# Patient Record
Sex: FEMALE | Race: WHITE | NOT HISPANIC OR LATINO
[De-identification: names, ages, dates, MRNs, and addresses within clinical notes are randomized per-mention and may not be internally consistent; named-entity substitution may affect disease eponyms.]

---

## 2017-01-14 ENCOUNTER — MEDICATION RENEWAL (OUTPATIENT)
Age: 77
End: 2017-01-14

## 2017-01-14 ENCOUNTER — RX RENEWAL (OUTPATIENT)
Age: 77
End: 2017-01-14

## 2017-01-17 ENCOUNTER — APPOINTMENT (OUTPATIENT)
Dept: ELECTROPHYSIOLOGY | Facility: CLINIC | Age: 77
End: 2017-01-17

## 2017-02-14 ENCOUNTER — APPOINTMENT (OUTPATIENT)
Dept: RHEUMATOLOGY | Facility: CLINIC | Age: 77
End: 2017-02-14

## 2017-02-17 ENCOUNTER — APPOINTMENT (OUTPATIENT)
Dept: ELECTROPHYSIOLOGY | Facility: CLINIC | Age: 77
End: 2017-02-17

## 2017-02-21 ENCOUNTER — APPOINTMENT (OUTPATIENT)
Dept: RHEUMATOLOGY | Facility: CLINIC | Age: 77
End: 2017-02-21

## 2017-03-20 ENCOUNTER — APPOINTMENT (OUTPATIENT)
Dept: ELECTROPHYSIOLOGY | Facility: CLINIC | Age: 77
End: 2017-03-20

## 2017-04-09 ENCOUNTER — RX RENEWAL (OUTPATIENT)
Age: 77
End: 2017-04-09

## 2017-04-21 ENCOUNTER — APPOINTMENT (OUTPATIENT)
Dept: ELECTROPHYSIOLOGY | Facility: CLINIC | Age: 77
End: 2017-04-21

## 2017-05-22 ENCOUNTER — APPOINTMENT (OUTPATIENT)
Dept: ELECTROPHYSIOLOGY | Facility: CLINIC | Age: 77
End: 2017-05-22

## 2017-06-23 ENCOUNTER — APPOINTMENT (OUTPATIENT)
Dept: ELECTROPHYSIOLOGY | Facility: CLINIC | Age: 77
End: 2017-06-23

## 2017-07-24 ENCOUNTER — APPOINTMENT (OUTPATIENT)
Dept: ELECTROPHYSIOLOGY | Facility: CLINIC | Age: 77
End: 2017-07-24

## 2017-08-10 ENCOUNTER — APPOINTMENT (OUTPATIENT)
Dept: RHEUMATOLOGY | Facility: CLINIC | Age: 77
End: 2017-08-10
Payer: MEDICARE

## 2017-08-10 VITALS
DIASTOLIC BLOOD PRESSURE: 80 MMHG | HEART RATE: 86 BPM | RESPIRATION RATE: 17 BRPM | BODY MASS INDEX: 30.43 KG/M2 | WEIGHT: 155 LBS | SYSTOLIC BLOOD PRESSURE: 154 MMHG | HEIGHT: 60 IN | TEMPERATURE: 98.2 F | OXYGEN SATURATION: 95 %

## 2017-08-10 DIAGNOSIS — M54.42 LUMBAGO WITH SCIATICA, LEFT SIDE: ICD-10-CM

## 2017-08-10 DIAGNOSIS — Z00.00 ENCOUNTER FOR GENERAL ADULT MEDICAL EXAMINATION W/OUT ABNORMAL FINDINGS: ICD-10-CM

## 2017-08-10 PROCEDURE — 99215 OFFICE O/P EST HI 40 MIN: CPT | Mod: 25

## 2017-08-10 PROCEDURE — 85651 RBC SED RATE NONAUTOMATED: CPT

## 2017-08-10 PROCEDURE — 36415 COLL VENOUS BLD VENIPUNCTURE: CPT

## 2017-08-12 LAB
ALBUMIN SERPL ELPH-MCNC: 4.6 G/DL
ALP BLD-CCNC: 91 U/L
ALT SERPL-CCNC: 21 U/L
ANION GAP SERPL CALC-SCNC: 15 MMOL/L
APPEARANCE: ABNORMAL
AST SERPL-CCNC: 24 U/L
BACTERIA: ABNORMAL
BASOPHILS # BLD AUTO: 0.05 K/UL
BASOPHILS NFR BLD AUTO: 0.7 %
BILIRUB SERPL-MCNC: 0.3 MG/DL
BILIRUB UR QL STRIP: NORMAL
BILIRUBIN URINE: ABNORMAL
BLOOD URINE: NEGATIVE
BUN SERPL-MCNC: 21 MG/DL
CALCIUM OXALATE CRYSTALS: ABNORMAL
CALCIUM SERPL-MCNC: 10 MG/DL
CHLORIDE SERPL-SCNC: 100 MMOL/L
CK SERPL-CCNC: 121 U/L
CO2 SERPL-SCNC: 25 MMOL/L
COLLECTION METHOD: NORMAL
COLOR: ABNORMAL
CREAT SERPL-MCNC: 0.77 MG/DL
CRP SERPL-MCNC: <0.2 MG/DL
DEPRECATED KAPPA LC FREE/LAMBDA SER: 1.21 RATIO
EOSINOPHIL # BLD AUTO: 0.14 K/UL
EOSINOPHIL NFR BLD AUTO: 1.8 %
GLUCOSE QUALITATIVE U: NORMAL MG/DL
GLUCOSE SERPL-MCNC: 93 MG/DL
GLUCOSE UR-MCNC: NORMAL
HCG UR QL: 0.2 EU/DL
HCT VFR BLD CALC: 42.1 %
HGB BLD-MCNC: 13.7 G/DL
HGB UR QL STRIP.AUTO: NORMAL
HYALINE CASTS: 0 /LPF
IGA SER QL IEP: 176 MG/DL
IGG SER QL IEP: 914 MG/DL
IGM SER QL IEP: 97 MG/DL
IMM GRANULOCYTES NFR BLD AUTO: 0.1 %
KAPPA LC CSF-MCNC: 1.82 MG/DL
KAPPA LC SERPL-MCNC: 2.21 MG/DL
KETONES UR-MCNC: NORMAL
KETONES URINE: NEGATIVE
LEUKOCYTE ESTERASE UR QL STRIP: NORMAL
LEUKOCYTE ESTERASE URINE: ABNORMAL
LYMPHOCYTES # BLD AUTO: 1.69 K/UL
LYMPHOCYTES NFR BLD AUTO: 22.2 %
M PROTEIN SPEC IFE-MCNC: NORMAL
MAN DIFF?: NORMAL
MCHC RBC-ENTMCNC: 29.8 PG
MCHC RBC-ENTMCNC: 32.5 GM/DL
MCV RBC AUTO: 91.7 FL
MICROSCOPIC-UA: NORMAL
MONOCYTES # BLD AUTO: 0.76 K/UL
MONOCYTES NFR BLD AUTO: 10 %
NEUTROPHILS # BLD AUTO: 4.97 K/UL
NEUTROPHILS NFR BLD AUTO: 65.2 %
NITRITE UR QL STRIP: POSITIVE
NITRITE URINE: POSITIVE
PH UR STRIP: 5.5
PH URINE: 5.5
PHOSPHATE SERPL-MCNC: 2.7 MG/DL
PLATELET # BLD AUTO: 279 K/UL
POTASSIUM SERPL-SCNC: 4.8 MMOL/L
PROT SERPL-MCNC: 7.3 G/DL
PROT UR STRIP-MCNC: NORMAL
PROTEIN URINE: 30 MG/DL
RBC # BLD: 4.59 M/UL
RBC # FLD: 14.1 %
RED BLOOD CELLS URINE: 16 /HPF
SODIUM SERPL-SCNC: 140 MMOL/L
SP GR UR STRIP: 1.02
SPECIFIC GRAVITY URINE: 1.04
SQUAMOUS EPITHELIAL CELLS: 18 /HPF
TSH SERPL-ACNC: 2.93 UIU/ML
UROBILINOGEN URINE: NORMAL MG/DL
WBC # FLD AUTO: 7.62 K/UL
WESR: 13
WHITE BLOOD CELLS URINE: 140 /HPF

## 2017-08-14 ENCOUNTER — CLINICAL ADVICE (OUTPATIENT)
Age: 77
End: 2017-08-14

## 2017-08-15 ENCOUNTER — APPOINTMENT (OUTPATIENT)
Dept: RHEUMATOLOGY | Facility: CLINIC | Age: 77
End: 2017-08-15
Payer: MEDICARE

## 2017-08-15 PROCEDURE — 77080 DXA BONE DENSITY AXIAL: CPT

## 2017-08-25 ENCOUNTER — APPOINTMENT (OUTPATIENT)
Dept: ELECTROPHYSIOLOGY | Facility: CLINIC | Age: 77
End: 2017-08-25
Payer: MEDICARE

## 2017-08-25 PROCEDURE — 93298 REM INTERROG DEV EVAL SCRMS: CPT

## 2017-08-25 PROCEDURE — 93299: CPT

## 2017-09-20 ENCOUNTER — APPOINTMENT (OUTPATIENT)
Dept: NEUROSURGERY | Facility: CLINIC | Age: 77
End: 2017-09-20
Payer: MEDICARE

## 2017-09-20 VITALS
OXYGEN SATURATION: 99 % | HEIGHT: 60 IN | WEIGHT: 160 LBS | BODY MASS INDEX: 31.41 KG/M2 | DIASTOLIC BLOOD PRESSURE: 68 MMHG | SYSTOLIC BLOOD PRESSURE: 138 MMHG | HEART RATE: 81 BPM | TEMPERATURE: 98.5 F

## 2017-09-20 PROCEDURE — 99212 OFFICE O/P EST SF 10 MIN: CPT

## 2017-09-25 ENCOUNTER — APPOINTMENT (OUTPATIENT)
Dept: ELECTROPHYSIOLOGY | Facility: CLINIC | Age: 77
End: 2017-09-25
Payer: MEDICARE

## 2017-09-25 PROCEDURE — 93299: CPT

## 2017-09-25 PROCEDURE — 93298 REM INTERROG DEV EVAL SCRMS: CPT

## 2017-10-24 ENCOUNTER — APPOINTMENT (OUTPATIENT)
Dept: NEUROLOGY | Facility: CLINIC | Age: 77
End: 2017-10-24
Payer: MEDICARE

## 2017-10-24 VITALS
DIASTOLIC BLOOD PRESSURE: 72 MMHG | BODY MASS INDEX: 31.41 KG/M2 | HEIGHT: 60 IN | WEIGHT: 160 LBS | SYSTOLIC BLOOD PRESSURE: 144 MMHG

## 2017-10-24 DIAGNOSIS — G31.84 MILD COGNITIVE IMPAIRMENT, SO STATED: ICD-10-CM

## 2017-10-24 DIAGNOSIS — G91.9 HYDROCEPHALUS, UNSPECIFIED: ICD-10-CM

## 2017-10-24 PROCEDURE — 99215 OFFICE O/P EST HI 40 MIN: CPT

## 2017-10-27 ENCOUNTER — APPOINTMENT (OUTPATIENT)
Dept: ELECTROPHYSIOLOGY | Facility: CLINIC | Age: 77
End: 2017-10-27
Payer: MEDICARE

## 2017-10-27 PROCEDURE — 93298 REM INTERROG DEV EVAL SCRMS: CPT

## 2017-10-27 PROCEDURE — 93299: CPT

## 2017-11-01 ENCOUNTER — APPOINTMENT (OUTPATIENT)
Dept: PULMONOLOGY | Facility: CLINIC | Age: 77
End: 2017-11-01
Payer: MEDICARE

## 2017-11-01 VITALS
SYSTOLIC BLOOD PRESSURE: 180 MMHG | DIASTOLIC BLOOD PRESSURE: 80 MMHG | HEART RATE: 82 BPM | BODY MASS INDEX: 33.58 KG/M2 | OXYGEN SATURATION: 96 % | WEIGHT: 160 LBS | HEIGHT: 58 IN

## 2017-11-01 DIAGNOSIS — I83.93 ASYMPTOMATIC VARICOSE VEINS OF BILATERAL LOWER EXTREMITIES: ICD-10-CM

## 2017-11-01 PROCEDURE — 99204 OFFICE O/P NEW MOD 45 MIN: CPT | Mod: 25

## 2017-11-01 PROCEDURE — 85018 HEMOGLOBIN: CPT | Mod: QW

## 2017-11-01 PROCEDURE — 94727 GAS DIL/WSHOT DETER LNG VOL: CPT

## 2017-11-01 PROCEDURE — 94060 EVALUATION OF WHEEZING: CPT

## 2017-11-01 PROCEDURE — 94729 DIFFUSING CAPACITY: CPT

## 2017-11-01 PROCEDURE — 94664 DEMO&/EVAL PT USE INHALER: CPT | Mod: 59

## 2017-11-22 ENCOUNTER — APPOINTMENT (OUTPATIENT)
Dept: PULMONOLOGY | Facility: CLINIC | Age: 77
End: 2017-11-22
Payer: MEDICARE

## 2017-11-22 VITALS
DIASTOLIC BLOOD PRESSURE: 70 MMHG | HEART RATE: 79 BPM | SYSTOLIC BLOOD PRESSURE: 160 MMHG | WEIGHT: 164 LBS | BODY MASS INDEX: 34.28 KG/M2

## 2017-11-22 VITALS — OXYGEN SATURATION: 96 %

## 2017-11-22 PROCEDURE — 99214 OFFICE O/P EST MOD 30 MIN: CPT

## 2017-11-27 ENCOUNTER — APPOINTMENT (OUTPATIENT)
Dept: ELECTROPHYSIOLOGY | Facility: CLINIC | Age: 77
End: 2017-11-27
Payer: MEDICARE

## 2017-11-27 PROCEDURE — 93299: CPT

## 2017-11-27 PROCEDURE — 93298 REM INTERROG DEV EVAL SCRMS: CPT

## 2017-12-09 ENCOUNTER — MEDICATION RENEWAL (OUTPATIENT)
Age: 77
End: 2017-12-09

## 2017-12-11 ENCOUNTER — APPOINTMENT (OUTPATIENT)
Dept: RHEUMATOLOGY | Facility: CLINIC | Age: 77
End: 2017-12-11
Payer: MEDICARE

## 2017-12-11 VITALS
BODY MASS INDEX: 32.25 KG/M2 | WEIGHT: 160 LBS | DIASTOLIC BLOOD PRESSURE: 106 MMHG | TEMPERATURE: 98 F | SYSTOLIC BLOOD PRESSURE: 172 MMHG | OXYGEN SATURATION: 95 % | HEIGHT: 59 IN | HEART RATE: 82 BPM | RESPIRATION RATE: 16 BRPM

## 2017-12-11 LAB
BILIRUB UR QL STRIP: NORMAL
CLARITY UR: CLEAR
COLLECTION METHOD: NORMAL
GLUCOSE UR-MCNC: NORMAL
HCG UR QL: 0.2 EU/DL
HGB UR QL STRIP.AUTO: NORMAL
KETONES UR-MCNC: NORMAL
LEUKOCYTE ESTERASE UR QL STRIP: NORMAL
NITRITE UR QL STRIP: NORMAL
PH UR STRIP: 7
PROT UR STRIP-MCNC: NORMAL
SP GR UR STRIP: 1.02
WESR: 38

## 2017-12-11 PROCEDURE — 36415 COLL VENOUS BLD VENIPUNCTURE: CPT

## 2017-12-11 PROCEDURE — 81003 URINALYSIS AUTO W/O SCOPE: CPT | Mod: QW

## 2017-12-11 PROCEDURE — 99214 OFFICE O/P EST MOD 30 MIN: CPT | Mod: 25

## 2017-12-11 PROCEDURE — 85651 RBC SED RATE NONAUTOMATED: CPT

## 2017-12-13 LAB
25(OH)D3 SERPL-MCNC: 58 NG/ML
ALBUMIN SERPL ELPH-MCNC: 4.3 G/DL
ALP BLD-CCNC: 85 U/L
ALT SERPL-CCNC: 20 U/L
ANION GAP SERPL CALC-SCNC: 12 MMOL/L
AST SERPL-CCNC: 27 U/L
BASOPHILS # BLD AUTO: 0.05 K/UL
BASOPHILS NFR BLD AUTO: 1 %
BILIRUB SERPL-MCNC: 0.3 MG/DL
BUN SERPL-MCNC: 21 MG/DL
CALCIUM SERPL-MCNC: 10.2 MG/DL
CHLORIDE SERPL-SCNC: 102 MMOL/L
CK SERPL-CCNC: 185 U/L
CO2 SERPL-SCNC: 30 MMOL/L
CREAT SERPL-MCNC: 0.83 MG/DL
CRP SERPL-MCNC: 0.2 MG/DL
EOSINOPHIL # BLD AUTO: 0.19 K/UL
EOSINOPHIL NFR BLD AUTO: 3.6 %
GLUCOSE SERPL-MCNC: 85 MG/DL
HCT VFR BLD CALC: 41.5 %
HGB BLD-MCNC: 13.1 G/DL
IMM GRANULOCYTES NFR BLD AUTO: 0 %
LYMPHOCYTES # BLD AUTO: 1.84 K/UL
LYMPHOCYTES NFR BLD AUTO: 35.1 %
MAN DIFF?: NORMAL
MCHC RBC-ENTMCNC: 30 PG
MCHC RBC-ENTMCNC: 31.6 GM/DL
MCV RBC AUTO: 95 FL
MONOCYTES # BLD AUTO: 0.66 K/UL
MONOCYTES NFR BLD AUTO: 12.6 %
NEUTROPHILS # BLD AUTO: 2.5 K/UL
NEUTROPHILS NFR BLD AUTO: 47.7 %
PHOSPHATE SERPL-MCNC: 3.2 MG/DL
PLATELET # BLD AUTO: 239 K/UL
POTASSIUM SERPL-SCNC: 4.9 MMOL/L
PROT SERPL-MCNC: 7.3 G/DL
RBC # BLD: 4.37 M/UL
RBC # FLD: 13 %
SODIUM SERPL-SCNC: 144 MMOL/L
WBC # FLD AUTO: 5.24 K/UL

## 2017-12-29 ENCOUNTER — APPOINTMENT (OUTPATIENT)
Dept: ELECTROPHYSIOLOGY | Facility: CLINIC | Age: 77
End: 2017-12-29
Payer: MEDICARE

## 2017-12-29 PROCEDURE — 93298 REM INTERROG DEV EVAL SCRMS: CPT

## 2017-12-29 PROCEDURE — 93299: CPT

## 2018-01-23 ENCOUNTER — APPOINTMENT (OUTPATIENT)
Dept: NEUROLOGY | Facility: CLINIC | Age: 78
End: 2018-01-23
Payer: MEDICARE

## 2018-01-23 VITALS
WEIGHT: 160 LBS | BODY MASS INDEX: 32.25 KG/M2 | SYSTOLIC BLOOD PRESSURE: 160 MMHG | DIASTOLIC BLOOD PRESSURE: 80 MMHG | HEIGHT: 59 IN

## 2018-01-23 PROCEDURE — 99214 OFFICE O/P EST MOD 30 MIN: CPT

## 2018-01-29 ENCOUNTER — APPOINTMENT (OUTPATIENT)
Dept: ELECTROPHYSIOLOGY | Facility: CLINIC | Age: 78
End: 2018-01-29
Payer: MEDICARE

## 2018-01-29 PROCEDURE — 93299: CPT

## 2018-01-29 PROCEDURE — 93298 REM INTERROG DEV EVAL SCRMS: CPT

## 2018-01-30 ENCOUNTER — OUTPATIENT (OUTPATIENT)
Dept: OUTPATIENT SERVICES | Facility: HOSPITAL | Age: 78
LOS: 1 days | End: 2018-01-30
Payer: MEDICARE

## 2018-01-30 DIAGNOSIS — Z90.49 ACQUIRED ABSENCE OF OTHER SPECIFIED PARTS OF DIGESTIVE TRACT: Chronic | ICD-10-CM

## 2018-01-30 DIAGNOSIS — Z96.652 PRESENCE OF LEFT ARTIFICIAL KNEE JOINT: Chronic | ICD-10-CM

## 2018-01-30 DIAGNOSIS — J98.6 DISORDERS OF DIAPHRAGM: ICD-10-CM

## 2018-01-30 DIAGNOSIS — Z90.710 ACQUIRED ABSENCE OF BOTH CERVIX AND UTERUS: Chronic | ICD-10-CM

## 2018-01-30 DIAGNOSIS — S86.009A UNSPECIFIED INJURY OF UNSPECIFIED ACHILLES TENDON, INITIAL ENCOUNTER: Chronic | ICD-10-CM

## 2018-03-02 ENCOUNTER — APPOINTMENT (OUTPATIENT)
Dept: ELECTROPHYSIOLOGY | Facility: CLINIC | Age: 78
End: 2018-03-02
Payer: MEDICARE

## 2018-03-02 PROCEDURE — 93298 REM INTERROG DEV EVAL SCRMS: CPT

## 2018-03-02 PROCEDURE — 93299: CPT

## 2018-03-10 ENCOUNTER — RX RENEWAL (OUTPATIENT)
Age: 78
End: 2018-03-10

## 2018-03-12 ENCOUNTER — APPOINTMENT (OUTPATIENT)
Dept: PULMONOLOGY | Facility: CLINIC | Age: 78
End: 2018-03-12
Payer: MEDICARE

## 2018-03-12 VITALS
HEART RATE: 78 BPM | BODY MASS INDEX: 31.91 KG/M2 | RESPIRATION RATE: 16 BRPM | WEIGHT: 158 LBS | SYSTOLIC BLOOD PRESSURE: 122 MMHG | OXYGEN SATURATION: 95 % | DIASTOLIC BLOOD PRESSURE: 72 MMHG

## 2018-03-12 DIAGNOSIS — J98.4 OTHER DISORDERS OF LUNG: ICD-10-CM

## 2018-03-12 PROCEDURE — 99214 OFFICE O/P EST MOD 30 MIN: CPT

## 2018-03-15 ENCOUNTER — APPOINTMENT (OUTPATIENT)
Dept: RHEUMATOLOGY | Facility: CLINIC | Age: 78
End: 2018-03-15

## 2018-03-24 ENCOUNTER — RX RENEWAL (OUTPATIENT)
Age: 78
End: 2018-03-24

## 2018-04-02 ENCOUNTER — APPOINTMENT (OUTPATIENT)
Dept: ELECTROPHYSIOLOGY | Facility: CLINIC | Age: 78
End: 2018-04-02
Payer: MEDICARE

## 2018-04-02 PROCEDURE — 93299: CPT

## 2018-04-02 PROCEDURE — 93298 REM INTERROG DEV EVAL SCRMS: CPT

## 2018-04-19 PROCEDURE — 94618 PULMONARY STRESS TESTING: CPT

## 2018-04-19 PROCEDURE — G0239: CPT

## 2018-05-04 ENCOUNTER — APPOINTMENT (OUTPATIENT)
Dept: ELECTROPHYSIOLOGY | Facility: CLINIC | Age: 78
End: 2018-05-04
Payer: MEDICARE

## 2018-05-04 PROCEDURE — 93298 REM INTERROG DEV EVAL SCRMS: CPT

## 2018-05-04 PROCEDURE — 93299: CPT

## 2018-06-04 ENCOUNTER — APPOINTMENT (OUTPATIENT)
Dept: ELECTROPHYSIOLOGY | Facility: CLINIC | Age: 78
End: 2018-06-04
Payer: MEDICARE

## 2018-06-04 PROCEDURE — 93299: CPT

## 2018-06-04 PROCEDURE — 93298 REM INTERROG DEV EVAL SCRMS: CPT

## 2018-06-13 ENCOUNTER — NON-APPOINTMENT (OUTPATIENT)
Age: 78
End: 2018-06-13

## 2018-06-13 ENCOUNTER — APPOINTMENT (OUTPATIENT)
Dept: CARDIOLOGY | Facility: CLINIC | Age: 78
End: 2018-06-13
Payer: MEDICARE

## 2018-06-13 VITALS
SYSTOLIC BLOOD PRESSURE: 150 MMHG | WEIGHT: 162 LBS | BODY MASS INDEX: 32.66 KG/M2 | HEIGHT: 59 IN | DIASTOLIC BLOOD PRESSURE: 83 MMHG | OXYGEN SATURATION: 95 % | HEART RATE: 77 BPM

## 2018-06-13 DIAGNOSIS — R06.02 SHORTNESS OF BREATH: ICD-10-CM

## 2018-06-13 PROCEDURE — 93000 ELECTROCARDIOGRAM COMPLETE: CPT

## 2018-06-13 PROCEDURE — 99214 OFFICE O/P EST MOD 30 MIN: CPT

## 2018-06-13 RX ORDER — CHOLECALCIFEROL (VITAMIN D3) 125 MCG
TABLET ORAL
Refills: 0 | Status: DISCONTINUED | COMMUNITY
End: 2018-06-13

## 2018-06-13 RX ORDER — LATANOPROST/PF 0.005 %
DROPS OPHTHALMIC (EYE)
Refills: 0 | Status: DISCONTINUED | COMMUNITY
End: 2018-06-13

## 2018-06-13 RX ORDER — NAPROXEN 500 MG/1
TABLET ORAL
Refills: 0 | Status: DISCONTINUED | COMMUNITY
End: 2018-06-13

## 2018-06-13 RX ORDER — INSULIN DEGLUDEC INJECTION 200 U/ML
200 INJECTION, SOLUTION SUBCUTANEOUS
Qty: 27 | Refills: 0 | Status: DISCONTINUED | COMMUNITY
Start: 2017-08-08 | End: 2018-06-13

## 2018-06-13 RX ORDER — ASPIRIN 325 MG/1
325 TABLET, FILM COATED ORAL DAILY
Qty: 30 | Refills: 3 | Status: DISCONTINUED | COMMUNITY
Start: 2017-03-20 | End: 2018-06-13

## 2018-06-13 RX ORDER — METFORMIN HYDROCHLORIDE 500 MG/1
500 TABLET, COATED ORAL
Refills: 0 | Status: DISCONTINUED | COMMUNITY
End: 2018-06-13

## 2018-06-13 RX ORDER — ACETAMINOPHEN AND CODEINE 300; 30 MG/1; MG/1
300-30 TABLET ORAL
Qty: 12 | Refills: 0 | Status: DISCONTINUED | COMMUNITY
Start: 2017-11-04 | End: 2018-06-13

## 2018-06-13 RX ORDER — NAPROXEN 500 MG/1
TABLET, DELAYED RELEASE ORAL
Refills: 0 | Status: DISCONTINUED | COMMUNITY
End: 2018-06-13

## 2018-06-13 RX ORDER — NAPROXEN SODIUM 220 MG
220 TABLET ORAL
Refills: 0 | Status: DISCONTINUED | COMMUNITY
End: 2018-06-13

## 2018-06-13 RX ORDER — BUPROPION HYDROCHLORIDE 75 MG/1
TABLET, FILM COATED ORAL
Refills: 0 | Status: DISCONTINUED | COMMUNITY
End: 2018-06-13

## 2018-06-19 ENCOUNTER — FORM ENCOUNTER (OUTPATIENT)
Age: 78
End: 2018-06-19

## 2018-06-20 ENCOUNTER — OUTPATIENT (OUTPATIENT)
Dept: OUTPATIENT SERVICES | Facility: HOSPITAL | Age: 78
LOS: 1 days | End: 2018-06-20
Payer: MEDICARE

## 2018-06-20 DIAGNOSIS — Z90.710 ACQUIRED ABSENCE OF BOTH CERVIX AND UTERUS: Chronic | ICD-10-CM

## 2018-06-20 DIAGNOSIS — S86.009A UNSPECIFIED INJURY OF UNSPECIFIED ACHILLES TENDON, INITIAL ENCOUNTER: Chronic | ICD-10-CM

## 2018-06-20 DIAGNOSIS — Z96.652 PRESENCE OF LEFT ARTIFICIAL KNEE JOINT: Chronic | ICD-10-CM

## 2018-06-20 DIAGNOSIS — R06.02 SHORTNESS OF BREATH: ICD-10-CM

## 2018-06-20 DIAGNOSIS — Z90.49 ACQUIRED ABSENCE OF OTHER SPECIFIED PARTS OF DIGESTIVE TRACT: Chronic | ICD-10-CM

## 2018-06-20 PROCEDURE — 93016 CV STRESS TEST SUPVJ ONLY: CPT

## 2018-06-20 PROCEDURE — A9500: CPT

## 2018-06-20 PROCEDURE — 78452 HT MUSCLE IMAGE SPECT MULT: CPT

## 2018-06-20 PROCEDURE — 78452 HT MUSCLE IMAGE SPECT MULT: CPT | Mod: 26

## 2018-06-20 PROCEDURE — 93017 CV STRESS TEST TRACING ONLY: CPT

## 2018-06-20 PROCEDURE — 93306 TTE W/DOPPLER COMPLETE: CPT

## 2018-06-20 PROCEDURE — 93306 TTE W/DOPPLER COMPLETE: CPT | Mod: 26

## 2018-06-20 PROCEDURE — 93018 CV STRESS TEST I&R ONLY: CPT

## 2018-06-22 ENCOUNTER — RESULT REVIEW (OUTPATIENT)
Age: 78
End: 2018-06-22

## 2018-06-27 ENCOUNTER — APPOINTMENT (OUTPATIENT)
Dept: RHEUMATOLOGY | Facility: CLINIC | Age: 78
End: 2018-06-27
Payer: MEDICARE

## 2018-06-27 ENCOUNTER — APPOINTMENT (OUTPATIENT)
Dept: NEUROSURGERY | Facility: CLINIC | Age: 78
End: 2018-06-27
Payer: MEDICARE

## 2018-06-27 VITALS
DIASTOLIC BLOOD PRESSURE: 82 MMHG | BODY MASS INDEX: 32.32 KG/M2 | OXYGEN SATURATION: 93 % | TEMPERATURE: 98 F | WEIGHT: 160 LBS | HEART RATE: 82 BPM | SYSTOLIC BLOOD PRESSURE: 142 MMHG | RESPIRATION RATE: 18 BRPM

## 2018-06-27 VITALS
SYSTOLIC BLOOD PRESSURE: 141 MMHG | BODY MASS INDEX: 32.66 KG/M2 | HEART RATE: 80 BPM | TEMPERATURE: 98.4 F | WEIGHT: 162 LBS | HEIGHT: 59 IN | OXYGEN SATURATION: 94 % | DIASTOLIC BLOOD PRESSURE: 80 MMHG

## 2018-06-27 DIAGNOSIS — M75.21 BICIPITAL TENDINITIS, RIGHT SHOULDER: ICD-10-CM

## 2018-06-27 PROCEDURE — 99214 OFFICE O/P EST MOD 30 MIN: CPT

## 2018-06-27 PROCEDURE — 99214 OFFICE O/P EST MOD 30 MIN: CPT | Mod: 25

## 2018-06-27 PROCEDURE — 85651 RBC SED RATE NONAUTOMATED: CPT

## 2018-06-27 PROCEDURE — 36415 COLL VENOUS BLD VENIPUNCTURE: CPT

## 2018-06-27 RX ORDER — MIRTAZAPINE 30 MG/1
30 TABLET, FILM COATED ORAL
Qty: 30 | Refills: 0 | Status: DISCONTINUED | COMMUNITY
Start: 2018-05-25

## 2018-06-27 RX ORDER — AZITHROMYCIN 250 MG/1
250 TABLET, FILM COATED ORAL
Qty: 6 | Refills: 0 | Status: DISCONTINUED | COMMUNITY
Start: 2018-01-19

## 2018-06-27 RX ORDER — MIRTAZAPINE 45 MG/1
45 TABLET, FILM COATED ORAL
Qty: 90 | Refills: 0 | Status: DISCONTINUED | COMMUNITY
Start: 2018-06-06

## 2018-06-27 RX ORDER — BROMFENAC 1.03 MG/ML
0.09 SOLUTION/ DROPS OPHTHALMIC
Qty: 2 | Refills: 0 | Status: DISCONTINUED | COMMUNITY
Start: 2018-03-23

## 2018-06-27 RX ORDER — PREDNISOLONE ACETATE 10 MG/ML
1 SUSPENSION/ DROPS OPHTHALMIC
Qty: 5 | Refills: 0 | Status: DISCONTINUED | COMMUNITY
Start: 2018-02-28

## 2018-06-28 LAB
BASOPHILS # BLD AUTO: 0.07 K/UL
BASOPHILS NFR BLD AUTO: 1 %
BILIRUB UR QL STRIP: NORMAL
CLARITY UR: NORMAL
COLLECTION METHOD: NORMAL
EOSINOPHIL # BLD AUTO: 0.33 K/UL
EOSINOPHIL NFR BLD AUTO: 4.8 %
GLUCOSE UR-MCNC: NORMAL
HCG UR QL: 0.2 EU/DL
HCT VFR BLD CALC: 41 %
HGB BLD-MCNC: 12.9 G/DL
HGB UR QL STRIP.AUTO: NORMAL
IMM GRANULOCYTES NFR BLD AUTO: 0.3 %
KETONES UR-MCNC: NORMAL
LEUKOCYTE ESTERASE UR QL STRIP: NORMAL
LYMPHOCYTES # BLD AUTO: 1.91 K/UL
LYMPHOCYTES NFR BLD AUTO: 27.6 %
MAN DIFF?: NORMAL
MCHC RBC-ENTMCNC: 30.3 PG
MCHC RBC-ENTMCNC: 31.5 GM/DL
MCV RBC AUTO: 96.2 FL
MONOCYTES # BLD AUTO: 0.75 K/UL
MONOCYTES NFR BLD AUTO: 10.8 %
NEUTROPHILS # BLD AUTO: 3.84 K/UL
NEUTROPHILS NFR BLD AUTO: 55.5 %
NITRITE UR QL STRIP: POSITIVE
PH UR STRIP: 6
PLATELET # BLD AUTO: 288 K/UL
PROT UR STRIP-MCNC: NORMAL
RBC # BLD: 4.26 M/UL
RBC # FLD: 13.3 %
SP GR UR STRIP: 1.02
WBC # FLD AUTO: 6.92 K/UL
WESR: 50

## 2018-06-29 LAB
ALBUMIN SERPL ELPH-MCNC: 4.2 G/DL
ALP BLD-CCNC: 86 U/L
ALT SERPL-CCNC: 16 U/L
ANION GAP SERPL CALC-SCNC: 18 MMOL/L
APPEARANCE: CLEAR
AST SERPL-CCNC: 22 U/L
BACTERIA: ABNORMAL
BILIRUB SERPL-MCNC: 0.2 MG/DL
BILIRUBIN URINE: NEGATIVE
BLOOD URINE: ABNORMAL
BUN SERPL-MCNC: 25 MG/DL
CALCIUM SERPL-MCNC: 9.7 MG/DL
CCP AB SER IA-ACNC: <8 UNITS
CHLORIDE SERPL-SCNC: 100 MMOL/L
CO2 SERPL-SCNC: 22 MMOL/L
COLOR: YELLOW
CREAT SERPL-MCNC: 0.83 MG/DL
CRP SERPL-MCNC: <0.2 MG/DL
GLUCOSE QUALITATIVE U: NEGATIVE MG/DL
GLUCOSE SERPL-MCNC: 98 MG/DL
HYALINE CASTS: 0 /LPF
KETONES URINE: NEGATIVE
LDH SERPL-CCNC: 223 U/L
LEUKOCYTE ESTERASE URINE: ABNORMAL
MICROSCOPIC-UA: NORMAL
NITRITE URINE: POSITIVE
PH URINE: 6
PHOSPHATE SERPL-MCNC: 3 MG/DL
POTASSIUM SERPL-SCNC: 4.4 MMOL/L
PROT SERPL-MCNC: 7.2 G/DL
PROTEIN URINE: NEGATIVE MG/DL
RED BLOOD CELLS URINE: 5 /HPF
RF+CCP IGG SER-IMP: NEGATIVE
RHEUMATOID FACT SER QL: 10 IU/ML
SODIUM SERPL-SCNC: 140 MMOL/L
SPECIFIC GRAVITY URINE: 1.01
SQUAMOUS EPITHELIAL CELLS: 6 /HPF
UROBILINOGEN URINE: NEGATIVE MG/DL
WHITE BLOOD CELLS URINE: 7 /HPF

## 2018-06-30 LAB
ENA SS-A AB SER IA-ACNC: <0.2 AL
ENA SS-B AB SER IA-ACNC: <0.2 AL

## 2018-07-06 ENCOUNTER — APPOINTMENT (OUTPATIENT)
Dept: ELECTROPHYSIOLOGY | Facility: CLINIC | Age: 78
End: 2018-07-06
Payer: MEDICARE

## 2018-07-06 PROCEDURE — 93298 REM INTERROG DEV EVAL SCRMS: CPT

## 2018-07-06 PROCEDURE — 93299: CPT

## 2018-07-10 ENCOUNTER — NON-APPOINTMENT (OUTPATIENT)
Age: 78
End: 2018-07-10

## 2018-07-10 ENCOUNTER — APPOINTMENT (OUTPATIENT)
Dept: CARDIOLOGY | Facility: CLINIC | Age: 78
End: 2018-07-10
Payer: MEDICARE

## 2018-07-10 VITALS
HEART RATE: 70 BPM | SYSTOLIC BLOOD PRESSURE: 162 MMHG | BODY MASS INDEX: 34.07 KG/M2 | WEIGHT: 169 LBS | HEIGHT: 59 IN | DIASTOLIC BLOOD PRESSURE: 60 MMHG | OXYGEN SATURATION: 93 %

## 2018-07-10 VITALS — SYSTOLIC BLOOD PRESSURE: 136 MMHG | DIASTOLIC BLOOD PRESSURE: 60 MMHG

## 2018-07-10 VITALS — DIASTOLIC BLOOD PRESSURE: 58 MMHG | SYSTOLIC BLOOD PRESSURE: 124 MMHG

## 2018-07-10 DIAGNOSIS — R07.89 OTHER CHEST PAIN: ICD-10-CM

## 2018-07-10 PROCEDURE — 99214 OFFICE O/P EST MOD 30 MIN: CPT

## 2018-07-10 PROCEDURE — 93000 ELECTROCARDIOGRAM COMPLETE: CPT

## 2018-07-10 RX ORDER — CLINDAMYCIN HYDROCHLORIDE 150 MG/1
150 CAPSULE ORAL
Qty: 24 | Refills: 0 | Status: DISCONTINUED | COMMUNITY
Start: 2017-10-28 | End: 2018-07-10

## 2018-07-10 RX ORDER — TRAVOPROST 0.04 MG/ML
0 SOLUTION/ DROPS OPHTHALMIC
Qty: 8 | Refills: 0 | Status: DISCONTINUED | COMMUNITY
Start: 2017-11-09 | End: 2018-07-10

## 2018-07-10 RX ORDER — BACLOFEN 10 MG/1
10 TABLET ORAL
Qty: 60 | Refills: 3 | Status: DISCONTINUED | COMMUNITY
Start: 2017-08-10 | End: 2018-07-10

## 2018-08-06 ENCOUNTER — RX RENEWAL (OUTPATIENT)
Age: 78
End: 2018-08-06

## 2018-08-06 ENCOUNTER — APPOINTMENT (OUTPATIENT)
Dept: ELECTROPHYSIOLOGY | Facility: CLINIC | Age: 78
End: 2018-08-06
Payer: MEDICARE

## 2018-08-06 PROCEDURE — 93299: CPT

## 2018-08-06 PROCEDURE — 93298 REM INTERROG DEV EVAL SCRMS: CPT

## 2018-09-07 ENCOUNTER — APPOINTMENT (OUTPATIENT)
Dept: ELECTROPHYSIOLOGY | Facility: CLINIC | Age: 78
End: 2018-09-07
Payer: MEDICARE

## 2018-09-07 ENCOUNTER — APPOINTMENT (OUTPATIENT)
Dept: NEUROLOGY | Facility: CLINIC | Age: 78
End: 2018-09-07

## 2018-09-07 PROCEDURE — 93299: CPT

## 2018-09-07 PROCEDURE — 93298 REM INTERROG DEV EVAL SCRMS: CPT

## 2018-09-10 ENCOUNTER — APPOINTMENT (OUTPATIENT)
Dept: PULMONOLOGY | Facility: CLINIC | Age: 78
End: 2018-09-10

## 2018-09-22 ENCOUNTER — RX RENEWAL (OUTPATIENT)
Age: 78
End: 2018-09-22

## 2018-09-27 ENCOUNTER — RX RENEWAL (OUTPATIENT)
Age: 78
End: 2018-09-27

## 2018-10-02 ENCOUNTER — APPOINTMENT (OUTPATIENT)
Dept: RHEUMATOLOGY | Facility: CLINIC | Age: 78
End: 2018-10-02
Payer: MEDICARE

## 2018-10-02 ENCOUNTER — LABORATORY RESULT (OUTPATIENT)
Age: 78
End: 2018-10-02

## 2018-10-02 VITALS
OXYGEN SATURATION: 96 % | BODY MASS INDEX: 33.26 KG/M2 | WEIGHT: 165 LBS | HEART RATE: 79 BPM | SYSTOLIC BLOOD PRESSURE: 140 MMHG | DIASTOLIC BLOOD PRESSURE: 68 MMHG | RESPIRATION RATE: 18 BRPM | HEIGHT: 59 IN | TEMPERATURE: 97.9 F

## 2018-10-02 DIAGNOSIS — L40.50 ARTHROPATHIC PSORIASIS, UNSPECIFIED: ICD-10-CM

## 2018-10-02 DIAGNOSIS — M79.7 FIBROMYALGIA: ICD-10-CM

## 2018-10-02 DIAGNOSIS — M85.80 OTHER SPECIFIED DISORDERS OF BONE DENSITY AND STRUCTURE, UNSPECIFIED SITE: ICD-10-CM

## 2018-10-02 DIAGNOSIS — M15.9 POLYOSTEOARTHRITIS, UNSPECIFIED: ICD-10-CM

## 2018-10-02 DIAGNOSIS — G89.29 PAIN IN RIGHT SHOULDER: ICD-10-CM

## 2018-10-02 DIAGNOSIS — M25.511 PAIN IN RIGHT SHOULDER: ICD-10-CM

## 2018-10-02 DIAGNOSIS — I73.00 RAYNAUD'S SYNDROME W/OUT GANGRENE: ICD-10-CM

## 2018-10-02 PROCEDURE — 99215 OFFICE O/P EST HI 40 MIN: CPT | Mod: 25

## 2018-10-02 PROCEDURE — 36415 COLL VENOUS BLD VENIPUNCTURE: CPT

## 2018-10-02 RX ORDER — ASPIRIN 300 MG
325 SUPPOSITORY, RECTAL RECTAL
Refills: 0 | Status: DISCONTINUED | COMMUNITY
End: 2018-10-02

## 2018-10-02 RX ORDER — OMEPRAZOLE 20 MG/1
20 CAPSULE, DELAYED RELEASE ORAL DAILY
Qty: 90 | Refills: 0 | Status: DISCONTINUED | COMMUNITY
Start: 2018-06-29 | End: 2018-10-02

## 2018-10-06 LAB
ALBUMIN SERPL ELPH-MCNC: 4.3 G/DL
ALP BLD-CCNC: 78 U/L
ALT SERPL-CCNC: 17 U/L
ANION GAP SERPL CALC-SCNC: 17 MMOL/L
AST SERPL-CCNC: 18 U/L
BASOPHILS # BLD AUTO: 0.09 K/UL
BASOPHILS NFR BLD AUTO: 1.1 %
BILIRUB SERPL-MCNC: 0.3 MG/DL
BUN SERPL-MCNC: 27 MG/DL
CALCIUM SERPL-MCNC: 9.5 MG/DL
CHLORIDE SERPL-SCNC: 100 MMOL/L
CO2 SERPL-SCNC: 25 MMOL/L
CREAT SERPL-MCNC: 1.12 MG/DL
CRP SERPL-MCNC: 0.12 MG/DL
ENA SS-A AB SER IA-ACNC: <0.2 AL
ENA SS-B AB SER IA-ACNC: <0.2 AL
EOSINOPHIL # BLD AUTO: 0.38 K/UL
EOSINOPHIL NFR BLD AUTO: 4.4 %
ERYTHROCYTE [SEDIMENTATION RATE] IN BLOOD BY WESTERGREN METHOD: 22 MM/HR
GLUCOSE SERPL-MCNC: 108 MG/DL
HCT VFR BLD CALC: 40.9 %
HGB BLD-MCNC: 12.8 G/DL
IMM GRANULOCYTES NFR BLD AUTO: 0.4 %
LDH SERPL-CCNC: 193 U/L
LYMPHOCYTES # BLD AUTO: 2.33 K/UL
LYMPHOCYTES NFR BLD AUTO: 27.2 %
MAN DIFF?: NORMAL
MCHC RBC-ENTMCNC: 29 PG
MCHC RBC-ENTMCNC: 31.3 GM/DL
MCV RBC AUTO: 92.5 FL
MONOCYTES # BLD AUTO: 0.78 K/UL
MONOCYTES NFR BLD AUTO: 9.1 %
NEUTROPHILS # BLD AUTO: 4.95 K/UL
NEUTROPHILS NFR BLD AUTO: 57.8 %
PHOSPHATE SERPL-MCNC: 3.2 MG/DL
PLATELET # BLD AUTO: 283 K/UL
POTASSIUM SERPL-SCNC: 4.4 MMOL/L
PROT SERPL-MCNC: 7.2 G/DL
RBC # BLD: 4.42 M/UL
RBC # FLD: 13.8 %
SODIUM SERPL-SCNC: 142 MMOL/L
WBC # FLD AUTO: 8.56 K/UL

## 2018-10-08 ENCOUNTER — APPOINTMENT (OUTPATIENT)
Dept: ELECTROPHYSIOLOGY | Facility: CLINIC | Age: 78
End: 2018-10-08
Payer: MEDICARE

## 2018-10-08 PROCEDURE — 93298 REM INTERROG DEV EVAL SCRMS: CPT

## 2018-10-08 PROCEDURE — 93299: CPT

## 2018-10-31 ENCOUNTER — APPOINTMENT (OUTPATIENT)
Dept: DERMATOLOGY | Facility: CLINIC | Age: 78
End: 2018-10-31

## 2018-11-09 ENCOUNTER — APPOINTMENT (OUTPATIENT)
Dept: ELECTROPHYSIOLOGY | Facility: CLINIC | Age: 78
End: 2018-11-09
Payer: MEDICARE

## 2018-11-09 PROCEDURE — 93298 REM INTERROG DEV EVAL SCRMS: CPT

## 2018-11-09 PROCEDURE — 93299: CPT

## 2018-12-10 ENCOUNTER — APPOINTMENT (OUTPATIENT)
Dept: ELECTROPHYSIOLOGY | Facility: CLINIC | Age: 78
End: 2018-12-10
Payer: MEDICARE

## 2018-12-10 PROCEDURE — 93299: CPT

## 2018-12-10 PROCEDURE — 93298 REM INTERROG DEV EVAL SCRMS: CPT

## 2019-01-11 ENCOUNTER — APPOINTMENT (OUTPATIENT)
Dept: ELECTROPHYSIOLOGY | Facility: CLINIC | Age: 79
End: 2019-01-11
Payer: MEDICARE

## 2019-01-11 ENCOUNTER — APPOINTMENT (OUTPATIENT)
Dept: RHEUMATOLOGY | Facility: CLINIC | Age: 79
End: 2019-01-11

## 2019-01-11 PROCEDURE — 93299: CPT

## 2019-01-11 PROCEDURE — 93298 REM INTERROG DEV EVAL SCRMS: CPT

## 2019-02-04 ENCOUNTER — APPOINTMENT (OUTPATIENT)
Dept: CARDIOLOGY | Facility: CLINIC | Age: 79
End: 2019-02-04

## 2019-02-11 ENCOUNTER — APPOINTMENT (OUTPATIENT)
Dept: ELECTROPHYSIOLOGY | Facility: CLINIC | Age: 79
End: 2019-02-11
Payer: MEDICARE

## 2019-02-11 PROCEDURE — 93299: CPT

## 2019-02-11 PROCEDURE — 93298 REM INTERROG DEV EVAL SCRMS: CPT

## 2019-03-18 ENCOUNTER — APPOINTMENT (OUTPATIENT)
Dept: ELECTROPHYSIOLOGY | Facility: CLINIC | Age: 79
End: 2019-03-18
Payer: MEDICARE

## 2019-03-18 PROCEDURE — 93299: CPT

## 2019-03-18 PROCEDURE — 93298 REM INTERROG DEV EVAL SCRMS: CPT

## 2019-04-19 ENCOUNTER — APPOINTMENT (OUTPATIENT)
Dept: ELECTROPHYSIOLOGY | Facility: CLINIC | Age: 79
End: 2019-04-19
Payer: MEDICARE

## 2019-04-19 PROCEDURE — 93298 REM INTERROG DEV EVAL SCRMS: CPT

## 2019-04-19 PROCEDURE — 93299: CPT

## 2019-05-20 ENCOUNTER — APPOINTMENT (OUTPATIENT)
Dept: ELECTROPHYSIOLOGY | Facility: CLINIC | Age: 79
End: 2019-05-20
Payer: MEDICARE

## 2019-05-20 PROCEDURE — 93299: CPT

## 2019-05-20 PROCEDURE — 93298 REM INTERROG DEV EVAL SCRMS: CPT

## 2019-05-22 ENCOUNTER — APPOINTMENT (OUTPATIENT)
Dept: CARDIOLOGY | Facility: CLINIC | Age: 79
End: 2019-05-22
Payer: MEDICARE

## 2019-05-22 VITALS
DIASTOLIC BLOOD PRESSURE: 64 MMHG | HEART RATE: 85 BPM | BODY MASS INDEX: 35.88 KG/M2 | HEIGHT: 59 IN | WEIGHT: 178 LBS | SYSTOLIC BLOOD PRESSURE: 146 MMHG | OXYGEN SATURATION: 94 %

## 2019-05-22 VITALS — SYSTOLIC BLOOD PRESSURE: 148 MMHG | DIASTOLIC BLOOD PRESSURE: 80 MMHG

## 2019-05-22 DIAGNOSIS — M79.89 OTHER SPECIFIED SOFT TISSUE DISORDERS: ICD-10-CM

## 2019-05-22 DIAGNOSIS — R06.00 DYSPNEA, UNSPECIFIED: ICD-10-CM

## 2019-05-22 PROCEDURE — 93000 ELECTROCARDIOGRAM COMPLETE: CPT

## 2019-05-22 PROCEDURE — 99214 OFFICE O/P EST MOD 30 MIN: CPT

## 2019-05-30 ENCOUNTER — NON-APPOINTMENT (OUTPATIENT)
Age: 79
End: 2019-05-30

## 2019-06-14 ENCOUNTER — APPOINTMENT (OUTPATIENT)
Dept: CARDIOLOGY | Facility: CLINIC | Age: 79
End: 2019-06-14
Payer: MEDICARE

## 2019-06-14 PROCEDURE — 93306 TTE W/DOPPLER COMPLETE: CPT

## 2019-06-14 PROCEDURE — 93970 EXTREMITY STUDY: CPT

## 2019-06-18 ENCOUNTER — MEDICATION RENEWAL (OUTPATIENT)
Age: 79
End: 2019-06-18

## 2019-06-21 ENCOUNTER — APPOINTMENT (OUTPATIENT)
Dept: ELECTROPHYSIOLOGY | Facility: CLINIC | Age: 79
End: 2019-06-21

## 2019-07-22 ENCOUNTER — APPOINTMENT (OUTPATIENT)
Dept: ELECTROPHYSIOLOGY | Facility: CLINIC | Age: 79
End: 2019-07-22

## 2019-08-23 ENCOUNTER — APPOINTMENT (OUTPATIENT)
Dept: ELECTROPHYSIOLOGY | Facility: CLINIC | Age: 79
End: 2019-08-23

## 2019-10-11 ENCOUNTER — APPOINTMENT (OUTPATIENT)
Dept: RHEUMATOLOGY | Facility: CLINIC | Age: 79
End: 2019-10-11

## 2019-10-15 ENCOUNTER — APPOINTMENT (OUTPATIENT)
Dept: DERMATOLOGY | Facility: CLINIC | Age: 79
End: 2019-10-15
Payer: MEDICARE

## 2019-10-15 PROCEDURE — 99202 OFFICE O/P NEW SF 15 MIN: CPT

## 2019-10-30 ENCOUNTER — NON-APPOINTMENT (OUTPATIENT)
Age: 79
End: 2019-10-30

## 2019-10-30 ENCOUNTER — APPOINTMENT (OUTPATIENT)
Dept: CARDIOLOGY | Facility: CLINIC | Age: 79
End: 2019-10-30
Payer: MEDICARE

## 2019-10-30 VITALS
SYSTOLIC BLOOD PRESSURE: 112 MMHG | DIASTOLIC BLOOD PRESSURE: 68 MMHG | WEIGHT: 168 LBS | BODY MASS INDEX: 33.87 KG/M2 | HEART RATE: 71 BPM | OXYGEN SATURATION: 97 % | HEIGHT: 59 IN

## 2019-10-30 DIAGNOSIS — E78.5 HYPERLIPIDEMIA, UNSPECIFIED: ICD-10-CM

## 2019-10-30 DIAGNOSIS — R07.9 CHEST PAIN, UNSPECIFIED: ICD-10-CM

## 2019-10-30 DIAGNOSIS — R60.0 LOCALIZED EDEMA: ICD-10-CM

## 2019-10-30 PROCEDURE — 99214 OFFICE O/P EST MOD 30 MIN: CPT | Mod: 25

## 2019-10-30 PROCEDURE — 93000 ELECTROCARDIOGRAM COMPLETE: CPT

## 2019-11-12 ENCOUNTER — APPOINTMENT (OUTPATIENT)
Dept: CARDIOLOGY | Facility: CLINIC | Age: 79
End: 2019-11-12
Payer: MEDICARE

## 2019-11-12 PROCEDURE — A9500: CPT

## 2019-11-12 PROCEDURE — 93015 CV STRESS TEST SUPVJ I&R: CPT

## 2019-11-12 PROCEDURE — 78452 HT MUSCLE IMAGE SPECT MULT: CPT

## 2020-01-16 ENCOUNTER — APPOINTMENT (OUTPATIENT)
Dept: CARDIOLOGY | Facility: CLINIC | Age: 80
End: 2020-01-16

## 2020-02-16 ENCOUNTER — EMERGENCY (EMERGENCY)
Facility: HOSPITAL | Age: 80
LOS: 1 days | Discharge: HOME | End: 2020-02-16
Attending: EMERGENCY MEDICINE
Payer: MEDICARE

## 2020-02-16 ENCOUNTER — INPATIENT (INPATIENT)
Facility: HOSPITAL | Age: 80
LOS: 18 days | Discharge: HOME | DRG: 885 | End: 2020-03-06
Attending: PSYCHIATRY & NEUROLOGY | Admitting: PSYCHIATRY & NEUROLOGY
Payer: MEDICARE

## 2020-02-16 VITALS
RESPIRATION RATE: 20 BRPM | HEIGHT: 59 IN | WEIGHT: 160.06 LBS | HEART RATE: 116 BPM | TEMPERATURE: 97 F | OXYGEN SATURATION: 96 % | SYSTOLIC BLOOD PRESSURE: 96 MMHG | DIASTOLIC BLOOD PRESSURE: 58 MMHG

## 2020-02-16 VITALS
SYSTOLIC BLOOD PRESSURE: 150 MMHG | TEMPERATURE: 98 F | DIASTOLIC BLOOD PRESSURE: 82 MMHG | WEIGHT: 158.29 LBS | HEART RATE: 83 BPM | RESPIRATION RATE: 16 BRPM | OXYGEN SATURATION: 93 %

## 2020-02-16 VITALS
SYSTOLIC BLOOD PRESSURE: 143 MMHG | HEART RATE: 69 BPM | TEMPERATURE: 98 F | RESPIRATION RATE: 16 BRPM | DIASTOLIC BLOOD PRESSURE: 63 MMHG | OXYGEN SATURATION: 95 %

## 2020-02-16 DIAGNOSIS — Z96.652 PRESENCE OF LEFT ARTIFICIAL KNEE JOINT: Chronic | ICD-10-CM

## 2020-02-16 DIAGNOSIS — F31.4 BIPOLAR DISORDER, CURRENT EPISODE DEPRESSED, SEVERE, WITHOUT PSYCHOTIC FEATURES: ICD-10-CM

## 2020-02-16 DIAGNOSIS — S86.009A UNSPECIFIED INJURY OF UNSPECIFIED ACHILLES TENDON, INITIAL ENCOUNTER: Chronic | ICD-10-CM

## 2020-02-16 DIAGNOSIS — R69 ILLNESS, UNSPECIFIED: ICD-10-CM

## 2020-02-16 DIAGNOSIS — Z90.710 ACQUIRED ABSENCE OF BOTH CERVIX AND UTERUS: Chronic | ICD-10-CM

## 2020-02-16 DIAGNOSIS — Z90.49 ACQUIRED ABSENCE OF OTHER SPECIFIED PARTS OF DIGESTIVE TRACT: Chronic | ICD-10-CM

## 2020-02-16 LAB
ALBUMIN SERPL ELPH-MCNC: 4 G/DL — SIGNIFICANT CHANGE UP (ref 3.3–5.2)
ALP SERPL-CCNC: 70 U/L — SIGNIFICANT CHANGE UP (ref 40–120)
ALT FLD-CCNC: 15 U/L — SIGNIFICANT CHANGE UP
AMPHET UR-MCNC: NEGATIVE — SIGNIFICANT CHANGE UP
ANION GAP SERPL CALC-SCNC: 18 MMOL/L — HIGH (ref 5–17)
APPEARANCE UR: ABNORMAL
AST SERPL-CCNC: 24 U/L — SIGNIFICANT CHANGE UP
BACTERIA # UR AUTO: ABNORMAL
BARBITURATES UR SCN-MCNC: NEGATIVE — SIGNIFICANT CHANGE UP
BASOPHILS # BLD AUTO: 0.08 K/UL — SIGNIFICANT CHANGE UP (ref 0–0.2)
BASOPHILS NFR BLD AUTO: 0.8 % — SIGNIFICANT CHANGE UP (ref 0–2)
BENZODIAZ UR-MCNC: NEGATIVE — SIGNIFICANT CHANGE UP
BILIRUB SERPL-MCNC: 0.4 MG/DL — SIGNIFICANT CHANGE UP (ref 0.4–2)
BILIRUB UR-MCNC: NEGATIVE — SIGNIFICANT CHANGE UP
BUN SERPL-MCNC: 31 MG/DL — HIGH (ref 8–20)
CALCIUM SERPL-MCNC: 9.2 MG/DL — SIGNIFICANT CHANGE UP (ref 8.6–10.2)
CHLORIDE SERPL-SCNC: 98 MMOL/L — SIGNIFICANT CHANGE UP (ref 98–107)
CO2 SERPL-SCNC: 23 MMOL/L — SIGNIFICANT CHANGE UP (ref 22–29)
COCAINE METAB.OTHER UR-MCNC: NEGATIVE — SIGNIFICANT CHANGE UP
COLOR SPEC: YELLOW — SIGNIFICANT CHANGE UP
CREAT SERPL-MCNC: 1.49 MG/DL — HIGH (ref 0.5–1.3)
DIFF PNL FLD: ABNORMAL
EOSINOPHIL # BLD AUTO: 0.12 K/UL — SIGNIFICANT CHANGE UP (ref 0–0.5)
EOSINOPHIL NFR BLD AUTO: 1.2 % — SIGNIFICANT CHANGE UP (ref 0–6)
EPI CELLS # UR: SIGNIFICANT CHANGE UP
ETHANOL SERPL-MCNC: <10 MG/DL — SIGNIFICANT CHANGE UP
GLUCOSE SERPL-MCNC: 177 MG/DL — HIGH (ref 70–99)
GLUCOSE UR QL: NEGATIVE MG/DL — SIGNIFICANT CHANGE UP
HCT VFR BLD CALC: 44.2 % — SIGNIFICANT CHANGE UP (ref 34.5–45)
HGB BLD-MCNC: 14.8 G/DL — SIGNIFICANT CHANGE UP (ref 11.5–15.5)
IMM GRANULOCYTES NFR BLD AUTO: 0.3 % — SIGNIFICANT CHANGE UP (ref 0–1.5)
KETONES UR-MCNC: NEGATIVE — SIGNIFICANT CHANGE UP
LEUKOCYTE ESTERASE UR-ACNC: ABNORMAL
LYMPHOCYTES # BLD AUTO: 2.14 K/UL — SIGNIFICANT CHANGE UP (ref 1–3.3)
LYMPHOCYTES # BLD AUTO: 21.2 % — SIGNIFICANT CHANGE UP (ref 13–44)
MCHC RBC-ENTMCNC: 30.1 PG — SIGNIFICANT CHANGE UP (ref 27–34)
MCHC RBC-ENTMCNC: 33.5 GM/DL — SIGNIFICANT CHANGE UP (ref 32–36)
MCV RBC AUTO: 89.8 FL — SIGNIFICANT CHANGE UP (ref 80–100)
METHADONE UR-MCNC: NEGATIVE — SIGNIFICANT CHANGE UP
MONOCYTES # BLD AUTO: 1 K/UL — HIGH (ref 0–0.9)
MONOCYTES NFR BLD AUTO: 9.9 % — SIGNIFICANT CHANGE UP (ref 2–14)
NEUTROPHILS # BLD AUTO: 6.71 K/UL — SIGNIFICANT CHANGE UP (ref 1.8–7.4)
NEUTROPHILS NFR BLD AUTO: 66.6 % — SIGNIFICANT CHANGE UP (ref 43–77)
NITRITE UR-MCNC: NEGATIVE — SIGNIFICANT CHANGE UP
OPIATES UR-MCNC: NEGATIVE — SIGNIFICANT CHANGE UP
PCP SPEC-MCNC: SIGNIFICANT CHANGE UP
PCP UR-MCNC: NEGATIVE — SIGNIFICANT CHANGE UP
PH UR: 5 — SIGNIFICANT CHANGE UP (ref 5–8)
PLATELET # BLD AUTO: 294 K/UL — SIGNIFICANT CHANGE UP (ref 150–400)
POTASSIUM SERPL-MCNC: 4.4 MMOL/L — SIGNIFICANT CHANGE UP (ref 3.5–5.3)
POTASSIUM SERPL-SCNC: 4.4 MMOL/L — SIGNIFICANT CHANGE UP (ref 3.5–5.3)
PROT SERPL-MCNC: 7.3 G/DL — SIGNIFICANT CHANGE UP (ref 6.6–8.7)
PROT UR-MCNC: 30 MG/DL
RBC # BLD: 4.92 M/UL — SIGNIFICANT CHANGE UP (ref 3.8–5.2)
RBC # FLD: 12.3 % — SIGNIFICANT CHANGE UP (ref 10.3–14.5)
RBC CASTS # UR COMP ASSIST: SIGNIFICANT CHANGE UP /HPF (ref 0–4)
SODIUM SERPL-SCNC: 139 MMOL/L — SIGNIFICANT CHANGE UP (ref 135–145)
SP GR SPEC: 1.01 — SIGNIFICANT CHANGE UP (ref 1.01–1.02)
THC UR QL: NEGATIVE — SIGNIFICANT CHANGE UP
UROBILINOGEN FLD QL: NEGATIVE MG/DL — SIGNIFICANT CHANGE UP
WBC # BLD: 10.08 K/UL — SIGNIFICANT CHANGE UP (ref 3.8–10.5)
WBC # FLD AUTO: 10.08 K/UL — SIGNIFICANT CHANGE UP (ref 3.8–10.5)
WBC UR QL: >50

## 2020-02-16 PROCEDURE — 99285 EMERGENCY DEPT VISIT HI MDM: CPT

## 2020-02-16 PROCEDURE — 93010 ELECTROCARDIOGRAM REPORT: CPT

## 2020-02-16 PROCEDURE — 90792 PSYCH DIAG EVAL W/MED SRVCS: CPT

## 2020-02-16 PROCEDURE — 85027 COMPLETE CBC AUTOMATED: CPT

## 2020-02-16 PROCEDURE — 81001 URINALYSIS AUTO W/SCOPE: CPT

## 2020-02-16 PROCEDURE — 80307 DRUG TEST PRSMV CHEM ANLYZR: CPT

## 2020-02-16 PROCEDURE — 80053 COMPREHEN METABOLIC PANEL: CPT

## 2020-02-16 PROCEDURE — 36415 COLL VENOUS BLD VENIPUNCTURE: CPT

## 2020-02-16 PROCEDURE — 93005 ELECTROCARDIOGRAM TRACING: CPT

## 2020-02-16 RX ORDER — CIPROFLOXACIN LACTATE 400MG/40ML
500 VIAL (ML) INTRAVENOUS ONCE
Refills: 0 | Status: COMPLETED | OUTPATIENT
Start: 2020-02-16 | End: 2020-02-16

## 2020-02-16 RX ORDER — LOSARTAN POTASSIUM 100 MG/1
100 TABLET, FILM COATED ORAL DAILY
Refills: 0 | Status: DISCONTINUED | OUTPATIENT
Start: 2020-02-16 | End: 2020-03-06

## 2020-02-16 RX ORDER — ARIPIPRAZOLE 15 MG/1
5 TABLET ORAL DAILY
Refills: 0 | Status: DISCONTINUED | OUTPATIENT
Start: 2020-02-16 | End: 2020-02-19

## 2020-02-16 RX ORDER — AMLODIPINE BESYLATE 2.5 MG/1
5 TABLET ORAL DAILY
Refills: 0 | Status: DISCONTINUED | OUTPATIENT
Start: 2020-02-16 | End: 2020-03-06

## 2020-02-16 RX ORDER — METFORMIN HYDROCHLORIDE 850 MG/1
500 TABLET ORAL THREE TIMES A DAY
Refills: 0 | Status: DISCONTINUED | OUTPATIENT
Start: 2020-02-16 | End: 2020-02-17

## 2020-02-16 RX ORDER — DEXTROSE 50 % IN WATER 50 %
25 SYRINGE (ML) INTRAVENOUS ONCE
Refills: 0 | Status: DISCONTINUED | OUTPATIENT
Start: 2020-02-16 | End: 2020-03-06

## 2020-02-16 RX ORDER — DEXTROSE 50 % IN WATER 50 %
12.5 SYRINGE (ML) INTRAVENOUS ONCE
Refills: 0 | Status: DISCONTINUED | OUTPATIENT
Start: 2020-02-16 | End: 2020-03-06

## 2020-02-16 RX ORDER — INSULIN LISPRO 100/ML
VIAL (ML) SUBCUTANEOUS
Refills: 0 | Status: DISCONTINUED | OUTPATIENT
Start: 2020-02-16 | End: 2020-03-06

## 2020-02-16 RX ORDER — DEXTROSE 50 % IN WATER 50 %
15 SYRINGE (ML) INTRAVENOUS ONCE
Refills: 0 | Status: DISCONTINUED | OUTPATIENT
Start: 2020-02-16 | End: 2020-03-06

## 2020-02-16 RX ORDER — HYDROCHLOROTHIAZIDE 25 MG
25 TABLET ORAL DAILY
Refills: 0 | Status: DISCONTINUED | OUTPATIENT
Start: 2020-02-16 | End: 2020-02-17

## 2020-02-16 RX ORDER — BUPROPION HYDROCHLORIDE 150 MG/1
150 TABLET, EXTENDED RELEASE ORAL DAILY
Refills: 0 | Status: DISCONTINUED | OUTPATIENT
Start: 2020-02-16 | End: 2020-03-06

## 2020-02-16 RX ORDER — SODIUM CHLORIDE 9 MG/ML
1000 INJECTION INTRAMUSCULAR; INTRAVENOUS; SUBCUTANEOUS ONCE
Refills: 0 | Status: COMPLETED | OUTPATIENT
Start: 2020-02-16 | End: 2020-02-16

## 2020-02-16 RX ORDER — CIPROFLOXACIN LACTATE 400MG/40ML
500 VIAL (ML) INTRAVENOUS EVERY 12 HOURS
Refills: 0 | Status: COMPLETED | OUTPATIENT
Start: 2020-02-17 | End: 2020-02-21

## 2020-02-16 RX ORDER — GLUCAGON INJECTION, SOLUTION 0.5 MG/.1ML
1 INJECTION, SOLUTION SUBCUTANEOUS ONCE
Refills: 0 | Status: DISCONTINUED | OUTPATIENT
Start: 2020-02-16 | End: 2020-03-06

## 2020-02-16 RX ORDER — SODIUM CHLORIDE 9 MG/ML
1000 INJECTION, SOLUTION INTRAVENOUS
Refills: 0 | Status: DISCONTINUED | OUTPATIENT
Start: 2020-02-16 | End: 2020-03-06

## 2020-02-16 RX ADMIN — Medication 500 MILLIGRAM(S): at 21:19

## 2020-02-16 RX ADMIN — SODIUM CHLORIDE 2000 MILLILITER(S): 9 INJECTION INTRAMUSCULAR; INTRAVENOUS; SUBCUTANEOUS at 18:19

## 2020-02-16 NOTE — ED PROVIDER NOTE - PSH
Achilles tendon injury  repair  right scalene muscle release  S/P cholecystectomy  1981  S/P hysterectomy  1981 and Oopherectomy in 1990s  S/P knee replacement, left

## 2020-02-16 NOTE — ED PROVIDER NOTE - PSYCHIATRIC, MLM
Flat affect. Alert and oriented to person, place, time/situation. normal mood. No apparent risk to self or others.
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
## 2020-02-16 NOTE — PATIENT PROFILE BEHAVIORAL HEALTH - SPIRITUAL COMMUNITY, PROFILE
She active in her senior center with lots of friends prior to her son moving in with her when he was released from [facility].

## 2020-02-16 NOTE — ED BEHAVIORAL HEALTH ASSESSMENT NOTE - CURRENT MEDICATION
HCTZ 25 mg daily; Norvasc 5 mg daily; Cozaar 100 mg daily; Metformin 500 mg TID wand Lipitor 40 mg HS.  Wellbutrin  mg daily; Abilify 5 mg daily
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
## 2020-02-16 NOTE — PATIENT PROFILE BEHAVIORAL HEALTH - FALL HARM RISK
poor appetite, s/p knee replacements, glaucoma, s/p retina surgery 9/2019 no prescription for glasses/other

## 2020-02-16 NOTE — PATIENT PROFILE BEHAVIORAL HEALTH - FAMILY HISTORY
Family history of early CAD     Family history of diabetes mellitus     Sibling  Still living? No  Family history of pancreatic cancer, Age at diagnosis: 61-70

## 2020-02-16 NOTE — ED PROVIDER NOTE - CLINICAL SUMMARY MEDICAL DECISION MAKING FREE TEXT BOX
Bipolar female compliant on meds but not taking care of herself secondary to severe depression. Will obtain labs, hydrate, psych consult and re-asses. Bipolar female compliant on meds but not taking care of herself secondary to severe depression. Will obtain labs, hydrate, psych consult and re-asses.  medically clear
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
## 2020-02-16 NOTE — ED PROVIDER NOTE - CARE PLAN
Principal Discharge DX:	Bipolar 1 disorder, depressed, severe Principal Discharge DX:	Bipolar 1 disorder, depressed, severe  Secondary Diagnosis:	Acute cystitis without hematuria

## 2020-02-16 NOTE — PATIENT PROFILE BEHAVIORAL HEALTH - ABILITY TO HEAR (WITH HEARING AID OR HEARING APPLIANCE IF NORMALLY USED):
Mildly to Moderately Impaired: difficulty hearing in some environments or speaker may need to increase volume or speak distinctly/has hearing aides does not wear because she dosnt feel don't fit properly has been back and forth to  numerous times to have them refitted still not happy with fit

## 2020-02-16 NOTE — PATIENT PROFILE BEHAVIORAL HEALTH - VISION (WITH CORRECTIVE LENSES IF THE PATIENT USUALLY WEARS THEM):
awaiting new prescription for eyeglasses s/p rright eye retina surgery 9/2019 Dr Alexander Mccoy missed 2/10/2020/Severely impaired: cannot locate objects without hearing or touching them or patient nonresponsive.
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
## 2020-02-16 NOTE — ED BEHAVIORAL HEALTH NOTE - BEHAVIORAL HEALTH NOTE
SWNote: pt seen by psych MD (Dr Godoy) pt found to benefit from inpatient hospitalization edgar a 9.13 status. Pt and pt's family would like [facility] given that pt's dghtr works nearby and can visit. Bed available at [facility], clinical info to be faxed . Auth will be needed. SW to follow. SWNote: pt seen by psych MD (Dr Godoy) pt found to benefit from inpatient hospitalization edgar a 9.13 status. Pt and pt's family would like [facility] given that pt's dghtr works nearby and can visit. Bed available at [facility], clinical info to be faxed . Auth no needed, pt Mcare recipient. SW to follow to arrange rossy.

## 2020-02-16 NOTE — ED ADULT NURSE REASSESSMENT NOTE - NS ED NURSE REASSESS COMMENT FT1
[facility] at bedside for transport to [place] for inpatient psych. Paperwork needed for transfer given to EMS. Pt. AxOx4, in NAD, VSS. IV catheter discontinued. Pt. aware of plan.
Report received from off-going RN at 19:30, NICOLE, pt resting in stretcher at this time, daughter Sarah at bedside. No s/s of distress noted, awaiting transfer to [facility] at this time. Continues to report feeling sadness and depressed, states she has been taking all of her medications as prescribed but has still been feeling sad. Per family patient has been more isolated and not had an appetite. No current s/s of SI/HI. Safety maintained. Will continue to monitor at this time.

## 2020-02-16 NOTE — ED ADULT NURSE NOTE - NS ED NURSE DISCH DISPOSITION
----- Message from Hollie Bullard sent at 11/6/2018  1:19 PM CST -----  Contact: mom Sherrill Alejandro  [phone]  Provider  Dr. Vidales    Pt mother calling for  dexmethylphenidate (FOCALIN XR) 30 mg 24 hr capsule      Pharmacy   [facility]    Thank you  
Refill request focalin xr 30mg  Well 9/5/18  
Transferred
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
## 2020-02-17 LAB
CHOLEST SERPL-MCNC: 132 MG/DL — SIGNIFICANT CHANGE UP (ref 10–199)
GLUCOSE BLDC GLUCOMTR-MCNC: 129 MG/DL — HIGH (ref 70–99)
GLUCOSE BLDC GLUCOMTR-MCNC: 172 MG/DL — HIGH (ref 70–99)
GLUCOSE BLDC GLUCOMTR-MCNC: 179 MG/DL — HIGH (ref 70–99)
HBA1C BLD-MCNC: 8.5 % — HIGH (ref 4–5.6)
HDLC SERPL-MCNC: 38 MG/DL — LOW
LIPID PNL WITH DIRECT LDL SERPL: 57 MG/DL — SIGNIFICANT CHANGE UP
T PALLIDUM AB TITR SER: NEGATIVE — SIGNIFICANT CHANGE UP
TOTAL CHOLESTEROL/HDL RATIO MEASUREMENT: 3.5 RATIO — SIGNIFICANT CHANGE UP (ref 3.3–7.1)
TRIGL SERPL-MCNC: 184 MG/DL — HIGH (ref 10–149)
TSH SERPL-MCNC: 4.11 UIU/ML — SIGNIFICANT CHANGE UP (ref 0.27–4.2)

## 2020-02-17 PROCEDURE — 99232 SBSQ HOSP IP/OBS MODERATE 35: CPT

## 2020-02-17 RX ORDER — BACITRACIN AND POLYMYXIN B SULFATE 500; 10000 [USP'U]/G; [USP'U]/G
1 OINTMENT TOPICAL
Refills: 0 | Status: DISCONTINUED | OUTPATIENT
Start: 2020-02-17 | End: 2020-02-19

## 2020-02-17 RX ORDER — ATORVASTATIN CALCIUM 80 MG/1
40 TABLET, FILM COATED ORAL AT BEDTIME
Refills: 0 | Status: DISCONTINUED | OUTPATIENT
Start: 2020-02-17 | End: 2020-03-06

## 2020-02-17 RX ADMIN — Medication 1: at 08:29

## 2020-02-17 RX ADMIN — AMLODIPINE BESYLATE 5 MILLIGRAM(S): 2.5 TABLET ORAL at 08:33

## 2020-02-17 RX ADMIN — BUPROPION HYDROCHLORIDE 150 MILLIGRAM(S): 150 TABLET, EXTENDED RELEASE ORAL at 08:33

## 2020-02-17 RX ADMIN — Medication 25 MILLIGRAM(S): at 08:33

## 2020-02-17 RX ADMIN — ATORVASTATIN CALCIUM 40 MILLIGRAM(S): 80 TABLET, FILM COATED ORAL at 20:31

## 2020-02-17 RX ADMIN — LOSARTAN POTASSIUM 100 MILLIGRAM(S): 100 TABLET, FILM COATED ORAL at 08:33

## 2020-02-17 RX ADMIN — ARIPIPRAZOLE 5 MILLIGRAM(S): 15 TABLET ORAL at 08:33

## 2020-02-17 RX ADMIN — Medication 500 MILLIGRAM(S): at 08:33

## 2020-02-17 RX ADMIN — Medication 500 MILLIGRAM(S): at 20:31

## 2020-02-17 NOTE — BEHAVIORAL HEALTH ASSESSMENT NOTE - NSBHCHARTREVIEWLAB_PSY_A_CORE FT
14.8   10.08 )-----------( 294      ( 16 Feb 2020 18:45 )             44.2   02-16    139  |  98  |  31.0<H>  ----------------------------<  177<H>  4.4   |  23.0  |  1.49<H>    Ca    9.2      16 Feb 2020 18:45    TPro  7.3  /  Alb  4.0  /  TBili  0.4  /  DBili  x   /  AST  24  /  ALT  15  /  AlkPhos  70  02-16
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
## 2020-02-17 NOTE — BEHAVIORAL HEALTH ASSESSMENT NOTE - NSBHCHARTREVIEWVS_PSY_A_CORE FT
Vital Signs Last 24 Hrs  T(C): 36.7 (17 Feb 2020 07:25), Max: 36.9 (16 Feb 2020 22:36)  T(F): 98.1 (17 Feb 2020 07:25), Max: 98.4 (16 Feb 2020 22:36)  HR: 96 (17 Feb 2020 07:25) (69 - 116)  BP: 142/79 (17 Feb 2020 07:25) (96/58 - 150/82)  BP(mean): --  RR: 18 (17 Feb 2020 07:25) (16 - 20)  SpO2: 96% (17 Feb 2020 07:25) (93% - 98%)

## 2020-02-17 NOTE — BEHAVIORAL HEALTH ASSESSMENT NOTE - ACTIVATING EVENTS/STRESSORS
None known/Triggering events leading to humiliation, shame, and/or despair (e.g. Loss of relationship, financial or health status) (real or anticipated)
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
## 2020-02-18 LAB
GLUCOSE BLDC GLUCOMTR-MCNC: 148 MG/DL — HIGH (ref 70–99)
GLUCOSE BLDC GLUCOMTR-MCNC: 174 MG/DL — HIGH (ref 70–99)
GLUCOSE BLDC GLUCOMTR-MCNC: 211 MG/DL — HIGH (ref 70–99)
GLUCOSE BLDC GLUCOMTR-MCNC: 282 MG/DL — HIGH (ref 70–99)

## 2020-02-18 PROCEDURE — 93010 ELECTROCARDIOGRAM REPORT: CPT

## 2020-02-18 PROCEDURE — 99233 SBSQ HOSP IP/OBS HIGH 50: CPT

## 2020-02-18 RX ORDER — BRIMONIDINE TARTRATE 2 MG/MG
1 SOLUTION/ DROPS OPHTHALMIC
Refills: 0 | Status: DISCONTINUED | OUTPATIENT
Start: 2020-02-18 | End: 2020-03-06

## 2020-02-18 RX ORDER — DORZOLAMIDE HYDROCHLORIDE 20 MG/ML
1 SOLUTION/ DROPS OPHTHALMIC THREE TIMES A DAY
Refills: 0 | Status: DISCONTINUED | OUTPATIENT
Start: 2020-02-18 | End: 2020-03-06

## 2020-02-18 RX ADMIN — LOSARTAN POTASSIUM 100 MILLIGRAM(S): 100 TABLET, FILM COATED ORAL at 09:17

## 2020-02-18 RX ADMIN — DORZOLAMIDE HYDROCHLORIDE 1 DROP(S): 20 SOLUTION/ DROPS OPHTHALMIC at 21:37

## 2020-02-18 RX ADMIN — AMLODIPINE BESYLATE 5 MILLIGRAM(S): 2.5 TABLET ORAL at 09:16

## 2020-02-18 RX ADMIN — Medication 500 MILLIGRAM(S): at 20:31

## 2020-02-18 RX ADMIN — BUPROPION HYDROCHLORIDE 150 MILLIGRAM(S): 150 TABLET, EXTENDED RELEASE ORAL at 09:19

## 2020-02-18 RX ADMIN — ATORVASTATIN CALCIUM 40 MILLIGRAM(S): 80 TABLET, FILM COATED ORAL at 20:31

## 2020-02-18 RX ADMIN — BRIMONIDINE TARTRATE 1 DROP(S): 2 SOLUTION/ DROPS OPHTHALMIC at 20:34

## 2020-02-18 RX ADMIN — Medication 1: at 12:37

## 2020-02-18 RX ADMIN — BACITRACIN AND POLYMYXIN B SULFATE 1 APPLICATION(S): 500; 10000 OINTMENT TOPICAL at 10:01

## 2020-02-18 RX ADMIN — Medication 2: at 17:13

## 2020-02-18 RX ADMIN — Medication 500 MILLIGRAM(S): at 09:17

## 2020-02-18 RX ADMIN — ARIPIPRAZOLE 5 MILLIGRAM(S): 15 TABLET ORAL at 09:16
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
## 2020-02-18 NOTE — ADVANCED PRACTICE NURSE CONSULT - RECOMMEDATIONS
Monitor glucose trends  Goal range 140 to 180mg/dl  communicate with her PCP  Communicate with  her daughter  Hold metformin  reevaluate diabetes discharge needs when closer to discharge  Consistent Carbohydrates  Diabetes Survival Skills

## 2020-02-18 NOTE — PROGRESS NOTE BEHAVIORAL HEALTH - NSBHFUPINTERVALHXFT_PSY_A_CORE
Met with and evaluated patient.  Chart reviewed and case discussed in tx team meeting and with Dr. Carrasco. No significant interval events are noted or reported.. Patient reports depressed mood, anhedonia, impaired sleep and appetite, poor concentration, and sense of worthlessness.  She reports she has family problems which she declines to discuses today.  Patient isolating to room and needs direction for ADL from staff.  Patient did attend an afternoon group with encouragement and support.  Denies any SI or HI.  No Rx SE or sx TD/EPS are noted or reported

## 2020-02-18 NOTE — OCCUPATIONAL THERAPY INITIAL EVALUATION ADULT - PERTINENT HX OF CURRENT PROBLEM, REHAB EVAL
This is a 79 year old female who comes in with worsening depression and SI. PPHx: Bipolar disorder and prior psychiatric admissions.

## 2020-02-18 NOTE — PROGRESS NOTE ADULT - SUBJECTIVE AND OBJECTIVE BOX
Progress: follow up medical problems.           Allergies    adhesives (Pruritus; Blisters)  penicillin (Hives)  pineapple (Anaphylaxis)    Intolerances            MEDICATIONS  (STANDING):  amLODIPine   Tablet 5 milliGRAM(s) Oral daily  ARIPiprazole 5 milliGRAM(s) Oral daily  atorvastatin 40 milliGRAM(s) Oral at bedtime  bacitracin/polymyxin B Ointment 1 Application(s) Topical two times a day  brimonidine 0.2% Ophthalmic Solution 1 Drop(s) Right EYE two times a day  buPROPion  milliGRAM(s) Oral daily  ciprofloxacin     Tablet 500 milliGRAM(s) Oral every 12 hours  dextrose 5%. 1000 milliLiter(s) (50 mL/Hr) IV Continuous <Continuous>  dextrose 50% Injectable 12.5 Gram(s) IV Push once  dextrose 50% Injectable 25 Gram(s) IV Push once  dextrose 50% Injectable 25 Gram(s) IV Push once  dorzolamide 2% Ophthalmic Solution 1 Drop(s) Right EYE three times a day  insulin lispro (HumaLOG) corrective regimen sliding scale   SubCutaneous three times a day before meals  losartan 100 milliGRAM(s) Oral daily    MEDICATIONS  (PRN):  dextrose 40% Gel 15 Gram(s) Oral once PRN Blood Glucose LESS THAN 70 milliGRAM(s)/deciliter  glucagon  Injectable 1 milliGRAM(s) IntraMuscular once PRN Glucose LESS THAN 70 milligrams/deciliter            Vital Signs Last 24 Hrs  T(C): 36.7 (18 Feb 2020 07:55), Max: 36.7 (18 Feb 2020 07:55)  T(F): 98 (18 Feb 2020 07:55), Max: 98 (18 Feb 2020 07:55)  HR: 76 (18 Feb 2020 07:55) (76 - 90)  BP: 115/75 (18 Feb 2020 07:55) (107/71 - 115/75)  RR: 18 (18 Feb 2020 07:55) (18 - 18)  SpO2: 95% (18 Feb 2020 07:55) (95% - 95%)      ROS ; no c/o sob, no constipation, no dysuria.        PHYSICAL EXAM:  GENERAL: NAD, well-groomed, well-developed  HEAD:  Atraumatic, Normocephalic  EYES: EOMI, PERRLA, conjunctiva and sclera clear  ENMT: No tonsillar erythema, exudates, or enlargement; Moist mucous membranes, Good dentition, No lesions  NECK: Supple, No JVD, Normal thyroid  NERVOUS SYSTEM:  Alert & Oriented X3, Good concentration; Motor Strength 5/5 B/L upper and lower extremities; DTRs 2+ intact and symmetric  CHEST/LUNG: Clear to auscultation bilaterally; No rales, rhonchi, wheezing, or rubs  HEART: Regular rate and rhythm; No murmurs, rubs, or gallops  ABDOMEN: Soft, Nontender, Nondistended; Bowel sounds present  EXTREMITIES:  2+ Peripheral Pulses, No clubbing, cyanosis, or edema  LYMPH: No lymphadenopathy noted  SKIN: No rashes or lesions    LABS:                        14.8   10.08 )-----------( 294      ( 16 Feb 2020 18:45 )             44.2     02-16    139  |  98  |  31.0<H>  ----------------------------<  177<H>  4.4   |  23.0  |  1.49<H>    Ca    9.2      16 Feb 2020 18:45    TPro  7.3  /  Alb  4.0  /  TBili  0.4  /  DBili  x   /  AST  24  /  ALT  15  /  AlkPhos  70  02-16    CAPILLARY BLOOD GLUCOSE      POCT Blood Glucose.: 174 mg/dL (18 Feb 2020 12:26)  POCT Blood Glucose.: 148 mg/dL (18 Feb 2020 07:55)  POCT Blood Glucose.: 179 mg/dL (17 Feb 2020 20:12)  POCT Blood Glucose.: 129 mg/dL (17 Feb 2020 17:10)    Thyroid Stimulating Hormone, Serum: 4.11 uIU/mL (02-17-20 @ 11:09)    02-17 Chol 132 LDL 57 HDL 38<L> Trig 184<H>  Hemoglobin A1C Hemoglobin A1C, Whole Blood: 8.5 % (02-17-20 @ 11:10)      RADIOLOGY & ADDITIONAL TESTS:
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
## 2020-02-18 NOTE — PROGRESS NOTE BEHAVIORAL HEALTH - NSBHCHARTREVIEWVS_PSY_A_CORE FT
Vital Signs Last 24 Hrs  T(C): 36.7 (18 Feb 2020 07:55), Max: 36.7 (18 Feb 2020 07:55)  T(F): 98 (18 Feb 2020 07:55), Max: 98 (18 Feb 2020 07:55)  HR: 81 (18 Feb 2020 16:52) (76 - 81)  BP: 110/73 (18 Feb 2020 16:52) (110/73 - 115/75)  BP(mean): --  RR: 16 (18 Feb 2020 16:52) (16 - 18)  SpO2: 96% (18 Feb 2020 16:52) (95% - 96%)

## 2020-02-18 NOTE — PROGRESS NOTE ADULT - ASSESSMENT
T2DM ; continue coverage . PT was consulted with diabetic nurse , see advise .  hyperlipidemia ; continue treatment as needed.  hypertension; stable.  h/o glaucoma ; started pt on her eye drops.  CKD stage 3; due to diabetic nephropathy. advised f/u with nephrologist post discharge .  UTI ; continue antibiotic , awaiting culture result.  left elbow ulcer ; continue local care . Awaiting wound care team for further evaluation .  h/o depression ; continue treatment as per psych advise.

## 2020-02-19 LAB
CULTURE RESULTS: SIGNIFICANT CHANGE UP
GLUCOSE BLDC GLUCOMTR-MCNC: 155 MG/DL — HIGH (ref 70–99)
GLUCOSE BLDC GLUCOMTR-MCNC: 166 MG/DL — HIGH (ref 70–99)
GLUCOSE BLDC GLUCOMTR-MCNC: 179 MG/DL — HIGH (ref 70–99)
SPECIMEN SOURCE: SIGNIFICANT CHANGE UP

## 2020-02-19 PROCEDURE — 99233 SBSQ HOSP IP/OBS HIGH 50: CPT

## 2020-02-19 PROCEDURE — 99222 1ST HOSP IP/OBS MODERATE 55: CPT

## 2020-02-19 RX ORDER — ARIPIPRAZOLE 15 MG/1
10 TABLET ORAL DAILY
Refills: 0 | Status: DISCONTINUED | OUTPATIENT
Start: 2020-02-19 | End: 2020-03-06

## 2020-02-19 RX ORDER — VENLAFAXINE HCL 75 MG
37.5 CAPSULE, EXT RELEASE 24 HR ORAL DAILY
Refills: 0 | Status: DISCONTINUED | OUTPATIENT
Start: 2020-02-19 | End: 2020-02-25

## 2020-02-19 RX ADMIN — AMLODIPINE BESYLATE 5 MILLIGRAM(S): 2.5 TABLET ORAL at 08:54

## 2020-02-19 RX ADMIN — DORZOLAMIDE HYDROCHLORIDE 1 DROP(S): 20 SOLUTION/ DROPS OPHTHALMIC at 20:28

## 2020-02-19 RX ADMIN — DORZOLAMIDE HYDROCHLORIDE 1 DROP(S): 20 SOLUTION/ DROPS OPHTHALMIC at 09:36

## 2020-02-19 RX ADMIN — ATORVASTATIN CALCIUM 40 MILLIGRAM(S): 80 TABLET, FILM COATED ORAL at 20:29

## 2020-02-19 RX ADMIN — LOSARTAN POTASSIUM 100 MILLIGRAM(S): 100 TABLET, FILM COATED ORAL at 09:34

## 2020-02-19 RX ADMIN — Medication 1: at 12:34

## 2020-02-19 RX ADMIN — DORZOLAMIDE HYDROCHLORIDE 1 DROP(S): 20 SOLUTION/ DROPS OPHTHALMIC at 12:59

## 2020-02-19 RX ADMIN — Medication 1: at 17:12

## 2020-02-19 RX ADMIN — Medication 500 MILLIGRAM(S): at 20:29

## 2020-02-19 RX ADMIN — Medication 1: at 08:13

## 2020-02-19 RX ADMIN — BRIMONIDINE TARTRATE 1 DROP(S): 2 SOLUTION/ DROPS OPHTHALMIC at 20:28

## 2020-02-19 RX ADMIN — Medication 500 MILLIGRAM(S): at 08:55

## 2020-02-19 RX ADMIN — BUPROPION HYDROCHLORIDE 150 MILLIGRAM(S): 150 TABLET, EXTENDED RELEASE ORAL at 08:55

## 2020-02-19 RX ADMIN — BRIMONIDINE TARTRATE 1 DROP(S): 2 SOLUTION/ DROPS OPHTHALMIC at 08:55

## 2020-02-19 RX ADMIN — ARIPIPRAZOLE 5 MILLIGRAM(S): 15 TABLET ORAL at 08:54

## 2020-02-19 NOTE — DIETITIAN INITIAL EVALUATION ADULT. - OTHER INFO
79y old  Female who presents with a chief complaint of Depression, thought blocking delay, acting bizarre: As per MD notes, Pt reported that she took her Metformin 100mg po daily as well as weekly tresiba. Pt stated that she was consuming three meals/day as well. However, as per MD notes, pt had poor intake PTA. HgbA1C 8.5%-question whether pt was taking meds as directed or at all. Pt cannot confirm or deny weight loss. 79y old  Female who presents with a chief complaint of Depression, thought blocking delay, acting bizarre: As per MD notes, Pt reported that she took her Metformin 100mg po daily as well as weekly tresiba. Pt stated that she was consuming three meals/day as well. However, as per MD notes, pt had poor intake  and she was not monitoring BG PTA. HgbA1C 8.5%-question whether pt was taking meds as directed or at all. Pt cannot confirm or deny weight loss. Physically, she appears overweight. ht/wt 5'4 158# BMI 27. Reviewed importance of consuming balanced, healthy meals.  Pt at this time appears dazed and although she was answering questions appropriately, she could not elaborate much on specifics. In depth education does not appear appropriate at this time. Would f/u once pt more mentally stable. 79y old  Female who presents with a chief complaint of Depression, thought blocking delay, acting bizarre: As per MD notes, Pt reported that she took her Metformin 100mg po daily as well as weekly tresiba. Pt stated that she was consuming three meals/day as well. However, as per MD notes, pt had poor intake  and she was not monitoring BG PTA. HgbA1C 8.5%-question whether pt was taking meds as directed or at all. Pt cannot confirm or deny weight loss. Physically, she appears overweight. ht/wt 5'4 158# BMI 27. Reviewed importance of consuming balanced, healthy meals.  Pt  answering questions appropriately,  but she could not elaborate much on specifics. In depth education/discussion does not appear appropriate at this time. Would f/u once pt more mentally stable. D/W NP CDE.

## 2020-02-19 NOTE — PROGRESS NOTE ADULT - ASSESSMENT
T2DM ; continue coverage . PT was consulted with diabetic nurse , see advise .  hyperlipidemia ; continue treatment as needed.  hypertension; stable.  h/o glaucoma ; started pt on her eye drops.  CKD stage 3; due to diabetic nephropathy. advised f/u with nephrologist post discharge .  UTI ;  culture result showed contamination , however pt afebrile and asymptomatic.  left elbow ulcer ; local treatment was changed to Hydro-Gel treatment . Notified  that  wound care team not available , called DR diaz surgeon for further treatment and advise . .  h/o depression ; continue treatment as per psych advise.
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
## 2020-02-19 NOTE — DIETITIAN INITIAL EVALUATION ADULT. - WEIGHT IN KG
1600:Bedside and Verbal shift change report given to CARYL Valderrama (oncoming nurse) by RAMYA Gutiérrez (offgoing nurse). Report included the following information SBAR.  
 
1249: Pt states follow up pediatrician will be Dr. Rosalio Patel. 58.5 71.6

## 2020-02-19 NOTE — PROGRESS NOTE BEHAVIORAL HEALTH - NSBHADMITMEDEDUDETAILS_A_CORE FT
Psychoeducation provided re: potential benefits/SE of Effexor with teach back verbalized by patient and daughter

## 2020-02-19 NOTE — CONSULT NOTE ADULT - ASSESSMENT
Stage II Decubitus of Left Elbow.  As such, no exposed neurovascular structures or bone.  This should be amenable to local care IF pressure alleviation can be maintained.  Discussed with patient in detail.  No cellulitis or soft tissue collection to suggest infection.  Therefore:  -Continue pressure alleviation with off-loading of site- should NOT be an issue now that patient is OOB  -Wash site with simple saline  -Cover with Allevyn dressing  -Repeat Monday, Wednesday and Friday  Relative quick progress should be expected.  However, should there be SLOW progress, then consideration of more intense nutritional supplementation and more thorough evaluation can follow.  Order written for local care.  No surgical intervention planned at this time.  However, will follow progress with you.  Importance of compliance with behavior modification reviewed with patient in detail.  She seems pleased, has overall some insight and verbalizes my instructions as outlined above.

## 2020-02-19 NOTE — PROGRESS NOTE BEHAVIORAL HEALTH - NSBHCHARTREVIEWVS_PSY_A_CORE FT
Vital Signs Last 24 Hrs  T(C): 36.6 (19 Feb 2020 08:14), Max: 36.6 (19 Feb 2020 08:14)  T(F): 97.9 (19 Feb 2020 08:14), Max: 97.9 (19 Feb 2020 08:14)  HR: --  BP: --  BP(mean): --  RR: 18 (19 Feb 2020 08:14) (18 - 18)  SpO2: 97% (19 Feb 2020 08:14) (97% - 97%)

## 2020-02-19 NOTE — PROGRESS NOTE ADULT - SUBJECTIVE AND OBJECTIVE BOX
Progress: follow up medical problems.           Allergies    adhesives (Pruritus; Blisters)  penicillin (Hives)  pineapple (Anaphylaxis)    Intolerances            MEDICATIONS  (STANDING):  amLODIPine   Tablet 5 milliGRAM(s) Oral daily  ARIPiprazole 5 milliGRAM(s) Oral daily  atorvastatin 40 milliGRAM(s) Oral at bedtime  bacitracin/polymyxin B Ointment 1 Application(s) Topical two times a day  brimonidine 0.2% Ophthalmic Solution 1 Drop(s) Right EYE two times a day  buPROPion  milliGRAM(s) Oral daily  ciprofloxacin     Tablet 500 milliGRAM(s) Oral every 12 hours  dextrose 5%. 1000 milliLiter(s) (50 mL/Hr) IV Continuous <Continuous>  dextrose 50% Injectable 12.5 Gram(s) IV Push once  dextrose 50% Injectable 25 Gram(s) IV Push once  dextrose 50% Injectable 25 Gram(s) IV Push once  dorzolamide 2% Ophthalmic Solution 1 Drop(s) Right EYE three times a day  insulin lispro (HumaLOG) corrective regimen sliding scale   SubCutaneous three times a day before meals  losartan 100 milliGRAM(s) Oral daily    MEDICATIONS  (PRN):  dextrose 40% Gel 15 Gram(s) Oral once PRN Blood Glucose LESS THAN 70 milliGRAM(s)/deciliter  glucagon  Injectable 1 milliGRAM(s) IntraMuscular once PRN Glucose LESS THAN 70 milligrams/deciliter            Vital Signs Last 24 Hrs  T(F): 97.9 (19 Feb 2020 )  HR: 97 (19 Feb 2020 )  BP: 1137/81 (19 Feb 2020 )  RR: 17 (19 Feb 2020 )  SpO2: 97% (19 Feb 2020 )      ROS ;c/o being tired  , no c/o sob, no constipation, no dysuria.        PHYSICAL EXAM:  GENERAL: NAD, well-groomed, well-developed  HEAD:  Atraumatic, Normocephalic  EYES: EOMI, PERRLA, conjunctiva and sclera clear  ENMT: No tonsillar erythema, exudates, or enlargement; Moist mucous membranes, Good dentition, No lesions  NECK: Supple, No JVD, Normal thyroid  CHEST/LUNG: Clear to auscultation bilaterally; No rales, rhonchi, wheezing, or rubs  HEART: Regular rate and rhythm; No murmurs, rubs, or gallops  ABDOMEN: Soft, Nontender, Nondistended; Bowel sounds present  EXTREMITIES:  2+ Peripheral Pulses, No clubbing, cyanosis, or edema  LYMPH: No lymphadenopathy noted  SKIN: left elbow wound stage ?2-3 mild clear breakthrough ,no erythema. non tender .    LABS:                        14.8   10.08 )-----------( 294      ( 16 Feb 2020 18:45 )             44.2     02-16    139  |  98  |  31.0<H>  ----------------------------<  177<H>  4.4   |  23.0  |  1.49<H>    Ca    9.2      16 Feb 2020 18:45    TPro  7.3  /  Alb  4.0  /  TBili  0.4  /  DBili  x   /  AST  24  /  ALT  15  /  AlkPhos  70  02-16    CAPILLARY BLOOD GLUCOSE      POCT Blood Glucose.: 166 mg/dL (19 Feb 2020 12:26)  POCT Blood Glucose.: 148 mg/dL (18 Feb 2020 07:55)  POCT Blood Glucose.: 179 mg/dL (17 Feb 2020 20:12)  POCT Blood Glucose.: 129 mg/dL (17 Feb 2020 17:10)    Thyroid Stimulating Hormone, Serum: 4.11 uIU/mL (02-17-20 @ 11:09)    02-17 Chol 132 LDL 57 HDL 38<L> Trig 184<H>  Hemoglobin A1C Hemoglobin A1C, Whole Blood: 8.5 % (02-17-20 @ 11:10)    URINE culture; 3 type of organisms suggestive of contamination .     RADIOLOGY & ADDITIONAL TESTS: EKG ; NSR, LVH .

## 2020-02-19 NOTE — CONSULT NOTE ADULT - SUBJECTIVE AND OBJECTIVE BOX
HPI:  Patient now on inpatient unit for psychiatry admission and treatment.  History of prior decubitus of left elbow which responded to local care and conservative management.  Patient now once again admits to bedrest from anxiety and depression with recurrence of wound at left elbow.  She admits to minimal discomfort, but denies scotty pain.  She denies any odor or drainage or other areas of breakdown/ concern/ compromise...      PAST MEDICAL & SURGICAL HISTORY:  Small bowel obstruction  Osteoarthritis  Glaucoma  Diabetes  Hyperlipidemia  Hypertension  S/P knee replacement, left  Achilles tendon injury: repair  right scalene muscle release  S/P cholecystectomy: 1981  S/P hysterectomy: 1981 and Oopherectomy in 1990s      REVIEW OF SYSTEMS  General: Denies fever or chills.	    Skin/Breast:  See HPI.  	  Ophthalmologic: Denies vision changes.  	  ENMT:	Denies naso or oropharyngeal discharge.    Respiratory and Thorax: Denies SOB.  	  Cardiovascular:	Denies CP.    Gastrointestinal:	Denies abdominal pain.    Genitourinary:	Denies dysuria.    Musculoskeletal:	 "arthritis..."    Neurological:	Denies focal deficits.    Psychiatric: Admits to depression and anxiety.	    Hematology/Lymphatics:	 Denies easy bruising or bleeding.    Endocrine:	Denies hypo or hyperthermia.    Allergic/Immunologic:	None presently; see below.      MEDICATIONS  (STANDING):  amLODIPine   Tablet 5 milliGRAM(s) Oral daily  ARIPiprazole 10 milliGRAM(s) Oral daily  atorvastatin 40 milliGRAM(s) Oral at bedtime  brimonidine 0.2% Ophthalmic Solution 1 Drop(s) Right EYE two times a day  buPROPion  milliGRAM(s) Oral daily  ciprofloxacin     Tablet 500 milliGRAM(s) Oral every 12 hours  dextrose 5%. 1000 milliLiter(s) (50 mL/Hr) IV Continuous <Continuous>  dextrose 50% Injectable 12.5 Gram(s) IV Push once  dextrose 50% Injectable 25 Gram(s) IV Push once  dextrose 50% Injectable 25 Gram(s) IV Push once  dorzolamide 2% Ophthalmic Solution 1 Drop(s) Right EYE three times a day  insulin lispro (HumaLOG) corrective regimen sliding scale   SubCutaneous three times a day before meals  losartan 100 milliGRAM(s) Oral daily  venlafaxine XR 37.5 milliGRAM(s) Oral daily      MEDICATIONS  (PRN):  dextrose 40% Gel 15 Gram(s) Oral once PRN Blood Glucose LESS THAN 70 milliGRAM(s)/deciliter  glucagon  Injectable 1 milliGRAM(s) IntraMuscular once PRN Glucose LESS THAN 70 milligrams/deciliter  LORazepam     Tablet 1 milliGRAM(s) Oral every 6 hours PRN anxiety      Allergies  adhesives (Pruritus; Blisters)  penicillin (Hives)  pineapple (Anaphylaxis)      SOCIAL HISTORY: Denies tobacco, EtOH abuse or any illicit drug use.      FAMILY HISTORY:  Family history of diabetes mellitus  Family history of early CAD  Family history of pancreatic cancer (Sibling)      Vital Signs Last 24 Hrs  T(C): 36.6 (19 Feb 2020 08:14), Max: 36.6 (19 Feb 2020 08:14)  T(F): 97.9 (19 Feb 2020 08:14), Max: 97.9 (19 Feb 2020 08:14)  RR: 18 (19 Feb 2020 08:14) (18 - 18)  SpO2: 97% (19 Feb 2020 08:14) (97% - 97%)      PHYSICAL EXAM-  Constitutional: Clearly flat affect and non-energetic demeanor, but no acute distress.    Eyes: Pupils equal round and responsive.    ENMT: Moist mucosal membranes.    Neck: Supple with full ROM.    Breasts: Deferred.    Back: No CVAT.    Respiratory: Equal expansion bilaterally; well-healed surgical scar consistent with right sided scalene muscle release.    Cardiovascular: Pulse regular in rate and rhythm.    Gastrointestinal: Full, moderately obese, but soft and non tense and non tender with well-healed surgical scars  c/w reported history.    Genitourinary: Deferred.    Rectal: Deferred.    Extremities: Grossly symmetric.    Vascular: Brisk capillary refill.    Neurological: Alert and oriented times three.    Skin: ~ 2 by 2 cm though clean Stage II decubitus of left elbow.  No occipital, scapular, sacral, ischial, trochanteric or heel breakdown.    Lymph Nodes: No cervical, supraclavicular, axillary or inguinal adenopathy.    Musculoskeletal: No splints, casts or braces.    Psychiatric: Interactive with prompting, but flat affect and admitting to severe depression and some anxiety.      LABS:    Hemoglobin A1C, Whole Blood: 8.5: Method: Immunoassay       Reference Range                4.0-5.6%       High risk (prediabetic)        5.7-6.4%       Diabetic, diagnostic             >=6.5%       ADA diabetic treatment goal       <7.0%  The Hemoglobin A1c testing is NGSP-certified.Reference ranges are based  upon the 2010 recommendations of  the American Diabetes Association.  Interpretation may vary for children  and adolescents. % (02.17.20 @ 11:10)      RADIOLOGY & ADDITIONAL STUDIES:    No new imaging studies

## 2020-02-20 LAB
GLUCOSE BLDC GLUCOMTR-MCNC: 150 MG/DL — HIGH (ref 70–99)
GLUCOSE BLDC GLUCOMTR-MCNC: 155 MG/DL — HIGH (ref 70–99)
GLUCOSE BLDC GLUCOMTR-MCNC: 169 MG/DL — HIGH (ref 70–99)
GLUCOSE BLDC GLUCOMTR-MCNC: 185 MG/DL — HIGH (ref 70–99)

## 2020-02-20 PROCEDURE — 99232 SBSQ HOSP IP/OBS MODERATE 35: CPT

## 2020-02-20 RX ADMIN — Medication 500 MILLIGRAM(S): at 08:27

## 2020-02-20 RX ADMIN — DORZOLAMIDE HYDROCHLORIDE 1 DROP(S): 20 SOLUTION/ DROPS OPHTHALMIC at 12:53

## 2020-02-20 RX ADMIN — DORZOLAMIDE HYDROCHLORIDE 1 DROP(S): 20 SOLUTION/ DROPS OPHTHALMIC at 20:33

## 2020-02-20 RX ADMIN — Medication 1: at 12:52

## 2020-02-20 RX ADMIN — Medication 500 MILLIGRAM(S): at 20:33

## 2020-02-20 RX ADMIN — ATORVASTATIN CALCIUM 40 MILLIGRAM(S): 80 TABLET, FILM COATED ORAL at 20:33

## 2020-02-20 RX ADMIN — DORZOLAMIDE HYDROCHLORIDE 1 DROP(S): 20 SOLUTION/ DROPS OPHTHALMIC at 08:27

## 2020-02-20 RX ADMIN — Medication 37.5 MILLIGRAM(S): at 08:27

## 2020-02-20 RX ADMIN — AMLODIPINE BESYLATE 5 MILLIGRAM(S): 2.5 TABLET ORAL at 08:27

## 2020-02-20 RX ADMIN — Medication 1: at 08:25

## 2020-02-20 RX ADMIN — LOSARTAN POTASSIUM 100 MILLIGRAM(S): 100 TABLET, FILM COATED ORAL at 08:27

## 2020-02-20 RX ADMIN — ARIPIPRAZOLE 10 MILLIGRAM(S): 15 TABLET ORAL at 08:27

## 2020-02-20 RX ADMIN — BRIMONIDINE TARTRATE 1 DROP(S): 2 SOLUTION/ DROPS OPHTHALMIC at 20:34

## 2020-02-20 RX ADMIN — BRIMONIDINE TARTRATE 1 DROP(S): 2 SOLUTION/ DROPS OPHTHALMIC at 08:28

## 2020-02-20 RX ADMIN — BUPROPION HYDROCHLORIDE 150 MILLIGRAM(S): 150 TABLET, EXTENDED RELEASE ORAL at 08:27

## 2020-02-20 NOTE — PROGRESS NOTE ADULT - ASSESSMENT
T2DM ; continue coverage . PT was consulted with diabetic nurse , see advise .  hyperlipidemia ; continue treatment as needed.  hypertension; stable.  h/o glaucoma ; started pt on her eye drops.  CKD stage 3; due to diabetic nephropathy. advised f/u with nephrologist post discharge .  UTI ;  culture result showed contamination , however pt afebrile and asymptomatic.  left elbow ulcer ;  DR diaz surgeon consulted pt . Please see consult and advised .  h/o depression ; continue treatment as per psych advise.
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
## 2020-02-20 NOTE — PROGRESS NOTE ADULT - SUBJECTIVE AND OBJECTIVE BOX
Progress: follow up medical problems.           Allergies    adhesives (Pruritus; Blisters)  penicillin (Hives)  pineapple (Anaphylaxis)    Intolerances            MEDICATIONS  (STANDING):  amLODIPine   Tablet 5 milliGRAM(s) Oral daily  ARIPiprazole 5 milliGRAM(s) Oral daily  atorvastatin 40 milliGRAM(s) Oral at bedtime  bacitracin/polymyxin B Ointment 1 Application(s) Topical two times a day  brimonidine 0.2% Ophthalmic Solution 1 Drop(s) Right EYE two times a day  buPROPion  milliGRAM(s) Oral daily  ciprofloxacin     Tablet 500 milliGRAM(s) Oral every 12 hours  dextrose 5%. 1000 milliLiter(s) (50 mL/Hr) IV Continuous <Continuous>  dextrose 50% Injectable 12.5 Gram(s) IV Push once  dextrose 50% Injectable 25 Gram(s) IV Push once  dextrose 50% Injectable 25 Gram(s) IV Push once  dorzolamide 2% Ophthalmic Solution 1 Drop(s) Right EYE three times a day  insulin lispro (HumaLOG) corrective regimen sliding scale   SubCutaneous three times a day before meals  losartan 100 milliGRAM(s) Oral daily    MEDICATIONS  (PRN):  dextrose 40% Gel 15 Gram(s) Oral once PRN Blood Glucose LESS THAN 70 milliGRAM(s)/deciliter  glucagon  Injectable 1 milliGRAM(s) IntraMuscular once PRN Glucose LESS THAN 70 milligrams/deciliter            Vital Signs Last 24 Hrs  T(F): 98.7 (20 Feb 2020 )  HR: 73 (20 Feb 2020 )  BP: 128/78 (20 Feb 2020 )  RR: 17 (20 Feb 2020 )  SpO2: 96% (20 Feb 2020 )      ROS ;c/o being tired  , no c/o sob, no constipation, no dysuria.        PHYSICAL EXAM:  GENERAL: NAD, well-groomed, well-developed  HEAD:  Atraumatic, Normocephalic  EYES: EOMI, PERRLA, conjunctiva and sclera clear  ENMT: No tonsillar erythema, exudates, or enlargement; Moist mucous membranes, Good dentition, No lesions  NECK: Supple, No JVD, Normal thyroid  CHEST/LUNG: Clear to auscultation bilaterally; No rales, rhonchi, wheezing, or rubs  HEART: Regular rate and rhythm; No murmurs, rubs, or gallops  ABDOMEN: Soft, Nontender, Nondistended; Bowel sounds present  EXTREMITIES:  2+ Peripheral Pulses, No clubbing, cyanosis, or edema  LYMPH: No lymphadenopathy noted  SKIN: left elbow 2x2 CM  wound stage 2 mild clear breakthrough ,no erythema. non tender .    LABS:                        14.8   10.08 )-----------( 294      ( 16 Feb 2020 18:45 )             44.2     02-16    139  |  98  |  31.0<H>  ----------------------------<  177<H>  4.4   |  23.0  |  1.49<H>    Ca    9.2      16 Feb 2020 18:45    TPro  7.3  /  Alb  4.0  /  TBili  0.4  /  DBili  x   /  AST  24  /  ALT  15  /  AlkPhos  70  02-16    CAPILLARY BLOOD GLUCOSE      POCT Blood Glucose.: 166 mg/dL (19 Feb 2020 12:26)  POCT Blood Glucose.: 148 mg/dL (18 Feb 2020 07:55)  POCT Blood Glucose.: 179 mg/dL (17 Feb 2020 20:12)  POCT Blood Glucose.: 129 mg/dL (17 Feb 2020 17:10)    Thyroid Stimulating Hormone, Serum: 4.11 uIU/mL (02-17-20 @ 11:09)    02-17 Chol 132 LDL 57 HDL 38<L> Trig 184<H>  Hemoglobin A1C Hemoglobin A1C, Whole Blood: 8.5 % (02-17-20 @ 11:10)    URINE culture; 3 type of organisms suggestive of contamination .     RADIOLOGY & ADDITIONAL TESTS: EKG ; NSR, LVH .

## 2020-02-20 NOTE — PROGRESS NOTE BEHAVIORAL HEALTH - NSBHCHARTREVIEWVS_PSY_A_CORE FT
Vital Signs Last 24 Hrs  T(C): 37.1 (20 Feb 2020 07:33), Max: 37.1 (20 Feb 2020 07:33)  T(F): 98.7 (20 Feb 2020 07:33), Max: 98.7 (20 Feb 2020 07:33)  HR: 73 (20 Feb 2020 07:33) (73 - 73)  BP: 128/78 (20 Feb 2020 07:33) (128/78 - 128/78)  BP(mean): --  RR: 17 (20 Feb 2020 07:33) (17 - 17)  SpO2: 96% (20 Feb 2020 07:33) (96% - 96%)

## 2020-02-21 LAB
GLUCOSE BLDC GLUCOMTR-MCNC: 126 MG/DL — HIGH (ref 70–99)
GLUCOSE BLDC GLUCOMTR-MCNC: 154 MG/DL — HIGH (ref 70–99)
GLUCOSE BLDC GLUCOMTR-MCNC: 183 MG/DL — HIGH (ref 70–99)
GLUCOSE BLDC GLUCOMTR-MCNC: 184 MG/DL — HIGH (ref 70–99)

## 2020-02-21 PROCEDURE — 99232 SBSQ HOSP IP/OBS MODERATE 35: CPT

## 2020-02-21 RX ORDER — ASCORBIC ACID 60 MG
500 TABLET,CHEWABLE ORAL DAILY
Refills: 0 | Status: DISCONTINUED | OUTPATIENT
Start: 2020-02-21 | End: 2020-03-06

## 2020-02-21 RX ORDER — ZINC SULFATE TAB 220 MG (50 MG ZINC EQUIVALENT) 220 (50 ZN) MG
220 TAB ORAL DAILY
Refills: 0 | Status: DISCONTINUED | OUTPATIENT
Start: 2020-02-21 | End: 2020-03-06

## 2020-02-21 RX ADMIN — DORZOLAMIDE HYDROCHLORIDE 1 DROP(S): 20 SOLUTION/ DROPS OPHTHALMIC at 08:40

## 2020-02-21 RX ADMIN — Medication 1: at 08:30

## 2020-02-21 RX ADMIN — Medication 500 MILLIGRAM(S): at 08:39

## 2020-02-21 RX ADMIN — Medication 37.5 MILLIGRAM(S): at 08:39

## 2020-02-21 RX ADMIN — BRIMONIDINE TARTRATE 1 DROP(S): 2 SOLUTION/ DROPS OPHTHALMIC at 21:03

## 2020-02-21 RX ADMIN — DORZOLAMIDE HYDROCHLORIDE 1 DROP(S): 20 SOLUTION/ DROPS OPHTHALMIC at 12:57

## 2020-02-21 RX ADMIN — Medication 1: at 12:45

## 2020-02-21 RX ADMIN — AMLODIPINE BESYLATE 5 MILLIGRAM(S): 2.5 TABLET ORAL at 08:39

## 2020-02-21 RX ADMIN — ARIPIPRAZOLE 10 MILLIGRAM(S): 15 TABLET ORAL at 08:39

## 2020-02-21 RX ADMIN — DORZOLAMIDE HYDROCHLORIDE 1 DROP(S): 20 SOLUTION/ DROPS OPHTHALMIC at 21:04

## 2020-02-21 RX ADMIN — Medication 500 MILLIGRAM(S): at 21:03

## 2020-02-21 RX ADMIN — BRIMONIDINE TARTRATE 1 DROP(S): 2 SOLUTION/ DROPS OPHTHALMIC at 08:40

## 2020-02-21 RX ADMIN — BUPROPION HYDROCHLORIDE 150 MILLIGRAM(S): 150 TABLET, EXTENDED RELEASE ORAL at 08:39

## 2020-02-21 RX ADMIN — ATORVASTATIN CALCIUM 40 MILLIGRAM(S): 80 TABLET, FILM COATED ORAL at 21:03

## 2020-02-21 RX ADMIN — LOSARTAN POTASSIUM 100 MILLIGRAM(S): 100 TABLET, FILM COATED ORAL at 08:39

## 2020-02-21 NOTE — PROGRESS NOTE ADULT - ASSESSMENT
A/P: Patient with Stage 2 LEFT elbow decubiti:    1. dressing changes - MWF - wash first with NS, then apply Avellyn   2. nutritional optimization    Case d/w Dr. ANUJ Wang.

## 2020-02-21 NOTE — PROGRESS NOTE BEHAVIORAL HEALTH - NSBHFUPINTERVALHXFT_PSY_A_CORE
Met with and evaluated patient.  Chart reviewed and case discussed in tx team meeting and with Dr. Carrasco. No significant interval events are noted or reported.. Patient continues to report depressed mood, but does report is feeling a little better.  Patient is in behavioral control and did spend time in the day area today., and went to groups.  More verbal today, and latency is much improved. Affect is somewhat brighter.    Patient continues to need  direction for ADL from staff.   Denies any SI or HI.  No Rx SE or sx TD/EPS are noted or reported.

## 2020-02-21 NOTE — PROGRESS NOTE BEHAVIORAL HEALTH - NSBHCHARTREVIEWVS_PSY_A_CORE FT
Vital Signs Last 24 Hrs  T(C): 36.8 (21 Feb 2020 08:50), Max: 36.8 (21 Feb 2020 08:50)  T(F): 98.2 (21 Feb 2020 08:50), Max: 98.2 (21 Feb 2020 08:50)  HR: 82 (21 Feb 2020 16:21) (82 - 89)  BP: 105/67 (21 Feb 2020 16:21) (105/67 - 121/77)  BP(mean): --  RR: 16 (21 Feb 2020 16:21) (16 - 18)  SpO2: 95% (21 Feb 2020 16:21) (95% - 96%)

## 2020-02-21 NOTE — PROGRESS NOTE ADULT - ATTENDING COMMENTS
All as above.  Patient personally seen and examined.  Wound clean.  Stage II.  Should be amenable to local care.  If site looks stable/ improved next week, can consider changing regimen to Monday and Thursday changes.   In interim, will add MVI, Zinc and Vitamin C as nutritional supplements.

## 2020-02-21 NOTE — PROGRESS NOTE ADULT - SUBJECTIVE AND OBJECTIVE BOX
SURGERY    79y WF admitted with depression, Stage 2 left elbow ulcer    SUBJECTIVE:   Patient seen at bedside, cooperative, alert.   Previous dressing applied on 2/19    OBJECTIVE:   T(F): 98.2 (02-21-20 @ 08:50), Max: 98.2 (02-21-20 @ 08:50)  HR: 89 (02-21-20 @ 08:50) (77 - 89)  BP: 121/77 (02-21-20 @ 08:50) (121/77 - 127/72)  RR: 18 (02-21-20 @ 08:50) (17 - 18)  SpO2: 96% (02-21-20 @ 08:50) (94% - 96%)  CAPILLARY BLOOD GLUCOSE      POCT Blood Glucose.: 154 mg/dL (21 Feb 2020 12:34)  POCT Blood Glucose.: 184 mg/dL (21 Feb 2020 08:14)  POCT Blood Glucose.: 169 mg/dL (20 Feb 2020 20:10)  POCT Blood Glucose.: 150 mg/dL (20 Feb 2020 16:46)    I&O's Detail      Physical exam:  General: alert, NAD  LEFT elbow: 2x2 cm central wound pink, clean, no exudate or foul-smelling drainage on bandage, non-edematous, surround area of ecchymosis with no skin breakdown.    MEDICATIONS  (STANDING):  amLODIPine   Tablet 5 milliGRAM(s) Oral daily  ARIPiprazole 10 milliGRAM(s) Oral daily  atorvastatin 40 milliGRAM(s) Oral at bedtime  brimonidine 0.2% Ophthalmic Solution 1 Drop(s) Right EYE two times a day  buPROPion  milliGRAM(s) Oral daily  ciprofloxacin     Tablet 500 milliGRAM(s) Oral every 12 hours  dextrose 5%. 1000 milliLiter(s) (50 mL/Hr) IV Continuous <Continuous>  dextrose 50% Injectable 12.5 Gram(s) IV Push once  dextrose 50% Injectable 25 Gram(s) IV Push once  dextrose 50% Injectable 25 Gram(s) IV Push once  dorzolamide 2% Ophthalmic Solution 1 Drop(s) Right EYE three times a day  insulin lispro (HumaLOG) corrective regimen sliding scale   SubCutaneous three times a day before meals  losartan 100 milliGRAM(s) Oral daily  venlafaxine XR 37.5 milliGRAM(s) Oral daily    MEDICATIONS  (PRN):  dextrose 40% Gel 15 Gram(s) Oral once PRN Blood Glucose LESS THAN 70 milliGRAM(s)/deciliter  glucagon  Injectable 1 milliGRAM(s) IntraMuscular once PRN Glucose LESS THAN 70 milligrams/deciliter  LORazepam     Tablet 1 milliGRAM(s) Oral every 6 hours PRN anxiety SURGERY      79y WF admitted with depression, Stage 2 left elbow ulcer    SUBJECTIVE:   Patient seen at bedside, cooperative, alert.   Previous dressing applied on 2/19      OBJECTIVE:   T(F): 98.2 (02-21-20 @ 08:50), Max: 98.2 (02-21-20 @ 08:50)  HR: 89 (02-21-20 @ 08:50) (77 - 89)  BP: 121/77 (02-21-20 @ 08:50) (121/77 - 127/72)  RR: 18 (02-21-20 @ 08:50) (17 - 18)  SpO2: 96% (02-21-20 @ 08:50) (94% - 96%)      CAPILLARY BLOOD GLUCOSE  POCT Blood Glucose.: 154 mg/dL (21 Feb 2020 12:34)  POCT Blood Glucose.: 184 mg/dL (21 Feb 2020 08:14)  POCT Blood Glucose.: 169 mg/dL (20 Feb 2020 20:10)  POCT Blood Glucose.: 150 mg/dL (20 Feb 2020 16:46)          Physical exam:  General: alert, NAD  LEFT elbow: 2x2 cm central wound pink, clean, no exudate or foul-smelling drainage on bandage, non-edematous, surround area of ecchymosis with no skin breakdown.      MEDICATIONS  (STANDING):  amLODIPine   Tablet 5 milliGRAM(s) Oral daily  ARIPiprazole 10 milliGRAM(s) Oral daily  atorvastatin 40 milliGRAM(s) Oral at bedtime  brimonidine 0.2% Ophthalmic Solution 1 Drop(s) Right EYE two times a day  buPROPion  milliGRAM(s) Oral daily  ciprofloxacin     Tablet 500 milliGRAM(s) Oral every 12 hours  dextrose 5%. 1000 milliLiter(s) (50 mL/Hr) IV Continuous <Continuous>  dextrose 50% Injectable 12.5 Gram(s) IV Push once  dextrose 50% Injectable 25 Gram(s) IV Push once  dextrose 50% Injectable 25 Gram(s) IV Push once  dorzolamide 2% Ophthalmic Solution 1 Drop(s) Right EYE three times a day  insulin lispro (HumaLOG) corrective regimen sliding scale   SubCutaneous three times a day before meals  losartan 100 milliGRAM(s) Oral daily  venlafaxine XR 37.5 milliGRAM(s) Oral daily    MEDICATIONS  (PRN):  dextrose 40% Gel 15 Gram(s) Oral once PRN Blood Glucose LESS THAN 70 milliGRAM(s)/deciliter  glucagon  Injectable 1 milliGRAM(s) IntraMuscular once PRN Glucose LESS THAN 70 milligrams/deciliter  LORazepam     Tablet 1 milliGRAM(s) Oral every 6 hours PRN anxiety

## 2020-02-21 NOTE — PROGRESS NOTE BEHAVIORAL HEALTH - NSBHADMITMEDEDUDETAILS_A_CORE FT
Psychoeducation again provided re: need for Rx adherence for sx management with teach back verbalized

## 2020-02-22 LAB
GLUCOSE BLDC GLUCOMTR-MCNC: 158 MG/DL — HIGH (ref 70–99)
GLUCOSE BLDC GLUCOMTR-MCNC: 165 MG/DL — HIGH (ref 70–99)
GLUCOSE BLDC GLUCOMTR-MCNC: 170 MG/DL — HIGH (ref 70–99)
GLUCOSE BLDC GLUCOMTR-MCNC: 181 MG/DL — HIGH (ref 70–99)

## 2020-02-22 RX ADMIN — Medication 1: at 08:10

## 2020-02-22 RX ADMIN — Medication 37.5 MILLIGRAM(S): at 08:49

## 2020-02-22 RX ADMIN — ATORVASTATIN CALCIUM 40 MILLIGRAM(S): 80 TABLET, FILM COATED ORAL at 20:10

## 2020-02-22 RX ADMIN — Medication 500 MILLIGRAM(S): at 08:48

## 2020-02-22 RX ADMIN — ARIPIPRAZOLE 10 MILLIGRAM(S): 15 TABLET ORAL at 08:48

## 2020-02-22 RX ADMIN — ZINC SULFATE TAB 220 MG (50 MG ZINC EQUIVALENT) 220 MILLIGRAM(S): 220 (50 ZN) TAB at 08:48

## 2020-02-22 RX ADMIN — Medication 1 TABLET(S): at 08:48

## 2020-02-22 RX ADMIN — Medication 1: at 17:10

## 2020-02-22 RX ADMIN — BUPROPION HYDROCHLORIDE 150 MILLIGRAM(S): 150 TABLET, EXTENDED RELEASE ORAL at 08:48

## 2020-02-22 RX ADMIN — DORZOLAMIDE HYDROCHLORIDE 1 DROP(S): 20 SOLUTION/ DROPS OPHTHALMIC at 20:10

## 2020-02-22 RX ADMIN — LOSARTAN POTASSIUM 100 MILLIGRAM(S): 100 TABLET, FILM COATED ORAL at 08:49

## 2020-02-22 RX ADMIN — DORZOLAMIDE HYDROCHLORIDE 1 DROP(S): 20 SOLUTION/ DROPS OPHTHALMIC at 14:44

## 2020-02-22 RX ADMIN — DORZOLAMIDE HYDROCHLORIDE 1 DROP(S): 20 SOLUTION/ DROPS OPHTHALMIC at 08:49

## 2020-02-22 RX ADMIN — BRIMONIDINE TARTRATE 1 DROP(S): 2 SOLUTION/ DROPS OPHTHALMIC at 08:49

## 2020-02-22 RX ADMIN — AMLODIPINE BESYLATE 5 MILLIGRAM(S): 2.5 TABLET ORAL at 08:48

## 2020-02-22 RX ADMIN — BRIMONIDINE TARTRATE 1 DROP(S): 2 SOLUTION/ DROPS OPHTHALMIC at 20:11

## 2020-02-22 RX ADMIN — Medication 1: at 12:26

## 2020-02-23 LAB
GLUCOSE BLDC GLUCOMTR-MCNC: 153 MG/DL — HIGH (ref 70–99)
GLUCOSE BLDC GLUCOMTR-MCNC: 169 MG/DL — HIGH (ref 70–99)
GLUCOSE BLDC GLUCOMTR-MCNC: 170 MG/DL — HIGH (ref 70–99)
GLUCOSE BLDC GLUCOMTR-MCNC: 206 MG/DL — HIGH (ref 70–99)

## 2020-02-23 RX ADMIN — DORZOLAMIDE HYDROCHLORIDE 1 DROP(S): 20 SOLUTION/ DROPS OPHTHALMIC at 20:30

## 2020-02-23 RX ADMIN — ATORVASTATIN CALCIUM 40 MILLIGRAM(S): 80 TABLET, FILM COATED ORAL at 20:29

## 2020-02-23 RX ADMIN — AMLODIPINE BESYLATE 5 MILLIGRAM(S): 2.5 TABLET ORAL at 09:27

## 2020-02-23 RX ADMIN — ARIPIPRAZOLE 10 MILLIGRAM(S): 15 TABLET ORAL at 09:40

## 2020-02-23 RX ADMIN — Medication 1: at 08:27

## 2020-02-23 RX ADMIN — BRIMONIDINE TARTRATE 1 DROP(S): 2 SOLUTION/ DROPS OPHTHALMIC at 09:27

## 2020-02-23 RX ADMIN — Medication 37.5 MILLIGRAM(S): at 09:28

## 2020-02-23 RX ADMIN — LOSARTAN POTASSIUM 100 MILLIGRAM(S): 100 TABLET, FILM COATED ORAL at 09:28

## 2020-02-23 RX ADMIN — DORZOLAMIDE HYDROCHLORIDE 1 DROP(S): 20 SOLUTION/ DROPS OPHTHALMIC at 14:44

## 2020-02-23 RX ADMIN — Medication 2: at 16:59

## 2020-02-23 RX ADMIN — ZINC SULFATE TAB 220 MG (50 MG ZINC EQUIVALENT) 220 MILLIGRAM(S): 220 (50 ZN) TAB at 09:28

## 2020-02-23 RX ADMIN — BRIMONIDINE TARTRATE 1 DROP(S): 2 SOLUTION/ DROPS OPHTHALMIC at 20:30

## 2020-02-23 RX ADMIN — Medication 1 TABLET(S): at 09:40

## 2020-02-23 RX ADMIN — Medication 1: at 12:35

## 2020-02-23 RX ADMIN — DORZOLAMIDE HYDROCHLORIDE 1 DROP(S): 20 SOLUTION/ DROPS OPHTHALMIC at 09:33

## 2020-02-23 RX ADMIN — Medication 500 MILLIGRAM(S): at 09:27

## 2020-02-23 RX ADMIN — BUPROPION HYDROCHLORIDE 150 MILLIGRAM(S): 150 TABLET, EXTENDED RELEASE ORAL at 09:27

## 2020-02-24 LAB
GLUCOSE BLDC GLUCOMTR-MCNC: 141 MG/DL — HIGH (ref 70–99)
GLUCOSE BLDC GLUCOMTR-MCNC: 172 MG/DL — HIGH (ref 70–99)
GLUCOSE BLDC GLUCOMTR-MCNC: 198 MG/DL — HIGH (ref 70–99)
GLUCOSE BLDC GLUCOMTR-MCNC: 248 MG/DL — HIGH (ref 70–99)

## 2020-02-24 PROCEDURE — 99232 SBSQ HOSP IP/OBS MODERATE 35: CPT

## 2020-02-24 RX ADMIN — ARIPIPRAZOLE 10 MILLIGRAM(S): 15 TABLET ORAL at 09:17

## 2020-02-24 RX ADMIN — Medication 37.5 MILLIGRAM(S): at 09:17

## 2020-02-24 RX ADMIN — DORZOLAMIDE HYDROCHLORIDE 1 DROP(S): 20 SOLUTION/ DROPS OPHTHALMIC at 12:58

## 2020-02-24 RX ADMIN — ATORVASTATIN CALCIUM 40 MILLIGRAM(S): 80 TABLET, FILM COATED ORAL at 20:15

## 2020-02-24 RX ADMIN — Medication 1 TABLET(S): at 09:17

## 2020-02-24 RX ADMIN — Medication 2: at 12:20

## 2020-02-24 RX ADMIN — DORZOLAMIDE HYDROCHLORIDE 1 DROP(S): 20 SOLUTION/ DROPS OPHTHALMIC at 20:15

## 2020-02-24 RX ADMIN — DORZOLAMIDE HYDROCHLORIDE 1 DROP(S): 20 SOLUTION/ DROPS OPHTHALMIC at 09:21

## 2020-02-24 RX ADMIN — ZINC SULFATE TAB 220 MG (50 MG ZINC EQUIVALENT) 220 MILLIGRAM(S): 220 (50 ZN) TAB at 09:17

## 2020-02-24 RX ADMIN — BUPROPION HYDROCHLORIDE 150 MILLIGRAM(S): 150 TABLET, EXTENDED RELEASE ORAL at 09:17

## 2020-02-24 RX ADMIN — LOSARTAN POTASSIUM 100 MILLIGRAM(S): 100 TABLET, FILM COATED ORAL at 09:17

## 2020-02-24 RX ADMIN — AMLODIPINE BESYLATE 5 MILLIGRAM(S): 2.5 TABLET ORAL at 09:17

## 2020-02-24 RX ADMIN — Medication 500 MILLIGRAM(S): at 09:17

## 2020-02-24 RX ADMIN — BRIMONIDINE TARTRATE 1 DROP(S): 2 SOLUTION/ DROPS OPHTHALMIC at 09:17

## 2020-02-24 RX ADMIN — Medication 1: at 08:08

## 2020-02-24 RX ADMIN — BRIMONIDINE TARTRATE 1 DROP(S): 2 SOLUTION/ DROPS OPHTHALMIC at 20:15

## 2020-02-24 NOTE — PROGRESS NOTE BEHAVIORAL HEALTH - NSBHFUPINTERVALHXFT_PSY_A_CORE
Met with and evaluated patient.  Chart reviewed and case discussed in tx team meeting and with Dr. Carrasco. No significant interval events are noted or reported.. Patient continues to report depressed mood.  Patient is in behavioral control and did spend time in the day area today., with a great deal of staff encouragement. and went to groups. Verbal with encouragement and latency is present. . Affect is somewhat brighter.    Patient continues to need  direction for ADL from staff.   Denies any SI or HI.  No Rx SE or sx TD/EPS are noted or reported.  Continue to encourage patient to consider ECT.  Spoke with patient's daughter and she reports she is in favor of ECT for patient, and other family members also feel ECT will help patient. Continue to discuss potential benefits/SE of ECT with daughter. .

## 2020-02-24 NOTE — PROGRESS NOTE BEHAVIORAL HEALTH - NSBHCHARTREVIEWVS_PSY_A_CORE FT
Vital Signs Last 24 Hrs  T(C): 36.5 (24 Feb 2020 07:35), Max: 36.5 (24 Feb 2020 07:35)  T(F): 97.7 (24 Feb 2020 07:35), Max: 97.7 (24 Feb 2020 07:35)  HR: 78 (24 Feb 2020 16:23) (78 - 108)  BP: 121/74 (24 Feb 2020 16:23) (121/74 - 140/83)  BP(mean): --  RR: 17 (24 Feb 2020 16:23) (16 - 17)  SpO2: 97% (24 Feb 2020 16:23) (95% - 97%))

## 2020-02-25 DIAGNOSIS — E11.9 TYPE 2 DIABETES MELLITUS WITHOUT COMPLICATIONS: ICD-10-CM

## 2020-02-25 LAB
GLUCOSE BLDC GLUCOMTR-MCNC: 140 MG/DL — HIGH (ref 70–99)
GLUCOSE BLDC GLUCOMTR-MCNC: 176 MG/DL — HIGH (ref 70–99)
GLUCOSE BLDC GLUCOMTR-MCNC: 210 MG/DL — HIGH (ref 70–99)
GLUCOSE BLDC GLUCOMTR-MCNC: 218 MG/DL — HIGH (ref 70–99)

## 2020-02-25 PROCEDURE — 93010 ELECTROCARDIOGRAM REPORT: CPT

## 2020-02-25 PROCEDURE — 99232 SBSQ HOSP IP/OBS MODERATE 35: CPT

## 2020-02-25 RX ORDER — VENLAFAXINE HCL 75 MG
75 CAPSULE, EXT RELEASE 24 HR ORAL DAILY
Refills: 0 | Status: DISCONTINUED | OUTPATIENT
Start: 2020-02-25 | End: 2020-03-06

## 2020-02-25 RX ADMIN — BUPROPION HYDROCHLORIDE 150 MILLIGRAM(S): 150 TABLET, EXTENDED RELEASE ORAL at 09:05

## 2020-02-25 RX ADMIN — Medication 37.5 MILLIGRAM(S): at 09:05

## 2020-02-25 RX ADMIN — BRIMONIDINE TARTRATE 1 DROP(S): 2 SOLUTION/ DROPS OPHTHALMIC at 09:06

## 2020-02-25 RX ADMIN — Medication 1: at 08:24

## 2020-02-25 RX ADMIN — Medication 1 TABLET(S): at 09:08

## 2020-02-25 RX ADMIN — ARIPIPRAZOLE 10 MILLIGRAM(S): 15 TABLET ORAL at 09:08

## 2020-02-25 RX ADMIN — DORZOLAMIDE HYDROCHLORIDE 1 DROP(S): 20 SOLUTION/ DROPS OPHTHALMIC at 09:06

## 2020-02-25 RX ADMIN — ATORVASTATIN CALCIUM 40 MILLIGRAM(S): 80 TABLET, FILM COATED ORAL at 20:28

## 2020-02-25 RX ADMIN — Medication 500 MILLIGRAM(S): at 09:05

## 2020-02-25 RX ADMIN — Medication 2: at 12:32

## 2020-02-25 RX ADMIN — BRIMONIDINE TARTRATE 1 DROP(S): 2 SOLUTION/ DROPS OPHTHALMIC at 20:28

## 2020-02-25 RX ADMIN — DORZOLAMIDE HYDROCHLORIDE 1 DROP(S): 20 SOLUTION/ DROPS OPHTHALMIC at 12:32

## 2020-02-25 RX ADMIN — LOSARTAN POTASSIUM 100 MILLIGRAM(S): 100 TABLET, FILM COATED ORAL at 09:04

## 2020-02-25 RX ADMIN — AMLODIPINE BESYLATE 5 MILLIGRAM(S): 2.5 TABLET ORAL at 09:04

## 2020-02-25 RX ADMIN — ZINC SULFATE TAB 220 MG (50 MG ZINC EQUIVALENT) 220 MILLIGRAM(S): 220 (50 ZN) TAB at 09:05

## 2020-02-25 RX ADMIN — DORZOLAMIDE HYDROCHLORIDE 1 DROP(S): 20 SOLUTION/ DROPS OPHTHALMIC at 20:28

## 2020-02-25 NOTE — PROGRESS NOTE ADULT - SUBJECTIVE AND OBJECTIVE BOX
Patient is a 79y old  Female who presents with a chief complaint of depression (21 Feb 2020 15:08)      Type:2 Dm  with  CKD was on  metformin 500mg po  daily stopped due to  renal insufficiency.  presently  requires correction insulin to  maintain glucose 140 to 180mg/dl    PAST MEDICAL & SURGICAL HISTORY:  Small bowel obstruction  Osteoarthritis  Glaucoma  Diabetes  Hyperlipidemia  Hypertension  S/P knee replacement, left  Achilles tendon injury: repair  right scalene muscle release  S/P cholecystectomy: 1981  S/P hysterectomy: 1981 and Oopherectomy in 1990s      MEDICATIONS  (STANDING):  amLODIPine   Tablet 5 milliGRAM(s) Oral daily  ARIPiprazole 10 milliGRAM(s) Oral daily  ascorbic acid 500 milliGRAM(s) Oral daily  atorvastatin 40 milliGRAM(s) Oral at bedtime  brimonidine 0.2% Ophthalmic Solution 1 Drop(s) Right EYE two times a day  buPROPion  milliGRAM(s) Oral daily  dextrose 5%. 1000 milliLiter(s) (50 mL/Hr) IV Continuous <Continuous>  dextrose 50% Injectable 12.5 Gram(s) IV Push once  dextrose 50% Injectable 25 Gram(s) IV Push once  dextrose 50% Injectable 25 Gram(s) IV Push once  dorzolamide 2% Ophthalmic Solution 1 Drop(s) Right EYE three times a day  insulin lispro (HumaLOG) corrective regimen sliding scale   SubCutaneous three times a day before meals  losartan 100 milliGRAM(s) Oral daily  multivitamin 1 Tablet(s) Oral daily  venlafaxine XR 37.5 milliGRAM(s) Oral daily  zinc sulfate 220 milliGRAM(s) Oral daily

## 2020-02-25 NOTE — PROGRESS NOTE ADULT - SUBJECTIVE AND OBJECTIVE BOX
No acute events since last visit from General Surgery perspective.  Ongoing psychiatry care continues.  Medication being adjusted.  Patient considering ECT.  She denies discomfort/ pain or complaint pertaining to left elbow.  Nurses report "fair" compliance with off-loading...      Vital Signs Last 24 Hrs  T(F): 97.3 (25 Feb 2020 08:00), Max: 97.3 (25 Feb 2020 08:00)  HR: 109 (25 Feb 2020 08:00) (78 - 109)  BP: 124/82 (25 Feb 2020 08:00) (121/74 - 124/82)  RR: 17 (25 Feb 2020 08:00) (17 - 17)  SpO2: 92% (25 Feb 2020 08:00) (92% - 97%)      SUBJECTIVE: Pt seen in day room and then brought to treatment area.  Appears well and NAD.      Pain: NO              SOB: NO  Chest Pain: NO    Nausea or Vomiting: NO           Flatus or Bowel Movement: YES                Void: YES      General Appearance: Appears well and in NAD  Neck: Supple with full ROM  Chest: Equal expansion bilaterally  CV: Pulse regular presently in rate and rhythm  Abdomen: Soft, non tense, non tender  Extremities: Grossly symmetric with SCD's in place   Left elbow area 1.5 x 1.5 cm central wound pink, clean, no exudate or foul-smelling drainage on bandage, non-edematous with re-epithelialization ongoing; no adjacent skin breakdown though slight discoloration noted... ? ecchymoses  ? stage I.      MEDICATIONS  (STANDING):  amLODIPine   Tablet 5 milliGRAM(s) Oral daily  ARIPiprazole 10 milliGRAM(s) Oral daily  ascorbic acid 500 milliGRAM(s) Oral daily  atorvastatin 40 milliGRAM(s) Oral at bedtime  brimonidine 0.2% Ophthalmic Solution 1 Drop(s) Right EYE two times a day  buPROPion  milliGRAM(s) Oral daily  dextrose 5%. 1000 milliLiter(s) (50 mL/Hr) IV Continuous <Continuous>  dextrose 50% Injectable 12.5 Gram(s) IV Push once  dextrose 50% Injectable 25 Gram(s) IV Push once  dextrose 50% Injectable 25 Gram(s) IV Push once  dorzolamide 2% Ophthalmic Solution 1 Drop(s) Right EYE three times a day  insulin lispro (HumaLOG) corrective regimen sliding scale   SubCutaneous three times a day before meals  losartan 100 milliGRAM(s) Oral daily  multivitamin 1 Tablet(s) Oral daily  venlafaxine XR 37.5 milliGRAM(s) Oral daily  zinc sulfate 220 milliGRAM(s) Oral daily      MEDICATIONS  (PRN):  dextrose 40% Gel 15 Gram(s) Oral once PRN Blood Glucose LESS THAN 70 milliGRAM(s)/deciliter  glucagon  Injectable 1 milliGRAM(s) IntraMuscular once PRN Glucose LESS THAN 70 milligrams/deciliter  LORazepam     Tablet 1 milliGRAM(s) Oral every 6 hours PRN anxiety        LABS: No new labs.      RADIOLOGY & ADDITIONAL STUDIES: No new imaging.

## 2020-02-25 NOTE — PROGRESS NOTE BEHAVIORAL HEALTH - NSBHCHARTREVIEWVS_PSY_A_CORE FT
Vital Signs Last 24 Hrs  T(C): 36.3 (25 Feb 2020 08:00), Max: 36.3 (25 Feb 2020 08:00)  T(F): 97.3 (25 Feb 2020 08:00), Max: 97.3 (25 Feb 2020 08:00)  HR: 69 (25 Feb 2020 16:20) (69 - 109)  BP: 135/75 (25 Feb 2020 16:20) (124/82 - 135/75)  BP(mean): --  RR: 18 (25 Feb 2020 16:20) (17 - 18)  SpO2: 95% (25 Feb 2020 16:20) (92% - 95%)

## 2020-02-25 NOTE — PROGRESS NOTE ADULT - ASSESSMENT
Psychiatry care continues.  Clinically stable from my perspective.  No acute General Surgery interventions required at this time outside of local care.  Vitals non-suggestive.  Stage II decubitus clean, anne marie and re-epithelializing.  Following adjacent areas to detect early new injury.  No new decubiti on my examination of pressure points.  Remains amenable to local care.  Will change dressing regimen to Monday and Thursday.   Ongoing nutritional supplements continue.  Importance of ongoing off-loading emphasized to patient in detail.

## 2020-02-25 NOTE — PROGRESS NOTE ADULT - SUBJECTIVE AND OBJECTIVE BOX
Progress: follow up medical problems.           Allergies    adhesives (Pruritus; Blisters)  penicillin (Hives)  pineapple (Anaphylaxis)    Intolerances            MEDICATIONS  (STANDING):  MEDICATIONS  (STANDING):  amLODIPine   Tablet 5 milliGRAM(s) Oral daily  ARIPiprazole 10 milliGRAM(s) Oral daily  ascorbic acid 500 milliGRAM(s) Oral daily  atorvastatin 40 milliGRAM(s) Oral at bedtime  brimonidine 0.2% Ophthalmic Solution 1 Drop(s) Right EYE two times a day  buPROPion  milliGRAM(s) Oral daily  dextrose 5%. 1000 milliLiter(s) (50 mL/Hr) IV Continuous <Continuous>  dextrose 50% Injectable 12.5 Gram(s) IV Push once  dextrose 50% Injectable 25 Gram(s) IV Push once  dextrose 50% Injectable 25 Gram(s) IV Push once  dorzolamide 2% Ophthalmic Solution 1 Drop(s) Right EYE three times a day  insulin lispro (HumaLOG) corrective regimen sliding scale   SubCutaneous three times a day before meals  losartan 100 milliGRAM(s) Oral daily  multivitamin 1 Tablet(s) Oral daily  venlafaxine XR 75 milliGRAM(s) Oral daily  zinc sulfate 220 milliGRAM(s) Oral daily    MEDICATIONS  (PRN):  dextrose 40% Gel 15 Gram(s) Oral once PRN Blood Glucose LESS THAN 70 milliGRAM(s)/deciliter  glucagon  Injectable 1 milliGRAM(s) IntraMuscular once PRN Glucose LESS THAN 70 milligrams/deciliter  LORazepam     Tablet 1 milliGRAM(s) Oral every 6 hours PRN anxiety          Vital Signs Last 24 Hrs  Vital Signs Last 24 Hrs  T(C): 36.3 (25 Feb 2020 08:00), Max: 36.3 (25 Feb 2020 08:00)  T(F): 97.3 (25 Feb 2020 08:00), Max: 97.3 (25 Feb 2020 08:00)  HR: 109 (25 Feb 2020 08:00) (78 - 109)  BP: 124/82 (25 Feb 2020 08:00) (121/74 - 124/82)  RR: 17 (25 Feb 2020 08:00) (17 - 17)  SpO2: 92% (25 Feb 2020 08:00) (92% - 97%)      ROS ; no c/o constipation, sob, or chest pain.        PHYSICAL EXAM:  GENERAL: NAD, well-groomed, well-developed  HEAD:  Atraumatic, Normocephalic  EYES: EOMI, PERRLA, conjunctiva and sclera clear  ENMT: No tonsillar erythema, exudates, or enlargement; Moist mucous membranes, Good dentition, No lesions  NECK: Supple, No JVD, Normal thyroid  CHEST/LUNG: Clear to auscultation bilaterally; No rales, rhonchi, wheezing, or rubs  HEART: Regular rate and rhythm; No murmurs, rubs, or gallops  ABDOMEN: Soft, Nontender, Nondistended; Bowel sounds present  EXTREMITIES:  2+ Peripheral Pulses, No clubbing, cyanosis, or edema  LYMPH: No lymphadenopathy noted  SKIN: left elbow 2x2 CM  wound stage 2 ,no breakthrough ,no erythema.  No tender .    LABS:                       POCT Blood Glucose.: 218 mg/dL (25 Feb 2020 12:28)  POCT Blood Glucose.: 176 mg/dL (25 Feb 2020 07:49)  POCT Blood Glucose.: 198 mg/dL (24 Feb 2020 20:06)  POCT Blood Glucose.: 141 mg/dL (24 Feb 2020 17:18)      02-17 [id number] 8.5    02-17 Chol 132 LDL 57 HDL 38<L> Trig 184<H>    Thyroid Stimulating Hormone, Serum: 4.11 uIU/mL (02-17 @ 11:09)

## 2020-02-25 NOTE — PROGRESS NOTE BEHAVIORAL HEALTH - NSBHFUPINTERVALHXFT_PSY_A_CORE
Met with and evaluated patient.  Chart reviewed and case discussed in tx team meeting and with Dr. Carrasco. No significant interval events are reported.. Patient continues to report depressed mood.  Patient is in behavioral control and did spend time in the day area again today., with a great deal of staff encouragement. and went to groups. Verbal with encouragement and latency is improved . Affect is somewhat brighter.    Patient continues to need  direction for ADL from staff.   Denies any SI or HI.  No Rx SE or sx TD/EPS are noted or reported.  Continue to encourage patient to consider ECT, and patient continues to refuse ECT  "I don't want it"

## 2020-02-25 NOTE — PROGRESS NOTE ADULT - ASSESSMENT
T2DM ; continue coverage . PT was consulted with diabetic nurse , see advise .  hyperlipidemia ; continue treatment as needed.  hypertension; stable.  h/o glaucoma ; Continue eye drops.  CKD stage 3; due to diabetic nephropathy. advised f/u with nephrologist post discharge .  left elbow ulcer ;  improving , continue treatment as per DR diaz.  h/o depression ; continue treatment as per psych advise.   pt in optimal condition , low risk for ECT procedure.

## 2020-02-25 NOTE — PROGRESS NOTE ADULT - ASSESSMENT
Allergies    adhesives (Pruritus; Blisters)  penicillin (Hives)  pineapple (Anaphylaxis)    Intolerances        Daily     Daily Weight in k.8 (2020 08:00)    Vital Signs Last 24 Hrs  T(C): 36.3 (2020 08:00), Max: 36.3 (2020 08:00)  T(F): 97.3 (2020 08:00), Max: 97.3 (2020 08:00)  HR: 109 (2020 08:00) (78 - 109)  BP: 124/82 (2020 08:00) (121/74 - 124/82)  BP(mean): --  RR: 17 (2020 08:00) (17 - 17)  SpO2: 92% (2020 08:00) (92% - 97%)            Hemoglobin A1C, Whole Blood: 8.5 % (-20 @ 11:10)           CAPILLARY BLOOD GLUCOSE      POCT Blood Glucose.: 176 mg/dL (2020 07:49)  POCT Blood Glucose.: 198 mg/dL (2020 20:06)  POCT Blood Glucose.: 141 mg/dL (2020 17:18)  POCT Blood Glucose.: 248 mg/dL (2020 12:16)      DIET: Consistent Carbohydrates

## 2020-02-25 NOTE — PROGRESS NOTE ADULT - PROBLEM SELECTOR PLAN 1
Repeat labs to assess  renal function  resume metformin if  EGRF is > 30  Monitor glucose trends  Goal range 140 to 180mg/dl  follow up

## 2020-02-26 LAB
ANION GAP SERPL CALC-SCNC: 5 MMOL/L — SIGNIFICANT CHANGE UP (ref 5–17)
BUN SERPL-MCNC: 21 MG/DL — SIGNIFICANT CHANGE UP (ref 7–23)
CALCIUM SERPL-MCNC: 8.6 MG/DL — SIGNIFICANT CHANGE UP (ref 8.4–10.5)
CHLORIDE SERPL-SCNC: 107 MMOL/L — SIGNIFICANT CHANGE UP (ref 96–108)
CO2 SERPL-SCNC: 33 MMOL/L — HIGH (ref 22–31)
CREAT SERPL-MCNC: 1.06 MG/DL — SIGNIFICANT CHANGE UP (ref 0.5–1.3)
GLUCOSE BLDC GLUCOMTR-MCNC: 166 MG/DL — HIGH (ref 70–99)
GLUCOSE BLDC GLUCOMTR-MCNC: 188 MG/DL — HIGH (ref 70–99)
GLUCOSE BLDC GLUCOMTR-MCNC: 189 MG/DL — HIGH (ref 70–99)
GLUCOSE BLDC GLUCOMTR-MCNC: 199 MG/DL — HIGH (ref 70–99)
GLUCOSE SERPL-MCNC: 183 MG/DL — HIGH (ref 70–99)
POTASSIUM SERPL-MCNC: 3.8 MMOL/L — SIGNIFICANT CHANGE UP (ref 3.5–5.3)
POTASSIUM SERPL-SCNC: 3.8 MMOL/L — SIGNIFICANT CHANGE UP (ref 3.5–5.3)
SODIUM SERPL-SCNC: 145 MMOL/L — SIGNIFICANT CHANGE UP (ref 135–145)

## 2020-02-26 PROCEDURE — 99233 SBSQ HOSP IP/OBS HIGH 50: CPT

## 2020-02-26 RX ADMIN — BUPROPION HYDROCHLORIDE 150 MILLIGRAM(S): 150 TABLET, EXTENDED RELEASE ORAL at 08:24

## 2020-02-26 RX ADMIN — ZINC SULFATE TAB 220 MG (50 MG ZINC EQUIVALENT) 220 MILLIGRAM(S): 220 (50 ZN) TAB at 08:24

## 2020-02-26 RX ADMIN — LOSARTAN POTASSIUM 100 MILLIGRAM(S): 100 TABLET, FILM COATED ORAL at 08:24

## 2020-02-26 RX ADMIN — BRIMONIDINE TARTRATE 1 DROP(S): 2 SOLUTION/ DROPS OPHTHALMIC at 08:25

## 2020-02-26 RX ADMIN — Medication 1 TABLET(S): at 08:24

## 2020-02-26 RX ADMIN — DORZOLAMIDE HYDROCHLORIDE 1 DROP(S): 20 SOLUTION/ DROPS OPHTHALMIC at 12:55

## 2020-02-26 RX ADMIN — Medication 1: at 08:01

## 2020-02-26 RX ADMIN — ARIPIPRAZOLE 10 MILLIGRAM(S): 15 TABLET ORAL at 08:24

## 2020-02-26 RX ADMIN — BRIMONIDINE TARTRATE 1 DROP(S): 2 SOLUTION/ DROPS OPHTHALMIC at 20:33

## 2020-02-26 RX ADMIN — Medication 500 MILLIGRAM(S): at 08:24

## 2020-02-26 RX ADMIN — AMLODIPINE BESYLATE 5 MILLIGRAM(S): 2.5 TABLET ORAL at 08:24

## 2020-02-26 RX ADMIN — DORZOLAMIDE HYDROCHLORIDE 1 DROP(S): 20 SOLUTION/ DROPS OPHTHALMIC at 20:33

## 2020-02-26 RX ADMIN — DORZOLAMIDE HYDROCHLORIDE 1 DROP(S): 20 SOLUTION/ DROPS OPHTHALMIC at 08:25

## 2020-02-26 RX ADMIN — ATORVASTATIN CALCIUM 40 MILLIGRAM(S): 80 TABLET, FILM COATED ORAL at 20:33

## 2020-02-26 RX ADMIN — Medication 75 MILLIGRAM(S): at 08:24

## 2020-02-26 RX ADMIN — Medication 1: at 17:31

## 2020-02-26 RX ADMIN — Medication 1: at 12:53

## 2020-02-26 NOTE — PROGRESS NOTE BEHAVIORAL HEALTH - NSBHADMITMEDEDUDETAILS_A_CORE FT
Psychoeducation again provided re: need for Rx adherence for sx management with teach back verbalized Psychoeducation again provided re: need for Rx adherence for sx management with teach back verbalized.  Psychoeducation provided to patient and daughter re: increase in Effexor dose, and potential benefits/SE of Effexor with both reporting teach back.

## 2020-02-26 NOTE — PROGRESS NOTE BEHAVIORAL HEALTH - NSBHCHARTREVIEWVS_PSY_A_CORE FT
Vital Signs Last 24 Hrs  T(C): 36.4 (26 Feb 2020 07:31), Max: 36.4 (26 Feb 2020 07:31)  T(F): 97.6 (26 Feb 2020 07:31), Max: 97.6 (26 Feb 2020 07:31)  HR: 90 (26 Feb 2020 07:31) (69 - 90)  BP: 169/81 (26 Feb 2020 07:31) (135/75 - 169/81)  BP(mean): --  RR: 18 (26 Feb 2020 07:31) (18 - 18)  SpO2: 94% (26 Feb 2020 07:31) (94% - 95%)

## 2020-02-26 NOTE — PROGRESS NOTE BEHAVIORAL HEALTH - NSBHFUPINTERVALHXFT_PSY_A_CORE
Met with and evaluated patient.  Chart reviewed and case discussed in tx team meeting and with Dr. Carrasco. No significant interval events are reported.. Patient continues to report depressed mood, but reports feeling "a little better" Reports on admission her mood was "a 1 or a 2, but now it is a 5"  (1=very depressed, 10=happy)  Patient is in behavioral control and did spend time in the day area again today .,with some  staff encouragement. and went to groups. Verbal and pleasant and no latency noted. . Affect is brighter, smiling at times.  Reports eating and sleeping well.  Patient continues to need  direction for ADL from staff.   Denies any SI or HI.  No Rx SE or sx TD/EPS are noted or reported.  Spoke with patient's daughter, who reports patient has a shunt in her head and patient reports this is from "past fluid on my brain"  Daughter reports she and other family members feel patient is doing better, and patient reports this as well.  Daughter reports family would like ECT put on hold for now, as patient is improving and they have concerns about the shunt. Patient continues to refuse ECT.

## 2020-02-27 DIAGNOSIS — E11.9 TYPE 2 DIABETES MELLITUS WITHOUT COMPLICATIONS: ICD-10-CM

## 2020-02-27 LAB
GLUCOSE BLDC GLUCOMTR-MCNC: 138 MG/DL — HIGH (ref 70–99)
GLUCOSE BLDC GLUCOMTR-MCNC: 152 MG/DL — HIGH (ref 70–99)
GLUCOSE BLDC GLUCOMTR-MCNC: 173 MG/DL — HIGH (ref 70–99)
GLUCOSE BLDC GLUCOMTR-MCNC: 196 MG/DL — HIGH (ref 70–99)
GLUCOSE BLDC GLUCOMTR-MCNC: 198 MG/DL — HIGH (ref 70–99)

## 2020-02-27 PROCEDURE — 99232 SBSQ HOSP IP/OBS MODERATE 35: CPT

## 2020-02-27 RX ADMIN — BUPROPION HYDROCHLORIDE 150 MILLIGRAM(S): 150 TABLET, EXTENDED RELEASE ORAL at 08:16

## 2020-02-27 RX ADMIN — Medication 500 MILLIGRAM(S): at 08:16

## 2020-02-27 RX ADMIN — ARIPIPRAZOLE 10 MILLIGRAM(S): 15 TABLET ORAL at 08:16

## 2020-02-27 RX ADMIN — BRIMONIDINE TARTRATE 1 DROP(S): 2 SOLUTION/ DROPS OPHTHALMIC at 20:49

## 2020-02-27 RX ADMIN — Medication 1: at 13:22

## 2020-02-27 RX ADMIN — Medication 1: at 17:02

## 2020-02-27 RX ADMIN — BRIMONIDINE TARTRATE 1 DROP(S): 2 SOLUTION/ DROPS OPHTHALMIC at 08:16

## 2020-02-27 RX ADMIN — DORZOLAMIDE HYDROCHLORIDE 1 DROP(S): 20 SOLUTION/ DROPS OPHTHALMIC at 20:49

## 2020-02-27 RX ADMIN — Medication 75 MILLIGRAM(S): at 08:16

## 2020-02-27 RX ADMIN — AMLODIPINE BESYLATE 5 MILLIGRAM(S): 2.5 TABLET ORAL at 08:16

## 2020-02-27 RX ADMIN — ZINC SULFATE TAB 220 MG (50 MG ZINC EQUIVALENT) 220 MILLIGRAM(S): 220 (50 ZN) TAB at 08:16

## 2020-02-27 RX ADMIN — Medication 1 TABLET(S): at 08:16

## 2020-02-27 RX ADMIN — DORZOLAMIDE HYDROCHLORIDE 1 DROP(S): 20 SOLUTION/ DROPS OPHTHALMIC at 17:04

## 2020-02-27 RX ADMIN — ATORVASTATIN CALCIUM 40 MILLIGRAM(S): 80 TABLET, FILM COATED ORAL at 20:48

## 2020-02-27 RX ADMIN — LOSARTAN POTASSIUM 100 MILLIGRAM(S): 100 TABLET, FILM COATED ORAL at 08:16

## 2020-02-27 RX ADMIN — Medication 1: at 08:12

## 2020-02-27 RX ADMIN — DORZOLAMIDE HYDROCHLORIDE 1 DROP(S): 20 SOLUTION/ DROPS OPHTHALMIC at 08:16

## 2020-02-27 NOTE — PROGRESS NOTE ADULT - ASSESSMENT
Patient  renal function improved  to resume metformin  low dose  glucose levels  near 200's  Allergies    adhesives (Pruritus; Blisters)  penicillin (Hives)  pineapple (Anaphylaxis)    Intolerances        Daily     Daily     Vital Signs Last 24 Hrs  T(C): 37.2 (27 Feb 2020 07:31), Max: 37.2 (27 Feb 2020 07:31)  T(F): 98.9 (27 Feb 2020 07:31), Max: 98.9 (27 Feb 2020 07:31)  HR: 82 (27 Feb 2020 07:31) (72 - 82)  BP: 141/77 (27 Feb 2020 07:31) (141/77 - 147/81)  BP(mean): --  RR: 14 (27 Feb 2020 07:31) (14 - 18)  SpO2: 95% (27 Feb 2020 07:31) (95% - 98%)    02-26    145  |  107  |  21  ----------------------------<  183<H>  3.8   |  33<H>  |  1.06    Ca    8.6      26 Feb 2020 07:03        Hemoglobin A1C, Whole Blood: 8.5 % (02-17-20 @ 11:10)       Anion Gap, Serum: 5 mmol/L (02-26-20 @ 07:03)  eGFR if Non African American: 51 mL/min/1.73M2 <L> (02-26-20 @ 07:03)  eGFR if African American: 59 mL/min/1.73M2 <L> (02-26-20 @ 07:03)      CAPILLARY BLOOD GLUCOSE      POCT Blood Glucose.: 198 mg/dL (27 Feb 2020 07:44)  POCT Blood Glucose.: 189 mg/dL (26 Feb 2020 20:04)  POCT Blood Glucose.: 166 mg/dL (26 Feb 2020 17:01)  POCT Blood Glucose.: 199 mg/dL (26 Feb 2020 12:10)      DIET: Consistent Carbohydrate

## 2020-02-27 NOTE — PROGRESS NOTE BEHAVIORAL HEALTH - NSBHFUPINTERVALHXFT_PSY_A_CORE
Met with and evaluated patient.  Chart reviewed and case discussed in tx team meeting and with Dr. Carrasco. No significant interval events are reported.. Patient continues to report depressed mood, but reports feeling "a little better" Continues to report her mood as " a 5"  (1=very depressed, 10=happy)  Patient continues  in behavioral control and did spend time in the day area again today .,with some  staff encouragement. and went to groups. Verbal and pleasant and no latency noted. . Affect is brighter, smiling at times.  Reports eating and sleeping well.  Patient continues to need  direction for ADL from staff.   Denies any SI or HI.  No Rx SE or sx TD/EPS are noted or reported.  Spoke with patient's daughter, who reports patient has a shunt in her head and patient reports this is from "past fluid on my brain"  Daughter reports she and other family members feel patient is doing better, and patient reports this as well. . Patient continues to refuse ECT.

## 2020-02-27 NOTE — PROGRESS NOTE BEHAVIORAL HEALTH - NSBHCHARTREVIEWVS_PSY_A_CORE FT
Vital Signs Last 24 Hrs  T(C): 37.2 (27 Feb 2020 07:31), Max: 37.2 (27 Feb 2020 07:31)  T(F): 98.9 (27 Feb 2020 07:31), Max: 98.9 (27 Feb 2020 07:31)  HR: 82 (27 Feb 2020 07:31) (72 - 82)  BP: 141/77 (27 Feb 2020 07:31) (141/77 - 147/81)  BP(mean): --  RR: 14 (27 Feb 2020 07:31) (14 - 18)  SpO2: 95% (27 Feb 2020 07:31) (95% - 98%)

## 2020-02-27 NOTE — PROGRESS NOTE BEHAVIORAL HEALTH - NSBHADMITMEDEDUDETAILS_A_CORE FT
Psychoeducation again provided re: need for Rx adherence for sx management with teach back verbalized.  Psychoeducation provided to patient and daughter re: increase in Effexor dose, and potential benefits/SE of Effexor with both reporting teach back. Psychoeducation again provided re: need for Rx adherence for sx management with teach back verbalized.

## 2020-02-27 NOTE — CHART NOTE - NSCHARTNOTEFT_GEN_A_CORE
Assessment:       79y old  Female who presents with a chief complaint of Depression, thought blocking delay, acting bizarre: As per MD notes, Pt reported that she took her Metformin 100mg po daily as well as weekly tresiba.  During initial assessment Pt stated that she was consuming three meals/day as well. However, as per MD notes, pt had poor intake  and she was not monitoring BG PTA. HgbA1C 8.5%-question whether pt was taking meds as directed or at all. Pt cannot confirm or deny weight loss. Physically, she appears overweight. ht/wt 5'4 158# BMI 27. Reviewed importance of consuming balanced, healthy meals.  Pt  answering questions appropriately,  but she could not elaborate much on specifics. In depth education/discussion does not appear appropriate at this time. Would f/u once pt more mentally stable. D/W NP CDE.              Factors impacting intake: [ ] none [ ] nausea  [ ] vomiting [ ] diarrhea [ ] constipation  [ ]chewing problems [ ] swallowing issues  [ ] other:     Diet Presciption: Diet, Consistent Carbohydrate w/Evening Snack (02-16-20 @ 21:08)    Intake:     Current Weight:   % Weight Change    Pertinent Medications: MEDICATIONS  (STANDING):  amLODIPine   Tablet 5 milliGRAM(s) Oral daily  ARIPiprazole 10 milliGRAM(s) Oral daily  ascorbic acid 500 milliGRAM(s) Oral daily  atorvastatin 40 milliGRAM(s) Oral at bedtime  brimonidine 0.2% Ophthalmic Solution 1 Drop(s) Right EYE two times a day  buPROPion  milliGRAM(s) Oral daily  dextrose 5%. 1000 milliLiter(s) (50 mL/Hr) IV Continuous <Continuous>  dextrose 50% Injectable 12.5 Gram(s) IV Push once  dextrose 50% Injectable 25 Gram(s) IV Push once  dextrose 50% Injectable 25 Gram(s) IV Push once  dorzolamide 2% Ophthalmic Solution 1 Drop(s) Right EYE three times a day  insulin lispro (HumaLOG) corrective regimen sliding scale   SubCutaneous three times a day before meals  losartan 100 milliGRAM(s) Oral daily  multivitamin 1 Tablet(s) Oral daily  venlafaxine XR 75 milliGRAM(s) Oral daily  zinc sulfate 220 milliGRAM(s) Oral daily    MEDICATIONS  (PRN):  dextrose 40% Gel 15 Gram(s) Oral once PRN Blood Glucose LESS THAN 70 milliGRAM(s)/deciliter  glucagon  Injectable 1 milliGRAM(s) IntraMuscular once PRN Glucose LESS THAN 70 milligrams/deciliter  LORazepam     Tablet 1 milliGRAM(s) Oral every 6 hours PRN anxiety    Pertinent Labs: 02-26 Na145 mmol/L Glu 183 mg/dL<H> K+ 3.8 mmol/L Cr  1.06 mg/dL BUN 21 mg/dL 02-17 [id number] 8.5 %<H> 02-17 Chol 132 mg/dL LDL 57 mg/dL HDL 38 mg/dL<L> Trig 184 mg/dL<H>     CAPILLARY BLOOD GLUCOSE      POCT Blood Glucose.: 173 mg/dL (27 Feb 2020 12:13)  POCT Blood Glucose.: 198 mg/dL (27 Feb 2020 07:44)  POCT Blood Glucose.: 189 mg/dL (26 Feb 2020 20:04)  POCT Blood Glucose.: 166 mg/dL (26 Feb 2020 17:01)    Skin: L  elbow stage II    Estimated Needs:   [ ] no change since previous assessment  [ ] recalculated:     Previous Nutrition Diagnosis:   [ ] Inadequate Energy Intake [ ]Inadequate Oral Intake [ ] Excessive Energy Intake   [ ] Underweight [ ] Increased Nutrient Needs [ ] Overweight/Obesity [x] abnormal nutrition related labs  [ ] Altered GI Function [ ] Unintended Weight Loss [ ] Food & Nutrition Related Knowledge Deficit [ ] Malnutrition     Nutrition Diagnosis is x] ongoing  [ ] resolved [ ] not applicable     New Nutrition Diagnosis: [ ] not applicable       Interventions:   Recommend  [ ] Change Diet To:  [x ] Nutrition Supplement  add SF magic cup  [ ] Nutrition Support  [ ] Other:     Monitoring and Evaluation:   [ ] PO intake [ x ] Tolerance to diet prescription [ x ] weights [ x ] labs[ x ] follow up per protocol  [ ] other: Assessment:       79y old  Female who presents with a chief complaint of Depression, thought blocking delay, acting bizarre: As per MD notes, Pt reported that she took her Metformin 100mg po daily as well as weekly tresiba.  During initial assessment Pt stated that she was consuming three meals/day as well. However, as per MD notes, pt had poor intake  and she was not monitoring BG PTA. HgbA1C 8.5%-question whether pt was taking meds as directed or at all. Pt cannot confirm or deny weight loss. Physically, she appears overweight. ht/wt 5'4 158# BMI 27. Attempted to review basics of consistent carbohydrate diet but pt with flat affect and did not appear to be comprehending. Returned for f/u today to re-attempt education. Pt again with flat affect but somewhat more alert. Reinforced balanced/healthy diet. Noted Surgery was consulted for L elbow wound. Pt with central wound to L elbow which as per Surgery is a  stage II p/u. Pt receiving  Vit C, MVI and zinc sulfate. Stressed importance of  consuming adequate protein kcal to help promote healing. RN reports pt eating meals without  trouble. Will provide SF magic cup BID. Pt has been going to Nutrition education group.               Factors impacting intake: [x ] none [ ] nausea  [ ] vomiting [ ] diarrhea [ ] constipation  [ ]chewing problems [ ] swallowing issues  [ ] other:     Diet Presciption: Diet, Consistent Carbohydrate w/Evening Snack (02-16-20 @ 21:08)    Intake: at least 50%    Current Weight:   no new  % Weight Change    Pertinent Medications: MEDICATIONS  (STANDING):  amLODIPine   Tablet 5 milliGRAM(s) Oral daily  ARIPiprazole 10 milliGRAM(s) Oral daily  ascorbic acid 500 milliGRAM(s) Oral daily  atorvastatin 40 milliGRAM(s) Oral at bedtime  brimonidine 0.2% Ophthalmic Solution 1 Drop(s) Right EYE two times a day  buPROPion  milliGRAM(s) Oral daily  dextrose 5%. 1000 milliLiter(s) (50 mL/Hr) IV Continuous <Continuous>  dextrose 50% Injectable 12.5 Gram(s) IV Push once  dextrose 50% Injectable 25 Gram(s) IV Push once  dextrose 50% Injectable 25 Gram(s) IV Push once  dorzolamide 2% Ophthalmic Solution 1 Drop(s) Right EYE three times a day  insulin lispro (HumaLOG) corrective regimen sliding scale   SubCutaneous three times a day before meals  losartan 100 milliGRAM(s) Oral daily  multivitamin 1 Tablet(s) Oral daily  venlafaxine XR 75 milliGRAM(s) Oral daily  zinc sulfate 220 milliGRAM(s) Oral daily    MEDICATIONS  (PRN):  dextrose 40% Gel 15 Gram(s) Oral once PRN Blood Glucose LESS THAN 70 milliGRAM(s)/deciliter  glucagon  Injectable 1 milliGRAM(s) IntraMuscular once PRN Glucose LESS THAN 70 milligrams/deciliter  LORazepam     Tablet 1 milliGRAM(s) Oral every 6 hours PRN anxiety    Pertinent Labs: 02-26 Na145 mmol/L Glu 183 mg/dL<H> K+ 3.8 mmol/L Cr  1.06 mg/dL BUN 21 mg/dL 02-17 [id number] 8.5 %<H> 02-17 Chol 132 mg/dL LDL 57 mg/dL HDL 38 mg/dL<L> Trig 184 mg/dL<H>     CAPILLARY BLOOD GLUCOSE      POCT Blood Glucose.: 173 mg/dL (27 Feb 2020 12:13)  POCT Blood Glucose.: 198 mg/dL (27 Feb 2020 07:44)  POCT Blood Glucose.: 189 mg/dL (26 Feb 2020 20:04)  POCT Blood Glucose.: 166 mg/dL (26 Feb 2020 17:01)    Skin: L  elbow stage II    Estimated Needs:   [x ] no change since previous assessment  [ ] recalculated:     Previous Nutrition Diagnosis:   [ ] Inadequate Energy Intake [ ]Inadequate Oral Intake [ ] Excessive Energy Intake   [ ] Underweight [ ] Increased Nutrient Needs [ ] Overweight/Obesity [x] abnormal nutrition related labs  [ ] Altered GI Function [ ] Unintended Weight Loss [ ] Food & Nutrition Related Knowledge Deficit [ ] Malnutrition     Nutrition Diagnosis is x] ongoing  [ ] resolved [ ] not applicable     New Nutrition Diagnosis: [ ] not applicable       Interventions:   Recommend  [ ] Change Diet To:  [x ] Nutrition Supplement  add SF magic cup  [ ] Nutrition Support  [ ] Other:     Monitoring and Evaluation:   [ ] PO intake [ x ] Tolerance to diet prescription [ x ] weights [ x ] labs[ x ] follow up per protocol  [ ] other:

## 2020-02-27 NOTE — PROGRESS NOTE ADULT - SUBJECTIVE AND OBJECTIVE BOX
79y    Female    Patient is a 79y old  Female who presents with a chief complaint of admitted for depression and medication adjustment. (25 Feb 2020 14:31)    HPI:      PAST MEDICAL & SURGICAL HISTORY:  Small bowel obstruction  Osteoarthritis  Glaucoma  Diabetes Type 2  Hyperlipidemia  Hypertension  S/P knee replacement, left  Achilles tendon injury: repair  right scalene muscle release  S/P cholecystectomy: 1981  S/P hysterectomy: 1981 and Oopherectomy in 1990s      MEDICATIONS  (STANDING):  amLODIPine   Tablet 5 milliGRAM(s) Oral daily  ARIPiprazole 10 milliGRAM(s) Oral daily  ascorbic acid 500 milliGRAM(s) Oral daily  atorvastatin 40 milliGRAM(s) Oral at bedtime  brimonidine 0.2% Ophthalmic Solution 1 Drop(s) Right EYE two times a day  buPROPion  milliGRAM(s) Oral daily  dextrose 5%. 1000 milliLiter(s) (50 mL/Hr) IV Continuous <Continuous>  dextrose 50% Injectable 12.5 Gram(s) IV Push once  dextrose 50% Injectable 25 Gram(s) IV Push once  dextrose 50% Injectable 25 Gram(s) IV Push once  dorzolamide 2% Ophthalmic Solution 1 Drop(s) Right EYE three times a day  insulin lispro (HumaLOG) corrective regimen sliding scale   SubCutaneous three times a day before meals  losartan 100 milliGRAM(s) Oral daily  multivitamin 1 Tablet(s) Oral daily  venlafaxine XR 75 milliGRAM(s) Oral daily  zinc sulfate 220 milliGRAM(s) Oral daily

## 2020-02-28 LAB
GLUCOSE BLDC GLUCOMTR-MCNC: 161 MG/DL — HIGH (ref 70–99)
GLUCOSE BLDC GLUCOMTR-MCNC: 174 MG/DL — HIGH (ref 70–99)
GLUCOSE BLDC GLUCOMTR-MCNC: 217 MG/DL — HIGH (ref 70–99)
GLUCOSE BLDC GLUCOMTR-MCNC: 223 MG/DL — HIGH (ref 70–99)

## 2020-02-28 PROCEDURE — 99231 SBSQ HOSP IP/OBS SF/LOW 25: CPT

## 2020-02-28 PROCEDURE — 99233 SBSQ HOSP IP/OBS HIGH 50: CPT

## 2020-02-28 RX ADMIN — DORZOLAMIDE HYDROCHLORIDE 1 DROP(S): 20 SOLUTION/ DROPS OPHTHALMIC at 12:40

## 2020-02-28 RX ADMIN — Medication 2: at 12:41

## 2020-02-28 RX ADMIN — DORZOLAMIDE HYDROCHLORIDE 1 DROP(S): 20 SOLUTION/ DROPS OPHTHALMIC at 08:22

## 2020-02-28 RX ADMIN — Medication 500 MILLIGRAM(S): at 08:24

## 2020-02-28 RX ADMIN — AMLODIPINE BESYLATE 5 MILLIGRAM(S): 2.5 TABLET ORAL at 08:24

## 2020-02-28 RX ADMIN — ATORVASTATIN CALCIUM 40 MILLIGRAM(S): 80 TABLET, FILM COATED ORAL at 20:49

## 2020-02-28 RX ADMIN — Medication 1: at 08:02

## 2020-02-28 RX ADMIN — Medication 1 TABLET(S): at 08:24

## 2020-02-28 RX ADMIN — Medication 75 MILLIGRAM(S): at 08:23

## 2020-02-28 RX ADMIN — BRIMONIDINE TARTRATE 1 DROP(S): 2 SOLUTION/ DROPS OPHTHALMIC at 20:50

## 2020-02-28 RX ADMIN — ARIPIPRAZOLE 10 MILLIGRAM(S): 15 TABLET ORAL at 08:23

## 2020-02-28 RX ADMIN — Medication 2: at 17:16

## 2020-02-28 RX ADMIN — LOSARTAN POTASSIUM 100 MILLIGRAM(S): 100 TABLET, FILM COATED ORAL at 08:23

## 2020-02-28 RX ADMIN — ZINC SULFATE TAB 220 MG (50 MG ZINC EQUIVALENT) 220 MILLIGRAM(S): 220 (50 ZN) TAB at 08:24

## 2020-02-28 RX ADMIN — DORZOLAMIDE HYDROCHLORIDE 1 DROP(S): 20 SOLUTION/ DROPS OPHTHALMIC at 20:49

## 2020-02-28 RX ADMIN — BRIMONIDINE TARTRATE 1 DROP(S): 2 SOLUTION/ DROPS OPHTHALMIC at 08:23

## 2020-02-28 RX ADMIN — BUPROPION HYDROCHLORIDE 150 MILLIGRAM(S): 150 TABLET, EXTENDED RELEASE ORAL at 08:24

## 2020-02-28 NOTE — PROGRESS NOTE BEHAVIORAL HEALTH - NSBHFUPINTERVALHXFT_PSY_A_CORE
Met with and evaluated patient.  Chart reviewed and case discussed in tx team meeting and with Dr. Carrasco. No significant interval events are reported.. Patient continues to report depressed mood, but reports feeling "a little better" Continues to report her mood as " a 5"  (1=very depressed, 10=happy)  Patient continues  in behavioral control and did spend time in the day area again today .,with some  staff encouragement. and went to groups. Verbal and pleasant and no latency noted. . Affect is brighter, smiling at times.  Reports eating and sleeping well.  Patient continues to need  direction for ADL from staff.   Denies any SI or HI.  No Rx SE or sx TD/EPS are noted or reported.  Spoke with patient's daughter, who reports patient has a shunt in her head and patient reports this is from "past fluid on my brain"  Daughter reports she and other family members feel patient is doing better, and patient reports this as well. . Patient continues to refuse ECT. Met with patient's daughter and daughter's aunt who is a behavioral health professional, and discussed patient's tx. Discussed Rx management, and that patient is slowly improving, and discussed patient's continued refusal of ECT.  Family agrees that Rx should be continued, as patient is refusing ECT

## 2020-02-28 NOTE — PROGRESS NOTE BEHAVIORAL HEALTH - NSBHADMITMEDEDUDETAILS_A_CORE FT
Psychoeducation again provided re: need for Rx adherence for sx management with teach back verbalized.

## 2020-02-28 NOTE — PROGRESS NOTE ADULT - SUBJECTIVE AND OBJECTIVE BOX
Quiet day.  Patient denies complaints to me.  No new surgical issues or concerns with dressing per RN staff.      Vital Signs Last 24 Hrs  T(F): 98.1 (28 Feb 2020 07:30), Max: 98.1 (28 Feb 2020 07:30)  HR: 73 (28 Feb 2020 15:48) (73 - 83)  BP: 114/72 (28 Feb 2020 15:48) (114/72 - 134/70)  RR: 16 (28 Feb 2020 15:48) (16 - 17)  SpO2: 93% (28 Feb 2020 15:48) (93% - 96%)      SUBJECTIVE: Pt seen resting comfortably in bed and NOT lying on elbow.      Pain: NO             SOB: NO  Chest Pain: NO    Nausea or Vomiting: NO           Flatus or Bowel Movement: YES                Void: YES      General Appearance: Appears well and in NAD  Neck: Supple with full ROM  Chest: Equal expansion bilaterally  CV: Pulse regular presently in rate and rhythm  Abdomen: Soft, non tense, non tender  Extremities: Grossly symmetric with SCD's in place   Left elbow area 1.0 x 1.0 cm central wound pink, clean, with no exudate or foul-smelling drainage, re-epithelialization ongoing; no adjacent new skin breakdown though slight discoloration noted, but stable since last exam.      MEDICATIONS  (STANDING):  amLODIPine   Tablet 5 milliGRAM(s) Oral daily  ARIPiprazole 10 milliGRAM(s) Oral daily  ascorbic acid 500 milliGRAM(s) Oral daily  atorvastatin 40 milliGRAM(s) Oral at bedtime  brimonidine 0.2% Ophthalmic Solution 1 Drop(s) Right EYE two times a day  buPROPion  milliGRAM(s) Oral daily  dextrose 5%. 1000 milliLiter(s) (50 mL/Hr) IV Continuous <Continuous>  dextrose 50% Injectable 12.5 Gram(s) IV Push once  dextrose 50% Injectable 25 Gram(s) IV Push once  dextrose 50% Injectable 25 Gram(s) IV Push once  dorzolamide 2% Ophthalmic Solution 1 Drop(s) Right EYE three times a day  insulin lispro (HumaLOG) corrective regimen sliding scale   SubCutaneous three times a day before meals  losartan 100 milliGRAM(s) Oral daily  multivitamin 1 Tablet(s) Oral daily  venlafaxine XR 75 milliGRAM(s) Oral daily  zinc sulfate 220 milliGRAM(s) Oral daily      MEDICATIONS  (PRN):  dextrose 40% Gel 15 Gram(s) Oral once PRN Blood Glucose LESS THAN 70 milliGRAM(s)/deciliter  glucagon  Injectable 1 milliGRAM(s) IntraMuscular once PRN Glucose LESS THAN 70 milligrams/deciliter  LORazepam     Tablet 1 milliGRAM(s) Oral every 8 hours PRN anxiety        LABS: None  IMAGING: None

## 2020-02-28 NOTE — PROGRESS NOTE BEHAVIORAL HEALTH - NSBHCHARTREVIEWVS_PSY_A_CORE FT
Vital Signs Last 24 Hrs  T(C): 36.7 (28 Feb 2020 07:30), Max: 36.7 (28 Feb 2020 07:30)  T(F): 98.1 (28 Feb 2020 07:30), Max: 98.1 (28 Feb 2020 07:30)  HR: 73 (28 Feb 2020 15:48) (73 - 83)  BP: 114/72 (28 Feb 2020 15:48) (114/72 - 134/70)  BP(mean): --  RR: 16 (28 Feb 2020 15:48) (16 - 17)  SpO2: 93% (28 Feb 2020 15:48) (93% - 96%)

## 2020-02-28 NOTE — PROGRESS NOTE ADULT - ASSESSMENT
Psychiatry care continues.  Clinically stable from General Surgery perspective.  No interventions required presently outside of local care.  Vitals non-suggestive.  Stage II decubitus clean and continues to contract/ re-epithelialize.  No new decubiti on my examination of pressure points.  If site looks good next week, can change to weekly dressing with Allevyn.   Ongoing nutritional supplements continue and tolerated per patient and RN report.  Importance of ongoing off-loading emphasized once again.

## 2020-02-29 LAB
GLUCOSE BLDC GLUCOMTR-MCNC: 143 MG/DL — HIGH (ref 70–99)
GLUCOSE BLDC GLUCOMTR-MCNC: 153 MG/DL — HIGH (ref 70–99)
GLUCOSE BLDC GLUCOMTR-MCNC: 186 MG/DL — HIGH (ref 70–99)
GLUCOSE BLDC GLUCOMTR-MCNC: 232 MG/DL — HIGH (ref 70–99)

## 2020-02-29 RX ADMIN — BUPROPION HYDROCHLORIDE 150 MILLIGRAM(S): 150 TABLET, EXTENDED RELEASE ORAL at 08:22

## 2020-02-29 RX ADMIN — Medication 1: at 12:42

## 2020-02-29 RX ADMIN — Medication 500 MILLIGRAM(S): at 08:22

## 2020-02-29 RX ADMIN — LOSARTAN POTASSIUM 100 MILLIGRAM(S): 100 TABLET, FILM COATED ORAL at 08:22

## 2020-02-29 RX ADMIN — Medication 1 TABLET(S): at 08:22

## 2020-02-29 RX ADMIN — AMLODIPINE BESYLATE 5 MILLIGRAM(S): 2.5 TABLET ORAL at 08:22

## 2020-02-29 RX ADMIN — DORZOLAMIDE HYDROCHLORIDE 1 DROP(S): 20 SOLUTION/ DROPS OPHTHALMIC at 12:43

## 2020-02-29 RX ADMIN — DORZOLAMIDE HYDROCHLORIDE 1 DROP(S): 20 SOLUTION/ DROPS OPHTHALMIC at 20:28

## 2020-02-29 RX ADMIN — ARIPIPRAZOLE 10 MILLIGRAM(S): 15 TABLET ORAL at 08:23

## 2020-02-29 RX ADMIN — ZINC SULFATE TAB 220 MG (50 MG ZINC EQUIVALENT) 220 MILLIGRAM(S): 220 (50 ZN) TAB at 08:22

## 2020-02-29 RX ADMIN — Medication 1: at 08:18

## 2020-02-29 RX ADMIN — DORZOLAMIDE HYDROCHLORIDE 1 DROP(S): 20 SOLUTION/ DROPS OPHTHALMIC at 08:18

## 2020-02-29 RX ADMIN — BRIMONIDINE TARTRATE 1 DROP(S): 2 SOLUTION/ DROPS OPHTHALMIC at 08:18

## 2020-02-29 RX ADMIN — BRIMONIDINE TARTRATE 1 DROP(S): 2 SOLUTION/ DROPS OPHTHALMIC at 20:28

## 2020-02-29 RX ADMIN — Medication 75 MILLIGRAM(S): at 08:23

## 2020-02-29 RX ADMIN — ATORVASTATIN CALCIUM 40 MILLIGRAM(S): 80 TABLET, FILM COATED ORAL at 20:28

## 2020-03-01 LAB
GLUCOSE BLDC GLUCOMTR-MCNC: 132 MG/DL — HIGH (ref 70–99)
GLUCOSE BLDC GLUCOMTR-MCNC: 147 MG/DL — HIGH (ref 70–99)
GLUCOSE BLDC GLUCOMTR-MCNC: 152 MG/DL — HIGH (ref 70–99)
GLUCOSE BLDC GLUCOMTR-MCNC: 184 MG/DL — HIGH (ref 70–99)

## 2020-03-01 RX ADMIN — ARIPIPRAZOLE 10 MILLIGRAM(S): 15 TABLET ORAL at 08:32

## 2020-03-01 RX ADMIN — BUPROPION HYDROCHLORIDE 150 MILLIGRAM(S): 150 TABLET, EXTENDED RELEASE ORAL at 08:31

## 2020-03-01 RX ADMIN — DORZOLAMIDE HYDROCHLORIDE 1 DROP(S): 20 SOLUTION/ DROPS OPHTHALMIC at 20:23

## 2020-03-01 RX ADMIN — ZINC SULFATE TAB 220 MG (50 MG ZINC EQUIVALENT) 220 MILLIGRAM(S): 220 (50 ZN) TAB at 08:32

## 2020-03-01 RX ADMIN — BRIMONIDINE TARTRATE 1 DROP(S): 2 SOLUTION/ DROPS OPHTHALMIC at 20:23

## 2020-03-01 RX ADMIN — Medication 1: at 08:22

## 2020-03-01 RX ADMIN — DORZOLAMIDE HYDROCHLORIDE 1 DROP(S): 20 SOLUTION/ DROPS OPHTHALMIC at 08:38

## 2020-03-01 RX ADMIN — LOSARTAN POTASSIUM 100 MILLIGRAM(S): 100 TABLET, FILM COATED ORAL at 08:38

## 2020-03-01 RX ADMIN — BRIMONIDINE TARTRATE 1 DROP(S): 2 SOLUTION/ DROPS OPHTHALMIC at 08:38

## 2020-03-01 RX ADMIN — Medication 75 MILLIGRAM(S): at 08:31

## 2020-03-01 RX ADMIN — Medication 500 MILLIGRAM(S): at 08:32

## 2020-03-01 RX ADMIN — Medication 1 TABLET(S): at 08:38

## 2020-03-01 RX ADMIN — DORZOLAMIDE HYDROCHLORIDE 1 DROP(S): 20 SOLUTION/ DROPS OPHTHALMIC at 18:13

## 2020-03-01 RX ADMIN — ATORVASTATIN CALCIUM 40 MILLIGRAM(S): 80 TABLET, FILM COATED ORAL at 20:22

## 2020-03-01 RX ADMIN — AMLODIPINE BESYLATE 5 MILLIGRAM(S): 2.5 TABLET ORAL at 08:31

## 2020-03-02 LAB
GLUCOSE BLDC GLUCOMTR-MCNC: 134 MG/DL — HIGH (ref 70–99)
GLUCOSE BLDC GLUCOMTR-MCNC: 138 MG/DL — HIGH (ref 70–99)
GLUCOSE BLDC GLUCOMTR-MCNC: 187 MG/DL — HIGH (ref 70–99)
GLUCOSE BLDC GLUCOMTR-MCNC: 190 MG/DL — HIGH (ref 70–99)

## 2020-03-02 PROCEDURE — 99233 SBSQ HOSP IP/OBS HIGH 50: CPT

## 2020-03-02 PROCEDURE — 99231 SBSQ HOSP IP/OBS SF/LOW 25: CPT

## 2020-03-02 RX ORDER — SENNA PLUS 8.6 MG/1
2 TABLET ORAL AT BEDTIME
Refills: 0 | Status: DISCONTINUED | OUTPATIENT
Start: 2020-03-02 | End: 2020-03-06

## 2020-03-02 RX ORDER — POLYETHYLENE GLYCOL 3350 17 G/17G
17 POWDER, FOR SOLUTION ORAL DAILY
Refills: 0 | Status: DISCONTINUED | OUTPATIENT
Start: 2020-03-02 | End: 2020-03-06

## 2020-03-02 RX ORDER — REPAGLINIDE 1 MG/1
0.5 TABLET ORAL
Refills: 0 | Status: DISCONTINUED | OUTPATIENT
Start: 2020-03-02 | End: 2020-03-04

## 2020-03-02 RX ADMIN — ZINC SULFATE TAB 220 MG (50 MG ZINC EQUIVALENT) 220 MILLIGRAM(S): 220 (50 ZN) TAB at 08:26

## 2020-03-02 RX ADMIN — DORZOLAMIDE HYDROCHLORIDE 1 DROP(S): 20 SOLUTION/ DROPS OPHTHALMIC at 08:26

## 2020-03-02 RX ADMIN — REPAGLINIDE 0.5 MILLIGRAM(S): 1 TABLET ORAL at 17:24

## 2020-03-02 RX ADMIN — Medication 1: at 12:22

## 2020-03-02 RX ADMIN — BUPROPION HYDROCHLORIDE 150 MILLIGRAM(S): 150 TABLET, EXTENDED RELEASE ORAL at 08:26

## 2020-03-02 RX ADMIN — Medication 500 MILLIGRAM(S): at 08:26

## 2020-03-02 RX ADMIN — POLYETHYLENE GLYCOL 3350 17 GRAM(S): 17 POWDER, FOR SOLUTION ORAL at 17:48

## 2020-03-02 RX ADMIN — AMLODIPINE BESYLATE 5 MILLIGRAM(S): 2.5 TABLET ORAL at 08:26

## 2020-03-02 RX ADMIN — ATORVASTATIN CALCIUM 40 MILLIGRAM(S): 80 TABLET, FILM COATED ORAL at 20:32

## 2020-03-02 RX ADMIN — LOSARTAN POTASSIUM 100 MILLIGRAM(S): 100 TABLET, FILM COATED ORAL at 08:26

## 2020-03-02 RX ADMIN — SENNA PLUS 2 TABLET(S): 8.6 TABLET ORAL at 20:32

## 2020-03-02 RX ADMIN — BRIMONIDINE TARTRATE 1 DROP(S): 2 SOLUTION/ DROPS OPHTHALMIC at 08:26

## 2020-03-02 RX ADMIN — BRIMONIDINE TARTRATE 1 DROP(S): 2 SOLUTION/ DROPS OPHTHALMIC at 20:32

## 2020-03-02 RX ADMIN — Medication 1: at 08:25

## 2020-03-02 RX ADMIN — Medication 1 TABLET(S): at 08:26

## 2020-03-02 RX ADMIN — DORZOLAMIDE HYDROCHLORIDE 1 DROP(S): 20 SOLUTION/ DROPS OPHTHALMIC at 12:23

## 2020-03-02 RX ADMIN — Medication 75 MILLIGRAM(S): at 08:26

## 2020-03-02 RX ADMIN — DORZOLAMIDE HYDROCHLORIDE 1 DROP(S): 20 SOLUTION/ DROPS OPHTHALMIC at 20:32

## 2020-03-02 RX ADMIN — ARIPIPRAZOLE 10 MILLIGRAM(S): 15 TABLET ORAL at 08:26

## 2020-03-02 NOTE — PROGRESS NOTE ADULT - SUBJECTIVE AND OBJECTIVE BOX
Quiet overnight.  No acute events this am.  Patient very interactive and seemingly in good spirits today.      Vital Signs Last 24 Hrs  T(F): 98 (02 Mar 2020 08:36), Max: 98 (02 Mar 2020 08:36)  HR: 88 (02 Mar 2020 08:36) (72 - 88)  BP: 167/78 (02 Mar 2020 08:36) (155/78 - 167/78)  RR: 18 (01 Mar 2020 17:13) (18 - 18)  SpO2: 95% (01 Mar 2020 17:13) (95% - 95%)      SUBJECTIVE: Pt seen resting in room WITH WEIGHT OFF ELBOW.      Pain: NO         SOB: NO  Chest Pain: NO    Nausea or Vomiting: NO           Flatus or Bowel Movement: YES                Void: YES      General Appearance: Appears well and in remains in NAD  Neck: Supple with full ROM today  Chest: Equal expansion bilaterally  CV: Pulse regular presently in rate and rhythm currently  Abdomen: Soft and non tense and non tender  Extremities: Grossly symmetric  Left elbow area .5 x .5 cm central wound pink, clean, with no exudate or foul-smelling drainage, re-epithelialization ongoing and nearly complete at this point; no adjacent new skin breakdown though slight discoloration noted, but stable since last exam.      MEDICATIONS  (STANDING):  amLODIPine   Tablet 5 milliGRAM(s) Oral daily  ARIPiprazole 10 milliGRAM(s) Oral daily  ascorbic acid 500 milliGRAM(s) Oral daily  atorvastatin 40 milliGRAM(s) Oral at bedtime  brimonidine 0.2% Ophthalmic Solution 1 Drop(s) Right EYE two times a day  buPROPion  milliGRAM(s) Oral daily  dextrose 5%. 1000 milliLiter(s) (50 mL/Hr) IV Continuous <Continuous>  dextrose 50% Injectable 12.5 Gram(s) IV Push once  dextrose 50% Injectable 25 Gram(s) IV Push once  dextrose 50% Injectable 25 Gram(s) IV Push once  dorzolamide 2% Ophthalmic Solution 1 Drop(s) Right EYE three times a day  insulin lispro (HumaLOG) corrective regimen sliding scale   SubCutaneous three times a day before meals  losartan 100 milliGRAM(s) Oral daily  multivitamin 1 Tablet(s) Oral daily  repaglinide 0.5 milliGRAM(s) Oral two times a day  venlafaxine XR 75 milliGRAM(s) Oral daily  zinc sulfate 220 milliGRAM(s) Oral daily      MEDICATIONS  (PRN):  dextrose 40% Gel 15 Gram(s) Oral once PRN Blood Glucose LESS THAN 70 milliGRAM(s)/deciliter  glucagon  Injectable 1 milliGRAM(s) IntraMuscular once PRN Glucose LESS THAN 70 milligrams/deciliter  LORazepam     Tablet 1 milliGRAM(s) Oral every 8 hours PRN anxiety      RADIOLOGY & ADDITIONAL STUDIES: No new results and no studies pending.

## 2020-03-02 NOTE — PROGRESS NOTE BEHAVIORAL HEALTH - NSBHFUPINTERVALHXFT_PSY_A_CORE
Met with and evaluated patient.  Chart reviewed and case discussed in tx team meeting and with Dr. Carrasco. No significant interval events are reported.. Patient continues to report depressed mood, but reports feeling "a little better" Continues to report her mood as " a 6"  (1=very depressed, 10=happy)  Patient continues  in behavioral control and did spend time in the day area again today .,with some  staff encouragement. and went to groups. Verbal and pleasant and no latency noted. . Affect is brighter, smiling at times.  Reports eating and sleeping well.  Patient needs less  direction for ADL from staff.   Denies any SI or HI.  No Rx SE or sx TD/EPS are noted or reported.  Spoke again with patient's daughter, who reports patient will go to stay with family members after DC for support.   Daughter reports she and other family members feel patient is doing better, and are pleased with patient's progress.

## 2020-03-02 NOTE — PROGRESS NOTE ADULT - SUBJECTIVE AND OBJECTIVE BOX
Progress: follow up medical problems. f/u DM.          Allergies    adhesives (Pruritus; Blisters)  penicillin (Hives)  pineapple (Anaphylaxis)    Intolerances            MEDICATIONS  (STANDING):  MEDICATIONS  (STANDING):  amLODIPine   Tablet 5 milliGRAM(s) Oral daily  ARIPiprazole 10 milliGRAM(s) Oral daily  ascorbic acid 500 milliGRAM(s) Oral daily  atorvastatin 40 milliGRAM(s) Oral at bedtime  brimonidine 0.2% Ophthalmic Solution 1 Drop(s) Right EYE two times a day  buPROPion  milliGRAM(s) Oral daily  dextrose 5%. 1000 milliLiter(s) (50 mL/Hr) IV Continuous <Continuous>  dextrose 50% Injectable 12.5 Gram(s) IV Push once  dextrose 50% Injectable 25 Gram(s) IV Push once  dextrose 50% Injectable 25 Gram(s) IV Push once  dorzolamide 2% Ophthalmic Solution 1 Drop(s) Right EYE three times a day  insulin lispro (HumaLOG) corrective regimen sliding scale   SubCutaneous three times a day before meals  losartan 100 milliGRAM(s) Oral daily  multivitamin 1 Tablet(s) Oral daily  venlafaxine XR 75 milliGRAM(s) Oral daily  zinc sulfate 220 milliGRAM(s) Oral daily    MEDICATIONS  (PRN):  dextrose 40% Gel 15 Gram(s) Oral once PRN Blood Glucose LESS THAN 70 milliGRAM(s)/deciliter  glucagon  Injectable 1 milliGRAM(s) IntraMuscular once PRN Glucose LESS THAN 70 milligrams/deciliter  LORazepam     Tablet 1 milliGRAM(s) Oral every 6 hours PRN anxiety      Vital Signs Last 24 Hrs  T(C): 36.7 (02 Mar 2020 08:36), Max: 36.7 (02 Mar 2020 08:36)  T(F): 98 (02 Mar 2020 08:36), Max: 98 (02 Mar 2020 08:36)  HR: 88 (02 Mar 2020 08:36) (72 - 88)  BP: 167/78 (02 Mar 2020 08:36) (155/78 - 167/78)  RR: 18 (01 Mar 2020 17:13) (18 - 18)  SpO2: 95% (01 Mar 2020 17:13) (95% - 95%)    ROS ; no c/o constipation, sob, or chest pain. No c/o elbow pain.        PHYSICAL EXAM:  GENERAL: NAD, well-groomed, well-developed  HEAD:  Atraumatic, Normocephalic  EYES: EOMI, PERRLA, conjunctiva and sclera clear  ENMT: No tonsillar erythema, exudates, or enlargement; Moist mucous membranes, Good dentition, No lesions  NECK: Supple, No JVD, Normal thyroid  CHEST/LUNG: Clear to auscultation bilaterally; No rales, rhonchi, wheezing, or rubs  HEART: Regular rate and rhythm; No murmurs, rubs, or gallops  ABDOMEN: Soft, Nontender, Nondistended; Bowel sounds present  EXTREMITIES:  2+ Peripheral Pulses, No clubbing, cyanosis, or edema  LYMPH: No lymphadenopathy noted  SKIN: left elbow 2x2 CM  wound stage 2 ,no breakthrough ,no erythema.  No tender . healing well .    LABS:                       POCT Blood Glucose.: 218 mg/dL (25 Feb 2020 12:28)  POCT Blood Glucose.: 176 mg/dL (25 Feb 2020 07:49)  POCT Blood Glucose.: 198 mg/dL (24 Feb 2020 20:06)  POCT Blood Glucose.: 141 mg/dL (24 Feb 2020 17:18)      02-17 [id number] 8.5    02-17 Chol 132 LDL 57 HDL 38<L> Trig 184<H>    Thyroid Stimulating Hormone, Serum: 4.11 uIU/mL (02-17 @ 11:09)

## 2020-03-02 NOTE — PROGRESS NOTE ADULT - ASSESSMENT
T2DM ; continue coverage . Start pt on Prandin 0,5 mg before lunch and dinner . f/u labs .   hyperlipidemia ; continue treatment as needed.  hypertension; stable.  h/o glaucoma ; Continue eye drops.  CKD stage 3; due to diabetic nephropathy. advised f/u with nephrologist post discharge .  left elbow ulcer ;  improving , continue treatment as per DR diaz.  h/o depression ; continue treatment as per psych advise.   pt in optimal condition , low risk for ECT procedure.

## 2020-03-02 NOTE — PROGRESS NOTE ADULT - NSHPATTENDINGPLANDISCUSS_GEN_ALL_CORE
patient in detail and today's RN.
patient, Surgical PA and RN staff.
patient, Surgical PA and RN.
patient, Surgical PA and RN staff.

## 2020-03-02 NOTE — PROGRESS NOTE ADULT - ASSESSMENT
Psychiatry care continuing.  Clinically stable from General Surgery perspective.  No interventions required.  Vitals still non-suggestive.  Stage II decubitus clean and continues to contract/ re-epithelialize, and now essentially healed...  No new decubiti on my examination today of pressure points.  Responding well to off-loading and local care.  Changing to weekly dressing with Allevyn.   Ongoing nutritional supplements continue.   Tolerated per patient and RN report.  Importance of ongoing off-loading emphasized.

## 2020-03-02 NOTE — PROGRESS NOTE BEHAVIORAL HEALTH - NSBHCHARTREVIEWVS_PSY_A_CORE FT
Vital Signs Last 24 Hrs  T(C): 36.7 (02 Mar 2020 08:36), Max: 36.7 (02 Mar 2020 08:36)  T(F): 98 (02 Mar 2020 08:36), Max: 98 (02 Mar 2020 08:36)  HR: 88 (02 Mar 2020 08:36) (88 - 88)  BP: 167/78 (02 Mar 2020 08:36) (167/78 - 167/78)  BP(mean): --  RR: --  SpO2: --

## 2020-03-03 LAB
ANION GAP SERPL CALC-SCNC: 3 MMOL/L — LOW (ref 5–17)
BUN SERPL-MCNC: 18 MG/DL — SIGNIFICANT CHANGE UP (ref 7–23)
CALCIUM SERPL-MCNC: 8.9 MG/DL — SIGNIFICANT CHANGE UP (ref 8.4–10.5)
CHLORIDE SERPL-SCNC: 102 MMOL/L — SIGNIFICANT CHANGE UP (ref 96–108)
CO2 SERPL-SCNC: 34 MMOL/L — HIGH (ref 22–31)
CREAT SERPL-MCNC: 0.96 MG/DL — SIGNIFICANT CHANGE UP (ref 0.5–1.3)
GLUCOSE BLDC GLUCOMTR-MCNC: 167 MG/DL — HIGH (ref 70–99)
GLUCOSE BLDC GLUCOMTR-MCNC: 179 MG/DL — HIGH (ref 70–99)
GLUCOSE BLDC GLUCOMTR-MCNC: 217 MG/DL — HIGH (ref 70–99)
GLUCOSE BLDC GLUCOMTR-MCNC: 218 MG/DL — HIGH (ref 70–99)
GLUCOSE SERPL-MCNC: 163 MG/DL — HIGH (ref 70–99)
POTASSIUM SERPL-MCNC: 4.4 MMOL/L — SIGNIFICANT CHANGE UP (ref 3.5–5.3)
POTASSIUM SERPL-SCNC: 4.4 MMOL/L — SIGNIFICANT CHANGE UP (ref 3.5–5.3)
SODIUM SERPL-SCNC: 139 MMOL/L — SIGNIFICANT CHANGE UP (ref 135–145)

## 2020-03-03 PROCEDURE — 99232 SBSQ HOSP IP/OBS MODERATE 35: CPT

## 2020-03-03 RX ADMIN — ZINC SULFATE TAB 220 MG (50 MG ZINC EQUIVALENT) 220 MILLIGRAM(S): 220 (50 ZN) TAB at 08:34

## 2020-03-03 RX ADMIN — ATORVASTATIN CALCIUM 40 MILLIGRAM(S): 80 TABLET, FILM COATED ORAL at 20:30

## 2020-03-03 RX ADMIN — DORZOLAMIDE HYDROCHLORIDE 1 DROP(S): 20 SOLUTION/ DROPS OPHTHALMIC at 08:34

## 2020-03-03 RX ADMIN — DORZOLAMIDE HYDROCHLORIDE 1 DROP(S): 20 SOLUTION/ DROPS OPHTHALMIC at 20:31

## 2020-03-03 RX ADMIN — REPAGLINIDE 0.5 MILLIGRAM(S): 1 TABLET ORAL at 17:07

## 2020-03-03 RX ADMIN — Medication 2: at 12:36

## 2020-03-03 RX ADMIN — Medication 1 TABLET(S): at 08:34

## 2020-03-03 RX ADMIN — BRIMONIDINE TARTRATE 1 DROP(S): 2 SOLUTION/ DROPS OPHTHALMIC at 20:30

## 2020-03-03 RX ADMIN — LOSARTAN POTASSIUM 100 MILLIGRAM(S): 100 TABLET, FILM COATED ORAL at 08:34

## 2020-03-03 RX ADMIN — Medication 500 MILLIGRAM(S): at 08:34

## 2020-03-03 RX ADMIN — Medication 1: at 17:08

## 2020-03-03 RX ADMIN — BRIMONIDINE TARTRATE 1 DROP(S): 2 SOLUTION/ DROPS OPHTHALMIC at 08:35

## 2020-03-03 RX ADMIN — Medication 75 MILLIGRAM(S): at 08:34

## 2020-03-03 RX ADMIN — BUPROPION HYDROCHLORIDE 150 MILLIGRAM(S): 150 TABLET, EXTENDED RELEASE ORAL at 08:34

## 2020-03-03 RX ADMIN — ARIPIPRAZOLE 10 MILLIGRAM(S): 15 TABLET ORAL at 08:34

## 2020-03-03 RX ADMIN — Medication 1: at 08:12

## 2020-03-03 RX ADMIN — AMLODIPINE BESYLATE 5 MILLIGRAM(S): 2.5 TABLET ORAL at 08:34

## 2020-03-03 RX ADMIN — REPAGLINIDE 0.5 MILLIGRAM(S): 1 TABLET ORAL at 12:37

## 2020-03-03 RX ADMIN — SENNA PLUS 2 TABLET(S): 8.6 TABLET ORAL at 20:30

## 2020-03-03 RX ADMIN — DORZOLAMIDE HYDROCHLORIDE 1 DROP(S): 20 SOLUTION/ DROPS OPHTHALMIC at 12:38

## 2020-03-03 NOTE — PROGRESS NOTE BEHAVIORAL HEALTH - NSBHFUPINTERVALHXFT_PSY_A_CORE
Met with and evaluated patient.  Chart reviewed and case discussed in tx team meeting and with Dr. Carrasco. No significant interval events are reported.. Patient now reports improved mood and reports feeling "much better"  Now reports  her mood as " a 7"  (1=very depressed, 10=happy)  Patient continues  in behavioral control and did spend time in the day area again today .,with some  staff encouragement. and went to groups. Verbal and pleasant and no latency noted. . Affect is brighter, smiling often.   Reports eating and sleeping well.     Denies any SI or HI.  No Rx SE or sx TD/EPS are noted or reported.

## 2020-03-03 NOTE — PROGRESS NOTE BEHAVIORAL HEALTH - NSBHCHARTREVIEWVS_PSY_A_CORE FT
Vital Signs Last 24 Hrs  T(C): 36.7 (03 Mar 2020 07:30), Max: 36.7 (03 Mar 2020 07:30)  T(F): 98.1 (03 Mar 2020 07:30), Max: 98.1 (03 Mar 2020 07:30)  HR: 66 (03 Mar 2020 16:08) (66 - 89)  BP: 127/75 (03 Mar 2020 16:08) (127/75 - 149/84)  BP(mean): --  RR: 18 (03 Mar 2020 16:08) (18 - 18)  SpO2: 96% (03 Mar 2020 16:08) (96% - 96%)-

## 2020-03-04 LAB
GLUCOSE BLDC GLUCOMTR-MCNC: 129 MG/DL — HIGH (ref 70–99)
GLUCOSE BLDC GLUCOMTR-MCNC: 142 MG/DL — HIGH (ref 70–99)
GLUCOSE BLDC GLUCOMTR-MCNC: 180 MG/DL — HIGH (ref 70–99)
GLUCOSE BLDC GLUCOMTR-MCNC: 190 MG/DL — HIGH (ref 70–99)

## 2020-03-04 PROCEDURE — 99232 SBSQ HOSP IP/OBS MODERATE 35: CPT

## 2020-03-04 RX ORDER — METFORMIN HYDROCHLORIDE 850 MG/1
500 TABLET ORAL DAILY
Refills: 0 | Status: DISCONTINUED | OUTPATIENT
Start: 2020-03-04 | End: 2020-03-06

## 2020-03-04 RX ORDER — REPAGLINIDE 1 MG/1
1 TABLET ORAL THREE TIMES A DAY
Refills: 0 | Status: DISCONTINUED | OUTPATIENT
Start: 2020-03-04 | End: 2020-03-04

## 2020-03-04 RX ADMIN — DORZOLAMIDE HYDROCHLORIDE 1 DROP(S): 20 SOLUTION/ DROPS OPHTHALMIC at 20:16

## 2020-03-04 RX ADMIN — Medication 1 TABLET(S): at 09:18

## 2020-03-04 RX ADMIN — REPAGLINIDE 0.5 MILLIGRAM(S): 1 TABLET ORAL at 11:56

## 2020-03-04 RX ADMIN — Medication 75 MILLIGRAM(S): at 09:18

## 2020-03-04 RX ADMIN — BUPROPION HYDROCHLORIDE 150 MILLIGRAM(S): 150 TABLET, EXTENDED RELEASE ORAL at 09:17

## 2020-03-04 RX ADMIN — DORZOLAMIDE HYDROCHLORIDE 1 DROP(S): 20 SOLUTION/ DROPS OPHTHALMIC at 09:17

## 2020-03-04 RX ADMIN — BRIMONIDINE TARTRATE 1 DROP(S): 2 SOLUTION/ DROPS OPHTHALMIC at 09:17

## 2020-03-04 RX ADMIN — ZINC SULFATE TAB 220 MG (50 MG ZINC EQUIVALENT) 220 MILLIGRAM(S): 220 (50 ZN) TAB at 09:18

## 2020-03-04 RX ADMIN — BRIMONIDINE TARTRATE 1 DROP(S): 2 SOLUTION/ DROPS OPHTHALMIC at 20:15

## 2020-03-04 RX ADMIN — Medication 1: at 11:59

## 2020-03-04 RX ADMIN — Medication 1: at 08:22

## 2020-03-04 RX ADMIN — Medication 500 MILLIGRAM(S): at 09:17

## 2020-03-04 RX ADMIN — ARIPIPRAZOLE 10 MILLIGRAM(S): 15 TABLET ORAL at 09:17

## 2020-03-04 RX ADMIN — AMLODIPINE BESYLATE 5 MILLIGRAM(S): 2.5 TABLET ORAL at 09:17

## 2020-03-04 RX ADMIN — ATORVASTATIN CALCIUM 40 MILLIGRAM(S): 80 TABLET, FILM COATED ORAL at 20:15

## 2020-03-04 RX ADMIN — LOSARTAN POTASSIUM 100 MILLIGRAM(S): 100 TABLET, FILM COATED ORAL at 09:18

## 2020-03-04 NOTE — PROGRESS NOTE ADULT - SUBJECTIVE AND OBJECTIVE BOX
Progress: follow up medical problems. f/u DM.          Allergies    adhesives (Pruritus; Blisters)  penicillin (Hives)  pineapple (Anaphylaxis)    Intolerances            MEDICATIONS  (STANDING):  MEDICATIONS  (STANDING):  amLODIPine   Tablet 5 milliGRAM(s) Oral daily  ARIPiprazole 10 milliGRAM(s) Oral daily  ascorbic acid 500 milliGRAM(s) Oral daily  atorvastatin 40 milliGRAM(s) Oral at bedtime  brimonidine 0.2% Ophthalmic Solution 1 Drop(s) Right EYE two times a day  buPROPion  milliGRAM(s) Oral daily  dextrose 5%. 1000 milliLiter(s) (50 mL/Hr) IV Continuous <Continuous>  dextrose 50% Injectable 12.5 Gram(s) IV Push once  dextrose 50% Injectable 25 Gram(s) IV Push once  dextrose 50% Injectable 25 Gram(s) IV Push once  dorzolamide 2% Ophthalmic Solution 1 Drop(s) Right EYE three times a day  insulin lispro (HumaLOG) corrective regimen sliding scale   SubCutaneous three times a day before meals  losartan 100 milliGRAM(s) Oral daily  multivitamin 1 Tablet(s) Oral daily  venlafaxine XR 75 milliGRAM(s) Oral daily  zinc sulfate 220 milliGRAM(s) Oral daily    MEDICATIONS  (PRN):  dextrose 40% Gel 15 Gram(s) Oral once PRN Blood Glucose LESS THAN 70 milliGRAM(s)/deciliter  glucagon  Injectable 1 milliGRAM(s) IntraMuscular once PRN Glucose LESS THAN 70 milligrams/deciliter  LORazepam     Tablet 1 milliGRAM(s) Oral every 6 hours PRN anxiety        Vital Signs Last 24 Hrs  T(C): 36.8 (04 Mar 2020 07:30), Max: 36.8 (04 Mar 2020 07:30)  T(F): 98.3 (04 Mar 2020 07:30), Max: 98.3 (04 Mar 2020 07:30)  HR: 95 (04 Mar 2020 07:30) (66 - 95)  BP: 149/69 (04 Mar 2020 07:30) (127/75 - 149/69)  RR: 18 (04 Mar 2020 07:30) (18 - 18)  SpO2: 92% (04 Mar 2020 07:30) (92% - 96%)      ROS ; no c/o constipation, sob, or chest pain. no dysuria.        PHYSICAL EXAM:  GENERAL: NAD, well-groomed, well-developed  HEAD:  Atraumatic, Normocephalic  EYES: EOMI, PERRLA, conjunctiva and sclera clear  ENMT: No tonsillar erythema, exudates, or enlargement; Moist mucous membranes, Good dentition, No lesions  NECK: Supple, No JVD, Normal thyroid  CHEST/LUNG: Clear to auscultation bilaterally; No rales, rhonchi, wheezing, or rubs  HEART: Regular rate and rhythm; No murmurs, rubs, or gallops  ABDOMEN: Soft, Nontender, Nondistended; Bowel sounds present  EXTREMITIES:  2+ Peripheral Pulses, No clubbing, cyanosis, or edema  LYMPH: No lymphadenopathy noted  SKIN: left elbow 2x2 CM  wound stage 2 ,no breakthrough ,no erythema.  No tender .  Healing well .    LABS:                         03 Mar 2020 07:12    139    |  102    |  18     ----------------------------<  163    4.4     |  34     |  0.96     Ca    8.9        03 Mar 2020 07:12      CAPILLARY BLOOD GLUCOSE      POCT Blood Glucose.: 190 mg/dL (04 Mar 2020 08:16)  POCT Blood Glucose.: 217 mg/dL (03 Mar 2020 20:13)  POCT Blood Glucose.: 179 mg/dL (03 Mar 2020 16:59)  POCT Blood Glucose.: 218 mg/dL (03 Mar 2020 12:30)      02-17 [id number] 8.5    02-17 Chol 132 LDL 57 HDL 38<L> Trig 184<H>    Thyroid Stimulating Hormone, Serum: 4.11 uIU/mL (02-17 @ 11:09)

## 2020-03-04 NOTE — PROGRESS NOTE BEHAVIORAL HEALTH - NSBHFUPINTERVALHXFT_PSY_A_CORE
Met with and evaluated patient.  Chart reviewed and case discussed in tx team meeting and with Dr. Godoy. No significant interval events are reported.. Patient now reports improved mood and reports feeling "much better"  Now reports  her mood as "8""  (1=very depressed, 10=happy)  Patient continues  in behavioral control and did spend time in the day area again today .,with some  staff encouragement. and went to groups. Patient needed encouragement to attend DM education group, but found it very helpful and reported "I learned a lot" Verbal and pleasant and no latency noted. . Affect is brighter, smiling often.   Reports eating and sleeping well.     Denies any SI or HI.  No Rx SE or sx TD/EPS are noted or reported.

## 2020-03-04 NOTE — PROGRESS NOTE ADULT - ASSESSMENT
T2DM ; continue coverage . BS improved but not controlled ,will adjust Prandin dosage.   hyperlipidemia ; continue treatment as needed.  hypertension; stable.  h/o glaucoma ; Continue eye drops.  CKD stage 3; due to diabetic nephropathy. advised f/u with nephrologist post discharge .  left elbow ulcer ;  improving , continue treatment as per DR diaz.  h/o depression ; continue treatment as per psych advise.

## 2020-03-04 NOTE — PROGRESS NOTE BEHAVIORAL HEALTH - NSBHCHARTREVIEWVS_PSY_A_CORE FT
Vital Signs Last 24 Hrs  T(C): 36.8 (04 Mar 2020 07:30), Max: 36.8 (04 Mar 2020 07:30)  T(F): 98.3 (04 Mar 2020 07:30), Max: 98.3 (04 Mar 2020 07:30)  HR: 95 (04 Mar 2020 07:30) (66 - 95)  BP: 149/69 (04 Mar 2020 07:30) (127/75 - 149/69)  BP(mean): --  RR: 18 (04 Mar 2020 07:30) (18 - 18)  SpO2: 92% (04 Mar 2020 07:30) (92% - 96%)

## 2020-03-05 DIAGNOSIS — E11.65 TYPE 2 DIABETES MELLITUS WITH HYPERGLYCEMIA: ICD-10-CM

## 2020-03-05 LAB
GLUCOSE BLDC GLUCOMTR-MCNC: 117 MG/DL — HIGH (ref 70–99)
GLUCOSE BLDC GLUCOMTR-MCNC: 155 MG/DL — HIGH (ref 70–99)
GLUCOSE BLDC GLUCOMTR-MCNC: 174 MG/DL — HIGH (ref 70–99)
GLUCOSE BLDC GLUCOMTR-MCNC: 177 MG/DL — HIGH (ref 70–99)

## 2020-03-05 PROCEDURE — 99232 SBSQ HOSP IP/OBS MODERATE 35: CPT

## 2020-03-05 RX ORDER — LOSARTAN POTASSIUM 100 MG/1
1 TABLET, FILM COATED ORAL
Qty: 0 | Refills: 0 | DISCHARGE

## 2020-03-05 RX ORDER — ASCORBIC ACID 60 MG
1 TABLET,CHEWABLE ORAL
Qty: 30 | Refills: 0
Start: 2020-03-05 | End: 2020-04-03

## 2020-03-05 RX ORDER — SENNA PLUS 8.6 MG/1
2 TABLET ORAL
Qty: 60 | Refills: 0
Start: 2020-03-05 | End: 2020-04-03

## 2020-03-05 RX ORDER — POLYETHYLENE GLYCOL 3350 17 G/17G
17 POWDER, FOR SOLUTION ORAL
Qty: 510 | Refills: 0
Start: 2020-03-05 | End: 2020-04-03

## 2020-03-05 RX ORDER — BUPROPION HYDROCHLORIDE 150 MG/1
1 TABLET, EXTENDED RELEASE ORAL
Qty: 30 | Refills: 0
Start: 2020-03-05 | End: 2020-04-03

## 2020-03-05 RX ORDER — DORZOLAMIDE HYDROCHLORIDE 20 MG/ML
1 SOLUTION/ DROPS OPHTHALMIC
Qty: 10 | Refills: 0
Start: 2020-03-05 | End: 2020-04-03

## 2020-03-05 RX ORDER — METFORMIN HYDROCHLORIDE 850 MG/1
1 TABLET ORAL
Qty: 30 | Refills: 0
Start: 2020-03-05 | End: 2020-04-03

## 2020-03-05 RX ORDER — ZINC SULFATE TAB 220 MG (50 MG ZINC EQUIVALENT) 220 (50 ZN) MG
1 TAB ORAL
Qty: 30 | Refills: 0
Start: 2020-03-05 | End: 2020-04-03

## 2020-03-05 RX ORDER — ARIPIPRAZOLE 15 MG/1
1 TABLET ORAL
Qty: 30 | Refills: 0
Start: 2020-03-05 | End: 2020-04-03

## 2020-03-05 RX ORDER — AMLODIPINE BESYLATE 2.5 MG/1
1 TABLET ORAL
Qty: 0 | Refills: 0 | DISCHARGE

## 2020-03-05 RX ORDER — ATORVASTATIN CALCIUM 80 MG/1
1 TABLET, FILM COATED ORAL
Qty: 0 | Refills: 0 | DISCHARGE

## 2020-03-05 RX ORDER — VENLAFAXINE HCL 75 MG
1 CAPSULE, EXT RELEASE 24 HR ORAL
Qty: 30 | Refills: 0
Start: 2020-03-05 | End: 2020-04-03

## 2020-03-05 RX ADMIN — METFORMIN HYDROCHLORIDE 500 MILLIGRAM(S): 850 TABLET ORAL at 09:10

## 2020-03-05 RX ADMIN — ATORVASTATIN CALCIUM 40 MILLIGRAM(S): 80 TABLET, FILM COATED ORAL at 20:02

## 2020-03-05 RX ADMIN — DORZOLAMIDE HYDROCHLORIDE 1 DROP(S): 20 SOLUTION/ DROPS OPHTHALMIC at 20:02

## 2020-03-05 RX ADMIN — DORZOLAMIDE HYDROCHLORIDE 1 DROP(S): 20 SOLUTION/ DROPS OPHTHALMIC at 12:23

## 2020-03-05 RX ADMIN — Medication 1 TABLET(S): at 09:10

## 2020-03-05 RX ADMIN — Medication 500 MILLIGRAM(S): at 09:09

## 2020-03-05 RX ADMIN — BRIMONIDINE TARTRATE 1 DROP(S): 2 SOLUTION/ DROPS OPHTHALMIC at 09:09

## 2020-03-05 RX ADMIN — BUPROPION HYDROCHLORIDE 150 MILLIGRAM(S): 150 TABLET, EXTENDED RELEASE ORAL at 09:10

## 2020-03-05 RX ADMIN — Medication 1: at 07:47

## 2020-03-05 RX ADMIN — Medication 75 MILLIGRAM(S): at 09:10

## 2020-03-05 RX ADMIN — LOSARTAN POTASSIUM 100 MILLIGRAM(S): 100 TABLET, FILM COATED ORAL at 09:10

## 2020-03-05 RX ADMIN — ZINC SULFATE TAB 220 MG (50 MG ZINC EQUIVALENT) 220 MILLIGRAM(S): 220 (50 ZN) TAB at 09:10

## 2020-03-05 RX ADMIN — Medication 1: at 12:22

## 2020-03-05 RX ADMIN — BRIMONIDINE TARTRATE 1 DROP(S): 2 SOLUTION/ DROPS OPHTHALMIC at 20:02

## 2020-03-05 RX ADMIN — DORZOLAMIDE HYDROCHLORIDE 1 DROP(S): 20 SOLUTION/ DROPS OPHTHALMIC at 09:09

## 2020-03-05 RX ADMIN — AMLODIPINE BESYLATE 5 MILLIGRAM(S): 2.5 TABLET ORAL at 09:09

## 2020-03-05 RX ADMIN — ARIPIPRAZOLE 10 MILLIGRAM(S): 15 TABLET ORAL at 09:09

## 2020-03-05 NOTE — PROGRESS NOTE BEHAVIORAL HEALTH - NSBHADMITMEDEDUDETAILS_A_CORE FT
Psychoeducation provided re: need for Rx and after care  adherence for sx management with teach back verbalized.

## 2020-03-05 NOTE — DISCHARGE NOTE BEHAVIORAL HEALTH - HPI (INCLUDE ILLNESS QUALITY, SEVERITY, DURATION, TIMING, CONTEXT, MODIFYING FACTORS, ASSOCIATED SIGNS AND SYMPTOMS)
: Patient seen, evaluated and chart reviewed. Patient a 80 y/o  female, domiciled alone, retired, past psychiatric hx of Bipolar D/O, hospitalized many years ago, no prior SI/SA, no drug abuse hx, no legal issues, no aggression or agitation, medically has HTN; HLD; DM was BIB/Family due to above reasons.    	Patient in bed, soft spoken, or reluctant to speak, looks depressed, with T. Blocking/Delay, limited answers to questions ands denied S/H/I/P, has fair sleep, poor appetite, and denied A/V/H or paranoid beliefs. She is not aware of her medical issues, writer got from her chart. She seems to be in a daze, does not remember her meds, does not remember whether she has been taking her meds and continues to have difficulty finding answers to questions. Even simple questions she had trouble answering.     	As per family information her 52 y/o son, who is a sociopath, moved in with her since Nov/Dec' 2019 and had extorted lot of money, charging different C. Cards, she writing different checks for him and in this way took out large sum of money and continues to do so. Her son was also in care home for 5 months from Feb' 2019 till July' 2019 and while in care home he started to communicate with her, with letters, phone calls etc. and  due to this behavior she had an order of protection against her son twice for a year, and he moved in with her once the order of protection is over. Since he moved in things are unravelling in a different way, she is isolative, not eating, not showering, not taking her meds on time, limited answers to question, as if her executive function is lost which she was doing very well till Nov/Dec' 2019. Family is concerned of her behavior ands wants her to get Psychiatric help. she takes Wellbutrin  mg with Abilify 5 mg daily from Dr. Corona @ [phone], who sees her once every 2-3 months, last visit to her Psychiatrist was in January' 2020, her next visit is in April'2019, and her psychiatrist is [place] of country now.    	Medically has HTN; DM; HLD and for which she takes HCTZ 25 mg daily; Norvasc 5 mg daily; Cozaar 100 mg daily; Metformin 500 mg TID wand Lipitor 40 mg HS. Allergies to PCN

## 2020-03-05 NOTE — DISCHARGE NOTE BEHAVIORAL HEALTH - NSBHDCCRISISPLAN3FT_PSY_A_CORE
Attend a local self-help group in your area, such as Emotions Anonymous (EA), or the Mood disorder support group of [place]. Consider joining a nearby [facility].    Juarez Jimenez! -JOSS Schmid

## 2020-03-05 NOTE — PROVIDER CONTACT NOTE (OTHER) - BACKGROUND
Type 2 DM on Metformin 500mg po daily  tresiba 20 units sq hs prior to  admission now on  metformin 500mg po daily, just resumed

## 2020-03-05 NOTE — PROGRESS NOTE ADULT - REASON FOR ADMISSION
admitted for depression and medication adjustment.
admitted for depression and medication adjustment.  follow up blood sugar .
depression
Psychiatric Admission.
Psychiatry Admission
Psychiatry Admission.
Bipolar Disorder
Bipolar Disorder
Depression

## 2020-03-05 NOTE — DISCHARGE NOTE BEHAVIORAL HEALTH - MEDICATION SUMMARY - MEDICATIONS TO STOP TAKING
I will STOP taking the medications listed below when I get home from the hospital:    Wellbutrin 200 mg/12 hours oral tablet, extended release  -- 1 tab(s) by mouth 2 times a day    Abilify 5 mg oral tablet  -- 1 tab(s) by mouth once a day    losartan 25 mg oral tablet  -- 1 tab(s) by mouth once a day

## 2020-03-05 NOTE — PROGRESS NOTE BEHAVIORAL HEALTH - NSBHFUPINTERVALHXFT_PSY_A_CORE
Met with and evaluated patient.  Chart reviewed and case discussed in tx team meeting and with Dr. Godoy. No significant interval events are reported.. Patient continues to report improved mood and continues to report feeling "much better"  Now reports  her mood as "8""  (1=very depressed, 10=happy)  Patient continues  in behavioral control and did spend time in the day area again today ,with some  staff encouragement. and went to groups.  Verbal and pleasant and no latency noted. . Affect is brighter, smiling often.   Reports eating and sleeping well.     Denies any SI or HI.  No Rx SE or sx TD/EPS are noted or reported.

## 2020-03-05 NOTE — PROGRESS NOTE BEHAVIORAL HEALTH - NSBHCHARTREVIEWVS_PSY_A_CORE FT
Vital Signs Last 24 Hrs  T(C): 36.4 (05 Mar 2020 08:07), Max: 36.4 (05 Mar 2020 08:07)  T(F): 97.5 (05 Mar 2020 08:07), Max: 97.5 (05 Mar 2020 08:07)  HR: 85 (05 Mar 2020 08:07) (73 - 85)  BP: 171/82 (05 Mar 2020 08:07) (120/76 - 171/82)  BP(mean): --  RR: 18 (05 Mar 2020 08:07) (18 - 18)  SpO2: 95% (05 Mar 2020 08:07) (94% - 95%)

## 2020-03-05 NOTE — PROVIDER CONTACT NOTE (OTHER) - RECOMMENDATIONS
Follow up with Dr Trevino  Diabetes Supplies  evaluate self administration of insulin  review the  pen preparation

## 2020-03-05 NOTE — DISCHARGE NOTE BEHAVIORAL HEALTH - NSBHDCCRISISPLAN1FT_PSY_A_CORE
STOP! Call [id number], 911, or go the nearest emergency room. There is always help available when and if you should need it.

## 2020-03-05 NOTE — PROGRESS NOTE ADULT - SUBJECTIVE AND OBJECTIVE BOX
Allergies    adhesives (Pruritus; Blisters)  penicillin (Hives)  pineapple (Anaphylaxis)    Intolerances    Vital Signs Last 24 Hrs  T(C): 36.4 (05 Mar 2020 08:07), Max: 36.4 (05 Mar 2020 08:07)  T(F): 97.5 (05 Mar 2020 08:07), Max: 97.5 (05 Mar 2020 08:07)  HR: 85 (05 Mar 2020 08:07) (73 - 85)  BP: 171/82 (05 Mar 2020 08:07) (120/76 - 171/82)  RR: 18 (05 Mar 2020 08:07) (18 - 18)  SpO2: 95% (05 Mar 2020 08:07) (94% - 95%)      Hemoglobin A1C, Whole Blood: 8.5 % (02-17-20 @ 11:10)    POCT Blood Glucose.: 155 mg/dL (05 Mar 2020 07:43)  POCT Blood Glucose.: 129 mg/dL (04 Mar 2020 20:03)  POCT Blood Glucose.: 142 mg/dL (04 Mar 2020 16:55)  POCT Blood Glucose.: 180 mg/dL (04 Mar 2020 11:54)      DIET: Consistent Carbohydrates

## 2020-03-05 NOTE — PROGRESS NOTE ADULT - PROBLEM SELECTOR PLAN 1
Monitor glucose trends  Goal range 140- 180  monitor use of metformin  communicate with Dr De Oliveira office re metformin and tresiba at discharge  communicate with family  prior to dischage  follow prn

## 2020-03-05 NOTE — DISCHARGE NOTE BEHAVIORAL HEALTH - NSBHDCMEDICALFT_PSY_A_CORE
DM Type 2   HTN   glaucoma   CKD    left elbow ulcer now healed   seen by internist, surgeon , NP,CDE

## 2020-03-05 NOTE — DISCHARGE NOTE BEHAVIORAL HEALTH - MEDICATION SUMMARY - MEDICATIONS TO TAKE
I will START or STAY ON the medications listed below when I get home from the hospital:    losartan 100 mg oral tablet  -- 1 tab(s) by mouth once a day x 30 days   -- Indication: For HTN    venlafaxine 75 mg oral capsule, extended release  -- 1 cap(s) by mouth once a day x 30 days   -- Indication: For Bipolar disorder, current episode depressed, severe, without psychotic features    metFORMIN 500 mg oral tablet  -- 1 tab(s) by mouth once a day x 30 days   -- Indication: For Diabetes type 2,     atorvastatin 40 mg oral tablet  -- 1 tab(s) by mouth once a day (at bedtime) x 30 days   -- Indication: For HLD    ARIPiprazole 10 mg oral tablet  -- 1 tab(s) by mouth once a day x 30 days   -- Indication: For Bipolar disorder, current episode depressed, severe, without psychotic features    amLODIPine 5 mg oral tablet  -- 1 tab(s) by mouth once a day x 30 days   -- Indication: For HTN    senna oral tablet  -- 2 tab(s) by mouth once a day (at bedtime) x 30 days   -- Indication: For constipation    polyethylene glycol 3350 oral powder for reconstitution  -- 17 gram(s) by mouth once a day x 30 days, As Needed -constipation   -- Indication: For constipation    zinc sulfate 220 mg oral capsule  -- 1 cap(s) by mouth once a day x 30 days   -- Indication: For supplement    dorzolamide 2% ophthalmic solution  -- 1 drop(s) to each affected eye 3 times a day x 30 days   -- Indication: For glauxoma    brimonidine 0.2% ophthalmic solution  -- 1 drop(s) to each affected eye 2 times a day x 30 days   -- Indication: For glaucoma    buPROPion 150 mg/24 hours (XL) oral tablet, extended release  -- 1 tab(s) by mouth once a day x 30 days   -- Indication: For Bipolar disorder, current episode depressed, severe, without psychotic features    Multiple Vitamins oral tablet  -- 1 tab(s) by mouth once a day x 30 days   -- Indication: For supplement    ascorbic acid 500 mg oral tablet  -- 1 tab(s) by mouth once a day x 30 days   -- Indication: For supplement

## 2020-03-05 NOTE — PROGRESS NOTE ADULT - ASSESSMENT
Patient is 79 year old female  admitted with depression with a history of diabetes, on  Tresiba 20 units sq hs and [id number] po daily She monitors her glucose at home denies need for  diabetes supplies and plans to follow u with Dr De Oliveira  after discharge.  Disposition to  sister's home

## 2020-03-06 VITALS
OXYGEN SATURATION: 96 % | DIASTOLIC BLOOD PRESSURE: 79 MMHG | HEART RATE: 73 BPM | SYSTOLIC BLOOD PRESSURE: 146 MMHG | RESPIRATION RATE: 16 BRPM

## 2020-03-06 LAB
GLUCOSE BLDC GLUCOMTR-MCNC: 104 MG/DL — HIGH (ref 70–99)
GLUCOSE BLDC GLUCOMTR-MCNC: 164 MG/DL — HIGH (ref 70–99)
GLUCOSE BLDC GLUCOMTR-MCNC: 165 MG/DL — HIGH (ref 70–99)

## 2020-03-06 PROCEDURE — 83036 HEMOGLOBIN GLYCOSYLATED A1C: CPT

## 2020-03-06 PROCEDURE — 36415 COLL VENOUS BLD VENIPUNCTURE: CPT

## 2020-03-06 PROCEDURE — 84443 ASSAY THYROID STIM HORMONE: CPT

## 2020-03-06 PROCEDURE — 99239 HOSP IP/OBS DSCHRG MGMT >30: CPT

## 2020-03-06 PROCEDURE — 80048 BASIC METABOLIC PNL TOTAL CA: CPT

## 2020-03-06 PROCEDURE — 86780 TREPONEMA PALLIDUM: CPT

## 2020-03-06 PROCEDURE — 93005 ELECTROCARDIOGRAM TRACING: CPT

## 2020-03-06 PROCEDURE — 87086 URINE CULTURE/COLONY COUNT: CPT

## 2020-03-06 PROCEDURE — 80061 LIPID PANEL: CPT

## 2020-03-06 PROCEDURE — 82962 GLUCOSE BLOOD TEST: CPT

## 2020-03-06 RX ADMIN — Medication 1: at 12:26

## 2020-03-06 RX ADMIN — Medication 1: at 08:02

## 2020-03-06 RX ADMIN — BUPROPION HYDROCHLORIDE 150 MILLIGRAM(S): 150 TABLET, EXTENDED RELEASE ORAL at 09:26

## 2020-03-06 RX ADMIN — AMLODIPINE BESYLATE 5 MILLIGRAM(S): 2.5 TABLET ORAL at 09:26

## 2020-03-06 RX ADMIN — Medication 1 TABLET(S): at 09:25

## 2020-03-06 RX ADMIN — ZINC SULFATE TAB 220 MG (50 MG ZINC EQUIVALENT) 220 MILLIGRAM(S): 220 (50 ZN) TAB at 09:26

## 2020-03-06 RX ADMIN — Medication 500 MILLIGRAM(S): at 09:26

## 2020-03-06 RX ADMIN — Medication 75 MILLIGRAM(S): at 09:26

## 2020-03-06 RX ADMIN — DORZOLAMIDE HYDROCHLORIDE 1 DROP(S): 20 SOLUTION/ DROPS OPHTHALMIC at 12:30

## 2020-03-06 RX ADMIN — LOSARTAN POTASSIUM 100 MILLIGRAM(S): 100 TABLET, FILM COATED ORAL at 09:26

## 2020-03-06 RX ADMIN — ARIPIPRAZOLE 10 MILLIGRAM(S): 15 TABLET ORAL at 09:25

## 2020-03-06 RX ADMIN — BRIMONIDINE TARTRATE 1 DROP(S): 2 SOLUTION/ DROPS OPHTHALMIC at 08:03

## 2020-03-06 RX ADMIN — DORZOLAMIDE HYDROCHLORIDE 1 DROP(S): 20 SOLUTION/ DROPS OPHTHALMIC at 08:03

## 2020-03-06 RX ADMIN — METFORMIN HYDROCHLORIDE 500 MILLIGRAM(S): 850 TABLET ORAL at 09:26

## 2020-03-06 NOTE — PROGRESS NOTE BEHAVIORAL HEALTH - AFFECT CONGRUENCE
Congruent

## 2020-03-06 NOTE — PROGRESS NOTE BEHAVIORAL HEALTH - NS ED BHA MSE SPEECH SPONTANEITY
Increased latency
Increased latency
Increased latency/Other
Normal
Increased latency
Increased latency
Normal

## 2020-03-06 NOTE — PROGRESS NOTE BEHAVIORAL HEALTH - THOUGHT PROCESS
Linear
Normal reasoning/Linear
Linear
Linear
Linear/Normal reasoning
Linear

## 2020-03-06 NOTE — PROGRESS NOTE BEHAVIORAL HEALTH - NSBHADMITIPREASON_PSY_A_CORE
Danger to self; mental illness expected to respond to inpatient care

## 2020-03-06 NOTE — PROGRESS NOTE BEHAVIORAL HEALTH - AFFECT QUALITY
Depressed
Depressed/Euthymic
Depressed/Euthymic
Depressed/Other
Euthymic
Depressed
Depressed
Depressed/Other
Euthymic
Depressed

## 2020-03-06 NOTE — PROGRESS NOTE BEHAVIORAL HEALTH - NSBHATTESTSEENBY_PSY_A_CORE
NP without Attending Psychiatrist

## 2020-03-06 NOTE — PROGRESS NOTE BEHAVIORAL HEALTH - NSBHFUPVIOL_PSY_A_CORE
none known

## 2020-03-06 NOTE — PROGRESS NOTE BEHAVIORAL HEALTH - ATTENTION / CONCENTRATION
Impaired
Impaired
Normal
Other
Normal

## 2020-03-06 NOTE — PROGRESS NOTE BEHAVIORAL HEALTH - NSBHLEGALSTATUS_PSY_A_CORE
9.13 (Voluntary)

## 2020-03-06 NOTE — PROGRESS NOTE BEHAVIORAL HEALTH - THOUGHT CONTENT
Unremarkable

## 2020-03-06 NOTE — PROGRESS NOTE BEHAVIORAL HEALTH - NSBHADMITIPOBSFT_PSY_A_CORE
Denies any SI or HI    In behavioral control

## 2020-03-06 NOTE — PROGRESS NOTE BEHAVIORAL HEALTH - AXIS III
HTN; DM Type 2, HLD

## 2020-03-06 NOTE — PROGRESS NOTE BEHAVIORAL HEALTH - AFFECT RANGE
Constricted
Full
Constricted
Constricted
Full
Other/Constricted
Constricted/Other
Constricted/Other
Other/Constricted
Other/Constricted

## 2020-03-06 NOTE — PROGRESS NOTE BEHAVIORAL HEALTH - NSBHCHARTREVIEWVS_PSY_A_CORE FT
Vital Signs Last 24 Hrs  T(C): 36.7 (06 Mar 2020 08:29), Max: 36.7 (06 Mar 2020 08:29)  T(F): 98.1 (06 Mar 2020 08:29), Max: 98.1 (06 Mar 2020 08:29)  HR: 54 (06 Mar 2020 08:29) (54 - 74)  BP: 113/66 (06 Mar 2020 08:29) (113/66 - 150/72)  BP(mean): --  RR: 16 (06 Mar 2020 08:29) (16 - 16)  SpO2: 98% (06 Mar 2020 08:29) (97% - 98%)

## 2020-03-06 NOTE — PROGRESS NOTE BEHAVIORAL HEALTH - NSBHADMITDANGERSELF_PSY_A_CORE
unable to care for self

## 2020-03-06 NOTE — PROGRESS NOTE BEHAVIORAL HEALTH - PERCEPTIONS
No abnormalities

## 2020-03-06 NOTE — PROGRESS NOTE BEHAVIORAL HEALTH - GAIT / STATION
Normal gait / station
Other
Normal gait / station

## 2020-03-06 NOTE — PROGRESS NOTE BEHAVIORAL HEALTH - NSBHFUPINTERVALHXFT_PSY_A_CORE
Met with and evaluated patient.  Chart reviewed and case discussed in tx team meeting and with Dr. Godoy. All members of team agree patient is safe and appropriate for discharge.  No significant interval events are reported.. Patient continues to report improved mood and continues to report feeling "much better"  Today again  reports  her mood as "8"  (1=very depressed, 10=happy)  Patient continues  in behavioral control and did spend time in the day area again today, and went to groups.  Verbal and pleasant and no latency noted. . Affect is brighter, smiling often.   Reports eating and sleeping well.     Denies any SI or HI.  No Rx SE or sx TD/EPS are noted or reported. Patient is stable and in behavioral control  Patient is not a danger to self or others.  Family reports that family members will be available and will be seeing patient often and monitoring her. Discharge to home today.

## 2020-03-06 NOTE — PROGRESS NOTE BEHAVIORAL HEALTH - NSBHADMITIPDSM_PSY_A_CORE
see above for Axis I, II, III

## 2020-03-06 NOTE — PROGRESS NOTE BEHAVIORAL HEALTH - NSBHADMITIPOBS_PSY_A_CORE
Routine observation

## 2020-03-06 NOTE — PROGRESS NOTE BEHAVIORAL HEALTH - PRIMARY DX
Bipolar disorder, current episode depressed, severe, without psychotic features

## 2020-03-06 NOTE — PROGRESS NOTE BEHAVIORAL HEALTH - MOOD
Anxious/Depressed
Anxious/Depressed
Other/Depressed
Depressed/Anxious
Depressed/Normal
Depressed/Other
Normal
Normal/Depressed
Depressed
Depressed
Depressed/Other
Normal

## 2020-03-06 NOTE — PROGRESS NOTE BEHAVIORAL HEALTH - SUMMARY
Patient a 78 y/o  female, domiciled alone, retired, past psychiatric hx of Bipolar D/O, hospitalized many years ago, no prior SI/SA, no drug abuse hx, no legal issues, no aggression or agitation, medically has HTN; HLD; DM was BIB/Family due to above reasons.    Patient in bed, soft spoken, or reluctant to speak, looks depressed, with T. Blocking/Delay, limited answers to questions ands denied S/H/I/P, has fair sleep, poor appetite, and denied A/V/H or paranoid beliefs. She is not aware of her medical issues, writer got from her chart. She seems to be in a daze, does not remember her meds, does not remember whether she has been taking her meds and continues to have difficulty finding answers to questions. E  As per her daughter/son/sister at bedside who informed that she has been doing everything by herself till Nov' 2019, she was active, was not able to drive, but was taking buses to go to [employer] center, has lot of friends, and was taking care of herself with no difficulties. Her 52 y/o son, who is a sociopath, moved in with her since Nov/Dec' 2019 and had extorted lot of money, charging different C. Cards, she writing different checks for him and in this way took out large sum of money and continues to do so. Her son was also in nursing home for 5 months from Feb' 2019 till July' 2019 and while in nursing home he started to communicate with her, with letters, phone calls etc. and  due to this behavior she had an order of protection against her son twice for a year, and he moved in with her once the order of protection is over. Since he moved in things are unravelling in a different way, she is isolative, not eating, not showering, not taking her meds on time, limited answers to question, as if her executive function is lost which she was doing very well till Nov/Dec' 2019. Family is concerned of her behavior ands wants her to get Psychiatric help. she takes Wellbutrin  mg with Abilify 5 mg daily from Dr. Corona @ [phone], who sees her once every 2-3 months, last visit to her Psychiatrist was in January' 2020, her next visit is in April'2019, and her psychiatrist is out of country now.    Medically has HTN; DM; HLD and for which she takes HCTZ 25 mg daily; Norvasc 5 mg daily; Cozaar 100 mg daily; Metformin 500 mg TID wand Lipitor 40 mg HS. Allergies to PCN.    Plan: Admit to Psychiatric unit --Voluntary         Start Wellbutrin  mg daily         Start Abilify 5 mg daily, increase ABilify to 10mg   Add Effexor 37.5mg daily, increase Effexor   consider ECT.  Declined ECT, improved sx on Rx         Medical meds as previously given         Medical clearance by Dr. Banerjee
Patient a 78 y/o  female, domiciled alone, retired, past psychiatric hx of Bipolar D/O, hospitalized many years ago, no prior SI/SA, no drug abuse hx, no legal issues, no aggression or agitation, medically has HTN; HLD; DM was BIB/Family due to above reasons.    Patient in bed, soft spoken, or reluctant to speak, looks depressed, with T. Blocking/Delay, limited answers to questions ands denied S/H/I/P, has fair sleep, poor appetite, and denied A/V/H or paranoid beliefs. She is not aware of her medical issues, writer got from her chart. She seems to be in a daze, does not remember her meds, does not remember whether she has been taking her meds and continues to have difficulty finding answers to questions. E  As per her daughter/son/sister at bedside who informed that she has been doing everything by herself till Nov' 2019, she was active, was not able to drive, but was taking buses to go to [employer] [facility], has lot of friends, and was taking care of herself with no difficulties. Her 50 y/o son, who is a sociopath, moved in with her since Nov/Dec' 2019 and had extorted lot of money, charging different C. Cards, she writing different checks for him and in this way took out large sum of money and continues to do so. Her son was also in [facility] for 5 months from Feb' 2019 till July' 2019 and while in [facility] he started to communicate with her, with letters, phone calls etc. and  due to this behavior she had an order of protection against her son twice for a year, and he moved in with her once the order of protection is over. Since he moved in things are unravelling in a different way, she is isolative, not eating, not showering, not taking her meds on time, limited answers to question, as if her executive function is lost which she was doing very well till Nov/Dec' 2019. Family is concerned of her behavior ands wants her to get Psychiatric help. she takes Wellbutrin  mg with Abilify 5 mg daily from Dr. Corona @ [phone], who sees her once every 2-3 months, last visit to her Psychiatrist was in January' 2020, her next visit is in April'2019, and her psychiatrist is out of country now.    Medically has HTN; DM; HLD and for which she takes HCTZ 25 mg daily; Norvasc 5 mg daily; Cozaar 100 mg daily; Metformin 500 mg TID wand Lipitor 40 mg HS. Allergies to PCN.    Plan: Admit to Psychiatric unit --Voluntary         Start Wellbutrin  mg daily         Start Abilify 5 mg daily, increase ABilify to 10mg   Add Effexor 37.5mg daily, increase Effexor   consider ECT.  Declined ECT, improved sx on Rx         Medical meds as previously given         Medical clearance by Dr. Banerjee
Patient a 78 y/o  female, domiciled alone, retired, past psychiatric hx of Bipolar D/O, hospitalized many years ago, no prior SI/SA, no drug abuse hx, no legal issues, no aggression or agitation, medically has HTN; HLD; DM was BIB/Family due to above reasons.    Patient in bed, soft spoken, or reluctant to speak, looks depressed, with T. Blocking/Delay, limited answers to questions ands denied S/H/I/P, has fair sleep, poor appetite, and denied A/V/H or paranoid beliefs. She is not aware of her medical issues, writer got from her chart. She seems to be in a daze, does not remember her meds, does not remember whether she has been taking her meds and continues to have difficulty finding answers to questions. E  As per her daughter/son/sister at bedside who informed that she has been doing everything by herself till Nov' 2019, she was active, was not able to drive, but was taking buses to go to [facility] [facility], has lot of friends, and was taking care of herself with no difficulties. Her 52 y/o son, who is a sociopath, moved in with her since Nov/Dec' 2019 and had extorted lot of money, charging different C. Cards, she writing different checks for him and in this way took out large sum of money and continues to do so. Her son was also in skilled nursing for 5 months from Feb' 2019 till July' 2019 and while in skilled nursing he started to communicate with her, with letters, phone calls etc. and  due to this behavior she had an order of protection against her son twice for a year, and he moved in with her once the order of protection is over. Since he moved in things are unravelling in a different way, she is isolative, not eating, not showering, not taking her meds on time, limited answers to question, as if her executive function is lost which she was doing very well till Nov/Dec' 2019. Family is concerned of her behavior ands wants her to get Psychiatric help. she takes Wellbutrin  mg with Abilify 5 mg daily from Dr. Corona @ [phone], who sees her once every 2-3 months, last visit to her Psychiatrist was in January' 2020, her next visit is in April'2019, and her psychiatrist is out of country now.    Medically has HTN; DM; HLD and for which she takes HCTZ 25 mg daily; Norvasc 5 mg daily; Cozaar 100 mg daily; Metformin 500 mg TID wand Lipitor 40 mg HS. Allergies to PCN.    Plan: Admit to Psychiatric unit --Voluntary         Start Wellbutrin  mg daily         Start Abilify 5 mg daily, increase ABilify to 10mg   Add Effexor 37.5mg daily, increase Effexor   consider ECT         Medical meds as previously given         Medical clearance by Dr. Banerjee
Patient a 78 y/o  female, domiciled alone, retired, past psychiatric hx of Bipolar D/O, hospitalized many years ago, no prior SI/SA, no drug abuse hx, no legal issues, no aggression or agitation, medically has HTN; HLD; DM was BIB/Family due to above reasons.    Patient in bed, soft spoken, or reluctant to speak, looks depressed, with T. Blocking/Delay, limited answers to questions ands denied S/H/I/P, has fair sleep, poor appetite, and denied A/V/H or paranoid beliefs. She is not aware of her medical issues, writer got from her chart. She seems to be in a daze, does not remember her meds, does not remember whether she has been taking her meds and continues to have difficulty finding answers to questions. E  As per her daughter/son/sister at bedside who informed that she has been doing everything by herself till Nov' 2019, she was active, was not able to drive, but was taking buses to go to [employer] center, has lot of friends, and was taking care of herself with no difficulties. Her 52 y/o son, who is a sociopath, moved in with her since Nov/Dec' 2019 and had extorted lot of money, charging different C. Cards, she writing different checks for him and in this way took out large sum of money and continues to do so. Her son was also in nursing home for 5 months from Feb' 2019 till July' 2019 and while in nursing home he started to communicate with her, with letters, phone calls etc. and  due to this behavior she had an order of protection against her son twice for a year, and he moved in with her once the order of protection is over. Since he moved in things are unravelling in a different way, she is isolative, not eating, not showering, not taking her meds on time, limited answers to question, as if her executive function is lost which she was doing very well till Nov/Dec' 2019. Family is concerned of her behavior ands wants her to get Psychiatric help. she takes Wellbutrin  mg with Abilify 5 mg daily from Dr. Corona @ [phone], who sees her once every 2-3 months, last visit to her Psychiatrist was in January' 2020, her next visit is in April'2019, and her psychiatrist is out of country now.    Medically has HTN; DM; HLD and for which she takes HCTZ 25 mg daily; Norvasc 5 mg daily; Cozaar 100 mg daily; Metformin 500 mg TID wand Lipitor 40 mg HS. Allergies to PCN.    Plan: Admit to Psychiatric unit --Voluntary         Start Wellbutrin  mg daily         Start Abilify 5 mg daily, increase ABilify to 10mg   Add Effexor 37.5mg daily    consider ECT         Medical meds as previously given         Medical clearance by Dr. Banerjee
Patient a 78 y/o  female, domiciled alone, retired, past psychiatric hx of Bipolar D/O, hospitalized many years ago, no prior SI/SA, no drug abuse hx, no legal issues, no aggression or agitation, medically has HTN; HLD; DM was BIB/Family due to above reasons.    Patient in bed, soft spoken, or reluctant to speak, looks depressed, with T. Blocking/Delay, limited answers to questions ands denied S/H/I/P, has fair sleep, poor appetite, and denied A/V/H or paranoid beliefs. She is not aware of her medical issues, writer got from her chart. She seems to be in a daze, does not remember her meds, does not remember whether she has been taking her meds and continues to have difficulty finding answers to questions. E  As per her daughter/son/sister at bedside who informed that she has been doing everything by herself till Nov' 2019, she was active, was not able to drive, but was taking buses to go to [employer] center, has lot of friends, and was taking care of herself with no difficulties. Her 50 y/o son, who is a sociopath, moved in with her since Nov/Dec' 2019 and had extorted lot of money, charging different C. Cards, she writing different checks for him and in this way took out large sum of money and continues to do so. Her son was also in [facility] for 5 months from Feb' 2019 till July' 2019 and while in [facility] he started to communicate with her, with letters, phone calls etc. and  due to this behavior she had an order of protection against her son twice for a year, and he moved in with her once the order of protection is over. Since he moved in things are unravelling in a different way, she is isolative, not eating, not showering, not taking her meds on time, limited answers to question, as if her executive function is lost which she was doing very well till Nov/Dec' 2019. Family is concerned of her behavior ands wants her to get Psychiatric help. she takes Wellbutrin  mg with Abilify 5 mg daily from Dr. Corona @ [phone], who sees her once every 2-3 months, last visit to her Psychiatrist was in January' 2020, her next visit is in April'2019, and her psychiatrist is out of country now.    Medically has HTN; DM; HLD and for which she takes HCTZ 25 mg daily; Norvasc 5 mg daily; Cozaar 100 mg daily; Metformin 500 mg TID wand Lipitor 40 mg HS. Allergies to PCN.    Plan: Admit to Psychiatric unit --Voluntary         Start Wellbutrin  mg daily         Start Abilify 5 mg daily         Medical meds as previously given         Medical clearance by Dr. Banerjee
Patient a 78 y/o  female, domiciled alone, retired, past psychiatric hx of Bipolar D/O, hospitalized many years ago, no prior SI/SA, no drug abuse hx, no legal issues, no aggression or agitation, medically has HTN; HLD; DM was BIB/Family due to above reasons.    Patient in bed, soft spoken, or reluctant to speak, looks depressed, with T. Blocking/Delay, limited answers to questions ands denied S/H/I/P, has fair sleep, poor appetite, and denied A/V/H or paranoid beliefs. She is not aware of her medical issues, writer got from her chart. She seems to be in a daze, does not remember her meds, does not remember whether she has been taking her meds and continues to have difficulty finding answers to questions. E  As per her daughter/son/sister at bedside who informed that she has been doing everything by herself till Nov' 2019, she was active, was not able to drive, but was taking buses to go to [employer] center, has lot of friends, and was taking care of herself with no difficulties. Her 50 y/o son, who is a sociopath, moved in with her since Nov/Dec' 2019 and had extorted lot of money, charging different C. Cards, she writing different checks for him and in this way took out large sum of money and continues to do so. Her son was also in [facility] for 5 months from Feb' 2019 till July' 2019 and while in [facility] he started to communicate with her, with letters, phone calls etc. and  due to this behavior she had an order of protection against her son twice for a year, and he moved in with her once the order of protection is over. Since he moved in things are unravelling in a different way, she is isolative, not eating, not showering, not taking her meds on time, limited answers to question, as if her executive function is lost which she was doing very well till Nov/Dec' 2019. Family is concerned of her behavior ands wants her to get Psychiatric help. she takes Wellbutrin  mg with Abilify 5 mg daily from Dr. Corona @ [phone], who sees her once every 2-3 months, last visit to her Psychiatrist was in January' 2020, her next visit is in April'2019, and her psychiatrist is out of country now.    Medically has HTN; DM; HLD and for which she takes HCTZ 25 mg daily; Norvasc 5 mg daily; Cozaar 100 mg daily; Metformin 500 mg TID wand Lipitor 40 mg HS. Allergies to PCN.    Plan: Admit to Psychiatric unit --Voluntary         Start Wellbutrin  mg daily         Start Abilify 5 mg daily, increase ABilify to 10mg   Add Effexor 37.5mg daily, increase Effexor   consider ECT.  Declined ECT, improved sx on Rx         Medical meds as previously given         Medical clearance by Dr. Banerjee
Patient a 78 y/o  female, domiciled alone, retired, past psychiatric hx of Bipolar D/O, hospitalized many years ago, no prior SI/SA, no drug abuse hx, no legal issues, no aggression or agitation, medically has HTN; HLD; DM was BIB/Family due to above reasons.    Patient in bed, soft spoken, or reluctant to speak, looks depressed, with T. Blocking/Delay, limited answers to questions ands denied S/H/I/P, has fair sleep, poor appetite, and denied A/V/H or paranoid beliefs. She is not aware of her medical issues, writer got from her chart. She seems to be in a daze, does not remember her meds, does not remember whether she has been taking her meds and continues to have difficulty finding answers to questions. E  As per her daughter/son/sister at bedside who informed that she has been doing everything by herself till Nov' 2019, she was active, was not able to drive, but was taking buses to go to [employer] center, has lot of friends, and was taking care of herself with no difficulties. Her 52 y/o son, who is a sociopath, moved in with her since Nov/Dec' 2019 and had extorted lot of money, charging different C. Cards, she writing different checks for him and in this way took out large sum of money and continues to do so. Her son was also in [facility] for 5 months from Feb' 2019 till July' 2019 and while in [facility] he started to communicate with her, with letters, phone calls etc. and  due to this behavior she had an order of protection against her son twice for a year, and he moved in with her once the order of protection is over. Since he moved in things are unravelling in a different way, she is isolative, not eating, not showering, not taking her meds on time, limited answers to question, as if her executive function is lost which she was doing very well till Nov/Dec' 2019. Family is concerned of her behavior ands wants her to get Psychiatric help. she takes Wellbutrin  mg with Abilify 5 mg daily from Dr. Corona @ [phone], who sees her once every 2-3 months, last visit to her Psychiatrist was in January' 2020, her next visit is in April'2019, and her psychiatrist is out of country now.    Medically has HTN; DM; HLD and for which she takes HCTZ 25 mg daily; Norvasc 5 mg daily; Cozaar 100 mg daily; Metformin 500 mg TID wand Lipitor 40 mg HS. Allergies to PCN.    Plan: Admit to Psychiatric unit --Voluntary         Start Wellbutrin  mg daily         Start Abilify 5 mg daily, increase ABilify to 10mg   Add Effexor 37.5mg daily    consider ECT         Medical meds as previously given         Medical clearance by Dr. Banerjee
Patient a 78 y/o  female, domiciled alone, retired, past psychiatric hx of Bipolar D/O, hospitalized many years ago, no prior SI/SA, no drug abuse hx, no legal issues, no aggression or agitation, medically has HTN; HLD; DM was BIB/Family due to above reasons.    Patient in bed, soft spoken, or reluctant to speak, looks depressed, with T. Blocking/Delay, limited answers to questions ands denied S/H/I/P, has fair sleep, poor appetite, and denied A/V/H or paranoid beliefs. She is not aware of her medical issues, writer got from her chart. She seems to be in a daze, does not remember her meds, does not remember whether she has been taking her meds and continues to have difficulty finding answers to questions. E  As per her daughter/son/sister at bedside who informed that she has been doing everything by herself till Nov' 2019, she was active, was not able to drive, but was taking buses to go to [employer] center, has lot of friends, and was taking care of herself with no difficulties. Her 50 y/o son, who is a sociopath, moved in with her since Nov/Dec' 2019 and had extorted lot of money, charging different C. Cards, she writing different checks for him and in this way took out large sum of money and continues to do so. Her son was also in [facility] for 5 months from Feb' 2019 till July' 2019 and while in [facility] he started to communicate with her, with letters, phone calls etc. and  due to this behavior she had an order of protection against her son twice for a year, and he moved in with her once the order of protection is over. Since he moved in things are unravelling in a different way, she is isolative, not eating, not showering, not taking her meds on time, limited answers to question, as if her executive function is lost which she was doing very well till Nov/Dec' 2019. Family is concerned of her behavior ands wants her to get Psychiatric help. she takes Wellbutrin  mg with Abilify 5 mg daily from Dr. Corona @ [phone], who sees her once every 2-3 months, last visit to her Psychiatrist was in January' 2020, her next visit is in April'2019, and her psychiatrist is out of country now.    Medically has HTN; DM; HLD and for which she takes HCTZ 25 mg daily; Norvasc 5 mg daily; Cozaar 100 mg daily; Metformin 500 mg TID wand Lipitor 40 mg HS. Allergies to PCN.    Plan: Admit to Psychiatric unit --Voluntary         Start Wellbutrin  mg daily         Start Abilify 5 mg daily, increase ABilify to 10mg   Add Effexor 37.5mg daily    consider ECT         Medical meds as previously given         Medical clearance by Dr. Banerjee
Patient a 78 y/o  female, domiciled alone, retired, past psychiatric hx of Bipolar D/O, hospitalized many years ago, no prior SI/SA, no drug abuse hx, no legal issues, no aggression or agitation, medically has HTN; HLD; DM was BIB/Family due to above reasons.    Patient in bed, soft spoken, or reluctant to speak, looks depressed, with T. Blocking/Delay, limited answers to questions ands denied S/H/I/P, has fair sleep, poor appetite, and denied A/V/H or paranoid beliefs. She is not aware of her medical issues, writer got from her chart. She seems to be in a daze, does not remember her meds, does not remember whether she has been taking her meds and continues to have difficulty finding answers to questions. E  As per her daughter/son/sister at bedside who informed that she has been doing everything by herself till Nov' 2019, she was active, was not able to drive, but was taking buses to go to [employer] [facility], has lot of friends, and was taking care of herself with no difficulties. Her 52 y/o son, who is a sociopath, moved in with her since Nov/Dec' 2019 and had extorted lot of money, charging different C. Cards, she writing different checks for him and in this way took out large sum of money and continues to do so. Her son was also in long term for 5 months from Feb' 2019 till July' 2019 and while in long term he started to communicate with her, with letters, phone calls etc. and  due to this behavior she had an order of protection against her son twice for a year, and he moved in with her once the order of protection is over. Since he moved in things are unravelling in a different way, she is isolative, not eating, not showering, not taking her meds on time, limited answers to question, as if her executive function is lost which she was doing very well till Nov/Dec' 2019. Family is concerned of her behavior ands wants her to get Psychiatric help. she takes Wellbutrin  mg with Abilify 5 mg daily from Dr. Corona @ [phone], who sees her once every 2-3 months, last visit to her Psychiatrist was in January' 2020, her next visit is in April'2019, and her psychiatrist is out of country now.    Medically has HTN; DM; HLD and for which she takes HCTZ 25 mg daily; Norvasc 5 mg daily; Cozaar 100 mg daily; Metformin 500 mg TID wand Lipitor 40 mg HS. Allergies to PCN.    Plan: Admit to Psychiatric unit --Voluntary         Start Wellbutrin  mg daily         Start Abilify 5 mg daily, increase ABilify to 10mg   Add Effexor 37.5mg daily    consider ECT         Medical meds as previously given         Medical clearance by Dr. Banerjee
Patient a 78 y/o  female, domiciled alone, retired, past psychiatric hx of Bipolar D/O, hospitalized many years ago, no prior SI/SA, no drug abuse hx, no legal issues, no aggression or agitation, medically has HTN; HLD; DM was BIB/Family due to above reasons.    Patient in bed, soft spoken, or reluctant to speak, looks depressed, with T. Blocking/Delay, limited answers to questions ands denied S/H/I/P, has fair sleep, poor appetite, and denied A/V/H or paranoid beliefs. She is not aware of her medical issues, writer got from her chart. She seems to be in a daze, does not remember her meds, does not remember whether she has been taking her meds and continues to have difficulty finding answers to questions. E  As per her daughter/son/sister at bedside who informed that she has been doing everything by herself till Nov' 2019, she was active, was not able to drive, but was taking buses to go to [facility] center, has lot of friends, and was taking care of herself with no difficulties. Her 50 y/o son, who is a sociopath, moved in with her since Nov/Dec' 2019 and had extorted lot of money, charging different C. Cards, she writing different checks for him and in this way took out large sum of money and continues to do so. Her son was also in alf for 5 months from Feb' 2019 till July' 2019 and while in alf he started to communicate with her, with letters, phone calls etc. and  due to this behavior she had an order of protection against her son twice for a year, and he moved in with her once the order of protection is over. Since he moved in things are unravelling in a different way, she is isolative, not eating, not showering, not taking her meds on time, limited answers to question, as if her executive function is lost which she was doing very well till Nov/Dec' 2019. Family is concerned of her behavior ands wants her to get Psychiatric help. she takes Wellbutrin  mg with Abilify 5 mg daily from Dr. Corona @ [phone], who sees her once every 2-3 months, last visit to her Psychiatrist was in January' 2020, her next visit is in April'2019, and her psychiatrist is out of country now.    Medically has HTN; DM; HLD and for which she takes HCTZ 25 mg daily; Norvasc 5 mg daily; Cozaar 100 mg daily; Metformin 500 mg TID wand Lipitor 40 mg HS. Allergies to PCN.    Plan: Admit to Psychiatric unit --Voluntary         Start Wellbutrin  mg daily         Start Abilify 5 mg daily, increase ABilify to 10mg   Add Effexor 37.5mg daily    consider ECT         Medical meds as previously given         Medical clearance by Dr. Banerjee
Patient a 78 y/o  female, domiciled alone, retired, past psychiatric hx of Bipolar D/O, hospitalized many years ago, no prior SI/SA, no drug abuse hx, no legal issues, no aggression or agitation, medically has HTN; HLD; DM was BIB/Family due to above reasons.    Patient in bed, soft spoken, or reluctant to speak, looks depressed, with T. Blocking/Delay, limited answers to questions ands denied S/H/I/P, has fair sleep, poor appetite, and denied A/V/H or paranoid beliefs. She is not aware of her medical issues, writer got from her chart. She seems to be in a daze, does not remember her meds, does not remember whether she has been taking her meds and continues to have difficulty finding answers to questions. E  As per her daughter/son/sister at bedside who informed that she has been doing everything by herself till Nov' 2019, she was active, was not able to drive, but was taking buses to go to [employer] center, has lot of friends, and was taking care of herself with no difficulties. Her 52 y/o son, who is a sociopath, moved in with her since Nov/Dec' 2019 and had extorted lot of money, charging different C. Cards, she writing different checks for him and in this way took out large sum of money and continues to do so. Her son was also in custodial for 5 months from Feb' 2019 till July' 2019 and while in custodial he started to communicate with her, with letters, phone calls etc. and  due to this behavior she had an order of protection against her son twice for a year, and he moved in with her once the order of protection is over. Since he moved in things are unravelling in a different way, she is isolative, not eating, not showering, not taking her meds on time, limited answers to question, as if her executive function is lost which she was doing very well till Nov/Dec' 2019. Family is concerned of her behavior ands wants her to get Psychiatric help. she takes Wellbutrin  mg with Abilify 5 mg daily from Dr. Corona @ [phone], who sees her once every 2-3 months, last visit to her Psychiatrist was in January' 2020, her next visit is in April'2019, and her psychiatrist is out of country now.    Medically has HTN; DM; HLD and for which she takes HCTZ 25 mg daily; Norvasc 5 mg daily; Cozaar 100 mg daily; Metformin 500 mg TID wand Lipitor 40 mg HS. Allergies to PCN.    Plan: Admit to Psychiatric unit --Voluntary         Start Wellbutrin  mg daily         Start Abilify 5 mg daily, increase ABilify to 10mg   Add Effexor 37.5mg daily, increase Effexor   consider ECT         Medical meds as previously given         Medical clearance by Dr. Banerjee
Patient a 78 y/o  female, domiciled alone, retired, past psychiatric hx of Bipolar D/O, hospitalized many years ago, no prior SI/SA, no drug abuse hx, no legal issues, no aggression or agitation, medically has HTN; HLD; DM was BIB/Family due to above reasons.    Patient in bed, soft spoken, or reluctant to speak, looks depressed, with T. Blocking/Delay, limited answers to questions ands denied S/H/I/P, has fair sleep, poor appetite, and denied A/V/H or paranoid beliefs. She is not aware of her medical issues, writer got from her chart. She seems to be in a daze, does not remember her meds, does not remember whether she has been taking her meds and continues to have difficulty finding answers to questions. E  As per her daughter/son/sister at bedside who informed that she has been doing everything by herself till Nov' 2019, she was active, was not able to drive, but was taking buses to go to [place] center, has lot of friends, and was taking care of herself with no difficulties. Her 52 y/o son, who is a sociopath, moved in with her since Nov/Dec' 2019 and had extorted lot of money, charging different C. Cards, she writing different checks for him and in this way took out large sum of money and continues to do so. Her son was also in senior living for 5 months from Feb' 2019 till July' 2019 and while in senior living he started to communicate with her, with letters, phone calls etc. and  due to this behavior she had an order of protection against her son twice for a year, and he moved in with her once the order of protection is over. Since he moved in things are unravelling in a different way, she is isolative, not eating, not showering, not taking her meds on time, limited answers to question, as if her executive function is lost which she was doing very well till Nov/Dec' 2019. Family is concerned of her behavior ands wants her to get Psychiatric help. she takes Wellbutrin  mg with Abilify 5 mg daily from Dr. Corona @ [phone], who sees her once every 2-3 months, last visit to her Psychiatrist was in January' 2020, her next visit is in April'2019, and her psychiatrist is out of country now.    Medically has HTN; DM; HLD and for which she takes HCTZ 25 mg daily; Norvasc 5 mg daily; Cozaar 100 mg daily; Metformin 500 mg TID wand Lipitor 40 mg HS. Allergies to PCN.    Plan: Admit to Psychiatric unit --Voluntary         Start Wellbutrin  mg daily         Start Abilify 5 mg daily, increase ABilify to 10mg   Add Effexor 37.5mg daily, increase Effexor   consider ECT         Medical meds as previously given         Medical clearance by Dr. Banerjee
Patient a 80 y/o  female, domiciled alone, retired, past psychiatric hx of Bipolar D/O, hospitalized many years ago, no prior SI/SA, no drug abuse hx, no legal issues, no aggression or agitation, medically has HTN; HLD; DM was BIB/Family due to above reasons.    Patient in bed, soft spoken, or reluctant to speak, looks depressed, with T. Blocking/Delay, limited answers to questions ands denied S/H/I/P, has fair sleep, poor appetite, and denied A/V/H or paranoid beliefs. She is not aware of her medical issues, writer got from her chart. She seems to be in a daze, does not remember her meds, does not remember whether she has been taking her meds and continues to have difficulty finding answers to questions. E  As per her daughter/son/sister at bedside who informed that she has been doing everything by herself till Nov' 2019, she was active, was not able to drive, but was taking buses to go to [employer] center, has lot of friends, and was taking care of herself with no difficulties. Her 50 y/o son, who is a sociopath, moved in with her since Nov/Dec' 2019 and had extorted lot of money, charging different C. Cards, she writing different checks for him and in this way took out large sum of money and continues to do so. Her son was also in [facility] for 5 months from Feb' 2019 till July' 2019 and while in [facility] he started to communicate with her, with letters, phone calls etc. and  due to this behavior she had an order of protection against her son twice for a year, and he moved in with her once the order of protection is over. Since he moved in things are unravelling in a different way, she is isolative, not eating, not showering, not taking her meds on time, limited answers to question, as if her executive function is lost which she was doing very well till Nov/Dec' 2019. Family is concerned of her behavior ands wants her to get Psychiatric help. she takes Wellbutrin  mg with Abilify 5 mg daily from Dr. Corona @ [phone], who sees her once every 2-3 months, last visit to her Psychiatrist was in January' 2020, her next visit is in April'2019, and her psychiatrist is out of country now.    Medically has HTN; DM; HLD and for which she takes HCTZ 25 mg daily; Norvasc 5 mg daily; Cozaar 100 mg daily; Metformin 500 mg TID wand Lipitor 40 mg HS. Allergies to PCN.    Plan: Admit to Psychiatric unit --Voluntary         Start Wellbutrin  mg daily         Start Abilify 5 mg daily, increase ABilify to 10mg   Add Effexor 37.5mg daily, increase Effexor   consider ECT         Medical meds as previously given         Medical clearance by Dr. Banerjee
Patient a 80 y/o  female, domiciled alone, retired, past psychiatric hx of Bipolar D/O, hospitalized many years ago, no prior SI/SA, no drug abuse hx, no legal issues, no aggression or agitation, medically has HTN; HLD; DM was BIB/Family due to above reasons.    Patient in bed, soft spoken, or reluctant to speak, looks depressed, with T. Blocking/Delay, limited answers to questions ands denied S/H/I/P, has fair sleep, poor appetite, and denied A/V/H or paranoid beliefs. She is not aware of her medical issues, writer got from her chart. She seems to be in a daze, does not remember her meds, does not remember whether she has been taking her meds and continues to have difficulty finding answers to questions. E  As per her daughter/son/sister at bedside who informed that she has been doing everything by herself till Nov' 2019, she was active, was not able to drive, but was taking buses to go to [employer] center, has lot of friends, and was taking care of herself with no difficulties. Her 52 y/o son, who is a sociopath, moved in with her since Nov/Dec' 2019 and had extorted lot of money, charging different C. Cards, she writing different checks for him and in this way took out large sum of money and continues to do so. Her son was also in alf for 5 months from Feb' 2019 till July' 2019 and while in alf he started to communicate with her, with letters, phone calls etc. and  due to this behavior she had an order of protection against her son twice for a year, and he moved in with her once the order of protection is over. Since he moved in things are unravelling in a different way, she is isolative, not eating, not showering, not taking her meds on time, limited answers to question, as if her executive function is lost which she was doing very well till Nov/Dec' 2019. Family is concerned of her behavior ands wants her to get Psychiatric help. she takes Wellbutrin  mg with Abilify 5 mg daily from Dr. Corona @ [phone], who sees her once every 2-3 months, last visit to her Psychiatrist was in January' 2020, her next visit is in April'2019, and her psychiatrist is out of country now.    Medically has HTN; DM; HLD and for which she takes HCTZ 25 mg daily; Norvasc 5 mg daily; Cozaar 100 mg daily; Metformin 500 mg TID wand Lipitor 40 mg HS. Allergies to PCN.    Plan: Admit to Psychiatric unit --Voluntary         Start Wellbutrin  mg daily         Start Abilify 5 mg daily, increase ABilify to 10mg   Add Effexor 37.5mg daily, increase Effexor   consider ECT         Medical meds as previously given         Medical clearance by Dr. Banerjee

## 2020-03-06 NOTE — PROGRESS NOTE BEHAVIORAL HEALTH - BEHAVIOR
Cooperative

## 2020-03-06 NOTE — PROGRESS NOTE BEHAVIORAL HEALTH - RISK ASSESSMENT
Denies current SI or HI.  Risk factors: mood episode, family stressors recent insomnia, anhedonia   Protective factors: responsibility to others, IDs reasons to live, future oriented, fear of death, domiciled, supportive daughter and family members
Denies current SI or HI.  Risk factors: mood episode, family stressors, insomnia, anhedonia   Protective factors: responsibility to others, IDs reasons to live, future oriented, fear of death, domiciled
Denies current SI or HI.  Risk factors: mood episode, family stressors, insomnia, anhedonia   Protective factors: responsibility to others, IDs reasons to live, future oriented, fear of death, domiciled, supportive daughter
Denies current SI or HI.  Risk factors: mood episode, family stressors recent insomnia, anhedonia   Protective factors: responsibility to others, IDs reasons to live, future oriented, fear of death, domiciled, supportive daughter
Denies current SI or HI.  Risk factors: mood episode, family stressors recent insomnia, anhedonia   Protective factors: responsibility to others, IDs reasons to live, future oriented, fear of death, domiciled, supportive daughter and family members

## 2020-03-06 NOTE — PROGRESS NOTE BEHAVIORAL HEALTH - NSBHCONSORIP_PSY_A_CORE
Inpatient Admission...

## 2020-04-22 NOTE — PROGRESS NOTE BEHAVIORAL HEALTH - NSBHLOC_PSY_A_CORE
Patient arrives from home via EMS with complaints of chest pain and shortness of breath. Diagnosed with pneumonia a week ago. Has been taking antibiotics and prednisone. Symptoms have become worse over last several days. ABCs in tact.  
Alert

## 2020-05-01 ENCOUNTER — APPOINTMENT (OUTPATIENT)
Dept: NEUROLOGY | Facility: CLINIC | Age: 80
End: 2020-05-01
Payer: MEDICARE

## 2020-05-01 DIAGNOSIS — G91.9 HYDROCEPHALUS, UNSPECIFIED: ICD-10-CM

## 2020-05-01 PROCEDURE — 99214 OFFICE O/P EST MOD 30 MIN: CPT | Mod: 95

## 2020-05-01 RX ORDER — PEN NEEDLE, DIABETIC 29 G X1/2"
32G X 4 MM NEEDLE, DISPOSABLE MISCELLANEOUS
Refills: 0 | Status: DISCONTINUED | COMMUNITY
Start: 2017-10-10 | End: 2020-05-01

## 2020-05-01 RX ORDER — LEVOTHYROXINE SODIUM 25 UG/1
25 TABLET ORAL
Refills: 0 | Status: DISCONTINUED | COMMUNITY
End: 2020-05-01

## 2020-05-01 RX ORDER — INSULIN DEGLUDEC INJECTION 100 U/ML
100 INJECTION, SOLUTION SUBCUTANEOUS
Refills: 0 | Status: DISCONTINUED | COMMUNITY
Start: 2019-05-22 | End: 2020-05-01

## 2020-05-01 RX ORDER — LATANOPROST/PF 0.005 %
0.01 DROPS OPHTHALMIC (EYE)
Refills: 0 | Status: DISCONTINUED | COMMUNITY
Start: 2019-05-22 | End: 2020-05-01

## 2020-05-01 NOTE — PHYSICAL EXAM
[FreeTextEntry1] : Neurologic examination was limited due to the video call.\par \par Mental status: Awake and alert fully oriented to person place and time. There is no aphasia. Mild dysarthria\par \par Cranial nerves:  Full extraocular movements. There is no nystagmus. Normal facial sensation and motor function. Tongue was in the midline.\par \par Motor: no pronator drift  bilaterally.\par \par Sensation: Light touch was diminished in left upper extremity\par \par Coordination: Deferred\par \par Gait: mild unsteadiness\par

## 2020-05-01 NOTE — REVIEW OF SYSTEMS
[Arm Weakness] : arm weakness [As Noted in HPI] : as noted in HPI [Difficulties in Speech] : speech difficulties [Abnormal Sensation] : an abnormal sensation [Difficulty Walking] : difficulty walking [Negative] : Heme/Lymph

## 2020-05-01 NOTE — HISTORY OF PRESENT ILLNESS
[Home] : at home, [unfilled] , at the time of the visit. [Patient] : the patient [Medical Office: ([facility])___] : at the medical office located in  [FreeTextEntry1] : 10/24/17:\par  is a 77-year-old woman comes here today for neurologic evaluation. She has a history of hydrocephalus with  shunt placed. She continues to have multiple neurologic complaints including a sensation of falling changes in handwriting a sensation of leaning to the left when walking, memory loss, drooling occasionally, slurring her speech, forgetting words in conversation, difficulty with handling money and figures, occasional confusion, difficulty tying her shoes and other items requiring fine hand motion and movements. This is been going on for at least 2-3 years what appears to be getting worse. She had a recent CAT scan which showed ventriculomegaly and a  shunt in place. She said the neurosurgeon offered to adjust the shunt but did not think it was entirely necessary. This is how she understood the conversation with the nurse surgical nurse practitioner. She also has a history of depression and is on medications for this. She is also a history of stroke and has a mild base line right-sided weakness. She is here today for neurologic reevaluation having seen multiple neurologists in the past.\par \par Followup January 23, 2018:\par This is a 77-year-old woman follows up today with gait difficulty. She has a history of hydrocephalus with  shunt. She was also having difficulty using her hands when I saw her last. She was also having some cognitive issues. I sent her for both physical and occupational therapy. Since going to therapy she's been improved greatly. Her gait is much more stable and her hands feel stronger and she is able to use them better. She even appears to be doing better for a cognitive point of view. She is here today for a neurologic followup.\par \par Followup May 1, 2020:\par This is a 80-year-old woman who presents today for followup of gait difficulty, new complaint of dysarthria and left hand numbness. She seen by video conference during the corona virus outbreak. Verbal consent was obtained at the beginning of the visit. She states that for the past 2-3 weeks she's been slurring her words. As the day progresses they run into each other more so. She does have a mild dysarthria when talking to her. She also states that her left upper tremor he occasionally has weakness and numbness. She did have some dyspnea and swelling in her leg and is currently being evaluated for DVT. She had some mild cough but no significant fever and no other signs of corona virus. She does have a history of hydrocephalus and has a  shunt placed. She also reports about 6 weeks ago she had a lump in the back of the right side of her head. I was present for 2 days and went away on its own. She presents today for neurologic followup.\par

## 2020-05-01 NOTE — ASSESSMENT
[FreeTextEntry1] : This is a 80-year-old woman with history of gait unsteadiness possibly related to hydrocephalus. She has a  shunt. It's concerning that she is having dysarthria and left the upper tremor he symptoms. I would like to do a head CT derive any evidence for stroke. She is being evaluated for possible DVT. She may be hypercoagulable for some reason, this has been seen in patient with corona virus recently however she has no other signs of corona virus. I will call her the results of the head CT and determine any further treatment plan as needed. I would like to see her back in the office once we reopened after the medical emergency due to the corona virus outbreak subsides.

## 2020-05-01 NOTE — CONSULT LETTER
[Dear  ___] : Dear  [unfilled], [Courtesy Letter:] : I had the pleasure of seeing your patient, [unfilled], in my office today. [Please see my note below.] : Please see my note below. [Consult Closing:] : Thank you very much for allowing me to participate in the care of this patient.  If you have any questions, please do not hesitate to contact me. [FreeTextEntry3] : Refugio Bowman M.D., Ph.D. DPN-N\par [facility]\par Neurology at [place]\par Medical Director of Stroke Services\par [facility]\par  [Sincerely,] : Sincerely,

## 2020-07-28 NOTE — ED BEHAVIORAL HEALTH ASSESSMENT NOTE - NS ED BHA MSE SPEECH VOLUME
07/28/20 6835   ABCDEF Bundle   SBT Safety Screen Passed Yes   SBT Trial Passed No   SBT Trial Reason for Failure Oxygen saturation < 88%;RSBI>105 Soft

## 2020-08-04 ENCOUNTER — APPOINTMENT (OUTPATIENT)
Dept: NEUROLOGY | Facility: CLINIC | Age: 80
End: 2020-08-04

## 2020-08-25 ENCOUNTER — EMERGENCY (EMERGENCY)
Facility: HOSPITAL | Age: 80
LOS: 1 days | Discharge: HOME | End: 2020-08-25
Attending: EMERGENCY MEDICINE
Payer: MEDICARE

## 2020-08-25 VITALS
HEART RATE: 85 BPM | WEIGHT: 160.06 LBS | TEMPERATURE: 98 F | DIASTOLIC BLOOD PRESSURE: 84 MMHG | SYSTOLIC BLOOD PRESSURE: 158 MMHG | OXYGEN SATURATION: 93 % | HEIGHT: 59 IN | RESPIRATION RATE: 20 BRPM

## 2020-08-25 DIAGNOSIS — S86.009A UNSPECIFIED INJURY OF UNSPECIFIED ACHILLES TENDON, INITIAL ENCOUNTER: Chronic | ICD-10-CM

## 2020-08-25 DIAGNOSIS — Z90.49 ACQUIRED ABSENCE OF OTHER SPECIFIED PARTS OF DIGESTIVE TRACT: Chronic | ICD-10-CM

## 2020-08-25 DIAGNOSIS — Z96.652 PRESENCE OF LEFT ARTIFICIAL KNEE JOINT: Chronic | ICD-10-CM

## 2020-08-25 DIAGNOSIS — Z90.710 ACQUIRED ABSENCE OF BOTH CERVIX AND UTERUS: Chronic | ICD-10-CM

## 2020-08-25 LAB
ALBUMIN SERPL ELPH-MCNC: 3.5 G/DL — SIGNIFICANT CHANGE UP (ref 3.3–5.2)
ALP SERPL-CCNC: 62 U/L — SIGNIFICANT CHANGE UP (ref 40–120)
ALT FLD-CCNC: 13 U/L — SIGNIFICANT CHANGE UP
ANION GAP SERPL CALC-SCNC: 13 MMOL/L — SIGNIFICANT CHANGE UP (ref 5–17)
APPEARANCE UR: ABNORMAL
AST SERPL-CCNC: 17 U/L — SIGNIFICANT CHANGE UP
BACTERIA # UR AUTO: ABNORMAL
BASOPHILS # BLD AUTO: 0.06 K/UL — SIGNIFICANT CHANGE UP (ref 0–0.2)
BASOPHILS NFR BLD AUTO: 0.9 % — SIGNIFICANT CHANGE UP (ref 0–2)
BILIRUB SERPL-MCNC: 0.4 MG/DL — SIGNIFICANT CHANGE UP (ref 0.4–2)
BILIRUB UR-MCNC: NEGATIVE — SIGNIFICANT CHANGE UP
BUN SERPL-MCNC: 22 MG/DL — HIGH (ref 8–20)
CALCIUM SERPL-MCNC: 9.7 MG/DL — SIGNIFICANT CHANGE UP (ref 8.6–10.2)
CHLORIDE SERPL-SCNC: 104 MMOL/L — SIGNIFICANT CHANGE UP (ref 98–107)
CO2 SERPL-SCNC: 24 MMOL/L — SIGNIFICANT CHANGE UP (ref 22–29)
COLOR SPEC: YELLOW — SIGNIFICANT CHANGE UP
CREAT SERPL-MCNC: 1.1 MG/DL — SIGNIFICANT CHANGE UP (ref 0.5–1.3)
DIFF PNL FLD: ABNORMAL
EOSINOPHIL # BLD AUTO: 0.15 K/UL — SIGNIFICANT CHANGE UP (ref 0–0.5)
EOSINOPHIL NFR BLD AUTO: 2.2 % — SIGNIFICANT CHANGE UP (ref 0–6)
EPI CELLS # UR: SIGNIFICANT CHANGE UP
ETHANOL SERPL-MCNC: <10 MG/DL — SIGNIFICANT CHANGE UP
GLUCOSE SERPL-MCNC: 209 MG/DL — HIGH (ref 70–99)
GLUCOSE UR QL: NEGATIVE — SIGNIFICANT CHANGE UP
HCT VFR BLD CALC: 40.6 % — SIGNIFICANT CHANGE UP (ref 34.5–45)
HGB BLD-MCNC: 13.5 G/DL — SIGNIFICANT CHANGE UP (ref 11.5–15.5)
IMM GRANULOCYTES NFR BLD AUTO: 0.3 % — SIGNIFICANT CHANGE UP (ref 0–1.5)
KETONES UR-MCNC: ABNORMAL
LEUKOCYTE ESTERASE UR-ACNC: ABNORMAL
LYMPHOCYTES # BLD AUTO: 1.97 K/UL — SIGNIFICANT CHANGE UP (ref 1–3.3)
LYMPHOCYTES # BLD AUTO: 28.4 % — SIGNIFICANT CHANGE UP (ref 13–44)
MCHC RBC-ENTMCNC: 30.6 PG — SIGNIFICANT CHANGE UP (ref 27–34)
MCHC RBC-ENTMCNC: 33.3 GM/DL — SIGNIFICANT CHANGE UP (ref 32–36)
MCV RBC AUTO: 92.1 FL — SIGNIFICANT CHANGE UP (ref 80–100)
MONOCYTES # BLD AUTO: 0.8 K/UL — SIGNIFICANT CHANGE UP (ref 0–0.9)
MONOCYTES NFR BLD AUTO: 11.5 % — SIGNIFICANT CHANGE UP (ref 2–14)
NEUTROPHILS # BLD AUTO: 3.93 K/UL — SIGNIFICANT CHANGE UP (ref 1.8–7.4)
NEUTROPHILS NFR BLD AUTO: 56.7 % — SIGNIFICANT CHANGE UP (ref 43–77)
NITRITE UR-MCNC: POSITIVE
PH UR: 5 — SIGNIFICANT CHANGE UP (ref 5–8)
PLATELET # BLD AUTO: 221 K/UL — SIGNIFICANT CHANGE UP (ref 150–400)
POTASSIUM SERPL-MCNC: 3.6 MMOL/L — SIGNIFICANT CHANGE UP (ref 3.5–5.3)
POTASSIUM SERPL-SCNC: 3.6 MMOL/L — SIGNIFICANT CHANGE UP (ref 3.5–5.3)
PROT SERPL-MCNC: 6.7 G/DL — SIGNIFICANT CHANGE UP (ref 6.6–8.7)
PROT UR-MCNC: 30 MG/DL
RBC # BLD: 4.41 M/UL — SIGNIFICANT CHANGE UP (ref 3.8–5.2)
RBC # FLD: 12.5 % — SIGNIFICANT CHANGE UP (ref 10.3–14.5)
RBC CASTS # UR COMP ASSIST: SIGNIFICANT CHANGE UP /HPF (ref 0–4)
SODIUM SERPL-SCNC: 141 MMOL/L — SIGNIFICANT CHANGE UP (ref 135–145)
SP GR SPEC: 1.02 — SIGNIFICANT CHANGE UP (ref 1.01–1.02)
UROBILINOGEN FLD QL: NEGATIVE — SIGNIFICANT CHANGE UP
WBC # BLD: 6.93 K/UL — SIGNIFICANT CHANGE UP (ref 3.8–10.5)
WBC # FLD AUTO: 6.93 K/UL — SIGNIFICANT CHANGE UP (ref 3.8–10.5)
WBC UR QL: ABNORMAL

## 2020-08-25 PROCEDURE — 93010 ELECTROCARDIOGRAM REPORT: CPT

## 2020-08-25 PROCEDURE — 99220: CPT | Mod: CS

## 2020-08-25 PROCEDURE — 71045 X-RAY EXAM CHEST 1 VIEW: CPT | Mod: 26

## 2020-08-25 RX ORDER — HYDROCHLOROTHIAZIDE 25 MG
25 TABLET ORAL ONCE
Refills: 0 | Status: COMPLETED | OUTPATIENT
Start: 2020-08-25 | End: 2020-08-25

## 2020-08-25 RX ORDER — METFORMIN HYDROCHLORIDE 850 MG/1
500 TABLET ORAL ONCE
Refills: 0 | Status: COMPLETED | OUTPATIENT
Start: 2020-08-25 | End: 2020-08-25

## 2020-08-25 RX ORDER — ATORVASTATIN CALCIUM 80 MG/1
40 TABLET, FILM COATED ORAL AT BEDTIME
Refills: 0 | Status: DISCONTINUED | OUTPATIENT
Start: 2020-08-25 | End: 2020-08-30

## 2020-08-25 RX ORDER — CEFTRIAXONE 500 MG/1
1000 INJECTION, POWDER, FOR SOLUTION INTRAMUSCULAR; INTRAVENOUS ONCE
Refills: 0 | Status: COMPLETED | OUTPATIENT
Start: 2020-08-25 | End: 2020-08-25

## 2020-08-25 RX ORDER — AMLODIPINE BESYLATE 2.5 MG/1
5 TABLET ORAL ONCE
Refills: 0 | Status: COMPLETED | OUTPATIENT
Start: 2020-08-25 | End: 2020-08-25

## 2020-08-25 RX ADMIN — CEFTRIAXONE 100 MILLIGRAM(S): 500 INJECTION, POWDER, FOR SOLUTION INTRAMUSCULAR; INTRAVENOUS at 23:32

## 2020-08-25 RX ADMIN — AMLODIPINE BESYLATE 5 MILLIGRAM(S): 2.5 TABLET ORAL at 23:33

## 2020-08-25 RX ADMIN — METFORMIN HYDROCHLORIDE 500 MILLIGRAM(S): 850 TABLET ORAL at 23:33

## 2020-08-25 RX ADMIN — Medication 25 MILLIGRAM(S): at 23:33

## 2020-08-25 RX ADMIN — ATORVASTATIN CALCIUM 40 MILLIGRAM(S): 80 TABLET, FILM COATED ORAL at 23:33

## 2020-08-25 NOTE — ED ADULT TRIAGE NOTE - CHIEF COMPLAINT QUOTE
as per daughter not taking meds not eating not getting off the couch dx bipolar  not showering not eating   hx dm.. as per daughter no hx of s/a pt denies s/h/i

## 2020-08-25 NOTE — ED ADULT TRIAGE NOTE - BP NONINVASIVE SYSTOLIC (MM HG)
-Left SI joint steroid injection ordered.  Scheduled for tomorrow at 8:15am. Check in at main registration at [facility] - stop drinking 1 hour prior to check in.   Cyclobenzaprine (Flexeril). Taking this medication may cause sedation. Do not take prior to driving  -Ice or heat 15-20 minutes as needed (Avoid sleeping on a heating pad or ice)      Follow up: As needed if symptoms fail to improve or worsen. Please call with any questions or concerns.         
[phone]

## 2020-08-25 NOTE — ED PROVIDER NOTE - OBJECTIVE STATEMENT
81 yo female history of Bipolar Disorder, DM, HTN presents after her daughter brought her to the ED over concern for decreased energy, depression over the past few weeks. Patient herself states that she has become increasingly depressed over the past few weeks. She mainly sits on the couch, has decreased energy and decreased appetite. She has stopped taking all of her medications for an unknown length of time. She is unable to answer why she stopped taking her medications. The patient states that she has though of suicide recently, but has no active plan with which to attempt the act. She denies attempting suicide in the past. Besides her current psychiatric symptoms, the patient states that she has been urinating less frequently, though she still urinates daily. She was started on Fluvoxamine recently by her Psychiatrist, Dr. Barr. Patient denies chest pain, abdominal pain, n/v/d, headache, SOB, recent fever/chills.

## 2020-08-25 NOTE — ED PROVIDER NOTE - PROGRESS NOTE DETAILS
pt with UTI will give one dose of rocephin , pt can be given ceftin 500mg twicw a day for 7 days Spoke with patient's daughter, Sarah. She states the patient has not taken medications in 2-3 weeks. She says her mother is worried about her bathroom renovation and distraught over her family issues with her younger brother. She denies that her mother has ever expressed SI, HI to her. She reports her mother was hospitalized at [facility] for 3 weeks several months ago for similar symptoms. pt with UTI will give one dose of rocephin , pt can be given ceftin 250mg twice a day for 7 days

## 2020-08-25 NOTE — ED PROVIDER NOTE - PHYSICAL EXAMINATION
Const: Awake, alert and oriented. In no acute distress.   HEENT: NC/AT. Moist mucous membranes.  Eyes: No scleral icterus. EOMI.  Neck:. Soft and supple. Full ROM without pain.  Cardiac: Regular rate and regular rhythm. +S1/S2. Peripheral pulses 2+ and symmetric. No LE edema.  Resp: Speaking in full sentences. No evidence of respiratory distress. No wheezes, rales or rhonchi.  Abd: Soft, non-tender, non-distended. Normal bowel sounds in all 4 quadrants. No guarding or rebound.  Back: Spine midline and non-tender. No CVAT.  Skin: No rashes, abrasions or lacerations.  Lymph: No cervical lymphadenopathy.  Neuro: Awake, alert & oriented x 3. Moves all extremities symmetrically.  Psychiatric: Depressed mood. Flat affect. Speaks with little inflection in her voice. Actively tearful in the exam room.

## 2020-08-25 NOTE — ED ADULT NURSE NOTE - OBJECTIVE STATEMENT
Assumed pt care at this time. Pt resting comfortably in stretcher, A&Ox3, NAD noted, respirations even and nonlabored. Pt comes in for a psych eval s/t being noncompliant with all meds x4-5 days (as per pt.) when asked why she hasn't been taking medicine pt responds "I have no idea why." Pt endorses feeling increased depression with loss of energy and interest in every day activities. Pt spends most of the day sitting on the couch which is not normal for her. when asked about wanting to hurt herself pt responds "its hard to say." Pt followed by dr julissa ramos. Assumed pt care at this time. Pt resting comfortably in stretcher, A&Ox3, NAD noted, respirations even and nonlabored. Pt comes in for a psych eval s/t being noncompliant with all meds x4-5 days (as per pt.) when asked why she hasn't been taking medicine pt responds "I have no idea why." Pt endorses feeling increased depression with loss of energy and interest in every day activities. Pt spends most of the day sitting on the couch which is not normal for her. when asked about wanting to hurt herself pt responds "its hard to say." denies plans for SI. Pt followed by dr julissa ramos.

## 2020-08-25 NOTE — ED PROVIDER NOTE - ATTENDING CONTRIBUTION TO CARE
pt was brought in for evaluation for depression h/o bipolar disorder    pt with decreased energy decreased appetite    not taking meds   UTI   rocephin and regular meds   psych eval

## 2020-08-25 NOTE — ED PROVIDER NOTE - CLINICAL SUMMARY MEDICAL DECISION MAKING FREE TEXT BOX
81 yo female history Bipolar Disorder, DM, HTN presents with decreased energy, depressed mood over the past several weeks. Will clear the patient medically with routine labs, EKG, chest X-Ray. Will consult behavioral health, psychiatry.

## 2020-08-25 NOTE — ED PROVIDER NOTE - CARE PLAN
Principal Discharge DX:	Urinary tract infection without hematuria, site unspecified  Secondary Diagnosis:	Depression, unspecified depression type

## 2020-08-25 NOTE — ED ADULT NURSE NOTE - NSIMPLEMENTINTERV_GEN_ALL_ED
Implemented All Universal Safety Interventions:  [place] to call system. Call bell, personal items and telephone within reach. Instruct patient to call for assistance. Room bathroom lighting operational. Non-slip footwear when patient is off stretcher. Physically safe environment: no spills, clutter or unnecessary equipment. Stretcher in lowest position, wheels locked, appropriate side rails in place.

## 2020-08-26 VITALS
HEART RATE: 61 BPM | DIASTOLIC BLOOD PRESSURE: 78 MMHG | TEMPERATURE: 98 F | RESPIRATION RATE: 18 BRPM | SYSTOLIC BLOOD PRESSURE: 148 MMHG | OXYGEN SATURATION: 98 %

## 2020-08-26 DIAGNOSIS — F33.1 MAJOR DEPRESSIVE DISORDER, RECURRENT, MODERATE: ICD-10-CM

## 2020-08-26 LAB
GLUCOSE BLDC GLUCOMTR-MCNC: 192 MG/DL — HIGH (ref 70–99)
SARS-COV-2 RNA SPEC QL NAA+PROBE: SIGNIFICANT CHANGE UP

## 2020-08-26 PROCEDURE — G0378: CPT

## 2020-08-26 PROCEDURE — 93005 ELECTROCARDIOGRAM TRACING: CPT

## 2020-08-26 PROCEDURE — 99284 EMERGENCY DEPT VISIT MOD MDM: CPT

## 2020-08-26 PROCEDURE — 80053 COMPREHEN METABOLIC PANEL: CPT

## 2020-08-26 PROCEDURE — 80307 DRUG TEST PRSMV CHEM ANLYZR: CPT

## 2020-08-26 PROCEDURE — 81001 URINALYSIS AUTO W/SCOPE: CPT

## 2020-08-26 PROCEDURE — 36415 COLL VENOUS BLD VENIPUNCTURE: CPT

## 2020-08-26 PROCEDURE — 71045 X-RAY EXAM CHEST 1 VIEW: CPT

## 2020-08-26 PROCEDURE — 82962 GLUCOSE BLOOD TEST: CPT

## 2020-08-26 PROCEDURE — 99217: CPT | Mod: CS

## 2020-08-26 PROCEDURE — U0003: CPT

## 2020-08-26 PROCEDURE — 85027 COMPLETE CBC AUTOMATED: CPT

## 2020-08-26 PROCEDURE — 96374 THER/PROPH/DIAG INJ IV PUSH: CPT

## 2020-08-26 PROCEDURE — 99284 EMERGENCY DEPT VISIT MOD MDM: CPT | Mod: 25

## 2020-08-26 RX ORDER — DEXTROSE 50 % IN WATER 50 %
25 SYRINGE (ML) INTRAVENOUS ONCE
Refills: 0 | Status: DISCONTINUED | OUTPATIENT
Start: 2020-08-26 | End: 2020-08-30

## 2020-08-26 RX ORDER — CEFUROXIME AXETIL 250 MG
1 TABLET ORAL
Qty: 14 | Refills: 0
Start: 2020-08-26 | End: 2020-09-01

## 2020-08-26 RX ORDER — INSULIN LISPRO 100/ML
VIAL (ML) SUBCUTANEOUS AT BEDTIME
Refills: 0 | Status: DISCONTINUED | OUTPATIENT
Start: 2020-08-26 | End: 2020-08-30

## 2020-08-26 RX ORDER — AMLODIPINE BESYLATE 2.5 MG/1
5 TABLET ORAL DAILY
Refills: 0 | Status: DISCONTINUED | OUTPATIENT
Start: 2020-08-26 | End: 2020-08-30

## 2020-08-26 RX ORDER — INSULIN LISPRO 100/ML
VIAL (ML) SUBCUTANEOUS
Refills: 0 | Status: DISCONTINUED | OUTPATIENT
Start: 2020-08-26 | End: 2020-08-30

## 2020-08-26 RX ORDER — SODIUM CHLORIDE 9 MG/ML
1000 INJECTION, SOLUTION INTRAVENOUS
Refills: 0 | Status: DISCONTINUED | OUTPATIENT
Start: 2020-08-26 | End: 2020-08-30

## 2020-08-26 RX ORDER — CEFUROXIME AXETIL 250 MG
250 TABLET ORAL EVERY 12 HOURS
Refills: 0 | Status: DISCONTINUED | OUTPATIENT
Start: 2020-08-26 | End: 2020-08-30

## 2020-08-26 RX ORDER — BRIMONIDINE TARTRATE 2 MG/MG
1 SOLUTION/ DROPS OPHTHALMIC
Qty: 10 | Refills: 0

## 2020-08-26 RX ORDER — DEXTROSE 50 % IN WATER 50 %
12.5 SYRINGE (ML) INTRAVENOUS ONCE
Refills: 0 | Status: DISCONTINUED | OUTPATIENT
Start: 2020-08-26 | End: 2020-08-30

## 2020-08-26 RX ORDER — GLUCAGON INJECTION, SOLUTION 0.5 MG/.1ML
1 INJECTION, SOLUTION SUBCUTANEOUS ONCE
Refills: 0 | Status: DISCONTINUED | OUTPATIENT
Start: 2020-08-26 | End: 2020-08-30

## 2020-08-26 RX ORDER — DEXTROSE 50 % IN WATER 50 %
15 SYRINGE (ML) INTRAVENOUS ONCE
Refills: 0 | Status: DISCONTINUED | OUTPATIENT
Start: 2020-08-26 | End: 2020-08-30

## 2020-08-26 RX ADMIN — AMLODIPINE BESYLATE 5 MILLIGRAM(S): 2.5 TABLET ORAL at 06:42

## 2020-08-26 RX ADMIN — Medication 2: at 08:05

## 2020-08-26 RX ADMIN — Medication 250 MILLIGRAM(S): at 06:42

## 2020-08-26 NOTE — ED CDU PROVIDER DISPOSITION NOTE - PATIENT PORTAL LINK FT
You can access the FollowMyHealth Patient Portal offered by [facility] by registering at the following website: [email or web address]. By joining [employer]’s FollowMyHealth portal, you will also be able to view your health information using other applications (apps) compatible with our system.

## 2020-08-26 NOTE — ED CDU PROVIDER SUBSEQUENT DAY NOTE - MEDICAL DECISION MAKING DETAILS
79 yo female history of Bipolar Disorder, depression, IDDM, HTN, previous psychiatric hospitalizations (last 2/2020) presents after her daughter brought her to the ED over concern for decreased energy, depression over the past few weeks. Patient stopped taking antipsychotic medications for unknown period of time. Patient found to have UTI. Patient pending psychiatric evaluation.

## 2020-08-26 NOTE — ED BEHAVIORAL HEALTH ASSESSMENT NOTE - HPI (INCLUDE ILLNESS QUALITY, SEVERITY, DURATION, TIMING, CONTEXT, MODIFYING FACTORS, ASSOCIATED SIGNS AND SYMPTOMS)
79 yo patient PMHx significant for DM, Bipolar Disorder, HTN presents to ED with depressed mood. Patient states that for the past couple weeks she has not been taking her medications. She has felt down, lack of interest in activities, decreased appetite and trouble sleeping. Patient states that she currently has a psychiatrist that has prescribed her medications that have improved these symptoms. In the past this year prior to COVID, she was hospitalized at [place] [facility] for similar symptoms. Patient was brought to ED by daughter. Daughter was concerned that her mother has not been eating, drinking, or compliant with her medications. Daughter was concerned as the patient complained that she "couldn't swallow". No suicidal or violent behaviors were identified.  Patient was prescribed Luvox  1 week ago by her psychiatrist but only took 2-3 doses.

## 2020-08-26 NOTE — ED CDU PROVIDER DISPOSITION NOTE - NSFOLLOWUPINSTRUCTIONS_ED_ALL_ED_FT
-FOLLOW UP WITH YOUR PRIMARY MEDICAL DOCTOR  -TAKE MEDICATION AS PRESCRIBED  -FOLLOW UP WITH YOUR PSYCHIATRIST

## 2020-08-26 NOTE — ED ADULT NURSE REASSESSMENT NOTE - COMFORT CARE
plan of care explained/side rails down/darkened lights/po fluids offered/wait time explained/warm blanket provided

## 2020-08-26 NOTE — ED CDU PROVIDER DISPOSITION NOTE - ATTENDING CONTRIBUTION TO CARE
presented for worsening depression. placed on observation for covid testing prior to BH placement.  COVID negative.  Urine c/w UTI: antibiotics prescribed.

## 2020-08-26 NOTE — ED CDU PROVIDER INITIAL DAY NOTE - MEDICAL DECISION MAKING DETAILS
81 yo female history of Bipolar Disorder, depression, IDDM, HTN, previous psychiatric hospitalizations (last 2/2020) presents after her daughter brought her to the ED over concern for decreased energy, depression over the past few weeks. Patient found to have UTI. Patient placed in CDU for psychiatric evaluation.

## 2020-08-26 NOTE — ED BEHAVIORAL HEALTH ASSESSMENT NOTE - SUMMARY
79 yo female with PMHx significant for Bipolar Disorder, DM, HTN presents with depressive symptoms. Patient admits to insomnia, anhedonia, appetite changes. Patient admits to being noncompliant with medications. Patient urine confirmed urinary tract infection. She does not meet criteria for involuntary admission- no indication of dehydration or malnutrition

## 2020-08-26 NOTE — CHART NOTE - NSCHARTNOTEFT_GEN_A_CORE
SW Note: Pt seen by psychiatry and cleared. Spoke with pts Dtr Sarah via phone [phone]. She explained that her mom lives alone and she makes home visits every 2 days. Pt is connected to an o/p psychiatrist. Has not been able to attend her senior program due to covid pandemic. Pts dtr aware I attached a resource for the . elder care program. Called and confirmed they are accepting clients but will conduct outreach via phone. Pts dtr expressed that she does not agree with the psych decision. Aware she can seek out a second opinion. Psychiatrist was aware and rom determined that pt does not meet criteria for inpt hospitalization.

## 2020-08-26 NOTE — ED CDU PROVIDER INITIAL DAY NOTE - ATTENDING CONTRIBUTION TO CARE
9 yo female history of Bipolar Disorder, depression, IDDM, HTN, previous psychiatric hospitalizations (last 2/2020) presents after her daughter brought her to the ED over concern for decreased energy, depression over the past few weeks. Patient found to have UTI. Patient placed in CDU for psychiatric evaluation.

## 2020-08-26 NOTE — ED ADULT NURSE REASSESSMENT NOTE - DESCRIPTION
patient rec'd from Main ER in [facility], belonging had been removed and placed in locked security closet. Pt wand by Security for contraband.  patient states that it started a few weeks ago that she want to lose weight for a event that she was planning on going to so she stopped eating breakfast and at the same time was only eating very little.  Pt states that she also stopped taking all of her meds but was unable to given a reason why. Pt denies any si/hi/ah/vh. patient states that in february had been admitting to [facility] for depression and anxiety and was discharged on 3/5/2020 after being there for about 3 weeks.  Pt also added that she may had bee at [facility] prior. Pt having poor eye contact, slow to respond  to questions. patient states that she lives alone and that her Daughter helps her with financial matters, laundry and shopping. 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 patient denies any short of breath if she walks slow but get worse if she increases her pace. Pt states that she has not been able to do her normal routing. Pt states that she is concerned about her 52 y/i son that lived in [place] about what he is doing with his life.  Pt aware of UTI and rec'd one dose of IV ABX and then will be started on po meds.  Pt stated that she does not have any out-pt services but does have a Psych MD, Dr. Brown in [place] that she spoke to this week but did not further elaborate on what was said. Pt oriented to unit offer a meal and po fluids which she declined v/s taken and chart blanket and pillow provided.  Telepsych consult had been called and awaiting to hear for them  Will monitor and chart changes and maintain safe environment

## 2020-08-26 NOTE — ED BEHAVIORAL HEALTH ASSESSMENT NOTE - DESCRIPTION
retired, lives alone DM, Bipolar Disorder, HTN patient diagnosed with UTI  T(C): 36.7 (26 Aug 2020 07:15), Max: 36.8 (25 Aug 2020 19:46)  T(F): 98 (26 Aug 2020 07:15), Max: 98.3 (25 Aug 2020 19:46)  HR: 61 (26 Aug 2020 07:15) (61 - 85)  BP: 148/78 (26 Aug 2020 07:15) (141/60 - 158/84)  RR: 18 (26 Aug 2020 07:15) (18 - 20)  SpO2: 98% (26 Aug 2020 07:15) (93% - 98%)

## 2020-08-26 NOTE — ED CDU PROVIDER SUBSEQUENT DAY NOTE - ATTENDING CONTRIBUTION TO CARE
case d./w BH on morning rounds.  no problems reported.  Awaiting COVID testing for BH placement.  Urine c/w UTI: moderate LE and positive Nitrite with 6-10 WBC/ hpf: abx ordered.

## 2020-08-26 NOTE — ED CDU PROVIDER INITIAL DAY NOTE - OBJECTIVE STATEMENT
81 yo female history of Bipolar Disorder, depression, IDDM, HTN, previous psychiatric hospitalizations (last 2/2020) presents after her daughter brought her to the ED over concern for decreased energy, depression over the past few weeks. Patient herself states that she has become increasingly depressed over the past few weeks. She mainly sits on the couch, has decreased energy and decreased appetite. She has stopped taking all of her medications for an unknown length of time. She is unable to answer why she stopped taking her medications. The patient states that she has though of suicide recently, but has no active plan with which to attempt the act and presently denies SI. She denies attempting suicide in the past. Besides her current psychiatric symptoms, the patient states that she has been urinating less frequently, though she still urinates daily. She was started on Fluvoxamine recently by her Psychiatrist, Dr. Barr. Patient denies chest pain, abdominal pain, n/v/d, headache, SOB, recent fever/chills.

## 2020-08-26 NOTE — ED BEHAVIORAL HEALTH ASSESSMENT NOTE - CURRENT MEDICATION
ARIPiprazole 20 mg oral tablet: 1 tab(s) orally once a day (26 Aug 2020 08:56)  fluvoxaMINE 25 mg oral tablet: 1 tab(s) orally once a day (at bedtime) (26 Aug 2020 08:56)  meloxicam 15 mg oral tablet: 1 tab(s) orally once a day (26 Aug 2020 08:56)

## 2020-08-26 NOTE — ED CDU PROVIDER DISPOSITION NOTE - CLINICAL COURSE
81 yo female history of Bipolar Disorder, depression, IDDM, HTN, presents after her daughter brought her to the ED over concern for decreased energy, depression over the past few weeks. Patient herself states that she has become increasingly depressed over the past few weeks associated with decreased energy and decreased appetite. She has stopped taking all of her medications for an unknown length of time. Denies SI/ HI. Pt seen by psych and cleared for d/c home- no need for inpt psych at this time. Urine with UTI, will d/c with cefuroxime and adviser pt to f/u pmd and her psychiatrist.

## 2020-10-07 NOTE — ED ADULT TRIAGE NOTE - BP NONINVASIVE DIASTOLIC (MM HG)
Pt notified it is recommended to isolate until results come back Negative. If Negative pt may return to work.    Results should populate in to pt [facility].    Pt verbalized understanding and denies further questions or concerns at this time.     84

## 2021-01-11 NOTE — BEHAVIORAL HEALTH ASSESSMENT NOTE - NSBHNICOTINE30DAYS_A_PSY_CORE
[Intercurrent Urgi Care visits] : went to urgent care [Yes] : Yes [2 - 3 times a week (3 pts)] : 2 - 3  times a week (3 points) [1 or 2 (0 pts)] : 1 or 2 (0 points) [Never (0 pts)] : Never (0 points) [No] : In the past 12 months have you used drugs other than those required for medical reasons? No [No falls in past year] : Patient reported no falls in the past year [1] : 2) Feeling down, depressed, or hopeless for several days (1) [] : No [de-identified] : 01/11/2021 [de-identified] : No [Audit-CScore] : 3 [de-identified] : None [de-identified] : Healthy Diet  [[id number]] : 2 no

## 2021-03-25 ENCOUNTER — APPOINTMENT (OUTPATIENT)
Dept: DERMATOLOGY | Facility: CLINIC | Age: 81
End: 2021-03-25

## 2021-05-18 ENCOUNTER — APPOINTMENT (OUTPATIENT)
Dept: NEUROLOGY | Facility: CLINIC | Age: 81
End: 2021-05-18
Payer: MEDICARE

## 2021-05-18 VITALS — WEIGHT: 160 LBS | BODY MASS INDEX: 32.25 KG/M2 | HEIGHT: 59 IN | TEMPERATURE: 97.4 F

## 2021-05-18 PROCEDURE — 99214 OFFICE O/P EST MOD 30 MIN: CPT

## 2021-05-18 RX ORDER — BUPROPION HYDROCHLORIDE 200 MG/1
200 TABLET, FILM COATED, EXTENDED RELEASE ORAL
Refills: 0 | Status: DISCONTINUED | COMMUNITY
Start: 2017-03-16 | End: 2021-05-18

## 2021-05-18 RX ORDER — AMLODIPINE BESYLATE 5 MG/1
5 TABLET ORAL DAILY
Qty: 30 | Refills: 6 | Status: DISCONTINUED | COMMUNITY
Start: 2018-06-13 | End: 2021-05-18

## 2021-05-18 RX ORDER — ATORVASTATIN CALCIUM 40 MG/1
40 TABLET, FILM COATED ORAL
Qty: 30 | Refills: 0 | Status: DISCONTINUED | COMMUNITY
End: 2021-05-18

## 2021-05-18 RX ORDER — HYDROCHLOROTHIAZIDE 25 MG/1
25 TABLET ORAL
Qty: 90 | Refills: 2 | Status: DISCONTINUED | COMMUNITY
Start: 2018-06-28 | End: 2021-05-18

## 2021-05-18 RX ORDER — NAPROXEN SODIUM 220 MG
220 TABLET ORAL
Qty: 100 | Refills: 0 | Status: DISCONTINUED | COMMUNITY
End: 2021-05-18

## 2021-05-18 NOTE — HISTORY OF PRESENT ILLNESS
[FreeTextEntry1] : 10/24/17:\par  is a 77-year-old woman comes here today for neurologic evaluation. She has a history of hydrocephalus with  shunt placed. She continues to have multiple neurologic complaints including a sensation of falling changes in handwriting a sensation of leaning to the left when walking, memory loss, drooling occasionally, slurring her speech, forgetting words in conversation, difficulty with handling money and figures, occasional confusion, difficulty tying her shoes and other items requiring fine hand motion and movements. This is been going on for at least 2-3 years what appears to be getting worse. She had a recent CAT scan which showed ventriculomegaly and a  shunt in place. She said the neurosurgeon offered to adjust the shunt but did not think it was entirely necessary. This is how she understood the conversation with the nurse surgical nurse practitioner. She also has a history of depression and is on medications for this. She is also a history of stroke and has a mild base line right-sided weakness. She is here today for neurologic reevaluation having seen multiple neurologists in the past.\par \par Followup January 23, 2018:\par This is a 77-year-old woman follows up today with gait difficulty. She has a history of hydrocephalus with  shunt. She was also having difficulty using her hands when I saw her last. She was also having some cognitive issues. I sent her for both physical and occupational therapy. Since going to therapy she's been improved greatly. Her gait is much more stable and her hands feel stronger and she is able to use them better. She even appears to be doing better for a cognitive point of view. She is here today for a neurologic followup.\par \par Followup May 1, 2020:\par This is a 80-year-old woman who presents today for followup of gait difficulty, new complaint of dysarthria and left hand numbness. She seen by video conference during the corona virus outbreak. Verbal consent was obtained at the beginning of the visit. She states that for the past 2-3 weeks she's been slurring her words. As the day progresses they run into each other more so. She does have a mild dysarthria when talking to her. She also states that her left upper tremor he occasionally has weakness and numbness. She did have some dyspnea and swelling in her leg and is currently being evaluated for DVT. She had some mild cough but no significant fever and no other signs of corona virus. She does have a history of hydrocephalus and has a  shunt placed. She also reports about 6 weeks ago she had a lump in the back of the right side of her head. I was present for 2 days and went away on its own. She presents today for neurologic followup.\par \par Followup May 18, 2021:\par This is a 81-year-old woman who presents today for neurologic followup. She has a history of a gait difficulty as well as a dysarthria and speech issues. She has a long-standing psychiatric history and is status post recent ECT. She's been on multiple medications in the past. Today she appears to be parkinsonian, likely from medications. She has a history of hydrocephalus and has a  shunt placed. She states she has not seen neurosurgery in many years for followup. She is here today with her daughter for neurologic followup. Her daughter is very concerned about her neurologic state and is concerned that her psychiatrist is changing her psychiatric medicines at their doses without much thought.\par

## 2021-05-18 NOTE — ASSESSMENT
[FreeTextEntry1] : This is a 81-year-old woman with unsteady gait difficulty speaking. She is overall Parkinsonian appearance. This is likely due to her long-standing history of medication use for psychiatric illness. Like to give her low dose of Cogentin to see if this may help her function better. Because she has a  shunt I will check a head CT to make sure that there is no increasing hydrocephalus I could be the cause of the gait disorder, although does not appear to be magnetic. She also has an unsteady gait due to scissoring as well as a slight shuffled due to her parkinsonism. I will send her for physical therapy for gait training. I will see her back in the office in 3 months, sooner should the need arise.

## 2021-05-18 NOTE — CONSULT LETTER
[Dear  ___] : Dear  [unfilled], [Courtesy Letter:] : I had the pleasure of seeing your patient, [unfilled], in my office today. [Please see my note below.] : Please see my note below. [Consult Closing:] : Thank you very much for allowing me to participate in the care of this patient.  If you have any questions, please do not hesitate to contact me. [Sincerely,] : Sincerely, [FreeTextEntry3] : Refugio Bowman M.D., Ph.D. DPN-N\par [facility]\par Neurology at [place]\par Medical Director of Stroke Services\par [facility]\par

## 2021-05-18 NOTE — PHYSICAL EXAM
[Person] : oriented to person [Place] : oriented to place [Time] : oriented to time [Remote Intact] : remote memory intact [Registration Intact] : recent registration memory intact [Span Intact] : the attention span was normal [Concentration Intact] : normal concentrating ability [Visual Intact] : visual attention was ~T not ~L decreased [Naming Objects] : no difficulty naming common objects [Repeating Phrases] : no difficulty repeating a phrase [Fluency] : fluency intact [Comprehension] : comprehension intact [Current Events] : adequate knowledge of current events [Past History] : adequate knowledge of personal past history [Cranial Nerves Optic (II)] : visual acuity intact bilaterally,  visual fields full to confrontation, pupils equal round and reactive to light [Cranial Nerves Oculomotor (III)] : extraocular motion intact [Cranial Nerves Trigeminal (V)] : facial sensation intact symmetrically [Cranial Nerves Facial (VII)] : face symmetrical [Cranial Nerves Vestibulocochlear (VIII)] : hearing was intact bilaterally [Cranial Nerves Glossopharyngeal (IX)] : tongue and palate midline [Cranial Nerves Accessory (XI - Cranial And Spinal)] : head turning and shoulder shrug symmetric [Cranial Nerves Hypoglossal (XII)] : there was no tongue deviation with protrusion [Motor Strength] : muscle strength was normal in all four extremities [No Muscle Atrophy] : normal bulk in all four extremities [Motor Handedness Right-Handed] : the patient is right hand dominant [Paresis Pronator Drift Right-Sided] : no pronator drift on the right [Paresis Pronator Drift Left-Sided] : no pronator drift on the left [Motor Strength Upper Extremities Bilaterally] : strength was normal in both upper extremities [Motor Strength Lower Extremities Bilaterally] : strength was normal in both lower extremities [Sensation Tactile Decrease] : light touch was intact [Sensation Pain / Temperature Decrease] : pain and temperature was intact [Sensation Vibration Decrease] : vibration was intact [Proprioception] : proprioception was intact [Tremor] : a tremor present [Coordination - Dysmetria Impaired Finger-to-Nose Bilateral] : not present [1+] : Patella left 1+ [FreeTextEntry6] : She has masked facies, increased tone with mild cogwheeling and overall  bradykinesia [FreeTextEntry8] : scissoring gait, Mild shuffle with reduced arm swing [Sclera] : the sclera and conjunctiva were normal [PERRL With Normal Accommodation] : pupils were equal in size, round, reactive to light, with normal accommodation [No APD] : no afferent pupillary defect [Extraocular Movements] : extraocular movements were intact [No CARSON] : no internuclear ophthalmoplegia [Full Visual Field] : full visual field

## 2021-05-18 NOTE — REVIEW OF SYSTEMS
[As Noted in HPI] : as noted in HPI [Changed Thought Patterns] : changed thought patterns [Difficulties in Speech] : speech difficulties [Difficulty Walking] : difficulty walking [Negative] : Heme/Lymph [de-identified] : Parkinsonian appearance

## 2021-05-27 ENCOUNTER — OUTPATIENT (OUTPATIENT)
Dept: OUTPATIENT SERVICES | Facility: HOSPITAL | Age: 81
LOS: 1 days | End: 2021-05-27
Payer: MEDICARE

## 2021-05-27 ENCOUNTER — APPOINTMENT (OUTPATIENT)
Dept: CT IMAGING | Facility: CLINIC | Age: 81
End: 2021-05-27
Payer: MEDICARE

## 2021-05-27 DIAGNOSIS — S86.009A UNSPECIFIED INJURY OF UNSPECIFIED ACHILLES TENDON, INITIAL ENCOUNTER: Chronic | ICD-10-CM

## 2021-05-27 DIAGNOSIS — Z90.710 ACQUIRED ABSENCE OF BOTH CERVIX AND UTERUS: Chronic | ICD-10-CM

## 2021-05-27 DIAGNOSIS — R26.81 UNSTEADINESS ON FEET: ICD-10-CM

## 2021-05-27 DIAGNOSIS — Z90.49 ACQUIRED ABSENCE OF OTHER SPECIFIED PARTS OF DIGESTIVE TRACT: Chronic | ICD-10-CM

## 2021-05-27 DIAGNOSIS — Z96.652 PRESENCE OF LEFT ARTIFICIAL KNEE JOINT: Chronic | ICD-10-CM

## 2021-05-27 PROCEDURE — 70450 CT HEAD/BRAIN W/O DYE: CPT

## 2021-05-27 PROCEDURE — 70450 CT HEAD/BRAIN W/O DYE: CPT | Mod: 26,ME

## 2021-05-27 PROCEDURE — G1004: CPT

## 2021-05-28 ENCOUNTER — INPATIENT (INPATIENT)
Facility: HOSPITAL | Age: 81
LOS: 9 days | Discharge: HOME | DRG: 64 | End: 2021-06-07
Attending: INTERNAL MEDICINE | Admitting: INTERNAL MEDICINE
Payer: MEDICARE

## 2021-05-28 VITALS — HEIGHT: 59 IN | WEIGHT: 154.98 LBS

## 2021-05-28 DIAGNOSIS — Z96.652 PRESENCE OF LEFT ARTIFICIAL KNEE JOINT: Chronic | ICD-10-CM

## 2021-05-28 DIAGNOSIS — S86.009A UNSPECIFIED INJURY OF UNSPECIFIED ACHILLES TENDON, INITIAL ENCOUNTER: Chronic | ICD-10-CM

## 2021-05-28 DIAGNOSIS — Z90.710 ACQUIRED ABSENCE OF BOTH CERVIX AND UTERUS: Chronic | ICD-10-CM

## 2021-05-28 DIAGNOSIS — I61.0 NONTRAUMATIC INTRACEREBRAL HEMORRHAGE IN HEMISPHERE, SUBCORTICAL: ICD-10-CM

## 2021-05-28 DIAGNOSIS — I61.9 NONTRAUMATIC INTRACEREBRAL HEMORRHAGE, UNSPECIFIED: ICD-10-CM

## 2021-05-28 DIAGNOSIS — Z90.49 ACQUIRED ABSENCE OF OTHER SPECIFIED PARTS OF DIGESTIVE TRACT: Chronic | ICD-10-CM

## 2021-05-28 LAB
ABO RH CONFIRMATION: SIGNIFICANT CHANGE UP
ALBUMIN SERPL ELPH-MCNC: 3.6 G/DL — SIGNIFICANT CHANGE UP (ref 3.3–5.2)
ALP SERPL-CCNC: 80 U/L — SIGNIFICANT CHANGE UP (ref 40–120)
ALT FLD-CCNC: 7 U/L — SIGNIFICANT CHANGE UP
ANION GAP SERPL CALC-SCNC: 12 MMOL/L — SIGNIFICANT CHANGE UP (ref 5–17)
APTT BLD: 33.7 SEC — SIGNIFICANT CHANGE UP (ref 27.5–35.5)
APTT BLD: 36.3 SEC — HIGH (ref 27.5–35.5)
AST SERPL-CCNC: 15 U/L — SIGNIFICANT CHANGE UP
BASOPHILS # BLD AUTO: 0.05 K/UL — SIGNIFICANT CHANGE UP (ref 0–0.2)
BASOPHILS NFR BLD AUTO: 0.7 % — SIGNIFICANT CHANGE UP (ref 0–2)
BILIRUB SERPL-MCNC: 0.4 MG/DL — SIGNIFICANT CHANGE UP (ref 0.4–2)
BLD GP AB SCN SERPL QL: SIGNIFICANT CHANGE UP
BUN SERPL-MCNC: 21.1 MG/DL — HIGH (ref 8–20)
CALCIUM SERPL-MCNC: 9.2 MG/DL — SIGNIFICANT CHANGE UP (ref 8.6–10.2)
CHLORIDE SERPL-SCNC: 100 MMOL/L — SIGNIFICANT CHANGE UP (ref 98–107)
CO2 SERPL-SCNC: 28 MMOL/L — SIGNIFICANT CHANGE UP (ref 22–29)
CREAT SERPL-MCNC: 1.14 MG/DL — SIGNIFICANT CHANGE UP (ref 0.5–1.3)
EOSINOPHIL # BLD AUTO: 0.1 K/UL — SIGNIFICANT CHANGE UP (ref 0–0.5)
EOSINOPHIL NFR BLD AUTO: 1.5 % — SIGNIFICANT CHANGE UP (ref 0–6)
GLUCOSE SERPL-MCNC: 222 MG/DL — HIGH (ref 70–99)
HCT VFR BLD CALC: 34.6 % — SIGNIFICANT CHANGE UP (ref 34.5–45)
HGB BLD-MCNC: 11.2 G/DL — LOW (ref 11.5–15.5)
IMM GRANULOCYTES NFR BLD AUTO: 0.3 % — SIGNIFICANT CHANGE UP (ref 0–1.5)
INR BLD: 1.86 RATIO — HIGH (ref 0.88–1.16)
INR BLD: 2.28 RATIO — HIGH (ref 0.88–1.16)
LYMPHOCYTES # BLD AUTO: 1.46 K/UL — SIGNIFICANT CHANGE UP (ref 1–3.3)
LYMPHOCYTES # BLD AUTO: 21.9 % — SIGNIFICANT CHANGE UP (ref 13–44)
MCHC RBC-ENTMCNC: 29.8 PG — SIGNIFICANT CHANGE UP (ref 27–34)
MCHC RBC-ENTMCNC: 32.4 GM/DL — SIGNIFICANT CHANGE UP (ref 32–36)
MCV RBC AUTO: 92 FL — SIGNIFICANT CHANGE UP (ref 80–100)
MONOCYTES # BLD AUTO: 0.56 K/UL — SIGNIFICANT CHANGE UP (ref 0–0.9)
MONOCYTES NFR BLD AUTO: 8.4 % — SIGNIFICANT CHANGE UP (ref 2–14)
NEUTROPHILS # BLD AUTO: 4.49 K/UL — SIGNIFICANT CHANGE UP (ref 1.8–7.4)
NEUTROPHILS NFR BLD AUTO: 67.2 % — SIGNIFICANT CHANGE UP (ref 43–77)
PLATELET # BLD AUTO: 226 K/UL — SIGNIFICANT CHANGE UP (ref 150–400)
POTASSIUM SERPL-MCNC: 3.9 MMOL/L — SIGNIFICANT CHANGE UP (ref 3.5–5.3)
POTASSIUM SERPL-SCNC: 3.9 MMOL/L — SIGNIFICANT CHANGE UP (ref 3.5–5.3)
PROT SERPL-MCNC: 6.6 G/DL — SIGNIFICANT CHANGE UP (ref 6.6–8.7)
PROTHROM AB SERPL-ACNC: 20.9 SEC — HIGH (ref 10.6–13.6)
PROTHROM AB SERPL-ACNC: 25.4 SEC — HIGH (ref 10.6–13.6)
RBC # BLD: 3.76 M/UL — LOW (ref 3.8–5.2)
RBC # FLD: 12.3 % — SIGNIFICANT CHANGE UP (ref 10.3–14.5)
SARS-COV-2 RNA SPEC QL NAA+PROBE: SIGNIFICANT CHANGE UP
SODIUM SERPL-SCNC: 140 MMOL/L — SIGNIFICANT CHANGE UP (ref 135–145)
WBC # BLD: 6.68 K/UL — SIGNIFICANT CHANGE UP (ref 3.8–10.5)
WBC # FLD AUTO: 6.68 K/UL — SIGNIFICANT CHANGE UP (ref 3.8–10.5)

## 2021-05-28 PROCEDURE — 99222 1ST HOSP IP/OBS MODERATE 55: CPT

## 2021-05-28 PROCEDURE — 93970 EXTREMITY STUDY: CPT | Mod: 26

## 2021-05-28 PROCEDURE — G1004: CPT

## 2021-05-28 PROCEDURE — 99285 EMERGENCY DEPT VISIT HI MDM: CPT

## 2021-05-28 PROCEDURE — 70450 CT HEAD/BRAIN W/O DYE: CPT | Mod: 26

## 2021-05-28 PROCEDURE — 71045 X-RAY EXAM CHEST 1 VIEW: CPT | Mod: 26

## 2021-05-28 PROCEDURE — 99223 1ST HOSP IP/OBS HIGH 75: CPT

## 2021-05-28 PROCEDURE — 70450 CT HEAD/BRAIN W/O DYE: CPT | Mod: 26,MG,77

## 2021-05-28 PROCEDURE — 99291 CRITICAL CARE FIRST HOUR: CPT

## 2021-05-28 RX ORDER — LABETALOL HCL 100 MG
5 TABLET ORAL ONCE
Refills: 0 | Status: COMPLETED | OUTPATIENT
Start: 2021-05-28 | End: 2021-05-28

## 2021-05-28 RX ORDER — BENZTROPINE MESYLATE 1 MG
0.5 TABLET ORAL
Refills: 0 | Status: DISCONTINUED | OUTPATIENT
Start: 2021-05-28 | End: 2021-06-04

## 2021-05-28 RX ORDER — ARIPIPRAZOLE 15 MG/1
1 TABLET ORAL
Qty: 0 | Refills: 0 | DISCHARGE

## 2021-05-28 RX ORDER — AMLODIPINE BESYLATE 2.5 MG/1
10 TABLET ORAL DAILY
Refills: 0 | Status: DISCONTINUED | OUTPATIENT
Start: 2021-05-28 | End: 2021-06-07

## 2021-05-28 RX ORDER — AMLODIPINE BESYLATE 2.5 MG/1
1 TABLET ORAL
Qty: 30 | Refills: 0

## 2021-05-28 RX ORDER — METFORMIN HYDROCHLORIDE 850 MG/1
1 TABLET ORAL
Qty: 0 | Refills: 0 | DISCHARGE

## 2021-05-28 RX ORDER — HYDRALAZINE HCL 50 MG
5 TABLET ORAL ONCE
Refills: 0 | Status: COMPLETED | OUTPATIENT
Start: 2021-05-28 | End: 2021-05-28

## 2021-05-28 RX ORDER — FLUVOXAMINE MALEATE 25 MG/1
1 TABLET ORAL
Qty: 0 | Refills: 0 | DISCHARGE

## 2021-05-28 RX ORDER — NETARSUDIL 0.2 MG/ML
1 SOLUTION/ DROPS OPHTHALMIC; TOPICAL
Qty: 0 | Refills: 0 | DISCHARGE

## 2021-05-28 RX ORDER — FOLIC ACID 0.8 MG
1 TABLET ORAL DAILY
Refills: 0 | Status: DISCONTINUED | OUTPATIENT
Start: 2021-05-28 | End: 2021-06-07

## 2021-05-28 RX ORDER — NICARDIPINE HYDROCHLORIDE 30 MG/1
5 CAPSULE, EXTENDED RELEASE ORAL
Qty: 40 | Refills: 0 | Status: DISCONTINUED | OUTPATIENT
Start: 2021-05-28 | End: 2021-05-28

## 2021-05-28 RX ORDER — DESVENLAFAXINE 50 MG/1
1 TABLET, EXTENDED RELEASE ORAL
Qty: 0 | Refills: 0 | DISCHARGE

## 2021-05-28 RX ORDER — LABETALOL HCL 100 MG
10 TABLET ORAL ONCE
Refills: 0 | Status: COMPLETED | OUTPATIENT
Start: 2021-05-28 | End: 2021-05-28

## 2021-05-28 RX ORDER — METOPROLOL TARTRATE 50 MG
25 TABLET ORAL
Refills: 0 | Status: DISCONTINUED | OUTPATIENT
Start: 2021-05-28 | End: 2021-06-07

## 2021-05-28 RX ORDER — DORZOLAMIDE HYDROCHLORIDE 20 MG/ML
1 SOLUTION/ DROPS OPHTHALMIC
Qty: 0 | Refills: 0 | DISCHARGE

## 2021-05-28 RX ORDER — ARIPIPRAZOLE 15 MG/1
5 TABLET ORAL DAILY
Refills: 0 | Status: DISCONTINUED | OUTPATIENT
Start: 2021-05-28 | End: 2021-06-02

## 2021-05-28 RX ORDER — LOSARTAN POTASSIUM 100 MG/1
1 TABLET, FILM COATED ORAL
Qty: 30 | Refills: 0

## 2021-05-28 RX ORDER — ATORVASTATIN CALCIUM 80 MG/1
40 TABLET, FILM COATED ORAL AT BEDTIME
Refills: 0 | Status: DISCONTINUED | OUTPATIENT
Start: 2021-05-28 | End: 2021-06-07

## 2021-05-28 RX ORDER — MELOXICAM 15 MG/1
1 TABLET ORAL
Qty: 0 | Refills: 0 | DISCHARGE

## 2021-05-28 RX ORDER — FLUVOXAMINE MALEATE 25 MG/1
150 TABLET ORAL AT BEDTIME
Refills: 0 | Status: DISCONTINUED | OUTPATIENT
Start: 2021-05-28 | End: 2021-06-07

## 2021-05-28 RX ORDER — ATORVASTATIN CALCIUM 80 MG/1
1 TABLET, FILM COATED ORAL
Qty: 30 | Refills: 0

## 2021-05-28 RX ADMIN — NICARDIPINE HYDROCHLORIDE 25 MG/HR: 30 CAPSULE, EXTENDED RELEASE ORAL at 16:20

## 2021-05-28 RX ADMIN — Medication 25 MILLIGRAM(S): at 18:57

## 2021-05-28 RX ADMIN — Medication 5 MILLIGRAM(S): at 16:12

## 2021-05-28 RX ADMIN — Medication 5 MILLIGRAM(S): at 18:57

## 2021-05-28 RX ADMIN — Medication 5 MILLIGRAM(S): at 16:25

## 2021-05-28 RX ADMIN — Medication 10 MILLIGRAM(S): at 16:30

## 2021-05-28 RX ADMIN — ATORVASTATIN CALCIUM 40 MILLIGRAM(S): 80 TABLET, FILM COATED ORAL at 23:36

## 2021-05-28 RX ADMIN — AMLODIPINE BESYLATE 10 MILLIGRAM(S): 2.5 TABLET ORAL at 18:57

## 2021-05-28 RX ADMIN — FLUVOXAMINE MALEATE 150 MILLIGRAM(S): 25 TABLET ORAL at 23:36

## 2021-05-28 NOTE — H&P ADULT - CONSTITUTIONAL COMMENTS
Gabe calling from [facility] pharmacy to clarify that Adderall XR 20 in a dosage increase and if it is ok to dispense Adderall XR 20 since the last fill of Adderall XR 15 was just filled and picked up on 02/23/2021, the patient will have two weeks left over of the previous medication .    Please review, call pharmacy and advise.    Gabe can be reached at [phone]      Thank you.  
Pharmacy closed for lunch and reopens at 2PM. Will call them back.   
Writer called [facility] pharmacy and advised to fill 20 mg dose of Adderall XR as that is recent increase from visit on 3/8/21. Gave ICD 10 code as well. They will speak with insurance and handle refill.  
AOX 3 word finding difficulty

## 2021-05-28 NOTE — H&P ADULT - HISTORY OF PRESENT ILLNESS
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 She states that over the past few weeks she has noted unsteady gain, worsening extremity tremors  and poor coordination with word finding difficulty She saw her neurologist Dr Bowman yesterday who ordered a CT head.  In the ER, was seen by neurology and neurosurgery. No surgical intervention advised at this time. REpeat CT head showed right lateral basal ganglia focal hge with stable ventriculostomy. Noted to have hypertensive urgency on admission requiring IV labetalol and hydralazine

## 2021-05-28 NOTE — H&P ADULT - NSICDXPASTSURGICALHX_GEN_ALL_CORE_FT
PAST SURGICAL HISTORY:  Achilles tendon injury repair  right scalene muscle release    S/P cholecystectomy 1981    S/P hysterectomy 1981 and Oopherectomy in 1990s    S/P knee replacement, left

## 2021-05-28 NOTE — CONSULT NOTE ADULT - SUBJECTIVE AND OBJECTIVE BOX
full note to follow  Patient is a 81y old  Female who presents with a chief complaint of ICH (28 May 2021 15:08)      HPI: 81 y.o. F evaluated in ED for CTB 5/27 finding of a small R GH hemorrhage and f/u CTB is reported as stable when repeated at [facility]. pt is on Eliquis daily for RLE DVT, resumed on  5/22 as she ran out of meds 10 days prior.  pt was evaluated in bed, Son Thomas ([phone]) and states that pt is followed by Dr Bowman/ neurology for her hand tremor/ worsning weakness/coordination/ unable to write and somewhat odd gait where she overshoots the target and misses / speeds up when she needs to stop.   pt denied recent trauma, states she had falls in the past and was evaluated and diagnosed w hydrocephalus which resolved after Dr Haskins placed right posterior VPS before 2015 as noted on images in [facility], son thinks it was about 5 years ago.   pt also admits to be followed by Dr Chino for arrhythmia when she used to just pass out w/o prodrome, had ILR done.  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   - LOC   - trauma   denied Headache   - Nausea /- Vomiting  Right hand dominant   denies new/ worsening visual changes  denies C- Spine pain,  denies T/LS  Spine pain  denies Bowel/ Bladder dysfunction or saddle anesthesia       GCS: 15  Eye response (E)4  Verbal response (V)5  Motor response (M)6        PAST MEDICAL & SURGICAL HISTORY:  Hypertension    Hyperlipidemia  Diabetes  Glaucoma  Osteoarthritis  Small bowel obstruction    S/P hysterectomy  1981 and Oopherectomy in 1990s    S/P cholecystectomy  1981    Achilles tendon injury  repair  right scalene muscle release    S/P knee replacement, left        SOCIAL HISTORY: - EtOH, - tobacco, - drugs    FAMILY HISTORY:  Family history of pancreatic cancer (Sibling)    Family history of early CAD    Family history of diabetes mellitus        MEDICATIONS  (STANDING):  MEDICATIONS  (PRN):    Allergies    adhesives (Pruritus; Blisters)  penicillin (Hives)  pineapple (Anaphylaxis)    Intolerances      Vital Signs Last 24 Hrs  T(C): 36.8 (28 May 2021 13:19), Max: 36.8 (28 May 2021 13:19)  T(F): 98.2 (28 May 2021 13:19), Max: 98.2 (28 May 2021 13:19)  HR: 70 (28 May 2021 15:20) (70 - 77)  BP: 205/96 (28 May 2021 15:20) (134/80 - 205/96)  BP(mean): --  RR: 22 (28 May 2021 15:20) (18 - 22)  SpO2: 96% (28 May 2021 15:20) (94% - 96%)        PHYSICAL EXAM:  GENERAL: NAD, well-groomed, well-developed, AAOx3 and very cooperative  HEAD:  Atraumatic, Normocephalic, no palpable step-off appreciated on palpation, easily compressible and refilling VPS valve/reservoir   EYES: b/l EOMI, + b/l cataract and sluggishly reactive pupils b/l, conjunctiva and sclera clear,   NECK: Supple, nontender to palpation posteriorly   TS/LS: nontender to palpation midline or paraspinal muscles b/l,   NERVOUS SYSTEM:  Alert & Oriented X3, speech is clear and fluent/ slow, no dysarthria appreciated. Good concentration & very cooperative; Motor Strength -5/5 L-sided weakness otherwise 5/5, sensory is at baseline and symmetric over the face b/ & decreased right-sided sensation as compared to the left-sidel; + mild right pronator drift, No ankle clonus appreciated b/l, cerebellar signs grossly intact b/l. FS/TML, no tavarez's b/l appreciated.   EXTREMITIES: + b/l nail clubbing, no cyanosis, + left shin/ankle and b/l feet edema, no calf tenderness appreciated on palpation    SKIN: No rashes or lesions, pale skin throughout                           11.2   6.68  )-----------( 226      ( 28 May 2021 13:39 )             34.6     05-28    140  |  100  |  21.1<H>  ----------------------------<  222<H>  3.9   |  28.0  |  1.14    Ca    9.2      28 May 2021 13:39    TPro  6.6  /  Alb  3.6  /  TBili  0.4  /  DBili  x   /  AST  15  /  ALT  7   /  AlkPhos  80  05-28    PT/INR - ( 28 May 2021 13:39 )   PT: 25.4 sec;   INR: 2.28 ratio    PTT - ( 28 May 2021 13:39 )  PTT:36.3 sec    LIVER FUNCTIONS - ( 28 May 2021 13:39 )  Alb: 3.6 g/dL / Pro: 6.6 g/dL / ALK PHOS: 80 U/L / ALT: 7 U/L / AST: 15 U/L / GGT: x               RADIOLOGY & ADDITIONAL STUDIES:  < from: CT Head No Cont (05.28.21 @ 14:41) >  INTERPRETATION:  INDICATION:  Headaches with dizziness. History of small hemorrhage. Follow-up study.  TECHNIQUE:  A non contrast 2.5 or 3 mm axial CT study of the brainwas performed from skull base to vertex. Coronal and sagittal reformations were generated from the axial data.  COMPARISON EXAMINATION:  CT dated 5/27/2021    FINDINGS:  HEMISPHERES:  Again identified is a very small area of focal hemorrhage in theright lateral basal ganglia and subinsular region, stable in appearance. There is no further hemorrhage. The surrounding edema is slightly more prominent. Otherwise there are involutional changes within both hemispheres with volume loss. There is gliosis and encephalomalacia in the right parietal region in the area of prior ventriculostomy.  VENTRICLES:  The right ventriculostomy is unremarkable in appearance. Ventricles demonstrate only mild ex vacuo enlargement and are stable compared with prior CT.  POSTERIOR FOSSA:  Scattered chronic ischemic changes are noted in both cerebellar hemispheres. No acute abnormality suggested.  EXTRACEREBRAL SPACES:  No subdural or epidural collections are noted.  SKULL BASE AND CALVARIUM:  Appears intact.  No fracture or destructive lesion is identified.  SINUSES AND MASTOIDS:  Clear.  MISCELLANEOUS:  Extensive calcified plaque noted in the vertebral and carotid arteries.    IMPRESSION:  1)  stable follow-up study with no interval change. Small focal hemorrhage again noted in the right lateral basal ganglia and subinsular region.  2)  right sided ventriculostomy again noted. Involutional changes with volume loss. Right parietal gliosis and encephalomalacia. Mild chronic ischemic changes both cerebellar hemispheres. These findings are well seen on prior study.  < end of copied text >         EXAM:  CT BRAIN        PROCEDURE DATE:  05/27/2021    INTERPRETATION:  Clinical indication: Gait abnormality. Difficulty speaking.  Multiple axial sections were performed from base of skull to vertex without contrast enhancement. Coronal and sagittal reconstructions were performed as well  This exam is compared with prior noncontrast head CT performed on March 28, 2015.  Increased parenchymal volume loss and chronic microvascular ischemic change identified  Right parietal shunt catheter is again seen and unchanged in position. Areas of surrounding gliosis is identified.  There is a new focus of high attenuation seen involving the right anterior perisylvian region. This finding measures approximately 0.8 cm and demonstrates some surrounding edema. This is compatible with a small area of acute parenchymal hemorrhage. No significant shift or herniation seen  Evaluation of the osseous structures with appropriate window contrast hyperostosis frontalis interna.  The visualized paranasal sinuses mastoid and appear clear.  Calcification is seen involving both distal carotid and vertebral arteries. Small left maxillary polyp versus retention cyst is seen. Both mastoids and middle ear regions appear clear.    IMPRESSION: New area of acute parenchymal hemorrhage as described above.  Findings discussed with Dr Bowman  on May 28, 2021 at 9:30 AM  with read back.    ARACELI BAH M.D., ATTENDING RADIOLOGIST   This document has been electronically signed. May 28 2021  9:33AM      Time Spent with patient/ education on the floor & arranging care: >55 mins discussing and arranging care

## 2021-05-28 NOTE — CONSULT NOTE ADULT - SUBJECTIVE AND OBJECTIVE BOX
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 Samuel at [facility],  Achilles tendon injury repair, right scalene muscle release, S/P cholecystectomy 1981, S/P hysterectomy  1981 and Oopherectomy in 1990s, s/P knee replacement presents to [facility] after outpatient CTH reveals small right basal ganglia hemorrhage. Patient endorses that she has had an unsteady gait, worsening coordination, and worsening hand tremor and recent falls x2 in the last month. She denies headache. denies head trauma, LOC, seizures, n/v, paresthesias, and focal weakness.     Constitutional:  Denies fever, chills, increasing lethargy  Eyes: No double vision, no change in visual acuity, no blurry vision, no occular discharge  Neurologic: +complaints of unsteadiness, falls, worsening coordination, worsening hand tremor, +hx  shunt for hydrocephalus,  No new focal weakness, no seizure reported  Ears, nose, mouth throat:  No ottorrhea. No change in hearing, No anosmia, No oral lesions, No sore throat  Cardiovascular: +hx HTN, No palpitations, no chest pain, no nocturnal or positional dyspnea.  Respiratory: No shortness of breath, No Cough  Gastrointestinal: No change in bowel habits, no change in appetite  Genitourinary: No Frequency, No Dysuria, no Hematuria  Musculoskeletal: No muscle wasting, No new weakness  Psych: No changes in mood, Denies drug use, +bipolar history  Integumentary: Denies new skin lesions  Endocrine: Denies history of DM, denies history of thyroid disease, No heat or cold intolerance  Heme/Lymph: No use of antiplatelet/anticoagulant  Allergic / Immune: No active allergies unless otherwise specified in medical chart    All other systems reviewed and are negative       Vital Signs Last 24 Hrs  T(C): 36.8 (28 May 2021 13:19), Max: 36.8 (28 May 2021 13:19)  T(F): 98.2 (28 May 2021 13:19), Max: 98.2 (28 May 2021 13:19)  HR: 73 (28 May 2021 16:50) (59 - 78)  BP: 161/69 (28 May 2021 16:50) (134/80 - 217/89)  BP(mean): --  RR: 20 (28 May 2021 15:40) (18 - 22)  SpO2: 94% (28 May 2021 15:40) (94% - 96%)    PHYSICAL EXAM:  GENERAL: NAD  HEAD:  No trauma  BHUMIKA COMA SCORE: E- V- M- =15       E: 4= opens eyes spontaneously        V: 5= oriented        M: 6= follows commands   MENTAL STATUS: AAO x3; Awake; Opens eyes spontaneously; Appropriately conversant without aphasia; following simple commands  CRANIAL NERVES: Visual acuity normal for age, visual fields full to confrontation, PERRL. EOMI without nystagmus. Face symmetric w/ normal eye closure and smile, tongue midline. Hearing grossly intact. Speech clear.   REFLEXES: PERRL.  MOTOR: strength 5/5 b/l upper and lower extremities  SENSATION: grossly intact to light touch all extremities  COORDINATION: heel to shin intact; no upper extremity dysmetria  CHEST/LUNG: Clear to auscultation bilaterally; no rales, rhonchi, wheezing, or rubs  HEART: +S1/+S2; Regular rate and rhythm; no murmurs, rubs, or gallops  ABDOMEN: Soft, nontender, nondistended; bowel sounds present all four quadrants  EXTREMITIES:  2+ peripheral pulses, no clubbing, cyanosis, or edema  SKIN: Warm, dry; no rashes or lesions    LABS:                        11.2   6.68  )-----------( 226      ( 28 May 2021 13:39 )             34.6     05-28    140  |  100  |  21.1<H>  ----------------------------<  222<H>  3.9   |  28.0  |  1.14    Ca    9.2      28 May 2021 13:39    TPro  6.6  /  Alb  3.6  /  TBili  0.4  /  DBili  x   /  AST  15  /  ALT  7   /  AlkPhos  80  05-28    PT/INR - ( 28 May 2021 13:39 )   PT: 25.4 sec;   INR: 2.28 ratio         PTT - ( 28 May 2021 13:39 )  PTT:36.3 sec        RADIOLOGY & ADDITIONAL TESTS:  < from: CT Head No Cont (05.28.21 @ 14:41) >   EXAM:  CT BRAIN                          PROCEDURE DATE:  05/28/2021          INTERPRETATION:  INDICATION:  Headaches with dizziness. History of small hemorrhage. Follow-up study.  TECHNIQUE:  A non contrast 2.5 or 3 mm axial CT study of the brainwas performed from skull base to vertex. Coronal and sagittal reformations were generated from the axial data.  COMPARISON EXAMINATION:  CT dated 5/27/2021    FINDINGS:    HEMISPHERES:  Again identified is a very small area of focal hemorrhage in theright lateral basal ganglia and subinsular region, stable in appearance. There is no further hemorrhage. The surrounding edema is slightly more prominent. Otherwise there are involutional changes within both hemispheres with volume loss. There is gliosis and encephalomalacia in the right parietal region in the area of prior ventriculostomy.  VENTRICLES:  The right ventriculostomy is unremarkable in appearance. Ventricles demonstrate only mild ex vacuo enlargement and are stable compared with prior CT.  POSTERIOR FOSSA:  Scattered chronic ischemic changes are noted in both cerebellar hemispheres. No acute abnormality suggested.  EXTRACEREBRAL SPACES:  No subdural or epidural collections are noted.  SKULL BASE AND CALVARIUM:  Appears intact.  No fracture or destructive lesion is identified.  SINUSES AND MASTOIDS:  Clear.  MISCELLANEOUS:  Extensive calcified plaque noted in the vertebral and carotid arteries.    IMPRESSION:    1)  stable follow-up study with no interval change. Small focal hemorrhage again noted in the right lateral basal ganglia and subinsular region.  2)  right sided ventriculostomy again noted. Involutional changes with volume loss. Right parietal gliosis and encephalomalacia. Mild chronic ischemic changes both cerebellar hemispheres. These findings are well seen on prior study.    < end of copied text >          CAPRINI SCORE [CLOT]:  Patient has an estimated Caprini score of greater than 5.  However, the patient's unique clinical situation will be addressed in an individual manner to determine appropriate anticoagulation treatment, if any.

## 2021-05-28 NOTE — ED ADULT NURSE REASSESSMENT NOTE - NS ED NURSE REASSESS COMMENT FT1
Call to Dr. Esteves. Patient to remain q4 neuro checks but will require stepdown level of care for blood pressure management/control.
Neurosurgery at bedside for evaluation. subtle deficit (pronator drift) noted to right upper extremity, patient attributes to rotator cuff impairment, patient neuro exam otherwise intact with no gross deficits noted. to order repeat CT

## 2021-05-28 NOTE — CONSULT NOTE ADULT - ASSESSMENT
ASSESSMENT:  82 yo woman with small right basal ganglia hemorrhage, uncontrolled HTN systolic . on eliquis    PLAN:  -Seen and evaluated with neurointensivist Dr. Cuellar.   -agree with neurochecks q4  -small R BG heme CT stable.  -Hold AP/AC  -SCDS  -HTN resolved with PRN pushes of labetalol.   -seen by neurosurgery. no indication for neuro ICU from neurosurgical standpoint. HTN now controlled with PRN labetalol push.   -please reconsult PRN ASSESSMENT:  80 yo woman with small right basal ganglia hemorrhage, called by ED for HTN systolic . on eliquis    PLAN:  -Seen and evaluated with neurointensivist Dr. Cuellar.   -agree with neurochecks q4  -small R BG heme CT stable.  -goal bp <150  -Hold AP/AC  -SCDS  -HTN resolved with PRN pushes of labetalol.   -seen by neurosurgery. no indication for neuro ICU from neurosurgical standpoint. HTN now controlled with PRN labetalol push.   -please reconsult PRN

## 2021-05-28 NOTE — H&P ADULT - NSICDXFAMILYHX_GEN_ALL_CORE_FT
FAMILY HISTORY:  Family history of diabetes mellitus  Family history of early CAD    Sibling  Still living? No  Family history of pancreatic cancer, Age at diagnosis: 61-70

## 2021-05-28 NOTE — ED PROVIDER NOTE - PHYSICAL EXAMINATION
Gen: Alert, NAD  Head: NC, AT, PERRL, EOMI, normal lids/conjunctiva  Neck: +supple, no tenderness/meningismus/JVD, +Trachea midline  Pulm: Bilateral BS, normal resp effort, no wheeze/stridor/retractions  CV: RRR, no M/R/G, 2+dist pulses  Abd: soft, NT/ND, +BS, no hepatosplenomegaly  Mskel: ROM intact x4 extremities.  no edema/erythema/cyanosis  Skin: no rash, warm, dry  Neuro: AAOx3, no sensory/motor deficits, mild L UE pronator drift

## 2021-05-28 NOTE — CONSULT NOTE ADULT - SUBJECTIVE AND OBJECTIVE BOX
Neurology Consult Note  [facility] at [place] [address] [place] [address]  [phone]    HPI:  82 yo woman with medical conditions as outlined below who was sent in by Dr. Bowman for small right basal ganglia hemorrhage. Patient states her BP has been running high recently. She denies recent falls. She has a  shunt for NPH. She states she has had unsteady gait for the past few months. She states recently, she feels she is falling to the left when she walks. She denies headache. She states she is on Eliquis for a blood clot in her right leg. Bilateral lower extremity dopplers performed in ED today shows no evidence of DVT. She states she took eliquis this morning. She denies feeling nauseous or new vision changes. She has no further complaints.    PMHx:  Bipolar disorder  Eliquis for DVT?   Diabetes    Glaucoma    Hyperlipidemia    Hypertension    Osteoarthritis    Small bowel obstruction.    PSHx:   shunt  Achilles tendon injury  repair  right scalene muscle release  S/P cholecystectomy  1981  S/P hysterectomy  1981 and Oopherectomy in 1990s  S/P knee replacement, left.    Meds:  Home Medications:  ARIPiprazole 20 mg oral tablet: 1 tab(s) orally once a day (26 Aug 2020 08:56)  dorzolamide 2% ophthalmic solution: 1 drop(s) in the right eye 2 times a day (26 Aug 2020 08:56)  fluvoxaMINE 25 mg oral tablet: 1 tab(s) orally once a day (at bedtime) (26 Aug 2020 08:56)  hydroCHLOROthiazide 25 mg oral tablet: 1 tab(s) orally once a day (26 Aug 2020 08:56)  meloxicam 15 mg oral tablet: 1 tab(s) orally once a day (26 Aug 2020 08:56)  metFORMIN 500 mg oral tablet: 1 tab(s) orally 3 times a day (26 Aug 2020     Allergies:  Penicillin    FamHx:  Fam hx of DM, CAD, pancreatic cancer    SocHx:  Never smoker    ROS: A 12 system review of system was negative aside from pertinent negatives and positives outlined in HPI    Physical Exam  Vital Signs Last 24 Hrs  T(C): 36.8 (28 May 2021 13:19), Max: 36.8 (28 May 2021 13:19)  T(F): 98.2 (28 May 2021 13:19), Max: 98.2 (28 May 2021 13:19)  HR: 71 (28 May 2021 16:00) (68 - 77)  BP: 176/69 (28 May 2021 16:00) (134/80 - 217/89)  BP(mean): --  RR: 20 (28 May 2021 15:40) (18 - 22)  SpO2: 94% (28 May 2021 15:40) (94% - 96%)  Mental Status: AAO x 3, no dysarthria, no aphasia  CN: PERRL, EOMI, VFF, V1-V3 sensation intact, no facial asymmetry  Motor:  5/5 x 4 extremities  Sensory: intact to light touch throughout  Coordination: no dysmetria on FTN  Gait: deferred    LABS:                        11.2   6.68  )-----------( 226      ( 28 May 2021 13:39 )             34.6     05-28    140  |  100  |  21.1<H>  ----------------------------<  222<H>  3.9   |  28.0  |  1.14    Ca    9.2      28 May 2021 13:39    TPro  6.6  /  Alb  3.6  /  TBili  0.4  /  DBili  x   /  AST  15  /  ALT  7   /  AlkPhos  80  05-28    PT/INR - ( 28 May 2021 13:39 )   PT: 25.4 sec;   INR: 2.28 ratio         PTT - ( 28 May 2021 13:39 )  PTT:36.3 sec08:56)  Rhopressa 0.02% ophthalmic solution: 1 drop(s) in each eye once a day   LABS:  cret                        11.2 6.68  )-----------( 226      ( 28 May 2021 13:39 )             34.6     05-28    140  |  100  |  21.1<H>  ----------------------------<  222<H>  3.9   |  28.0  |  1.14    Ca    9.2      28 May 2021 13:39    TPro  6.6  /  Alb  3.6  /  TBili  0.4  /  DBili  x   /  AST  15  /  ALT  7   /  AlkPhos  80  05-28    PT/INR - ( 28 May 2021 13:39 )   PT: 25.4 sec;   INR: 2.28 ratio         PTT - ( 28 May 2021 13:39 )  PTT:36.3 sec(26 Aug 2020 08:56)      US Doppler LE - no evidence of DVT bilaterally    CTH - 1)  stable follow-up study with no interval change. Small focal hemorrhage again noted in the right lateral basal ganglia and subinsular region.  2)  right sided ventriculostomy again noted. Involutional changes with volume loss. Right parietal gliosis and encephalomalacia. Mild chronic ischemic changes both cerebellar hemispheres. These findings are well seen on prior study.     Neurology Consult Note  [facility] at [place] [address] [place] [address]  [phone]    HPI:  80 yo woman with medical conditions as outlined below who was sent in by Dr. Bowman for small right basal ganglia hemorrhage. Patient states her BP has been running high recently. She denies recent falls. She has a  shunt for NPH. She states she has had unsteady gait for the past few months. She states recently, she feels she is falling to the left when she walks. She denies headache. She states she is on Eliquis for a blood clot in her right leg. Bilateral lower extremity dopplers performed in ED today shows no evidence of DVT. She states she took eliquis this morning. She denies feeling nauseous or new vision changes. She has no further complaints.    PMHx:  Bipolar disorder  Eliquis for DVT?   Diabetes    Glaucoma    Hyperlipidemia    Hypertension    Osteoarthritis    Small bowel obstruction.    PSHx:   shunt  Achilles tendon injury  repair  right scalene muscle release  S/P cholecystectomy  1981  S/P hysterectomy  1981 and Oopherectomy in 1990s  S/P knee replacement, left.    Meds:    Home Medications:  ARIPiprazole 5 mg oral tablet: 1 tab(s) orally once a day (at bedtime) (28 May 2021 18:00)  atorvastatin 40 mg oral tablet: 1 tab(s) orally once a day (28 May 2021 18:00)  benztropine 0.5 mg oral tablet: 1 tab(s) orally 2 times a day (28 May 2021 18:00)  desvenlafaxine (as succinate) 25 mg oral tablet, extended release: 1 tab(s) orally once a day (in the morning) (28 May 2021 18:00)  Eliquis 5 mg oral tablet: 1 tab(s) orally 2 times a day (28 May 2021 18:00)  ergocalciferol 50,000 intl units (1.25 mg) oral capsule: 1 cap(s) orally once a day (28 May 2021 18:00)  fluvoxaMINE 100 mg oral tablet: 1.5 tab(s) orally once a day (at bedtime) (28 May 2021 18:00)  folic acid 1 mg oral tablet: 1 tab(s) orally once a day (28 May 2021 18:00)    Allergies:  Penicillin    FamHx:  Fam hx of DM, CAD, pancreatic cancer    SocHx:  Never smoker    ROS: A 12 system review of system was negative aside from pertinent negatives and positives outlined in HPI    Physical Exam  Vital Signs Last 24 Hrs  T(C): 36.8 (28 May 2021 13:19), Max: 36.8 (28 May 2021 13:19)  T(F): 98.2 (28 May 2021 13:19), Max: 98.2 (28 May 2021 13:19)  HR: 71 (28 May 2021 16:00) (68 - 77)  BP: 176/69 (28 May 2021 16:00) (134/80 - 217/89)  RR: 20 (28 May 2021 15:40) (18 - 22)  SpO2: 94% (28 May 2021 15:40) (94% - 96%)      Mental Status: AAO x 3, no dysarthria, no aphasia  CN: PERRL, EOMI, VFF, V1-V3 sensation intact, no facial asymmetry  Motor:  5/5 x 4 extremities  Sensory: intact to light touch throughout  Coordination: no dysmetria on FTN  Gait: deferred    LABS:                        11.2   6.68  )-----------( 226      ( 28 May 2021 13:39 )             34.6     05-28    140  |  100  |  21.1<H>  ----------------------------<  222<H>  3.9   |  28.0  |  1.14    Ca    9.2      28 May 2021 13:39    TPro  6.6  /  Alb  3.6  /  TBili  0.4  /  DBili  x   /  AST  15  /  ALT  7   /  AlkPhos  80  05-28    PT/INR - ( 28 May 2021 13:39 )   PT: 25.4 sec;   INR: 2.28 ratio    PTT - ( 28 May 2021 13:39 )  PTT:36.3 sec08:56)    Rhopressa 0.02% ophthalmic solution: 1 drop(s) in each eye once a day   LABS:  cret                        11.2   6.68  )-----------( 226      ( 28 May 2021 13:39 )             34.6     05-28    140  |  100  |  21.1<H>  ----------------------------<  222<H>  3.9   |  28.0  |  1.14    Ca    9.2      28 May 2021 13:39    TPro  6.6  /  Alb  3.6  /  TBili  0.4  /  DBili  x   /  AST  15  /  ALT  7   /  AlkPhos  80  05-28    PT/INR - ( 28 May 2021 13:39 )   PT: 25.4 sec;   INR: 2.28 ratio         PTT - ( 28 May 2021 13:39 )  PTT:36.3 sec(26 Aug 2020 08:56)      US Doppler LE - no evidence of DVT bilaterally    CTH - 1)  stable follow-up study with no interval change. Small focal hemorrhage again noted in the right lateral basal ganglia and subinsular region.  2)  right sided ventriculostomy again noted. Involutional changes with volume loss. Right parietal gliosis and encephalomalacia. Mild chronic ischemic changes both cerebellar hemispheres. These findings are well seen on prior study.

## 2021-05-28 NOTE — H&P ADULT - NSICDXPASTMEDICALHX_GEN_ALL_CORE_FT
PAST MEDICAL HISTORY:  Diabetes     Glaucoma     Hyperlipidemia     Hypertension     Osteoarthritis     Small bowel obstruction

## 2021-05-28 NOTE — CONSULT NOTE ADULT - ASSESSMENT
82 yo woman with small right basal ganglia hemorrhage likely 2/2 uncontrolled HTN    Right basal ganglia hemorrhage  BP goal <160/90  Hold antiplatelet, AC  Neurochecks q 1 - 2h  NSICU eval  PT/OT    Hx of DVT?  Patient on eliquis at home, no DVT on dopplers done in ED today    Plan discussed with ED team    Will continue to follow 82 yo woman with small right basal ganglia hemorrhage likely 2/2 uncontrolled HTN    Small Right basal ganglia hemorrhage  BP goal <150/90- hydralazine/   Patient R basal ganglia heme - 1cc in volume and stable will repeat CT at 8 pm   If increase size of heme - KCENTRA- reverse eloquis and repeat PT in 1 0 after drug administered   Repeat INR STAT  Hold antiplatelet, AC  Check Lipid profile and HGBA1C  Neurochecks q 1 - 2h  NSICU eval  PT/OT    Hx of DVT  Patient on eliquis at home, no DVT on dopplers done in ED today    Plan discussed with ED team    Will continue to follow

## 2021-05-28 NOTE — ED PROVIDER NOTE - OBJECTIVE STATEMENT
81yoF; with pmh signif for HTN, HLD, Bipolar, DVT on Eliquis(has not taken it for 10 days), s/p  shunt 3 years ago with Dr. Samuel at [facility]; now p/w dizziness and increasing tremulousness for past 3-4 days. denies headache. denies head trauma. denies loc. denies n/v. denies numbness/tingling. denies focal eakness. denies f/c/s.  PMH: HTN, HLD, Bipolar, DVT on Eliquis(has not taken it for 10 days), s/p  shunt  SOCIAL: No tobacco/illicit substance use

## 2021-05-28 NOTE — ED ADULT TRIAGE NOTE - CHIEF COMPLAINT QUOTE
Patient A&Ox4 complaining of feeling "off". Son stated patient went for CT yesterday & got results today, was told to come to ED because she "has a bleed". Denies any recent trauma. Son also stated patient has shunt x5 years. Patient brought to [facility], Dr. Campos made aware.

## 2021-05-28 NOTE — ED PROVIDER NOTE - CLINICAL SUMMARY MEDICAL DECISION MAKING FREE TEXT BOX
patient arrived with finding of intraparenchymal hemorrhage on outpatient ct, repeat ct stable, seen by nsg who recommended admission to tele with q4 neuro checks, cleared by neuro icu who stated patient no longer needs cardene gtt. will admit to tele.

## 2021-05-28 NOTE — H&P ADULT - ASSESSMENT
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     Assessment/Plan:    1. Right basal ganglia hemorrhage: Hold Eliquis  seen by neurology and neurosurgery  Neuro checks Q4 hours  Blood pressure control  PT/OT/ST  SCDS  Seen by neurosurgical ICU_ recommend BP control with IV labetalol prn, nifedipine was discontinued    2. History of DVT in September: repeat duplex negative  PO eliquis held    3. Hypertensive urgency: Start labetalol PO  IV labetalol prn  MOnitor BP closely    4. Anxiety continue current medications    5. 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     Assessment/Plan:    1. Right basal ganglia hemorrhage: Hold Eliquis  seen by neurology and neurosurgery  Neuro checks Q4 hours  Blood pressure control  PT/OT/ST  SCDS  Seen by neurosurgical ICU_ recommend BP control with IV labetalol prn, nifedipine was discontinued  Follow up CTH in AM     2. History of DVT in September: repeat duplex negative  PO eliquis held    3. Hypertensive urgency: Start labetalol PO  IV labetalol prn  MOnitor BP closely    4. Bipolar disorder continue current medications    5. Hyperlipidemia on statin    VTE- SCDS

## 2021-05-28 NOTE — CONSULT NOTE ADULT - ASSESSMENT
81 y.o. F evaluated in ED for CTB 5/27 finding of a small R GH hemorrhage and f/u CTB is reported as stable when repeated at [facility]. pt is on Eliquis daily for RLE DVT, resumed on  5/22 as she ran out of meds 10 days prior.  pt was evaluated in bed, Son Thomas ([phone]) and states that pt is followed by Dr Bowman/ neurology for her hand tremor and somewhat odd gait where she overshoots the target and misses / speeds up when she needs to stop.   pt denied recent trauma, states she had falls in the past and was evaluated and diagnosed w hydrocephalus which resolved after Dr Haskins placed right posterior VPS before 2015 as noted on images in [facility], son thinks it was about 5 years ago.   pt also admits to be followed by Dr Chino for arrhythmia when she used to just pass out w/o prodrome, had ILR done.  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     overall pt is at her baseline from NSx standpoint  easily compressible and refilling VPS valve/reservoir   + mild right pronator drift  decreased right-sided sensation, otherwise intact   -5/5 L-sided weakness otherwise 5/5   + L shin/ankle swelling, no swelling on the right noted    recommend to hold AC/AP agents   recommend to consult cardio to see when would Eliquis need top be resumed and if other drug would be safer in sarah of the ICH and hx of prior falls and VPS  recommend LED b/l eval to r/o DVT  HOB ~ 30 degrees   NC q4 hrs or as per primary/ neurology   recommend PT eval for dispo recommendations   reconsult as needed  81 y.o. F evaluated in ED for CTB 5/27 finding of a small R GH hemorrhage and f/u CTB is reported as stable when repeated at [facility]. pt is on Eliquis daily for RLE DVT, resumed on  5/22 as she ran out of meds 10 days prior.  pt was evaluated in bed, Son Thomas ([phone]) and states that pt is followed by Dr Bowman/ neurology for her hand tremor and somewhat odd gait where she overshoots the target and misses / speeds up when she needs to stop.   pt denied recent trauma, states she had falls in the past and was evaluated and diagnosed w hydrocephalus which resolved after Dr Haskins placed right posterior VPS before 2015 as noted on images in [facility], son thinks it was about 5 years ago.   pt also admits to be followed by Dr Chino for arrhythmia when she used to just pass out w/o prodrome, had ILR done.  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     overall pt is at her baseline from NSx standpoint  easily compressible and refilling VPS valve/reservoir   + mild right pronator drift  decreased right-sided sensation, otherwise intact   -5/5 L-sided weakness otherwise 5/5   + L shin/ankle swelling, no swelling on the right noted    case and plan d/w Dr Higgins and images reviewed   recommend to hold AC/AP agents   recommend to consult cardio to see when would Eliquis need top be resumed and if other drug would be safer in sarah of the ICH and hx of prior falls and VPS  recommend LED b/l eval to r/o DVT  HOB ~ 30 degrees   NC q4 hrs or as per primary medicine/ neurology   recommend PT eval for dispo recommendations   reconsult as needed and f/u OP w Dr Higgins as needed

## 2021-05-29 LAB
ANION GAP SERPL CALC-SCNC: 12 MMOL/L — SIGNIFICANT CHANGE UP (ref 5–17)
BUN SERPL-MCNC: 16.8 MG/DL — SIGNIFICANT CHANGE UP (ref 8–20)
CALCIUM SERPL-MCNC: 9.4 MG/DL — SIGNIFICANT CHANGE UP (ref 8.6–10.2)
CHLORIDE SERPL-SCNC: 96 MMOL/L — LOW (ref 98–107)
CO2 SERPL-SCNC: 30 MMOL/L — HIGH (ref 22–29)
COVID-19 SPIKE DOMAIN AB INTERP: POSITIVE
COVID-19 SPIKE DOMAIN ANTIBODY RESULT: >250 U/ML — HIGH
CREAT SERPL-MCNC: 1.02 MG/DL — SIGNIFICANT CHANGE UP (ref 0.5–1.3)
GLUCOSE BLDC GLUCOMTR-MCNC: 172 MG/DL — HIGH (ref 70–99)
GLUCOSE BLDC GLUCOMTR-MCNC: 187 MG/DL — HIGH (ref 70–99)
GLUCOSE BLDC GLUCOMTR-MCNC: 189 MG/DL — HIGH (ref 70–99)
GLUCOSE BLDC GLUCOMTR-MCNC: 345 MG/DL — HIGH (ref 70–99)
GLUCOSE SERPL-MCNC: 184 MG/DL — HIGH (ref 70–99)
HCT VFR BLD CALC: 37.2 % — SIGNIFICANT CHANGE UP (ref 34.5–45)
HGB BLD-MCNC: 12.2 G/DL — SIGNIFICANT CHANGE UP (ref 11.5–15.5)
MCHC RBC-ENTMCNC: 30.1 PG — SIGNIFICANT CHANGE UP (ref 27–34)
MCHC RBC-ENTMCNC: 32.8 GM/DL — SIGNIFICANT CHANGE UP (ref 32–36)
MCV RBC AUTO: 91.9 FL — SIGNIFICANT CHANGE UP (ref 80–100)
PLATELET # BLD AUTO: 244 K/UL — SIGNIFICANT CHANGE UP (ref 150–400)
POTASSIUM SERPL-MCNC: 3.6 MMOL/L — SIGNIFICANT CHANGE UP (ref 3.5–5.3)
POTASSIUM SERPL-SCNC: 3.6 MMOL/L — SIGNIFICANT CHANGE UP (ref 3.5–5.3)
RBC # BLD: 4.05 M/UL — SIGNIFICANT CHANGE UP (ref 3.8–5.2)
RBC # FLD: 12.3 % — SIGNIFICANT CHANGE UP (ref 10.3–14.5)
SARS-COV-2 IGG+IGM SERPL QL IA: >250 U/ML — HIGH
SARS-COV-2 IGG+IGM SERPL QL IA: POSITIVE
SODIUM SERPL-SCNC: 138 MMOL/L — SIGNIFICANT CHANGE UP (ref 135–145)
WBC # BLD: 8.55 K/UL — SIGNIFICANT CHANGE UP (ref 3.8–10.5)
WBC # FLD AUTO: 8.55 K/UL — SIGNIFICANT CHANGE UP (ref 3.8–10.5)

## 2021-05-29 PROCEDURE — 99232 SBSQ HOSP IP/OBS MODERATE 35: CPT

## 2021-05-29 PROCEDURE — 99233 SBSQ HOSP IP/OBS HIGH 50: CPT

## 2021-05-29 RX ORDER — DEXTROSE 50 % IN WATER 50 %
15 SYRINGE (ML) INTRAVENOUS ONCE
Refills: 0 | Status: DISCONTINUED | OUTPATIENT
Start: 2021-05-29 | End: 2021-06-07

## 2021-05-29 RX ORDER — LANOLIN ALCOHOL/MO/W.PET/CERES
3 CREAM (GRAM) TOPICAL AT BEDTIME
Refills: 0 | Status: COMPLETED | OUTPATIENT
Start: 2021-05-29 | End: 2021-05-29

## 2021-05-29 RX ORDER — DEXTROSE 50 % IN WATER 50 %
25 SYRINGE (ML) INTRAVENOUS ONCE
Refills: 0 | Status: DISCONTINUED | OUTPATIENT
Start: 2021-05-29 | End: 2021-06-07

## 2021-05-29 RX ORDER — INSULIN LISPRO 100/ML
VIAL (ML) SUBCUTANEOUS
Refills: 0 | Status: DISCONTINUED | OUTPATIENT
Start: 2021-05-29 | End: 2021-06-07

## 2021-05-29 RX ORDER — SODIUM CHLORIDE 9 MG/ML
1000 INJECTION, SOLUTION INTRAVENOUS
Refills: 0 | Status: DISCONTINUED | OUTPATIENT
Start: 2021-05-29 | End: 2021-06-07

## 2021-05-29 RX ORDER — INSULIN LISPRO 100/ML
2 VIAL (ML) SUBCUTANEOUS
Refills: 0 | Status: DISCONTINUED | OUTPATIENT
Start: 2021-05-29 | End: 2021-06-07

## 2021-05-29 RX ORDER — HYDRALAZINE HCL 50 MG
10 TABLET ORAL EVERY 8 HOURS
Refills: 0 | Status: DISCONTINUED | OUTPATIENT
Start: 2021-05-29 | End: 2021-06-07

## 2021-05-29 RX ORDER — INSULIN GLARGINE 100 [IU]/ML
5 INJECTION, SOLUTION SUBCUTANEOUS AT BEDTIME
Refills: 0 | Status: DISCONTINUED | OUTPATIENT
Start: 2021-05-29 | End: 2021-06-07

## 2021-05-29 RX ORDER — DEXTROSE 50 % IN WATER 50 %
12.5 SYRINGE (ML) INTRAVENOUS ONCE
Refills: 0 | Status: DISCONTINUED | OUTPATIENT
Start: 2021-05-29 | End: 2021-06-07

## 2021-05-29 RX ORDER — GLUCAGON INJECTION, SOLUTION 0.5 MG/.1ML
1 INJECTION, SOLUTION SUBCUTANEOUS ONCE
Refills: 0 | Status: DISCONTINUED | OUTPATIENT
Start: 2021-05-29 | End: 2021-06-07

## 2021-05-29 RX ADMIN — INSULIN GLARGINE 5 UNIT(S): 100 INJECTION, SOLUTION SUBCUTANEOUS at 22:02

## 2021-05-29 RX ADMIN — ARIPIPRAZOLE 5 MILLIGRAM(S): 15 TABLET ORAL at 11:41

## 2021-05-29 RX ADMIN — AMLODIPINE BESYLATE 10 MILLIGRAM(S): 2.5 TABLET ORAL at 05:26

## 2021-05-29 RX ADMIN — Medication 1 MILLIGRAM(S): at 11:41

## 2021-05-29 RX ADMIN — Medication 3 MILLIGRAM(S): at 02:13

## 2021-05-29 RX ADMIN — Medication 25 MILLIGRAM(S): at 16:42

## 2021-05-29 RX ADMIN — ATORVASTATIN CALCIUM 40 MILLIGRAM(S): 80 TABLET, FILM COATED ORAL at 22:02

## 2021-05-29 RX ADMIN — Medication 0.5 MILLIGRAM(S): at 05:26

## 2021-05-29 RX ADMIN — Medication 10 MILLIGRAM(S): at 01:24

## 2021-05-29 RX ADMIN — Medication 0.5 MILLIGRAM(S): at 16:42

## 2021-05-29 RX ADMIN — FLUVOXAMINE MALEATE 150 MILLIGRAM(S): 25 TABLET ORAL at 22:03

## 2021-05-29 RX ADMIN — Medication 2 UNIT(S): at 16:42

## 2021-05-29 RX ADMIN — Medication 2: at 16:42

## 2021-05-29 NOTE — PHYSICAL THERAPY INITIAL EVALUATION ADULT - GENERAL OBSERVATIONS, REHAB EVAL
Pt received lying on stretcher in ED holding room, (+) primafit, (+) cardiac monitor, (+) IV lock, NAD. Agreeable to PT evaluation.

## 2021-05-29 NOTE — CHART NOTE - NSCHARTNOTEFT_GEN_A_CORE
case  d/w w Dr Cuellar regarding a f/u CTB considering coags and ongoing Eliqus therapy   f/u CTB reviewed from 8 PM: Stable 5 mm right basal ganglia hemorrhage. No new hemorrhage.  no changes reported, chart reviewed     no acute NSx interventions   plan unchanged  considering no LE DVT reported, re-eval the need for Eliquis  defer further care and management o primary team/ neurology  reconsult as needed

## 2021-05-29 NOTE — PHYSICAL THERAPY INITIAL EVALUATION ADULT - ADDITIONAL COMMENTS
Pt reports living with son in an apartment. 2 outdoor + 12 indoor steps to enter. (+) rail. Independent at baseline, with use of RW on occasion. States that she cannot use SAC, "I'm clumsy."  Pt sleeps on couch. Son does laundry, cooking, cleaning... Does not work and able to assist as needed

## 2021-05-29 NOTE — CHART NOTE - NSCHARTNOTEFT_GEN_A_CORE
Patient complain of difficulty falling asleep. Patient request sleep aid to help her fall asleep.  Patient otherwise asymptomatic, and without any other complaint. VSS, pt hemodynamically stalbe.  -Melatonin 3mg PO x1

## 2021-05-29 NOTE — PROGRESS NOTE ADULT - ASSESSMENT
82 y/o F w/ PMH of DVT (on Eliquis), NIDDM, glaucoma, HLD, HTN, hs of NPH s/p  shunt 2015 sent ot ED for +CTH w/ ICH.  CTH was done outpt after c/o dizziness and unsteadness/falling over to left side.  Pt denies head trauma.  Evaluated by neurosurgery but no intervention planned.  Repeat CTH stable.        Right basal ganglia hemorrhage   - Continue to hold Eliquis   - Repeat CTH noted and ICH stable   - No intervention necessary at this time as per neurosurgery  - c/w neuro checks and vital signs q4h   - Goal BP <140/90 and c/w prn IV labetalol to maintain BP at goal  - PT/OT/ST evaluations noted  - HOB elevated to 30 degrees  - Avoid hyperglycemia       Hx of DVT in September  - Repeat duplex during this admission negative  - PO eliquis on hold in light of acute ICH      Hypertensive Crisis  - Goal BP <140/90 in light of ICH  - c/w po labetalol and IV prn  - Monitor BP closely       Bipolar disorder   - Stable  - c/w current medications      HLD  - c/w statin therapy       VTE ppx: no chemical prophylaxis given acute ICH.  c/w SCDs    Dispo: No plans for discharge at this time.  Possible [facility] vs acute rehab once medically stable for d/c.    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  CTH was done outpt after c/o dizziness and unsteadness/falling over to left side.  Pt denies head trauma.  Evaluated by neurosurgery but no intervention planned.  Repeat CTH stable.        Right basal ganglia hemorrhage   - Continue to hold Eliquis   - Repeat CTH noted and ICH stable   - No intervention necessary at this time as per neurosurgery  - c/w neuro checks and vital signs q4h   - Goal BP <140/90 and c/w prn IV labetalol to maintain BP at goal  - HOB elevated to 30 degrees   - PT/OT/ST evaluations noted  - Neurology and neurosurgery on board      Hx of DVT in September  - Repeat duplex during this admission negative  - PO eliquis on hold in light of acute ICH      Hypertensive Crisis  - Goal BP <140/90 in light of ICH  - c/w po labetalol and IV prn  - Monitor BP closely       NIDDM w/ hyperglycemia   - f/u A1c  - Hold metformin while hospitalized  - Start on basal bolus regimen and titrate to maintain BG <180  - ISS and hypoglycemic protocol in place      Bipolar disorder   - Stable  - c/w current medications      HLD  - c/w statin therapy       VTE ppx: no chemical prophylaxis given acute ICH.  c/w SCDs    Dispo: No plans for discharge at this time.  Possible [facility] vs acute rehab once medically stable for d/c.

## 2021-05-29 NOTE — PROGRESS NOTE ADULT - SUBJECTIVE AND OBJECTIVE BOX
Neurology Progress Note  [facility] [place] [address] [place] [address]  [phone]    Interval History:  No acute overnight events    HPI 5/28/21:  82 yo woman with medical conditions as outlined below who was sent in by Dr. Bowman for small right basal ganglia hemorrhage. Patient states her BP has been running high recently. She denies recent falls. She has a  shunt for NPH. She states she has had unsteady gait for the past few months. She states recently, she feels she is falling to the left when she walks. She denies headache. She states she is on Eliquis for a blood clot in her right leg. Bilateral lower extremity dopplers performed in ED today shows no evidence of DVT. She states she took eliquis this morning. She denies feeling nauseous or new vision changes. She has no further complaints.    MEDICATIONS  (STANDING):  amLODIPine   Tablet 10 milliGRAM(s) Oral daily  ARIPiprazole 5 milliGRAM(s) Oral daily  atorvastatin 40 milliGRAM(s) Oral at bedtime  benztropine 0.5 milliGRAM(s) Oral two times a day  dextrose 40% Gel 15 Gram(s) Oral once  dextrose 5%. 1000 milliLiter(s) (50 mL/Hr) IV Continuous <Continuous>  dextrose 5%. 1000 milliLiter(s) (100 mL/Hr) IV Continuous <Continuous>  dextrose 50% Injectable 25 Gram(s) IV Push once  dextrose 50% Injectable 12.5 Gram(s) IV Push once  dextrose 50% Injectable 25 Gram(s) IV Push once  fluvoxaMINE 150 milliGRAM(s) Oral at bedtime  folic acid 1 milliGRAM(s) Oral daily  glucagon  Injectable 1 milliGRAM(s) IntraMuscular once  insulin glargine Injectable (LANTUS) 5 Unit(s) SubCutaneous at bedtime  insulin lispro (ADMELOG) corrective regimen sliding scale   SubCutaneous three times a day before meals  insulin lispro Injectable (ADMELOG) 2 Unit(s) SubCutaneous three times a day before meals  metoprolol tartrate 25 milliGRAM(s) Oral two times a day    MEDICATIONS  (PRN):  hydrALAZINE Injectable 10 milliGRAM(s) IV Push every 8 hours PRN Elevated BP      Physical Exam  Vital Signs Last 24 Hrs  T(C): 36.7 (29 May 2021 15:49), Max: 36.7 (28 May 2021 20:51)  T(F): 98.1 (29 May 2021 15:49), Max: 98.1 (28 May 2021 20:51)  HR: 59 (29 May 2021 15:49) (54 - 73)  BP: 167/73 (29 May 2021 15:49) (135/68 - 177/67)  BP(mean): --  RR: 18 (29 May 2021 15:49) (18 - 18)  SpO2: 95% (29 May 2021 15:49) (92% - 95%)      Mental Status: AAO x 3, no dysarthria, no aphasia  CN: PERRL, EOMI, VFF, V1-V3 sensation intact, no facial asymmetry  Motor:  5/5 x 4 extremities  Sensory: intact to light touch throughout  Coordination: no dysmetria on FTN  Gait: deferred      LABS:  cret                        12.2   8.55  )-----------( 244      ( 29 May 2021 06:22 )             37.2     05-29    138  |  96<L>  |  16.8  ----------------------------<  184<H>  3.6   |  30.0<H>  |  1.02    Ca    9.4      29 May 2021 06:22    TPro  6.6  /  Alb  3.6  /  TBili  0.4  /  DBili  x   /  AST  15  /  ALT  7   /  AlkPhos  80  05-28    PT/INR - ( 28 May 2021 21:48 )   PT: 20.9 sec;   INR: 1.86 ratio         PTT - ( 28 May 2021 21:48 )  PTT:33.7 sec      US Doppler LE - no evidence of DVT bilaterally    CTH - stable right basal ganglia hemorrhage

## 2021-05-29 NOTE — PROGRESS NOTE ADULT - SUBJECTIVE AND OBJECTIVE BOX
Chief Complaint:  ICH    SUBJECTIVE / OVERNIGHT EVENTS: No acute events reported overnight.  Pt offers no acute complaints at this time.     Patient denies chest pain, SOB, abd pain, N/V, fever, chills, dysuria or any other complaints. All remainder ROS negative.       I&O's Summary    28 May 2021 07:01  -  29 May 2021 07:00  --------------------------------------------------------  IN: 0 mL / OUT: 600 mL / NET: -600 mL          PHYSICAL EXAM:  Vital Signs Last 24 Hrs  T(C): 36.7 (29 May 2021 19:53), Max: 36.7 (29 May 2021 15:49)  T(F): 98.1 (29 May 2021 19:53), Max: 98.1 (29 May 2021 15:49)  HR: 62 (29 May 2021 19:53) (57 - 70)  BP: 154/76 (29 May 2021 19:53) (135/68 - 176/85)  BP(mean): --  RR: 19 (29 May 2021 19:53) (18 - 19)  SpO2: 93% (29 May 2021 19:53) (92% - 95%)      GENERAL: pt examined bedside, laying comfortably in bed in NAD  HEENT: NC/AT, moist oral mucosa, clear conjunctiva, sclera nonicteric  RESPIRATORY: Normal respiratory effort; CTA b/l, no wheezing, rhonchi, rales  CARDIOVASCULAR: RRR, normal S1 and S  ABDOMEN: soft, NT/ND, normoactive bowel sounds, no rebound/guarding  EXTREMITIES: No cynaosis, no clubbing, no lower extremity edema; Peripheral pulses are 2+ bilaterally  PSYCH: affect appropriate and cooperative  NEUROLOGY: A+O to person, place, and time, sensation intact, strength 5/5 in extremities, CN II-XII intact, no focal neurologic deficits appreciated   SKIN: No rashes or no palpable lesions        LABS:                        12.2   8.55  )-----------( 244      ( 29 May 2021 06:22 )             37.2     05-29    138  |  96<L>  |  16.8  ----------------------------<  184<H>  3.6   |  30.0<H>  |  1.02    Ca    9.4      29 May 2021 06:22    TPro  6.6  /  Alb  3.6  /  TBili  0.4  /  DBili  x   /  AST  15  /  ALT  7   /  AlkPhos  80  05-28    PT/INR - ( 28 May 2021 21:48 )   PT: 20.9 sec;   INR: 1.86 ratio         PTT - ( 28 May 2021 21:48 )  PTT:33.7 sec          CAPILLARY BLOOD GLUCOSE      POCT Blood Glucose.: 189 mg/dL (29 May 2021 20:48)  POCT Blood Glucose.: 187 mg/dL (29 May 2021 16:38)  POCT Blood Glucose.: 345 mg/dL (29 May 2021 11:37)        RADIOLOGY & ADDITIONAL TESTS:          MEDICATIONS  (STANDING):  amLODIPine   Tablet 10 milliGRAM(s) Oral daily  ARIPiprazole 5 milliGRAM(s) Oral daily  atorvastatin 40 milliGRAM(s) Oral at bedtime  benztropine 0.5 milliGRAM(s) Oral two times a day  dextrose 40% Gel 15 Gram(s) Oral once  dextrose 5%. 1000 milliLiter(s) (50 mL/Hr) IV Continuous <Continuous>  dextrose 5%. 1000 milliLiter(s) (100 mL/Hr) IV Continuous <Continuous>  dextrose 50% Injectable 25 Gram(s) IV Push once  dextrose 50% Injectable 12.5 Gram(s) IV Push once  dextrose 50% Injectable 25 Gram(s) IV Push once  fluvoxaMINE 150 milliGRAM(s) Oral at bedtime  folic acid 1 milliGRAM(s) Oral daily  glucagon  Injectable 1 milliGRAM(s) IntraMuscular once  insulin glargine Injectable (LANTUS) 5 Unit(s) SubCutaneous at bedtime  insulin lispro (ADMELOG) corrective regimen sliding scale   SubCutaneous three times a day before meals  insulin lispro Injectable (ADMELOG) 2 Unit(s) SubCutaneous three times a day before meals  metoprolol tartrate 25 milliGRAM(s) Oral two times a day    MEDICATIONS  (PRN):  hydrALAZINE Injectable 10 milliGRAM(s) IV Push every 8 hours PRN Elevated BP

## 2021-05-29 NOTE — PROGRESS NOTE ADULT - ASSESSMENT
82 yo woman with small right basal ganglia hemorrhage likely 2/2 uncontrolled HTN    Small Right basal ganglia hemorrhage  BP goal <140/90  Hold AC, antiplatelet  PT/OT      Hx of DVT  Patient on eliquis at home, no DVT on dopplers done in ED today  Primary team to determine if patient requires long term anticoagulation    No further inpt neurologic workup necessary at this time    Thank you for allowing me to participate in this patient's care

## 2021-05-30 LAB
A1C WITH ESTIMATED AVERAGE GLUCOSE RESULT: 7.7 % — HIGH (ref 4–5.6)
ALBUMIN SERPL ELPH-MCNC: 3.6 G/DL — SIGNIFICANT CHANGE UP (ref 3.3–5.2)
ALP SERPL-CCNC: 75 U/L — SIGNIFICANT CHANGE UP (ref 40–120)
ALT FLD-CCNC: 7 U/L — SIGNIFICANT CHANGE UP
ANION GAP SERPL CALC-SCNC: 10 MMOL/L — SIGNIFICANT CHANGE UP (ref 5–17)
APTT BLD: 31 SEC — SIGNIFICANT CHANGE UP (ref 27.5–35.5)
AST SERPL-CCNC: 13 U/L — SIGNIFICANT CHANGE UP
BASOPHILS # BLD AUTO: 0.07 K/UL — SIGNIFICANT CHANGE UP (ref 0–0.2)
BASOPHILS NFR BLD AUTO: 1 % — SIGNIFICANT CHANGE UP (ref 0–2)
BILIRUB SERPL-MCNC: 0.5 MG/DL — SIGNIFICANT CHANGE UP (ref 0.4–2)
BUN SERPL-MCNC: 27.2 MG/DL — HIGH (ref 8–20)
CALCIUM SERPL-MCNC: 9.2 MG/DL — SIGNIFICANT CHANGE UP (ref 8.6–10.2)
CHLORIDE SERPL-SCNC: 100 MMOL/L — SIGNIFICANT CHANGE UP (ref 98–107)
CHOLEST SERPL-MCNC: 133 MG/DL — SIGNIFICANT CHANGE UP
CO2 SERPL-SCNC: 29 MMOL/L — SIGNIFICANT CHANGE UP (ref 22–29)
CREAT SERPL-MCNC: 1.28 MG/DL — SIGNIFICANT CHANGE UP (ref 0.5–1.3)
EOSINOPHIL # BLD AUTO: 0.33 K/UL — SIGNIFICANT CHANGE UP (ref 0–0.5)
EOSINOPHIL NFR BLD AUTO: 4.7 % — SIGNIFICANT CHANGE UP (ref 0–6)
ESTIMATED AVERAGE GLUCOSE: 174 MG/DL — HIGH (ref 68–114)
GLUCOSE BLDC GLUCOMTR-MCNC: 122 MG/DL — HIGH (ref 70–99)
GLUCOSE BLDC GLUCOMTR-MCNC: 158 MG/DL — HIGH (ref 70–99)
GLUCOSE BLDC GLUCOMTR-MCNC: 159 MG/DL — HIGH (ref 70–99)
GLUCOSE BLDC GLUCOMTR-MCNC: 185 MG/DL — HIGH (ref 70–99)
GLUCOSE SERPL-MCNC: 161 MG/DL — HIGH (ref 70–99)
HCT VFR BLD CALC: 36.1 % — SIGNIFICANT CHANGE UP (ref 34.5–45)
HDLC SERPL-MCNC: 54 MG/DL — SIGNIFICANT CHANGE UP
HGB BLD-MCNC: 11.5 G/DL — SIGNIFICANT CHANGE UP (ref 11.5–15.5)
IMM GRANULOCYTES NFR BLD AUTO: 0.3 % — SIGNIFICANT CHANGE UP (ref 0–1.5)
INR BLD: 1.23 RATIO — HIGH (ref 0.88–1.16)
LIPID PNL WITH DIRECT LDL SERPL: 58 MG/DL — SIGNIFICANT CHANGE UP
LYMPHOCYTES # BLD AUTO: 2 K/UL — SIGNIFICANT CHANGE UP (ref 1–3.3)
LYMPHOCYTES # BLD AUTO: 28.2 % — SIGNIFICANT CHANGE UP (ref 13–44)
MAGNESIUM SERPL-MCNC: 1.3 MG/DL — LOW (ref 1.6–2.6)
MCHC RBC-ENTMCNC: 29.3 PG — SIGNIFICANT CHANGE UP (ref 27–34)
MCHC RBC-ENTMCNC: 31.9 GM/DL — LOW (ref 32–36)
MCV RBC AUTO: 91.9 FL — SIGNIFICANT CHANGE UP (ref 80–100)
MONOCYTES # BLD AUTO: 0.77 K/UL — SIGNIFICANT CHANGE UP (ref 0–0.9)
MONOCYTES NFR BLD AUTO: 10.9 % — SIGNIFICANT CHANGE UP (ref 2–14)
NEUTROPHILS # BLD AUTO: 3.89 K/UL — SIGNIFICANT CHANGE UP (ref 1.8–7.4)
NEUTROPHILS NFR BLD AUTO: 54.9 % — SIGNIFICANT CHANGE UP (ref 43–77)
NON HDL CHOLESTEROL: 79 MG/DL — SIGNIFICANT CHANGE UP
PHOSPHATE SERPL-MCNC: 3.2 MG/DL — SIGNIFICANT CHANGE UP (ref 2.4–4.7)
PLATELET # BLD AUTO: 234 K/UL — SIGNIFICANT CHANGE UP (ref 150–400)
POTASSIUM SERPL-MCNC: 3.9 MMOL/L — SIGNIFICANT CHANGE UP (ref 3.5–5.3)
POTASSIUM SERPL-SCNC: 3.9 MMOL/L — SIGNIFICANT CHANGE UP (ref 3.5–5.3)
PROT SERPL-MCNC: 6.7 G/DL — SIGNIFICANT CHANGE UP (ref 6.6–8.7)
PROTHROM AB SERPL-ACNC: 14.1 SEC — HIGH (ref 10.6–13.6)
RBC # BLD: 3.93 M/UL — SIGNIFICANT CHANGE UP (ref 3.8–5.2)
RBC # FLD: 12.5 % — SIGNIFICANT CHANGE UP (ref 10.3–14.5)
SODIUM SERPL-SCNC: 139 MMOL/L — SIGNIFICANT CHANGE UP (ref 135–145)
TRIGL SERPL-MCNC: 103 MG/DL — SIGNIFICANT CHANGE UP
WBC # BLD: 7.08 K/UL — SIGNIFICANT CHANGE UP (ref 3.8–10.5)
WBC # FLD AUTO: 7.08 K/UL — SIGNIFICANT CHANGE UP (ref 3.8–10.5)

## 2021-05-30 PROCEDURE — 70450 CT HEAD/BRAIN W/O DYE: CPT | Mod: 26

## 2021-05-30 PROCEDURE — 99233 SBSQ HOSP IP/OBS HIGH 50: CPT

## 2021-05-30 RX ORDER — MAGNESIUM SULFATE 500 MG/ML
2 VIAL (ML) INJECTION ONCE
Refills: 0 | Status: COMPLETED | OUTPATIENT
Start: 2021-05-30 | End: 2021-05-30

## 2021-05-30 RX ADMIN — AMLODIPINE BESYLATE 10 MILLIGRAM(S): 2.5 TABLET ORAL at 06:03

## 2021-05-30 RX ADMIN — Medication 2 UNIT(S): at 07:35

## 2021-05-30 RX ADMIN — Medication 0.5 MILLIGRAM(S): at 06:03

## 2021-05-30 RX ADMIN — Medication 2: at 16:38

## 2021-05-30 RX ADMIN — Medication 10 MILLIGRAM(S): at 15:33

## 2021-05-30 RX ADMIN — FLUVOXAMINE MALEATE 150 MILLIGRAM(S): 25 TABLET ORAL at 21:07

## 2021-05-30 RX ADMIN — Medication 2 UNIT(S): at 16:38

## 2021-05-30 RX ADMIN — Medication 2 UNIT(S): at 10:53

## 2021-05-30 RX ADMIN — ATORVASTATIN CALCIUM 40 MILLIGRAM(S): 80 TABLET, FILM COATED ORAL at 21:07

## 2021-05-30 RX ADMIN — ARIPIPRAZOLE 5 MILLIGRAM(S): 15 TABLET ORAL at 11:30

## 2021-05-30 RX ADMIN — Medication 50 GRAM(S): at 11:30

## 2021-05-30 RX ADMIN — Medication 0.5 MILLIGRAM(S): at 16:53

## 2021-05-30 RX ADMIN — INSULIN GLARGINE 5 UNIT(S): 100 INJECTION, SOLUTION SUBCUTANEOUS at 21:38

## 2021-05-30 RX ADMIN — Medication 10 MILLIGRAM(S): at 06:12

## 2021-05-30 RX ADMIN — Medication 2: at 07:35

## 2021-05-30 RX ADMIN — Medication 1 MILLIGRAM(S): at 11:30

## 2021-05-30 NOTE — PROGRESS NOTE ADULT - ASSESSMENT
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  CTH was done outpt after c/o dizziness and unsteadness/falling over to left side.  Pt denies head trauma.  Evaluated by neurosurgery but no intervention planned.  CTH stable x2 however pt today having worsening expressive aphasia and more pronounced tongue deviation to the right.  Stat CTH pending.         Right basal ganglia hemorrhage   - Discontinued Eliquis on admission   - CTH x2 ICH stable however in light of worsening neurologic exam will order stat repeat CTH   - No intervention necessary as per neurosurgery but if ICH larger will contact neurosurgery to reassess  - c/w neuro checks and vital signs q4h   - Goal BP <140/90 and c/w prn IV labetalol to maintain BP at goal  - HOB elevated to 30 degrees   - PT/OT/ST evaluations noted  - Neurology and neurosurgery on board      Hx of DVT in September  - Repeat duplex during this admission negative  - PO eliquis discontinued in light of acute ICH  - Given negative doppler and risk/benefit assessment pt should remain off AC      Hypertensive Crisis, improving  - Goal BP <140/90 in light of ICH  - c/w po labetalol and IV prn  - Monitor BP closely       NIDDM w/ hyperglycemia, improving   - A1c 7.7  - Hold metformin while hospitalized  - c/w basal bolus regimen and titrate to maintain BG <180  - ISS and hypoglycemic protocol in place      Bipolar disorder   - Stable  - c/w current medications      HLD  - c/w statin therapy       VTE ppx: no chemical prophylaxis given acute ICH.  c/w SCDs    Dispo: No plans for discharge at this time; pt remains acute w/ worsening neurologic symptoms.  Workup ongoing.  Possible [facility] vs acute rehab once medically stable for d/c.

## 2021-05-30 NOTE — PROGRESS NOTE ADULT - SUBJECTIVE AND OBJECTIVE BOX
Chief Complaint:  ICH    SUBJECTIVE / OVERNIGHT EVENTS: No acute events reported overnight.  Pt reports worsening with her speech/difficulty finding words worse today then yesterday.          Patient denies chest pain, SOB, abd pain, N/V, fever, chills, dysuria or any other complaints. All remainder ROS negative.       I&O's Summary    28 May 2021 07:01  -  29 May 2021 07:00  --------------------------------------------------------  IN: 0 mL / OUT: 600 mL / NET: -600 mL          PHYSICAL EXAM:  Vital Signs Last 24 Hrs  T(C): 36.4 (30 May 2021 08:12), Max: 36.7 (29 May 2021 15:49)  T(F): 97.6 (30 May 2021 08:12), Max: 98.1 (29 May 2021 15:49)  HR: 62 (30 May 2021 08:12) (54 - 62)  BP: 169/72 (30 May 2021 08:12) (138/75 - 170/75)  BP(mean): --  RR: 18 (30 May 2021 08:12) (18 - 19)  SpO2: 92% (30 May 2021 08:12) (91% - 95%)    GENERAL: pt examined bedside, laying comfortably in bed in NAD  HEENT: NC/AT, moist oral mucosa, clear conjunctiva, sclera nonicteric  RESPIRATORY: Normal respiratory effort; CTA b/l, no wheezing, rhonchi, rales  CARDIOVASCULAR: RRR, normal S1 and S, 2/6 systolic murmur  ABDOMEN: soft, NT/ND, normoactive bowel sounds, no rebound/guarding  EXTREMITIES: No cynaosis, no clubbing, no lower extremity edema; Peripheral pulses are 2+ bilaterally  PSYCH: affect appropriate and cooperative  NEUROLOGY: A+O to person, place, and time, sensation intact, strength 5/5 in extremities, tongue deviation to right, remainder of CN's intact, worsening expressive aphasia  SKIN: No rashes or no palpable lesions        LABS:                                       11.5   7.08  )-----------( 234      ( 30 May 2021 07:53 )             36.1     05-30    139  |  100  |  27.2<H>  ----------------------------<  161<H>  3.9   |  29.0  |  1.28    Ca    9.2      30 May 2021 07:53  Phos  3.2     05-30  Mg     1.3     05-30    TPro  6.7  /  Alb  3.6  /  TBili  0.5  /  DBili  x   /  AST  13  /  ALT  7   /  AlkPhos  75  05-30          CAPILLARY BLOOD GLUCOSE      POCT Blood Glucose.: 189 mg/dL (29 May 2021 20:48)  POCT Blood Glucose.: 187 mg/dL (29 May 2021 16:38)  POCT Blood Glucose.: 345 mg/dL (29 May 2021 11:37)        RADIOLOGY & ADDITIONAL TESTS:          MEDICATIONS  (STANDING):  amLODIPine   Tablet 10 milliGRAM(s) Oral daily  ARIPiprazole 5 milliGRAM(s) Oral daily  atorvastatin 40 milliGRAM(s) Oral at bedtime  benztropine 0.5 milliGRAM(s) Oral two times a day  dextrose 40% Gel 15 Gram(s) Oral once  dextrose 5%. 1000 milliLiter(s) (50 mL/Hr) IV Continuous <Continuous>  dextrose 5%. 1000 milliLiter(s) (100 mL/Hr) IV Continuous <Continuous>  dextrose 50% Injectable 25 Gram(s) IV Push once  dextrose 50% Injectable 12.5 Gram(s) IV Push once  dextrose 50% Injectable 25 Gram(s) IV Push once  fluvoxaMINE 150 milliGRAM(s) Oral at bedtime  folic acid 1 milliGRAM(s) Oral daily  glucagon  Injectable 1 milliGRAM(s) IntraMuscular once  insulin glargine Injectable (LANTUS) 5 Unit(s) SubCutaneous at bedtime  insulin lispro (ADMELOG) corrective regimen sliding scale   SubCutaneous three times a day before meals  insulin lispro Injectable (ADMELOG) 2 Unit(s) SubCutaneous three times a day before meals  metoprolol tartrate 25 milliGRAM(s) Oral two times a day    MEDICATIONS  (PRN):  hydrALAZINE Injectable 10 milliGRAM(s) IV Push every 8 hours PRN Elevated BP

## 2021-05-31 LAB
ALBUMIN SERPL ELPH-MCNC: 3.5 G/DL — SIGNIFICANT CHANGE UP (ref 3.3–5.2)
ALP SERPL-CCNC: 72 U/L — SIGNIFICANT CHANGE UP (ref 40–120)
ALT FLD-CCNC: 8 U/L — SIGNIFICANT CHANGE UP
ANION GAP SERPL CALC-SCNC: 8 MMOL/L — SIGNIFICANT CHANGE UP (ref 5–17)
AST SERPL-CCNC: 15 U/L — SIGNIFICANT CHANGE UP
BILIRUB SERPL-MCNC: 0.4 MG/DL — SIGNIFICANT CHANGE UP (ref 0.4–2)
BUN SERPL-MCNC: 31.7 MG/DL — HIGH (ref 8–20)
CALCIUM SERPL-MCNC: 9.1 MG/DL — SIGNIFICANT CHANGE UP (ref 8.6–10.2)
CHLORIDE SERPL-SCNC: 100 MMOL/L — SIGNIFICANT CHANGE UP (ref 98–107)
CO2 SERPL-SCNC: 30 MMOL/L — HIGH (ref 22–29)
CREAT SERPL-MCNC: 1.55 MG/DL — HIGH (ref 0.5–1.3)
GLUCOSE BLDC GLUCOMTR-MCNC: 116 MG/DL — HIGH (ref 70–99)
GLUCOSE BLDC GLUCOMTR-MCNC: 142 MG/DL — HIGH (ref 70–99)
GLUCOSE BLDC GLUCOMTR-MCNC: 160 MG/DL — HIGH (ref 70–99)
GLUCOSE BLDC GLUCOMTR-MCNC: 298 MG/DL — HIGH (ref 70–99)
GLUCOSE SERPL-MCNC: 142 MG/DL — HIGH (ref 70–99)
HCT VFR BLD CALC: 38.4 % — SIGNIFICANT CHANGE UP (ref 34.5–45)
HGB BLD-MCNC: 11.9 G/DL — SIGNIFICANT CHANGE UP (ref 11.5–15.5)
MAGNESIUM SERPL-MCNC: 2 MG/DL — SIGNIFICANT CHANGE UP (ref 1.6–2.6)
MCHC RBC-ENTMCNC: 29.5 PG — SIGNIFICANT CHANGE UP (ref 27–34)
MCHC RBC-ENTMCNC: 31 GM/DL — LOW (ref 32–36)
MCV RBC AUTO: 95 FL — SIGNIFICANT CHANGE UP (ref 80–100)
PLATELET # BLD AUTO: 277 K/UL — SIGNIFICANT CHANGE UP (ref 150–400)
POTASSIUM SERPL-MCNC: 4.2 MMOL/L — SIGNIFICANT CHANGE UP (ref 3.5–5.3)
POTASSIUM SERPL-SCNC: 4.2 MMOL/L — SIGNIFICANT CHANGE UP (ref 3.5–5.3)
PROT SERPL-MCNC: 6.5 G/DL — LOW (ref 6.6–8.7)
RBC # BLD: 4.04 M/UL — SIGNIFICANT CHANGE UP (ref 3.8–5.2)
RBC # FLD: 12.5 % — SIGNIFICANT CHANGE UP (ref 10.3–14.5)
SODIUM SERPL-SCNC: 138 MMOL/L — SIGNIFICANT CHANGE UP (ref 135–145)
WBC # BLD: 7.05 K/UL — SIGNIFICANT CHANGE UP (ref 3.8–10.5)
WBC # FLD AUTO: 7.05 K/UL — SIGNIFICANT CHANGE UP (ref 3.8–10.5)

## 2021-05-31 PROCEDURE — 99232 SBSQ HOSP IP/OBS MODERATE 35: CPT

## 2021-05-31 RX ADMIN — Medication 25 MILLIGRAM(S): at 17:17

## 2021-05-31 RX ADMIN — Medication 0.5 MILLIGRAM(S): at 05:24

## 2021-05-31 RX ADMIN — FLUVOXAMINE MALEATE 150 MILLIGRAM(S): 25 TABLET ORAL at 22:50

## 2021-05-31 RX ADMIN — Medication 2 UNIT(S): at 17:17

## 2021-05-31 RX ADMIN — Medication 6: at 12:18

## 2021-05-31 RX ADMIN — AMLODIPINE BESYLATE 10 MILLIGRAM(S): 2.5 TABLET ORAL at 05:23

## 2021-05-31 RX ADMIN — Medication 2 UNIT(S): at 08:26

## 2021-05-31 RX ADMIN — Medication 2 UNIT(S): at 12:18

## 2021-05-31 RX ADMIN — INSULIN GLARGINE 5 UNIT(S): 100 INJECTION, SOLUTION SUBCUTANEOUS at 21:40

## 2021-05-31 RX ADMIN — Medication 2: at 08:25

## 2021-05-31 RX ADMIN — Medication 0.5 MILLIGRAM(S): at 18:38

## 2021-05-31 RX ADMIN — ARIPIPRAZOLE 5 MILLIGRAM(S): 15 TABLET ORAL at 11:55

## 2021-05-31 RX ADMIN — Medication 10 MILLIGRAM(S): at 02:30

## 2021-05-31 RX ADMIN — Medication 25 MILLIGRAM(S): at 05:23

## 2021-05-31 RX ADMIN — ATORVASTATIN CALCIUM 40 MILLIGRAM(S): 80 TABLET, FILM COATED ORAL at 22:51

## 2021-05-31 RX ADMIN — Medication 1 MILLIGRAM(S): at 11:55

## 2021-05-31 NOTE — PROGRESS NOTE ADULT - SUBJECTIVE AND OBJECTIVE BOX
Chief Complaint:  ICH    SUBJECTIVE / OVERNIGHT EVENTS: No acute events reported overnight.  Pt neurologically stable.  She offers no acute complaints at this time.          Patient denies chest pain, SOB, abd pain, N/V, fever, chills, dysuria or any other complaints. All remainder ROS negative.       I&O's Summary    28 May 2021 07:01  -  29 May 2021 07:00  --------------------------------------------------------  IN: 0 mL / OUT: 600 mL / NET: -600 mL          PHYSICAL EXAM:  Vital Signs Last 24 Hrs  T(C): 36.7 (31 May 2021 08:26), Max: 37.2 (31 May 2021 05:20)  T(F): 98.1 (31 May 2021 08:26), Max: 98.9 (31 May 2021 05:20)  HR: 77 (31 May 2021 08:26) (48 - 77)  BP: 126/56 (31 May 2021 08:26) (112/62 - 173/69)  BP(mean): --  RR: 20 (31 May 2021 08:26) (18 - 20)  SpO2: 98% (31 May 2021 08:26) (90% - 98%)    GENERAL: pt examined bedside, laying comfortably in bed in NAD  HEENT: NC/AT, moist oral mucosa, clear conjunctiva, sclera nonicteric  RESPIRATORY: Normal respiratory effort; CTA b/l, no wheezing, rhonchi, rales  CARDIOVASCULAR: RRR, normal S1 and S, 2/6 systolic murmur  ABDOMEN: soft, NT/ND, normoactive bowel sounds, no rebound/guarding  EXTREMITIES: No cynaosis, no clubbing, no lower extremity edema; Peripheral pulses are 2+ bilaterally  PSYCH: affect appropriate and cooperative  NEUROLOGY: A+O to person, place, and time, sensation intact, strength 5/5 in extremities, slight tongue deviation to rt, remainder of CN's intact, expressive aphasia  SKIN: No rashes or no palpable lesions        LABS:                                         11.5   7.08  )-----------( 234      ( 30 May 2021 07:53 )             36.1       05-30    139  |  100  |  27.2<H>  ----------------------------<  161<H>  3.9   |  29.0  |  1.28    Ca    9.2      30 May 2021 07:53  Phos  3.2     05-30  Mg     1.3     05-30    TPro  6.7  /  Alb  3.6  /  TBili  0.5  /  DBili  x   /  AST  13  /  ALT  7   /  AlkPhos  75  05-30        CAPILLARY BLOOD GLUCOSE      POCT Blood Glucose.: 189 mg/dL (29 May 2021 20:48)  POCT Blood Glucose.: 187 mg/dL (29 May 2021 16:38)  POCT Blood Glucose.: 345 mg/dL (29 May 2021 11:37)        RADIOLOGY & ADDITIONAL TESTS:          MEDICATIONS  (STANDING):  amLODIPine   Tablet 10 milliGRAM(s) Oral daily  ARIPiprazole 5 milliGRAM(s) Oral daily  atorvastatin 40 milliGRAM(s) Oral at bedtime  benztropine 0.5 milliGRAM(s) Oral two times a day  dextrose 40% Gel 15 Gram(s) Oral once  dextrose 5%. 1000 milliLiter(s) (50 mL/Hr) IV Continuous <Continuous>  dextrose 5%. 1000 milliLiter(s) (100 mL/Hr) IV Continuous <Continuous>  dextrose 50% Injectable 25 Gram(s) IV Push once  dextrose 50% Injectable 12.5 Gram(s) IV Push once  dextrose 50% Injectable 25 Gram(s) IV Push once  fluvoxaMINE 150 milliGRAM(s) Oral at bedtime  folic acid 1 milliGRAM(s) Oral daily  glucagon  Injectable 1 milliGRAM(s) IntraMuscular once  insulin glargine Injectable (LANTUS) 5 Unit(s) SubCutaneous at bedtime  insulin lispro (ADMELOG) corrective regimen sliding scale   SubCutaneous three times a day before meals  insulin lispro Injectable (ADMELOG) 2 Unit(s) SubCutaneous three times a day before meals  metoprolol tartrate 25 milliGRAM(s) Oral two times a day    MEDICATIONS  (PRN):  hydrALAZINE Injectable 10 milliGRAM(s) IV Push every 8 hours PRN Elevated BP

## 2021-05-31 NOTE — PROGRESS NOTE ADULT - ASSESSMENT
82 y/o F w/ PMH of DVT (on Eliquis), NIDDM, glaucoma, HLD, HTN, hs of NPH s/p  shunt 2015 sent ot ED for +CTH w/ ICH.  CTH was done outpt after c/o dizziness and unsteadness/falling over to left side.  Pt denies head trauma.  Evaluated by neurosurgery but no intervention planned.  CTH stable x3.        Right basal ganglia hemorrhage   - Discontinued Eliquis on admission   - CTH x3 ICH stable and neurologically unchanged from yesterday  - No intervention necessary as per neurosurgery but if ICH larger will contact neurosurgery to reassess  - c/w neuro checks and vital signs q4h   - Goal BP <140/90 and c/w prn IV labetalol to maintain BP at goal  - HOB elevated to 30 degrees   - PT/OT/ST evaluations noted  - Neurology and neurosurgery on board      Hx of DVT in September  - Repeat duplex during this admission negative  - PO eliquis discontinued in light of acute ICH  - Given negative doppler and risk/benefit assessment pt should remain off AC      Hypertensive Crisis, improving  - Goal BP <140/90 in light of ICH  - c/w po labetalol and IV prn  - Monitor BP closely       NIDDM w/ hyperglycemia, improving   - A1c 7.7  - Hold metformin while hospitalized  - c/w basal bolus regimen and titrate to maintain BG <180  - ISS and hypoglycemic protocol in place      Bipolar disorder   - Stable  - c/w current medications      HLD  - c/w statin therapy       VTE ppx: no chemical prophylaxis given acute ICH.  c/w SCDs    Dispo: Downgrade to telemetry.  Possible [facility] vs acute rehab once medically stable for d/c.

## 2021-06-01 LAB
ALBUMIN SERPL ELPH-MCNC: 3.3 G/DL — SIGNIFICANT CHANGE UP (ref 3.3–5.2)
ALP SERPL-CCNC: 69 U/L — SIGNIFICANT CHANGE UP (ref 40–120)
ALT FLD-CCNC: 8 U/L — SIGNIFICANT CHANGE UP
ANION GAP SERPL CALC-SCNC: 9 MMOL/L — SIGNIFICANT CHANGE UP (ref 5–17)
AST SERPL-CCNC: 14 U/L — SIGNIFICANT CHANGE UP
BASOPHILS # BLD AUTO: 0.07 K/UL — SIGNIFICANT CHANGE UP (ref 0–0.2)
BASOPHILS NFR BLD AUTO: 1 % — SIGNIFICANT CHANGE UP (ref 0–2)
BILIRUB SERPL-MCNC: 0.4 MG/DL — SIGNIFICANT CHANGE UP (ref 0.4–2)
BUN SERPL-MCNC: 35 MG/DL — HIGH (ref 8–20)
CALCIUM SERPL-MCNC: 9 MG/DL — SIGNIFICANT CHANGE UP (ref 8.6–10.2)
CHLORIDE SERPL-SCNC: 102 MMOL/L — SIGNIFICANT CHANGE UP (ref 98–107)
CO2 SERPL-SCNC: 29 MMOL/L — SIGNIFICANT CHANGE UP (ref 22–29)
CREAT SERPL-MCNC: 1.55 MG/DL — HIGH (ref 0.5–1.3)
EOSINOPHIL # BLD AUTO: 0.32 K/UL — SIGNIFICANT CHANGE UP (ref 0–0.5)
EOSINOPHIL NFR BLD AUTO: 4.6 % — SIGNIFICANT CHANGE UP (ref 0–6)
GLUCOSE BLDC GLUCOMTR-MCNC: 113 MG/DL — HIGH (ref 70–99)
GLUCOSE BLDC GLUCOMTR-MCNC: 157 MG/DL — HIGH (ref 70–99)
GLUCOSE BLDC GLUCOMTR-MCNC: 158 MG/DL — HIGH (ref 70–99)
GLUCOSE BLDC GLUCOMTR-MCNC: 163 MG/DL — HIGH (ref 70–99)
GLUCOSE SERPL-MCNC: 153 MG/DL — HIGH (ref 70–99)
HCT VFR BLD CALC: 34.9 % — SIGNIFICANT CHANGE UP (ref 34.5–45)
HGB BLD-MCNC: 11.2 G/DL — LOW (ref 11.5–15.5)
IMM GRANULOCYTES NFR BLD AUTO: 0.1 % — SIGNIFICANT CHANGE UP (ref 0–1.5)
LYMPHOCYTES # BLD AUTO: 2.08 K/UL — SIGNIFICANT CHANGE UP (ref 1–3.3)
LYMPHOCYTES # BLD AUTO: 30 % — SIGNIFICANT CHANGE UP (ref 13–44)
MAGNESIUM SERPL-MCNC: 1.8 MG/DL — SIGNIFICANT CHANGE UP (ref 1.6–2.6)
MCHC RBC-ENTMCNC: 30 PG — SIGNIFICANT CHANGE UP (ref 27–34)
MCHC RBC-ENTMCNC: 32.1 GM/DL — SIGNIFICANT CHANGE UP (ref 32–36)
MCV RBC AUTO: 93.6 FL — SIGNIFICANT CHANGE UP (ref 80–100)
MONOCYTES # BLD AUTO: 0.9 K/UL — SIGNIFICANT CHANGE UP (ref 0–0.9)
MONOCYTES NFR BLD AUTO: 13 % — SIGNIFICANT CHANGE UP (ref 2–14)
NEUTROPHILS # BLD AUTO: 3.56 K/UL — SIGNIFICANT CHANGE UP (ref 1.8–7.4)
NEUTROPHILS NFR BLD AUTO: 51.3 % — SIGNIFICANT CHANGE UP (ref 43–77)
PHOSPHATE SERPL-MCNC: 3.3 MG/DL — SIGNIFICANT CHANGE UP (ref 2.4–4.7)
PLATELET # BLD AUTO: 238 K/UL — SIGNIFICANT CHANGE UP (ref 150–400)
POTASSIUM SERPL-MCNC: 4.2 MMOL/L — SIGNIFICANT CHANGE UP (ref 3.5–5.3)
POTASSIUM SERPL-SCNC: 4.2 MMOL/L — SIGNIFICANT CHANGE UP (ref 3.5–5.3)
PROT SERPL-MCNC: 6.4 G/DL — LOW (ref 6.6–8.7)
RBC # BLD: 3.73 M/UL — LOW (ref 3.8–5.2)
RBC # FLD: 12.4 % — SIGNIFICANT CHANGE UP (ref 10.3–14.5)
SODIUM SERPL-SCNC: 140 MMOL/L — SIGNIFICANT CHANGE UP (ref 135–145)
WBC # BLD: 6.94 K/UL — SIGNIFICANT CHANGE UP (ref 3.8–10.5)
WBC # FLD AUTO: 6.94 K/UL — SIGNIFICANT CHANGE UP (ref 3.8–10.5)

## 2021-06-01 PROCEDURE — 99223 1ST HOSP IP/OBS HIGH 75: CPT

## 2021-06-01 PROCEDURE — 70450 CT HEAD/BRAIN W/O DYE: CPT | Mod: 26

## 2021-06-01 PROCEDURE — 99232 SBSQ HOSP IP/OBS MODERATE 35: CPT

## 2021-06-01 RX ORDER — SODIUM CHLORIDE 9 MG/ML
1000 INJECTION, SOLUTION INTRAVENOUS
Refills: 0 | Status: COMPLETED | OUTPATIENT
Start: 2021-06-01 | End: 2021-06-01

## 2021-06-01 RX ADMIN — AMLODIPINE BESYLATE 10 MILLIGRAM(S): 2.5 TABLET ORAL at 05:39

## 2021-06-01 RX ADMIN — Medication 0.5 MILLIGRAM(S): at 05:39

## 2021-06-01 RX ADMIN — INSULIN GLARGINE 5 UNIT(S): 100 INJECTION, SOLUTION SUBCUTANEOUS at 22:22

## 2021-06-01 RX ADMIN — Medication 0.5 MILLIGRAM(S): at 17:54

## 2021-06-01 RX ADMIN — Medication 2 UNIT(S): at 13:15

## 2021-06-01 RX ADMIN — Medication 2 UNIT(S): at 09:12

## 2021-06-01 RX ADMIN — FLUVOXAMINE MALEATE 150 MILLIGRAM(S): 25 TABLET ORAL at 23:08

## 2021-06-01 RX ADMIN — Medication 25 MILLIGRAM(S): at 05:39

## 2021-06-01 RX ADMIN — Medication 2: at 09:13

## 2021-06-01 RX ADMIN — Medication 1 MILLIGRAM(S): at 12:41

## 2021-06-01 RX ADMIN — Medication 25 MILLIGRAM(S): at 17:55

## 2021-06-01 RX ADMIN — Medication 10 MILLIGRAM(S): at 22:22

## 2021-06-01 RX ADMIN — Medication 2: at 13:15

## 2021-06-01 RX ADMIN — SODIUM CHLORIDE 65 MILLILITER(S): 9 INJECTION, SOLUTION INTRAVENOUS at 11:48

## 2021-06-01 RX ADMIN — Medication 2 UNIT(S): at 17:55

## 2021-06-01 RX ADMIN — ATORVASTATIN CALCIUM 40 MILLIGRAM(S): 80 TABLET, FILM COATED ORAL at 22:22

## 2021-06-01 RX ADMIN — ARIPIPRAZOLE 5 MILLIGRAM(S): 15 TABLET ORAL at 12:41

## 2021-06-01 NOTE — CONSULT NOTE ADULT - SUBJECTIVE AND OBJECTIVE BOX
81yF was admitted on 05-28 with weeks of unsteady gait and word finding difficulties. Outpatient Neurologist recommended imaging and found an ICH.     Imaging performed:  HEAD CT 5/27 - Multiple axial sections were performed from base of skull to vertex without contrast enhancement. Coronal and sagittal reconstructions were performed as well This exam is compared with prior noncontrast head CT performed on March 28, 2015. Increased parenchymal volume loss and chronic microvascular ischemic change identified Right parietal shunt catheter is again seen and unchanged in position. Areas of surrounding gliosis is identified. There is a new focus of high attenuation seen involving the right anterior perisylvian region. This finding measures approximately 0.8 cm and demonstrates some surrounding edema. This is compatible with a small area of acute parenchymal hemorrhage. No significant shift or herniation seen Evaluation of the osseous structures with appropriate window contrast hyperostosis frontalis interna. The visualized paranasal sinuses mastoid and appear clear. Calcification is seen involving both distal carotid and vertebral arteries. Small left maxillary polyp versus retention cyst is seen. Both mastoids and middle ear regions appear clear. IMPRESSION: New area of acute parenchymal hemorrhage as described above.    HEAD CT 5/28 - 1)  stable follow-up study with no interval change. Small focal hemorrhage again noted in the right lateral basal ganglia and subinsular region. 2)  right sided ventriculostomy again noted. Involutional changes with volume loss. Right parietal gliosis and encephalomalacia. Mild chronic ischemic changes both cerebellar hemispheres. These findings are well seen on prior study.    HEAD CT 5/28 -  Stable 5 mm right basal ganglia hemorrhage. No new hemorrhage.    HEAD CT 5/30 - No change since 5/28/2021. Small right basal ganglia hemorrhage. Right-sided  shunt. Small vessel white matter ischemic changes.    HEAD CT 6/1 - . Tiny hyperdensity in the right lateral basal ganglia has slightly decreased in attenuation with diminishing surrounding edema. There is no new hemorrhage or cortical edema, mass effect or hydrocephalus. A ventricular catheter remains in place. There is mild generalized volume loss, central worse than peripheral cortical. There is mild stable left cerebellar encephalomalacia.    Patient is eating lunch, but having difficulty.   Noted to be aphasic and possibly delirious as she is talking about her son being present.     REVIEW OF SYSTEMS  Constitutional - No fever, No weight loss, +fatigue  HEENT - No eye pain, No visual disturbances, No difficulty hearing, No tinnitus, No vertigo, No neck pain  Respiratory - No cough, No wheezing, +shortness of breath  Cardiovascular - No chest pain, No palpitations  Gastrointestinal - No abdominal pain, No nausea, No vomiting, No diarrhea, No constipation  Genitourinary - No dysuria, No frequency, No hematuria, No incontinence  Neurological - No headaches, +memory loss, +loss of strength, +tremors  Skin - No itching, No rashes, No lesions   Endocrine - No temperature intolerance  Musculoskeletal - No joint pain, No joint swelling, No muscle pain  Psychiatric - No depression, +anxiety    VITALS  T(C): 36.3 (06-01-21 @ 10:04), Max: 37.1 (06-01-21 @ 05:11)  HR: 72 (06-01-21 @ 10:04) (55 - 74)  BP: 152/84 (06-01-21 @ 10:04) (109/77 - 152/84)  RR: 19 (06-01-21 @ 10:04) (18 - 20)  SpO2: 94% (06-01-21 @ 05:11) (93% - 98%)  Wt(kg): --    PAST MEDICAL & SURGICAL HISTORY  Hypertension    Hyperlipidemia    Diabetes    Glaucoma    Osteoarthritis    Small bowel obstruction    S/P hysterectomy    S/P cholecystectomy    Achilles tendon injury    S/P knee replacement, left        SOCIAL HISTORY - as per documentation/history  Smoking - None  EtOH - None  Drugs - None    FUNCTIONAL HISTORY  Lives with sone, 12 PAVEL  Independent with RW use PRN    CURRENT FUNCTIONAL STATUS  5/29  Bed Mobility: Sit to Supine:     · Level of [place]	minimum assist (75% patients effort); moderate assist (50% patients effort)  · Physical Assist/Nonphysical Assist	1 person assist; verbal cues    Bed Mobility: Supine to Sit:     · Level of [place]	minimum assist (75% patients effort)  · Physical Assist/Nonphysical Assist	1 person assist; verbal cues    Bed Mobility Analysis:     · Impairments Contributing to Impaired Bed Mobility	decreased strength    Transfer: Sit to Stand:     · Level of [place]	minimum assist (75% patients effort)  · Physical Assist/Nonphysical Assist	1 person assist; verbal cues  · Weight-Bearing Restrictions	full weight-bearing  · Assistive Device	rolling walker    Transfer: Stand to Sit:     · Level of [place]	minimum assist (75% patients effort)  · Physical Assist/Nonphysical Assist	verbal cues; 1 person assist  · Weight-Bearing Restrictions	full weight-bearing  · Assistive Device	rolling walker    Sit/Stand Transfer Safety Analysis:     · Impairments Contributing to Impaired Transfers	decreased strength    Gait Skills:     · Level of [place]	minimum assist (75% patients effort)  · Physical Assist/Nonphysical Assist	1 person assist; verbal cues  · Weight-Bearing Restrictions	full weight-bearing  · Assistive Device	rolling walker  · Gait Distance	3 feet to right    Gait Analysis:     · Gait Pattern Used	3-point gait  · Gait Deviations Noted	decreased dmitry  · Impairments Contributing to Gait Deviations	decreased strength; pt dizzy; impaired balance    Stair Negotiation:     · Level of Prospect	to be assessed when appropriate      FAMILY HISTORY   Family history of pancreatic cancer (Sibling)    Family history of early CAD    Family history of diabetes mellitus        RECENT LABS - Reviewed  CBC Full  -  ( 01 Jun 2021 07:15 )  WBC Count : 6.94 K/uL  RBC Count : 3.73 M/uL  Hemoglobin : 11.2 g/dL  Hematocrit : 34.9 %  Platelet Count - Automated : 238 K/uL  Mean Cell Volume : 93.6 fl  Mean Cell Hemoglobin : 30.0 pg  Mean Cell Hemoglobin Concentration : 32.1 gm/dL  Auto Neutrophil # : 3.56 K/uL  Auto Lymphocyte # : 2.08 K/uL  Auto Monocyte # : 0.90 K/uL  Auto Eosinophil # : 0.32 K/uL  Auto Basophil # : 0.07 K/uL  Auto Neutrophil % : 51.3 %  Auto Lymphocyte % : 30.0 %  Auto Monocyte % : 13.0 %  Auto Eosinophil % : 4.6 %  Auto Basophil % : 1.0 %    06-01    140  |  102  |  35.0<H>  ----------------------------<  153<H>  4.2   |  29.0  |  1.55<H>    Ca    9.0      01 Jun 2021 07:15  Phos  3.3     06-01  Mg     1.8     06-01    TPro  6.4<L>  /  Alb  3.3  /  TBili  0.4  /  DBili  x   /  AST  14  /  ALT  8   /  AlkPhos  69  06-01        ALLERGIES  adhesives (Pruritus; Blisters)  penicillin (Hives)  pineapple (Anaphylaxis)      MEDICATIONS   amLODIPine   Tablet 10 milliGRAM(s) Oral daily  ARIPiprazole 5 milliGRAM(s) Oral daily  atorvastatin 40 milliGRAM(s) Oral at bedtime  benztropine 0.5 milliGRAM(s) Oral two times a day  dextrose 40% Gel 15 Gram(s) Oral once  dextrose 5%. 1000 milliLiter(s) IV Continuous <Continuous>  dextrose 5%. 1000 milliLiter(s) IV Continuous <Continuous>  dextrose 50% Injectable 25 Gram(s) IV Push once  dextrose 50% Injectable 12.5 Gram(s) IV Push once  dextrose 50% Injectable 25 Gram(s) IV Push once  fluvoxaMINE 150 milliGRAM(s) Oral at bedtime  folic acid 1 milliGRAM(s) Oral daily  glucagon  Injectable 1 milliGRAM(s) IntraMuscular once  hydrALAZINE Injectable 10 milliGRAM(s) IV Push every 8 hours PRN  insulin glargine Injectable (LANTUS) 5 Unit(s) SubCutaneous at bedtime  insulin lispro (ADMELOG) corrective regimen sliding scale   SubCutaneous three times a day before meals  insulin lispro Injectable (ADMELOG) 2 Unit(s) SubCutaneous three times a day before meals  metoprolol tartrate 25 milliGRAM(s) Oral two times a day      ----------------------------------------------------------------------------------------  PHYSICAL EXAM  Constitutional - NAD, Comfortable  HEENT - NCAT, EOMI  Neck - Supple, No limited ROM  Chest - Increased work of breathing, No wheezing  Cardiovascular - S1S2   Abdomen - Soft   Extremities - No C/C/E, No calf tenderness   Neurologic Exam -                    Cognitive - AAO to self, NOT situation     Communication - Expressive deficits, Dysarthric     Cranial Nerves - Left lip droop     Motor - Noted left inattention, Decreased use of left UE unless cued multiple times for BUE, not for BLE                    LEFT    UE - ShAB 4/5, EF 4/5, EE 5/5,  5/5                    RIGHT UE - ShAB 4/5, EF 4/5, EE 5/5,  5/5                    LEFT    LE - HF 1/5, KE 1/5, DF 3/5                     RIGHT LE - HF 1/5, KE 1/5, DF 3/5      Sensory - Unable to fully assess     Coordination - FTN impaired  Psychiatric - Mood anxious  ----------------------------------------------------------------------------------------  ASSESSMENT/PLAN  81yFemale with functional deficits after an IPH  Right BG ICH - Stable  HTN - Norvasc, Lopressor  DM2 - Lantus, Lispro  Mood - Abilify, Cogentin, Luvox, Consider Psych to assist in optimization of medications, May need Seroquel for delirium  Pain - Tylenol  DVT PPX - SCDs  Rehab - If family can secure care for discharge to home after rehab, recommend ACUTE inpatient rehabilitation for the functional deficits consisting of 3 hours of therapy/day & 24 hour RN/daily PMR physician for comorbid medical management. Will continue to follow for ongoing rehab needs and recommendations. Patient will be able to tolerate 3 hours a day.    Continue bedside therapy as well as OOB throughout the day with mobilization throughout the day with staff to maintain cardiopulmonary function and prevention of secondary complications related to debility.     Discussed with rehab clinical team.

## 2021-06-01 NOTE — PROGRESS NOTE ADULT - ASSESSMENT
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  CTH was done outpt after c/o dizziness and unsteadness/falling over to left side.  Pt denies head trauma.  Evaluated by neurosurgery but no intervention planned.  CTH stable x4.      Right basal ganglia hemorrhage   - Discontinued Eliquis on admission   - CTH x4 ICH stable and neurologically unchanged from yesterday  - No intervention necessary as per neurosurgery but if ICH larger will contact neurosurgery to reassess  - c/w neuro checks and vital signs q4h   - Goal BP <140/90 and c/w prn IV labetalol to maintain BP at goal  - HOB elevated to 30 degrees   - PT/OT/ST evaluations noted  - Neurology and neurosurgery on board    Hx of DVT in September  - Repeat duplex during this admission negative  - PO eliquis discontinued in light of acute ICH  - Given negative doppler and risk/benefit assessment pt should remain off AC    Hypertensive Crisis, improving  - Goal BP <140/90 in light of ICH  - c/w po labetalol and IV prn  - Monitor BP closely     NIDDM w/ hyperglycemia, improving   - A1c 7.7  - Hold metformin while hospitalized  - c/w basal bolus regimen (Lantus 5u HS and mod dose sliding scale) and titrate to maintain BG <180  - ISS and hypoglycemic protocol in place      Bipolar disorder   - Stable  - c/w current medications      HLD  - c/w statin therapy       VTE ppx: no chemical prophylaxis given acute ICH.  c/w SCDs    Dispo: Downgrade to telemetry.  Possible [facility] vs acute rehab once medically stable for d/c.     PMR consult pending  82 y/o F w/ PMH of DVT (on Eliquis), NIDDM, glaucoma, HLD, HTN, hs of NPH s/p  shunt 2015 sent ot ED for +CTH w/ ICH.  CTH was done outpt after c/o dizziness and unsteadness/falling over to left side.  Pt denies head trauma.  Evaluated by neurosurgery but no intervention planned.  CTH stable x4.      Right basal ganglia hemorrhage   - Discontinued Eliquis on admission   - CTH x4 ICH stable and neurologically unchanged from yesterday  - No intervention necessary as per neurosurgery but if ICH larger will contact neurosurgery to reassess  - c/w neuro checks and vital signs q4h   - Goal BP <140/90 and c/w prn IV labetalol to maintain BP at goal  - HOB elevated to 30 degrees   - PT/OT/ST evaluations noted  - Neurology and neurosurgery on board    Hx of DVT in September  - Repeat duplex during this admission negative  - PO eliquis discontinued in light of acute ICH  - Given negative doppler and risk/benefit assessment pt should remain off AC    Hypertensive Crisis, improving  - Goal BP <140/90 in light of ICH  - c/w po labetalol and IV prn  - Monitor BP closely     NIDDM w/ hyperglycemia, improving   - A1c 7.7  - Hold metformin while hospitalized  - c/w basal bolus regimen (Lantus 5u HS and mod dose sliding scale) and titrate to maintain BG <180  - 1 episode >200 before lunch yest, cont to monitor  - ISS and hypoglycemic protocol in place      Bipolar disorder   - Stable  - c/w current medications      HLD  - c/w statin therapy       VTE ppx: no chemical prophylaxis given acute ICH.  c/w SCDs    Dispo: Possible [facility] vs acute rehab once medically stable for d/c.     PMR consult pending

## 2021-06-01 NOTE — PROGRESS NOTE ADULT - ATTENDING COMMENTS
Physical Exam  GENERAL: pt examined bedside, laying comfortably in bed in NAD  HEENT: NC/AT, moist oral mucosa, clear conjunctiva, sclera nonicteric  RESPIRATORY: Normal respiratory effort; CTA b/l, no wheezing, rhonchi, rales  CARDIOVASCULAR: RRR, normal S1 and S, 2/6 systolic murmur  ABDOMEN: soft, NT/ND, normoactive bowel sounds, no rebound/guarding  EXTREMITIES: No cynaosis, no clubbing, no lower extremity edema; Peripheral pulses are 2+ bilaterally  PSYCH: affect appropriate and cooperative  NEUROLOGY: A+O to person, place, and time, sensation intact, strength 5/5 in extremities, slight tongue deviation to rt, remainder of CN's intact, expressive aphasia  SKIN: No rashes or no palpable lesions      82 y/o F w/ PMH of DVT (on Eliquis), NIDDM, glaucoma, HLD, HTN, hs of NPH s/p  shunt 2015 sent ot ED for +CTH w/ ICH.  CTH was done outpt after c/o dizziness and unsteadness/falling over to left side.  Pt denies head trauma.  Evaluated by neurosurgery but no intervention planned.  CTH stable x4.    Right basal ganglia hemorrhage unchanged on repeat CTHs and neurologic exam stable.  PMR consulted.  Anticipate d/c in 1-2 days to acute rehab vs [facility].

## 2021-06-01 NOTE — PROGRESS NOTE ADULT - SUBJECTIVE AND OBJECTIVE BOX
GREGORY WOODALL  81y  Female    Interval/overnight events/pt complaints:            ROS:  GEN:   HEENT:   RESPIRATORY:  CARDIAC:   GI:   :  NEURO:   MS:   SKIN:      Vital Signs Last 24 Hrs  T(C): 36.3 (01 Jun 2021 10:04), Max: 37.1 (01 Jun 2021 05:11)  T(F): 97.4 (01 Jun 2021 10:04), Max: 98.7 (01 Jun 2021 05:11)  HR: 72 (01 Jun 2021 10:04) (55 - 81)  BP: 152/84 (01 Jun 2021 10:04) (107/54 - 152/84)  BP(mean): --  RR: 19 (01 Jun 2021 10:04) (18 - 20)  SpO2: 94% (01 Jun 2021 05:11) (93% - 98%)  I&O's Summary    31 May 2021 07:01  -  01 Jun 2021 07:00  --------------------------------------------------------  IN: 770 mL / OUT: 370 mL / NET: 400 mL        PHYSICAL EXAM:  GENERAL  HEENT -  NECK:   CHEST/LUNG:   HEART:   ABDOMEN:  EXTREMITIES:    NEURO:    SKIN:     CAPILLARY BLOOD GLUCOSE      POCT Blood Glucose.: 142 mg/dL (31 May 2021 21:31)  POCT Blood Glucose.: 116 mg/dL (31 May 2021 17:05)  POCT Blood Glucose.: 298 mg/dL (31 May 2021 12:16)      LABS    (06-01 @ 07:15)                      11.2  6.94 )-----------( 238                 34.9    Neutrophils = 3.56 (51.3%)  Lymphocytes = 2.08 (30.0%)  Eosinophils = 0.32 (4.6%)  Basophils = 0.07 (1.0%)  Monocytes = 0.90 (13.0%)  Bands = --%    06-01    140  |  102  |  35.0<H>  ----------------------------<  153<H>  4.2   |  29.0  |  1.55<H>    Ca    9.0      01 Jun 2021 07:15  Phos  3.3     06-01  Mg     1.8     06-01    TPro  6.4<L>  /  Alb  3.3  /  TBili  0.4  /  DBili  x   /  AST  14  /  ALT  8   /  AlkPhos  69  06-01    IMAGING    < from: CT Head No Cont (06.01.21 @ 09:03) >  INTERPRETATION:  CT brain without contrast    History altered mental status and intracranial hemorrhage    Comparison 2 days prior    . Tiny hyperdensity in the right lateral basal ganglia has slightly decreased in attenuation with diminishing surrounding edema. There is no new hemorrhage or cortical edema, mass effect or hydrocephalus. A ventricular catheter remains in place. There is mild generalized volume loss, central worse than peripheral cortical. There is mild stable left cerebellar encephalomalacia.    IMPRESSION:  Slightly improved    < end of copied text >      Imaging Personally Reviewed:  [x] YES  [ ] NO    MEDICATIONS  (STANDING):  amLODIPine   Tablet 10 milliGRAM(s) Oral daily  ARIPiprazole 5 milliGRAM(s) Oral daily  atorvastatin 40 milliGRAM(s) Oral at bedtime  benztropine 0.5 milliGRAM(s) Oral two times a day  dextrose 40% Gel 15 Gram(s) Oral once  dextrose 5%. 1000 milliLiter(s) (50 mL/Hr) IV Continuous <Continuous>  dextrose 5%. 1000 milliLiter(s) (100 mL/Hr) IV Continuous <Continuous>  dextrose 50% Injectable 25 Gram(s) IV Push once  dextrose 50% Injectable 12.5 Gram(s) IV Push once  dextrose 50% Injectable 25 Gram(s) IV Push once  fluvoxaMINE 150 milliGRAM(s) Oral at bedtime  folic acid 1 milliGRAM(s) Oral daily  glucagon  Injectable 1 milliGRAM(s) IntraMuscular once  insulin glargine Injectable (LANTUS) 5 Unit(s) SubCutaneous at bedtime  insulin lispro (ADMELOG) corrective regimen sliding scale   SubCutaneous three times a day before meals  insulin lispro Injectable (ADMELOG) 2 Unit(s) SubCutaneous three times a day before meals  metoprolol tartrate 25 milliGRAM(s) Oral two times a day    MEDICATIONS  (PRN):  hydrALAZINE Injectable 10 milliGRAM(s) IV Push every 8 hours PRN Elevated BP    Allergiesadhesives (Pruritus; Blisters)  penicillin (Hives)  pineapple (Anaphylaxis)      Consultant(s) Notes Reviewed:  [x ] YES  [ ] NO  Care Discussed with Consultants/Other Providers [ ] YES  [ ] NO       GREGORY WOODALL  81y  Female    Interval/overnight events/pt complaints:  Pt appeared to be hallucinating/confusion overnight and repeat head CT done - no changes noted.  At this time patient awake and alert, wanting to get OOB to go to bathroom. Oriented to person, month, year but not to place or recent events. Speech is rambling and often slurred/mumbled. Often difficult to understand.     ROS: Unable to provide full meaningful ROS  RESPIRATORY: ZEPEDA  NEURO: denies HA/dizziness, expresses fear of falling.     Vital Signs Last 24 Hrs  T(C): 36.3 (2021 10:04), Max: 37.1 (2021 05:11)  T(F): 97.4 (2021 10:04), Max: 98.7 (2021 05:11)  HR: 72 (2021 10:04) (55 - 81)  BP: 152/84 (2021 10:04) (107/54 - 152/84)  BP(mean): --  RR: 19 (2021 10:04) (18 - 20)  SpO2: 94% (2021 05:11) (93% - 98%)  I&O's Summary    31 May 2021 07:01  -  2021 07:00  --------------------------------------------------------  IN: 770 mL / OUT: 370 mL / NET: 400 mL        PHYSICAL EXAM:   GENERAL - awake, alert in NAD, confused  HEENT - pupils equal, EOMI, MMM  NECK:  no JVD, supple  CHEST/LUNG: clear, good aeration, equal bilat, nonlabored resp  HEART: S1,S2 regular w EMIGDIO  ABDOMEN: +BS, soft, diffuse mild tenderness  EXTREMITIES:  No peripheral edema bilat LE, R knee limited ROM + soft tissue swelling (R TKR)   Strenght: Bilat upper 3/5, sl weaker on R,  LLE 4/5, RLE 3/5  NEURO:  alert, oriented to person, date,  - Not oriented to location or recent events  SKIN: warm, dry    CAPILLARY BLOOD GLUCOSE  POCT Blood Glucose.: 142 mg/dL (31 May 2021 21:31)  POCT Blood Glucose.: 116 mg/dL (31 May 2021 17:05)  POCT Blood Glucose.: 298 mg/dL (31 May 2021 12:16)      LABS    ( @ 07:15)                      11.2  6.94 )-----------( 238                 34.9    Neutrophils = 3.56 (51.3%)  Lymphocytes = 2.08 (30.0%)  Eosinophils = 0.32 (4.6%)  Basophils = 0.07 (1.0%)  Monocytes = 0.90 (13.0%)  Bands = --%        140  |  102  |  35.0<H>  ----------------------------<  153<H>  4.2   |  29.0  |  1.55<H>    Ca    9.0      2021 07:15  Phos  3.3       Mg     1.8         TPro  6.4<L>  /  Alb  3.3  /  TBili  0.4  /  DBili  x   /  AST  14  /  ALT  8   /  AlkPhos  69      IMAGING    < from: CT Head No Cont (21 @ 09:03) >  INTERPRETATION:  CT brain without contrast    History altered mental status and intracranial hemorrhage    Comparison 2 days prior    . Tiny hyperdensity in the right lateral basal ganglia has slightly decreased in attenuation with diminishing surrounding edema. There is no new hemorrhage or cortical edema, mass effect or hydrocephalus. A ventricular catheter remains in place. There is mild generalized volume loss, central worse than peripheral cortical. There is mild stable left cerebellar encephalomalacia.    IMPRESSION:  Slightly improved    < end of copied text >  Imaging Personally Reviewed:  [x] YES  [ ] NO    MEDICATIONS  (STANDING):  amLODIPine   Tablet 10 milliGRAM(s) Oral daily  ARIPiprazole 5 milliGRAM(s) Oral daily  atorvastatin 40 milliGRAM(s) Oral at bedtime  benztropine 0.5 milliGRAM(s) Oral two times a day  dextrose 40% Gel 15 Gram(s) Oral once  dextrose 5%. 1000 milliLiter(s) (50 mL/Hr) IV Continuous <Continuous>  dextrose 5%. 1000 milliLiter(s) (100 mL/Hr) IV Continuous <Continuous>  dextrose 50% Injectable 25 Gram(s) IV Push once  dextrose 50% Injectable 12.5 Gram(s) IV Push once  dextrose 50% Injectable 25 Gram(s) IV Push once  fluvoxaMINE 150 milliGRAM(s) Oral at bedtime  folic acid 1 milliGRAM(s) Oral daily  glucagon  Injectable 1 milliGRAM(s) IntraMuscular once  insulin glargine Injectable (LANTUS) 5 Unit(s) SubCutaneous at bedtime  insulin lispro (ADMELOG) corrective regimen sliding scale   SubCutaneous three times a day before meals  insulin lispro Injectable (ADMELOG) 2 Unit(s) SubCutaneous three times a day before meals  metoprolol tartrate 25 milliGRAM(s) Oral two times a day    MEDICATIONS  (PRN):  hydrALAZINE Injectable 10 milliGRAM(s) IV Push every 8 hours PRN Elevated BP    Allergiesadhesives (Pruritus; Blisters)  penicillin (Hives)  pineapple (Anaphylaxis)      Consultant(s) Notes Reviewed:  [x ] YES  [ ] NO  Care Discussed with Consultants/Other Providers [ ] YES  [ ] NO

## 2021-06-01 NOTE — CHART NOTE - NSCHARTNOTEFT_GEN_A_CORE
Called by RN for concern for change in mental status.   Patient reporting visual hallucinations and confusion.  Patient seen and examined, in NAD. She states that she feels like she can't keep her thoughts straight.   AOx3, FROM x 4, following commands.  Vital Signs:  T(F): 98.7  HR: 66   BP: 144/71  RR: 18  SpO2: 94%  ?Hallucinations possibly 2/2 Parkinson's vs ICH  Stat repeat Head CT ordered  Continue to monitor patient, notify PA of any changes in patient status.

## 2021-06-02 LAB
ANION GAP SERPL CALC-SCNC: 10 MMOL/L — SIGNIFICANT CHANGE UP (ref 5–17)
APPEARANCE UR: SIGNIFICANT CHANGE UP
BACTERIA # UR AUTO: ABNORMAL
BILIRUB UR-MCNC: NEGATIVE — SIGNIFICANT CHANGE UP
BUN SERPL-MCNC: 31.7 MG/DL — HIGH (ref 8–20)
CALCIUM SERPL-MCNC: 9.1 MG/DL — SIGNIFICANT CHANGE UP (ref 8.6–10.2)
CHLORIDE SERPL-SCNC: 104 MMOL/L — SIGNIFICANT CHANGE UP (ref 98–107)
CO2 SERPL-SCNC: 28 MMOL/L — SIGNIFICANT CHANGE UP (ref 22–29)
COLOR SPEC: YELLOW — SIGNIFICANT CHANGE UP
CREAT SERPL-MCNC: 1.43 MG/DL — HIGH (ref 0.5–1.3)
DIFF PNL FLD: ABNORMAL
EPI CELLS # UR: SIGNIFICANT CHANGE UP
GLUCOSE BLDC GLUCOMTR-MCNC: 120 MG/DL — HIGH (ref 70–99)
GLUCOSE BLDC GLUCOMTR-MCNC: 129 MG/DL — HIGH (ref 70–99)
GLUCOSE BLDC GLUCOMTR-MCNC: 144 MG/DL — HIGH (ref 70–99)
GLUCOSE BLDC GLUCOMTR-MCNC: 154 MG/DL — HIGH (ref 70–99)
GLUCOSE SERPL-MCNC: 157 MG/DL — HIGH (ref 70–99)
GLUCOSE UR QL: NEGATIVE — SIGNIFICANT CHANGE UP
HCT VFR BLD CALC: 34 % — LOW (ref 34.5–45)
HGB BLD-MCNC: 10.7 G/DL — LOW (ref 11.5–15.5)
KETONES UR-MCNC: NEGATIVE — SIGNIFICANT CHANGE UP
LEUKOCYTE ESTERASE UR-ACNC: ABNORMAL
MCHC RBC-ENTMCNC: 29.5 PG — SIGNIFICANT CHANGE UP (ref 27–34)
MCHC RBC-ENTMCNC: 31.5 GM/DL — LOW (ref 32–36)
MCV RBC AUTO: 93.7 FL — SIGNIFICANT CHANGE UP (ref 80–100)
NITRITE UR-MCNC: NEGATIVE — SIGNIFICANT CHANGE UP
PH UR: 7 — SIGNIFICANT CHANGE UP (ref 5–8)
PLATELET # BLD AUTO: 247 K/UL — SIGNIFICANT CHANGE UP (ref 150–400)
POTASSIUM SERPL-MCNC: 4.3 MMOL/L — SIGNIFICANT CHANGE UP (ref 3.5–5.3)
POTASSIUM SERPL-SCNC: 4.3 MMOL/L — SIGNIFICANT CHANGE UP (ref 3.5–5.3)
PROT UR-MCNC: 100
RBC # BLD: 3.63 M/UL — LOW (ref 3.8–5.2)
RBC # FLD: 12.2 % — SIGNIFICANT CHANGE UP (ref 10.3–14.5)
RBC CASTS # UR COMP ASSIST: ABNORMAL /HPF (ref 0–4)
SODIUM SERPL-SCNC: 142 MMOL/L — SIGNIFICANT CHANGE UP (ref 135–145)
SP GR SPEC: 1.01 — SIGNIFICANT CHANGE UP (ref 1.01–1.02)
UROBILINOGEN FLD QL: NEGATIVE — SIGNIFICANT CHANGE UP
WBC # BLD: 5.84 K/UL — SIGNIFICANT CHANGE UP (ref 3.8–10.5)
WBC # FLD AUTO: 5.84 K/UL — SIGNIFICANT CHANGE UP (ref 3.8–10.5)
WBC UR QL: >50

## 2021-06-02 PROCEDURE — 99233 SBSQ HOSP IP/OBS HIGH 50: CPT

## 2021-06-02 RX ORDER — CEFTRIAXONE 500 MG/1
1000 INJECTION, POWDER, FOR SOLUTION INTRAMUSCULAR; INTRAVENOUS EVERY 24 HOURS
Refills: 0 | Status: DISCONTINUED | OUTPATIENT
Start: 2021-06-03 | End: 2021-06-07

## 2021-06-02 RX ORDER — NYSTATIN 500MM UNIT
500000 POWDER (EA) MISCELLANEOUS
Refills: 0 | Status: DISCONTINUED | OUTPATIENT
Start: 2021-06-02 | End: 2021-06-07

## 2021-06-02 RX ORDER — CIPROFLOXACIN LACTATE 400MG/40ML
VIAL (ML) INTRAVENOUS
Refills: 0 | Status: DISCONTINUED | OUTPATIENT
Start: 2021-06-02 | End: 2021-06-02

## 2021-06-02 RX ORDER — CIPROFLOXACIN LACTATE 400MG/40ML
400 VIAL (ML) INTRAVENOUS EVERY 12 HOURS
Refills: 0 | Status: DISCONTINUED | OUTPATIENT
Start: 2021-06-02 | End: 2021-06-02

## 2021-06-02 RX ORDER — SODIUM CHLORIDE 9 MG/ML
1000 INJECTION, SOLUTION INTRAVENOUS
Refills: 0 | Status: COMPLETED | OUTPATIENT
Start: 2021-06-02 | End: 2021-06-03

## 2021-06-02 RX ORDER — CEFTRIAXONE 500 MG/1
INJECTION, POWDER, FOR SOLUTION INTRAMUSCULAR; INTRAVENOUS
Refills: 0 | Status: DISCONTINUED | OUTPATIENT
Start: 2021-06-02 | End: 2021-06-07

## 2021-06-02 RX ORDER — ARIPIPRAZOLE 15 MG/1
10 TABLET ORAL DAILY
Refills: 0 | Status: DISCONTINUED | OUTPATIENT
Start: 2021-06-02 | End: 2021-06-07

## 2021-06-02 RX ORDER — CIPROFLOXACIN LACTATE 400MG/40ML
400 VIAL (ML) INTRAVENOUS ONCE
Refills: 0 | Status: DISCONTINUED | OUTPATIENT
Start: 2021-06-02 | End: 2021-06-02

## 2021-06-02 RX ORDER — CEFTRIAXONE 500 MG/1
1000 INJECTION, POWDER, FOR SOLUTION INTRAMUSCULAR; INTRAVENOUS ONCE
Refills: 0 | Status: COMPLETED | OUTPATIENT
Start: 2021-06-02 | End: 2021-06-02

## 2021-06-02 RX ORDER — CHLORHEXIDINE GLUCONATE 213 G/1000ML
15 SOLUTION TOPICAL
Refills: 0 | Status: DISCONTINUED | OUTPATIENT
Start: 2021-06-02 | End: 2021-06-07

## 2021-06-02 RX ADMIN — Medication 0.5 MILLIGRAM(S): at 18:36

## 2021-06-02 RX ADMIN — INSULIN GLARGINE 5 UNIT(S): 100 INJECTION, SOLUTION SUBCUTANEOUS at 21:57

## 2021-06-02 RX ADMIN — Medication 2 UNIT(S): at 09:13

## 2021-06-02 RX ADMIN — Medication 0.5 MILLIGRAM(S): at 05:29

## 2021-06-02 RX ADMIN — SODIUM CHLORIDE 65 MILLILITER(S): 9 INJECTION, SOLUTION INTRAVENOUS at 10:29

## 2021-06-02 RX ADMIN — Medication 1 MILLIGRAM(S): at 12:06

## 2021-06-02 RX ADMIN — Medication 2: at 09:13

## 2021-06-02 RX ADMIN — Medication 25 MILLIGRAM(S): at 05:29

## 2021-06-02 RX ADMIN — ARIPIPRAZOLE 10 MILLIGRAM(S): 15 TABLET ORAL at 12:06

## 2021-06-02 RX ADMIN — Medication 25 MILLIGRAM(S): at 18:36

## 2021-06-02 RX ADMIN — CEFTRIAXONE 1000 MILLIGRAM(S): 500 INJECTION, POWDER, FOR SOLUTION INTRAMUSCULAR; INTRAVENOUS at 11:20

## 2021-06-02 RX ADMIN — AMLODIPINE BESYLATE 10 MILLIGRAM(S): 2.5 TABLET ORAL at 05:29

## 2021-06-02 RX ADMIN — Medication 2 UNIT(S): at 12:14

## 2021-06-02 RX ADMIN — Medication 2 UNIT(S): at 18:37

## 2021-06-02 RX ADMIN — Medication 10 MILLIGRAM(S): at 21:12

## 2021-06-02 RX ADMIN — Medication 500000 UNIT(S): at 12:06

## 2021-06-02 NOTE — PHARMACOTHERAPY INTERVENTION NOTE - COMMENTS
Called pharmacy to obtain medication list. Patient on Eliquis 5 mG bid
Patient received ceftriaxone in Aug 2020, was discharged on Ceftin, has tolerated cephalosporins in the past.

## 2021-06-02 NOTE — CHART NOTE - NSCHARTNOTEFT_GEN_A_CORE
RN concerned as patient was only responsive to painful stimuli  Patient admitted with right basal ganglia hemorrhage   Patient seen and evaluated at bedside  Patient responsive to verbal stimuli, AxOx1 to name (patient's baseline)   ROS limited 2/2 to patient's baseline mentation     Vital Signs Last 24 Hrs  T(C): 37.3 (02 Jun 2021 05:05), Max: 37.3 (02 Jun 2021 05:05)  T(F): 99.1 (02 Jun 2021 05:05), Max: 99.1 (02 Jun 2021 05:05)  HR: 71 (02 Jun 2021 05:05) (55 - 72)  BP: 120/66 (02 Jun 2021 05:05) (120/66 - 177/77)  RR: 18 (02 Jun 2021 05:05) (18 - 19)  SpO2: 94% (02 Jun 2021 05:05) (93% - 97%)    GENERAL: NAD, lying comfortably  HEAD: Atraumatic, Normocephalic  EYES: EOMI, PERRLA, conjunctiva and sclera clear  NERVOUS SYSTEM: Alert to verbal stimuli, AxOx1 to name, follows commands, +aphasia (as per baseline)   CHEST/LUNG: Clear to auscultation b/l; Unlabored respirations  HEART: S1S2+, + murmur   ABDOMEN: Soft, Nontender, Nondistended; BS+   EXTREMITIES:  2+ Peripheral Pulses, No LE edema, no tenderness to palpation of b/l LE  SKIN: Warm and dry    Patient appears to be at baseline mentation   RN to notify with any acute issues RN concerned as patient was only responsive to painful stimuli  Patient admitted with right basal ganglia hemorrhage   Patient seen and evaluated at bedside  Patient responsive to verbal stimuli, AxOx1 to name (patient's baseline)   ROS limited 2/2 to patient's baseline mentation     Vital Signs Last 24 Hrs  T(C): 37.3 (02 Jun 2021 05:05), Max: 37.3 (02 Jun 2021 05:05)  T(F): 99.1 (02 Jun 2021 05:05), Max: 99.1 (02 Jun 2021 05:05)  HR: 71 (02 Jun 2021 05:05) (55 - 72)  BP: 120/66 (02 Jun 2021 05:05) (120/66 - 177/77)  RR: 18 (02 Jun 2021 05:05) (18 - 19)  SpO2: 94% (02 Jun 2021 05:05) (93% - 97%)    GENERAL: NAD, lying comfortably  HEAD: Atraumatic, Normocephalic  EYES: EOMI, PERRLA, conjunctiva and sclera clear  NERVOUS SYSTEM: Alert to verbal stimuli, AxOx1 to name, follows commands, +aphasia (as per baseline)   CHEST/LUNG: Clear to auscultation b/l; Unlabored respirations  HEART: S1S2+, + murmur   ABDOMEN: Soft, Nontender, Nondistended; BS+   EXTREMITIES:  2+ Peripheral Pulses, No LE edema, no tenderness to palpation of b/l LE  SKIN: Warm and dry    Patient also with cloudy urine in primafit, ordered UA and urine culture   Patient appears to be at baseline mentation   RN to notify with any acute issues

## 2021-06-02 NOTE — OCCUPATIONAL THERAPY INITIAL EVALUATION ADULT - MODIFIED CLINICAL TEST OF SENSORY INTEGRATION IN BALANCE TEST
dynamic sitting edge of bed with moderate assistance, pt with posterior lean despite cues to correct self; standing not assessed

## 2021-06-02 NOTE — OCCUPATIONAL THERAPY INITIAL EVALUATION ADULT - LEVEL OF INDEPENDENCE: STAND/SIT, REHAB EVAL
pt extremely lethargic and unable to keep eyes open despite maximal cues; pt also with urine incontinence requiring full hygiene

## 2021-06-02 NOTE — OCCUPATIONAL THERAPY INITIAL EVALUATION ADULT - LEVEL OF INDEPENDENCE: SIT/STAND, REHAB EVAL
pt extremely lethargic and unable to keep eyes open despite maximal cues; pt also with urine incontinence requiring full hygiene/unable to perform

## 2021-06-02 NOTE — OCCUPATIONAL THERAPY INITIAL EVALUATION ADULT - PERTINENT HX OF CURRENT PROBLEM, REHAB EVAL
Pt presents to ED with dizziness and increasing tremulousness x 3-4 days. Pt went for CT day prior and dvised to go to ED as she "has a bleed." Head CT with stable follow-up study with no interval change; small focal hemorrhage noted in right lateral basal ganglia and subinsular region; right sided ventriculostomy noted; involutional changes with volume loss; right parietal gliosis and encephalomalacia; mild chronic ischemic changes both cerebellar hemispheres; findings well seen on prior study.

## 2021-06-02 NOTE — OCCUPATIONAL THERAPY INITIAL EVALUATION ADULT - GENERAL OBSERVATIONS, REHAB EVAL
Received pt in semi-buck position in bed, +IV and IV lock, +telemetry, +Primafit, +2LNCO2. Pt agrees to OT evaluation.

## 2021-06-02 NOTE — OCCUPATIONAL THERAPY INITIAL EVALUATION ADULT - LEVEL OF INDEPENDENCE:TOILET, OT EVAL
(+) Primafit; pt with urinary incontinence requiring assistance for hygiene (RN present at bedside)/dependent (less than 25% patients effort)

## 2021-06-02 NOTE — PROGRESS NOTE ADULT - ASSESSMENT
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  CTH was done outpt after c/o dizziness and unsteadness/falling over to left side.  Pt denies head trauma.  Evaluated by neurosurgery but no intervention planned.  CTH stable x4.  Pt confused overnight and UA ordered and found to have pyuria; suspect UTI as cause of change in mentation.        Right basal ganglia hemorrhage   - Discontinued Eliquis on admission   - CTH x4 ICH stable and neurologically unchanged from yesterday  - No intervention necessary as per neurosurgery but if ICH larger will contact neurosurgery to reassess  - c/w neuro checks and vital signs q4h   - Goal BP <140/90 and c/w prn IV labetalol to maintain BP at goal  - HOB elevated to 30 degrees   - PT/OT/ST evaluations noted  - Neurology and neurosurgery on board      Acute metabolic encephalopathy 2/2 UTI  - UA w/ pyuria, Ucx pending  - Will start on empiric antibiotics pending sensitivities  - No leukocytosis but will monitor CBC daily   - Tylenol prn if febrile      Hx of DVT in September  - Repeat duplex during this admission negative  - PO eliquis discontinued in light of acute ICH  - Given negative doppler and risk/benefit assessment pt should remain off AC      Hypertensive Crisis, improving  - Goal BP <140/90 in light of ICH  - c/w po labetalol and IV prn  - Monitor BP closely       NIDDM w/ hyperglycemia, improving   - A1c 7.7  - Hold metformin while hospitalized  - c/w basal bolus regimen and titrate to maintain BG <180  - ISS and hypoglycemic protocol in place      Bipolar disorder   - Stable  - c/w current medications      HLD  - c/w statin therapy       VTE ppx: no chemical prophylaxis given acute ICH.  c/w SCDs    Dispo: No plans for discharge at this time given pt acutely decompensated today.  w/u and treatment ongoing.    LOV Sept  Next Sept 2021

## 2021-06-02 NOTE — OCCUPATIONAL THERAPY INITIAL EVALUATION ADULT - ORIENTATION, REHAB EVAL
pt able to report name and place (hospital); pt grossly oriented to time (2021) however unable to recall month/person/place/time

## 2021-06-02 NOTE — OCCUPATIONAL THERAPY INITIAL EVALUATION ADULT - PLANNED THERAPY INTERVENTIONS, OT EVAL
toilet/ADL retraining/balance training/bed mobility training/cognitive, visual perceptual/fine motor coordination training/motor coordination training/neuromuscular re-education/parent/caregiver training.../ROM/strengthening/transfer training

## 2021-06-02 NOTE — OCCUPATIONAL THERAPY INITIAL EVALUATION ADULT - NS ASR FOLLOW COMMAND OT EVAL
pt requires increased time, cues and repetition to process commands of tasks; pt with decreased attention requiring cues to redirect to tasks; pt requires cues to sequence and problem solve tasks/mobility/75% of the time/able to follow single-step instructions/speech unintelligible

## 2021-06-02 NOTE — OCCUPATIONAL THERAPY INITIAL EVALUATION ADULT - ADDITIONAL COMMENTS
Pt is very lethargic and with speech difficulties making it difficult to obtain social information. As per chart, pt lives in apartment with 2 PAVEL and 12 steps inside up to apartment. Pt owns RW. Son does not work and is able to assist upon discharge.

## 2021-06-02 NOTE — OCCUPATIONAL THERAPY INITIAL EVALUATION ADULT - MANUAL MUSCLE TESTING RESULTS, REHAB EVAL
bilateral shoulder flexion grossly assessed with AROM against gravity 3/5, bilateral elbow flexion/extension grossly assessed with AROM against gravity 3/5, bilateral gross grasp 4/5

## 2021-06-02 NOTE — PROGRESS NOTE ADULT - SUBJECTIVE AND OBJECTIVE BOX
Patient appears to continue to be delirious.  Unable to answer many questions.     REVIEW OF SYSTEMS  Constitutional - No fever,  +fatigue  Neurological - +memory loss, +loss of strength  Psychiatric - +anxiety    FUNCTIONAL PROGRESS    Bed Mobility: Sit to Supine:     · Level of [place]	minimum assist (75% patients effort); moderate assist (50% patients effort)  · Physical Assist/Nonphysical Assist	1 person assist; verbal cues    Bed Mobility: Supine to Sit:     · Level of [place]	minimum assist (75% patients effort)  · Physical Assist/Nonphysical Assist	1 person assist; verbal cues    Bed Mobility Analysis:     · Impairments Contributing to Impaired Bed Mobility	decreased strength    Transfer: Sit to Stand:     · Level of [place]	minimum assist (75% patients effort)  · Physical Assist/Nonphysical Assist	1 person assist; verbal cues  · Weight-Bearing Restrictions	full weight-bearing  · Assistive Device	rolling walker    Transfer: Stand to Sit:     · Level of [place]	minimum assist (75% patients effort)  · Physical Assist/Nonphysical Assist	verbal cues; 1 person assist  · Weight-Bearing Restrictions	full weight-bearing  · Assistive Device	rolling walker    Sit/Stand Transfer Safety Analysis:     · Impairments Contributing to Impaired Transfers	decreased strength    Gait Skills:     · Level of [place]	minimum assist (75% patients effort)  · Physical Assist/Nonphysical Assist	1 person assist; verbal cues  · Weight-Bearing Restrictions	full weight-bearing  · Assistive Device	rolling walker  · Gait Distance	3 feet to right    Gait Analysis:     · Gait Pattern Used	3-point gait  · Gait Deviations Noted	decreased dmitry  · Impairments Contributing to Gait Deviations	decreased strength; pt dizzy; impaired balance    Stair Negotiation:     · Level of [place]	to be assessed when appropriate      VITALS  T(C): 37.3 (21 @ 05:05), Max: 37.3 (21 @ 05:05)  HR: 71 (21 @ 05:05) (60 - 72)  BP: 120/66 (21 @ 05:05) (120/66 - 177/77)  RR: 18 (21 @ 05:05) (18 - 19)  SpO2: 94% (21 @ 05:05) (93% - 97%)  Wt(kg): --    MEDICATIONS   amLODIPine   Tablet 10 milliGRAM(s) daily  ARIPiprazole 5 milliGRAM(s) daily  atorvastatin 40 milliGRAM(s) at bedtime  benztropine 0.5 milliGRAM(s) two times a day  dextrose 40% Gel 15 Gram(s) once  dextrose 5%. 1000 milliLiter(s) <Continuous>  dextrose 5%. 1000 milliLiter(s) <Continuous>  dextrose 50% Injectable 25 Gram(s) once  dextrose 50% Injectable 12.5 Gram(s) once  dextrose 50% Injectable 25 Gram(s) once  fluvoxaMINE 150 milliGRAM(s) at bedtime  folic acid 1 milliGRAM(s) daily  glucagon  Injectable 1 milliGRAM(s) once  hydrALAZINE Injectable 10 milliGRAM(s) every 8 hours PRN  insulin glargine Injectable (LANTUS) 5 Unit(s) at bedtime  insulin lispro (ADMELOG) corrective regimen sliding scale   three times a day before meals  insulin lispro Injectable (ADMELOG) 2 Unit(s) three times a day before meals  metoprolol tartrate 25 milliGRAM(s) two times a day      RECENT LABS/IMAGING                          11.2   6.94  )-----------( 238      ( 2021 07:15 )             34.9     06-    140  |  102  |  35.0<H>  ----------------------------<  153<H>  4.2   |  29.0  |  1.55<H>    Ca    9.0      2021 07:15  Phos  3.3     06-  Mg     1.8     06-    TPro  6.4<L>  /  Alb  3.3  /  TBili  0.4  /  DBili  x   /  AST  14  /  ALT  8   /  AlkPhos  69  06-01      Urinalysis Basic - ( 2021 05:57 )    Color: Yellow / Appearance: Turbid / S.010 / pH: x  Gluc: x / Ketone: Negative  / Bili: Negative / Urobili: Negative   Blood: x / Protein: 100 / Nitrite: Negative   Leuk Esterase: Moderate / RBC: 6-10 /HPF / WBC >50   Sq Epi: x / Non Sq Epi: Few / Bacteria: Moderate              HEAD CT  - Multiple axial sections were performed from base of skull to vertex without contrast enhancement. Coronal and sagittal reconstructions were performed as well This exam is compared with prior noncontrast head CT performed on 2015. Increased parenchymal volume loss and chronic microvascular ischemic change identified Right parietal shunt catheter is again seen and unchanged in position. Areas of surrounding gliosis is identified. There is a new focus of high attenuation seen involving the right anterior perisylvian region. This finding measures approximately 0.8 cm and demonstrates some surrounding edema. This is compatible with a small area of acute parenchymal hemorrhage. No significant shift or herniation seen Evaluation of the osseous structures with appropriate window contrast hyperostosis frontalis interna. The visualized paranasal sinuses mastoid and appear clear. Calcification is seen involving both distal carotid and vertebral arteries. Small left maxillary polyp versus retention cyst is seen. Both mastoids and middle ear regions appear clear. IMPRESSION: New area of acute parenchymal hemorrhage as described above.    HEAD CT  - 1)  stable follow-up study with no interval change. Small focal hemorrhage again noted in the right lateral basal ganglia and subinsular region. 2)  right sided ventriculostomy again noted. Involutional changes with volume loss. Right parietal gliosis and encephalomalacia. Mild chronic ischemic changes both cerebellar hemispheres. These findings are well seen on prior study.    HEAD CT  -  Stable 5 mm right basal ganglia hemorrhage. No new hemorrhage.    HEAD CT  - No change since 2021. Small right basal ganglia hemorrhage. Right-sided  shunt. Small vessel white matter ischemic changes.    HEAD CT  - . Tiny hyperdensity in the right lateral basal ganglia has slightly decreased in attenuation with diminishing surrounding edema. There is no new hemorrhage or cortical edema, mass effect or hydrocephalus. A ventricular catheter remains in place. There is mild generalized volume loss, central worse than peripheral cortical. There is mild stable left cerebellar encephalomalacia.    ----------------------------------------------------------------------------------------  PHYSICAL EXAM  Constitutional - NAD, Comfortable  Extremities - No C/C/E, No calf tenderness   Neurologic Exam -                    Cognitive - Awake/Alert      Communication - Expressive deficits, Dysarthric     Cranial Nerves - Left lip droop     Motor - As of : Noted left inattention, Decreased use of left UE unless cued multiple times for BUE, not for BLE                    LEFT    UE - ShAB 4/5, EF 4/5, EE 5/5,  5/5                    RIGHT UE - ShAB 4/5, EF 4/5, EE 5/5,  5/5                    LEFT    LE - HF 1/5, KE 1/5, DF 3/5                     RIGHT LE - HF 1/5, KE 1/5, DF 3/5      Sensory - Unable to fully assess     Coordination - FTN impaired  Psychiatric - Mood anxious, Intermittently Restless  ----------------------------------------------------------------------------------------  ASSESSMENT/PLAN  81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 Will continue to follow for ongoing rehab needs and recommendations. Patient will be able to tolerate 3 hours a day.    Continue bedside therapy as well as OOB throughout the day with mobilization throughout the day with staff to maintain cardiopulmonary function and prevention of secondary complications related to debility.     Discussed with rehab clinical team.    Patient appears to continue to be delirious.  Unable to answer many questions.     REVIEW OF SYSTEMS  Constitutional - No fever,  +fatigue  Neurological - +memory loss, +loss of strength  Psychiatric - +anxiety    FUNCTIONAL PROGRESS    Bed Mobility: Sit to Supine:     · Level of [place]	minimum assist (75% patients effort); moderate assist (50% patients effort)  · Physical Assist/Nonphysical Assist	1 person assist; verbal cues    Bed Mobility: Supine to Sit:     · Level of [place]	minimum assist (75% patients effort)  · Physical Assist/Nonphysical Assist	1 person assist; verbal cues    Bed Mobility Analysis:     · Impairments Contributing to Impaired Bed Mobility	decreased strength    Transfer: Sit to Stand:     · Level of [place]	minimum assist (75% patients effort)  · Physical Assist/Nonphysical Assist	1 person assist; verbal cues  · Weight-Bearing Restrictions	full weight-bearing  · Assistive Device	rolling walker    Transfer: Stand to Sit:     · Level of [place]	minimum assist (75% patients effort)  · Physical Assist/Nonphysical Assist	verbal cues; 1 person assist  · Weight-Bearing Restrictions	full weight-bearing  · Assistive Device	rolling walker    Sit/Stand Transfer Safety Analysis:     · Impairments Contributing to Impaired Transfers	decreased strength    Gait Skills:     · Level of [place]	minimum assist (75% patients effort)  · Physical Assist/Nonphysical Assist	1 person assist; verbal cues  · Weight-Bearing Restrictions	full weight-bearing  · Assistive Device	rolling walker  · Gait Distance	3 feet to right    Gait Analysis:     · Gait Pattern Used	3-point gait  · Gait Deviations Noted	decreased dmitry  · Impairments Contributing to Gait Deviations	decreased strength; pt dizzy; impaired balance    Stair Negotiation:     · Level of [place]	to be assessed when appropriate      VITALS  T(C): 37.3 (21 @ 05:05), Max: 37.3 (21 @ 05:05)  HR: 71 (21 @ 05:05) (60 - 72)  BP: 120/66 (21 @ 05:05) (120/66 - 177/77)  RR: 18 (21 @ 05:05) (18 - 19)  SpO2: 94% (21 @ 05:05) (93% - 97%)  Wt(kg): --    MEDICATIONS   amLODIPine   Tablet 10 milliGRAM(s) daily  ARIPiprazole 5 milliGRAM(s) daily  atorvastatin 40 milliGRAM(s) at bedtime  benztropine 0.5 milliGRAM(s) two times a day  dextrose 40% Gel 15 Gram(s) once  dextrose 5%. 1000 milliLiter(s) <Continuous>  dextrose 5%. 1000 milliLiter(s) <Continuous>  dextrose 50% Injectable 25 Gram(s) once  dextrose 50% Injectable 12.5 Gram(s) once  dextrose 50% Injectable 25 Gram(s) once  fluvoxaMINE 150 milliGRAM(s) at bedtime  folic acid 1 milliGRAM(s) daily  glucagon  Injectable 1 milliGRAM(s) once  hydrALAZINE Injectable 10 milliGRAM(s) every 8 hours PRN  insulin glargine Injectable (LANTUS) 5 Unit(s) at bedtime  insulin lispro (ADMELOG) corrective regimen sliding scale   three times a day before meals  insulin lispro Injectable (ADMELOG) 2 Unit(s) three times a day before meals  metoprolol tartrate 25 milliGRAM(s) two times a day      RECENT LABS/IMAGING                          11.2   6.94  )-----------( 238      ( 2021 07:15 )             34.9     06-    140  |  102  |  35.0<H>  ----------------------------<  153<H>  4.2   |  29.0  |  1.55<H>    Ca    9.0      2021 07:15  Phos  3.3     06-  Mg     1.8     06-    TPro  6.4<L>  /  Alb  3.3  /  TBili  0.4  /  DBili  x   /  AST  14  /  ALT  8   /  AlkPhos  69  06-01      Urinalysis Basic - ( 2021 05:57 )    Color: Yellow / Appearance: Turbid / S.010 / pH: x  Gluc: x / Ketone: Negative  / Bili: Negative / Urobili: Negative   Blood: x / Protein: 100 / Nitrite: Negative   Leuk Esterase: Moderate / RBC: 6-10 /HPF / WBC >50   Sq Epi: x / Non Sq Epi: Few / Bacteria: Moderate              HEAD CT  - Multiple axial sections were performed from base of skull to vertex without contrast enhancement. Coronal and sagittal reconstructions were performed as well This exam is compared with prior noncontrast head CT performed on 2015. Increased parenchymal volume loss and chronic microvascular ischemic change identified Right parietal shunt catheter is again seen and unchanged in position. Areas of surrounding gliosis is identified. There is a new focus of high attenuation seen involving the right anterior perisylvian region. This finding measures approximately 0.8 cm and demonstrates some surrounding edema. This is compatible with a small area of acute parenchymal hemorrhage. No significant shift or herniation seen Evaluation of the osseous structures with appropriate window contrast hyperostosis frontalis interna. The visualized paranasal sinuses mastoid and appear clear. Calcification is seen involving both distal carotid and vertebral arteries. Small left maxillary polyp versus retention cyst is seen. Both mastoids and middle ear regions appear clear. IMPRESSION: New area of acute parenchymal hemorrhage as described above.    HEAD CT  - 1)  stable follow-up study with no interval change. Small focal hemorrhage again noted in the right lateral basal ganglia and subinsular region. 2)  right sided ventriculostomy again noted. Involutional changes with volume loss. Right parietal gliosis and encephalomalacia. Mild chronic ischemic changes both cerebellar hemispheres. These findings are well seen on prior study.    HEAD CT  -  Stable 5 mm right basal ganglia hemorrhage. No new hemorrhage.    HEAD CT  - No change since 2021. Small right basal ganglia hemorrhage. Right-sided  shunt. Small vessel white matter ischemic changes.    HEAD CT  - . Tiny hyperdensity in the right lateral basal ganglia has slightly decreased in attenuation with diminishing surrounding edema. There is no new hemorrhage or cortical edema, mass effect or hydrocephalus. A ventricular catheter remains in place. There is mild generalized volume loss, central worse than peripheral cortical. There is mild stable left cerebellar encephalomalacia.    ----------------------------------------------------------------------------------------  PHYSICAL EXAM  Constitutional - NAD, Comfortable  Extremities - No C/C/E, No calf tenderness   Neurologic Exam -                    Cognitive - Awake/Alert      Communication - Expressive deficits, Dysarthric     Cranial Nerves - Left lip droop     Motor - As of : Noted left inattention, Decreased use of left UE unless cued multiple times for BUE, not for BLE                    LEFT    UE - ShAB 4/5, EF 4/5, EE 5/5,  5/5                    RIGHT UE - ShAB 4/5, EF 4/5, EE 5/5,  5/5                    LEFT    LE - HF 1, KE 1, DF 3/5                     RIGHT LE - HF 1, KE 1, DF 3/5      Sensory - Unable to fully assess     Coordination - FTN impaired  Psychiatric - Mood anxious, Intermittently Restless  ----------------------------------------------------------------------------------------  ASSESSMENT/PLAN  81yFemale with functional deficits after an IPH  Right BG ICH - Stable  HTN - Hydralazine, Norvasc, Lopressor  DM2 - Lantus, Lispro  Mood - Abilify, Cogentin, Luvox, Would benefit from Psych to assist medication optimization - appears to have delirium and may need Seroquel  Pain - Recommend Tylenol  DVT PPX - SCDs  Rehab - If family can secure care for discharge to home after rehab, would benefit from AR.     Continue bedside therapy as well as OOB throughout the day with mobilization throughout the day with staff to maintain cardiopulmonary function and prevention of secondary complications related to debility.     Discussed with rehab clinical team.    Patient appears to continue to be delirious.  Unable to answer many questions.     REVIEW OF SYSTEMS  Constitutional - No fever,  +fatigue  Neurological - +memory loss, +loss of strength  Psychiatric - +anxiety    FUNCTIONAL PROGRESS    Bed Mobility: Sit to Supine:     · Level of [place]	minimum assist (75% patients effort); moderate assist (50% patients effort)  · Physical Assist/Nonphysical Assist	1 person assist; verbal cues    Bed Mobility: Supine to Sit:     · Level of [place]	minimum assist (75% patients effort)  · Physical Assist/Nonphysical Assist	1 person assist; verbal cues    Bed Mobility Analysis:     · Impairments Contributing to Impaired Bed Mobility	decreased strength    Transfer: Sit to Stand:     · Level of [place]	minimum assist (75% patients effort)  · Physical Assist/Nonphysical Assist	1 person assist; verbal cues  · Weight-Bearing Restrictions	full weight-bearing  · Assistive Device	rolling walker    Transfer: Stand to Sit:     · Level of [place]	minimum assist (75% patients effort)  · Physical Assist/Nonphysical Assist	verbal cues; 1 person assist  · Weight-Bearing Restrictions	full weight-bearing  · Assistive Device	rolling walker    Sit/Stand Transfer Safety Analysis:     · Impairments Contributing to Impaired Transfers	decreased strength    Gait Skills:     · Level of [place]	minimum assist (75% patients effort)  · Physical Assist/Nonphysical Assist	1 person assist; verbal cues  · Weight-Bearing Restrictions	full weight-bearing  · Assistive Device	rolling walker  · Gait Distance	3 feet to right    Gait Analysis:     · Gait Pattern Used	3-point gait  · Gait Deviations Noted	decreased dmitry  · Impairments Contributing to Gait Deviations	decreased strength; pt dizzy; impaired balance    Stair Negotiation:     · Level of [place]	to be assessed when appropriate      VITALS  T(C): 37.3 (21 @ 05:05), Max: 37.3 (21 @ 05:05)  HR: 71 (21 @ 05:05) (60 - 72)  BP: 120/66 (21 @ 05:05) (120/66 - 177/77)  RR: 18 (21 @ 05:05) (18 - 19)  SpO2: 94% (21 @ 05:05) (93% - 97%)  Wt(kg): --    MEDICATIONS   amLODIPine   Tablet 10 milliGRAM(s) daily  ARIPiprazole 5 milliGRAM(s) daily  atorvastatin 40 milliGRAM(s) at bedtime  benztropine 0.5 milliGRAM(s) two times a day  dextrose 40% Gel 15 Gram(s) once  dextrose 5%. 1000 milliLiter(s) <Continuous>  dextrose 5%. 1000 milliLiter(s) <Continuous>  dextrose 50% Injectable 25 Gram(s) once  dextrose 50% Injectable 12.5 Gram(s) once  dextrose 50% Injectable 25 Gram(s) once  fluvoxaMINE 150 milliGRAM(s) at bedtime  folic acid 1 milliGRAM(s) daily  glucagon  Injectable 1 milliGRAM(s) once  hydrALAZINE Injectable 10 milliGRAM(s) every 8 hours PRN  insulin glargine Injectable (LANTUS) 5 Unit(s) at bedtime  insulin lispro (ADMELOG) corrective regimen sliding scale   three times a day before meals  insulin lispro Injectable (ADMELOG) 2 Unit(s) three times a day before meals  metoprolol tartrate 25 milliGRAM(s) two times a day      RECENT LABS/IMAGING                          11.2   6.94  )-----------( 238      ( 2021 07:15 )             34.9     06-    140  |  102  |  35.0<H>  ----------------------------<  153<H>  4.2   |  29.0  |  1.55<H>    Ca    9.0      2021 07:15  Phos  3.3     06-  Mg     1.8     06-    TPro  6.4<L>  /  Alb  3.3  /  TBili  0.4  /  DBili  x   /  AST  14  /  ALT  8   /  AlkPhos  69  06-01      Urinalysis Basic - ( 2021 05:57 )    Color: Yellow / Appearance: Turbid / S.010 / pH: x  Gluc: x / Ketone: Negative  / Bili: Negative / Urobili: Negative   Blood: x / Protein: 100 / Nitrite: Negative   Leuk Esterase: Moderate / RBC: 6-10 /HPF / WBC >50   Sq Epi: x / Non Sq Epi: Few / Bacteria: Moderate              HEAD CT  - Multiple axial sections were performed from base of skull to vertex without contrast enhancement. Coronal and sagittal reconstructions were performed as well This exam is compared with prior noncontrast head CT performed on 2015. Increased parenchymal volume loss and chronic microvascular ischemic change identified Right parietal shunt catheter is again seen and unchanged in position. Areas of surrounding gliosis is identified. There is a new focus of high attenuation seen involving the right anterior perisylvian region. This finding measures approximately 0.8 cm and demonstrates some surrounding edema. This is compatible with a small area of acute parenchymal hemorrhage. No significant shift or herniation seen Evaluation of the osseous structures with appropriate window contrast hyperostosis frontalis interna. The visualized paranasal sinuses mastoid and appear clear. Calcification is seen involving both distal carotid and vertebral arteries. Small left maxillary polyp versus retention cyst is seen. Both mastoids and middle ear regions appear clear. IMPRESSION: New area of acute parenchymal hemorrhage as described above.    HEAD CT  - 1)  stable follow-up study with no interval change. Small focal hemorrhage again noted in the right lateral basal ganglia and subinsular region. 2)  right sided ventriculostomy again noted. Involutional changes with volume loss. Right parietal gliosis and encephalomalacia. Mild chronic ischemic changes both cerebellar hemispheres. These findings are well seen on prior study.    HEAD CT  -  Stable 5 mm right basal ganglia hemorrhage. No new hemorrhage.    HEAD CT  - No change since 2021. Small right basal ganglia hemorrhage. Right-sided  shunt. Small vessel white matter ischemic changes.    HEAD CT  - . Tiny hyperdensity in the right lateral basal ganglia has slightly decreased in attenuation with diminishing surrounding edema. There is no new hemorrhage or cortical edema, mass effect or hydrocephalus. A ventricular catheter remains in place. There is mild generalized volume loss, central worse than peripheral cortical. There is mild stable left cerebellar encephalomalacia.    ----------------------------------------------------------------------------------------  PHYSICAL EXAM  Constitutional - NAD, Comfortable  Extremities - No C/C/E, No calf tenderness   Neurologic Exam -                    Cognitive - Awake/Alert      Communication - Expressive deficits, Dysarthric     Cranial Nerves - Left lip droop     Motor - As of : Noted left inattention, Decreased use of left UE unless cued multiple times for BUE, not for BLE                    LEFT    UE - ShAB 4/5, EF 4/5, EE 5/5,  5/5                    RIGHT UE - ShAB 4/5, EF 4/5, EE 5/5,  5/5                    LEFT    LE - HF 1/5, KE 1/5, DF 3/5                     RIGHT LE - HF 1, KE 1, DF 3/5      Sensory - Unable to fully assess     Coordination - FTN impaired  Psychiatric - Mood anxious, Intermittently Restless  ----------------------------------------------------------------------------------------  ASSESSMENT/PLAN  81yFemale with functional deficits after an IPH  Right BG ICH - Stable  HTN - Hydralazine, Norvasc, Lopressor  DM2 - Lantus, Lispro  Oral Hygiene - Aggressive oral care, Nystatin (last ), Peridex ()  Mood - Abilify, Cogentin, Luvox, Would benefit from Psych to assist medication optimization - appears to have delirium and may need Seroquel  Pain - Recommend Tylenol  DVT PPX - SCDs  Rehab - If family can secure care for discharge to home after rehab, would benefit from AR.     Continue bedside therapy as well as OOB throughout the day with mobilization throughout the day with staff to maintain cardiopulmonary function and prevention of secondary complications related to debility.     Discussed with rehab clinical team.

## 2021-06-02 NOTE — PROVIDER CONTACT NOTE (OTHER) - SITUATION
Pt lethargic, hard to arouse. Repeated physical stimuli. Does not sustain awakening without continued verbal cues. intermittent tremor noted in hands and legs.

## 2021-06-02 NOTE — PROGRESS NOTE ADULT - SUBJECTIVE AND OBJECTIVE BOX
Chief Complaint:  ICH    SUBJECTIVE / OVERNIGHT EVENTS: Pt confused overnight and noted to have cloudy urine.  This morning pt is confused and unable to provide ROS        I&O's Summary    28 May 2021 07:01  -  29 May 2021 07:00  --------------------------------------------------------  IN: 0 mL / OUT: 600 mL / NET: -600 mL          PHYSICAL EXAM:  Vital Signs Last 24 Hrs  T(C): 37.3 (02 Jun 2021 05:05), Max: 37.3 (02 Jun 2021 05:05)  T(F): 99.1 (02 Jun 2021 05:05), Max: 99.1 (02 Jun 2021 05:05)  HR: 71 (02 Jun 2021 05:05) (60 - 72)  BP: 120/66 (02 Jun 2021 05:05) (120/66 - 177/77)  BP(mean): --  RR: 18 (02 Jun 2021 05:05) (18 - 19)  SpO2: 94% (02 Jun 2021 05:05) (93% - 97%)    GENERAL: pt examined bedside, laying comfortably in bed in NAD  HEENT: NC/AT, moist oral mucosa, clear conjunctiva, sclera nonicteric  RESPIRATORY: Normal respiratory effort; CTA b/l, no wheezing, rhonchi, rales  CARDIOVASCULAR: RRR, normal S1 and S, 2/6 systolic murmur  ABDOMEN: soft, NT/ND, normoactive bowel sounds, no rebound/guarding  EXTREMITIES: No cynaosis, no clubbing, no lower extremity edema; Peripheral pulses are 2+ bilaterally  NEUROLOGY: Awake and alert but not oriented, pt not following commands, unable to perform neurologic exam as pt not following commands but pt able to move all extremities spontaneously   SKIN: No rashes or no palpable lesions        LABS:                                        10.7   5.84  )-----------( 247      ( 02 Jun 2021 07:54 )             34.0       06-02    142  |  104  |  31.7<H>  ----------------------------<  157<H>  4.3   |  28.0  |  1.43<H>    Ca    9.1      02 Jun 2021 07:54  Phos  3.3     06-01  Mg     1.8     06-01    TPro  6.4<L>  /  Alb  3.3  /  TBili  0.4  /  DBili  x   /  AST  14  /  ALT  8   /  AlkPhos  69  06-01      CAPILLARY BLOOD GLUCOSE      POCT Blood Glucose.: 189 mg/dL (29 May 2021 20:48)  POCT Blood Glucose.: 187 mg/dL (29 May 2021 16:38)  POCT Blood Glucose.: 345 mg/dL (29 May 2021 11:37)        RADIOLOGY & ADDITIONAL TESTS:          MEDICATIONS  (STANDING):  amLODIPine   Tablet 10 milliGRAM(s) Oral daily  ARIPiprazole 5 milliGRAM(s) Oral daily  atorvastatin 40 milliGRAM(s) Oral at bedtime  benztropine 0.5 milliGRAM(s) Oral two times a day  dextrose 40% Gel 15 Gram(s) Oral once  dextrose 5%. 1000 milliLiter(s) (50 mL/Hr) IV Continuous <Continuous>  dextrose 5%. 1000 milliLiter(s) (100 mL/Hr) IV Continuous <Continuous>  dextrose 50% Injectable 25 Gram(s) IV Push once  dextrose 50% Injectable 12.5 Gram(s) IV Push once  dextrose 50% Injectable 25 Gram(s) IV Push once  fluvoxaMINE 150 milliGRAM(s) Oral at bedtime  folic acid 1 milliGRAM(s) Oral daily  glucagon  Injectable 1 milliGRAM(s) IntraMuscular once  insulin glargine Injectable (LANTUS) 5 Unit(s) SubCutaneous at bedtime  insulin lispro (ADMELOG) corrective regimen sliding scale   SubCutaneous three times a day before meals  insulin lispro Injectable (ADMELOG) 2 Unit(s) SubCutaneous three times a day before meals  metoprolol tartrate 25 milliGRAM(s) Oral two times a day    MEDICATIONS  (PRN):  hydrALAZINE Injectable 10 milliGRAM(s) IV Push every 8 hours PRN Elevated BP

## 2021-06-02 NOTE — OCCUPATIONAL THERAPY INITIAL EVALUATION ADULT - LEVEL OF CONSCIOUSNESS, OT EVAL
pt requires cues throughout to keep eyes open/attend to tasks (RN aware of pt lethargy/fatigue and reports has been wide awake last two nights and has not slept)/lethargic/somnolent

## 2021-06-03 LAB
ALBUMIN SERPL ELPH-MCNC: 3.1 G/DL — LOW (ref 3.3–5.2)
ALP SERPL-CCNC: 56 U/L — SIGNIFICANT CHANGE UP (ref 40–120)
ALT FLD-CCNC: 8 U/L — SIGNIFICANT CHANGE UP
ANION GAP SERPL CALC-SCNC: 10 MMOL/L — SIGNIFICANT CHANGE UP (ref 5–17)
AST SERPL-CCNC: 13 U/L — SIGNIFICANT CHANGE UP
BASOPHILS # BLD AUTO: 0.05 K/UL — SIGNIFICANT CHANGE UP (ref 0–0.2)
BASOPHILS NFR BLD AUTO: 0.7 % — SIGNIFICANT CHANGE UP (ref 0–2)
BILIRUB SERPL-MCNC: 0.3 MG/DL — LOW (ref 0.4–2)
BUN SERPL-MCNC: 30.4 MG/DL — HIGH (ref 8–20)
CALCIUM SERPL-MCNC: 8.7 MG/DL — SIGNIFICANT CHANGE UP (ref 8.6–10.2)
CHLORIDE SERPL-SCNC: 104 MMOL/L — SIGNIFICANT CHANGE UP (ref 98–107)
CO2 SERPL-SCNC: 27 MMOL/L — SIGNIFICANT CHANGE UP (ref 22–29)
CREAT SERPL-MCNC: 1.35 MG/DL — HIGH (ref 0.5–1.3)
EOSINOPHIL # BLD AUTO: 0.07 K/UL — SIGNIFICANT CHANGE UP (ref 0–0.5)
EOSINOPHIL NFR BLD AUTO: 1 % — SIGNIFICANT CHANGE UP (ref 0–6)
GLUCOSE BLDC GLUCOMTR-MCNC: 116 MG/DL — HIGH (ref 70–99)
GLUCOSE BLDC GLUCOMTR-MCNC: 125 MG/DL — HIGH (ref 70–99)
GLUCOSE BLDC GLUCOMTR-MCNC: 180 MG/DL — HIGH (ref 70–99)
GLUCOSE BLDC GLUCOMTR-MCNC: 245 MG/DL — HIGH (ref 70–99)
GLUCOSE SERPL-MCNC: 137 MG/DL — HIGH (ref 70–99)
HCT VFR BLD CALC: 33.5 % — LOW (ref 34.5–45)
HGB BLD-MCNC: 10.2 G/DL — LOW (ref 11.5–15.5)
IMM GRANULOCYTES NFR BLD AUTO: 0.1 % — SIGNIFICANT CHANGE UP (ref 0–1.5)
LYMPHOCYTES # BLD AUTO: 1.3 K/UL — SIGNIFICANT CHANGE UP (ref 1–3.3)
LYMPHOCYTES # BLD AUTO: 19.5 % — SIGNIFICANT CHANGE UP (ref 13–44)
MCHC RBC-ENTMCNC: 29.1 PG — SIGNIFICANT CHANGE UP (ref 27–34)
MCHC RBC-ENTMCNC: 30.4 GM/DL — LOW (ref 32–36)
MCV RBC AUTO: 95.4 FL — SIGNIFICANT CHANGE UP (ref 80–100)
MONOCYTES # BLD AUTO: 1.1 K/UL — HIGH (ref 0–0.9)
MONOCYTES NFR BLD AUTO: 16.5 % — HIGH (ref 2–14)
NEUTROPHILS # BLD AUTO: 4.14 K/UL — SIGNIFICANT CHANGE UP (ref 1.8–7.4)
NEUTROPHILS NFR BLD AUTO: 62.2 % — SIGNIFICANT CHANGE UP (ref 43–77)
PLATELET # BLD AUTO: 234 K/UL — SIGNIFICANT CHANGE UP (ref 150–400)
POTASSIUM SERPL-MCNC: 4.3 MMOL/L — SIGNIFICANT CHANGE UP (ref 3.5–5.3)
POTASSIUM SERPL-SCNC: 4.3 MMOL/L — SIGNIFICANT CHANGE UP (ref 3.5–5.3)
PROT SERPL-MCNC: 5.9 G/DL — LOW (ref 6.6–8.7)
RBC # BLD: 3.51 M/UL — LOW (ref 3.8–5.2)
RBC # FLD: 12.3 % — SIGNIFICANT CHANGE UP (ref 10.3–14.5)
SODIUM SERPL-SCNC: 141 MMOL/L — SIGNIFICANT CHANGE UP (ref 135–145)
WBC # BLD: 6.67 K/UL — SIGNIFICANT CHANGE UP (ref 3.8–10.5)
WBC # FLD AUTO: 6.67 K/UL — SIGNIFICANT CHANGE UP (ref 3.8–10.5)

## 2021-06-03 PROCEDURE — 99233 SBSQ HOSP IP/OBS HIGH 50: CPT

## 2021-06-03 RX ADMIN — Medication 25 MILLIGRAM(S): at 18:17

## 2021-06-03 RX ADMIN — CHLORHEXIDINE GLUCONATE 15 MILLILITER(S): 213 SOLUTION TOPICAL at 17:17

## 2021-06-03 RX ADMIN — Medication 2: at 17:15

## 2021-06-03 RX ADMIN — Medication 2 UNIT(S): at 17:15

## 2021-06-03 RX ADMIN — Medication 2 UNIT(S): at 13:27

## 2021-06-03 RX ADMIN — FLUVOXAMINE MALEATE 150 MILLIGRAM(S): 25 TABLET ORAL at 21:37

## 2021-06-03 RX ADMIN — Medication 500000 UNIT(S): at 13:29

## 2021-06-03 RX ADMIN — Medication 2 UNIT(S): at 09:12

## 2021-06-03 RX ADMIN — Medication 500000 UNIT(S): at 17:17

## 2021-06-03 RX ADMIN — ATORVASTATIN CALCIUM 40 MILLIGRAM(S): 80 TABLET, FILM COATED ORAL at 21:24

## 2021-06-03 RX ADMIN — CEFTRIAXONE 100 MILLIGRAM(S): 500 INJECTION, POWDER, FOR SOLUTION INTRAMUSCULAR; INTRAVENOUS at 10:44

## 2021-06-03 RX ADMIN — Medication 0.5 MILLIGRAM(S): at 18:17

## 2021-06-03 RX ADMIN — Medication 1 MILLIGRAM(S): at 13:29

## 2021-06-03 RX ADMIN — INSULIN GLARGINE 5 UNIT(S): 100 INJECTION, SOLUTION SUBCUTANEOUS at 21:37

## 2021-06-03 RX ADMIN — ARIPIPRAZOLE 10 MILLIGRAM(S): 15 TABLET ORAL at 13:29

## 2021-06-03 RX ADMIN — SODIUM CHLORIDE 65 MILLILITER(S): 9 INJECTION, SOLUTION INTRAVENOUS at 05:21

## 2021-06-03 NOTE — PROGRESS NOTE ADULT - SUBJECTIVE AND OBJECTIVE BOX
Patient appears more calm and less confused.  Aware she is in the hospital.  Reports no pain.     REVIEW OF SYSTEMS  Constitutional - No fever,  +fatigue  Neurological - No headaches, +memory loss, +loss of strength, No numbness, No tremors  Musculoskeletal - No joint pain, No joint swelling, No muscle pain  Psychiatric - No depression, No anxiety    FUNCTIONAL PROGRESS    Bed Mobility: Rolling/Turning:     · Level of [place]	moderate assist (50% patients effort)  · Physical Assist/Nonphysical Assist	1 person assist; nonverbal cues (demo/gestures); verbal cues  · Assistive Device	bed rails    Bed Mobility: Scooting/Bridging:     · Level of [place]	maximum assist (25% patients effort); scooting  · Physical Assist/Nonphysical Assist	1 person assist; nonverbal cues (demo/gestures); verbal cues    Bed Mobility: Sit to Supine:     · Level of [place]	maximum assist (25% patients effort)  · Physical Assist/Nonphysical Assist	1 person assist; nonverbal cues (demo/gestures); verbal cues  · Assistive Device	elevated head of bed    Bed Mobility: Supine to Sit:     · Level of [place]	maximum assist (25% patients effort)  · Physical Assist/Nonphysical Assist	1 person assist; nonverbal cues (demo/gestures); verbal cues  · Assistive Device	bed rails; elevated head of bed    Transfer: Sit to Stand:     · Level of [place]	unable to perform; pt extremely lethargic and unable to keep eyes open despite maximal cues; pt also with urine incontinence requiring full hygiene    Transfer: Stand to Sit:     · Level of [place]	pt extremely lethargic and unable to keep eyes open despite maximal cues; pt also with urine incontinence requiring full hygiene    Bathing Training:     · Level of [place]	maximum assist (25% patients effort); sponge in semi-buck position in bed  · Physical Assist/Nonphysical Assist	1 person assist; nonverbal cues (demo/gestures); verbal cues; set-up required    Upper Body Dressing Training:     · Level of [place]	moderate assist (50% patients effort); gown in semi-buck position in bed  · Physical Assist/Nonphysical Assist	1 person assist; nonverbal cues (demo/gestures); verbal cues    Lower Body Dressing Training:     · Level of [place]	dependent (less than 25% patients effort); socks in semi-buck position in bed  · Physical Assist/Nonphysical Assist	1 person assist; verbal cues; nonverbal cues (demo/gestures)    Toilet Hygiene Training:     · Level of [place]	dependent (less than 25% patients effort); (+) Primafit; pt with urinary incontinence requiring assistance for hygiene (RN present at bedside)  · Physical Assist/Nonphysical Assist	1 person assist    Grooming Training:     · Level of [place]	moderate assist (50% patients effort); tasks in semi-buck position in bed  · Physical Assist/Nonphysical Assist	1 person assist; nonverbal cues (demo/gestures); verbal cues; set-up required    Eating/Self-Feeding Training:     · Level of [place]	not observed    VITALS  T(C): 36.9 (21 @ 04:15), Max: 37.2 (21 @ 21:01)  HR: 68 (21 @ 04:15) (64 - 72)  BP: 127/67 (21 @ 04:15) (126/70 - 145/125)  RR: 17 (21 @ 04:15) (17 - 20)  SpO2: 92% (21 @ 04:15) (84% - 95%)  Wt(kg): --    MEDICATIONS   amLODIPine   Tablet 10 milliGRAM(s) daily  ARIPiprazole 10 milliGRAM(s) daily  atorvastatin 40 milliGRAM(s) at bedtime  benztropine 0.5 milliGRAM(s) two times a day  cefTRIAXone   IVPB 1000 milliGRAM(s) every 24 hours  cefTRIAXone   IVPB     chlorhexidine 0.12% Liquid 15 milliLiter(s) two times a day  dextrose 40% Gel 15 Gram(s) once  dextrose 5%. 1000 milliLiter(s) <Continuous>  dextrose 5%. 1000 milliLiter(s) <Continuous>  dextrose 50% Injectable 25 Gram(s) once  dextrose 50% Injectable 12.5 Gram(s) once  dextrose 50% Injectable 25 Gram(s) once  fluvoxaMINE 150 milliGRAM(s) at bedtime  folic acid 1 milliGRAM(s) daily  glucagon  Injectable 1 milliGRAM(s) once  hydrALAZINE Injectable 10 milliGRAM(s) every 8 hours PRN  insulin glargine Injectable (LANTUS) 5 Unit(s) at bedtime  insulin lispro (ADMELOG) corrective regimen sliding scale   three times a day before meals  insulin lispro Injectable (ADMELOG) 2 Unit(s) three times a day before meals  metoprolol tartrate 25 milliGRAM(s) two times a day  nystatin    Suspension 656183 Unit(s) four times a day      RECENT LABS/IMAGING                          10.2   6.67  )-----------( 234      ( 2021 06:20 )             33.5     06-    141  |  104  |  30.4<H>  ----------------------------<  137<H>  4.3   |  27.0  |  1.35<H>    Ca    8.7      2021 06:20    TPro  5.9<L>  /  Alb  3.1<L>  /  TBili  0.3<L>  /  DBili  x   /  AST  13  /  ALT  8   /  AlkPhos  56  06-03      Urinalysis Basic - ( 2021 05:57 )    Color: Yellow / Appearance: Turbid / S.010 / pH: x  Gluc: x / Ketone: Negative  / Bili: Negative / Urobili: Negative   Blood: x / Protein: 100 / Nitrite: Negative   Leuk Esterase: Moderate / RBC: 6-10 /HPF / WBC >50   Sq Epi: x / Non Sq Epi: Few / Bacteria: Moderate                HEAD CT  - Multiple axial sections were performed from base of skull to vertex without contrast enhancement. Coronal and sagittal reconstructions were performed as well This exam is compared with prior noncontrast head CT performed on 2015. Increased parenchymal volume loss and chronic microvascular ischemic change identified Right parietal shunt catheter is again seen and unchanged in position. Areas of surrounding gliosis is identified. There is a new focus of high attenuation seen involving the right anterior perisylvian region. This finding measures approximately 0.8 cm and demonstrates some surrounding edema. This is compatible with a small area of acute parenchymal hemorrhage. No significant shift or herniation seen Evaluation of the osseous structures with appropriate window contrast hyperostosis frontalis interna. The visualized paranasal sinuses mastoid and appear clear. Calcification is seen involving both distal carotid and vertebral arteries. Small left maxillary polyp versus retention cyst is seen. Both mastoids and middle ear regions appear clear. IMPRESSION: New area of acute parenchymal hemorrhage as described above.    HEAD CT  - 1)  stable follow-up study with no interval change. Small focal hemorrhage again noted in the right lateral basal ganglia and subinsular region. 2)  right sided ventriculostomy again noted. Involutional changes with volume loss. Right parietal gliosis and encephalomalacia. Mild chronic ischemic changes both cerebellar hemispheres. These findings are well seen on prior study.    HEAD CT  -  Stable 5 mm right basal ganglia hemorrhage. No new hemorrhage.    HEAD CT  - No change since 2021. Small right basal ganglia hemorrhage. Right-sided  shunt. Small vessel white matter ischemic changes.    HEAD CT  - . Tiny hyperdensity in the right lateral basal ganglia has slightly decreased in attenuation with diminishing surrounding edema. There is no new hemorrhage or cortical edema, mass effect or hydrocephalus. A ventricular catheter remains in place. There is mild generalized volume loss, central worse than peripheral cortical. There is mild stable left cerebellar encephalomalacia.    ----------------------------------------------------------------------------------------  PHYSICAL EXAM  Constitutional - NAD, Comfortable, Poor oral care  Extremities - No C/C/E, No calf tenderness   Neurologic Exam -                    Cognitive - AAOx self, hospital, NOT situation     Communication - Expressive deficits, Dysarthric     Cranial Nerves - Left lip droop     Motor -                     LEFT    UE - ShAB 4/5, EF 4/5, EE 5/5,  5/5                    RIGHT UE - ShAB 4/5, EF 4/5, EE 5/5,  5/5                    LEFT    LE - HF 1/5, KE 1/5, DF 3/5                     RIGHT LE - HF 1/5, KE 1/5, DF 3/5      Sensory - Intact to LT     Coordination - FTN impaired  Psychiatric - Mood calm   ----------------------------------------------------------------------------------------  ASSESSMENT/PLAN  81yFemale with functional deficits after an IPH  Right BG ICH - Stable  CAD - Lipitor  HTN - Hydralazine, Norvasc, Lopressor  DM2 - Lantus, Lispro  UTI - Rocephin  Oral Hygiene - Aggressive oral care, Nystatin (last ), Peridex ()  Mood - Abilify, Cogentin, Luvox, Would benefit from Psych to assist medication optimization  Pain - Recommend Tylenol  DVT PPX - SCDs  Rehab - Patient was lethargic yesterday and may explain her functional assessment by OT. If family can secure care for discharge to home after rehab, would benefit from AR. Otherwise, will need JULIEN. Will continue to follow and current recommendations may change if functional progress changes.    Continue mobilization by staff to maintain cardiopulmonary function and prevention of secondary complications related to debility.     Discussed with rehab team.

## 2021-06-03 NOTE — PROGRESS NOTE ADULT - ASSESSMENT
82 y/o F w/ PMH of DVT (on Eliquis), NIDDM, glaucoma, HLD, HTN, hs of NPH s/p  shunt 2015 sent ot ED for +CTH w/ ICH.  CTH was done outpt after c/o dizziness and unsteadness/falling over to left side.  Pt denies head trauma.  Evaluated by neurosurgery but no intervention planned.  CTH stable x4.  Pt confused overnight and UA ordered and found to have pyuria; suspect UTI as cause of change in mentation.        Right basal ganglia hemorrhage   - Discontinued Eliquis on admission   - CTH x4 ICH stable and neurologically unchanged from yesterday  - No intervention necessary as per neurosurgery but if ICH larger will contact neurosurgery to reassess  - c/w neuro checks and vital signs q4h   - Goal BP <140/90 and c/w prn IV labetalol to maintain BP at goal  - HOB elevated to 30 degrees   - PT/OT/ST evaluations noted  - Neurology and neurosurgery on board      Acute metabolic encephalopathy 2/2 UTI  - UA w/ pyuria, Ucx pending  - c/w empiric antibiotics pending culture and sensitivities  - No leukocytosis but will monitor CBC daily   - Tylenol prn if febrile      Hx of DVT in September  - Repeat duplex during this admission negative  - PO eliquis discontinued in light of acute ICH  - Given negative doppler and risk/benefit assessment pt should remain off AC      Hypertensive Crisis, improving  - Goal BP <140/90 in light of ICH  - c/w po labetalol and IV prn  - Monitor BP closely       NIDDM w/ hyperglycemia, improving   - A1c 7.7  - Hold metformin while hospitalized  - c/w basal bolus regimen and titrate to maintain BG <180  - ISS and hypoglycemic protocol in place      Bipolar disorder   - Stable  - c/w current medications      HLD  - c/w statin therapy       VTE ppx: no chemical prophylaxis given acute ICH.  c/w SCDs    Dispo: No plans for discharge at this time.    Adult rehab medicine consult reviewed and per recs if family can secure care for discharge to home after rehab, would benefit from AR. Otherwise, will need JULIEN.

## 2021-06-04 ENCOUNTER — TRANSCRIPTION ENCOUNTER (OUTPATIENT)
Age: 81
End: 2021-06-04

## 2021-06-04 LAB
-  AMIKACIN: SIGNIFICANT CHANGE UP
-  AMOXICILLIN/CLAVULANIC ACID: SIGNIFICANT CHANGE UP
-  AMPICILLIN/SULBACTAM: SIGNIFICANT CHANGE UP
-  AMPICILLIN: SIGNIFICANT CHANGE UP
-  AZTREONAM: SIGNIFICANT CHANGE UP
-  CEFAZOLIN: SIGNIFICANT CHANGE UP
-  CEFEPIME: SIGNIFICANT CHANGE UP
-  CEFOXITIN: SIGNIFICANT CHANGE UP
-  CEFTRIAXONE: SIGNIFICANT CHANGE UP
-  CIPROFLOXACIN: SIGNIFICANT CHANGE UP
-  ERTAPENEM: SIGNIFICANT CHANGE UP
-  GENTAMICIN: SIGNIFICANT CHANGE UP
-  IMIPENEM: SIGNIFICANT CHANGE UP
-  LEVOFLOXACIN: SIGNIFICANT CHANGE UP
-  MEROPENEM: SIGNIFICANT CHANGE UP
-  NITROFURANTOIN: SIGNIFICANT CHANGE UP
-  PIPERACILLIN/TAZOBACTAM: SIGNIFICANT CHANGE UP
-  TIGECYCLINE: SIGNIFICANT CHANGE UP
-  TOBRAMYCIN: SIGNIFICANT CHANGE UP
-  TRIMETHOPRIM/SULFAMETHOXAZOLE: SIGNIFICANT CHANGE UP
ANION GAP SERPL CALC-SCNC: 8 MMOL/L — SIGNIFICANT CHANGE UP (ref 5–17)
BUN SERPL-MCNC: 31.3 MG/DL — HIGH (ref 8–20)
CALCIUM SERPL-MCNC: 8.8 MG/DL — SIGNIFICANT CHANGE UP (ref 8.6–10.2)
CHLORIDE SERPL-SCNC: 103 MMOL/L — SIGNIFICANT CHANGE UP (ref 98–107)
CO2 SERPL-SCNC: 29 MMOL/L — SIGNIFICANT CHANGE UP (ref 22–29)
CREAT SERPL-MCNC: 1.47 MG/DL — HIGH (ref 0.5–1.3)
CULTURE RESULTS: SIGNIFICANT CHANGE UP
GLUCOSE BLDC GLUCOMTR-MCNC: 139 MG/DL — HIGH (ref 70–99)
GLUCOSE BLDC GLUCOMTR-MCNC: 156 MG/DL — HIGH (ref 70–99)
GLUCOSE BLDC GLUCOMTR-MCNC: 165 MG/DL — HIGH (ref 70–99)
GLUCOSE BLDC GLUCOMTR-MCNC: 193 MG/DL — HIGH (ref 70–99)
GLUCOSE SERPL-MCNC: 159 MG/DL — HIGH (ref 70–99)
HCT VFR BLD CALC: 33.1 % — LOW (ref 34.5–45)
HGB BLD-MCNC: 10.2 G/DL — LOW (ref 11.5–15.5)
MCHC RBC-ENTMCNC: 29.6 PG — SIGNIFICANT CHANGE UP (ref 27–34)
MCHC RBC-ENTMCNC: 30.8 GM/DL — LOW (ref 32–36)
MCV RBC AUTO: 95.9 FL — SIGNIFICANT CHANGE UP (ref 80–100)
METHOD TYPE: SIGNIFICANT CHANGE UP
ORGANISM # SPEC MICROSCOPIC CNT: SIGNIFICANT CHANGE UP
ORGANISM # SPEC MICROSCOPIC CNT: SIGNIFICANT CHANGE UP
PLATELET # BLD AUTO: 240 K/UL — SIGNIFICANT CHANGE UP (ref 150–400)
POTASSIUM SERPL-MCNC: 4.1 MMOL/L — SIGNIFICANT CHANGE UP (ref 3.5–5.3)
POTASSIUM SERPL-SCNC: 4.1 MMOL/L — SIGNIFICANT CHANGE UP (ref 3.5–5.3)
RBC # BLD: 3.45 M/UL — LOW (ref 3.8–5.2)
RBC # FLD: 12.2 % — SIGNIFICANT CHANGE UP (ref 10.3–14.5)
SODIUM SERPL-SCNC: 140 MMOL/L — SIGNIFICANT CHANGE UP (ref 135–145)
SPECIMEN SOURCE: SIGNIFICANT CHANGE UP
WBC # BLD: 6.62 K/UL — SIGNIFICANT CHANGE UP (ref 3.8–10.5)
WBC # FLD AUTO: 6.62 K/UL — SIGNIFICANT CHANGE UP (ref 3.8–10.5)

## 2021-06-04 PROCEDURE — 99232 SBSQ HOSP IP/OBS MODERATE 35: CPT

## 2021-06-04 PROCEDURE — 99223 1ST HOSP IP/OBS HIGH 75: CPT

## 2021-06-04 PROCEDURE — 99233 SBSQ HOSP IP/OBS HIGH 50: CPT

## 2021-06-04 RX ORDER — APIXABAN 2.5 MG/1
1 TABLET, FILM COATED ORAL
Qty: 0 | Refills: 0 | DISCHARGE

## 2021-06-04 RX ORDER — BENZTROPINE MESYLATE 1 MG
1 TABLET ORAL
Qty: 0 | Refills: 0 | DISCHARGE

## 2021-06-04 RX ORDER — AMLODIPINE BESYLATE 2.5 MG/1
1 TABLET ORAL
Qty: 0 | Refills: 0 | DISCHARGE
Start: 2021-06-04

## 2021-06-04 RX ORDER — DESVENLAFAXINE 50 MG/1
25 TABLET, EXTENDED RELEASE ORAL DAILY
Refills: 0 | Status: DISCONTINUED | OUTPATIENT
Start: 2021-06-04 | End: 2021-06-04

## 2021-06-04 RX ORDER — ARIPIPRAZOLE 15 MG/1
1 TABLET ORAL
Qty: 0 | Refills: 0 | DISCHARGE
Start: 2021-06-04

## 2021-06-04 RX ORDER — METOPROLOL TARTRATE 50 MG
1 TABLET ORAL
Qty: 0 | Refills: 0 | DISCHARGE
Start: 2021-06-04

## 2021-06-04 RX ORDER — SODIUM CHLORIDE 9 MG/ML
1000 INJECTION, SOLUTION INTRAVENOUS
Refills: 0 | Status: COMPLETED | OUTPATIENT
Start: 2021-06-04 | End: 2021-06-04

## 2021-06-04 RX ADMIN — Medication 500000 UNIT(S): at 11:44

## 2021-06-04 RX ADMIN — FLUVOXAMINE MALEATE 150 MILLIGRAM(S): 25 TABLET ORAL at 22:22

## 2021-06-04 RX ADMIN — CEFTRIAXONE 100 MILLIGRAM(S): 500 INJECTION, POWDER, FOR SOLUTION INTRAMUSCULAR; INTRAVENOUS at 11:38

## 2021-06-04 RX ADMIN — Medication 500000 UNIT(S): at 05:17

## 2021-06-04 RX ADMIN — ARIPIPRAZOLE 10 MILLIGRAM(S): 15 TABLET ORAL at 11:43

## 2021-06-04 RX ADMIN — Medication 2 UNIT(S): at 17:49

## 2021-06-04 RX ADMIN — Medication 25 MILLIGRAM(S): at 05:15

## 2021-06-04 RX ADMIN — INSULIN GLARGINE 5 UNIT(S): 100 INJECTION, SOLUTION SUBCUTANEOUS at 22:22

## 2021-06-04 RX ADMIN — Medication 25 MILLIGRAM(S): at 17:51

## 2021-06-04 RX ADMIN — Medication 2: at 17:49

## 2021-06-04 RX ADMIN — AMLODIPINE BESYLATE 10 MILLIGRAM(S): 2.5 TABLET ORAL at 05:15

## 2021-06-04 RX ADMIN — SODIUM CHLORIDE 70 MILLILITER(S): 9 INJECTION, SOLUTION INTRAVENOUS at 11:36

## 2021-06-04 RX ADMIN — ATORVASTATIN CALCIUM 40 MILLIGRAM(S): 80 TABLET, FILM COATED ORAL at 22:22

## 2021-06-04 RX ADMIN — Medication 1 MILLIGRAM(S): at 11:43

## 2021-06-04 RX ADMIN — Medication 2 UNIT(S): at 09:12

## 2021-06-04 RX ADMIN — Medication 0.5 MILLIGRAM(S): at 05:15

## 2021-06-04 RX ADMIN — CHLORHEXIDINE GLUCONATE 15 MILLILITER(S): 213 SOLUTION TOPICAL at 17:51

## 2021-06-04 RX ADMIN — Medication 2: at 11:49

## 2021-06-04 RX ADMIN — Medication 500000 UNIT(S): at 17:51

## 2021-06-04 RX ADMIN — Medication 2: at 09:13

## 2021-06-04 RX ADMIN — Medication 2 UNIT(S): at 11:48

## 2021-06-04 RX ADMIN — CHLORHEXIDINE GLUCONATE 15 MILLILITER(S): 213 SOLUTION TOPICAL at 05:16

## 2021-06-04 NOTE — SWALLOW BEDSIDE ASSESSMENT ADULT - SWALLOW EVAL: CRITERIA FOR SKILLED INTERVENTION MET
will follow to complete speech/language eval/demonstrates skilled criteria for swallowing intervention

## 2021-06-04 NOTE — DISCHARGE NOTE PROVIDER - NSDCQMANTICOAGCONTRA_GEN_A_CORE
Patient does not have atrial fibrillation/flutter Patient does not have atrial fibrillation/flutter/Intracranial Hemorrhage

## 2021-06-04 NOTE — DIETITIAN INITIAL EVALUATION ADULT. - PERTINENT LABORATORY DATA
06-04 Na140 mmol/L Glu 159 mg/dL<H> K+ 4.1 mmol/L Cr  1.47 mg/dL<H> BUN 31.3 mg/dL<H> Phos n/a   Alb n/a   PAB n/a

## 2021-06-04 NOTE — PROGRESS NOTE ADULT - SUBJECTIVE AND OBJECTIVE BOX
Chief Complaint:  ICH    SUBJECTIVE / OVERNIGHT EVENTS: No acute overnight events reported.  Pt mentation near baseline.  Pt offers no acute complaints this morning.          I&O's Summary    28 May 2021 07:01  -  29 May 2021 07:00  --------------------------------------------------------  IN: 0 mL / OUT: 600 mL / NET: -600 mL          PHYSICAL EXAM:  Vital Signs Last 24 Hrs  T(C): 37.4 (04 Jun 2021 04:37), Max: 37.4 (03 Jun 2021 20:03)  T(F): 99.3 (04 Jun 2021 04:37), Max: 99.3 (03 Jun 2021 20:03)  HR: 63 (04 Jun 2021 04:37) (60 - 63)  BP: 145/62 (04 Jun 2021 04:37) (118/55 - 145/62)  BP(mean): --  RR: 19 (04 Jun 2021 04:37) (18 - 19)  SpO2: 92% (04 Jun 2021 04:37) (88% - 93%)      GENERAL: pt examined bedside, laying comfortably in bed in NAD  HEENT: NC/AT, moist oral mucosa, clear conjunctiva, sclera nonicteric  RESPIRATORY: Normal respiratory effort; CTA b/l, no wheezing, rhonchi, rales  CARDIOVASCULAR: RRR, normal S1 and S, 2/6 systolic murmur  ABDOMEN: soft, NT/ND, normoactive bowel sounds, no rebound/guarding  EXTREMITIES: No cynaosis, no clubbing, no lower extremity edema; Peripheral pulses are 2+ bilaterally  NEUROLOGY: AAO to self, place but not time.  Following commands.  Strength and sensation intact, CNII-XII intact    SKIN: No rashes or no palpable lesions        LABS:                                 10.2   6.62  )-----------( 240      ( 04 Jun 2021 08:02 )             33.1     06-04    140  |  103  |  31.3<H>  ----------------------------<  159<H>  4.1   |  29.0  |  1.47<H>    Ca    8.8      04 Jun 2021 08:02    TPro  5.9<L>  /  Alb  3.1<L>  /  TBili  0.3<L>  /  DBili  x   /  AST  13  /  ALT  8   /  AlkPhos  56  06-03               CAPILLARY BLOOD GLUCOSE      POCT Blood Glucose.: 189 mg/dL (29 May 2021 20:48)  POCT Blood Glucose.: 187 mg/dL (29 May 2021 16:38)  POCT Blood Glucose.: 345 mg/dL (29 May 2021 11:37)        RADIOLOGY & ADDITIONAL TESTS:          MEDICATIONS  (STANDING):  amLODIPine   Tablet 10 milliGRAM(s) Oral daily  ARIPiprazole 5 milliGRAM(s) Oral daily  atorvastatin 40 milliGRAM(s) Oral at bedtime  benztropine 0.5 milliGRAM(s) Oral two times a day  dextrose 40% Gel 15 Gram(s) Oral once  dextrose 5%. 1000 milliLiter(s) (50 mL/Hr) IV Continuous <Continuous>  dextrose 5%. 1000 milliLiter(s) (100 mL/Hr) IV Continuous <Continuous>  dextrose 50% Injectable 25 Gram(s) IV Push once  dextrose 50% Injectable 12.5 Gram(s) IV Push once  dextrose 50% Injectable 25 Gram(s) IV Push once  fluvoxaMINE 150 milliGRAM(s) Oral at bedtime  folic acid 1 milliGRAM(s) Oral daily  glucagon  Injectable 1 milliGRAM(s) IntraMuscular once  insulin glargine Injectable (LANTUS) 5 Unit(s) SubCutaneous at bedtime  insulin lispro (ADMELOG) corrective regimen sliding scale   SubCutaneous three times a day before meals  insulin lispro Injectable (ADMELOG) 2 Unit(s) SubCutaneous three times a day before meals  metoprolol tartrate 25 milliGRAM(s) Oral two times a day    MEDICATIONS  (PRN):  hydrALAZINE Injectable 10 milliGRAM(s) IV Push every 8 hours PRN Elevated BP

## 2021-06-04 NOTE — SWALLOW BEDSIDE ASSESSMENT ADULT - SWALLOW EVAL: RECOMMENDED FEEDING/EATING TECHNIQUES
alternate food with liquid/check mouth frequently for oral residue/pocketing/crush medication (when feasible)/maintain upright posture during/after eating for 30 mins/oral hygiene/position upright (90 degrees)/small sips/bites

## 2021-06-04 NOTE — CHART NOTE - NSCHARTNOTEFT_GEN_A_CORE
I called Infectious Disease MD to consult this patient with + UA culture showing Klebsiella Varicola I called Infectious Disease MD to consult this patient with + UA culture showing Klebsiella Variicola. Patient being treated with Rocephin. Requesting ID to determine appropriate antibiotic to treat this type infection. Dr. Pemberton will arrive today to consult patient.

## 2021-06-04 NOTE — SWALLOW BEDSIDE ASSESSMENT ADULT - ORAL PHASE
Within functional limits absent mastication of mech soft 2* reduced cognition/Stasis in anterior sulcus

## 2021-06-04 NOTE — PROGRESS NOTE ADULT - SUBJECTIVE AND OBJECTIVE BOX
Patient more confused.   States she is in "[facility]".  Reports no pain.   Able to follow commands.     REVIEW OF SYSTEMS  Constitutional - No fever,  +fatigue  HEENT - No vertigo, No neck pain  Neurological - No headaches, +memory loss, +loss of strength, No numbness, No tremors  Musculoskeletal - No joint pain, No joint swelling, No muscle pain  Psychiatric - No depression, No anxiety    FUNCTIONAL PROGRESS  6/3  Bed Mobility  Bed Mobility Training Sit-to-Supine: maximum assist (25% patient effort);  1 person assist  Bed Mobility Training Supine-to-Sit: maximum assist (25% patient effort);  1 person assist;  verbal cues to encourage pt to maintain eyes open during transfer. During transfer pt began to cough, noted unchewed pasta in pt's mouth. Therapist removed food from pt's mouth. pt required mod to max A x 1 to maintain upright posture due to fatigue.   Bed Mobility Training Limitations: decreased ability to use arms for pushing/pulling;  decreased ability to use legs for bridging/pushing;  decreased strength;  impaired balance;  cognitive, decreased safety awareness;  decreased endurance    Sit-Stand Transfer Training  Transfer Training Sit-to-Stand Transfer: unable to perform;  unsafe to perform due to pt being lethargic.       VITALS  T(C): 37.4 (06-04-21 @ 04:37), Max: 37.4 (06-03-21 @ 20:03)  HR: 63 (06-04-21 @ 04:37) (60 - 66)  BP: 145/62 (06-04-21 @ 04:37) (118/55 - 145/62)  RR: 19 (06-04-21 @ 04:37) (18 - 19)  SpO2: 92% (06-04-21 @ 04:37) (88% - 95%)  Wt(kg): --    MEDICATIONS   amLODIPine   Tablet 10 milliGRAM(s) daily  ARIPiprazole 10 milliGRAM(s) daily  atorvastatin 40 milliGRAM(s) at bedtime  benztropine 0.5 milliGRAM(s) two times a day  cefTRIAXone   IVPB 1000 milliGRAM(s) every 24 hours  cefTRIAXone   IVPB     chlorhexidine 0.12% Liquid 15 milliLiter(s) two times a day  dextrose 40% Gel 15 Gram(s) once  dextrose 5%. 1000 milliLiter(s) <Continuous>  dextrose 5%. 1000 milliLiter(s) <Continuous>  dextrose 50% Injectable 25 Gram(s) once  dextrose 50% Injectable 12.5 Gram(s) once  dextrose 50% Injectable 25 Gram(s) once  fluvoxaMINE 150 milliGRAM(s) at bedtime  folic acid 1 milliGRAM(s) daily  glucagon  Injectable 1 milliGRAM(s) once  hydrALAZINE Injectable 10 milliGRAM(s) every 8 hours PRN  insulin glargine Injectable (LANTUS) 5 Unit(s) at bedtime  insulin lispro (ADMELOG) corrective regimen sliding scale   three times a day before meals  insulin lispro Injectable (ADMELOG) 2 Unit(s) three times a day before meals  metoprolol tartrate 25 milliGRAM(s) two times a day  nystatin    Suspension 708732 Unit(s) four times a day      RECENT LABS/IMAGING                          10.2   6.62  )-----------( 240      ( 04 Jun 2021 08:02 )             33.1     06-03    141  |  104  |  30.4<H>  ----------------------------<  137<H>  4.3   |  27.0  |  1.35<H>    Ca    8.7      03 Jun 2021 06:20    TPro  5.9<L>  /  Alb  3.1<L>  /  TBili  0.3<L>  /  DBili  x   /  AST  13  /  ALT  8   /  AlkPhos  56  06-03    HEAD CT 5/27 - Multiple axial sections were performed from base of skull to vertex without contrast enhancement. Coronal and sagittal reconstructions were performed as well This exam is compared with prior noncontrast head CT performed on March 28, 2015. Increased parenchymal volume loss and chronic microvascular ischemic change identified Right parietal shunt catheter is again seen and unchanged in position. Areas of surrounding gliosis is identified. There is a new focus of high attenuation seen involving the right anterior perisylvian region. This finding measures approximately 0.8 cm and demonstrates some surrounding edema. This is compatible with a small area of acute parenchymal hemorrhage. No significant shift or herniation seen Evaluation of the osseous structures with appropriate window contrast hyperostosis frontalis interna. The visualized paranasal sinuses mastoid and appear clear. Calcification is seen involving both distal carotid and vertebral arteries. Small left maxillary polyp versus retention cyst is seen. Both mastoids and middle ear regions appear clear. IMPRESSION: New area of acute parenchymal hemorrhage as described above.    HEAD CT 5/28 - 1)  stable follow-up study with no interval change. Small focal hemorrhage again noted in the right lateral basal ganglia and subinsular region. 2)  right sided ventriculostomy again noted. Involutional changes with volume loss. Right parietal gliosis and encephalomalacia. Mild chronic ischemic changes both cerebellar hemispheres. These findings are well seen on prior study.    HEAD CT 5/28 -  Stable 5 mm right basal ganglia hemorrhage. No new hemorrhage.    HEAD CT 5/30 - No change since 5/28/2021. Small right basal ganglia hemorrhage. Right-sided  shunt. Small vessel white matter ischemic changes.    HEAD CT 6/1 - . Tiny hyperdensity in the right lateral basal ganglia has slightly decreased in attenuation with diminishing surrounding edema. There is no new hemorrhage or cortical edema, mass effect or hydrocephalus. A ventricular catheter remains in place. There is mild generalized volume loss, central worse than peripheral cortical. There is mild stable left cerebellar encephalomalacia.    ----------------------------------------------------------------------------------------  PHYSICAL EXAM  Constitutional - NAD, Comfortable, Poor oral care  Extremities - No C/C/E, No calf tenderness   Neurologic Exam -                    Cognitive - AAOx self, hospital - not name, NOT situation (thinks she is here for glaucoma)     Communication - Expressive deficits, Dysarthric     Cranial Nerves - Left lip droop     Motor -                     LEFT    UE - ShAB 4/5, EF 4/5, EE 5/5,  5/5                    RIGHT UE - ShAB 4/5, EF 4/5, EE 5/5,  5/5                    LEFT    LE - HF 3/5, KE 3/5, DF 3/5                     RIGHT LE - HF 3/5, KE 3/5, DF 3/5      Sensory - Intact to LT     Coordination - FTN impaired  Psychiatric - Mood calm   ----------------------------------------------------------------------------------------  ASSESSMENT/PLAN  81yFemale with functional deficits after an IPH  Right BG ICH - Stable  CAD - Lipitor  HTN - Hydralazine, Norvasc, Lopressor  DM2 - Lantus, Lispro  UTI - Rocephin  Oral Hygiene - Aggressive oral care, Nystatin (last 6/9), Peridex (6/2)  Mood - Abilify, Cogentin, Luvox   DVT PPX - SCDs  Rehab - Patient more awake, but is confused. Unclear if eventual discharge to home form rehab is possible/has assist. This will determine JULIEN vs AR.     Continue mobilization by staff to maintain cardiopulmonary function and prevention of secondary complications related to debility.     Discussed with rehab team.              Patient more confused.   States she is in "[facility]".  Reports no pain.   Able to follow commands.     REVIEW OF SYSTEMS  Constitutional - No fever,  +fatigue  HEENT - No vertigo, No neck pain  Neurological - No headaches, +memory loss, +loss of strength, No numbness, No tremors  Musculoskeletal - No joint pain, No joint swelling, No muscle pain  Psychiatric - No depression, No anxiety    FUNCTIONAL PROGRESS  6/3  Bed Mobility  Bed Mobility Training Sit-to-Supine: maximum assist (25% patient effort);  1 person assist  Bed Mobility Training Supine-to-Sit: maximum assist (25% patient effort);  1 person assist;  verbal cues to encourage pt to maintain eyes open during transfer. During transfer pt began to cough, noted unchewed pasta in pt's mouth. Therapist removed food from pt's mouth. pt required mod to max A x 1 to maintain upright posture due to fatigue.   Bed Mobility Training Limitations: decreased ability to use arms for pushing/pulling;  decreased ability to use legs for bridging/pushing;  decreased strength;  impaired balance;  cognitive, decreased safety awareness;  decreased endurance    Sit-Stand Transfer Training  Transfer Training Sit-to-Stand Transfer: unable to perform;  unsafe to perform due to pt being lethargic.       VITALS  T(C): 37.4 (06-04-21 @ 04:37), Max: 37.4 (06-03-21 @ 20:03)  HR: 63 (06-04-21 @ 04:37) (60 - 66)  BP: 145/62 (06-04-21 @ 04:37) (118/55 - 145/62)  RR: 19 (06-04-21 @ 04:37) (18 - 19)  SpO2: 92% (06-04-21 @ 04:37) (88% - 95%)  Wt(kg): --    MEDICATIONS   amLODIPine   Tablet 10 milliGRAM(s) daily  ARIPiprazole 10 milliGRAM(s) daily  atorvastatin 40 milliGRAM(s) at bedtime  benztropine 0.5 milliGRAM(s) two times a day  cefTRIAXone   IVPB 1000 milliGRAM(s) every 24 hours  cefTRIAXone   IVPB     chlorhexidine 0.12% Liquid 15 milliLiter(s) two times a day  dextrose 40% Gel 15 Gram(s) once  dextrose 5%. 1000 milliLiter(s) <Continuous>  dextrose 5%. 1000 milliLiter(s) <Continuous>  dextrose 50% Injectable 25 Gram(s) once  dextrose 50% Injectable 12.5 Gram(s) once  dextrose 50% Injectable 25 Gram(s) once  fluvoxaMINE 150 milliGRAM(s) at bedtime  folic acid 1 milliGRAM(s) daily  glucagon  Injectable 1 milliGRAM(s) once  hydrALAZINE Injectable 10 milliGRAM(s) every 8 hours PRN  insulin glargine Injectable (LANTUS) 5 Unit(s) at bedtime  insulin lispro (ADMELOG) corrective regimen sliding scale   three times a day before meals  insulin lispro Injectable (ADMELOG) 2 Unit(s) three times a day before meals  metoprolol tartrate 25 milliGRAM(s) two times a day  nystatin    Suspension 941254 Unit(s) four times a day      RECENT LABS/IMAGING                          10.2   6.62  )-----------( 240      ( 04 Jun 2021 08:02 )             33.1     06-03    141  |  104  |  30.4<H>  ----------------------------<  137<H>  4.3   |  27.0  |  1.35<H>    Ca    8.7      03 Jun 2021 06:20    TPro  5.9<L>  /  Alb  3.1<L>  /  TBili  0.3<L>  /  DBili  x   /  AST  13  /  ALT  8   /  AlkPhos  56  06-03    HEAD CT 5/27 - Multiple axial sections were performed from base of skull to vertex without contrast enhancement. Coronal and sagittal reconstructions were performed as well This exam is compared with prior noncontrast head CT performed on March 28, 2015. Increased parenchymal volume loss and chronic microvascular ischemic change identified Right parietal shunt catheter is again seen and unchanged in position. Areas of surrounding gliosis is identified. There is a new focus of high attenuation seen involving the right anterior perisylvian region. This finding measures approximately 0.8 cm and demonstrates some surrounding edema. This is compatible with a small area of acute parenchymal hemorrhage. No significant shift or herniation seen Evaluation of the osseous structures with appropriate window contrast hyperostosis frontalis interna. The visualized paranasal sinuses mastoid and appear clear. Calcification is seen involving both distal carotid and vertebral arteries. Small left maxillary polyp versus retention cyst is seen. Both mastoids and middle ear regions appear clear. IMPRESSION: New area of acute parenchymal hemorrhage as described above.    HEAD CT 5/28 - 1)  stable follow-up study with no interval change. Small focal hemorrhage again noted in the right lateral basal ganglia and subinsular region. 2)  right sided ventriculostomy again noted. Involutional changes with volume loss. Right parietal gliosis and encephalomalacia. Mild chronic ischemic changes both cerebellar hemispheres. These findings are well seen on prior study.    HEAD CT 5/28 -  Stable 5 mm right basal ganglia hemorrhage. No new hemorrhage.    HEAD CT 5/30 - No change since 5/28/2021. Small right basal ganglia hemorrhage. Right-sided  shunt. Small vessel white matter ischemic changes.    HEAD CT 6/1 - . Tiny hyperdensity in the right lateral basal ganglia has slightly decreased in attenuation with diminishing surrounding edema. There is no new hemorrhage or cortical edema, mass effect or hydrocephalus. A ventricular catheter remains in place. There is mild generalized volume loss, central worse than peripheral cortical. There is mild stable left cerebellar encephalomalacia.    ----------------------------------------------------------------------------------------  PHYSICAL EXAM  Constitutional - NAD, Comfortable, Poor oral care  Extremities - No C/C/E, No calf tenderness   Neurologic Exam -                    Cognitive - AAOx self, hospital - not name, NOT situation (thinks she is here for glaucoma)     Communication - Expressive deficits, Dysarthric     Cranial Nerves - Left lip droop     Motor -                     LEFT    UE - ShAB 4/5, EF 4/5, EE 5/5,  5/5                    RIGHT UE - ShAB 4/5, EF 4/5, EE 5/5,  5/5                    LEFT    LE - HF 3/5, KE 3/5, DF 3/5                     RIGHT LE - HF 3/5, KE 3/5, DF 3/5      Sensory - Intact to LT     Coordination - FTN impaired  Psychiatric - Mood calm   ----------------------------------------------------------------------------------------  ASSESSMENT/PLAN  81yFemale with functional deficits after an IPH  Right BG ICH - Stable  CAD - Lipitor  HTN - Hydralazine, Norvasc, Lopressor  DM2 - Lantus, Lispro  UTI - Rocephin  Oral Hygiene - Aggressive oral care, Nystatin (last 6/9), Peridex (6/2)  Mood - Abilify, Cogentin, Luvox   DVT PPX - SCDs  Rehab - As per discussion with rehab liaison, patient has support for eventual DC HOME. Recommend ACUTE inpatient rehabilitation for the functional deficits consisting of 3 hours of therapy/day & 24 hour RN/daily PMR physician for comorbid medical management. Will continue to follow for ongoing rehab needs and recommendations. Patient will be able to tolerate 3 hours a day.    Continue bedside therapy as well as OOB throughout the day with mobilization throughout the day with staff to maintain cardiopulmonary function and prevention of secondary complications related to debility.     Discussed with rehab clinical team.

## 2021-06-04 NOTE — SWALLOW BEDSIDE ASSESSMENT ADULT - SWALLOW EVAL: DIAGNOSIS
Pt presents w/moderate oral dysphagia & functional pharyngeal phase of swallow. Oral stage negatively impacted upon by reduced cognitive state at this time, marked by absent mastication of mechanical soft solids, resulting in consumption of trial whole. Additional min-mod anterior sulcus residue noted post swallow, requiring max cues w/additional puree trial in order to clear. Improved overall manipulation w/puree. Pharyngeal phase of swallow deemed clinically unremarkable, w/no s/s penetration or aspiration observed across consistencies at the bedside.

## 2021-06-04 NOTE — CONSULT NOTE ADULT - ASSESSMENT
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  CTH was done outpt after c/o dizziness and unsteadness/falling over to left side.  Pt denies head trauma.  Evaluated by neurosurgery but no intervention planned.  CTH stable x4.  Pt confused overnight and UA ordered and found to have pyuria; suspect UTI as cause of change in mentation.   On ceftriaxone, tolerating well despite pencillin allergy. UCX with kleb varicolla  Afebrile and has no leukocytosis, no cva tenderness  suggest cephalexin 500 mg q12h adjusted for renal function total x 7 days    d/w Dr Ray

## 2021-06-04 NOTE — SWALLOW BEDSIDE ASSESSMENT ADULT - SLP GENERAL OBSERVATIONS
Pt recd awake/upright in bed, A&A Ox1, reduced cognition, intermittent language of confusion, 0/10 pain pre/post, tolerating 4L O2 via NC SpO2 >93% throughout

## 2021-06-04 NOTE — DIETITIAN INITIAL EVALUATION ADULT. - OTHER INFO
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  CTH was done outpt after c/o dizziness and unsteadness/falling over to left side.  Pt denies head trauma.  Evaluated by neurosurgery but no intervention planned.  CTH stable x4.  Pt confused overnight and UA ordered and found to have pyuria; suspect UTI as cause of change in mentation.  SLP recommending puree diet. Plan is acute Rehab.

## 2021-06-04 NOTE — CONSULT NOTE ADULT - SUBJECTIVE AND OBJECTIVE BOX
[facility]  INFECTIOUS DISEASES AND INTERNAL MEDICINE at [place]  =======================================================  Petr Pemberton MD  Diplomates American Board of Internal Medicine and Infectious Diseases  Tel: [phone]      Fax: [phone]  =======================================================      [id number]  GREGORY WOODALL    CC: Patient is a 81y old  Female who presents with a chief complaint of ICH (04 Jun 2021 13:00)      81y  Female       Past Medical & Surgical Hx:  PAST MEDICAL & SURGICAL HISTORY:  Hypertension    Hyperlipidemia    Diabetes    Glaucoma    Osteoarthritis    Small bowel obstruction    S/P hysterectomy  1981 and Oopherectomy in 1990s    S/P cholecystectomy  1981    Achilles tendon injury  repair  right scalene muscle release    S/P knee replacement, left            Social Hx:    FAMILY HISTORY:  Family history of pancreatic cancer (Sibling)    Family history of early CAD    Family history of diabetes mellitus        Allergies    adhesives (Pruritus; Blisters)  penicillin (Hives)  pineapple (Anaphylaxis)    Intolerances             REVIEW OF SYSTEMS:  CONSTITUTIONAL:  No Fever or chills  HEENT:  No diplopia or blurred vision.  No earache, sore throat or runny nose.  CARDIOVASCULAR:  No pressure, squeezing, strangling, tightness, heaviness or aching about the chest, neck, axilla or epigastrium.  RESPIRATORY:  No cough, shortness of breath  GASTROINTESTINAL:  No nausea, vomiting or diarrhea.  GENITOURINARY:  No dysuria, frequency or urgency. No Blood in urine  MUSCULOSKELETAL:  no joint aches, no muscle pain  SKIN:  No change in skin, hair or nails.  NEUROLOGIC:  No Headaches, seizures or weakness.  PSYCHIATRIC:  No disorder of thought or mood.  ENDOCRINE:  No heat or cold intolerance  HEMATOLOGICAL:  No easy bruising or bleeding.       Physical Exam:    GEN: NAD, pleasant  HEENT: normocephalic and atraumatic. EOMI. PERRL.  Anicteric  NECK: Supple.   LUNGS: Clear to auscultation.  HEART: Regular rate and rhythm without murmur.  ABDOMEN: Soft, nontender, and nondistended.  Positive bowel sounds.    : No CVA tenderness  EXTREMITIES: Without any edema.  MSK: No joint swelling  NEUROLOGIC: Cranial nerves II through XII are grossly intact. No Focal Deficits  PSYCHIATRIC: Appropriate affect .  SKIN: No Rash        Vitals:    T(F): 98.1 (04 Jun 2021 11:10), Max: 99.3 (03 Jun 2021 20:03)  HR: 66 (04 Jun 2021 11:10)  BP: 130/54 (04 Jun 2021 11:10)  RR: 19 (04 Jun 2021 11:10)  SpO2: 91% (04 Jun 2021 11:10) (88% - 93%)  temp max in last 48H T(F): , Max: 99.3 (06-03-21 @ 20:03)    Current Antibiotics:  cefTRIAXone   IVPB 1000 milliGRAM(s) IV Intermittent every 24 hours  cefTRIAXone   IVPB      nystatin    Suspension 045837 Unit(s) Oral four times a day    Other medications:  amLODIPine   Tablet 10 milliGRAM(s) Oral daily  ARIPiprazole 10 milliGRAM(s) Oral daily  atorvastatin 40 milliGRAM(s) Oral at bedtime  chlorhexidine 0.12% Liquid 15 milliLiter(s) Oral Mucosa two times a day  dextrose 40% Gel 15 Gram(s) Oral once  dextrose 5%. 1000 milliLiter(s) IV Continuous <Continuous>  dextrose 5%. 1000 milliLiter(s) IV Continuous <Continuous>  dextrose 50% Injectable 25 Gram(s) IV Push once  dextrose 50% Injectable 12.5 Gram(s) IV Push once  dextrose 50% Injectable 25 Gram(s) IV Push once  fluvoxaMINE 150 milliGRAM(s) Oral at bedtime  folic acid 1 milliGRAM(s) Oral daily  glucagon  Injectable 1 milliGRAM(s) IntraMuscular once  insulin glargine Injectable (LANTUS) 5 Unit(s) SubCutaneous at bedtime  insulin lispro (ADMELOG) corrective regimen sliding scale   SubCutaneous three times a day before meals  insulin lispro Injectable (ADMELOG) 2 Unit(s) SubCutaneous three times a day before meals  metoprolol tartrate 25 milliGRAM(s) Oral two times a day                            10.2   6.62  )-----------( 240      ( 04 Jun 2021 08:02 )             33.1     06-04    140  |  103  |  31.3<H>  ----------------------------<  159<H>  4.1   |  29.0  |  1.47<H>    Ca    8.8      04 Jun 2021 08:02    TPro  5.9<L>  /  Alb  3.1<L>  /  TBili  0.3<L>  /  DBili  x   /  AST  13  /  ALT  8   /  AlkPhos  56  06-03    RECENT CULTURES:  06-02 @ 16:40 .Urine Clean Catch (Midstream)     >100,000 CFU/ml Klebsiella variicola            WBC Count: 6.62 K/uL (06-04-21 @ 08:02)  WBC Count: 6.67 K/uL (06-03-21 @ 06:20)  WBC Count: 5.84 K/uL (06-02-21 @ 07:54)  WBC Count: 6.94 K/uL (06-01-21 @ 07:15)  WBC Count: 7.05 K/uL (05-31-21 @ 10:33)    Creatinine, Serum: 1.47 mg/dL (06-04-21 @ 08:02)  Creatinine, Serum: 1.35 mg/dL (06-03-21 @ 06:20)  Creatinine, Serum: 1.43 mg/dL (06-02-21 @ 07:54)  Creatinine, Serum: 1.55 mg/dL (06-01-21 @ 07:15)  Creatinine, Serum: 1.55 mg/dL (05-31-21 @ 10:33)               COVID-19 PCR: NotDetec (05-28-21 @ 13:40)   [facility]  INFECTIOUS DISEASES AND INTERNAL MEDICINE at [place]  =======================================================  Petr Pemberton MD  Diplomates American Board of Internal Medicine and Infectious Diseases  Tel: [phone]      Fax: [phone]  =======================================================      [id number]  GREGORY WOODALL    CC: Patient is a 81y old  Female who presents with a chief complaint of ICH (04 Jun 2021 13:00)      80 y/o F w/ PMH of DVT (on Eliquis), NIDDM, glaucoma, HLD, HTN, hs of NPH s/p  shunt 2015 sent ot ED for +CTH w/ ICH.  CTH was done outpt after c/o dizziness and unsteadness/falling over to left side.  Pt denies head trauma.  Evaluated by neurosurgery but no intervention planned.  CTH stable x4.  Pt confused overnight and UA ordered and found to have pyuria; suspect UTI as cause of change in mentation.            Past Medical & Surgical Hx:  PAST MEDICAL & SURGICAL HISTORY:  Hypertension    Hyperlipidemia    Diabetes    Glaucoma    Osteoarthritis    Small bowel obstruction    S/P hysterectomy  1981 and Oopherectomy in 1990s    S/P cholecystectomy  1981    Achilles tendon injury  repair  right scalene muscle release    S/P knee replacement, left            Social Hx: non smoker    FAMILY HISTORY:  Family history of pancreatic cancer (Sibling)    Family history of early CAD    Family history of diabetes mellitus        Allergies    adhesives (Pruritus; Blisters)  penicillin (Hives)  pineapple (Anaphylaxis)    Intolerances             REVIEW OF SYSTEMS:  CONSTITUTIONAL:  No Fever or chills  HEENT:  No diplopia or blurred vision.  No earache, sore throat or runny nose.  CARDIOVASCULAR:  No pressure, squeezing, strangling, tightness, heaviness or aching about the chest, neck, axilla or epigastrium.  RESPIRATORY:  No cough, shortness of breath  GASTROINTESTINAL:  No nausea, vomiting or diarrhea.  GENITOURINARY:  No dysuria, frequency or urgency. No Blood in urine  MUSCULOSKELETAL:  no joint aches, no muscle pain  SKIN:  No change in skin, hair or nails.  NEUROLOGIC:  No Headaches, seizures or weakness.  PSYCHIATRIC:  No disorder of thought or mood.  ENDOCRINE:  No heat or cold intolerance  HEMATOLOGICAL:  No easy bruising or bleeding.       Physical Exam:    GEN: NAD, pleasant axox2  HEENT: normocephalic and atraumatic. EOMI. PERRL.  Anicteric  NECK: Supple.   LUNGS: Clear to auscultation.  HEART: Regular rate and rhythm without murmur.  ABDOMEN: Soft, nontender, and nondistended.  Positive bowel sounds.    : No CVA tenderness  EXTREMITIES: Without any edema. b/l knee scars intact  MSK: No joint swelling  NEUROLOGIC: Cranial nerves II through XII are grossly intact. No Focal Deficits  PSYCHIATRIC: Appropriate affect .  SKIN: No Rash        Vitals:    T(F): 98.1 (04 Jun 2021 11:10), Max: 99.3 (03 Jun 2021 20:03)  HR: 66 (04 Jun 2021 11:10)  BP: 130/54 (04 Jun 2021 11:10)  RR: 19 (04 Jun 2021 11:10)  SpO2: 91% (04 Jun 2021 11:10) (88% - 93%)  temp max in last 48H T(F): , Max: 99.3 (06-03-21 @ 20:03)    Current Antibiotics:  cefTRIAXone   IVPB 1000 milliGRAM(s) IV Intermittent every 24 hours  cefTRIAXone   IVPB      nystatin    Suspension 559073 Unit(s) Oral four times a day    Other medications:  amLODIPine   Tablet 10 milliGRAM(s) Oral daily  ARIPiprazole 10 milliGRAM(s) Oral daily  atorvastatin 40 milliGRAM(s) Oral at bedtime  chlorhexidine 0.12% Liquid 15 milliLiter(s) Oral Mucosa two times a day  dextrose 40% Gel 15 Gram(s) Oral once  dextrose 5%. 1000 milliLiter(s) IV Continuous <Continuous>  dextrose 5%. 1000 milliLiter(s) IV Continuous <Continuous>  dextrose 50% Injectable 25 Gram(s) IV Push once  dextrose 50% Injectable 12.5 Gram(s) IV Push once  dextrose 50% Injectable 25 Gram(s) IV Push once  fluvoxaMINE 150 milliGRAM(s) Oral at bedtime  folic acid 1 milliGRAM(s) Oral daily  glucagon  Injectable 1 milliGRAM(s) IntraMuscular once  insulin glargine Injectable (LANTUS) 5 Unit(s) SubCutaneous at bedtime  insulin lispro (ADMELOG) corrective regimen sliding scale   SubCutaneous three times a day before meals  insulin lispro Injectable (ADMELOG) 2 Unit(s) SubCutaneous three times a day before meals  metoprolol tartrate 25 milliGRAM(s) Oral two times a day                            10.2   6.62  )-----------( 240      ( 04 Jun 2021 08:02 )             33.1     06-04    140  |  103  |  31.3<H>  ----------------------------<  159<H>  4.1   |  29.0  |  1.47<H>    Ca    8.8      04 Jun 2021 08:02    TPro  5.9<L>  /  Alb  3.1<L>  /  TBili  0.3<L>  /  DBili  x   /  AST  13  /  ALT  8   /  AlkPhos  56  06-03    RECENT CULTURES:  06-02 @ 16:40 .Urine Clean Catch (Midstream)     >100,000 CFU/ml Klebsiella variicola            WBC Count: 6.62 K/uL (06-04-21 @ 08:02)  WBC Count: 6.67 K/uL (06-03-21 @ 06:20)  WBC Count: 5.84 K/uL (06-02-21 @ 07:54)  WBC Count: 6.94 K/uL (06-01-21 @ 07:15)  WBC Count: 7.05 K/uL (05-31-21 @ 10:33)    Creatinine, Serum: 1.47 mg/dL (06-04-21 @ 08:02)  Creatinine, Serum: 1.35 mg/dL (06-03-21 @ 06:20)  Creatinine, Serum: 1.43 mg/dL (06-02-21 @ 07:54)  Creatinine, Serum: 1.55 mg/dL (06-01-21 @ 07:15)  Creatinine, Serum: 1.55 mg/dL (05-31-21 @ 10:33)               COVID-19 PCR: [place] (05-28-21 @ 13:40)

## 2021-06-04 NOTE — PROGRESS NOTE ADULT - ASSESSMENT
82 y/o F w/ PMH of DVT (on Eliquis), NIDDM, glaucoma, HLD, HTN, hs of NPH s/p  shunt 2015 sent ot ED for +CTH w/ ICH.  CTH was done outpt after c/o dizziness and unsteadness/falling over to left side.  Pt denies head trauma.  Evaluated by neurosurgery but no intervention planned.  CTH stable x4.  Pt confused overnight and UA ordered and found to have pyuria; suspect UTI as cause of change in mentation.        Right basal ganglia hemorrhage   - Discontinued Eliquis on admission   - CTH x4 ICH stable and neurologically unchanged from yesterday  - No intervention necessary as per neurosurgery but if ICH larger will contact neurosurgery to reassess  - Neuro checks and vital signs q4h   - Goal BP <140/90 and c/w prn IV labetalol to maintain BP at goal  - HOB elevated to 30 degrees   - PT/OT/ST evaluations noted  - Neurology and neurosurgery consulted      Acute metabolic encephalopathy 2/2 UTI  - UA w/ pyuria, Ucx growing K variicola  - c/w empiric antibiotics pending sensitivities  - No leukocytosis but will monitor CBC daily   - Tylenol prn if febrile      Hx of DVT in September  - Repeat duplex during this admission negative  - PO eliquis discontinued in light of acute ICH  - Given negative doppler and risk/benefit assessment pt should remain off AC      Hypertensive Crisis, improving  - Goal BP <140/90 in light of ICH  - c/w po labetalol and IV prn  - Monitor BP closely       NIDDM w/ hyperglycemia, improving   - A1c 7.7  - Hold metformin while hospitalized  - c/w basal bolus regimen and titrate to maintain BG <180  - ISS and hypoglycemic protocol in place      PASCALE likely prerenal   - Will c/w gentle hydration   - Monitor renal function   - Avoid nephrotoxic medications or renally dose if needed      Bipolar disorder   - Stable  - c/w current medications      HLD  - c/w statin therapy       VTE ppx: no chemical prophylaxis given acute ICH.  c/w SCDs    Dispo:  Pt medically stable and pending acute rehab placement.

## 2021-06-04 NOTE — DISCHARGE NOTE PROVIDER - NSDCCPCAREPLAN_GEN_ALL_CORE_FT
PRINCIPAL DISCHARGE DIAGNOSIS  Diagnosis: Intraparenchymal hemorrhage of brain  Assessment and Plan of Treatment: discotniued anticoagulation.   continue with pureed diet with thin liquids.   aspiration precatuions.   continue with pt/ ot/speech therapy.   follow up with nueorology in 1 week.      SECONDARY DISCHARGE DIAGNOSES  Diagnosis: Hypertension  Assessment and Plan of Treatment: continue with  medications as advised.    Diagnosis: UTI (urinary tract infection)  Assessment and Plan of Treatment: continue with antibiotics as advised

## 2021-06-04 NOTE — DISCHARGE NOTE PROVIDER - CARE PROVIDER_API CALL
Mohini Sorensen (DO)  EEGEpilepsy; Neurology  [address] [place] [address]  Phone: [phone]  Fax: [phone]  Follow Up Time: 1 week

## 2021-06-04 NOTE — SWALLOW BEDSIDE ASSESSMENT ADULT - SLP PERTINENT HISTORY OF CURRENT PROBLEM
As per MD note, "80 y/o F w/ PMH of DVT (on Eliquis), NIDDM, glaucoma, HLD, HTN, hs of NPH s/p  shunt 2015 sent ot ED for +CTH w/ R basal ganglia ICH.  CTH was done outpt after c/o dizziness and unsteadness/falling over to left side.  Pt denies head trauma.  Evaluated by neurosurgery but no intervention planned.  CTH stable x4.  Pt confused overnight and UA ordered and found to have pyuria; suspect UTI as cause of change in mentation".

## 2021-06-04 NOTE — DISCHARGE NOTE PROVIDER - HOSPITAL COURSE
82 y/o F w/ PMH of DVT (on Eliquis), NIDDM, glaucoma, HLD, HTN, hs of NPH s/p  shunt 2015 sent ot ED for +CTH w/ ICH.  CTH was done outpt after c/o dizziness and unsteadness/falling over to left side.  CTH done in ED showed pt had a small right basal ganglia hemorrhage.  Eliquis was discontinued and neurosurgery consulted for evaluation.  f/u CTH was done and ICH noted to be stable.  Per neurosurgery no interventions necessary.  Pt has had a total of 4 CTH all stable.  On admission pt also noted to have hypertensive crisis that resolved w/ IV lopressor.  BP was maintained at goal <140/90 in light of ICH.  Hospital course was complicated by pt developing acute metabolic encephalopathy due to UTI.  UA noted to have pyuria and cultures grew klebsiella variicola.  Pt was placed on emperic antibiotics and ID consulted.  Mentation improved within 24hrs of IV antibiotics.  Pt will be switched to po antibiotics pending sensitivities for total of 7 day course per ID recs.  Pts A1c noted to be 7.7 and hyperglycemia resolved w/ basal/bolus regimen.  Pt will resume metformin on discharge.  PASCALE was also noted and is likely prerenal.  Pt placed on gentle hydration and renal function monitored closely.  Pt evaluated by PMR and is a candidate for acute rehab.  Pt now medically stable for discharge.           Time spent on discharge 40 minutes.    82 y/o F w/ PMH of DVT (on Eliquis), NIDDM, glaucoma, HLD, HTN, hs of NPH s/p  shunt 2015 sent ot ED for +CTH w/ ICH.  CTH was done outpt after c/o dizziness and unsteadness/falling over to left side.  CTH done in ED showed pt had a small right basal ganglia hemorrhage.  Eliquis was discontinued and neurosurgery consulted for evaluation.  f/u CTH was done and ICH noted to be stable.  Per neurosurgery no interventions necessary.  Pt has had a total of 4 CTH all stable. repeat venous duplex neg for any dvt.  On admission pt also noted to have hypertensive crisis that resolved w/ IV lopressor.  BP was maintained at goal <140/90 in light of ICH.  Hospital course was complicated by pt developing acute metabolic encephalopathy due to UTI.  UA noted to have pyuria and cultures grew klebsiella variicola.  Pt was placed on emperic antibiotics and ID consulted.  Mentation improved within 24hrs of IV antibiotics.  Pt will be switched to po antibiotics pending sensitivities for total of 7 day course per ID recs.  Pts A1c noted to be 7.7 and hyperglycemia resolved w/ basal/bolus regimen.  Pt will resume metformin on discharge.  PASCALE was also noted and is likely prerenal.  patient started on dysphagia 1 diet with thin liquids. .  Pt evaluated by PMR and is a candidate for acute rehab.  Pt now medically stable for discharge.       Vital Signs Last 24 Hrs  T(C): 36.6 (07 Jun 2021 05:04), Max: 36.8 (06 Jun 2021 16:22)  T(F): 97.8 (07 Jun 2021 05:04), Max: 98.3 (06 Jun 2021 16:22)  HR: 52 (07 Jun 2021 05:04) (51 - 55)  BP: 123/79 (07 Jun 2021 05:04) (123/79 - 141/72)  BP(mean): --  RR: 18 (07 Jun 2021 05:04) (17 - 18)  SpO2: 95% (07 Jun 2021 05:04) (91% - 95%)    GENERAL: pt examined bedside, laying comfortably in bed in NAD  HEENT: NC/AT, moist oral mucosa, clear conjunctiva, sclera nonicteric  RESPIRATORY: Normal respiratory effort; CTA b/l, no wheezing, rhonchi, rales  CARDIOVASCULAR: RRR, normal S1 and S, 2/6 systolic murmur  ABDOMEN: soft, NT/ND, normoactive bowel sounds, no rebound/guarding  EXTREMITIES: No cynaosis, no clubbing, no lower extremity edema; Peripheral pulses are 2+ bilaterally  NEUROLOGY: AAO to self, place but not time.   Strength and sensation intact,   SKIN: No rashes or no palpable lesions    Time spent on discharge 40 minutes.

## 2021-06-04 NOTE — DISCHARGE NOTE PROVIDER - NSDCMRMEDTOKEN_GEN_ALL_CORE_FT
amLODIPine 10 mg oral tablet: 1 tab(s) orally once a day  ARIPiprazole 10 mg oral tablet: 1 tab(s) orally once a day (at bedtime)  ARIPiprazole 5 mg oral tablet: 1 tab(s) orally once a day (at bedtime)  atorvastatin 40 mg oral tablet: 1 tab(s) orally once a day  desvenlafaxine (as succinate) 25 mg oral tablet, extended release: 1 tab(s) orally once a day (in the morning)  ergocalciferol 50,000 intl units (1.25 mg) oral capsule: 1 cap(s) orally once a day  fluvoxaMINE 100 mg oral tablet: 1.5 tab(s) orally once a day (at bedtime)  folic acid 1 mg oral tablet: 1 tab(s) orally once a day  metoprolol tartrate 25 mg oral tablet: 1 tab(s) orally 2 times a day   amLODIPine 10 mg oral tablet: 1 tab(s) orally once a day  ARIPiprazole 10 mg oral tablet: 1 tab(s) orally once a day (at bedtime)  atorvastatin 40 mg oral tablet: 1 tab(s) orally once a day  chlorhexidine 0.12% mucous membrane liquid: 15 milliliter(s) mucous membrane 2 times a day  desvenlafaxine (as succinate) 25 mg oral tablet, extended release: 1 tab(s) orally once a day (in the morning)  ergocalciferol 50,000 intl units (1.25 mg) oral capsule: 1 cap(s) orally once a day  fluvoxaMINE 100 mg oral tablet: 1.5 tab(s) orally once a day (at bedtime)  folic acid 1 mg oral tablet: 1 tab(s) orally once a day  insulin glargine: 5 unit(s) subcutaneous once a day (at bedtime)  Keflex 500 mg oral capsule: 1 cap(s) orally 2 times a day   metoprolol tartrate 25 mg oral tablet: 1 tab(s) orally 2 times a day  nystatin 100,000 units/mL oral suspension: 5 milliliter(s) orally 4 times a day  Pristiq 25 mg oral tablet, extended release: 1 tab(s) orally once a day

## 2021-06-05 LAB
ANION GAP SERPL CALC-SCNC: 7 MMOL/L — SIGNIFICANT CHANGE UP (ref 5–17)
BUN SERPL-MCNC: 26.1 MG/DL — HIGH (ref 8–20)
CALCIUM SERPL-MCNC: 9.2 MG/DL — SIGNIFICANT CHANGE UP (ref 8.6–10.2)
CHLORIDE SERPL-SCNC: 103 MMOL/L — SIGNIFICANT CHANGE UP (ref 98–107)
CO2 SERPL-SCNC: 29 MMOL/L — SIGNIFICANT CHANGE UP (ref 22–29)
CREAT SERPL-MCNC: 1.18 MG/DL — SIGNIFICANT CHANGE UP (ref 0.5–1.3)
GLUCOSE BLDC GLUCOMTR-MCNC: 127 MG/DL — HIGH (ref 70–99)
GLUCOSE BLDC GLUCOMTR-MCNC: 151 MG/DL — HIGH (ref 70–99)
GLUCOSE BLDC GLUCOMTR-MCNC: 153 MG/DL — HIGH (ref 70–99)
GLUCOSE BLDC GLUCOMTR-MCNC: 185 MG/DL — HIGH (ref 70–99)
GLUCOSE SERPL-MCNC: 160 MG/DL — HIGH (ref 70–99)
HCT VFR BLD CALC: 32.2 % — LOW (ref 34.5–45)
HGB BLD-MCNC: 10.5 G/DL — LOW (ref 11.5–15.5)
MCHC RBC-ENTMCNC: 30.3 PG — SIGNIFICANT CHANGE UP (ref 27–34)
MCHC RBC-ENTMCNC: 32.6 GM/DL — SIGNIFICANT CHANGE UP (ref 32–36)
MCV RBC AUTO: 92.8 FL — SIGNIFICANT CHANGE UP (ref 80–100)
PLATELET # BLD AUTO: 274 K/UL — SIGNIFICANT CHANGE UP (ref 150–400)
POTASSIUM SERPL-MCNC: 4.2 MMOL/L — SIGNIFICANT CHANGE UP (ref 3.5–5.3)
POTASSIUM SERPL-SCNC: 4.2 MMOL/L — SIGNIFICANT CHANGE UP (ref 3.5–5.3)
RBC # BLD: 3.47 M/UL — LOW (ref 3.8–5.2)
RBC # FLD: 12.2 % — SIGNIFICANT CHANGE UP (ref 10.3–14.5)
SARS-COV-2 RNA SPEC QL NAA+PROBE: SIGNIFICANT CHANGE UP
SODIUM SERPL-SCNC: 139 MMOL/L — SIGNIFICANT CHANGE UP (ref 135–145)
WBC # BLD: 9.05 K/UL — SIGNIFICANT CHANGE UP (ref 3.8–10.5)
WBC # FLD AUTO: 9.05 K/UL — SIGNIFICANT CHANGE UP (ref 3.8–10.5)

## 2021-06-05 PROCEDURE — 99233 SBSQ HOSP IP/OBS HIGH 50: CPT

## 2021-06-05 RX ADMIN — Medication 1 MILLIGRAM(S): at 12:01

## 2021-06-05 RX ADMIN — AMLODIPINE BESYLATE 10 MILLIGRAM(S): 2.5 TABLET ORAL at 05:22

## 2021-06-05 RX ADMIN — Medication 500000 UNIT(S): at 23:10

## 2021-06-05 RX ADMIN — Medication 500000 UNIT(S): at 00:28

## 2021-06-05 RX ADMIN — ARIPIPRAZOLE 10 MILLIGRAM(S): 15 TABLET ORAL at 12:01

## 2021-06-05 RX ADMIN — CHLORHEXIDINE GLUCONATE 15 MILLILITER(S): 213 SOLUTION TOPICAL at 05:22

## 2021-06-05 RX ADMIN — Medication 500000 UNIT(S): at 12:02

## 2021-06-05 RX ADMIN — ATORVASTATIN CALCIUM 40 MILLIGRAM(S): 80 TABLET, FILM COATED ORAL at 22:47

## 2021-06-05 RX ADMIN — Medication 2: at 11:59

## 2021-06-05 RX ADMIN — Medication 500000 UNIT(S): at 18:05

## 2021-06-05 RX ADMIN — Medication 2 UNIT(S): at 11:59

## 2021-06-05 RX ADMIN — Medication 25 MILLIGRAM(S): at 05:22

## 2021-06-05 RX ADMIN — INSULIN GLARGINE 5 UNIT(S): 100 INJECTION, SOLUTION SUBCUTANEOUS at 23:09

## 2021-06-05 RX ADMIN — Medication 25 MILLIGRAM(S): at 18:05

## 2021-06-05 RX ADMIN — CHLORHEXIDINE GLUCONATE 15 MILLILITER(S): 213 SOLUTION TOPICAL at 18:06

## 2021-06-05 RX ADMIN — Medication 2 UNIT(S): at 18:03

## 2021-06-05 RX ADMIN — Medication 2: at 07:55

## 2021-06-05 RX ADMIN — Medication 2 UNIT(S): at 07:55

## 2021-06-05 RX ADMIN — Medication 500000 UNIT(S): at 05:22

## 2021-06-05 RX ADMIN — FLUVOXAMINE MALEATE 150 MILLIGRAM(S): 25 TABLET ORAL at 23:09

## 2021-06-05 RX ADMIN — CEFTRIAXONE 100 MILLIGRAM(S): 500 INJECTION, POWDER, FOR SOLUTION INTRAMUSCULAR; INTRAVENOUS at 11:54

## 2021-06-05 NOTE — PROGRESS NOTE ADULT - ASSESSMENT
82 y/o F w/ PMH of DVT (on Eliquis), NIDDM, glaucoma, HLD, HTN, hs of NPH s/p  shunt 2015 sent ot ED for +CTH w/ ICH.  CTH was done outpt after c/o dizziness and unsteadness/falling over to left side.  Pt denies head trauma.  Evaluated by neurosurgery but no intervention planned.  CTH stable x4.  Pt confused overnight and UA ordered and found to have pyuria; suspect UTI as cause of change in mentation.      > Right basal ganglia hemorrhage   - Discontinued Eliquis on admission   - CTH x4 ICH stable and neurologically unchanged  - No intervention necessary as per neurosurgery but if ICH larger will contact neurosurgery to reassess  - Neuro checks and vital signs q4h   - Goal BP <140/90 and c/w prn IV labetalol to maintain BP at goal  - HOB elevated to 30 degrees   - PT/OT/ST evaluations noted  - Neurology and neurosurgery following     >Acute metabolic encephalopathy 2/2 UTI  - UA w/ pyuria, Ucx growing K variicola  - c/w empiric antibiotics pending sensitivities  - No leukocytosis but will monitor CBC daily   - Tylenol prn if febrile    >Hx of DVT in September  - Repeat duplex during this admission negative  - PO eliquis discontinued in light of acute ICH  - Given negative doppler and risk/benefit assessment pt should remain off AC    >Hypertensive Crisis, improving  - Goal BP <140/90 in light of ICH  - c/w po labetalol and IV prn  - Monitor BP closely     >NIDDM w/ hyperglycemia, improving   - A1c 7.7  - Hold metformin while hospitalized  - c/w basal bolus regimen and titrate to maintain BG <180  - ISS and hypoglycemic protocol in place    > PASCALE likely prerenal   - Will c/w gentle hydration   - Monitor renal function   - Avoid nephrotoxic medications or renally dose if needed    >Bipolar disorder   - Stable  - c/w current medications    >HLD  - c/w statin therapy     >VTE ppx: no chemical prophylaxis given acute ICH.  c/w SCDs    >Dispo:  Pt medically stable and pending acute rehab placement.

## 2021-06-05 NOTE — PROGRESS NOTE ADULT - SUBJECTIVE AND OBJECTIVE BOX
Chief Complaint:  ICH    SUBJECTIVE / OVERNIGHT EVENTS: patient seen and eval. comfortable. in no acute distress. no fever or chills.     Vital Signs Last 24 Hrs  T(C): 36.8 (05 Jun 2021 08:30), Max: 37.3 (05 Jun 2021 05:18)  T(F): 98.2 (05 Jun 2021 08:30), Max: 99.1 (05 Jun 2021 05:18)  HR: 72 (05 Jun 2021 08:30) (66 - 85)  BP: 154/80 (05 Jun 2021 08:30) (148/61 - 155/67)  BP(mean): --  RR: 18 (05 Jun 2021 08:30) (17 - 18)  SpO2: 94% (05 Jun 2021 08:30) (93% - 94%)    GENERAL: pt examined bedside, laying comfortably in bed in NAD  HEENT: NC/AT, moist oral mucosa, clear conjunctiva, sclera nonicteric  RESPIRATORY: Normal respiratory effort; CTA b/l, no wheezing, rhonchi, rales  CARDIOVASCULAR: RRR, normal S1 and S, 2/6 systolic murmur  ABDOMEN: soft, NT/ND, normoactive bowel sounds, no rebound/guarding  EXTREMITIES: No cynaosis, no clubbing, no lower extremity edema; Peripheral pulses are 2+ bilaterally  NEUROLOGY: AAO to self, place but not time.   Strength and sensation intact,   SKIN: No rashes or no palpable lesions                              10.5   9.05  )-----------( 274      ( 05 Jun 2021 08:05 )             32.2   06-05    139  |  103  |  26.1<H>  ----------------------------<  160<H>  4.2   |  29.0  |  1.18    Ca    9.2      05 Jun 2021 08:03

## 2021-06-06 LAB
ANION GAP SERPL CALC-SCNC: 10 MMOL/L — SIGNIFICANT CHANGE UP (ref 5–17)
BUN SERPL-MCNC: 23.6 MG/DL — HIGH (ref 8–20)
CALCIUM SERPL-MCNC: 8.6 MG/DL — SIGNIFICANT CHANGE UP (ref 8.6–10.2)
CHLORIDE SERPL-SCNC: 101 MMOL/L — SIGNIFICANT CHANGE UP (ref 98–107)
CO2 SERPL-SCNC: 28 MMOL/L — SIGNIFICANT CHANGE UP (ref 22–29)
CREAT SERPL-MCNC: 1.1 MG/DL — SIGNIFICANT CHANGE UP (ref 0.5–1.3)
GLUCOSE BLDC GLUCOMTR-MCNC: 112 MG/DL — HIGH (ref 70–99)
GLUCOSE BLDC GLUCOMTR-MCNC: 174 MG/DL — HIGH (ref 70–99)
GLUCOSE BLDC GLUCOMTR-MCNC: 188 MG/DL — HIGH (ref 70–99)
GLUCOSE BLDC GLUCOMTR-MCNC: 205 MG/DL — HIGH (ref 70–99)
GLUCOSE SERPL-MCNC: 139 MG/DL — HIGH (ref 70–99)
HCT VFR BLD CALC: 32.8 % — LOW (ref 34.5–45)
HGB BLD-MCNC: 10.1 G/DL — LOW (ref 11.5–15.5)
MCHC RBC-ENTMCNC: 29.1 PG — SIGNIFICANT CHANGE UP (ref 27–34)
MCHC RBC-ENTMCNC: 30.8 GM/DL — LOW (ref 32–36)
MCV RBC AUTO: 94.5 FL — SIGNIFICANT CHANGE UP (ref 80–100)
PLATELET # BLD AUTO: 261 K/UL — SIGNIFICANT CHANGE UP (ref 150–400)
POTASSIUM SERPL-MCNC: 3.9 MMOL/L — SIGNIFICANT CHANGE UP (ref 3.5–5.3)
POTASSIUM SERPL-SCNC: 3.9 MMOL/L — SIGNIFICANT CHANGE UP (ref 3.5–5.3)
RBC # BLD: 3.47 M/UL — LOW (ref 3.8–5.2)
RBC # FLD: 11.9 % — SIGNIFICANT CHANGE UP (ref 10.3–14.5)
SODIUM SERPL-SCNC: 139 MMOL/L — SIGNIFICANT CHANGE UP (ref 135–145)
WBC # BLD: 7.94 K/UL — SIGNIFICANT CHANGE UP (ref 3.8–10.5)
WBC # FLD AUTO: 7.94 K/UL — SIGNIFICANT CHANGE UP (ref 3.8–10.5)

## 2021-06-06 PROCEDURE — 99232 SBSQ HOSP IP/OBS MODERATE 35: CPT

## 2021-06-06 RX ADMIN — Medication 500000 UNIT(S): at 05:37

## 2021-06-06 RX ADMIN — AMLODIPINE BESYLATE 10 MILLIGRAM(S): 2.5 TABLET ORAL at 05:37

## 2021-06-06 RX ADMIN — Medication 500000 UNIT(S): at 22:09

## 2021-06-06 RX ADMIN — ARIPIPRAZOLE 10 MILLIGRAM(S): 15 TABLET ORAL at 12:30

## 2021-06-06 RX ADMIN — Medication 1 MILLIGRAM(S): at 12:30

## 2021-06-06 RX ADMIN — CEFTRIAXONE 100 MILLIGRAM(S): 500 INJECTION, POWDER, FOR SOLUTION INTRAMUSCULAR; INTRAVENOUS at 11:23

## 2021-06-06 RX ADMIN — INSULIN GLARGINE 5 UNIT(S): 100 INJECTION, SOLUTION SUBCUTANEOUS at 22:09

## 2021-06-06 RX ADMIN — ATORVASTATIN CALCIUM 40 MILLIGRAM(S): 80 TABLET, FILM COATED ORAL at 22:09

## 2021-06-06 RX ADMIN — Medication 2 UNIT(S): at 08:45

## 2021-06-06 RX ADMIN — Medication 4: at 12:27

## 2021-06-06 RX ADMIN — Medication 2 UNIT(S): at 12:28

## 2021-06-06 RX ADMIN — Medication 25 MILLIGRAM(S): at 05:37

## 2021-06-06 RX ADMIN — FLUVOXAMINE MALEATE 150 MILLIGRAM(S): 25 TABLET ORAL at 22:09

## 2021-06-06 RX ADMIN — CHLORHEXIDINE GLUCONATE 15 MILLILITER(S): 213 SOLUTION TOPICAL at 18:10

## 2021-06-06 RX ADMIN — Medication 25 MILLIGRAM(S): at 18:11

## 2021-06-06 RX ADMIN — Medication 500000 UNIT(S): at 12:31

## 2021-06-06 RX ADMIN — Medication 500000 UNIT(S): at 18:10

## 2021-06-06 RX ADMIN — CHLORHEXIDINE GLUCONATE 15 MILLILITER(S): 213 SOLUTION TOPICAL at 05:37

## 2021-06-06 RX ADMIN — Medication 2: at 08:45

## 2021-06-06 RX ADMIN — Medication 2 UNIT(S): at 18:08

## 2021-06-06 NOTE — PROGRESS NOTE ADULT - SUBJECTIVE AND OBJECTIVE BOX
Chief Complaint:  ICH    SUBJECTIVE / OVERNIGHT EVENTS: patient seen and eval. comfortable. in no acute distress. no fever or chills. noted to have choking on  meat bite earlier by aid. now comfortable. in no acute distress.    Vital Signs Last 24 Hrs  T(C): 36.8 (06 Jun 2021 10:21), Max: 36.8 (05 Jun 2021 22:41)  T(F): 98.2 (06 Jun 2021 10:21), Max: 98.3 (05 Jun 2021 22:41)  HR: 55 (06 Jun 2021 10:21) (55 - 70)  BP: 126/81 (06 Jun 2021 10:21) (126/81 - 157/68)  BP(mean): --  RR: 17 (06 Jun 2021 10:21) (17 - 18)  SpO2: 91% (06 Jun 2021 10:21) (91% - 93%)    GENERAL: pt examined bedside, laying comfortably in bed in NAD  HEENT: NC/AT, moist oral mucosa, clear conjunctiva, sclera nonicteric  RESPIRATORY: Normal respiratory effort; CTA b/l, no wheezing, rhonchi, rales  CARDIOVASCULAR: RRR, normal S1 and S, 2/6 systolic murmur  ABDOMEN: soft, NT/ND, normoactive bowel sounds, no rebound/guarding  EXTREMITIES: No cynaosis, no clubbing, no lower extremity edema; Peripheral pulses are 2+ bilaterally  NEUROLOGY: AAO to self, place but not time.   Strength and sensation intact,   SKIN: No rashes or no palpable lesions                            10.1   7.94  )-----------( 261      ( 06 Jun 2021 08:21 )             32.8   06-06    139  |  101  |  23.6<H>  ----------------------------<  139<H>  3.9   |  28.0  |  1.10    Ca    8.6      06 Jun 2021 08:17

## 2021-06-06 NOTE — PROGRESS NOTE ADULT - PROVIDER SPECIALTY LIST ADULT
Hospitalist
Rehab Medicine
Hospitalist
Neurology
Rehab Medicine
Hospitalist
Hospitalist
Rehab Medicine
Hospitalist

## 2021-06-06 NOTE — PROGRESS NOTE ADULT - NSICDXPILOT_GEN_ALL_CORE
[place]

## 2021-06-06 NOTE — PROGRESS NOTE ADULT - ASSESSMENT
82 y/o F w/ PMH of DVT (on Eliquis), NIDDM, glaucoma, HLD, HTN, hs of NPH s/p  shunt 2015 sent ot ED for +CTH w/ ICH.  CTH was done outpt after c/o dizziness and unsteadness/falling over to left side.  Pt denies head trauma.  Evaluated by neurosurgery but no intervention planned.  CTH stable x4.  Pt confused overnight and UA ordered and found to have pyuria; suspect UTI as cause of change in mentation.      > Right basal ganglia hemorrhage / stable   - Discontinued Eliquis on admission   - CTH x4 ICH stable and neurologically unchanged  - No intervention necessary as per neurosurgery but if ICH larger will contact neurosurgery to reassess  - Neuro checks and vital signs q4h   - Goal BP <140/90 and c/w prn IV labetalol to maintain BP at goal  - HOB elevated to 30 degrees   - PT/OT/ST evaluations noted  - seen Neurology and neurosurgery , no further intervention at thsi time     >Acute metabolic encephalopathy 2/2 UTI  - UA w/ pyuria, Ucx growing Klebsiella   - sensitive to ceftriaxone , c/w abxs to complete x 5-7 days     >Hx of DVT in September  - Repeat duplex during this admission negative  - PO eliquis discontinued in light of acute ICH  - Given negative doppler and risk/benefit assessment pt should remain off AC    >Hypertensive Crisis, improving  - Goal BP <140/90 in light of ICH  - c/w po labetalol and IV prn  - Monitor BP closely     >NIDDM w/ hyperglycemia, improving   - A1c 7.7  - Hold metformin while hospitalized  - c/w basal bolus regimen and titrate to maintain BG <180  - ISS and hypoglycemic protocol in place    > PASCALE likely prerenal   - Will c/w gentle hydration   - Monitor renal function   - Avoid nephrotoxic medications or renally dose if needed    >Bipolar disorder   - Stable  - c/w current medications    >HLD  - c/w statin therapy     >VTE ppx: no chemical prophylaxis given acute ICH.  c/w SCDs    >Dispo:  Pt medically stable and pending acute rehab placement.  COVID-19 PCR: Blanca: (06.05.21 @ 18:40)

## 2021-06-07 ENCOUNTER — INPATIENT (INPATIENT)
Facility: HOSPITAL | Age: 81
LOS: 13 days | Discharge: ANOTHER HOSPITAL | DRG: 949 | End: 2021-06-21
Attending: PSYCHIATRY & NEUROLOGY | Admitting: PSYCHIATRY & NEUROLOGY
Payer: MEDICARE

## 2021-06-07 ENCOUNTER — TRANSCRIPTION ENCOUNTER (OUTPATIENT)
Age: 81
End: 2021-06-07

## 2021-06-07 VITALS
HEART RATE: 65 BPM | TEMPERATURE: 98 F | WEIGHT: 160.5 LBS | HEIGHT: 58 IN | RESPIRATION RATE: 14 BRPM | SYSTOLIC BLOOD PRESSURE: 162 MMHG | DIASTOLIC BLOOD PRESSURE: 72 MMHG | OXYGEN SATURATION: 95 %

## 2021-06-07 VITALS
HEART RATE: 59 BPM | DIASTOLIC BLOOD PRESSURE: 70 MMHG | TEMPERATURE: 98 F | SYSTOLIC BLOOD PRESSURE: 120 MMHG | OXYGEN SATURATION: 95 % | RESPIRATION RATE: 17 BRPM

## 2021-06-07 DIAGNOSIS — Z96.652 PRESENCE OF LEFT ARTIFICIAL KNEE JOINT: Chronic | ICD-10-CM

## 2021-06-07 DIAGNOSIS — Z90.49 ACQUIRED ABSENCE OF OTHER SPECIFIED PARTS OF DIGESTIVE TRACT: Chronic | ICD-10-CM

## 2021-06-07 DIAGNOSIS — Z90.710 ACQUIRED ABSENCE OF BOTH CERVIX AND UTERUS: Chronic | ICD-10-CM

## 2021-06-07 DIAGNOSIS — I61.9 NONTRAUMATIC INTRACEREBRAL HEMORRHAGE, UNSPECIFIED: ICD-10-CM

## 2021-06-07 DIAGNOSIS — S86.009A UNSPECIFIED INJURY OF UNSPECIFIED ACHILLES TENDON, INITIAL ENCOUNTER: Chronic | ICD-10-CM

## 2021-06-07 LAB
ANION GAP SERPL CALC-SCNC: 8 MMOL/L — SIGNIFICANT CHANGE UP (ref 5–17)
BUN SERPL-MCNC: 25.8 MG/DL — HIGH (ref 8–20)
CALCIUM SERPL-MCNC: 9.1 MG/DL — SIGNIFICANT CHANGE UP (ref 8.6–10.2)
CHLORIDE SERPL-SCNC: 100 MMOL/L — SIGNIFICANT CHANGE UP (ref 98–107)
CO2 SERPL-SCNC: 28 MMOL/L — SIGNIFICANT CHANGE UP (ref 22–29)
CREAT SERPL-MCNC: 1.09 MG/DL — SIGNIFICANT CHANGE UP (ref 0.5–1.3)
GLUCOSE BLDC GLUCOMTR-MCNC: 125 MG/DL — HIGH (ref 70–99)
GLUCOSE BLDC GLUCOMTR-MCNC: 152 MG/DL — HIGH (ref 70–99)
GLUCOSE BLDC GLUCOMTR-MCNC: 152 MG/DL — HIGH (ref 70–99)
GLUCOSE BLDC GLUCOMTR-MCNC: 159 MG/DL — HIGH (ref 70–99)
GLUCOSE SERPL-MCNC: 138 MG/DL — HIGH (ref 70–99)
HCT VFR BLD CALC: 30.4 % — LOW (ref 34.5–45)
HGB BLD-MCNC: 10.1 G/DL — LOW (ref 11.5–15.5)
MCHC RBC-ENTMCNC: 30.1 PG — SIGNIFICANT CHANGE UP (ref 27–34)
MCHC RBC-ENTMCNC: 33.2 GM/DL — SIGNIFICANT CHANGE UP (ref 32–36)
MCV RBC AUTO: 90.5 FL — SIGNIFICANT CHANGE UP (ref 80–100)
PLATELET # BLD AUTO: 223 K/UL — SIGNIFICANT CHANGE UP (ref 150–400)
POTASSIUM SERPL-MCNC: 4.3 MMOL/L — SIGNIFICANT CHANGE UP (ref 3.5–5.3)
POTASSIUM SERPL-SCNC: 4.3 MMOL/L — SIGNIFICANT CHANGE UP (ref 3.5–5.3)
RBC # BLD: 3.36 M/UL — LOW (ref 3.8–5.2)
RBC # FLD: 11.9 % — SIGNIFICANT CHANGE UP (ref 10.3–14.5)
SODIUM SERPL-SCNC: 136 MMOL/L — SIGNIFICANT CHANGE UP (ref 135–145)
WBC # BLD: 8.35 K/UL — SIGNIFICANT CHANGE UP (ref 3.8–10.5)
WBC # FLD AUTO: 8.35 K/UL — SIGNIFICANT CHANGE UP (ref 3.8–10.5)

## 2021-06-07 PROCEDURE — 83036 HEMOGLOBIN GLYCOSYLATED A1C: CPT

## 2021-06-07 PROCEDURE — 36415 COLL VENOUS BLD VENIPUNCTURE: CPT

## 2021-06-07 PROCEDURE — 96375 TX/PRO/DX INJ NEW DRUG ADDON: CPT

## 2021-06-07 PROCEDURE — 87086 URINE CULTURE/COLONY COUNT: CPT

## 2021-06-07 PROCEDURE — 71045 X-RAY EXAM CHEST 1 VIEW: CPT

## 2021-06-07 PROCEDURE — 86850 RBC ANTIBODY SCREEN: CPT

## 2021-06-07 PROCEDURE — 93005 ELECTROCARDIOGRAM TRACING: CPT

## 2021-06-07 PROCEDURE — 85610 PROTHROMBIN TIME: CPT

## 2021-06-07 PROCEDURE — 70450 CT HEAD/BRAIN W/O DYE: CPT

## 2021-06-07 PROCEDURE — 93970 EXTREMITY STUDY: CPT

## 2021-06-07 PROCEDURE — 87186 SC STD MICRODIL/AGAR DIL: CPT

## 2021-06-07 PROCEDURE — 99233 SBSQ HOSP IP/OBS HIGH 50: CPT

## 2021-06-07 PROCEDURE — 99223 1ST HOSP IP/OBS HIGH 75: CPT

## 2021-06-07 PROCEDURE — 99239 HOSP IP/OBS DSCHRG MGMT >30: CPT

## 2021-06-07 PROCEDURE — 83735 ASSAY OF MAGNESIUM: CPT

## 2021-06-07 PROCEDURE — U0005: CPT

## 2021-06-07 PROCEDURE — 85027 COMPLETE CBC AUTOMATED: CPT

## 2021-06-07 PROCEDURE — 86900 BLOOD TYPING SEROLOGIC ABO: CPT

## 2021-06-07 PROCEDURE — 81001 URINALYSIS AUTO W/SCOPE: CPT

## 2021-06-07 PROCEDURE — 80048 BASIC METABOLIC PNL TOTAL CA: CPT

## 2021-06-07 PROCEDURE — 85025 COMPLETE CBC W/AUTO DIFF WBC: CPT

## 2021-06-07 PROCEDURE — 87077 CULTURE AEROBIC IDENTIFY: CPT

## 2021-06-07 PROCEDURE — 85730 THROMBOPLASTIN TIME PARTIAL: CPT

## 2021-06-07 PROCEDURE — 97110 THERAPEUTIC EXERCISES: CPT

## 2021-06-07 PROCEDURE — 96374 THER/PROPH/DIAG INJ IV PUSH: CPT

## 2021-06-07 PROCEDURE — 82962 GLUCOSE BLOOD TEST: CPT

## 2021-06-07 PROCEDURE — 80061 LIPID PANEL: CPT

## 2021-06-07 PROCEDURE — 97116 GAIT TRAINING THERAPY: CPT

## 2021-06-07 PROCEDURE — 99285 EMERGENCY DEPT VISIT HI MDM: CPT | Mod: 25

## 2021-06-07 PROCEDURE — 86901 BLOOD TYPING SEROLOGIC RH(D): CPT

## 2021-06-07 PROCEDURE — 96376 TX/PRO/DX INJ SAME DRUG ADON: CPT

## 2021-06-07 PROCEDURE — 84100 ASSAY OF PHOSPHORUS: CPT

## 2021-06-07 PROCEDURE — U0003: CPT

## 2021-06-07 PROCEDURE — 86769 SARS-COV-2 COVID-19 ANTIBODY: CPT

## 2021-06-07 PROCEDURE — 92526 ORAL FUNCTION THERAPY: CPT

## 2021-06-07 PROCEDURE — 80053 COMPREHEN METABOLIC PANEL: CPT

## 2021-06-07 PROCEDURE — 97167 OT EVAL HIGH COMPLEX 60 MIN: CPT

## 2021-06-07 PROCEDURE — 97530 THERAPEUTIC ACTIVITIES: CPT

## 2021-06-07 RX ORDER — ARIPIPRAZOLE 15 MG/1
10 TABLET ORAL AT BEDTIME
Refills: 0 | Status: DISCONTINUED | OUTPATIENT
Start: 2021-06-07 | End: 2021-06-21

## 2021-06-07 RX ORDER — CEFTRIAXONE 500 MG/1
1000 INJECTION, POWDER, FOR SOLUTION INTRAMUSCULAR; INTRAVENOUS EVERY 24 HOURS
Refills: 0 | Status: COMPLETED | OUTPATIENT
Start: 2021-06-08 | End: 2021-06-10

## 2021-06-07 RX ORDER — INSULIN LISPRO 100/ML
2 VIAL (ML) SUBCUTANEOUS
Refills: 0 | Status: DISCONTINUED | OUTPATIENT
Start: 2021-06-07 | End: 2021-06-08

## 2021-06-07 RX ORDER — SODIUM CHLORIDE 9 MG/ML
1000 INJECTION, SOLUTION INTRAVENOUS
Refills: 0 | Status: DISCONTINUED | OUTPATIENT
Start: 2021-06-07 | End: 2021-06-21

## 2021-06-07 RX ORDER — DEXTROSE 50 % IN WATER 50 %
25 SYRINGE (ML) INTRAVENOUS ONCE
Refills: 0 | Status: DISCONTINUED | OUTPATIENT
Start: 2021-06-07 | End: 2021-06-21

## 2021-06-07 RX ORDER — DESVENLAFAXINE 50 MG/1
1 TABLET, EXTENDED RELEASE ORAL
Qty: 30 | Refills: 0
Start: 2021-06-07 | End: 2021-07-06

## 2021-06-07 RX ORDER — CEFPODOXIME PROXETIL 100 MG
1 TABLET ORAL
Qty: 4 | Refills: 0
Start: 2021-06-07 | End: 2021-06-08

## 2021-06-07 RX ORDER — INSULIN GLARGINE 100 [IU]/ML
5 INJECTION, SOLUTION SUBCUTANEOUS AT BEDTIME
Refills: 0 | Status: DISCONTINUED | OUTPATIENT
Start: 2021-06-07 | End: 2021-06-08

## 2021-06-07 RX ORDER — INSULIN GLARGINE 100 [IU]/ML
5 INJECTION, SOLUTION SUBCUTANEOUS
Qty: 0 | Refills: 0 | DISCHARGE
Start: 2021-06-07

## 2021-06-07 RX ORDER — ACETAMINOPHEN 500 MG
650 TABLET ORAL EVERY 6 HOURS
Refills: 0 | Status: DISCONTINUED | OUTPATIENT
Start: 2021-06-07 | End: 2021-06-21

## 2021-06-07 RX ORDER — CHLORHEXIDINE GLUCONATE 213 G/1000ML
15 SOLUTION TOPICAL
Qty: 0 | Refills: 0 | DISCHARGE
Start: 2021-06-07

## 2021-06-07 RX ORDER — NYSTATIN 500MM UNIT
5 POWDER (EA) MISCELLANEOUS
Qty: 0 | Refills: 0 | DISCHARGE
Start: 2021-06-07

## 2021-06-07 RX ORDER — ARIPIPRAZOLE 15 MG/1
1 TABLET ORAL
Qty: 0 | Refills: 0 | DISCHARGE

## 2021-06-07 RX ORDER — DEXTROSE 50 % IN WATER 50 %
12.5 SYRINGE (ML) INTRAVENOUS ONCE
Refills: 0 | Status: DISCONTINUED | OUTPATIENT
Start: 2021-06-07 | End: 2021-06-21

## 2021-06-07 RX ORDER — CHLORHEXIDINE GLUCONATE 213 G/1000ML
15 SOLUTION TOPICAL
Refills: 0 | Status: DISCONTINUED | OUTPATIENT
Start: 2021-06-07 | End: 2021-06-21

## 2021-06-07 RX ORDER — INSULIN LISPRO 100/ML
VIAL (ML) SUBCUTANEOUS
Refills: 0 | Status: DISCONTINUED | OUTPATIENT
Start: 2021-06-07 | End: 2021-06-21

## 2021-06-07 RX ORDER — DEXTROSE 50 % IN WATER 50 %
15 SYRINGE (ML) INTRAVENOUS ONCE
Refills: 0 | Status: DISCONTINUED | OUTPATIENT
Start: 2021-06-07 | End: 2021-06-21

## 2021-06-07 RX ORDER — FOLIC ACID 0.8 MG
1 TABLET ORAL DAILY
Refills: 0 | Status: DISCONTINUED | OUTPATIENT
Start: 2021-06-08 | End: 2021-06-21

## 2021-06-07 RX ORDER — ATORVASTATIN CALCIUM 80 MG/1
40 TABLET, FILM COATED ORAL AT BEDTIME
Refills: 0 | Status: DISCONTINUED | OUTPATIENT
Start: 2021-06-07 | End: 2021-06-21

## 2021-06-07 RX ORDER — GLUCAGON INJECTION, SOLUTION 0.5 MG/.1ML
1 INJECTION, SOLUTION SUBCUTANEOUS ONCE
Refills: 0 | Status: DISCONTINUED | OUTPATIENT
Start: 2021-06-07 | End: 2021-06-21

## 2021-06-07 RX ORDER — ARIPIPRAZOLE 15 MG/1
10 TABLET ORAL DAILY
Refills: 0 | Status: DISCONTINUED | OUTPATIENT
Start: 2021-06-08 | End: 2021-06-07

## 2021-06-07 RX ORDER — CEPHALEXIN 500 MG
1 CAPSULE ORAL
Qty: 4 | Refills: 0
Start: 2021-06-07 | End: 2021-06-08

## 2021-06-07 RX ORDER — AMLODIPINE BESYLATE 2.5 MG/1
10 TABLET ORAL DAILY
Refills: 0 | Status: DISCONTINUED | OUTPATIENT
Start: 2021-06-07 | End: 2021-06-21

## 2021-06-07 RX ORDER — DESVENLAFAXINE 50 MG/1
25 TABLET, EXTENDED RELEASE ORAL DAILY
Refills: 0 | Status: DISCONTINUED | OUTPATIENT
Start: 2021-06-08 | End: 2021-06-08

## 2021-06-07 RX ORDER — METOPROLOL TARTRATE 50 MG
25 TABLET ORAL
Refills: 0 | Status: DISCONTINUED | OUTPATIENT
Start: 2021-06-07 | End: 2021-06-09

## 2021-06-07 RX ORDER — FLUVOXAMINE MALEATE 25 MG/1
150 TABLET ORAL AT BEDTIME
Refills: 0 | Status: DISCONTINUED | OUTPATIENT
Start: 2021-06-07 | End: 2021-06-08

## 2021-06-07 RX ORDER — NYSTATIN 500MM UNIT
500000 POWDER (EA) MISCELLANEOUS
Refills: 0 | Status: COMPLETED | OUTPATIENT
Start: 2021-06-07 | End: 2021-06-14

## 2021-06-07 RX ADMIN — CEFTRIAXONE 100 MILLIGRAM(S): 500 INJECTION, POWDER, FOR SOLUTION INTRAMUSCULAR; INTRAVENOUS at 13:08

## 2021-06-07 RX ADMIN — Medication 25 MILLIGRAM(S): at 21:19

## 2021-06-07 RX ADMIN — Medication 2 UNIT(S): at 08:25

## 2021-06-07 RX ADMIN — ARIPIPRAZOLE 10 MILLIGRAM(S): 15 TABLET ORAL at 21:28

## 2021-06-07 RX ADMIN — AMLODIPINE BESYLATE 10 MILLIGRAM(S): 2.5 TABLET ORAL at 05:47

## 2021-06-07 RX ADMIN — Medication 1 MILLIGRAM(S): at 13:13

## 2021-06-07 RX ADMIN — INSULIN GLARGINE 5 UNIT(S): 100 INJECTION, SOLUTION SUBCUTANEOUS at 23:49

## 2021-06-07 RX ADMIN — Medication 2: at 08:24

## 2021-06-07 RX ADMIN — Medication 500000 UNIT(S): at 05:48

## 2021-06-07 RX ADMIN — CHLORHEXIDINE GLUCONATE 15 MILLILITER(S): 213 SOLUTION TOPICAL at 05:48

## 2021-06-07 RX ADMIN — Medication 2 UNIT(S): at 13:12

## 2021-06-07 RX ADMIN — Medication 2: at 13:12

## 2021-06-07 RX ADMIN — Medication 500000 UNIT(S): at 13:13

## 2021-06-07 RX ADMIN — ATORVASTATIN CALCIUM 40 MILLIGRAM(S): 80 TABLET, FILM COATED ORAL at 22:59

## 2021-06-07 NOTE — H&P ADULT - NSHPSOCIALHISTORY_GEN_ALL_CORE
SOCIAL HISTORY  Smoking - Denied  EtOH - Denied   Drugs - Denied    FUNCTIONAL HISTORY  Pt reports living with son in an apartment. 2 outdoor + 12 indoor steps to enter. (+) rail. Independent at baseline, with use of RW on occasion. States that she cannot use SAC, "I'm clumsy."  Pt sleeps on couch. Son does laundry, cooking, cleaning... Does not work and able to assist as needed    CURRENT FUNCTIONAL STATUS  6/3  Bed Mobility  Bed Mobility Training Sit-to-Supine: maximum assist (25% patient effort);  1 person assist  Bed Mobility Training Supine-to-Sit: maximum assist (25% patient effort);  1 person assist;  verbal cues to encourage pt to maintain eyes open during transfer. During transfer pt began to cough, noted unchewed pasta in pt's mouth. Therapist removed food from pt's mouth. pt required mod to max A x 1 to maintain upright posture due to fatigue.   Bed Mobility Training Limitations: decreased ability to use arms for pushing/pulling;  decreased ability to use legs for bridging/pushing;  decreased strength;  impaired balance;  cognitive, decreased safety awareness;  decreased endurance    Sit-Stand Transfer Training  Transfer Training Sit-to-Stand Transfer: unable to perform;  unsafe to perform due to pt being lethargic.

## 2021-06-07 NOTE — H&P ADULT - ASSESSMENT
ASSESSMENT/PLAN  80 y/o F w/ PMH of DVT (on Eliquis), NIDDM, glaucoma, HLD, HTN, hs of NPH s/p  shunt 2015 sent ot ED for +CTH w/ ICH.  CTH was done outpt after c/o dizziness and unsteadness/falling over to left side.  Pt denies head trauma.  Evaluated by neurosurgery - nonsurgical intervention. Hospital course complicated by encephalopathic d/u UTI    COMORBIDITES/ACTIVE MEDICAL ISSUES     Gait Instability, ADL impairments and Functional impairments: start Comprehensive Rehab Program of PT/OT/SLP     # Right Basal ganglia hemorrhage  - Eliquis held (previous DVT)  - CTH x4 - ICH stable   - BP goal: <140/90    # Hypertensive Crisis  - Continue metoprolol 25 BID, norvasc 10  - BP Goal: <140/90    #Encephalopathy due to UTI  - UCx grew klebsiella variicola  - start ceftriaxone x 5-7 days    #Hyperglycemia   - A1c 7.7  - Lantus 5U  - Admelog 2U  - FS + ISS    #PASCALE  - Encourage oral fluid  - Avoid nephrotoxic med    #HLD  - Lipitor 40    #Bipolar disorder  - continue with Pristiq ER 25    #Pain control  - Tylenol PRN    #GI/Bowel Mgmt   - Continent Senna,  Miralax PRN,    #Bladder management  - COntinent, PVR q 8 hours (SC if > 400)    #Hx of DVT (9/2020)  - SCDs, TEDs   - Doppler negative - 5/28  - Eliquis dc in light of hemorrhage    #Skin:  -No active issues at this time    FEN   - Diet - Puree + Thins    - Dysphagia  SLP - evaluation and treatment    Precautions / PROPHYLAXIS:   - Falls  - ortho: Weight bearing status: WBAT   - Lungs: Aspiration, Incentive Spirometer   - Pressure injury/Skin: Turn Q2hrs while in bed, OOB to Chair, PT/OT        MEDICAL PROGNOSIS: GOOD            REHAB POTENTIAL: GOOD             ESTIMATED DISPOSITION: HOME WITH HOME CARE            ELOS: 10-14 Days   EXPECTED THERAPY:     P.T. 1hr/day       O.T. 1hr/day      S.L.P. 1hr/day     P&O Unnecessary     EXP FREQUENCY: 5 days per 7 day period     PRESCREEN COMPARISION:   I have reviewed the prescreen information and I have found no relevant changes between the preadmission screening and my post admission evaluation     RATIONALE FOR INPATIENT ADMISSION - Patient demonstrates the following: (check all that apply)  [X] Medically appropriate for rehabilitation admission  [X] Has attainable rehab goals with an appropriate initial discharge plan  [X] Has rehabilitation potential (expected to make a significant improvement within a reasonable period of time)   [X] Requires close medical management by a rehab physician, rehab nursing care, Hospitalist and comprehensive interdisciplinary team (including PT, OT, & or SLP, Prosthetics and Orthotics)   ASSESSMENT/PLAN  82 y/o F w/ PMH of DVT (on Eliquis), NIDDM, glaucoma, HLD, HTN, hs of NPH s/p  shunt 2015 sent ot ED for +CTH w/ ICH.  CTH was done outpt after c/o dizziness and unsteadness/falling over to left side.  Pt denies head trauma.  Evaluated by neurosurgery - nonsurgical intervention. Hospital course complicated by encephalopathic d/u UTI    COMORBIDITES/ACTIVE MEDICAL ISSUES     Gait Instability, ADL impairments and Functional impairments: start Comprehensive Rehab Program of PT/OT/SLP     # Right Basal ganglia hemorrhage  - Eliquis held (previous DVT)  - CTH x4 - ICH stable   - BP goal: <140/90    # Hypertensive Crisis  - Continue metoprolol 25 BID, norvasc 10  - BP Goal: <140/90    #Encephalopathy due to UTI  - UCx grew klebsiella variicola  - start ceftriaxone x 5-7 days    #Hyperglycemia   - A1c 7.7  - Lantus 5U  - Admelog 2U  - FS + ISS    #PASCALE  - Encourage oral fluid  - Avoid nephrotoxic med    #HLD  - Lipitor 40    #Bipolar disorder  - continue with Pristiq ER 25  - continue abilify 10mg qhs    #Pain control  - Tylenol PRN    #GI/Bowel Mgmt   - Continent Senna,  Miralax PRN,    #Bladder management  - COntinent, PVR q 8 hours (SC if > 400)    #Hx of DVT (9/2020)  - SCDs, TEDs   - Doppler negative - 5/28  - Eliquis dc in light of hemorrhage    #Skin:  - Left heel callous, overgrown toenails, consider podiatry consult  - left elbow, healing skin tear    FEN   - Diet - Puree + Thins    - Dysphagia  SLP - evaluation and treatment    Precautions / PROPHYLAXIS:   - Falls  - ortho: Weight bearing status: WBAT   - Lungs: Aspiration, Incentive Spirometer   - Pressure injury/Skin: Turn Q2hrs while in bed, OOB to Chair, PT/OT        MEDICAL PROGNOSIS: GOOD            REHAB POTENTIAL: GOOD             ESTIMATED DISPOSITION: HOME WITH HOME CARE            ELOS: 10-14 Days   EXPECTED THERAPY:     P.T. 1hr/day       O.T. 1hr/day      S.L.P. 1hr/day     P&O Unnecessary     EXP FREQUENCY: 5 days per 7 day period     PRESCREEN COMPARISION:   I have reviewed the prescreen information and I have found no relevant changes between the preadmission screening and my post admission evaluation     RATIONALE FOR INPATIENT ADMISSION - Patient demonstrates the following: (check all that apply)  [X] Medically appropriate for rehabilitation admission  [X] Has attainable rehab goals with an appropriate initial discharge plan  [X] Has rehabilitation potential (expected to make a significant improvement within a reasonable period of time)   [X] Requires close medical management by a rehab physician, rehab nursing care, Hospitalist and comprehensive interdisciplinary team (including PT, OT, & or SLP, Prosthetics and Orthotics)   ASSESSMENT/PLAN  80 y/o F w/ PMH of DVT (on Eliquis), NIDDM, glaucoma, HLD, HTN, hs of NPH s/p  shunt 2015 sent ot ED for +CTH w/ ICH.  CTH was done outpt after c/o dizziness and unsteadness/falling over to left side.  Pt denies head trauma.  Evaluated by neurosurgery - nonsurgical intervention. Hospital course complicated by encephalopathic d/u UTI    COMORBIDITES/ACTIVE MEDICAL ISSUES     Gait Instability, ADL impairments and Functional impairments: start Comprehensive Rehab Program of PT/OT/SLP     # Right Basal ganglia hemorrhage  - Eliquis held (previous DVT)  - CTH x4 - ICH stable   - BP goal: <140/90    # Hypertensive Crisis  - Continue metoprolol 25 BID, norvasc 10  - BP Goal: <140/90    #Encephalopathy due to UTI  - UCx grew klebsiella variicola  - start ceftriaxone x 5-7 days    #Hyperglycemia   - A1c 7.7  - Lantus 5U  - Admelog 2U  - FS + ISS    #PASCALE  - Encourage oral fluid  - Avoid nephrotoxic med    #HLD  - Lipitor 40    #Bipolar disorder  - continue with Pristiq ER 25  - continue abilify 10mg qhs    #Pain control  - Tylenol PRN    #GI/Bowel Mgmt   - Continent Senna,  Miralax PRN,    #Bladder management  - COntinent, PVR q 8 hours (SC if > 400)    #Hx of DVT (9/2020)  - SCDs, TEDs   - Doppler negative - 5/28  - Eliquis dc in light of hemorrhage    #Skin:  - Left heel callous, overgrown toenails, consider podiatry consult  - left elbow, healing skin tear: apply cavalon and allevyn  - moisture associated dermatitis at abdominal pannus - cont barrier cream    FEN   - Diet - Puree + Thins    - Dysphagia  SLP - evaluation and treatment    Precautions / PROPHYLAXIS:   - Falls  - ortho: Weight bearing status: WBAT   - Lungs: Aspiration, Incentive Spirometer   - Pressure injury/Skin: Turn Q2hrs while in bed, OOB to Chair, PT/OT        MEDICAL PROGNOSIS: GOOD            REHAB POTENTIAL: GOOD             ESTIMATED DISPOSITION: HOME WITH HOME CARE            ELOS: 10-14 Days   EXPECTED THERAPY:     P.T. 1hr/day       O.T. 1hr/day      S.L.P. 1hr/day     P&O Unnecessary     EXP FREQUENCY: 5 days per 7 day period     PRESCREEN COMPARISION:   I have reviewed the prescreen information and I have found no relevant changes between the preadmission screening and my post admission evaluation     RATIONALE FOR INPATIENT ADMISSION - Patient demonstrates the following: (check all that apply)  [X] Medically appropriate for rehabilitation admission  [X] Has attainable rehab goals with an appropriate initial discharge plan  [X] Has rehabilitation potential (expected to make a significant improvement within a reasonable period of time)   [X] Requires close medical management by a rehab physician, rehab nursing care, Hospitalist and comprehensive interdisciplinary team (including PT, OT, & or SLP, Prosthetics and Orthotics)   ASSESSMENT/PLAN  82 y/o F w/ PMH of DVT (on Eliquis), NIDDM, glaucoma, HLD, HTN, hs of NPH s/p  shunt 2015 sent ot ED for +CTH w/ ICH.  CTH was done outpt after c/o dizziness and unsteadness/falling over to left side.  Pt denies head trauma.  Evaluated by neurosurgery - nonsurgical intervention. Hospital course complicated by encephalopathic d/u UTI    COMORBIDITES/ACTIVE MEDICAL ISSUES     Gait Instability, ADL impairments and Functional impairments: start Comprehensive Rehab Program of PT/OT/SLP     # Right Basal ganglia hemorrhage  - Eliquis held (previous DVT)  - CTH x4 - ICH stable   - BP goal: <140/90    # Hypertensive Crisis  - Continue metoprolol 25 BID, norvasc 10  - BP Goal: <140/90    #Encephalopathy due to UTI  - UCx grew klebsiella variicola  - start ceftriaxone x 5-7 days    #Hyperglycemia   - A1c 7.7  - Lantus 5U  - Admelog 2U  - FS + ISS    #PASCALE  - Encourage oral fluid  - Avoid nephrotoxic med    #HLD  - Lipitor 40    #Bipolar disorder  - Pristiq ER 25  - Fluvoxamine 150  - Abilify 10mg qhs    #Pain control  - Tylenol PRN    #GI/Bowel Mgmt   - Continent Senna,  Miralax PRN,    #Bladder management  - COntinent, PVR q 8 hours (SC if > 400)    #Hx of DVT (9/2020)  - SCDs, TEDs   - Doppler negative - 5/28  - Eliquis dc in light of hemorrhage    #Skin:  - Left heel callous, overgrown toenails, consider podiatry consult  - left elbow, healing skin tear: apply cavalon and allevyn  - moisture associated dermatitis at abdominal pannus - cont barrier cream    FEN   - Diet - Puree + Thins    - Dysphagia  SLP - evaluation and treatment    Precautions / PROPHYLAXIS:   - Falls  - ortho: Weight bearing status: WBAT   - Lungs: Aspiration, Incentive Spirometer   - Pressure injury/Skin: Turn Q2hrs while in bed, OOB to Chair, PT/OT        MEDICAL PROGNOSIS: GOOD            REHAB POTENTIAL: GOOD             ESTIMATED DISPOSITION: HOME WITH HOME CARE            ELOS: 10-14 Days   EXPECTED THERAPY:     P.T. 1hr/day       O.T. 1hr/day      S.L.P. 1hr/day     P&O Unnecessary     EXP FREQUENCY: 5 days per 7 day period     PRESCREEN COMPARISION:   I have reviewed the prescreen information and I have found no relevant changes between the preadmission screening and my post admission evaluation     RATIONALE FOR INPATIENT ADMISSION - Patient demonstrates the following: (check all that apply)  [X] Medically appropriate for rehabilitation admission  [X] Has attainable rehab goals with an appropriate initial discharge plan  [X] Has rehabilitation potential (expected to make a significant improvement within a reasonable period of time)   [X] Requires close medical management by a rehab physician, rehab nursing care, Hospitalist and comprehensive interdisciplinary team (including PT, OT, & or SLP, Prosthetics and Orthotics)

## 2021-06-07 NOTE — PATIENT PROFILE ADULT - NSPROIMPLANTSMEDDEV_GEN_A_NUR
Problem: Falls - Risk of:  Goal: Will remain free from falls  Description  Will remain free from falls  12/24/2019 0016 by Marcus Wilde RN  Outcome: Met This Shift     Problem: Falls - Risk of:  Goal: Absence of physical injury  Description  Absence of physical injury  Outcome: Met This Shift     Problem: Cardiac Output - Decreased:  Goal: Cardiac output within specified parameters  Description  Cardiac output within specified parameters  Outcome: Met This Shift     Problem: Cardiac Output - Decreased:  Goal: Hemodynamic stability will improve  Description  Hemodynamic stability will improve  12/24/2019 0016 by Marcus Wilde RN  Outcome: Met This Shift     Problem: Fluid Volume - Imbalance:  Goal: Ability to achieve a balanced intake and output will improve  Description  Ability to achieve a balanced intake and output will improve  Outcome: Met This Shift     Problem: Fluid Volume - Imbalance:  Goal: Chest tube drainage is within specified parameters  Description  Chest tube drainage is within specified parameters  12/24/2019 0016 by Marcus Wilde RN  Outcome: Met This Shift     Problem: Gas Exchange - Impaired:  Goal: Levels of oxygenation will improve  Description  Levels of oxygenation will improve  12/24/2019 0016 by Marcus Wilde RN  Outcome: Met This Shift     Problem: Gas Exchange - Impaired:  Goal: Ability to maintain adequate ventilation will improve  Description  Ability to maintain adequate ventilation will improve  Outcome: Met This Shift     Problem: Pain:  Goal: Pain level will decrease  Description  Pain level will decrease  Outcome: Met This Shift     Problem: Pain:  Goal: Control of acute pain  Description  Control of acute pain  Outcome: Met This Shift     Problem: Pain:  Goal: Control of chronic pain  Description  Control of chronic pain  Outcome: Met This Shift     Problem: Tissue Perfusion - Cardiopulmonary, Altered:  Goal: Absence of angina  Description  Absence of  shunt/Artificial joint

## 2021-06-07 NOTE — H&P ADULT - HISTORY OF PRESENT ILLNESS
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  CTH was done outpt after c/o dizziness and unsteadness/falling over to left side.  CTH done in ED showed pt had a small right basal ganglia hemorrhage.  Eliquis was discontinued and neurosurgery consulted for evaluation.  f/u CTH was done and ICH noted to be stable.  Per neurosurgery no interventions necessary.  Pt has had a total of 4 CTH all stable. repeat venous duplex neg for any dvt.  On admission pt also noted to have hypertensive crisis that resolved w/ IV lopressor.  BP was maintained at goal <140/90 in light of ICH.  Hospital course was complicated by pt developing acute metabolic encephalopathy due to UTI.  UA noted to have pyuria and cultures grew klebsiella variicola.  Pt was placed on emperic antibiotics and ID consulted.  Mentation improved within 24hrs of IV antibiotics.  Pt will be switched to po antibiotics pending sensitivities for total of 7 day course per ID recs.  Pts A1c noted to be 7.7 and hyperglycemia resolved w/ basal/bolus regimen.  Pt will resume metformin on discharge.  PASCALE was also noted and is likely prerenal.  patient started on dysphagia 1 diet with thin liquids.     Patient was evaluated by PM&R and therapy for functional deficits and gait/ADL impairments and recommend acute rehabilitation.  Patient was medically optimized for discharge to [place] inpatient acute rehab on 6/7/2021.

## 2021-06-07 NOTE — DISCHARGE NOTE NURSING/CASE MANAGEMENT/SOCIAL WORK - PATIENT PORTAL LINK FT
You can access the FollowMyHealth Patient Portal offered by [facility] by registering at the following website: [email or web address]. By joining [facility]’s FollowMyHealth portal, you will also be able to view your health information using other applications (apps) compatible with our system.

## 2021-06-07 NOTE — H&P ADULT - NSHPREVIEWOFSYSTEMS_GEN_ALL_CORE
REVIEW OF SYSTEMS  Constitutional: No fever, No Chills, +fatigue  HEENT: No eye pain, No visual disturbances, No difficulty hearing  Pulm: No cough,  No shortness of breath  Cardio: No chest pain, No palpitations  GI:  No abdominal pain, No nausea, No vomiting, No diarrhea, No constipation  : No dysuria, No frequency, No hematuria  Neuro: No headaches, +memory loss, +loss of strength, No numbness, No tremors  Skin: No itching, No rashes, No lesions   Endo: No temperature intolerance  MSK: No joint pain, No joint swelling, No muscle pain, No Neck or back pain  Psych:  No depression, No anxiety REVIEW OF SYSTEMS  Constitutional: No fever, No Chills, +fatigue  HEENT: No eye pain, +Blurry vision, hx glaucoma and wears reading glasses, No difficulty hearing  Pulm: No cough,  No shortness of breath  Cardio: No chest pain, No palpitations  GI:  No abdominal pain, No nausea, No vomiting, No diarrhea, No constipation  : No dysuria, No frequency, No hematuria  Neuro: No headaches, +memory loss, +loss of strength, No numbness, No tremors  Skin: No itching, No rashes, No lesions   Endo: No temperature intolerance  MSK: No joint pain, No joint swelling, No muscle pain, No Neck or back pain  Psych:  No depression, No anxiety

## 2021-06-07 NOTE — PATIENT PROFILE ADULT - VISION (WITH CORRECTIVE LENSES IF THE PATIENT USUALLY WEARS THEM):
awaiting new prescription for eyeglasses s/p rright eye retina surgery 9/2019 Dr Alexander Mccoy missed 2/10/2020/Partially impaired: cannot see medication labels or newsprint, but can see obstacles in path, and the surrounding layout; can count fingers at arm's length

## 2021-06-07 NOTE — H&P ADULT - ATTENDING COMMENTS
Patient examined, medical records and brain imaging reviewed  Tan to BIU  Resume all medications  Admit labs  Aspiration and fall precautions  Medicine evaluation

## 2021-06-07 NOTE — H&P ADULT - NSHPLABSRESULTS_GEN_ALL_CORE
10.1   8.35  )-----------( 223      ( 07 Jun 2021 08:54 )             30.4     06-07    136  |  100  |  25.8<H>  ----------------------------<  138<H>  4.3   |  28.0  |  1.09    Ca    9.1      07 Jun 2021 08:50    RADIOLOGY, EKG & ADDITIONAL TESTS:     CT Head No Cont (06.01.21 @ 09:03) >  IMPRESSION:  Slightly improved    CT Head No Cont (05.30.21 @ 09:52) >  IMPRESSION: No change since 5/28/2021. Small right basal ganglia hemorrhage. Right-sided  shunt. Small vessel white matter ischemic changes.    CT Head No Cont (05.28.21 @ 19:48) >  IMPRESSION:  Stable 5 mm right basal ganglia hemorrhage. No new hemorrhage.    S Duplex Venous Lower Ext Complete, Bilateral (05.28.21 @ 15:06) >  IMPRESSION:  No evidence of deep venous thrombosis in either lower extremity.    CT Head No Cont (05.28.21 @ 14:41) >  IMPRESSION:  1)  stable follow-up study with no interval change. Small focal hemorrhage again noted in the right lateral basal ganglia and subinsular region.  2)  right sided ventriculostomy again noted. Involutional changes with volume loss. Right parietal gliosis and encephalomalacia. Mild chronic ischemic changes both cerebellar hemispheres. These findings are well seen on prior study.    CT Head No Cont (05.27.21 @ 17:36) >  IMPRESSION: New area of acute parenchymal hemorrhage as described above.    Culture - Urine (06.02.21 @ 16:40)   ***Klebsiella variicola   - Piperacillin/Tazobactam: S <=8   - Tigecycline: S <=2   - Tobramycin: S <=2   - Cefoxitin: S <=8   - Trimethoprim/Sulfamethoxazole: S <=0.5/9.5   - Amikacin: S <=16   - Amoxicillin/Clavulanic Acid: S <=8/4   - Ampicillin: R 16 These ampicillin results predict results for amoxicillin   - Ampicillin/Sulbactam: S <=4/2 Enterobacter, Citrobacter, and Serratia may develop resistance during prolonged therapy (3-4 days)   - Aztreonam: S <=4   - Cefazolin: S <=2   - Cefepime: S <=2   - Ceftriaxone: S <=1 Enterobacter, Citrobacter, and Serratia may develop resistance during prolonged therapy   - Ciprofloxacin: S <=0.25   - Ertapenem: S <=0.5   - Gentamicin: S <=2   - Imipenem: S <=1   - Levofloxacin: S <=0.5   - Meropenem: S <=1   - Nitrofurantoin: S <=32 Should not be used to treat pyelonephritis

## 2021-06-07 NOTE — H&P ADULT - NSHPPHYSICALEXAM_GEN_ALL_CORE
PHYSICAL EXAMINATION   VItals: T(C): 36.6 (06-07-21 @ 05:04), Max: 36.8 (06-06-21 @ 16:22)  HR: 52 (06-07-21 @ 05:04) (51 - 55)  BP: 123/79 (06-07-21 @ 05:04) (123/79 - 141/72)  RR: 18 (06-07-21 @ 05:04) (17 - 18)  SpO2: 95% (06-07-21 @ 05:04) (91% - 95%)    General: NAD, Resting Comfortable,                                  HEENT: NC/AT, EOM I, PERRLA, Normal Conjunctivae  Cardio: RRR, Normal S1-S2, No M/G/R                              Pulm: No Respiratory Distress,  Lungs CTAB                        Abdomen: ND/NT, Soft, BS+                                                MSK: No joint swelling, Full ROM                                         Ext: No C/C/E, Pulses 2+ throughout, No calf tenderness    Skin:  all skin intact                                                                 Wounds: none  Decubitus Ulcers: None Present     Neurological Examination    Cognitive: AAO x 3                                                                         Attention: Intact   Judgment: Good evidence of judgement                               Memory: Recall 3 objects immediate and 3 min later      Mood/Affect: wnl                                                                           Communication:  Fluent,  No dysarthria   Swallow: intact  CN II - XII  intact  Coordination: FTN/HTS intact                                                                              Sensory: Intact to light touch, PP and Vibration                                                                                             Tone: normal Throughout   Balance: impaired    Motor    LEFT    UE: SF [5/5], EF [5/5], EE [5/5], WE [5/5],  [wnl]  RIGHT UE: SF [5/5], EF [5/5], EE [5/5], WE [5/5],  [wnl]  LEFT    LE:  HF [5/5], KE [5/5], DF [5/5], EHL [5/5],  PF [5/5]  RIGHT LE:  HF [5/5], KE [5/5], DF [5/5], EHL [5/5],  PF [5/5]      Reflex:  2 + thoroughout, Carrasco/Babinski negative PHYSICAL EXAMINATION   VItals: T(C): 36.6 (06-07-21 @ 05:04), Max: 36.8 (06-06-21 @ 16:22)  HR: 52 (06-07-21 @ 05:04) (51 - 55)  BP: 123/79 (06-07-21 @ 05:04) (123/79 - 141/72)  RR: 18 (06-07-21 @ 05:04) (17 - 18)  SpO2: 95% (06-07-21 @ 05:04) (91% - 95%)    General: NAD, Resting Comfortable,                                  HEENT: NC/AT, EOM I, PERRLA, Normal Conjunctivae  Cardio: RRR, Normal S1-S2, +murmur                           Pulm: No Respiratory Distress,  Lungs CTAB                        Abdomen: ND/NT, Soft, BS+                                                MSK: No joint swelling, Full ROM                                         Ext: No C/C/E, Pulses 2+ throughout, No calf tenderness    Skin: Left heel 1cm round callous with 1mm depression. Left elbow 1cm x 3cm area of healed skin, intact with flaky dry skin overlying. C/D/I, no erythema or discharge.                                                                Wounds: none  Decubitus Ulcers: None Present     Neurological Examination    Cognitive: AAO x 3                                                                         Attention: Impaired, easily distracted  Judgment: Good evidence of judgement                               Memory: Recall 3/3 objects immediate and 3/3 5 min later with cueing  Mood/Affect: wnl                                                                           Communication:  +dysarthria   Swallow: intact  CN II - XII  intact  Coordination: Left FTN dysmetria                                                                              Sensory: Intact to light touch, PP and Vibration                                                                                             Tone: normal Throughout   Balance: impaired    Motor    LEFT    UE: SF [3/5], EF [4/5], EE [4/5], WE [3/5],  [weak]  RIGHT UE: SF [4/5], EF [4/5], EE [4/5], WE [4/5],  [wnl]  LEFT    LE:  HF [3/5], KE [3/5], DF [3/5], EHL [3/5],  PF [3/5]  RIGHT LE:  HF [3/5], KE [3/5], DF [3/5], EHL [3/5],  PF [3/5]      Reflex:  2 + thoroughout, Carrasco/Babinski negative PHYSICAL EXAMINATION   VItals: T(C): 36.6 (06-07-21 @ 05:04), Max: 36.8 (06-06-21 @ 16:22)  HR: 52 (06-07-21 @ 05:04) (51 - 55)  BP: 123/79 (06-07-21 @ 05:04) (123/79 - 141/72)  RR: 18 (06-07-21 @ 05:04) (17 - 18)  SpO2: 95% (06-07-21 @ 05:04) (91% - 95%)    General: NAD, Resting Comfortable,                                  HEENT: NC/AT, EOM I, PERRLA, Normal Conjunctivae  Cardio: RRR, Normal S1-S2, +murmur                           Pulm: No Respiratory Distress,  Lungs CTAB                        Abdomen: ND/NT, Soft, BS+                                                MSK: No joint swelling, Full ROM                                         Ext: No C/C/E, Pulses 2+ throughout, No calf tenderness    Skin: Left heel 1cm round callous with 1mm depression. Left elbow 1cm x 3cm area of healed skin, intact with flaky dry skin overlying. C/D/I, no erythema or discharge.    area of superficial skin breakdown at abdominal panus fold.                                                             Wounds: none  Decubitus Ulcers: None Present     Neurological Examination    Cognitive: AAO x 3                                                                         Attention: Impaired, easily distracted  Judgment: Good evidence of judgement                               Memory: Recall 3/3 objects immediate and 3/3 5 min later with cueing  Mood/Affect: wnl                                                                           Communication:  +dysarthria   Swallow: intact  CN II - XII  intact  Coordination: Left FTN dysmetria                                                                              Sensory: Intact to light touch, PP and Vibration                                                                                             Tone: normal Throughout   Balance: impaired    Motor    LEFT    UE: SF [3/5], EF [4/5], EE [4/5], WE [3/5],  [weak]  RIGHT UE: SF [4/5], EF [4/5], EE [4/5], WE [4/5],  [wnl]  LEFT    LE:  HF [3/5], KE [3/5], DF [3/5], EHL [3/5],  PF [3/5]  RIGHT LE:  HF [3/5], KE [3/5], DF [3/5], EHL [3/5],  PF [3/5]      Reflex:  2 + thoroughout, Carrasco/Babinski negative

## 2021-06-08 LAB
ANION GAP SERPL CALC-SCNC: 7 MMOL/L — SIGNIFICANT CHANGE UP (ref 5–17)
BUN SERPL-MCNC: 25 MG/DL — HIGH (ref 7–23)
CALCIUM SERPL-MCNC: 8.8 MG/DL — SIGNIFICANT CHANGE UP (ref 8.4–10.5)
CHLORIDE SERPL-SCNC: 102 MMOL/L — SIGNIFICANT CHANGE UP (ref 96–108)
CO2 SERPL-SCNC: 29 MMOL/L — SIGNIFICANT CHANGE UP (ref 22–31)
COVID-19 SPIKE DOMAIN AB INTERP: POSITIVE
COVID-19 SPIKE DOMAIN ANTIBODY RESULT: >250 U/ML — HIGH
CREAT SERPL-MCNC: 1.31 MG/DL — HIGH (ref 0.5–1.3)
GLUCOSE BLDC GLUCOMTR-MCNC: 135 MG/DL — HIGH (ref 70–99)
GLUCOSE BLDC GLUCOMTR-MCNC: 135 MG/DL — HIGH (ref 70–99)
GLUCOSE BLDC GLUCOMTR-MCNC: 192 MG/DL — HIGH (ref 70–99)
GLUCOSE BLDC GLUCOMTR-MCNC: 99 MG/DL — SIGNIFICANT CHANGE UP (ref 70–99)
GLUCOSE SERPL-MCNC: 132 MG/DL — HIGH (ref 70–99)
HCT VFR BLD CALC: 31.4 % — LOW (ref 34.5–45)
HGB BLD-MCNC: 10.2 G/DL — LOW (ref 11.5–15.5)
MCHC RBC-ENTMCNC: 30.1 PG — SIGNIFICANT CHANGE UP (ref 27–34)
MCHC RBC-ENTMCNC: 32.5 GM/DL — SIGNIFICANT CHANGE UP (ref 32–36)
MCV RBC AUTO: 92.6 FL — SIGNIFICANT CHANGE UP (ref 80–100)
NRBC # BLD: 0 /100 WBCS — SIGNIFICANT CHANGE UP (ref 0–0)
NT-PROBNP SERPL-SCNC: 572 PG/ML — HIGH (ref 0–300)
PLATELET # BLD AUTO: 283 K/UL — SIGNIFICANT CHANGE UP (ref 150–400)
POTASSIUM SERPL-MCNC: 4.1 MMOL/L — SIGNIFICANT CHANGE UP (ref 3.5–5.3)
POTASSIUM SERPL-SCNC: 4.1 MMOL/L — SIGNIFICANT CHANGE UP (ref 3.5–5.3)
RBC # BLD: 3.39 M/UL — LOW (ref 3.8–5.2)
RBC # FLD: 11.7 % — SIGNIFICANT CHANGE UP (ref 10.3–14.5)
SARS-COV-2 IGG+IGM SERPL QL IA: >250 U/ML — HIGH
SARS-COV-2 IGG+IGM SERPL QL IA: POSITIVE
SODIUM SERPL-SCNC: 138 MMOL/L — SIGNIFICANT CHANGE UP (ref 135–145)
WBC # BLD: 8.1 K/UL — SIGNIFICANT CHANGE UP (ref 3.8–10.5)
WBC # FLD AUTO: 8.1 K/UL — SIGNIFICANT CHANGE UP (ref 3.8–10.5)

## 2021-06-08 PROCEDURE — 90792 PSYCH DIAG EVAL W/MED SRVCS: CPT

## 2021-06-08 PROCEDURE — 99232 SBSQ HOSP IP/OBS MODERATE 35: CPT

## 2021-06-08 PROCEDURE — 93970 EXTREMITY STUDY: CPT | Mod: 26

## 2021-06-08 PROCEDURE — 99223 1ST HOSP IP/OBS HIGH 75: CPT

## 2021-06-08 PROCEDURE — 71045 X-RAY EXAM CHEST 1 VIEW: CPT | Mod: 26

## 2021-06-08 RX ORDER — NYSTATIN CREAM 100000 [USP'U]/G
1 CREAM TOPICAL
Refills: 0 | Status: DISCONTINUED | OUTPATIENT
Start: 2021-06-08 | End: 2021-06-21

## 2021-06-08 RX ORDER — HYDRALAZINE HCL 50 MG
10 TABLET ORAL EVERY 12 HOURS
Refills: 0 | Status: DISCONTINUED | OUTPATIENT
Start: 2021-06-08 | End: 2021-06-09

## 2021-06-08 RX ORDER — ALBUTEROL 90 UG/1
2 AEROSOL, METERED ORAL EVERY 6 HOURS
Refills: 0 | Status: DISCONTINUED | OUTPATIENT
Start: 2021-06-08 | End: 2021-06-17

## 2021-06-08 RX ORDER — FLUVOXAMINE MALEATE 25 MG/1
150 TABLET ORAL AT BEDTIME
Refills: 0 | Status: DISCONTINUED | OUTPATIENT
Start: 2021-06-09 | End: 2021-06-20

## 2021-06-08 RX ORDER — METFORMIN HYDROCHLORIDE 850 MG/1
850 TABLET ORAL DAILY
Refills: 0 | Status: DISCONTINUED | OUTPATIENT
Start: 2021-06-08 | End: 2021-06-18

## 2021-06-08 RX ORDER — INSULIN LISPRO 100/ML
VIAL (ML) SUBCUTANEOUS AT BEDTIME
Refills: 0 | Status: DISCONTINUED | OUTPATIENT
Start: 2021-06-08 | End: 2021-06-21

## 2021-06-08 RX ADMIN — METFORMIN HYDROCHLORIDE 850 MILLIGRAM(S): 850 TABLET ORAL at 11:53

## 2021-06-08 RX ADMIN — DESVENLAFAXINE 25 MILLIGRAM(S): 50 TABLET, EXTENDED RELEASE ORAL at 07:18

## 2021-06-08 RX ADMIN — NYSTATIN CREAM 1 APPLICATION(S): 100000 CREAM TOPICAL at 18:23

## 2021-06-08 RX ADMIN — Medication 500000 UNIT(S): at 05:21

## 2021-06-08 RX ADMIN — CEFTRIAXONE 100 MILLIGRAM(S): 500 INJECTION, POWDER, FOR SOLUTION INTRAMUSCULAR; INTRAVENOUS at 08:22

## 2021-06-08 RX ADMIN — Medication 2: at 11:57

## 2021-06-08 RX ADMIN — Medication 25 MILLIGRAM(S): at 05:21

## 2021-06-08 RX ADMIN — Medication 10 MILLIGRAM(S): at 18:24

## 2021-06-08 RX ADMIN — AMLODIPINE BESYLATE 10 MILLIGRAM(S): 2.5 TABLET ORAL at 05:21

## 2021-06-08 RX ADMIN — FLUVOXAMINE MALEATE 150 MILLIGRAM(S): 25 TABLET ORAL at 05:22

## 2021-06-08 RX ADMIN — ARIPIPRAZOLE 10 MILLIGRAM(S): 15 TABLET ORAL at 22:21

## 2021-06-08 RX ADMIN — ATORVASTATIN CALCIUM 40 MILLIGRAM(S): 80 TABLET, FILM COATED ORAL at 22:21

## 2021-06-08 RX ADMIN — Medication 25 MILLIGRAM(S): at 18:23

## 2021-06-08 RX ADMIN — CHLORHEXIDINE GLUCONATE 15 MILLILITER(S): 213 SOLUTION TOPICAL at 05:21

## 2021-06-08 RX ADMIN — NYSTATIN CREAM 1 APPLICATION(S): 100000 CREAM TOPICAL at 06:29

## 2021-06-08 RX ADMIN — Medication 500000 UNIT(S): at 11:53

## 2021-06-08 RX ADMIN — Medication 1 MILLIGRAM(S): at 11:53

## 2021-06-08 NOTE — BH CONSULTATION LIAISON ASSESSMENT NOTE - OTHER
not tested  staff report pt is hallucinations which were not elicited or observed on exam.  hx of BPD with episode of hypomania  mild tremors noted to right hand

## 2021-06-08 NOTE — BH CONSULTATION LIAISON ASSESSMENT NOTE - NSUNABLEASSESSPROTRISKCOMMENT_PSY_ALL_CORE
Pt is future oriented and endorses feeling motivated to get back to her previous baseline of functioning.

## 2021-06-08 NOTE — DIETITIAN INITIAL EVALUATION ADULT. - OTHER INFO
82 yo woman with a history of DVT, DM II, glaucoma, HLD, HTN, NPH s/p  admitted to [facility] rehab after hospital course for ICH. CT Head + for small right basal ganglia hemorrhage.  Hospital course was complicated by acute metabolic encephalopathy due to UTI (klebsiella variicola).    Pt tolerating dysphagia 2 mechanical soft, thin liquids, consistent carbohydrate (no snacks), DASH/TLC with fair-good PO intake. Observed during lunch today, ate most of meal. Pt provided food preferences, likes chicken, fish, all kinds of pasta. Receptive to oral nutrition supplement (Glucerna). Pt reports no recent weight change but about 1 year ago, reports UBW of 165-170 lbs; however per chart review, pt was @ [facility] behavioral health unit in 2020 and weighed 158 lbs. Current weight is 160.4 lbs (6/7/21). No edema per nursing flow sheets. Skin w/ Left heel 1cm round callous with 1mm depression. Left elbow 1cm x 3cm area of healed skin, intact with flaky dry skin overlying. C/D/I, no erythema or discharge, Area of superficial skin breakdown at abdominal panus fold per H&P. Pt denies nausea, vomiting, diarrhea, constipation, but no BM yet.

## 2021-06-08 NOTE — BH CONSULTATION LIAISON ASSESSMENT NOTE - NSBHCHARTREVIEWVS_PSY_A_CORE FT
Vital Signs Last 24 Hrs  T(C): 36.7 (08 Jun 2021 08:10), Max: 36.8 (07 Jun 2021 21:12)  T(F): 98 (08 Jun 2021 08:10), Max: 98.2 (07 Jun 2021 21:12)  HR: 58 (08 Jun 2021 08:10) (58 - 65)  BP: 136/64 (08 Jun 2021 08:10) (136/64 - 162/80)  BP(mean): --  RR: 16 (08 Jun 2021 08:10) (14 - 16)  SpO2: 95% (08 Jun 2021 08:10) (95% - 95%)

## 2021-06-08 NOTE — CONSULT NOTE ADULT - ASSESSMENT
82 yo woman with a history of DVT, DM II, glaucoma, HLD, HTN, NPH s/p  admitted to [facility] rehab after hospital course for ICH. CT Head + for small right basal ganglia hemorrhage.  Hospital course was complicated by acute metabolic encephalopathy due to UTI (klebsiella variicola).      Dizziness, unsteadiness, and fall due to acute ICH causing Impaired Gait, Mobility, and ADLs  NPH s/p  shunt  History DVT  - Continue comprehensive rehab program of PT/OT  - Fall precautions  - Continue to HOLD Eliquis for DVT   - Duplex Venous Lower, Bilateral (05.28.21 @ 15:06) - No evidence of deep venous thrombosis in either lower extremity.  - Continue statin  - Bowel regimen as per primary team  - Pain meds as per primary team     Acute Hypoxic Respiratory Failure   - No history of Asthma or COPD  - Albuterol PRN  - CXR - 2 view if possible. If not, CT chest w/o  - F/u pro-bnp  - Oxygen supplement PRN to keep O2 sat > 92%    Acute UTI  - UCx (6/2) Kleb Variicola  - Continue Ceftriaxone for 2 more days    Uncontrolled Hypertension  - continue amlodipine and metoprolol    Diabetes Mellitus II  - controlled  - D/C lantus and pre-meal  - Patient reports taking metformin TID. Due arias, starting metformin and observing FS  - Starting metformin 850mg daily  - ISS and monitor FS    Bipolar disorder  - continue Pristiq, Abilify, and Luvox    Normocytic Anemia  - Stable  - continue folic acid    Possible arias on CKD 3a  - encourage oral intake  - avoid nephrotic agents    DVT ppx - as per primary   82 yo woman with a history of DVT, DM II, glaucoma, HLD, HTN, NPH s/p  admitted to [facility] rehab after hospital course for ICH. CT Head + for small right basal ganglia hemorrhage.  Hospital course was complicated by acute metabolic encephalopathy due to UTI (klebsiella variicola).      Dizziness, unsteadiness, and fall due to acute ICH of basal ganglia causing Impaired Gait, Mobility, and ADLs  NPH s/p  shunt  History DVT  - Continue comprehensive rehab program of PT/OT  - Fall precautions  - Eliquis discontinued for DVT due to IPH  - Duplex Venous Lower, Bilateral (05.28.21 @ 15:06) - No evidence of deep venous thrombosis in either lower extremity.  - Continue statin  - Bowel regimen as per primary team  - Pain meds as per primary team     Acute Hypoxic Respiratory Failure   - No history of Asthma or COPD  - Albuterol PRN  - CXR - 2 view if possible. If not, CT chest w/o  - F/u pro-bnp  - Oxygen supplement PRN to keep O2 sat > 92%    Acute UTI  - UCx (6/2) Kleb Variicola  - Continue Ceftriaxone for 2 more days    Uncontrolled Hypertension  - continue amlodipine and metoprolol    Diabetes Mellitus II  - controlled  - D/C lantus and pre-meal  - Patient reports taking metformin TID. Due arias, starting metformin and observing FS  - Starting metformin 850mg daily  - ISS and monitor FS    Bipolar disorder  - continue Pristiq, Abilify, and Luvox    Normocytic Anemia  - Stable  - continue folic acid    Possible arias on CKD 3a  - encourage oral intake  - avoid nephrotic agents    DVT ppx - as per primary

## 2021-06-08 NOTE — BH CONSULTATION LIAISON ASSESSMENT NOTE - HPI (INCLUDE ILLNESS QUALITY, SEVERITY, DURATION, TIMING, CONTEXT, MODIFYING FACTORS, ASSOCIATED SIGNS AND SYMPTOMS)
82 y/o F w/ PMH of DVT (on Eliquis), NIDDM, glaucoma, HLD, HTN, hs of NPH s/p  shunt 2015 sent ot ED for +CTH w/ ICH.  CTH was done outpt after c/o dizziness and unsteadiness/falling over to left side.  CTH done in ED showed pt had a small right basal ganglia hemorrhage.  Eliquis was discontinued and neurosurgery consulted for evaluation.  f/u CTH was done and ICH noted to be stable.  Per neurosurgery no interventions necessary.  Pt has had a total of 4 CTH all stable. repeat venous duplex neg for any dvt.  On admission pt also noted to have hypertensive crisis that resolved w/ IV lopressor.  BP was maintained at goal <140/90 in light of ICH.  Hospital course was complicated by pt developing acute metabolic encephalopathy due to UTI.  UA noted to have pyuria and cultures grew klebsiella.  Pt was placed on epimeric antibiotics and ID consulted.  Mentation improved within 24hrs of IV antibiotics.  Pt will be switched to po antibiotics pending sensitivities for total of 7 day course per ID recs.  Pts A1c noted to be 7.7 and hyperglycemia resolved w/ basal/bolus regimen.  Pt will resume metformin on discharge.  PASCALE was also noted and is likely prerenal.  patient started on dysphagia 1 diet with thin liquids. Patient was evaluated by PM&R and therapy for functional deficits and gait/ADL impairments and recommend acute rehabilitation.  Patient was medically optimized for discharge to [place] inpatient acute rehab on 6/7/2021. (07 Jun 2021 14:43)    Psychiatry was consulted to evaluate Mrs. Duff for "hallucinations" and medication management. Pt is a retired  female; domiciled alone in [place]; has 3 children; PPhx of Bipolar disorder mixed type (diagnosed in 50's). Pt reports being psychiatrically hospitalized 2-3 times for depressive episodes. Last admission about 10 year ago for depression at [facility]. Reports having some hypomania as well with euphoria and "spending too much money" at times; denies substance abuse hx; denies previous suicide attempts; reports being prescribed Abilify for years and other meds which she cannot recall. Chart review shows Wellbutrin last yea; currently in outpatient treatment with Dr. Rashard Barr [phone] (left message to obtain collateral)    Writer called [facility] in [place], meds include:  Luvox 150mg oral once daily  Cogentin 0.5mg PO BID  Abilify 10mg oral once daily  Pristiq ER 25mg oral once daily    Pt seen and evaluated at bedside. Pt is A&Ox4 to person, place, time & situation, pleasant, calm, cooperative with the undersign. Pts female friend was at bedside and she gave permission for the undersign to conduct an evaluation with her friend present. Pt states she recently suffered a CVA and she was managing "fairly independently" prior this. She states in the last few weeks her mood has been fluctuating from depressed and anxious to some days her mood being "normal". Pt states she has more so worrisome thoughts about her future given her current health status. Staff report pt has been intermittently hallucinating which was not observed on exam. Pt denied having any perceptual disturbances. Pt was also recently diagnosed with a UTI and she may have a very mild delirium at this time. UTI being treated with Rocephin and IF this is the cause, having perceptual disturbances is expected to resolve when underlying infection is treated. Pt otherwise states she has been sleeping "good", appetite is "fair", mood is "depressed" and she feels generally weak from laying in bed too much. Denied suicidal/homicidal ideations. Writer placed call to pts outpatient prescriber, Dr. Barr, and waiting for a return phone call. See recommendations below.    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  CTH was done outpt after c/o dizziness and unsteadiness/falling over to left side.  CTH done in ED showed pt had a small right basal ganglia hemorrhage.  Eliquis was discontinued and neurosurgery consulted for evaluation.  f/u CTH was done and ICH noted to be stable.  Per neurosurgery no interventions necessary.  Pt has had a total of 4 CTH all stable. repeat venous duplex neg for any dvt.  On admission pt also noted to have hypertensive crisis that resolved w/ IV lopressor.  BP was maintained at goal <140/90 in light of ICH.  Hospital course was complicated by pt developing acute metabolic encephalopathy due to UTI.  UA noted to have pyuria and cultures grew klebsiella.  Pt was placed on epimeric antibiotics and ID consulted.  Mentation improved within 24hrs of IV antibiotics.  Pt will be switched to po antibiotics pending sensitivities for total of 7 day course per ID recs.  Pts A1c noted to be 7.7 and hyperglycemia resolved w/ basal/bolus regimen.  Pt will resume metformin on discharge.  PASCALE was also noted and is likely prerenal.  patient started on dysphagia 1 diet with thin liquids. Patient was evaluated by PM&R and therapy for functional deficits and gait/ADL impairments and recommend acute rehabilitation.  Patient was medically optimized for discharge to [place] inpatient acute rehab on 6/7/2021. (07 Jun 2021 14:43)    Psychiatry was consulted to evaluate Mrs. Duff for "hallucinations" and medication management. Pt is a retired  female; domiciled alone in [place]; has 3 children; PPhx of Bipolar disorder mixed type (diagnosed in 50's), OCD. Pt reports being psychiatrically hospitalized 2-3 times for depressive episodes- treated with ECT in the past. Last admission about 10 year ago for depression at [facility]. Reports having some hypomania as well with euphoria and "spending too much money" at times; denies substance abuse hx; denies previous suicide attempts; reports being prescribed Abilify for years and other meds which she cannot recall. Chart review shows Wellbutrin last year; currently in outpatient treatment with Dr. Rashard Barr [phone]     Writer called [facility] in [place], meds include:  Luvox 150mg oral once daily  Cogentin 0.5mg PO BID  Abilify 10mg oral once daily      Pt seen and evaluated at bedside. Pt is A&Ox4 to person, place, time & situation, pleasant, calm, cooperative with the undersign. Pts female friend was at bedside and she gave permission for the undersign to conduct an evaluation with her friend present. Pt states she recently suffered a CVA and she was managing "fairly independently" prior this. She states in the last few weeks her mood has been fluctuating from depressed and anxious to some days her mood being "normal". Pt states she has more so worrisome thoughts about her future given her current health status. Staff report pt has been intermittently hallucinating which was not observed on exam. Pt denied having any perceptual disturbances. Pt was also recently diagnosed with a UTI and she may have a very mild delirium at this time. UTI being treated with Rocephin and IF this is the cause, having perceptual disturbances is expected to resolve when underlying infection is treated. Pt otherwise states she has been sleeping "good", appetite is "fair", mood is "depressed" and she feels generally weak from laying in bed too much. Denied suicidal/homicidal ideations. Writer spoke with pts outpatient prescriber, Dr. Barr who confirms patients medications as listed above. He states pt was tapered off Pritiq. See recommendations below.

## 2021-06-08 NOTE — DIETITIAN INITIAL EVALUATION ADULT. - PERTINENT MEDS FT
Rocephin IVPB, insulin lispro sliding scale, metformin, Norvasc, Abilify, Lipitor, lopressor, Pristiq, folic acid 1 mg daily

## 2021-06-08 NOTE — BH CONSULTATION LIAISON ASSESSMENT NOTE - MSE OPTIONS
Chest tube has been clamped since 12:05. Repeat Xray without pneumothorax. Per pulmonary recommendation, chest tube was removed at 8:45. Wound was sealed with Vaseline gauze and bandaged. Patient tolerated procedure well and was relaxed following the procedure without tachypnea. Pain was consistent with that prior to removal. Portable Xray ordered to monitor for new pneumothorax.        Cross cover  Sho Mckee MD  Internal Medicine PGY-1  P: [phone]  P: [phone] Structured MSE

## 2021-06-08 NOTE — DIETITIAN INITIAL EVALUATION ADULT. - PERTINENT LABORATORY DATA
POCT glucose x 4: 192 (H), 135 (H), 159 (H), 125 (H)  6/8/21: Hgb 10.2 (L), Hct 31.4 (L), BUN 25 (H), Creat 1.31 (H), Glu 132 (H), eGFR 39 (L)  5/30/21: HbA1c 7.7 (H)

## 2021-06-08 NOTE — PROGRESS NOTE ADULT - SUBJECTIVE AND OBJECTIVE BOX
CHIEF COMPLAINT: no acute events overnight, appears delusional at times. Comfortable denies HA, new weakness, sensory, visual or speech problems       HISTORY OF PRESENT ILLNESS    80 y/o F w/ PMH of DVT (on Eliquis), NIDDM, glaucoma, HLD, HTN, hs of NPH s/p  shunt 2015 sent ot ED for +CTH w/ ICH.  CTH was done outpt after c/o dizziness and unsteadness/falling over to left side.  CTH done in ED showed pt had a small right basal ganglia hemorrhage.  Eliquis was discontinued and neurosurgery consulted for evaluation.  f/u CTH was done and ICH noted to be stable.  Per neurosurgery no interventions necessary.  Pt has had a total of 4 CTH all stable. repeat venous duplex neg for any dvt.  On admission pt also noted to have hypertensive crisis that resolved w/ IV lopressor.  BP was maintained at goal <140/90 in light of ICH.  Hospital course was complicated by pt developing acute metabolic encephalopathy due to UTI.  UA noted to have pyuria and cultures grew klebsiella variicola.  Pt was placed on emperic antibiotics and ID consulted.  Mentation improved within 24hrs of IV antibiotics.  Pt will be switched to po antibiotics pending sensitivities for total of 7 day course per ID recs.  Pts A1c noted to be 7.7 and hyperglycemia resolved w/ basal/bolus regimen.  Pt will resume metformin on discharge.  PASCALE was also noted and is likely prerenal.  patient started on dysphagia 1 diet with thin liquids.     Patient was evaluated by PM&R and therapy for functional deficits and gait/ADL impairments and recommend acute rehabilitation.  Patient was medically optimized for discharge to [place] [facility] inpatient acute rehab on 6/7/2021. (07 Jun 2021 14:43)        PAST MEDICAL & SURGICAL HISTORY:  Hypertension    Hyperlipidemia    Diabetes    Glaucoma    Osteoarthritis    Small bowel obstruction    S/P hysterectomy  1981 and Oopherectomy in 1990s    S/P cholecystectomy  1981    Achilles tendon injury  repair  right scalene muscle release    S/P knee replacement, left      VITALS  Vital Signs Last 24 Hrs  T(C): 36.7 (08 Jun 2021 08:10), Max: 36.8 (07 Jun 2021 21:12)  T(F): 98 (08 Jun 2021 08:10), Max: 98.2 (07 Jun 2021 21:12)  HR: 58 (08 Jun 2021 08:10) (58 - 65)  BP: 136/64 (08 Jun 2021 08:10) (120/70 - 162/80)  BP(mean): --  RR: 16 (08 Jun 2021 08:10) (14 - 17)  SpO2: 95% (08 Jun 2021 08:10) (95% - 95%)      PHYSICAL EXAM  Constitutional - NAD, Comfortable  HEENT - NCAT, EOMI  Neck - Supple, No limited ROM  Chest - CTA bilaterally  Cardiovascular - RRR, S1S2, No murmurs  Abdomen - BS+, Soft, NTND  Extremities - No C/C/E, No calf tenderness   Neurologic Exam -  AAOX 3, no difficulty naming or following commands, poor attention and memory, left mild hemiparesis                        RECENT LABS                        10.2   8.10  )-----------( 283      ( 08 Jun 2021 05:40 )             31.4     06-08    138  |  102  |  25<H>  ----------------------------<  132<H>  4.1   |  29  |  1.31<H>    Ca    8.8      08 Jun 2021 05:40                                CURRENT MEDICATIONS  MEDICATIONS  (STANDING):  amLODIPine   Tablet 10 milliGRAM(s) Oral daily  ARIPiprazole 10 milliGRAM(s) Oral at bedtime  atorvastatin 40 milliGRAM(s) Oral at bedtime  cefTRIAXone   IVPB 1000 milliGRAM(s) IV Intermittent every 24 hours  chlorhexidine 0.12% Liquid 15 milliLiter(s) Oral Mucosa two times a day  desvenlafaxine ER 25 milliGRAM(s) Oral daily  dextrose 40% Gel 15 Gram(s) Oral once  dextrose 5%. 1000 milliLiter(s) (50 mL/Hr) IV Continuous <Continuous>  dextrose 5%. 1000 milliLiter(s) (100 mL/Hr) IV Continuous <Continuous>  dextrose 50% Injectable 25 Gram(s) IV Push once  dextrose 50% Injectable 12.5 Gram(s) IV Push once  dextrose 50% Injectable 25 Gram(s) IV Push once  folic acid 1 milliGRAM(s) Oral daily  glucagon  Injectable 1 milliGRAM(s) IntraMuscular once  insulin lispro (ADMELOG) corrective regimen sliding scale   SubCutaneous three times a day before meals  insulin lispro (ADMELOG) corrective regimen sliding scale   SubCutaneous at bedtime  metFORMIN 850 milliGRAM(s) Oral daily  metoprolol tartrate 25 milliGRAM(s) Oral two times a day  nystatin    Suspension 199899 Unit(s) Oral four times a day  nystatin Powder 1 Application(s) Topical two times a day    MEDICATIONS  (PRN):  acetaminophen   Tablet .. 650 milliGRAM(s) Oral every 6 hours PRN Temp greater or equal to 38C (100.4F), Mild Pain (1 - 3), Moderate Pain (4 - 6)  ALBUTerol    90 MICROgram(s) HFA Inhaler 2 Puff(s) Inhalation every 6 hours PRN Shortness of Breath and/or Wheezing

## 2021-06-08 NOTE — BH CONSULTATION LIAISON ASSESSMENT NOTE - CURRENT MEDICATION
MEDICATIONS  (STANDING):  amLODIPine   Tablet 10 milliGRAM(s) Oral daily  ARIPiprazole 10 milliGRAM(s) Oral at bedtime  atorvastatin 40 milliGRAM(s) Oral at bedtime  cefTRIAXone   IVPB 1000 milliGRAM(s) IV Intermittent every 24 hours  chlorhexidine 0.12% Liquid 15 milliLiter(s) Oral Mucosa two times a day  desvenlafaxine ER 25 milliGRAM(s) Oral daily  dextrose 40% Gel 15 Gram(s) Oral once  dextrose 5%. 1000 milliLiter(s) (50 mL/Hr) IV Continuous <Continuous>  dextrose 5%. 1000 milliLiter(s) (100 mL/Hr) IV Continuous <Continuous>  dextrose 50% Injectable 25 Gram(s) IV Push once  dextrose 50% Injectable 12.5 Gram(s) IV Push once  dextrose 50% Injectable 25 Gram(s) IV Push once  folic acid 1 milliGRAM(s) Oral daily  glucagon  Injectable 1 milliGRAM(s) IntraMuscular once  insulin lispro (ADMELOG) corrective regimen sliding scale   SubCutaneous three times a day before meals  insulin lispro (ADMELOG) corrective regimen sliding scale   SubCutaneous at bedtime  metFORMIN 850 milliGRAM(s) Oral daily  metoprolol tartrate 25 milliGRAM(s) Oral two times a day  nystatin    Suspension 655617 Unit(s) Oral four times a day  nystatin Powder 1 Application(s) Topical two times a day    MEDICATIONS  (PRN):  acetaminophen   Tablet .. 650 milliGRAM(s) Oral every 6 hours PRN Temp greater or equal to 38C (100.4F), Mild Pain (1 - 3), Moderate Pain (4 - 6)  ALBUTerol    90 MICROgram(s) HFA Inhaler 2 Puff(s) Inhalation every 6 hours PRN Shortness of Breath and/or Wheezing

## 2021-06-08 NOTE — BH CONSULTATION LIAISON ASSESSMENT NOTE - SUMMARY
81 F with recent CVA currently admitted to [facility] for acute rehab services. Psychiatry consulted for med management and staff reporting pt has been "hallucinating". See HPI for details. Pt with hx of Bipolar disorder and currently in outpatient treatment with Dr. Rashard Barr. Writer placed called and waiting for a return phone call back. [facility] called and verified meds and dosages. See recommendations below.  81 F with recent CVA currently admitted to [facility] for acute rehab services. Psychiatry consulted for med management and staff reporting pt has been "hallucinating". See HPI for details. Pt with hx of Bipolar disorder and currently in outpatient treatment with Dr. Rashard Barr. Writer spoke with pts prescriber who confirms pts psychiatric hx and medications. [facility] called and verified meds and dosages as well. See recommendations below.

## 2021-06-08 NOTE — BH CONSULTATION LIAISON ASSESSMENT NOTE - NSBHCONSULTRECOMMENDOTHER_PSY_A_CORE FT
Would recommend to continue the following medications until further collateral is obtained by pts prescriber:    Luvox 150mg oral once daily at bedtime  Abilify 10mg oral once daily at bedtime  Pristiq ER 25mg oral once daily    Would defer use of Cogentin to neurology.  Would recommend to continue the following medications:    Luvox 150mg oral once daily at bedtime  Abilify 10mg oral once daily at bedtime    DC Pristiq as this was already tapered off by pts outpatient prescriber.     Would defer use of Cogentin to neurology.

## 2021-06-08 NOTE — BH CONSULTATION LIAISON ASSESSMENT NOTE - NSBHCHARTREVIEWLAB_PSY_A_CORE FT
10.2   8.10  )-----------( 283      ( 08 Jun 2021 05:40 )             31.4   06-08    138  |  102  |  25<H>  ----------------------------<  132<H>  4.1   |  29  |  1.31<H>    Ca    8.8      08 Jun 2021 05:40    Urinalysis (06.02.21 @ 05:57)    Glucose Qualitative, Urine: Negative    Blood, Urine: Large    pH Urine: 7.0    Color: Yellow    Urine Appearance: Turbid    Bilirubin: Negative    Ketone - Urine: Negative    Specific Gravity: 1.010    Protein, Urine: 100    Urobilinogen: Negative    Nitrite: Negative    Leukocyte Esterase Concentration: Moderate

## 2021-06-08 NOTE — DIETITIAN INITIAL EVALUATION ADULT. - ADD RECOMMEND
1. Recommend Glucerna 1x/day (provides 220 kcal, 10 g protein) 2. Obtain and honor food preferences 3. Provide nutrition education if/when pt receptive

## 2021-06-08 NOTE — BH CONSULTATION LIAISON ASSESSMENT NOTE - NSBHADMITCOUNSEL_PSY_A_CORE
diagnostic results/impressions and/or recommended studies/risks and benefits of treatment options/client/family/caregiver education/prognosis

## 2021-06-08 NOTE — PROGRESS NOTE ADULT - ASSESSMENT
ASSESSMENT/PLAN  82 y/o F w/ PMH of DVT (on Eliquis), NIDDM, glaucoma, HLD, HTN, hs of NPH s/p  shunt 2015 sent ot ED for +CTH w/ ICH.  CTH was done outpt after c/o dizziness and unsteadness/falling over to left side.  Pt denies head trauma.  Evaluated by neurosurgery - nonsurgical intervention. Hospital course complicated by encephalopathic d/u UTI    COMORBIDITES/ACTIVE MEDICAL ISSUES     Gait Instability, ADL impairments and Functional impairments: start Comprehensive Rehab Program of PT/OT/SLP     # Right Basal ganglia hemorrhage  - Eliquis held (previous DVT, but most recent LE Venous Doppler is negative)  - CTH x4 - ICH stable   - BP goal: <140/90    # Hypertensive Crisis  - Continue Metoprolol 25 BID, Norvasc 10  - BP Goal: <140/90    #Encephalopathy due to UTI  - UCx grew klebsiella variicola  - start ceftriaxone x 5-7 days    #Hyperglycemia   - A1c 7.7  - Lantus 5U  - Admelog 2U  - FS + ISS    #PASCALE  - Encourage oral fluid  - Avoid nephrotoxic med  - Renal evaluation     #HLD  - Lipitor 40  - goal LDL < 70     #Bipolar disorder  - Pristiq ER 25  - Fluvoxamine 150  - Abilify 10mg qhs  - Psychiatry evaluation     #Pain control  - Tylenol PRN    #GI/Bowel Mgmt   - Continent Senna,  Miralax PRN,    #Bladder management  - COntinent, PVR q 8 hours (SC if > 400)    #Hx of DVT (9/2020)  - SCDs, TEDs   - Doppler negative - 5/28  - Eliquis dc in light of hemorrhage

## 2021-06-08 NOTE — BH CONSULTATION LIAISON ASSESSMENT NOTE - RISK ASSESSMENT
Pt has been cooperative with treatment, and is somewhat future oriented. Pt denies feeling suicidal/homicidal. Due to physical limitations pt likely unable to carryout a dangerous plan. Pt also with no access to hard lethal means in the rehab setting. Would place pt as a overall LOW risk for dangerous behaviors. Elevated chronic risk for suicide given her mood disorder hx.

## 2021-06-08 NOTE — CONSULT NOTE ADULT - SUBJECTIVE AND OBJECTIVE BOX
Patient is a 81y old  Female who presents with a chief complaint of CVA (07 Jun 2021 14:43)      HPI:  82 y/o F w/ PMH of DVT (on Eliquis), NIDDM, glaucoma, HLD, HTN, hs of NPH s/p  shunt 2015 sent ot ED for +CTH w/ ICH.  CTH was done outpt after c/o dizziness and unsteadness/falling over to left side.  CTH done in ED showed pt had a small right basal ganglia hemorrhage.  Eliquis was discontinued and neurosurgery consulted for evaluation.  f/u CTH was done and ICH noted to be stable.  Per neurosurgery no interventions necessary.  Pt has had a total of 4 CTH all stable. repeat venous duplex neg for any dvt.  On admission pt also noted to have hypertensive crisis that resolved w/ IV lopressor.  BP was maintained at goal <140/90 in light of ICH.  Hospital course was complicated by pt developing acute metabolic encephalopathy due to UTI.  UA noted to have pyuria and cultures grew klebsiella variicola.  Pt was placed on emperic antibiotics and ID consulted.  Mentation improved within 24hrs of IV antibiotics.  Pt will be switched to po antibiotics pending sensitivities for total of 7 day course per ID recs.  Pts A1c noted to be 7.7 and hyperglycemia resolved w/ basal/bolus regimen.  Pt will resume metformin on discharge.  PASCALE was also noted and is likely prerenal.  patient started on dysphagia 1 diet with thin liquids.     Patient was evaluated by PM&R and therapy for functional deficits and gait/ADL impairments and recommend acute rehabilitation.  Patient was medically optimized for discharge to [facility] inpatient acute rehab on 6/7/2021. (07 Jun 2021 14:43)      PAST MEDICAL & SURGICAL HISTORY:  Hypertension    Hyperlipidemia    Diabetes    Glaucoma    Osteoarthritis    Small bowel obstruction    S/P hysterectomy  1981 and Oopherectomy in 1990s    S/P cholecystectomy  1981    Achilles tendon injury  repair  right scalene muscle release    S/P knee replacement, left        Father: - at age - with history of   Mother: - at age - with history of           Substance Use (street drugs): (  ) never used  (  ) other:  Tobacco Usage:  (   ) never smoked   (   ) former smoker   (   ) current smoker  (     ) pack year  Alcohol Usage:  Sexual History:   Recent Travel:      Allergies    adhesives (Pruritus; Blisters)  penicillin (Hives)  pineapple (Anaphylaxis)    Intolerances        ARIPiprazole 10 milliGRAM(s) Oral at bedtime  desvenlafaxine ER 25 milliGRAM(s) Oral daily  nystatin Powder 1 Application(s) Topical two times a day      REVIEW OF SYSTEMS:  CONSTITUTIONAL: No fever, weight loss, or fatigue  EYES: No eye pain, visual disturbances, or discharge  RESPIRATORY: No cough, wheezing, chills or hemoptysis; No shortness of breath  CARDIOVASCULAR: No chest pain, palpitations, dizziness, or leg swelling  GASTROINTESTINAL: No abdominal or epigastric pain. No nausea, vomiting, or hematemesis; No diarrhea or constipation. No melena or hematochezia.  GENITOURINARY: No dysuria, frequency, hematuria, or incontinence  NEUROLOGICAL: No headaches, memory loss, loss of strength, numbness, or tremors  SKIN: No itching, burning, rashes, or lesions   MUSCULOSKELETAL: No joint pain or swelling; No muscle, back, or extremity pain  PSYCHIATRIC: No depression, anxiety, mood swings, or difficulty sleeping    ALL OTHER SYSTEMS REVIEWED AND ARE NEGATIVE    VITAL SIGNS:  T(C): 36.8 (06-07-21 @ 21:12), Max: 36.8 (06-07-21 @ 21:12)  HR: 64 (06-08-21 @ 05:14) (59 - 65)  BP: 145/88 (06-08-21 @ 05:14) (120/70 - 162/80)  RR: 14 (06-07-21 @ 21:12) (14 - 17)  SpO2: 95% (06-07-21 @ 21:12) (95% - 95%)  Wt(kg): --Vital Signs Last 24 Hrs  T(C): 36.8 (07 Jun 2021 21:12), Max: 36.8 (07 Jun 2021 21:12)  T(F): 98.2 (07 Jun 2021 21:12), Max: 98.2 (07 Jun 2021 21:12)  HR: 64 (08 Jun 2021 05:14) (59 - 65)  BP: 145/88 (08 Jun 2021 05:14) (120/70 - 162/80)  BP(mean): --  RR: 14 (07 Jun 2021 21:12) (14 - 17)  SpO2: 95% (07 Jun 2021 21:12) (95% - 95%)    PHYSICAL EXAM:  GENERAL: NAD  HENT:  Atraumatic, Normocephalic; No tonsillar erythema, exudates, ulcers, or enlargement; Moist mucous membranes  EYES: EOMI, PERRLA, conjunctiva and sclera clear; no lid-lag  NECK: Supple, No JVD, Normal thyroid  NERVOUS SYSTEM:  Motor Strength 5/5 B/L upper and lower extremities; CN II-XII intact  CHEST/LUNG: Clear to percussion bilaterally; No rales, rhonchi, wheezing, or rubs; normal respiratory effort, no intercostal retractions  HEART: Regular rate and rhythm; No murmurs, rubs, or gallops  ABDOMEN: Soft, Nontender, Nondistended; Bowel sounds present; No HSM  EXTREMITIES:  2+ Peripheral Pulses, No clubbing, cyanosis, or peripheral edema  LYMPH: No lymphadenopathy noted  SKIN: No rashes or lesions; normal temperature and turgor   PSYCH: Appropriate affect; Alert & Oriented x 3    LABS:                        10.2   8.10  )-----------( 283      ( 08 Jun 2021 05:40 )             31.4     06-08    138  |  102  |  25<H>  ----------------------------<  132<H>  4.1   |  29  |  1.31<H>    Ca    8.8      08 Jun 2021 05:40           CAPILLARY BLOOD GLUCOSE      POCT Blood Glucose.: 159 mg/dL (07 Jun 2021 22:54)  POCT Blood Glucose.: 125 mg/dL (07 Jun 2021 18:27)  POCT Blood Glucose.: 152 mg/dL (07 Jun 2021 13:07)  POCT Blood Glucose.: 152 mg/dL (07 Jun 2021 08:23)              Previous Consultant(s) Notes Reviewed:  YES  Care Discussed with Consultants/Other Providers YES  Previous Imaging Personally Reviewed:  YES Patient is a 81y old  Female who presents with a chief complaint of CVA (2021 14:43)    HPI:  80 y/o F w/ PMH of DVT (on Eliquis), NIDDM, glaucoma, HLD, HTN, hs of NPH s/p  shunt  sent ot ED for +CTH w/ ICH.  CTH was done outpt after c/o dizziness and unsteadness/falling over to left side.  CTH done in ED showed pt had a small right basal ganglia hemorrhage.  Eliquis was discontinued and neurosurgery consulted for evaluation.  f/u CTH was done and ICH noted to be stable.  Per neurosurgery no interventions necessary.  Pt has had a total of 4 CTH all stable. repeat venous duplex neg for any dvt.  On admission pt also noted to have hypertensive crisis that resolved w/ IV lopressor.  BP was maintained at goal <140/90 in light of ICH.  Hospital course was complicated by pt developing acute metabolic encephalopathy due to UTI.  UA noted to have pyuria and cultures grew klebsiella variicola.  Pt was placed on empiric antibiotics and ID consulted.  Mentation improved within 24hrs of IV antibiotics.  Pt will be switched to po antibiotics pending sensitivities for total of 7 day course per ID recs.  Pts A1c noted to be 7.7 and hyperglycemia resolved w/ basal/bolus regimen.  Pt will resume metformin on discharge.  PASCALE was also noted and is likely prerenal. Patient started on dysphagia 1 diet with thin liquids.     Patient was evaluated by PM&R and therapy for functional deficits and gait/ADL impairments and recommend acute rehabilitation.  Patient was medically optimized for discharge to [place] inpatient acute rehab on 2021. (2021 14:43)    TODAY:  Patient seen and examined in NAD  She was placed back on O2 overnight  She reports having dizziness and headache in the back of her head    PAST MEDICAL & SURGICAL HISTORY:  Hypertension  Hyperlipidemia  Diabetes  Glaucoma  Osteoarthritis  Small bowel obstruction    S/P hysterectomy   and Oopherectomy in   S/P cholecystectomy    Achilles tendon injury  repair  right scalene muscle release  S/P knee replacement, left  s/P KNEE REPLACEMENT, RIGHT    FAMILY HISTORY:  Father: -   Mother: - . HTN history    SOCIAL HISTORY:  Substance Use (street drugs): denies  Tobacco Usage:  denies  Alcohol Usage: denies    ALLERGIES:  adhesives (Pruritus; Blisters)  penicillin (Hives)  pineapple (Anaphylaxis)    No Known Intolerances    MEDICATIONS:  ARIPiprazole 10 milliGRAM(s) Oral at bedtime  desvenlafaxine ER 25 milliGRAM(s) Oral daily  nystatin Powder 1 Application(s) Topical two times a day      REVIEW OF SYSTEMS:  CONSTITUTIONAL: No fever, weight loss, or fatigue  EYES: No eye pain, visual disturbances, or discharge  RESPIRATORY: No cough, wheezing, chills or hemoptysis; No shortness of breath  CARDIOVASCULAR: +DIZZINESS; No chest pain, palpitations, or leg swelling  GASTROINTESTINAL: No abdominal or epigastric pain. No nausea, vomiting, or hematemesis; No diarrhea or constipation. No melena or hematochezia.  GENITOURINARY: No dysuria, frequency, hematuria, or incontinence  NEUROLOGICAL: + headaches and memory loss; No loss of strength, numbness, or tremors  SKIN: No itching, burning, rashes, or lesions   MUSCULOSKELETAL: No joint pain or swelling; No muscle, back, or extremity pain  PSYCHIATRIC: No depression, anxiety, mood swings, or difficulty sleeping    ALL OTHER SYSTEMS REVIEWED AND ARE NEGATIVE    VITAL SIGNS:  T(C): 36.8 (21 @ 21:12), Max: 36.8 (21 @ 21:12)  HR: 64 (21 @ 05:14) (59 - 65)  BP: 145/88 (21 @ 05:14) (120/70 - 162/80)  RR: 14 (21 @ 21:12) (14 - 17)  SpO2: 95% (21 @ 21:12) (95% - 95%)  Wt(kg): --Vital Signs Last 24 Hrs  T(C): 36.8 (2021 21:12), Max: 36.8 (2021 21:12)  T(F): 98.2 (2021 21:12), Max: 98.2 (2021 21:12)  HR: 64 (2021 05:14) (59 - 65)  BP: 145/88 (2021 05:14) (120/70 - 162/80)  BP(mean): --  RR: 14 (2021 21:12) (14 - 17)  SpO2: 95% (2021 21:12) (95% - 95%)    PHYSICAL EXAM:  GENERAL: NAD, on NC  HENT:  Atraumatic, Normocephalic; No tonsillar erythema, exudates, ulcers, or enlargement; Moist mucous membranes  EYES: EOMI, PERRLA, conjunctiva and sclera clear; no lid-lag  NECK: Supple, No JVD, Normal thyroid  NERVOUS SYSTEM:  Follows commands; CN II-XII intact; + dysarthria  CHEST/LUNG: +Crackles diffusely and LLL wheezing; No rales, rhonchi, or rubs; normal respiratory effort, no intercostal retractions  HEART: Regular rate and rhythm; No murmurs, rubs, or gallops  ABDOMEN: Soft, Nontender, Nondistended; Bowel sounds present; No HSM  EXTREMITIES:  2+ Peripheral Pulses, No clubbing, cyanosis, or peripheral edema  LYMPH: No lymphadenopathy noted  SKIN: No rashes or lesions; normal temperature and turgor   PSYCH: Appropriate affect; Alert & Oriented x 2-3    LABS:                        10.2   8.10  )-----------( 283      ( 2021 05:40 )             31.4     06-08    138  |  102  |  25<H>  ----------------------------<  132<H>  4.1   |  29  |  1.31<H>    Ca    8.8      2021 05:40        CAPILLARY BLOOD GLUCOSE  POCT Blood Glucose.: 159 mg/dL (2021 22:54)  POCT Blood Glucose.: 125 mg/dL (2021 18:27)  POCT Blood Glucose.: 152 mg/dL (2021 13:07)  POCT Blood Glucose.: 152 mg/dL (2021 08:23)    Previous Consultant(s) Notes Reviewed:  YES  Care Discussed with Consultants/Other Providers YES  Previous Imaging Personally Reviewed:  YES

## 2021-06-08 NOTE — BH CONSULTATION LIAISON ASSESSMENT NOTE - NSBHCONSFOLLOWNEEDS_PSY_ALL_CORE
Unsure of disposition at this time due to being in the early process of admission to acute rehab. Pt will likely f/u with her outpatient prescriber Dr. Rashard Barr in [place]./No psychiatric contraindications to discharge

## 2021-06-08 NOTE — CONSULT NOTE ADULT - SUBJECTIVE AND OBJECTIVE BOX
NEPHROLOGY CONSULTATION    CHIEF COMPLAINT: ICH    HPI:  Pt is 82 yo F w/PMH of DVT (on Eliquis), NIDDM, glaucoma, HLD, HTN, hx of NPH s/p  shunt 2015 sent to ED for +CTH w/ICH.  CTH was done outpt after c/o dizziness and unsteadness/falling over to left side. CTH done in ED showed pt had a small right basal ganglia hemorrhage. Eliquis was discontinued and neurosurgery consulted for evaluation. Per neurosurgery no interventions necessary. On admission pt also noted to have hypertensive crisis that resolved w/IV lopressor. BP was maintained at goal <140/90 in light of ICH. S/p PASCALE. Adm to [facility] at [facility] 6/7/21.     ROS:  as above    Allergies:  adhesives (Pruritus; Blisters)  penicillin (Hives)  pineapple (Anaphylaxis)    PAST MEDICAL & SURGICAL HISTORY:  Hypertension  Hyperlipidemia  Diabetes  Glaucoma  Osteoarthritis  Small bowel obstruction  S/P hysterectomy 1981 and Oopherectomy in 1990s  S/P cholecystectomy 1981  Achilles tendon injury repair  right scalene muscle release  S/P knee replacement, left    SOCIAL HISTORY:  negative    FAMILY HISTORY:  Family history of pancreatic cancer (Sibling)  Family history of early CAD  Family history of diabetes mellitus    MEDICATIONS  (STANDING):  amLODIPine   Tablet 10 milliGRAM(s) Oral daily  ARIPiprazole 10 milliGRAM(s) Oral at bedtime  atorvastatin 40 milliGRAM(s) Oral at bedtime  cefTRIAXone   IVPB 1000 milliGRAM(s) IV Intermittent every 24 hours  chlorhexidine 0.12% Liquid 15 milliLiter(s) Oral Mucosa two times a day  desvenlafaxine ER 25 milliGRAM(s) Oral daily  dextrose 40% Gel 15 Gram(s) Oral once  dextrose 5%. 1000 milliLiter(s) (50 mL/Hr) IV Continuous <Continuous>  dextrose 5%. 1000 milliLiter(s) (100 mL/Hr) IV Continuous <Continuous>  dextrose 50% Injectable 25 Gram(s) IV Push once  dextrose 50% Injectable 12.5 Gram(s) IV Push once  dextrose 50% Injectable 25 Gram(s) IV Push once  folic acid 1 milliGRAM(s) Oral daily  glucagon  Injectable 1 milliGRAM(s) IntraMuscular once  insulin lispro (ADMELOG) corrective regimen sliding scale   SubCutaneous three times a day before meals  insulin lispro (ADMELOG) corrective regimen sliding scale   SubCutaneous at bedtime  metFORMIN 850 milliGRAM(s) Oral daily  metoprolol tartrate 25 milliGRAM(s) Oral two times a day  nystatin    Suspension 723328 Unit(s) Oral four times a day  nystatin Powder 1 Application(s) Topical two times a day    Vital Signs Last 24 Hrs  T(C): 36.7 (06-08-21 @ 08:10), Max: 36.8 (06-07-21 @ 21:12)  T(F): 98 (06-08-21 @ 08:10), Max: 98.2 (06-07-21 @ 21:12)  HR: 58 (06-08-21 @ 08:10) (58 - 65)  BP: 136/64 (06-08-21 @ 08:10) (136/64 - 162/80)  RR: 16 (06-08-21 @ 08:10) (14 - 16)  SpO2: 95% (06-08-21 @ 08:10) (95% - 95%)    s1s2  b/l air entry  soft, ND  no edema    LABS:                        10.2   8.10  )-----------( 283      ( 08 Jun 2021 05:40 )             31.4     06-08    138  |  102  |  25<H>  ----------------------------<  132<H>  4.1   |  29  |  1.31<H>    Ca    8.8      08 Jun 2021 05:40    A/P:    Hx HTN, s/p ICH  CKD 3, fluctuating Cr  Will check UA, renal SONO  Avoid nephrotoxins  Encourage PO fluids  Will add Hydralazine for BP control    [phone]

## 2021-06-09 LAB
ANION GAP SERPL CALC-SCNC: 7 MMOL/L — SIGNIFICANT CHANGE UP (ref 5–17)
APPEARANCE UR: CLEAR — SIGNIFICANT CHANGE UP
BACTERIA # UR AUTO: ABNORMAL /HPF
BILIRUB UR-MCNC: NEGATIVE — SIGNIFICANT CHANGE UP
BUN SERPL-MCNC: 22 MG/DL — SIGNIFICANT CHANGE UP (ref 7–23)
CALCIUM SERPL-MCNC: 9 MG/DL — SIGNIFICANT CHANGE UP (ref 8.4–10.5)
CHLORIDE SERPL-SCNC: 105 MMOL/L — SIGNIFICANT CHANGE UP (ref 96–108)
CO2 SERPL-SCNC: 29 MMOL/L — SIGNIFICANT CHANGE UP (ref 22–31)
COLOR SPEC: YELLOW — SIGNIFICANT CHANGE UP
COMMENT - URINE: SIGNIFICANT CHANGE UP
CREAT SERPL-MCNC: 1.13 MG/DL — SIGNIFICANT CHANGE UP (ref 0.5–1.3)
DIFF PNL FLD: ABNORMAL
EPI CELLS # UR: SIGNIFICANT CHANGE UP
GLUCOSE BLDC GLUCOMTR-MCNC: 132 MG/DL — HIGH (ref 70–99)
GLUCOSE BLDC GLUCOMTR-MCNC: 157 MG/DL — HIGH (ref 70–99)
GLUCOSE BLDC GLUCOMTR-MCNC: 169 MG/DL — HIGH (ref 70–99)
GLUCOSE BLDC GLUCOMTR-MCNC: 172 MG/DL — HIGH (ref 70–99)
GLUCOSE SERPL-MCNC: 129 MG/DL — HIGH (ref 70–99)
GLUCOSE UR QL: NEGATIVE — SIGNIFICANT CHANGE UP
HYALINE CASTS # UR AUTO: NEGATIVE — SIGNIFICANT CHANGE UP (ref 0–7)
KETONES UR-MCNC: NEGATIVE — SIGNIFICANT CHANGE UP
LEUKOCYTE ESTERASE UR-ACNC: ABNORMAL
NITRITE UR-MCNC: NEGATIVE — SIGNIFICANT CHANGE UP
PH UR: 6 — SIGNIFICANT CHANGE UP (ref 5–8)
POTASSIUM SERPL-MCNC: 4.4 MMOL/L — SIGNIFICANT CHANGE UP (ref 3.5–5.3)
POTASSIUM SERPL-SCNC: 4.4 MMOL/L — SIGNIFICANT CHANGE UP (ref 3.5–5.3)
PROT UR-MCNC: 15
RBC CASTS # UR COMP ASSIST: SIGNIFICANT CHANGE UP /HPF (ref 0–4)
SODIUM SERPL-SCNC: 141 MMOL/L — SIGNIFICANT CHANGE UP (ref 135–145)
SP GR SPEC: 1.01 — SIGNIFICANT CHANGE UP (ref 1.01–1.02)
UROBILINOGEN FLD QL: NEGATIVE — SIGNIFICANT CHANGE UP
WBC UR QL: ABNORMAL /HPF (ref 0–5)

## 2021-06-09 PROCEDURE — 76775 US EXAM ABDO BACK WALL LIM: CPT | Mod: 26

## 2021-06-09 PROCEDURE — 93010 ELECTROCARDIOGRAM REPORT: CPT

## 2021-06-09 PROCEDURE — 93306 TTE W/DOPPLER COMPLETE: CPT | Mod: 26

## 2021-06-09 PROCEDURE — 99233 SBSQ HOSP IP/OBS HIGH 50: CPT

## 2021-06-09 PROCEDURE — 71250 CT THORAX DX C-: CPT | Mod: 26

## 2021-06-09 PROCEDURE — 99223 1ST HOSP IP/OBS HIGH 75: CPT

## 2021-06-09 RX ORDER — METOPROLOL TARTRATE 50 MG
12.5 TABLET ORAL
Refills: 0 | Status: DISCONTINUED | OUTPATIENT
Start: 2021-06-09 | End: 2021-06-14

## 2021-06-09 RX ORDER — HYDROCHLOROTHIAZIDE 25 MG
25 TABLET ORAL
Refills: 0 | Status: DISCONTINUED | OUTPATIENT
Start: 2021-06-09 | End: 2021-06-10

## 2021-06-09 RX ADMIN — Medication 10 MILLIGRAM(S): at 06:27

## 2021-06-09 RX ADMIN — AMLODIPINE BESYLATE 10 MILLIGRAM(S): 2.5 TABLET ORAL at 06:27

## 2021-06-09 RX ADMIN — FLUVOXAMINE MALEATE 150 MILLIGRAM(S): 25 TABLET ORAL at 21:04

## 2021-06-09 RX ADMIN — NYSTATIN CREAM 1 APPLICATION(S): 100000 CREAM TOPICAL at 17:34

## 2021-06-09 RX ADMIN — Medication 500000 UNIT(S): at 12:19

## 2021-06-09 RX ADMIN — Medication 25 MILLIGRAM(S): at 17:36

## 2021-06-09 RX ADMIN — Medication 500000 UNIT(S): at 06:28

## 2021-06-09 RX ADMIN — CHLORHEXIDINE GLUCONATE 15 MILLILITER(S): 213 SOLUTION TOPICAL at 21:04

## 2021-06-09 RX ADMIN — Medication 12.5 MILLIGRAM(S): at 17:35

## 2021-06-09 RX ADMIN — ARIPIPRAZOLE 10 MILLIGRAM(S): 15 TABLET ORAL at 21:04

## 2021-06-09 RX ADMIN — Medication 2: at 17:04

## 2021-06-09 RX ADMIN — Medication 500000 UNIT(S): at 22:58

## 2021-06-09 RX ADMIN — Medication 500000 UNIT(S): at 21:04

## 2021-06-09 RX ADMIN — Medication 2: at 07:59

## 2021-06-09 RX ADMIN — Medication 25 MILLIGRAM(S): at 06:27

## 2021-06-09 RX ADMIN — Medication 1 MILLIGRAM(S): at 12:19

## 2021-06-09 RX ADMIN — METFORMIN HYDROCHLORIDE 850 MILLIGRAM(S): 850 TABLET ORAL at 12:19

## 2021-06-09 RX ADMIN — CEFTRIAXONE 100 MILLIGRAM(S): 500 INJECTION, POWDER, FOR SOLUTION INTRAMUSCULAR; INTRAVENOUS at 08:02

## 2021-06-09 RX ADMIN — NYSTATIN CREAM 1 APPLICATION(S): 100000 CREAM TOPICAL at 06:28

## 2021-06-09 RX ADMIN — ATORVASTATIN CALCIUM 40 MILLIGRAM(S): 80 TABLET, FILM COATED ORAL at 21:05

## 2021-06-09 RX ADMIN — Medication 2: at 12:19

## 2021-06-09 RX ADMIN — CHLORHEXIDINE GLUCONATE 15 MILLILITER(S): 213 SOLUTION TOPICAL at 06:27

## 2021-06-09 NOTE — PROGRESS NOTE ADULT - SUBJECTIVE AND OBJECTIVE BOX
CHIEF COMPLAINT: no acute event overnight.  Denies CP, SOB, HA, pain, or GI/ symptoms.  Pt remains hypoxic, on 3L.  Pt states that kiran had put her on home Oxygen (2-3 months ago) and when it was delivered she sent it back.  SHe states she feels fine, not short of breath, and didn't know why/what diagnoses O2 supp was for.  Was seen by psychiatry yesterday and medications were modified according to outpatient psych recommendation.  She has intermittent visual hallucination, which was not present according o outpatient psych provider.  According to speech, OT, and PT - she was seeing a dog.  Pt was says "i know the dog is not really there, but i see it"      Patient is a 81y old  Female who presents with a chief complaint of CVA (2021 10:25)    HPI:  80 y/o F w/ PMH of DVT (on Eliquis), NIDDM, glaucoma, HLD, HTN, hs of NPH s/p  shunt  sent ot ED for +CTH w/ ICH.  CTH was done outpt after c/o dizziness and unsteadness/falling over to left side.  CTH done in ED showed pt had a small right basal ganglia hemorrhage.  Eliquis was discontinued and neurosurgery consulted for evaluation.  f/u CTH was done and ICH noted to be stable.  Per neurosurgery no interventions necessary.  Pt has had a total of 4 CTH all stable. repeat venous duplex neg for any dvt.  On admission pt also noted to have hypertensive crisis that resolved w/ IV lopressor.  BP was maintained at goal <140/90 in light of ICH.  Hospital course was complicated by pt developing acute metabolic encephalopathy due to UTI.  UA noted to have pyuria and cultures grew klebsiella variicola.  Pt was placed on emperic antibiotics and ID consulted.  Mentation improved within 24hrs of IV antibiotics.  Pt will be switched to po antibiotics pending sensitivities for total of 7 day course per ID recs.  Pts A1c noted to be 7.7 and hyperglycemia resolved w/ basal/bolus regimen.  Pt will resume metformin on discharge.  PASCALE was also noted and is likely prerenal.  patient started on dysphagia 1 diet with thin liquids.     Patient was evaluated by PM&R and therapy for functional deficits and gait/ADL impairments and recommend acute rehabilitation.  Patient was medically optimized for discharge to [place] inpatient acute rehab on 2021. (2021 14:43)      PAST MEDICAL & SURGICAL HISTORY:  Hypertension  Hyperlipidemia  Diabetes  Glaucoma  Osteoarthritis  Small bowel obstruction  S/P hysterectomy   and Oopherectomy in   S/P cholecystectomy    Achilles tendon injury  repair  right scalene muscle release  S/P knee replacement, left    Allergies  adhesives (Pruritus; Blisters)  penicillin (Hives)  pineapple (Anaphylaxis)  Intolerances      PHYSICAL EXAM  81y  Vital Signs Last 24 Hrs  T(C): 36.6 (2021 08:19), Max: 36.6 (2021 20:48)  T(F): 97.9 (2021 08:19), Max: 97.9 (2021 20:48)  HR: 52 (2021 10:22) (45 - 76)  BP: 129/74 (2021 08:19) (129/74 - 178/68)  BP(mean): --  RR: 16 (2021 08:19) (16 - 16)  SpO2: 100% (2021 08:19) (95% - 100%)  Daily Weight in k.2 (2021 01:00)    General: NAD, Resting Comfortable,                                  HEENT: NC/AT, EOM I, PERRLA, Normal Conjunctivae  Cardio: RRR, Normal S1-S2, +murmur                           Pulm: No Respiratory Distress,  Lungs CTAB                        Abdomen: ND/NT, Soft, BS+                                                MSK: No joint swelling, Full ROM                                         Ext: No C/C/E, Pulses 2+ throughout, No calf tenderness    Skin: Left heel 1cm round callous with 1mm depression. Left elbow 1cm x 3cm area of healed skin, intact with flaky dry skin overlying. C/D/I, no erythema or discharge.    area of superficial skin breakdown at abdominal panus fold.                                                             Wounds: none  Decubitus Ulcers: None Present     Neurological Examination    Cognitive: AAO to self, location, month/year                                                                         Attention: Impaired, easily distracted  Judgment: Good evidence of judgement                               Memory: Recall 3/3 objects immediate and 3/3 5 min later with cueing                                                                     Communication:  +dysarthria   Coordination: Left FTN dysmetria                                                                              Sensory: Intact to light touch, PP and Vibration                                                                                               Motor    LEFT    UE: SF [3/5], EF [4/5], EE [4/5], WE [3/5],  [weak]  RIGHT UE: SF [4/5], EF [4/5], EE [4/5], WE [4/5],  [wnl]  LEFT    LE:  HF [3/5], KE [3/5], DF [3/5], EHL [3/5],  PF [3/5]  RIGHT LE:  HF [3/5], KE [3/5], DF [3/5], EHL [3/5],  PF [3/5]      Reflex:  2 + thoroughout, Carrasco/Babinski negative    RECENT LABS:                          10.2   8.10  )-----------( 283      ( 2021 05:40 )             31.4     -    141  |  105  |  22  ----------------------------<  129<H>  4.4   |  29  |  1.13    Ca    9.0      2021 05:30      CAPILLARY BLOOD GLUCOSE  POCT Blood Glucose.: 157 mg/dL (2021 07:57)  POCT Blood Glucose.: 135 mg/dL (2021 22:20)  POCT Blood Glucose.: 99 mg/dL (2021 18:18)      MEDICATIONS  (STANDING):  amLODIPine   Tablet 10 milliGRAM(s) Oral daily  ARIPiprazole 10 milliGRAM(s) Oral at bedtime  atorvastatin 40 milliGRAM(s) Oral at bedtime  cefTRIAXone   IVPB 1000 milliGRAM(s) IV Intermittent every 24 hours  chlorhexidine 0.12% Liquid 15 milliLiter(s) Oral Mucosa two times a day  dextrose 40% Gel 15 Gram(s) Oral once  dextrose 5%. 1000 milliLiter(s) (50 mL/Hr) IV Continuous <Continuous>  dextrose 5%. 1000 milliLiter(s) (100 mL/Hr) IV Continuous <Continuous>  dextrose 50% Injectable 25 Gram(s) IV Push once  dextrose 50% Injectable 12.5 Gram(s) IV Push once  dextrose 50% Injectable 25 Gram(s) IV Push once  fluvoxaMINE 150 milliGRAM(s) Oral at bedtime  folic acid 1 milliGRAM(s) Oral daily  glucagon  Injectable 1 milliGRAM(s) IntraMuscular once  hydrochlorothiazide 25 milliGRAM(s) Oral <User Schedule>  insulin lispro (ADMELOG) corrective regimen sliding scale   SubCutaneous three times a day before meals  insulin lispro (ADMELOG) corrective regimen sliding scale   SubCutaneous at bedtime  metFORMIN 850 milliGRAM(s) Oral daily  metoprolol tartrate 12.5 milliGRAM(s) Oral two times a day  nystatin    Suspension 759916 Unit(s) Oral four times a day  nystatin Powder 1 Application(s) Topical two times a day    MEDICATIONS  (PRN):  acetaminophen   Tablet .. 650 milliGRAM(s) Oral every 6 hours PRN Temp greater or equal to 38C (100.4F), Mild Pain (1 - 3), Moderate Pain (4 - 6)  ALBUTerol    90 MICROgram(s) HFA Inhaler 2 Puff(s) Inhalation every 6 hours PRN Shortness of Breath and/or Wheezing       CHIEF COMPLAINT: no acute event overnight.  Denies CP, SOB,pain, or GI/ symptoms. Denies HA but has intermittent right occipital region pain. Pt remains hypoxic, on 3L.  Pt states that kiran had put her on home Oxygen (2-3 months ago) and when it was delivered she sent it back.  SHe states she feels fine, not short of breath, and didn't know why/what diagnoses O2 supp was for.  Was seen by psychiatry yesterday and medications were modified according to outpatient psych recommendation.  She has intermittent visual hallucination, which was not present according o outpatient psych provider.  According to speech, OT, and PT - she was seeing a dog.  Pt was says "i know the dog is not really there, but i see it"      Patient is a 81y old  Female who presents with a chief complaint of CVA (2021 10:25)    HPI:  82 y/o F w/ PMH of DVT (on Eliquis), NIDDM, glaucoma, HLD, HTN, hs of NPH s/p  shunt  sent ot ED for +CTH w/ ICH.  CTH was done outpt after c/o dizziness and unsteadness/falling over to left side.  CTH done in ED showed pt had a small right basal ganglia hemorrhage.  Eliquis was discontinued and neurosurgery consulted for evaluation.  f/u CTH was done and ICH noted to be stable.  Per neurosurgery no interventions necessary.  Pt has had a total of 4 CTH all stable. repeat venous duplex neg for any dvt.  On admission pt also noted to have hypertensive crisis that resolved w/ IV lopressor.  BP was maintained at goal <140/90 in light of ICH.  Hospital course was complicated by pt developing acute metabolic encephalopathy due to UTI.  UA noted to have pyuria and cultures grew klebsiella variicola.  Pt was placed on emperic antibiotics and ID consulted.  Mentation improved within 24hrs of IV antibiotics.  Pt will be switched to po antibiotics pending sensitivities for total of 7 day course per ID recs.  Pts A1c noted to be 7.7 and hyperglycemia resolved w/ basal/bolus regimen.  Pt will resume metformin on discharge.  PASCALE was also noted and is likely prerenal.  patient started on dysphagia 1 diet with thin liquids.     Patient was evaluated by PM&R and therapy for functional deficits and gait/ADL impairments and recommend acute rehabilitation.  Patient was medically optimized for discharge to [place] inpatient acute rehab on 2021. (2021 14:43)      PAST MEDICAL & SURGICAL HISTORY:  Hypertension  Hyperlipidemia  Diabetes  Glaucoma  Osteoarthritis  Small bowel obstruction  S/P hysterectomy   and Oopherectomy in   S/P cholecystectomy    Achilles tendon injury  repair  right scalene muscle release  S/P knee replacement, left    Allergies  adhesives (Pruritus; Blisters)  penicillin (Hives)  pineapple (Anaphylaxis)  Intolerances      PHYSICAL EXAM  81y  Vital Signs Last 24 Hrs  T(C): 36.6 (2021 08:19), Max: 36.6 (2021 20:48)  T(F): 97.9 (2021 08:19), Max: 97.9 (2021 20:48)  HR: 52 (2021 10:22) (45 - 76)  BP: 129/74 (2021 08:19) (129/74 - 178/68)  BP(mean): --  RR: 16 (2021 08:19) (16 - 16)  SpO2: 100% (2021 08:19) (95% - 100%)  Daily Weight in k.2 (2021 01:00)    General: NAD, Resting Comfortable,                                  HEENT: NC/AT, EOM I, PERRLA, Normal Conjunctivae  Cardio: RRR, Normal S1-S2, +murmur                           Pulm: No Respiratory Distress,  Lungs CTAB                        Abdomen: ND/NT, Soft, BS+                                                MSK: No joint swelling, Full ROM                                         Ext: No C/C/E, Pulses 2+ throughout, No calf tenderness    Skin: Left heel 1cm round callous with 1mm depression. Left elbow 1cm x 3cm area of healed skin, intact with flaky dry skin overlying. C/D/I, no erythema or discharge.    area of superficial skin breakdown at abdominal panus fold.                                                             Wounds: none  Decubitus Ulcers: None Present     Neurological Examination    Cognitive: AAO to self, location, month/year                                                                         Attention: Impaired, easily distracted  Judgment: Good evidence of judgement                               Memory: Recall 3/3 objects immediate and 3/3 5 min later with cueing                                                                     Communication:  +dysarthria   Coordination: Left FTN dysmetria                                                                              Sensory: Intact to light touch, PP and Vibration                                                                                               Motor    LEFT    UE: SF [3/5], EF [4/5], EE [4/5], WE [3/5],  [weak]  RIGHT UE: SF [4/5], EF [4/5], EE [4/5], WE [4/5],  [wnl]  LEFT    LE:  HF [3/5], KE [3/5], DF [3/5], EHL [3/5],  PF [3/5]  RIGHT LE:  HF [3/5], KE [3/5], DF [3/5], EHL [3/5],  PF [3/5]      Reflex:  2 + thoroughout, Carrasco/Babinski negative    RECENT LABS:                          10.2   8.10  )-----------( 283      ( 2021 05:40 )             31.4     06-    141  |  105  |  22  ----------------------------<  129<H>  4.4   |  29  |  1.13    Ca    9.0      2021 05:30      CAPILLARY BLOOD GLUCOSE  POCT Blood Glucose.: 157 mg/dL (2021 07:57)  POCT Blood Glucose.: 135 mg/dL (2021 22:20)  POCT Blood Glucose.: 99 mg/dL (2021 18:18)      MEDICATIONS  (STANDING):  amLODIPine   Tablet 10 milliGRAM(s) Oral daily  ARIPiprazole 10 milliGRAM(s) Oral at bedtime  atorvastatin 40 milliGRAM(s) Oral at bedtime  cefTRIAXone   IVPB 1000 milliGRAM(s) IV Intermittent every 24 hours  chlorhexidine 0.12% Liquid 15 milliLiter(s) Oral Mucosa two times a day  dextrose 40% Gel 15 Gram(s) Oral once  dextrose 5%. 1000 milliLiter(s) (50 mL/Hr) IV Continuous <Continuous>  dextrose 5%. 1000 milliLiter(s) (100 mL/Hr) IV Continuous <Continuous>  dextrose 50% Injectable 25 Gram(s) IV Push once  dextrose 50% Injectable 12.5 Gram(s) IV Push once  dextrose 50% Injectable 25 Gram(s) IV Push once  fluvoxaMINE 150 milliGRAM(s) Oral at bedtime  folic acid 1 milliGRAM(s) Oral daily  glucagon  Injectable 1 milliGRAM(s) IntraMuscular once  hydrochlorothiazide 25 milliGRAM(s) Oral <User Schedule>  insulin lispro (ADMELOG) corrective regimen sliding scale   SubCutaneous three times a day before meals  insulin lispro (ADMELOG) corrective regimen sliding scale   SubCutaneous at bedtime  metFORMIN 850 milliGRAM(s) Oral daily  metoprolol tartrate 12.5 milliGRAM(s) Oral two times a day  nystatin    Suspension [id number] Unit(s) Oral four times a day  nystatin Powder 1 Application(s) Topical two times a day    MEDICATIONS  (PRN):  acetaminophen   Tablet .. 650 milliGRAM(s) Oral every 6 hours PRN Temp greater or equal to 38C (100.4F), Mild Pain (1 - 3), Moderate Pain (4 - 6)  ALBUTerol    90 MICROgram(s) HFA Inhaler 2 Puff(s) Inhalation every 6 hours PRN Shortness of Breath and/or Wheezing

## 2021-06-09 NOTE — CONSULT NOTE ADULT - SUBJECTIVE AND OBJECTIVE BOX
HPI:HPI:  82 y/o F w/ PMH of DVT (on Eliquis), NIDDM, glaucoma, HLD, HTN, hs of NPH s/p  shunt 2015 sent ot ED for +CTH w/ ICH.  CTH was done outpt after c/o dizziness and unsteadness/falling over to left side.  CTH done in ED showed pt had a small right basal ganglia hemorrhage.  Eliquis was discontinued and neurosurgery consulted for evaluation.  f/u CTH was done and ICH noted to be stable.  Per neurosurgery no interventions necessary.  Pt has had a total of 4 CTH all stable. repeat venous duplex neg for any dvt.  On admission pt also noted to have hypertensive crisis that resolved w/ IV lopressor.  BP was maintained at goal <140/90 in light of ICH.  Hospital course was complicated by pt developing acute metabolic encephalopathy due to UTI.  UA noted to have pyuria and cultures grew klebsiella variicola.  Pt was placed on emperic antibiotics and ID consulted.  Mentation improved within 24hrs of IV antibiotics.  Pt will be switched to po antibiotics pending sensitivities for total of 7 day course per ID recs.  Pts A1c noted to be 7.7 and hyperglycemia resolved w/ basal/bolus regimen.  Pt will resume metformin on discharge.  PASCALE was also noted and is likely prerenal.  patient started on dysphagia 1 diet with thin liquids.     Patient was evaluated by PM&R and therapy for functional deficits and gait/ADL impairments and recommend acute rehabilitation.  Patient was medically optimized for discharge to [place] inpatient acute rehab on 6/7/2021. (07 Jun 2021 14:43)  Pulmonary:  Patient was given oxygen from [facility] and then returned it.  She denies dyspnea but was noted to have low oxy sat today    PAST MEDICAL & SURGICAL HISTORY:  Hypertension    Hyperlipidemia    Diabetes    Glaucoma    Osteoarthritis    Small bowel obstruction    S/P hysterectomy  1981 and Oopherectomy in 1990s    S/P cholecystectomy  1981    Achilles tendon injury  repair  right scalene muscle release    S/P knee replacement, left        FAMILY HISTORY:  Family history of pancreatic cancer (Sibling)    Family history of early CAD    Family history of diabetes mellitus        SOCIAL HISTORY:  Smoking: _denies  EtOH Use:  Marital Status:  Occupation:  Exposures:  Recent Travel:    Allergies    adhesives (Pruritus; Blisters)  penicillin (Hives)  pineapple (Anaphylaxis)    Intolerances        HOME  MEDICATIONS:Home Medications:  amLODIPine 10 mg oral tablet: 1 tab(s) orally once a day (04 Jun 2021 16:58)  ARIPiprazole 10 mg oral tablet: 1 tab(s) orally once a day (at bedtime) (04 Jun 2021 16:58)  atorvastatin 40 mg oral tablet: 1 tab(s) orally once a day (28 May 2021 18:00)  chlorhexidine 0.12% mucous membrane liquid: 15 milliliter(s) mucous membrane 2 times a day (07 Jun 2021 12:26)  desvenlafaxine (as succinate) 25 mg oral tablet, extended release: 1 tab(s) orally once a day (in the morning) (28 May 2021 18:00)  ergocalciferol 50,000 intl units (1.25 mg) oral capsule: 1 cap(s) orally once a day (28 May 2021 18:00)  fluvoxaMINE 100 mg oral tablet: 1.5 tab(s) orally once a day (at bedtime) (28 May 2021 18:00)  folic acid 1 mg oral tablet: 1 tab(s) orally once a day (28 May 2021 18:00)  insulin glargine: 5 unit(s) subcutaneous once a day (at bedtime) (07 Jun 2021 12:26)  metoprolol tartrate 25 mg oral tablet: 1 tab(s) orally 2 times a day (04 Jun 2021 16:58)  nystatin 100,000 units/mL oral suspension: 5 milliliter(s) orally 4 times a day (07 Jun 2021 12:26)      REVIEW OF SYSTEMS:  Constitutional: No fevers or chills. No weight loss. .  Eyes: No itching or discharge from the eyes  ENT: . No nasal congestion. No post nasal drip  CV: No chest pain. No palpitations. No lightheadedness or dizziness.   Resp: No dyspnea at rest. No dyspnea on exertion. .  GI: No nausea. No vomiting. No diarrhea.  MSK: generalized weakness  Neurological: No gross motor weakness. No sensory changes.    [ ] All other systems negative  [ ] Unable to assess ROS because ________    OBJECTIVE:  ICU Vital Signs Last 24 Hrs  T(C): 36.6 (09 Jun 2021 08:19), Max: 36.6 (08 Jun 2021 20:48)  T(F): 97.9 (09 Jun 2021 08:19), Max: 97.9 (08 Jun 2021 20:48)  HR: 52 (09 Jun 2021 10:22) (45 - 76)  BP: 129/74 (09 Jun 2021 08:19) (129/74 - 178/68)  BP(mean): --  ABP: --  ABP(mean): --  RR: 16 (09 Jun 2021 08:19) (16 - 16)  SpO2: 100% (09 Jun 2021 08:19) (95% - 100%)        06-08 @ 07:01  -  06-09 @ 07:00  --------------------------------------------------------  IN: 480 mL / OUT: 0 mL / NET: 480 mL      CAPILLARY BLOOD GLUCOSE      POCT Blood Glucose.: 172 mg/dL (09 Jun 2021 12:17)      PHYSICAL EXAM:  General: Awake, alert, oriented X 3.   HEENT: Atraumatic, normocephalic.                            .  Lymph Nodes: No palpable lymphadenopathy  Neck: No JVD. No carotid bruit.   Respiratory:                          Normal and equal air entry                         No wheeze, rhonchi or rales.  Cardiovascular: S1 S2 normal. 3/6 mr murmur  Abdomen: Soft, non-tender,   Extremities: Warm to touch.    Skin: No rashes or skin lesions  Neurological: as per rehab  Psychiatry: Appropriate mood and affect.    HOSPITAL MEDICATIONS:  MEDICATIONS  (STANDING):  amLODIPine   Tablet 10 milliGRAM(s) Oral daily  ARIPiprazole 10 milliGRAM(s) Oral at bedtime  atorvastatin 40 milliGRAM(s) Oral at bedtime  cefTRIAXone   IVPB 1000 milliGRAM(s) IV Intermittent every 24 hours  chlorhexidine 0.12% Liquid 15 milliLiter(s) Oral Mucosa two times a day  dextrose 40% Gel 15 Gram(s) Oral once  dextrose 5%. 1000 milliLiter(s) (50 mL/Hr) IV Continuous <Continuous>  dextrose 5%. 1000 milliLiter(s) (100 mL/Hr) IV Continuous <Continuous>  dextrose 50% Injectable 25 Gram(s) IV Push once  dextrose 50% Injectable 12.5 Gram(s) IV Push once  dextrose 50% Injectable 25 Gram(s) IV Push once  fluvoxaMINE 150 milliGRAM(s) Oral at bedtime  folic acid 1 milliGRAM(s) Oral daily  glucagon  Injectable 1 milliGRAM(s) IntraMuscular once  hydrochlorothiazide 25 milliGRAM(s) Oral <User Schedule>  insulin lispro (ADMELOG) corrective regimen sliding scale   SubCutaneous three times a day before meals  insulin lispro (ADMELOG) corrective regimen sliding scale   SubCutaneous at bedtime  metFORMIN 850 milliGRAM(s) Oral daily  metoprolol tartrate 12.5 milliGRAM(s) Oral two times a day  nystatin    Suspension [id number] Unit(s) Oral four times a day  nystatin Powder 1 Application(s) Topical two times a day    MEDICATIONS  (PRN):  acetaminophen   Tablet .. 650 milliGRAM(s) Oral every 6 hours PRN Temp greater or equal to 38C (100.4F), Mild Pain (1 - 3), Moderate Pain (4 - 6)  ALBUTerol    90 MICROgram(s) HFA Inhaler 2 Puff(s) Inhalation every 6 hours PRN Shortness of Breath and/or Wheezing      LABS:                        10.2   8.10  )-----------( 283      ( 08 Jun 2021 05:40 )             31.4     06-09    141  |  105  |  22  ----------------------------<  129<H>  4.4   |  29  |  1.13    Ca    9.0      09 Jun 2021 05:30                MICROBIOLOGY:     RADIOLOGY:  [ ] Reviewed and interpreted by me   cxr chronic elevated rt diaphragm  EKG:

## 2021-06-09 NOTE — PROGRESS NOTE ADULT - SUBJECTIVE AND OBJECTIVE BOX
Patient is a 81y old  Female who presents with a chief complaint of CVA (08 Jun 2021 17:27)      Patient seen and examined at bedside.  No overnight events  No complaints this morning. HR noted to be in low 50s, high 40s    ALLERGIES:  adhesives (Pruritus; Blisters)  penicillin (Hives)  pineapple (Anaphylaxis)    MEDICATIONS  (STANDING):  amLODIPine   Tablet 10 milliGRAM(s) Oral daily  ARIPiprazole 10 milliGRAM(s) Oral at bedtime  atorvastatin 40 milliGRAM(s) Oral at bedtime  cefTRIAXone   IVPB 1000 milliGRAM(s) IV Intermittent every 24 hours  chlorhexidine 0.12% Liquid 15 milliLiter(s) Oral Mucosa two times a day  dextrose 40% Gel 15 Gram(s) Oral once  dextrose 5%. 1000 milliLiter(s) (50 mL/Hr) IV Continuous <Continuous>  dextrose 5%. 1000 milliLiter(s) (100 mL/Hr) IV Continuous <Continuous>  dextrose 50% Injectable 25 Gram(s) IV Push once  dextrose 50% Injectable 12.5 Gram(s) IV Push once  dextrose 50% Injectable 25 Gram(s) IV Push once  fluvoxaMINE 150 milliGRAM(s) Oral at bedtime  folic acid 1 milliGRAM(s) Oral daily  glucagon  Injectable 1 milliGRAM(s) IntraMuscular once  hydrALAZINE 10 milliGRAM(s) Oral every 12 hours  insulin lispro (ADMELOG) corrective regimen sliding scale   SubCutaneous three times a day before meals  insulin lispro (ADMELOG) corrective regimen sliding scale   SubCutaneous at bedtime  metFORMIN 850 milliGRAM(s) Oral daily  metoprolol tartrate 25 milliGRAM(s) Oral two times a day  nystatin    Suspension 705260 Unit(s) Oral four times a day  nystatin Powder 1 Application(s) Topical two times a day    MEDICATIONS  (PRN):  acetaminophen   Tablet .. 650 milliGRAM(s) Oral every 6 hours PRN Temp greater or equal to 38C (100.4F), Mild Pain (1 - 3), Moderate Pain (4 - 6)  ALBUTerol    90 MICROgram(s) HFA Inhaler 2 Puff(s) Inhalation every 6 hours PRN Shortness of Breath and/or Wheezing    Vital Signs Last 24 Hrs  T(F): 97.9 (09 Jun 2021 08:19), Max: 97.9 (08 Jun 2021 20:48)  HR: 52 (09 Jun 2021 10:22) (45 - 76)  BP: 129/74 (09 Jun 2021 08:19) (129/74 - 178/68)  RR: 16 (09 Jun 2021 08:19) (16 - 16)  SpO2: 100% (09 Jun 2021 08:19) (95% - 100%)  I&O's Summary    08 Jun 2021 07:01  -  09 Jun 2021 07:00  --------------------------------------------------------  IN: 480 mL / OUT: 0 mL / NET: 480 mL      BMI (kg/m2): 33.6 (06-07-21 @ 18:31)    PHYSICAL EXAM:  GENERAL: NAD  HENT:  Atraumatic, Normocephalic; No tonsillar erythema, exudates, or enlargement; Moist mucous membranes;   EYES: EOMI, PERRLA, conjunctiva and sclera clear, no lid-lag  NECK: Supple, No JVD, Normal thyroid  CHEST/LUNG: Clear to percussion bilaterally; No rales, rhonchi, wheezing, or rubs; normal respiratory effort, no intercostal retractions  HEART: Regular rate and rhythm; No murmurs, rubs, or gallops  ABDOMEN: Soft, Nontender, Nondistended; Bowel sounds present; No HSM  MUSCULOSKELETAL/EXTREMITIES:  2+ Peripheral Pulses, No clubbing, cyanosis, or peripheral edema; No digital cyanosis  PSYCH: Appropriate affect, Alert & Awake    LABS:                        10.2   8.10  )-----------( 283      ( 08 Jun 2021 05:40 )             31.4       06-09    141  |  105  |  22  ----------------------------<  129  4.4   |  29  |  1.13    Ca    9.0      09 Jun 2021 05:30       eGFR if Non African American: 46 mL/min/1.73M2 (06-09-21 @ 05:30)  eGFR if African American: 53 mL/min/1.73M2 (06-09-21 @ 05:30)             05-30 Chol 133 mg/dL LDL -- HDL 54 mg/dL Trig 103 mg/dL              POCT Blood Glucose.: 157 mg/dL (09 Jun 2021 07:57)  POCT Blood Glucose.: 135 mg/dL (08 Jun 2021 22:20)  POCT Blood Glucose.: 99 mg/dL (08 Jun 2021 18:18)  POCT Blood Glucose.: 192 mg/dL (08 Jun 2021 11:56)          Culture - Urine (collected 02 Jun 2021 16:40)  Source: .Urine Clean Catch (Midstream)  Final Report (04 Jun 2021 16:22):    >100,000 CFU/ml Klebsiella variicola  Organism: Klebsiella variicola (04 Jun 2021 16:22)  Organism: Klebsiella variicola (04 Jun 2021 16:22)      -  Amikacin: S <=16      -  Amoxicillin/Clavulanic Acid: S <=8/4      -  Ampicillin: R 16 These ampicillin results predict results for amoxicillin      -  Ampicillin/Sulbactam: S <=4/2 Enterobacter, Citrobacter, and Serratia may develop resistance during prolonged therapy (3-4 days)      -  Aztreonam: S <=4      -  Cefazolin: S <=2      -  Cefepime: S <=2      -  Cefoxitin: S <=8      -  Ceftriaxone: S <=1 Enterobacter, Citrobacter, and Serratia may develop resistance during prolonged therapy      -  Ciprofloxacin: S <=0.25      -  Ertapenem: S <=0.5      -  Gentamicin: S <=2      -  Imipenem: S <=1      -  Levofloxacin: S <=0.5      -  Meropenem: S <=1      -  Nitrofurantoin: S <=32 Should not be used to treat pyelonephritis      -  Piperacillin/Tazobactam: S <=8      -  Tigecycline: S <=2      -  Tobramycin: S <=2      -  Trimethoprim/Sulfamethoxazole: S <=0.5/9.5      Method Type: KAYLEEN      COVID-19 PCR: NotDetec (06-05-21 @ 18:40)  COVID-19 PCR: NotDetec (05-28-21 @ 13:40)      Care Discussed with Consultants/Other Providers: Yes   Patient is a 81y old  Female who presents with a chief complaint of CVA (08 Jun 2021 17:27)      Patient seen and examined at bedside.  No overnight events  No complaints this morning. HR noted to be in low 50s, high 40s    ALLERGIES:  adhesives (Pruritus; Blisters)  penicillin (Hives)  pineapple (Anaphylaxis)    MEDICATIONS  (STANDING):  amLODIPine   Tablet 10 milliGRAM(s) Oral daily  ARIPiprazole 10 milliGRAM(s) Oral at bedtime  atorvastatin 40 milliGRAM(s) Oral at bedtime  cefTRIAXone   IVPB 1000 milliGRAM(s) IV Intermittent every 24 hours  chlorhexidine 0.12% Liquid 15 milliLiter(s) Oral Mucosa two times a day  dextrose 40% Gel 15 Gram(s) Oral once  dextrose 5%. 1000 milliLiter(s) (50 mL/Hr) IV Continuous <Continuous>  dextrose 5%. 1000 milliLiter(s) (100 mL/Hr) IV Continuous <Continuous>  dextrose 50% Injectable 25 Gram(s) IV Push once  dextrose 50% Injectable 12.5 Gram(s) IV Push once  dextrose 50% Injectable 25 Gram(s) IV Push once  fluvoxaMINE 150 milliGRAM(s) Oral at bedtime  folic acid 1 milliGRAM(s) Oral daily  glucagon  Injectable 1 milliGRAM(s) IntraMuscular once  hydrALAZINE 10 milliGRAM(s) Oral every 12 hours  insulin lispro (ADMELOG) corrective regimen sliding scale   SubCutaneous three times a day before meals  insulin lispro (ADMELOG) corrective regimen sliding scale   SubCutaneous at bedtime  metFORMIN 850 milliGRAM(s) Oral daily  metoprolol tartrate 25 milliGRAM(s) Oral two times a day  nystatin    Suspension 102142 Unit(s) Oral four times a day  nystatin Powder 1 Application(s) Topical two times a day    MEDICATIONS  (PRN):  acetaminophen   Tablet .. 650 milliGRAM(s) Oral every 6 hours PRN Temp greater or equal to 38C (100.4F), Mild Pain (1 - 3), Moderate Pain (4 - 6)  ALBUTerol    90 MICROgram(s) HFA Inhaler 2 Puff(s) Inhalation every 6 hours PRN Shortness of Breath and/or Wheezing    Vital Signs Last 24 Hrs  T(F): 97.9 (09 Jun 2021 08:19), Max: 97.9 (08 Jun 2021 20:48)  HR: 52 (09 Jun 2021 10:22) (45 - 76)  BP: 129/74 (09 Jun 2021 08:19) (129/74 - 178/68)  RR: 16 (09 Jun 2021 08:19) (16 - 16)  SpO2: 100% (09 Jun 2021 08:19) (95% - 100%)  I&O's Summary    08 Jun 2021 07:01  -  09 Jun 2021 07:00  --------------------------------------------------------  IN: 480 mL / OUT: 0 mL / NET: 480 mL      BMI (kg/m2): 33.6 (06-07-21 @ 18:31)    PHYSICAL EXAM:  GENERAL: NAD, on NC  HENT:  Atraumatic, Normocephalic; No tonsillar erythema, exudates, or enlargement; Moist mucous membranes;   EYES: EOMI, PERRLA, conjunctiva and sclera clear, no lid-lag  NECK: Supple, No JVD, Normal thyroid  CHEST/LUNG: Clear to percussion bilaterally; No rales, rhonchi, wheezing, or rubs; normal respiratory effort, no intercostal retractions  HEART: Regular rate and rhythm; No murmurs, rubs, or gallops  ABDOMEN: Soft, Nontender, Nondistended; Bowel sounds present; No HSM  MUSCULOSKELETAL/EXTREMITIES:  2+ Peripheral Pulses, No clubbing, cyanosis, or peripheral edema; No digital cyanosis  PSYCH: Appropriate affect, Alert & Awake    LABS:                        10.2   8.10  )-----------( 283      ( 08 Jun 2021 05:40 )             31.4       06-09    141  |  105  |  22  ----------------------------<  129  4.4   |  29  |  1.13    Ca    9.0      09 Jun 2021 05:30       eGFR if Non African American: 46 mL/min/1.73M2 (06-09-21 @ 05:30)  eGFR if African American: 53 mL/min/1.73M2 (06-09-21 @ 05:30)       05-30 Chol 133 mg/dL LDL -- HDL 54 mg/dL Trig 103 mg/dL    POCT Blood Glucose.: 157 mg/dL (09 Jun 2021 07:57)  POCT Blood Glucose.: 135 mg/dL (08 Jun 2021 22:20)  POCT Blood Glucose.: 99 mg/dL (08 Jun 2021 18:18)  POCT Blood Glucose.: 192 mg/dL (08 Jun 2021 11:56)          Culture - Urine (collected 02 Jun 2021 16:40)  Source: .Urine Clean Catch (Midstream)  Final Report (04 Jun 2021 16:22):    >100,000 CFU/ml Klebsiella variicola  Organism: Klebsiella variicola (04 Jun 2021 16:22)  Organism: Klebsiella variicola (04 Jun 2021 16:22)      -  Amikacin: S <=16      -  Amoxicillin/Clavulanic Acid: S <=8/4      -  Ampicillin: R 16 These ampicillin results predict results for amoxicillin      -  Ampicillin/Sulbactam: S <=4/2 Enterobacter, Citrobacter, and Serratia may develop resistance during prolonged therapy (3-4 days)      -  Aztreonam: S <=4      -  Cefazolin: S <=2      -  Cefepime: S <=2      -  Cefoxitin: S <=8      -  Ceftriaxone: S <=1 Enterobacter, Citrobacter, and Serratia may develop resistance during prolonged therapy      -  Ciprofloxacin: S <=0.25      -  Ertapenem: S <=0.5      -  Gentamicin: S <=2      -  Imipenem: S <=1      -  Levofloxacin: S <=0.5      -  Meropenem: S <=1      -  Nitrofurantoin: S <=32 Should not be used to treat pyelonephritis      -  Piperacillin/Tazobactam: S <=8      -  Tigecycline: S <=2      -  Tobramycin: S <=2      -  Trimethoprim/Sulfamethoxazole: S <=0.5/9.5      Method Type: KAYLEEN      COVID-19 PCR: NotDetec (06-05-21 @ 18:40)  COVID-19 PCR: NotDetec (05-28-21 @ 13:40)      Care Discussed with Consultants/Other Providers: Yes

## 2021-06-09 NOTE — PROGRESS NOTE ADULT - ASSESSMENT
ASSESSMENT/PLAN  82 y/o F w/ PMH of DVT (on Eliquis), NIDDM, glaucoma, HLD, HTN, hs of NPH s/p  shunt 2015 sent ot ED for +CTH w/ ICH.  CTH was done outpt after c/o dizziness and unsteadness/falling over to left side.  Pt denies head trauma.  Evaluated by neurosurgery - nonsurgical intervention. Hospital course complicated by encephalopathic d/u UTI    COMORBIDITES/ACTIVE MEDICAL ISSUES     Gait Instability, ADL impairments and Functional impairments: start Comprehensive Rehab Program of PT/OT/SLP     # Right Basal ganglia hemorrhage  - Eliquis held (previous DVT)  - CTH x4 - ICH stable   - BP goal: <140/90    # Hypertensive Crisis  - Continue metoprolol 25 BID, norvasc 10  - BP Goal: <140/90    #Encephalopathy due to UTI  - UCx grew klebsiella variicola  - start ceftriaxone x 5-7 days (last dose 6/10)    #Hypoxia  - on 3L NC  - Home O2? - stated by kiran, but pt did not see a need and declined  - Pulmonary consult requested - rec: D-Dimer and ECHO    #Hyperglycemia   - A1c 7.7  - Lantus 5U  - Admelog 2U  - FS + ISS    #PASCALE  - Encourage oral fluid  - Avoid nephrotoxic med  - Nephro following    #HLD  - Lipitor 40    #Bipolar disorder  - Pristiq ER 25- d/c (6/8)  - Fluvoxamine 150  - Abilify 10mg qhs  - psychiatry consult appreciated    #Pain control  - Tylenol PRN    #GI/Bowel Mgmt   - Continent Senna,  Miralax PRN,    #Bladder management  - COntinent, PVR q 8 hours (SC if > 400)    #Hx of DVT (9/2020)  - SCDs, TEDs   - Doppler negative - 5/28  - Eliquis dc in light of hemorrhage    #Skin:  - Left heel callous, overgrown toenails, consider podiatry consult  - left elbow, healing skin tear: apply cavalon and allevyn  - moisture associated dermatitis at abdominal pannus - cont barrier cream    FEN   - Diet - Puree + Thins    - Dysphagia  SLP - evaluation and treatment    Precautions / PROPHYLAXIS:   - Falls  - ortho: Weight bearing status: WBAT   - Lungs: Aspiration, Incentive Spirometer   - Pressure injury/Skin: Turn Q2hrs while in bed, OOB to Chair, PT/OT   ASSESSMENT/PLAN  80 y/o F w/ PMH of DVT (on Eliquis), NIDDM, glaucoma, HLD, HTN, hs of NPH s/p  shunt 2015 sent ot ED for +CTH w/ ICH.  CTH was done outpt after c/o dizziness and unsteadness/falling over to left side.  Pt denies head trauma.  Evaluated by neurosurgery - nonsurgical intervention. Hospital course complicated by encephalopathic d/u UTI    COMORBIDITES/ACTIVE MEDICAL ISSUES     Gait Instability, ADL impairments and Functional impairments: start Comprehensive Rehab Program of PT/OT/SLP     # Right Basal ganglia hemorrhage  - Eliquis held (previous DVT)  - CTH x4 - ICH stable   - BP goal: <140/90    # Hypertensive Crisis  - Continue metoprolol 25 BID, norvasc 10  - BP Goal: <140/90    #Encephalopathy due to UTI  - UCx grew klebsiella variicola  - start ceftriaxone x 5-7 days (last dose 6/10)    #Hypoxia  - on 3L NC  - Home O2? - stated by kiran, but pt did not see a need and declined  - Pulmonary consult requested - rec: D-Dimer and ECHO    #Hyperglycemia   - A1c 7.7  - Lantus 5U  - Admelog 2U  - FS + ISS    #PASCALE  - Encourage oral fluid  - Avoid nephrotoxic med  - Nephro following    #HLD  - Lipitor 40    #Bipolar disorder  - Pristiq ER 25- d/c (6/8)  - Fluvoxamine 150  - Abilify 10mg qhs  - psychiatry consult appreciated    #Pain control  - Tylenol PRN    #GI/Bowel Mgmt   - Continent Senna,  Miralax PRN,    #Bladder management  - COntinent, PVR q 8 hours (SC if > 400)    #Hx of DVT (9/2020)  - SCDs, TEDs   - Doppler negative - 5/28  - Eliquis dc in light of hemorrhage    #Skin:  - Left heel callous, overgrown toenails, consider podiatry consult  - left elbow, healing skin tear: apply cavalon and allevyn  - moisture associated dermatitis at abdominal pannus - cont barrier cream    FEN   - Diet - Puree + Thins    - Dysphagia  SLP - evaluation and treatment    Precautions / PROPHYLAXIS:   - Falls  - ortho: Weight bearing status: WBAT   - Lungs: Aspiration, Incentive Spirometer   - Pressure injury/Skin: Turn Q2hrs while in bed, OOB to Chair, PT/OT      TEAM MEETING 6/9  SW: lives with unemployed son  OT: SV, eat, min A groom, Max A UBD/toilet transfer, Total A LBD/Toileting  PT: Max A transfer, standing min-mod A, Amb 50' RW min-mod A  SLP: Cog mess, poor memory  barriers: dec memory, safety, visual hallucination  Goals: min assist with ADLs; close SV with transfer and ambulation; will need 24H help  EDOD: 6/21 HOME ASSESSMENT/PLAN  82 y/o F w/ PMH of DVT (on Eliquis), NIDDM, glaucoma, HLD, HTN, hs of NPH s/p  shunt 2015 sent ot ED for +CTH w/ ICH.  CTH was done outpt after c/o dizziness and unsteadness/falling over to left side.  Pt denies head trauma.  Evaluated by neurosurgery - nonsurgical intervention. Hospital course complicated by encephalopathic d/u UTI    COMORBIDITES/ACTIVE MEDICAL ISSUES     Gait Instability, ADL impairments and Functional impairments: start Comprehensive Rehab Program of PT/OT/SLP     # Right Basal ganglia hemorrhage  - Eliquis held (previous DVT)  - CTH x4 - ICH stable   - BP goal: <140/90    # Hypertensive Crisis  - Continue metoprolol 25 BID, norvasc 10  - BP Goal: <140/90    #Encephalopathy due to UTI  - UCx grew klebsiella variicola  - start ceftriaxone x 5-7 days (last dose 6/10)    #Hypoxia  - on 3L NC  - Home O2? - stated by kiran, but pt did not see a need and declined  - Pulmonary consult requested - rec: D-Dimer, ECHO, CTH    #Hyperglycemia   - A1c 7.7  - Lantus 5U  - Admelog 2U  - FS + ISS    #PASCALE  - Encourage oral fluid  - Avoid nephrotoxic med  - Nephro following    #HLD  - Lipitor 40    #Bipolar disorder  - Pristiq ER 25- d/c (6/8)  - Fluvoxamine 150  - Abilify 10mg qhs  - psychiatry consult appreciated    #Pain control  - Tylenol PRN    #GI/Bowel Mgmt   - Continent Senna,  Miralax PRN,    #Bladder management  - COntinent, PVR q 8 hours (SC if > 400)    #Hx of DVT (9/2020)  - SCDs, TEDs   - Doppler negative - 5/28  - Eliquis dc in light of hemorrhage    #Skin:  - Left heel callous, overgrown toenails, consider podiatry consult  - left elbow, healing skin tear: apply cavalon and allevyn  - moisture associated dermatitis at abdominal pannus - cont barrier cream    FEN   - Diet - Puree + Thins    - Dysphagia  SLP - evaluation and treatment    Precautions / PROPHYLAXIS:   - Falls  - ortho: Weight bearing status: WBAT   - Lungs: Aspiration, Incentive Spirometer   - Pressure injury/Skin: Turn Q2hrs while in bed, OOB to Chair, PT/OT      TEAM MEETING 6/9  SW: lives with unemployed son  OT: SV, eat, min A groom, Max A UBD/toilet transfer, Total A LBD/Toileting  PT: Max A transfer, standing min-mod A, Amb 50' RW min-mod A  SLP: Cog mess, poor memory  barriers: dec memory, safety, visual hallucination  Goals: min assist with ADLs; close SV with transfer and ambulation; will need 24H help  EDOD: 6/21 HOME

## 2021-06-09 NOTE — PROGRESS NOTE ADULT - ATTENDING COMMENTS
Hypoxis and bradycardic - Pulmonary input requested ( will obtain CT chest , TTE, D-dimers), Renal input appropriated , case discussed, will follow recommendations.  Complete IV Abx for UTI   Monitor full dose AC for potential toxicity  Full program Hypoxis and bradycardic - Pulmonary input requested ( will obtain CT chest , TTE, D-dimers), Renal input appropriated , case discussed, will follow recommendations.  Complete IV Abx for UTI   Monitor full dose AC for potential toxicity  Full program    Multidisciplinary team meeting today:  patient's functional goals and needs, functional and clinical  progress were discussed, barriers to discharge were identified. Anticipate discharge home with home care, 24/7 supervision for safety.    EDOD 6/21/21 Hypoxis and bradycardic - Pulmonary input requested ( will obtain CT chest , TTE, D-dimers), Renal input appropriated , case discussed, will follow recommendations.  Complete IV Abx for UTI   Monitor full dose AC for potential toxicity  Full program    Case discussed with Psychiatry - Pristiq discontinued.    Multidisciplinary team meeting today:  patient's functional goals and needs, functional and clinical  progress were discussed, barriers to discharge were identified. Anticipate discharge home with home care, 24/7 supervision for safety.    EDOD 6/21/21

## 2021-06-09 NOTE — PROGRESS NOTE ADULT - ASSESSMENT
82 yo woman with a history of DVT, DM II, glaucoma, HLD, HTN, NPH s/p  [facility] for ICH. CT Head + for small right basal ganglia hemorrhage.  Hospital course was complicated by acute metabolic encephalopathy due to UTI (klebsiella variicola).      Dizziness, unsteadiness, and fall due to acute ICH of basal ganglia causing Impaired Gait, Mobility, and ADLs  NPH s/p  shunt  History DVT  - Continue comprehensive rehab program of PT/OT  - Fall precautions  - Eliquis discontinued for DVT due to IPH  - Duplex Venous Lower, Bilateral (05.28.21 @ 15:06) - No evidence of deep venous thrombosis in either lower extremity.  - Continue statin  - Bowel regimen as per primary team  - Pain meds as per primary team     Acute Hypoxic Respiratory Failure   - No history of Asthma or COPD  - Albuterol PRN  - CXR - 2 view if possible. If not, CT chest w/o  - F/u pro-bnp  - Oxygen supplement PRN to keep O2 sat > 92%    Acute UTI  - UCx (6/2) Kleb Variicola  - Continue Ceftriaxone for 1 more days    Uncontrolled Hypertension  Episode of bradycardia  - likely after beta blocker was given  - On HCTZ, lisinopril 10mg and amlodipine at home  - Will d/c hydralazine at restart some of her home meds  - getting EKG  - continue amlodipine, decrease metoprolol  - add HCTZ 25mg    Diabetes Mellitus II  - controlled  - D/C lantus and pre-meal  - Patient reports taking metformin TID. Due arias, starting metformin and observing FS  - Continue metformin 850mg daily  - ISS and monitor FS    Bipolar disorder  - continue Pristiq, Abilify, and Luvox    Normocytic Anemia  - Stable  - continue folic acid    Possible arias on CKD 3a  - resolving  - encourage oral intake  - avoid nephrotic agents    DVT ppx - as per primary 82 yo woman with a history of DVT, DM II, glaucoma, HLD, HTN, NPH s/p  [facility] for [facility]. CT Head + for small right basal ganglia hemorrhage.  Hospital course was complicated by acute metabolic encephalopathy due to UTI (klebsiella variicola).      Dizziness, unsteadiness, and fall due to acute ICH of basal ganglia causing Impaired Gait, Mobility, and ADLs  NPH s/p  shunt  History DVT  - Continue comprehensive rehab program of PT/OT  - Fall precautions  - Eliquis discontinued for DVT due to IPH  - Duplex Venous Lower, Bilateral (05.28.21 @ 15:06) - No evidence of deep venous thrombosis in either lower extremity.  - Continue statin  - Bowel regimen as per primary team  - Pain meds as per primary team     Acute Hypoxic Respiratory Failure   - No history of Asthma or COPD  - Albuterol PRN  - CXR - 2 view if possible. If not, CT chest w/o  - Pro-bnp elevated  - Oxygen supplement PRN to keep O2 sat > 92%    Acute UTI  - UCx (6/2) Kleb Variicola  - Continue Ceftriaxone for 1 more days    Uncontrolled Hypertension  Episode of bradycardia  - likely after beta blocker was given  - On HCTZ, lisinopril 10mg and amlodipine at home  - Will d/c hydralazine at restart some of her home meds  - getting EKG  - continue amlodipine, decrease metoprolol  - add HCTZ 25mg    Diabetes Mellitus II  - controlled  - D/C lantus and pre-meal  - Patient reports taking metformin TID. Due arias, starting metformin and observing FS  - Continue metformin 850mg daily  - ISS and monitor FS    Bipolar disorder  - continue Pristiq, Abilify, and Luvox    Normocytic Anemia  - Stable  - continue folic acid    Possible arias on CKD 3a  - resolving  - encourage oral intake  - avoid nephrotic agents    DVT ppx - as per primary

## 2021-06-09 NOTE — CONSULT NOTE ADULT - ASSESSMENT
82 yo woman with a history of DVT, DM II, glaucoma, HLD, HTN, NPH s/p  [facility]. CT Head + for small right basal ganglia hemorrhage.  Hospital course was complicated by acute metabolic encephalopathy due to UTI (klebsiella variicola).      Dizziness, unsteadiness, and fall due to acute ICH of basal ganglia causing Impaired Gait, Mobility, and ADLs  NPH s/p  shunt  History DVT  - Continue comprehensive rehab program of PT/OT  - Fall precautions  - Eliquis discontinued for DVT due to IPH  - Duplex Venous Lower, Bilateral (05.28.21 @ 15:06) - No evidence of deep venous thrombosis in either lower extremity.  - Continue statin  - Bowel regimen as per primary team  - Pain meds as per primary team     Acute Hypoxic Respiratory Failure :  unclear etioloigy but may be related to elevated rt diaphragm  - No history of Asthma or COPD  - Albuterol PRN  echo and ct ordered  - Oxygen supplement PRN to keep O2 sat > 92%    Acute UTI  - UCx (6/2) Kleb Variicola  - Continue Ceftriaxone for 1 more days    Uncontrolled Hypertension  Episode of bradycardia  - likely after beta blocker was given  - On HCTZ, lisinopril 10mg and amlodipine at home  - Will d/c hydralazine at restart some of her home meds  - getting EKG  - continue amlodipine, decrease metoprolol  - add HCTZ 25mg    Diabetes Mellitus II  - controlled  - D/C lantus and pre-meal  - Patient reports taking metformin TID. Due arias, starting metformin and observing FS  - Continue metformin 850mg daily  - ISS and monitor FS    Bipolar disorder  - continue Pristiq, Abilify, and Luvox    Normocytic Anemia  - Stable  - continue folic acid    Possible arias on CKD 3a  - resolving  - encourage oral intake  - avoid nephrotic agents    DVT ppx - as per primary

## 2021-06-10 LAB
ALBUMIN SERPL ELPH-MCNC: 2.4 G/DL — LOW (ref 3.3–5)
ALP SERPL-CCNC: 72 U/L — SIGNIFICANT CHANGE UP (ref 40–120)
ALT FLD-CCNC: 14 U/L — SIGNIFICANT CHANGE UP (ref 10–45)
ANION GAP SERPL CALC-SCNC: 4 MMOL/L — LOW (ref 5–17)
AST SERPL-CCNC: 16 U/L — SIGNIFICANT CHANGE UP (ref 10–40)
BASOPHILS # BLD AUTO: 0.04 K/UL — SIGNIFICANT CHANGE UP (ref 0–0.2)
BASOPHILS NFR BLD AUTO: 0.5 % — SIGNIFICANT CHANGE UP (ref 0–2)
BILIRUB SERPL-MCNC: 0.1 MG/DL — LOW (ref 0.2–1.2)
BUN SERPL-MCNC: 24 MG/DL — HIGH (ref 7–23)
CALCIUM SERPL-MCNC: 9.1 MG/DL — SIGNIFICANT CHANGE UP (ref 8.4–10.5)
CHLORIDE SERPL-SCNC: 104 MMOL/L — SIGNIFICANT CHANGE UP (ref 96–108)
CO2 SERPL-SCNC: 32 MMOL/L — HIGH (ref 22–31)
CREAT SERPL-MCNC: 1.26 MG/DL — SIGNIFICANT CHANGE UP (ref 0.5–1.3)
D DIMER BLD IA.RAPID-MCNC: 1030 NG/ML DDU — HIGH
EOSINOPHIL # BLD AUTO: 0.34 K/UL — SIGNIFICANT CHANGE UP (ref 0–0.5)
EOSINOPHIL NFR BLD AUTO: 4.5 % — SIGNIFICANT CHANGE UP (ref 0–6)
GLUCOSE BLDC GLUCOMTR-MCNC: 102 MG/DL — HIGH (ref 70–99)
GLUCOSE BLDC GLUCOMTR-MCNC: 160 MG/DL — HIGH (ref 70–99)
GLUCOSE BLDC GLUCOMTR-MCNC: 160 MG/DL — HIGH (ref 70–99)
GLUCOSE BLDC GLUCOMTR-MCNC: 179 MG/DL — HIGH (ref 70–99)
GLUCOSE SERPL-MCNC: 169 MG/DL — HIGH (ref 70–99)
HCT VFR BLD CALC: 30.8 % — LOW (ref 34.5–45)
HGB BLD-MCNC: 10 G/DL — LOW (ref 11.5–15.5)
IMM GRANULOCYTES NFR BLD AUTO: 0.8 % — SIGNIFICANT CHANGE UP (ref 0–1.5)
LYMPHOCYTES # BLD AUTO: 1.85 K/UL — SIGNIFICANT CHANGE UP (ref 1–3.3)
LYMPHOCYTES # BLD AUTO: 24.6 % — SIGNIFICANT CHANGE UP (ref 13–44)
MCHC RBC-ENTMCNC: 29.5 PG — SIGNIFICANT CHANGE UP (ref 27–34)
MCHC RBC-ENTMCNC: 32.5 GM/DL — SIGNIFICANT CHANGE UP (ref 32–36)
MCV RBC AUTO: 90.9 FL — SIGNIFICANT CHANGE UP (ref 80–100)
MONOCYTES # BLD AUTO: 0.77 K/UL — SIGNIFICANT CHANGE UP (ref 0–0.9)
MONOCYTES NFR BLD AUTO: 10.2 % — SIGNIFICANT CHANGE UP (ref 2–14)
NEUTROPHILS # BLD AUTO: 4.46 K/UL — SIGNIFICANT CHANGE UP (ref 1.8–7.4)
NEUTROPHILS NFR BLD AUTO: 59.4 % — SIGNIFICANT CHANGE UP (ref 43–77)
NRBC # BLD: 0 /100 WBCS — SIGNIFICANT CHANGE UP (ref 0–0)
PLATELET # BLD AUTO: 280 K/UL — SIGNIFICANT CHANGE UP (ref 150–400)
POTASSIUM SERPL-MCNC: 4.5 MMOL/L — SIGNIFICANT CHANGE UP (ref 3.5–5.3)
POTASSIUM SERPL-SCNC: 4.5 MMOL/L — SIGNIFICANT CHANGE UP (ref 3.5–5.3)
PROT SERPL-MCNC: 6.4 G/DL — SIGNIFICANT CHANGE UP (ref 6–8.3)
RBC # BLD: 3.39 M/UL — LOW (ref 3.8–5.2)
RBC # FLD: 11.9 % — SIGNIFICANT CHANGE UP (ref 10.3–14.5)
SODIUM SERPL-SCNC: 140 MMOL/L — SIGNIFICANT CHANGE UP (ref 135–145)
WBC # BLD: 7.52 K/UL — SIGNIFICANT CHANGE UP (ref 3.8–10.5)
WBC # FLD AUTO: 7.52 K/UL — SIGNIFICANT CHANGE UP (ref 3.8–10.5)

## 2021-06-10 PROCEDURE — 99233 SBSQ HOSP IP/OBS HIGH 50: CPT

## 2021-06-10 RX ORDER — HYDRALAZINE HCL 50 MG
10 TABLET ORAL EVERY 8 HOURS
Refills: 0 | Status: DISCONTINUED | OUTPATIENT
Start: 2021-06-10 | End: 2021-06-11

## 2021-06-10 RX ADMIN — Medication 10 MILLIGRAM(S): at 21:11

## 2021-06-10 RX ADMIN — Medication 25 MILLIGRAM(S): at 17:52

## 2021-06-10 RX ADMIN — METFORMIN HYDROCHLORIDE 850 MILLIGRAM(S): 850 TABLET ORAL at 11:52

## 2021-06-10 RX ADMIN — Medication 2: at 11:55

## 2021-06-10 RX ADMIN — CHLORHEXIDINE GLUCONATE 15 MILLILITER(S): 213 SOLUTION TOPICAL at 21:10

## 2021-06-10 RX ADMIN — Medication 12.5 MILLIGRAM(S): at 17:51

## 2021-06-10 RX ADMIN — CHLORHEXIDINE GLUCONATE 15 MILLILITER(S): 213 SOLUTION TOPICAL at 06:25

## 2021-06-10 RX ADMIN — Medication 500000 UNIT(S): at 21:10

## 2021-06-10 RX ADMIN — Medication 1 MILLIGRAM(S): at 11:52

## 2021-06-10 RX ADMIN — ARIPIPRAZOLE 10 MILLIGRAM(S): 15 TABLET ORAL at 21:11

## 2021-06-10 RX ADMIN — FLUVOXAMINE MALEATE 150 MILLIGRAM(S): 25 TABLET ORAL at 21:11

## 2021-06-10 RX ADMIN — Medication 500000 UNIT(S): at 06:26

## 2021-06-10 RX ADMIN — AMLODIPINE BESYLATE 10 MILLIGRAM(S): 2.5 TABLET ORAL at 06:26

## 2021-06-10 RX ADMIN — NYSTATIN CREAM 1 APPLICATION(S): 100000 CREAM TOPICAL at 17:52

## 2021-06-10 RX ADMIN — Medication 2: at 07:51

## 2021-06-10 RX ADMIN — NYSTATIN CREAM 1 APPLICATION(S): 100000 CREAM TOPICAL at 06:30

## 2021-06-10 RX ADMIN — Medication 500000 UNIT(S): at 11:52

## 2021-06-10 RX ADMIN — ATORVASTATIN CALCIUM 40 MILLIGRAM(S): 80 TABLET, FILM COATED ORAL at 21:11

## 2021-06-10 RX ADMIN — Medication 12.5 MILLIGRAM(S): at 06:26

## 2021-06-10 RX ADMIN — CEFTRIAXONE 100 MILLIGRAM(S): 500 INJECTION, POWDER, FOR SOLUTION INTRAMUSCULAR; INTRAVENOUS at 07:52

## 2021-06-10 NOTE — PROGRESS NOTE ADULT - ASSESSMENT
80 yo woman with a history of DVT, DM II, glaucoma, HLD, HTN, NPH s/p  admitted to [facility] after hospital course for ICH. CT Head + for small right basal ganglia hemorrhage.  Hospital course was complicated by acute metabolic encephalopathy due to UTI (klebsiella variicola).      Dizziness, unsteadiness, and fall due to acute ICH of basal ganglia causing Impaired Gait, Mobility, and ADLs  NPH s/p  shunt  History DVT  - Continue comprehensive rehab program of PT/OT  - Fall precautions  - Eliquis discontinued for DVT due to IPH  - Duplex Venous Lower, Bilateral (05.28.21 @ 15:06) - No evidence of deep venous thrombosis in either lower extremity.  - Continue statin  - Bowel regimen as per primary team  - Pain meds as per primary team   Pulm:  doubt pe.  Would consider cta if kidney function im\proved.  However if positive we may not be able to tolerate anticoagulation 2: to neuro status.  Acute Hypoxic Respiratory Failure likely due to COVID History  - No history of Asthma or COPD  - Elevated d- dimer likely due to COVID history  - Albuterol PRN  - CXR - 2 view if possible. If not, CT chest w/o  - CT chest (6/9) Trace right pleural effusion and atelectasis/consolidation of lateral-posterior segments of right lower lobe. Atelectasis of medial segment of right middle lobe. Patchy groundglass opacities within the left lower lobe.  The anteromedial part of the right hemidiaphragm is elevated most likely secondary to eventration.  - Pro-bnp elevated  - Oxygen supplement PRN to keep O2 sat > 92%      Acute UTI  - UCx (6/2) Kleb Variicola  - Ceftriaxone completed    Uncontrolled Hypertension  Episode of bradycardia  - likely after beta blocker was given  - On HCTZ, lisinopril 10mg and amlodipine at home  - EKG (6/9) shows sinus bradycardia, PACs, [id number]  - continue amlodipine, metoprolol, HCTZ 25mg    Diabetes Mellitus II  - controlled  - D/C lantus and pre-meal  - Patient reports taking metformin TID. Due arias, starting metformin and observing FS  - Continue metformin 850mg daily  - ISS and monitor FS    Bipolar disorder  - HELD Pristiq, Continue Abilify, and Luvox  - Neuro Psych on board    Normocytic Anemia  - Stable  - continue folic acid    Possible arias on CKD 3a  - encourage oral intake  - avoid nephrotic agents    Hypoalbuminemia  - consider nutrition consult    DVT ppx - as per primary

## 2021-06-10 NOTE — PROGRESS NOTE ADULT - ASSESSMENT
80 yo woman with a history of DVT, DM II, glaucoma, HLD, HTN, NPH s/p  admitted to [facility] rehab after hospital course for ICH. CT Head + for small right basal ganglia hemorrhage.  Hospital course was complicated by acute metabolic encephalopathy due to UTI (klebsiella variicola).      Dizziness, unsteadiness, and fall due to acute ICH of basal ganglia causing Impaired Gait, Mobility, and ADLs  NPH s/p  shunt  History DVT  - Continue comprehensive rehab program of PT/OT  - Fall precautions  - Eliquis discontinued for DVT due to IPH  - Duplex Venous Lower, Bilateral (05.28.21 @ 15:06) - No evidence of deep venous thrombosis in either lower extremity.  - Continue statin  - Bowel regimen as per primary team  - Pain meds as per primary team     Acute Hypoxic Respiratory Failure   - No history of Asthma or COPD  - Albuterol PRN  - CXR - 2 view if possible. If not, CT chest w/o  - CT chest (6/9) Trace right pleural effusion and atelectasis/consolidation of lateral-posterior segments of right lower lobe. Atelectasis of medial segment of right middle lobe. Patchy groundglass opacities within the left lower lobe.  The anteromedial part of the right hemidiaphragm is elevated most likely secondary to eventration.  - Pro-bnp elevated  - Oxygen supplement PRN to keep O2 sat > 92%  - Pulm consulted    Acute UTI  - UCx (6/2) Kleb Variicola  - Ceftriaxone completed    Uncontrolled Hypertension  Episode of bradycardia  - likely after beta blocker was given  - On HCTZ, lisinopril 10mg and amlodipine at home  - EKG (6/9) shows sinus bradycardia, PACs, [id number]  - continue amlodipine, metoprolol, HCTZ 25mg    Diabetes Mellitus II  - controlled  - D/C lantus and pre-meal  - Patient reports taking metformin TID. Due arias, starting metformin and observing FS  - Continue metformin 850mg daily  - ISS and monitor FS    Bipolar disorder  - HELD Pristiq, Continue Abilify, and Luvox  - Neuro Psych on board    Normocytic Anemia  - Stable  - continue folic acid    Possible arias on CKD 3a  - encourage oral intake  - avoid nephrotic agents    Hypoalbuminemia  - consider nutrition consult    DVT ppx - as per primary 80 yo woman with a history of DVT, DM II, glaucoma, HLD, HTN, NPH s/p  [facility] hospital course for ICH. CT Head + for small right basal ganglia hemorrhage.  Hospital course was complicated by acute metabolic encephalopathy due to UTI (klebsiella variicola).      Dizziness, unsteadiness, and fall due to acute ICH of basal ganglia causing Impaired Gait, Mobility, and ADLs  NPH s/p  shunt  History DVT  - Continue comprehensive rehab program of PT/OT  - Fall precautions  - Eliquis discontinued for DVT due to IPH  - Duplex Venous Lower, Bilateral (05.28.21 @ 15:06) - No evidence of deep venous thrombosis in either lower extremity.  - Continue statin  - Bowel regimen as per primary team  - Pain meds as per primary team     Acute Hypoxic Respiratory Failure likely due to COVID History  - No history of Asthma or COPD  - Elevated d- dimer likely due to COVID history  - Albuterol PRN  - CXR - 2 view if possible. If not, CT chest w/o  - CT chest (6/9) Trace right pleural effusion and atelectasis/consolidation of lateral-posterior segments of right lower lobe. Atelectasis of medial segment of right middle lobe. Patchy groundglass opacities within the left lower lobe.  The anteromedial part of the right hemidiaphragm is elevated most likely secondary to eventration.  - Pro-bnp elevated  - Oxygen supplement PRN to keep O2 sat > 92%  - Pulm consulted    Acute UTI  - UCx (6/2) Kleb Variicola  - Ceftriaxone completed    Uncontrolled Hypertension  Episode of bradycardia  - likely after beta blocker was given  - On HCTZ, lisinopril 10mg and amlodipine at home  - EKG (6/9) shows sinus bradycardia, PACs, [id number]  - continue amlodipine, metoprolol, HCTZ 25mg    Diabetes Mellitus II  - controlled  - D/C lantus and pre-meal  - Patient reports taking metformin TID. Due arias, starting metformin and observing FS  - Continue metformin 850mg daily  - ISS and monitor FS    Bipolar disorder  - HELD Pristiq, Continue Abilify, and Luvox  - Neuro Psych on board    Normocytic Anemia  - Stable  - continue folic acid    Possible arias on CKD 3a  - encourage oral intake  - avoid nephrotic agents    Hypoalbuminemia  - consider nutrition consult    DVT ppx - as per primary

## 2021-06-10 NOTE — PROGRESS NOTE ADULT - SUBJECTIVE AND OBJECTIVE BOX
Resting    Vital Signs Last 24 Hrs  T(C): 36.6 (06-10-21 @ 07:20), Max: 36.6 (06-10-21 @ 07:20)  T(F): 97.8 (06-10-21 @ 07:20), Max: 97.8 (06-10-21 @ 07:20)  HR: 62 (06-10-21 @ 17:48) (50 - 62)  BP: 167/68 (06-10-21 @ 17:48) (140/61 - 184/81)  RR: 16 (06-10-21 @ 07:20) (15 - 16)  SpO2: 95% (06-10-21 @ 07:20) (95% - 96%)    s1s2  b/l air entry  soft, ND  no edema                        10.0   7.52  )-----------( 280      ( 10 Adam 2021 06:00 )             30.8     10 Adam 2021 06:00    140    |  104    |  24     ----------------------------<  169    4.5     |  32     |  1.26     Ca    9.1        10 Adam 2021 06:00    TPro  6.4    /  Alb  2.4    /  TBili  0.1    /  DBili  x      /  AST  16     /  ALT  14     /  AlkPhos  72     10 Adam 2021 06:00    LIVER FUNCTIONS - ( 10 Adam 2021 06:00 )  Alb: 2.4 g/dL / Pro: 6.4 g/dL / ALK PHOS: 72 U/L / ALT: 14 U/L / AST: 16 U/L / GGT: x           Urinalysis Basic - ( 2021 22:50 )    Color: Yellow / Appearance: Clear / S.010 / pH: x  Gluc: x / Ketone: Negative  / Bili: Negative / Urobili: Negative   Blood: x / Protein: 15 / Nitrite: Negative   Leuk Esterase: Moderate / RBC: 10-15 /HPF / WBC 11-25 /HPF   Sq Epi: x / Non Sq Epi: Neg.-Few / Bacteria: Trace /HPF    acetaminophen   Tablet .. 650 milliGRAM(s) Oral every 6 hours PRN  ALBUTerol    90 MICROgram(s) HFA Inhaler 2 Puff(s) Inhalation every 6 hours PRN  amLODIPine   Tablet 10 milliGRAM(s) Oral daily  ARIPiprazole 10 milliGRAM(s) Oral at bedtime  atorvastatin 40 milliGRAM(s) Oral at bedtime  chlorhexidine 0.12% Liquid 15 milliLiter(s) Oral Mucosa two times a day  dextrose 40% Gel 15 Gram(s) Oral once  dextrose 5%. 1000 milliLiter(s) IV Continuous <Continuous>  dextrose 5%. 1000 milliLiter(s) IV Continuous <Continuous>  dextrose 50% Injectable 25 Gram(s) IV Push once  dextrose 50% Injectable 12.5 Gram(s) IV Push once  dextrose 50% Injectable 25 Gram(s) IV Push once  fluvoxaMINE 150 milliGRAM(s) Oral at bedtime  folic acid 1 milliGRAM(s) Oral daily  glucagon  Injectable 1 milliGRAM(s) IntraMuscular once  hydrochlorothiazide 25 milliGRAM(s) Oral <User Schedule>  insulin lispro (ADMELOG) corrective regimen sliding scale   SubCutaneous three times a day before meals  insulin lispro (ADMELOG) corrective regimen sliding scale   SubCutaneous at bedtime  metFORMIN 850 milliGRAM(s) Oral daily  metoprolol tartrate 12.5 milliGRAM(s) Oral two times a day  nystatin    Suspension 685465 Unit(s) Oral four times a day  nystatin Powder 1 Application(s) Topical two times a day    A/P:    Hx HTN, s/p ICH  CKD 3, fluctuating Cr  Avoid nephrotoxins  Encourage PO fluids  Will add Hydralazine for BP control    [phone]

## 2021-06-10 NOTE — PROGRESS NOTE ADULT - SUBJECTIVE AND OBJECTIVE BOX
Follow-up Pulm Progress Note    Rashard Napoles MD  [phone]    No new respiratory events overnight.  Denies SOB/CP.     Medications:  Vital Signs Last 24 Hrs  T(C): 36.6 (10 Adam 2021 07:20), Max: 36.6 (10 Adam 2021 07:20)  T(F): 97.8 (10 Adam 2021 07:20), Max: 97.8 (10 Adam 2021 07:20)  HR: 50 (10 Adam 2021 07:20) (50 - 58)  BP: 140/61 (10 Adam 2021 07:20) (140/61 - 184/81)  BP(mean): --  RR: 16 (10 Adam 2021 07:20) (15 - 16)  SpO2: 95% (10 Adam 2021 07:20) (95% - 96%)              LABS:                        10.0   7.52  )-----------( 280      ( 10 Adam 2021 06:00 )             30.8     06-10    140  |  104  |  24<H>  ----------------------------<  169<H>  4.5   |  32<H>  |  1.26    Ca    9.1      10 Adam 2021 06:00    TPro  6.4  /  Alb  2.4<L>  /  TBili  0.1<L>  /  DBili  x   /  AST  16  /  ALT  14  /  AlkPhos  72  06-10          Serum Pro-Brain Natriuretic Peptide: 572 pg/mL (06-08-21 @ 05:40)      CULTURES:        Physical Examination:  PULM: Clear to auscultation bilaterally, no significant sputum production  CVS: Regular rate and rhythm, no murmurs, rubs, or gallops  ABD: Soft, non-tender  EXT:  No clubbing, cyanosis, or edema    RADIOLOGY REVIEWED  CXR:  echo:  slight rv enlargent,  d-dimer elevated  < from: CT Chest No Cont (06.09.21 @ 16:49) >  EXAM:  CT CHEST      PROCEDURE DATE:  06/09/2021        INTERPRETATION:  INDICATION: Elevated right hemidiaphragm, dyspnea  TECHNIQUE: Unenhanced CT of the chest. Coronal and sagittal images were reconstructed. Maximum intensity projection images were generated.  COMPARISON: No prior chest CT.    FINDINGS:    AIRWAYS, LUNGS and PLEURA: Patent central airways. Trace right pleural effusion and atelectasis/consolidation of lateral-posterior segments of right lower lobe. Atelectasis of medial segment of right middle lobe. Patchy groundglass opacities within the left lower lobe.    The anteromedial part of the right hemidiaphragm is elevated most likely secondary to eventration.    MEDIASTINUM AND LASHONDA: Borderline and subcentimeter mediastinal/hilar lymph nodes.    HEART AND VESSELS: Cardiomegaly. No pericardial effusion. Thoracic aorta and pulmonary artery normal in diameter. Multivessel coronary calcifications. Mild aortic valve leaflet calcification.    VISUALIZED UPPER ABDOMEN: Cholecystectomy.  shunt partially visualized.    CHEST WALL AND BONES: No aggressive osseous lesion. Subcutaneous loop recorder.    LOWER NECK: Within normal limits.    IMPRESSION:    Trace right pleural effusion and atelectasis/consolidation of lateral-posterior segments of right lower lobe. Atelectasis of medial segment of right middle lobe. Patchy groundglass opacities within the left lower lobe.    The anteromedial part of the right hemidiaphragm is elevated most likely secondary to eventration.              KAMALA YUEN MD; Attending Radiologist  This document has been electronically signed. Jun 9 2021  6:39PM    < end of copied text >    TTE:

## 2021-06-10 NOTE — PROGRESS NOTE ADULT - SUBJECTIVE AND OBJECTIVE BOX
CHIEF COMPLAINT: no acute events overnight, comfortable on O2 NC        HISTORY OF PRESENT ILLNESS    82 y/o F w/ PMH of DVT (on Eliquis), NIDDM, glaucoma, HLD, HTN, hs of NPH s/p  shunt  sent ot ED for +CTH w/ ICH.  CTH was done outpt after c/o dizziness and unsteadness/falling over to left side.  CTH done in ED showed pt had a small right basal ganglia hemorrhage.  Eliquis was discontinued and neurosurgery consulted for evaluation.  f/u CTH was done and ICH noted to be stable.  Per neurosurgery no interventions necessary.  Pt has had a total of 4 CTH all stable. repeat venous duplex neg for any dvt.  On admission pt also noted to have hypertensive crisis that resolved w/ IV lopressor.  BP was maintained at goal <140/90 in light of ICH.  Hospital course was complicated by pt developing acute metabolic encephalopathy due to UTI.  UA noted to have pyuria and cultures grew klebsiella variicola.  Pt was placed on emperic antibiotics and ID consulted.  Mentation improved within 24hrs of IV antibiotics.  Pt will be switched to po antibiotics pending sensitivities for total of 7 day course per ID recs.  Pts A1c noted to be 7.7 and hyperglycemia resolved w/ basal/bolus regimen.  Pt will resume metformin on discharge.  PASCALE was also noted and is likely prerenal.  patient started on dysphagia 1 diet with thin liquids.     Patient was evaluated by PM&R and therapy for functional deficits and gait/ADL impairments and recommend acute rehabilitation.  Patient was medically optimized for discharge to [facility] acute rehab on 2021. (2021 14:43)        PAST MEDICAL & SURGICAL HISTORY:  Hypertension    Hyperlipidemia    Diabetes    Glaucoma    Osteoarthritis    Small bowel obstruction    S/P hysterectomy   and Oopherectomy in     S/P cholecystectomy      Achilles tendon injury  repair  right scalene muscle release    S/P knee replacement, left        VITALS  Vital Signs Last 24 Hrs  T(C): 36.6 (10 Adam 2021 07:20), Max: 36.6 (10 Adam 2021 07:20)  T(F): 97.8 (10 Adam 2021 07:20), Max: 97.8 (10 Adam 2021 07:20)  HR: 50 (10 Adam 2021 07:20) (50 - 58)  BP: 140/61 (10 Adam 2021 07:20) (140/61 - 184/81)  BP(mean): --  RR: 16 (10 Adam 2021 07:20) (15 - 16)  SpO2: 95% (10 Adam 2021 07:20) (95% - 96%)      PHYSICAL EXAM  Constitutional - NAD, Comfortable  HEENT - NCAT, EOMI  Neck - Supple, No limited ROM  Chest - CTA bilaterally  Cardiovascular - RRR, S1S2,  Abdomen - BS+, Soft, NTND  Extremities - No C/C/E, No calf tenderness   Neurologic Exam - no new focal deficits                   RECENT LABS                        10.0   7.52  )-----------( 280      ( 10 Adam 2021 06:00 )             30.8     06-10    140  |  104  |  24<H>  ----------------------------<  169<H>  4.5   |  32<H>  |  1.26    Ca    9.1      10 Adam 2021 06:00    TPro  6.4  /  Alb  2.4<L>  /  TBili  0.1<L>  /  DBili  x   /  AST  16  /  ALT  14  /  AlkPhos  72  06-10      LIVER FUNCTIONS - ( 10 Adam 2021 06:00 )  Alb: 2.4 g/dL / Pro: 6.4 g/dL / ALK PHOS: 72 U/L / ALT: 14 U/L / AST: 16 U/L / GGT: x           Urinalysis Basic - ( 2021 22:50 )    Color: Yellow / Appearance: Clear / S.010 / pH: x  Gluc: x / Ketone: Negative  / Bili: Negative / Urobili: Negative   Blood: x / Protein: 15 / Nitrite: Negative   Leuk Esterase: Moderate / RBC: 10-15 /HPF / WBC 11-25 /HPF   Sq Epi: x / Non Sq Epi: Neg.-Few / Bacteria: Trace /HPF                  Urinalysis Basic - ( 2021 22:50 )    Color: Yellow / Appearance: Clear / S.010 / pH: x  Gluc: x / Ketone: Negative  / Bili: Negative / Urobili: Negative   Blood: x / Protein: 15 / Nitrite: Negative   Leuk Esterase: Moderate / RBC: 10-15 /HPF / WBC 11-25 /HPF   Sq Epi: x / Non Sq Epi: Neg.-Few / Bacteria: Trace /HPF          RADIOLOGY/OTHER RESULTS      EXAM:  US KIDNEY(S)      PROCEDURE DATE:  2021        INTERPRETATION:  CLINICAL INFORMATION: Renal failure    COMPARISON: None available.    TECHNIQUE: Sonography of the kidneys and bladder.    FINDINGS:    Right kidney: 11.5 cm. No renal mass, hydronephrosis or calculi.    Left kidney: 12.3 cm. No renal mass, hydronephrosis or calculi.    Urinary bladder: Within normal limits. Distended up to 270 cc. The patient did not void at the time the examination.    IMPRESSION:    Normal renal ultrasound.          < end of copied text >  < from: CT Chest No Cont (21 @ 16:49) >    EXAM:  CT CHEST      PROCEDURE DATE:  2021        INTERPRETATION:  INDICATION: Elevated right hemidiaphragm, dyspnea  TECHNIQUE: Unenhanced CT of the chest. Coronal and sagittal images were reconstructed. Maximum intensity projection images were generated.  COMPARISON: No prior chest CT.    FINDINGS:    AIRWAYS, LUNGS and PLEURA: Patent central airways. Trace right pleural effusion and atelectasis/consolidation of lateral-posterior segments of right lower lobe. Atelectasis of medial segment of right middle lobe. Patchy groundglass opacities within the left lower lobe.    The anteromedial part of the right hemidiaphragm is elevated most likely secondary to eventration.    MEDIASTINUM AND LASHONDA: Borderline and subcentimeter mediastinal/hilar lymph nodes.    HEART AND VESSELS: Cardiomegaly. No pericardial effusion. Thoracic aorta and pulmonary artery normal in diameter. Multivessel coronary calcifications. Mild aortic valve leaflet calcification.    VISUALIZED UPPER ABDOMEN: Cholecystectomy.  shunt partially visualized.    CHEST WALL AND BONES: No aggressive osseous lesion. Subcutaneous loop recorder.    LOWER NECK: Within normal limits.    IMPRESSION:    Trace right pleural effusion and atelectasis/consolidation of lateral-posterior segments of right lower lobe. Atelectasis of medial segment of right middle lobe. Patchy groundglass opacities within the left lower lobe.    The anteromedial part of the right hemidiaphragm is elevated most likely secondary to eventration.                EXAM:  US DPLX LWR EXT VEINS COMPL BI      PROCEDURE DATE:  2021        INTERPRETATION:  CLINICAL INFORMATION: History of DVT in 2020 with negative follow-up study. Patient in rehabilitation on blood thinner    COMPARISON: 2021    TECHNIQUE: Duplex sonography of the BILATERAL LOWER extremity veins with color and spectral Doppler, with and without compression.    FINDINGS:    RIGHT:  Normal compressibility of the RIGHT common femoral, femoral and popliteal veins.  Doppler examination shows normal spontaneous and phasic flow.  No RIGHT calf vein thrombosis is detected.    LEFT:  Normal compressibility of the LEFT common femoral, femoral and popliteal veins.  Doppler examination shows normal spontaneous and phasic flow.  No LEFT calf vein thrombosis is detected.    IMPRESSION:  No evidence of deep venous thrombosis in either lower extremity.                    CURRENT MEDICATIONS  MEDICATIONS  (STANDING):  amLODIPine   Tablet 10 milliGRAM(s) Oral daily  ARIPiprazole 10 milliGRAM(s) Oral at bedtime  atorvastatin 40 milliGRAM(s) Oral at bedtime  chlorhexidine 0.12% Liquid 15 milliLiter(s) Oral Mucosa two times a day  dextrose 40% Gel 15 Gram(s) Oral once  dextrose 5%. 1000 milliLiter(s) (50 mL/Hr) IV Continuous <Continuous>  dextrose 5%. 1000 milliLiter(s) (100 mL/Hr) IV Continuous <Continuous>  dextrose 50% Injectable 25 Gram(s) IV Push once  dextrose 50% Injectable 12.5 Gram(s) IV Push once  dextrose 50% Injectable 25 Gram(s) IV Push once  fluvoxaMINE 150 milliGRAM(s) Oral at bedtime  folic acid 1 milliGRAM(s) Oral daily  glucagon  Injectable 1 milliGRAM(s) IntraMuscular once  hydrochlorothiazide 25 milliGRAM(s) Oral <User Schedule>  insulin lispro (ADMELOG) corrective regimen sliding scale   SubCutaneous three times a day before meals  insulin lispro (ADMELOG) corrective regimen sliding scale   SubCutaneous at bedtime  metFORMIN 850 milliGRAM(s) Oral daily  metoprolol tartrate 12.5 milliGRAM(s) Oral two times a day  nystatin    Suspension [id number] Unit(s) Oral four times a day  nystatin Powder 1 Application(s) Topical two times a day    MEDICATIONS  (PRN):  acetaminophen   Tablet .. 650 milliGRAM(s) Oral every 6 hours PRN Temp greater or equal to 38C (100.4F), Mild Pain (1 - 3), Moderate Pain (4 - 6)  ALBUTerol    90 MICROgram(s) HFA Inhaler 2 Puff(s) Inhalation every 6 hours PRN Shortness of Breath and/or Wheezing

## 2021-06-10 NOTE — PROGRESS NOTE ADULT - ASSESSMENT
ASSESSMENT/PLAN  82 y/o F w/ PMH of DVT (on Eliquis), NIDDM, glaucoma, HLD, HTN, hs of NPH s/p  shunt 2015 sent ot ED for +CTH w/ ICH.  CTH was done outpt after c/o dizziness and unsteadness/falling over to left side.  Pt denies head trauma.  Evaluated by neurosurgery - no surgical intervention. Hospital course complicated by encephalopathic d/u UTI    COMORBIDITES/ACTIVE MEDICAL ISSUES     Gait Instability, ADL impairments and Functional impairments: start Comprehensive Rehab Program of PT/OT/SLP     # Right Basal ganglia hemorrhage  - Eliquis held (previous DVT) - negative LE Venous Doppler now   - CTH x4 - ICH stable   - BP goal: <140/90    # Hypertension   - Continue Metoprolol and  Norvasc   - BP Goal: <140/90    #Encephalopathy due to UTI - improving   - UCx grew klebsiella variicola  - start ceftriaxone x 5-7 days (last dose 6/10)    #Hypoxia  - on 3L NC  - Home O2? - started at [facility] , but pt did not see a need and declined  - Pulmonary consult requested - workup is in progress, results revwered with Medicine, will follow recommendations     #Hyperglycemia   - A1c 7.7  - Lantus 5U  - Admelog 2U  - FS + ISS    #PASCALE  - Encourage oral fluid  - Avoid nephrotoxic med  - Nephro is following  - Renal US normal     #HLD  - Lipitor 40    #Bipolar disorder  - Pristiq ER 25- d/c (6/8)  - Fluvoxamine 150  - Abilify 10mg qhs  - psychiatry consult appreciated, case discussed     #Pain control  - Tylenol PRN    #GI/Bowel Mgmt   - Continent Senna,  Miralax PRN,    #Bladder management  - COntinent, PVR q 8 hours (SC if > 400)    #Hx of DVT (9/2020)  - SCDs, TEDs   - Doppler negative - 5/28  - Eliquis dc in light of hemorrhage    #FEN   - Diet - Puree + Thins    - Dysphagia  SLP - evaluation and treatment    Precautions / PROPHYLAXIS:   - Falls  - ortho: Weight bearing status: WBAT   - Lungs: Aspiration, Incentive Spirometer   - Pressure injury/Skin: Turn Q2hrs while in bed, OOB to Chair, PT/OT      TEAM MEETING 6/9  SW: lives with unemployed son  OT: SV, eat, min A groom, Max A UBD/toilet transfer, Total A LBD/Toileting  PT: Max A transfer, standing min-mod A, Amb 50' RW min-mod A  SLP: Cog mess, poor memory  barriers: dec memory, safety, visual hallucination  Goals: min assist with ADLs; close SV with transfer and ambulation; will need 24H help  EDOD: 6/21 HOME

## 2021-06-10 NOTE — PROGRESS NOTE ADULT - SUBJECTIVE AND OBJECTIVE BOX
Patient is a 81y old  Female who presents with a chief complaint of CVA (2021 12:30)      Patient seen and examined at bedside.  No overnight events  No complaints this morning    ALLERGIES:  adhesives (Pruritus; Blisters)  penicillin (Hives)  pineapple (Anaphylaxis)    MEDICATIONS  (STANDING):  amLODIPine   Tablet 10 milliGRAM(s) Oral daily  ARIPiprazole 10 milliGRAM(s) Oral at bedtime  atorvastatin 40 milliGRAM(s) Oral at bedtime  chlorhexidine 0.12% Liquid 15 milliLiter(s) Oral Mucosa two times a day  dextrose 40% Gel 15 Gram(s) Oral once  dextrose 5%. 1000 milliLiter(s) (50 mL/Hr) IV Continuous <Continuous>  dextrose 5%. 1000 milliLiter(s) (100 mL/Hr) IV Continuous <Continuous>  dextrose 50% Injectable 25 Gram(s) IV Push once  dextrose 50% Injectable 12.5 Gram(s) IV Push once  dextrose 50% Injectable 25 Gram(s) IV Push once  fluvoxaMINE 150 milliGRAM(s) Oral at bedtime  folic acid 1 milliGRAM(s) Oral daily  glucagon  Injectable 1 milliGRAM(s) IntraMuscular once  hydrochlorothiazide 25 milliGRAM(s) Oral <User Schedule>  insulin lispro (ADMELOG) corrective regimen sliding scale   SubCutaneous three times a day before meals  insulin lispro (ADMELOG) corrective regimen sliding scale   SubCutaneous at bedtime  metFORMIN 850 milliGRAM(s) Oral daily  metoprolol tartrate 12.5 milliGRAM(s) Oral two times a day  nystatin    Suspension 761070 Unit(s) Oral four times a day  nystatin Powder 1 Application(s) Topical two times a day    MEDICATIONS  (PRN):  acetaminophen   Tablet .. 650 milliGRAM(s) Oral every 6 hours PRN Temp greater or equal to 38C (100.4F), Mild Pain (1 - 3), Moderate Pain (4 - 6)  ALBUTerol    90 MICROgram(s) HFA Inhaler 2 Puff(s) Inhalation every 6 hours PRN Shortness of Breath and/or Wheezing    Vital Signs Last 24 Hrs  T(F): 97.8 (10 Adam 2021 07:20), Max: 97.8 (10 Adam 2021 07:20)  HR: 50 (10 Adam 2021 07:20) (50 - 58)  BP: 140/61 (10 Adam 2021 07:20) (140/61 - 184/81)  RR: 16 (10 Adam 2021 07:20) (15 - 16)  SpO2: 95% (10 Adam 2021 07:20) (95% - 96%)  I&O's Summary    BMI (kg/m2): 33.6 (21 @ 18:31)    PHYSICAL EXAM:  GENERAL: NAD  HENT:  Atraumatic, Normocephalic; No tonsillar erythema, exudates, or enlargement; Moist mucous membranes;   EYES: EOMI, PERRLA, conjunctiva and sclera clear, no lid-lag  NECK: Supple, No JVD, Normal thyroid  CHEST/LUNG: Clear to percussion bilaterally; No rales, rhonchi, wheezing, or rubs; normal respiratory effort, no intercostal retractions  HEART: Regular rate and rhythm; No murmurs, rubs, or gallops  ABDOMEN: Soft, Nontender, Nondistended; Bowel sounds present; No HSM  MUSCULOSKELETAL/EXTREMITIES:  2+ Peripheral Pulses, No clubbing, cyanosis, or peripheral edema; No digital cyanosis  PSYCH: Appropriate affect, Alert & Awake    LABS:                        10.0   7.52  )-----------( 280      ( 10 Adam 2021 06:00 )             30.8       06-10    140  |  104  |  24  ----------------------------<  169  4.5   |  32  |  1.26    Ca    9.1      10 Adam 2021 06:00    TPro  6.4  /  Alb  2.4  /  TBili  0.1  /  DBili  x   /  AST  16  /  ALT  14  /  AlkPhos  72  06-10     eGFR if Non African American: 40 mL/min/1.73M2 (06-10-21 @ 06:00)  eGFR if : 47 mL/min/1.73M2 (06-10-21 @ 06:00)       05-30 Chol 133 mg/dL LDL -- HDL 54 mg/dL Trig 103 mg/dL    POCT Blood Glucose.: 160 mg/dL (10 Adam 2021 07:50)  POCT Blood Glucose.: 132 mg/dL (2021 21:02)  POCT Blood Glucose.: 169 mg/dL (2021 16:59)  POCT Blood Glucose.: 172 mg/dL (2021 12:17)      Urinalysis Basic - ( 2021 22:50 )    Color: Yellow / Appearance: Clear / S.010 / pH: x  Gluc: x / Ketone: Negative  / Bili: Negative / Urobili: Negative   Blood: x / Protein: 15 / Nitrite: Negative   Leuk Esterase: Moderate / RBC: 10-15 /HPF / WBC 11-25 /HPF   Sq Epi: x / Non Sq Epi: Neg.-Few / Bacteria: Trace /HPF      COVID-19 PCR: NotDetec (21 @ 18:40)  COVID-19 PCR: NotDetec (21 @ 13:40)      Care Discussed with Consultants/Other Providers: Yes   Patient is a 81y old  Female who presents with a chief complaint of CVA (2021 12:30)      Patient seen and examined at bedside.  No overnight events  No complaints this morning    ALLERGIES:  adhesives (Pruritus; Blisters)  penicillin (Hives)  pineapple (Anaphylaxis)    MEDICATIONS  (STANDING):  amLODIPine   Tablet 10 milliGRAM(s) Oral daily  ARIPiprazole 10 milliGRAM(s) Oral at bedtime  atorvastatin 40 milliGRAM(s) Oral at bedtime  chlorhexidine 0.12% Liquid 15 milliLiter(s) Oral Mucosa two times a day  dextrose 40% Gel 15 Gram(s) Oral once  dextrose 5%. 1000 milliLiter(s) (50 mL/Hr) IV Continuous <Continuous>  dextrose 5%. 1000 milliLiter(s) (100 mL/Hr) IV Continuous <Continuous>  dextrose 50% Injectable 25 Gram(s) IV Push once  dextrose 50% Injectable 12.5 Gram(s) IV Push once  dextrose 50% Injectable 25 Gram(s) IV Push once  fluvoxaMINE 150 milliGRAM(s) Oral at bedtime  folic acid 1 milliGRAM(s) Oral daily  glucagon  Injectable 1 milliGRAM(s) IntraMuscular once  hydrochlorothiazide 25 milliGRAM(s) Oral <User Schedule>  insulin lispro (ADMELOG) corrective regimen sliding scale   SubCutaneous three times a day before meals  insulin lispro (ADMELOG) corrective regimen sliding scale   SubCutaneous at bedtime  metFORMIN 850 milliGRAM(s) Oral daily  metoprolol tartrate 12.5 milliGRAM(s) Oral two times a day  nystatin    Suspension 363338 Unit(s) Oral four times a day  nystatin Powder 1 Application(s) Topical two times a day    MEDICATIONS  (PRN):  acetaminophen   Tablet .. 650 milliGRAM(s) Oral every 6 hours PRN Temp greater or equal to 38C (100.4F), Mild Pain (1 - 3), Moderate Pain (4 - 6)  ALBUTerol    90 MICROgram(s) HFA Inhaler 2 Puff(s) Inhalation every 6 hours PRN Shortness of Breath and/or Wheezing    Vital Signs Last 24 Hrs  T(F): 97.8 (10 Adam 2021 07:20), Max: 97.8 (10 Adam 2021 07:20)  HR: 50 (10 Adam 2021 07:20) (50 - 58)  BP: 140/61 (10 Adam 2021 07:20) (140/61 - 184/81)  RR: 16 (10 Adam 2021 07:20) (15 - 16)  SpO2: 95% (10 Adam 2021 07:20) (95% - 96%)  I&O's Summary    BMI (kg/m2): 33.6 (21 @ 18:31)    PHYSICAL EXAM:  GENERAL: NAD, on NC  HENT:  Atraumatic, Normocephalic; No tonsillar erythema, exudates, or enlargement; Moist mucous membranes;   EYES: EOMI, PERRLA, conjunctiva and sclera clear, no lid-lag  NECK: Supple, No JVD, Normal thyroid  CHEST/LUNG: + crackles L>R; No rales, rhonchi, wheezing, or rubs; normal respiratory effort, no intercostal retractions  HEART: Regular rate and rhythm; No murmurs, rubs, or gallops  ABDOMEN: Soft, Nontender, Nondistended; Bowel sounds present; No HSM  MUSCULOSKELETAL/EXTREMITIES:  2+ Peripheral Pulses, No clubbing, cyanosis, or peripheral edema; No digital cyanosis  PSYCH: Appropriate affect, Alert & Awake    LABS:                        10.0   7.52  )-----------( 280      ( 10 Adam 2021 06:00 )             30.8       06-10    140  |  104  |  24  ----------------------------<  169  4.5   |  32  |  1.26    Ca    9.1      10 Adam 2021 06:00    TPro  6.4  /  Alb  2.4  /  TBili  0.1  /  DBili  x   /  AST  16  /  ALT  14  /  AlkPhos  72  06-10     eGFR if Non African American: 40 mL/min/1.73M2 (06-10-21 @ 06:00)  eGFR if : 47 mL/min/1.73M2 (06-10-21 @ 06:00)       05-30 Chol 133 mg/dL LDL -- HDL 54 mg/dL Trig 103 mg/dL    POCT Blood Glucose.: 160 mg/dL (10 Adam 2021 07:50)  POCT Blood Glucose.: 132 mg/dL (2021 21:02)  POCT Blood Glucose.: 169 mg/dL (2021 16:59)  POCT Blood Glucose.: 172 mg/dL (2021 12:17)      Urinalysis Basic - ( 2021 22:50 )    Color: Yellow / Appearance: Clear / S.010 / pH: x  Gluc: x / Ketone: Negative  / Bili: Negative / Urobili: Negative   Blood: x / Protein: 15 / Nitrite: Negative   Leuk Esterase: Moderate / RBC: 10-15 /HPF / WBC 11-25 /HPF   Sq Epi: x / Non Sq Epi: Neg.-Few / Bacteria: Trace /HPF      COVID-19 PCR: NotDetec (21 @ 18:40)  COVID-19 PCR: NotDetec (21 @ 13:40)      Care Discussed with Consultants/Other Providers: Yes

## 2021-06-11 LAB
ANION GAP SERPL CALC-SCNC: 4 MMOL/L — LOW (ref 5–17)
BUN SERPL-MCNC: 22 MG/DL — SIGNIFICANT CHANGE UP (ref 7–23)
CALCIUM SERPL-MCNC: 9.3 MG/DL — SIGNIFICANT CHANGE UP (ref 8.4–10.5)
CHLORIDE SERPL-SCNC: 105 MMOL/L — SIGNIFICANT CHANGE UP (ref 96–108)
CO2 SERPL-SCNC: 33 MMOL/L — HIGH (ref 22–31)
CREAT SERPL-MCNC: 1.27 MG/DL — SIGNIFICANT CHANGE UP (ref 0.5–1.3)
GLUCOSE BLDC GLUCOMTR-MCNC: 126 MG/DL — HIGH (ref 70–99)
GLUCOSE BLDC GLUCOMTR-MCNC: 171 MG/DL — HIGH (ref 70–99)
GLUCOSE BLDC GLUCOMTR-MCNC: 172 MG/DL — HIGH (ref 70–99)
GLUCOSE BLDC GLUCOMTR-MCNC: 179 MG/DL — HIGH (ref 70–99)
GLUCOSE SERPL-MCNC: 149 MG/DL — HIGH (ref 70–99)
POTASSIUM SERPL-MCNC: 4.4 MMOL/L — SIGNIFICANT CHANGE UP (ref 3.5–5.3)
POTASSIUM SERPL-SCNC: 4.4 MMOL/L — SIGNIFICANT CHANGE UP (ref 3.5–5.3)
SODIUM SERPL-SCNC: 142 MMOL/L — SIGNIFICANT CHANGE UP (ref 135–145)

## 2021-06-11 PROCEDURE — 99233 SBSQ HOSP IP/OBS HIGH 50: CPT

## 2021-06-11 RX ORDER — HYDRALAZINE HCL 50 MG
20 TABLET ORAL EVERY 8 HOURS
Refills: 0 | Status: DISCONTINUED | OUTPATIENT
Start: 2021-06-11 | End: 2021-06-17

## 2021-06-11 RX ADMIN — CHLORHEXIDINE GLUCONATE 15 MILLILITER(S): 213 SOLUTION TOPICAL at 21:15

## 2021-06-11 RX ADMIN — Medication 500000 UNIT(S): at 12:08

## 2021-06-11 RX ADMIN — Medication 500000 UNIT(S): at 05:52

## 2021-06-11 RX ADMIN — Medication 500000 UNIT(S): at 21:15

## 2021-06-11 RX ADMIN — Medication 10 MILLIGRAM(S): at 05:50

## 2021-06-11 RX ADMIN — Medication 12.5 MILLIGRAM(S): at 18:27

## 2021-06-11 RX ADMIN — Medication 10 MILLIGRAM(S): at 14:14

## 2021-06-11 RX ADMIN — CHLORHEXIDINE GLUCONATE 15 MILLILITER(S): 213 SOLUTION TOPICAL at 07:53

## 2021-06-11 RX ADMIN — AMLODIPINE BESYLATE 10 MILLIGRAM(S): 2.5 TABLET ORAL at 05:50

## 2021-06-11 RX ADMIN — Medication 2: at 07:53

## 2021-06-11 RX ADMIN — FLUVOXAMINE MALEATE 150 MILLIGRAM(S): 25 TABLET ORAL at 21:16

## 2021-06-11 RX ADMIN — ATORVASTATIN CALCIUM 40 MILLIGRAM(S): 80 TABLET, FILM COATED ORAL at 21:15

## 2021-06-11 RX ADMIN — Medication 20 MILLIGRAM(S): at 21:15

## 2021-06-11 RX ADMIN — Medication 1 MILLIGRAM(S): at 12:08

## 2021-06-11 RX ADMIN — Medication 2: at 12:08

## 2021-06-11 RX ADMIN — Medication 12.5 MILLIGRAM(S): at 05:50

## 2021-06-11 RX ADMIN — NYSTATIN CREAM 1 APPLICATION(S): 100000 CREAM TOPICAL at 05:51

## 2021-06-11 RX ADMIN — ARIPIPRAZOLE 10 MILLIGRAM(S): 15 TABLET ORAL at 21:15

## 2021-06-11 RX ADMIN — METFORMIN HYDROCHLORIDE 850 MILLIGRAM(S): 850 TABLET ORAL at 12:08

## 2021-06-11 RX ADMIN — NYSTATIN CREAM 1 APPLICATION(S): 100000 CREAM TOPICAL at 18:27

## 2021-06-11 NOTE — PROGRESS NOTE ADULT - SUBJECTIVE AND OBJECTIVE BOX
CHIEF COMPLAINT: comfortable on O2 NC, better oral intake, participates in therapy well. Hydralazine added for BP control       HISTORY OF PRESENT ILLNESS    80 y/o F w/ PMH of DVT (on Eliquis), NIDDM, glaucoma, HLD, HTN, hs of NPH s/p  shunt  sent ot ED for +CTH w/ ICH.  CTH was done outpt after c/o dizziness and unsteadness/falling over to left side.  CTH done in ED showed pt had a small right basal ganglia hemorrhage.  Eliquis was discontinued and neurosurgery consulted for evaluation.  f/u CTH was done and ICH noted to be stable.  Per neurosurgery no interventions necessary.  Pt has had a total of 4 CTH all stable. repeat venous duplex neg for any dvt.  On admission pt also noted to have hypertensive crisis that resolved w/ IV lopressor.  BP was maintained at goal <140/90 in light of ICH.  Hospital course was complicated by pt developing acute metabolic encephalopathy due to UTI.  UA noted to have pyuria and cultures grew klebsiella variicola.  Pt was placed on emperic antibiotics and ID consulted.  Mentation improved within 24hrs of IV antibiotics.  Pt will be switched to po antibiotics pending sensitivities for total of 7 day course per ID recs.  Pts A1c noted to be 7.7 and hyperglycemia resolved w/ basal/bolus regimen.  Pt will resume metformin on discharge.  PASCALE was also noted and is likely prerenal.  patient started on dysphagia 1 diet with thin liquids.     Patient was evaluated by PM&R and therapy for functional deficits and gait/ADL impairments and recommend acute rehabilitation.  Patient was medically optimized for discharge to [facility] on 2021. (2021 14:43)        PAST MEDICAL & SURGICAL HISTORY:  Hypertension    Hyperlipidemia    Diabetes    Glaucoma    Osteoarthritis    Small bowel obstruction    S/P hysterectomy   and Oopherectomy in     S/P cholecystectomy      Achilles tendon injury  repair  right scalene muscle release    S/P knee replacement, left        VITALS  Vital Signs Last 24 Hrs  T(C): 36.6 (2021 07:56), Max: 36.6 (2021 07:56)  T(F): 97.9 (2021 07:56), Max: 97.9 (2021 07:56)  HR: 51 (2021 07:56) (51 - 62)  BP: 140/62 (2021 07:56) (140/62 - 167/68)  BP(mean): --  RR: 16 (2021 07:56) (16 - 16)  SpO2: 94% (2021 07:56) (93% - 94%)      PHYSICAL EXAM  Constitutional - NAD, Comfortable  HEENT - NCAT, EOMI  Neck - Supple, No limited ROM  Chest - CTA bilaterally  Cardiovascular - RRR, S1S2  Abdomen - Soft, NTND  Extremities -No calf tenderness   Neurologic Exam -  no new focal deficits                       RECENT LABS                        10.0   7.52  )-----------( 280      ( 10 Adam 2021 06:00 )             30.8     06-11    142  |  105  |  22  ----------------------------<  149<H>  4.4   |  33<H>  |  1.27    Ca    9.3      2021 05:30    TPro  6.4  /  Alb  2.4<L>  /  TBili  0.1<L>  /  DBili  x   /  AST  16  /  ALT  14  /  AlkPhos  72  06-10      LIVER FUNCTIONS - ( 10 Adam 2021 06:00 )  Alb: 2.4 g/dL / Pro: 6.4 g/dL / ALK PHOS: 72 U/L / ALT: 14 U/L / AST: 16 U/L / GGT: x           Urinalysis Basic - ( 2021 22:50 )    Color: Yellow / Appearance: Clear / S.010 / pH: x  Gluc: x / Ketone: Negative  / Bili: Negative / Urobili: Negative   Blood: x / Protein: 15 / Nitrite: Negative   Leuk Esterase: Moderate / RBC: 10-15 /HPF / WBC 11-25 /HPF   Sq Epi: x / Non Sq Epi: Neg.-Few / Bacteria: Trace /HPF                  Urinalysis Basic - ( 2021 22:50 )    Color: Yellow / Appearance: Clear / S.010 / pH: x  Gluc: x / Ketone: Negative  / Bili: Negative / Urobili: Negative   Blood: x / Protein: 15 / Nitrite: Negative   Leuk Esterase: Moderate / RBC: 10-15 /HPF / WBC 11-25 /HPF   Sq Epi: x / Non Sq Epi: Neg.-Few / Bacteria: Trace /HPF                CURRENT MEDICATIONS  MEDICATIONS  (STANDING):  amLODIPine   Tablet 10 milliGRAM(s) Oral daily  ARIPiprazole 10 milliGRAM(s) Oral at bedtime  atorvastatin 40 milliGRAM(s) Oral at bedtime  chlorhexidine 0.12% Liquid 15 milliLiter(s) Oral Mucosa two times a day  dextrose 40% Gel 15 Gram(s) Oral once  dextrose 5%. 1000 milliLiter(s) (50 mL/Hr) IV Continuous <Continuous>  dextrose 5%. 1000 milliLiter(s) (100 mL/Hr) IV Continuous <Continuous>  dextrose 50% Injectable 25 Gram(s) IV Push once  dextrose 50% Injectable 12.5 Gram(s) IV Push once  dextrose 50% Injectable 25 Gram(s) IV Push once  fluvoxaMINE 150 milliGRAM(s) Oral at bedtime  folic acid 1 milliGRAM(s) Oral daily  glucagon  Injectable 1 milliGRAM(s) IntraMuscular once  hydrALAZINE 10 milliGRAM(s) Oral every 8 hours  insulin lispro (ADMELOG) corrective regimen sliding scale   SubCutaneous three times a day before meals  insulin lispro (ADMELOG) corrective regimen sliding scale   SubCutaneous at bedtime  metFORMIN 850 milliGRAM(s) Oral daily  metoprolol tartrate 12.5 milliGRAM(s) Oral two times a day  nystatin    Suspension 789730 Unit(s) Oral four times a day  nystatin Powder 1 Application(s) Topical two times a day    MEDICATIONS  (PRN):  acetaminophen   Tablet .. 650 milliGRAM(s) Oral every 6 hours PRN Temp greater or equal to 38C (100.4F), Mild Pain (1 - 3), Moderate Pain (4 - 6)  ALBUTerol    90 MICROgram(s) HFA Inhaler 2 Puff(s) Inhalation every 6 hours PRN Shortness of Breath and/or Wheezing

## 2021-06-11 NOTE — PROGRESS NOTE ADULT - ASSESSMENT
ASSESSMENT/PLAN  82 y/o F w/ PMH of DVT (on Eliquis), NIDDM, glaucoma, HLD, HTN, hs of NPH s/p  shunt 2015 sent ot ED for +CTH w/ ICH.  CTH was done outpt after c/o dizziness and unsteadness/falling over to left side.  Pt denies head trauma.  Evaluated by neurosurgery - no surgical intervention. Hospital course complicated by encephalopathic d/u UTI    COMORBIDITES/ACTIVE MEDICAL ISSUES     Gait Instability, ADL impairments and Functional impairments: start Comprehensive Rehab Program of PT/OT/SLP     # Right Basal ganglia hemorrhage  - Eliquis held (previous DVT) - negative LE Venous Doppler now   - CTH x4 - ICH stable   - BP goal: <140/90    # Hypertension   - Continue Metoprolol and  Norvasc   - Hydralazine added   - BP Goal: <140/90    #Encephalopathy due to UTI - improving   - UCx grew klebsiella variicola  - start ceftriaxone x 5-7 days (last dose 6/10)    #Hypoxia  - on 3L NC  - Home O2? - started at [facility] , but pt did not see a need and declined  - Pulmonary consult appreciated - H/o COVID, monitor closely, will follow recommendations     #Hyperglycemia   - A1c 7.7  - Lantus 5U  - Admelog 2U  - FS + ISS    #PASCALE  - improved  - Encourage oral fluid  - Avoid nephrotoxic med  - Nephro is following  - Renal US normal     #HLD  - Lipitor 40    #Bipolar disorder  - Pristiq ER 25- d/c (6/8)  - Fluvoxamine 150  - Abilify 10mg qhs  - psychiatry consult appreciated, case discussed     #Pain control  - Tylenol PRN    #GI/Bowel Mgmt   - Continent Senna,  Miralax PRN,    #Hx of DVT (9/2020)  - SCDs, TEDs   - Doppler negative - 5/28  - Eliquis dc in light of hemorrhage    #FEN   - Diet - Puree + Thins    - Dysphagia  SLP - evaluation and treatment    Precautions / PROPHYLAXIS:   - Falls  - ortho: Weight bearing status: WBAT   - Lungs: Aspiration, Incentive Spirometer   - Pressure injury/Skin: Turn Q2hrs while in bed, OOB to Chair, PT/OT      TEAM MEETING 6/9  SW: lives with unemployed son  OT: SV, eat, min A groom, Max A UBD/toilet transfer, Total A LBD/Toileting  PT: Max A transfer, standing min-mod A, Amb 50' RW min-mod A  SLP: Cog mess, poor memory  barriers: dec memory, safety, visual hallucination  Goals: min assist with ADLs; close SV with transfer and ambulation; will need 24H help  EDOD: 6/21 HOME

## 2021-06-11 NOTE — PROGRESS NOTE ADULT - SUBJECTIVE AND OBJECTIVE BOX
Denies complaints    Vital Signs Last 24 Hrs  T(C): 36.6 (21 @ 07:56), Max: 36.6 (21 @ 07:56)  T(F): 97.9 (21 @ 07:56), Max: 97.9 (21 @ 07:56)  HR: 84 (21 @ 14:00) (51 - 84)  BP: 158/71 (21 @ 14:00) (140/62 - 167/68)  RR: 16 (21 @ 07:56) (16 - 16)  SpO2: 94% (21 @ 07:56) (93% - 94%)    s1s2  b/l air entry  soft, ND  no edema                                10.0   7.52  )-----------( 280      ( 10 Adam 2021 06:00 )             30.8     2021 05:30    142    |  105    |  22     ----------------------------<  149    4.4     |  33     |  1.27     Ca    9.3        2021 05:30    TPro  6.4    /  Alb  2.4    /  TBili  0.1    /  DBili  x      /  AST  16     /  ALT  14     /  AlkPhos  72     10 Adam 2021 06:00    LIVER FUNCTIONS - ( 10 Adam 2021 06:00 )  Alb: 2.4 g/dL / Pro: 6.4 g/dL / ALK PHOS: 72 U/L / ALT: 14 U/L / AST: 16 U/L / GGT: x           Urinalysis Basic - ( 2021 22:50 )    Color: Yellow / Appearance: Clear / S.010 / pH: x  Gluc: x / Ketone: Negative  / Bili: Negative / Urobili: Negative   Blood: x / Protein: 15 / Nitrite: Negative   Leuk Esterase: Moderate / RBC: 10-15 /HPF / WBC 11-25 /HPF   Sq Epi: x / Non Sq Epi: Neg.-Few / Bacteria: Trace /HPF    acetaminophen   Tablet .. 650 milliGRAM(s) Oral every 6 hours PRN  ALBUTerol    90 MICROgram(s) HFA Inhaler 2 Puff(s) Inhalation every 6 hours PRN  amLODIPine   Tablet 10 milliGRAM(s) Oral daily  ARIPiprazole 10 milliGRAM(s) Oral at bedtime  atorvastatin 40 milliGRAM(s) Oral at bedtime  chlorhexidine 0.12% Liquid 15 milliLiter(s) Oral Mucosa two times a day  dextrose 40% Gel 15 Gram(s) Oral once  dextrose 5%. 1000 milliLiter(s) IV Continuous <Continuous>  dextrose 5%. 1000 milliLiter(s) IV Continuous <Continuous>  dextrose 50% Injectable 25 Gram(s) IV Push once  dextrose 50% Injectable 12.5 Gram(s) IV Push once  dextrose 50% Injectable 25 Gram(s) IV Push once  fluvoxaMINE 150 milliGRAM(s) Oral at bedtime  folic acid 1 milliGRAM(s) Oral daily  glucagon  Injectable 1 milliGRAM(s) IntraMuscular once  hydrALAZINE 10 milliGRAM(s) Oral every 8 hours  insulin lispro (ADMELOG) corrective regimen sliding scale   SubCutaneous three times a day before meals  insulin lispro (ADMELOG) corrective regimen sliding scale   SubCutaneous at bedtime  metFORMIN 850 milliGRAM(s) Oral daily  metoprolol tartrate 12.5 milliGRAM(s) Oral two times a day  nystatin    Suspension 315840 Unit(s) Oral four times a day  nystatin Powder 1 Application(s) Topical two times a day    A/P:    Hx HTN, s/p ICH  CKD 3, stable  Avoid nephrotoxins  Encourage PO fluids  Will titrate Hydralazine for BP control    [phone]

## 2021-06-11 NOTE — PROGRESS NOTE ADULT - SUBJECTIVE AND OBJECTIVE BOX
Follow-up Pulm Progress Note    Rashard Napoles MD  [phone]    No new respiratory events overnight.  Denies SOB/CP. Denies needing oxygen    Medications:  Vital Signs Last 24 Hrs  T(C): 36.6 (11 Jun 2021 07:56), Max: 36.6 (11 Jun 2021 07:56)  T(F): 97.9 (11 Jun 2021 07:56), Max: 97.9 (11 Jun 2021 07:56)  HR: 51 (11 Jun 2021 07:56) (51 - 62)  BP: 140/62 (11 Jun 2021 07:56) (140/62 - 167/68)  BP(mean): --  RR: 16 (11 Jun 2021 07:56) (16 - 16)  SpO2: 94% (11 Jun 2021 07:56) (93% - 94%)          06-10 @ 07:01  -  06-11 @ 07:00  --------------------------------------------------------  IN: 0 mL / OUT: 3 mL / NET: -3 mL        LABS:                        10.0   7.52  )-----------( 280      ( 10 Adam 2021 06:00 )             30.8     06-11    142  |  105  |  22  ----------------------------<  149<H>  4.4   |  33<H>  |  1.27    Ca    9.3      11 Jun 2021 05:30    TPro  6.4  /  Alb  2.4<L>  /  TBili  0.1<L>  /  DBili  x   /  AST  16  /  ALT  14  /  AlkPhos  72  06-10              CULTURES:        Physical Examination:  PULM: Clear to auscultation bilaterally, no significant sputum production  CVS: Regular rate and rhythm, no murmurs, rubs, or gallops  ABD: Soft, non-tender  EXT:  No clubbing, cyanosis, or edema    RADIOLOGY REVIEWED  CXR:    CT chest:    TTE:

## 2021-06-12 LAB
GLUCOSE BLDC GLUCOMTR-MCNC: 146 MG/DL — HIGH (ref 70–99)
GLUCOSE BLDC GLUCOMTR-MCNC: 157 MG/DL — HIGH (ref 70–99)
GLUCOSE BLDC GLUCOMTR-MCNC: 231 MG/DL — HIGH (ref 70–99)
GLUCOSE BLDC GLUCOMTR-MCNC: 98 MG/DL — SIGNIFICANT CHANGE UP (ref 70–99)
SARS-COV-2 RNA SPEC QL NAA+PROBE: SIGNIFICANT CHANGE UP

## 2021-06-12 PROCEDURE — 99232 SBSQ HOSP IP/OBS MODERATE 35: CPT

## 2021-06-12 RX ADMIN — Medication 2: at 08:42

## 2021-06-12 RX ADMIN — Medication 12.5 MILLIGRAM(S): at 05:36

## 2021-06-12 RX ADMIN — ARIPIPRAZOLE 10 MILLIGRAM(S): 15 TABLET ORAL at 21:25

## 2021-06-12 RX ADMIN — Medication 500000 UNIT(S): at 05:36

## 2021-06-12 RX ADMIN — FLUVOXAMINE MALEATE 150 MILLIGRAM(S): 25 TABLET ORAL at 21:26

## 2021-06-12 RX ADMIN — Medication 500000 UNIT(S): at 17:42

## 2021-06-12 RX ADMIN — Medication 1 MILLIGRAM(S): at 12:27

## 2021-06-12 RX ADMIN — Medication 20 MILLIGRAM(S): at 21:25

## 2021-06-12 RX ADMIN — Medication 4: at 12:25

## 2021-06-12 RX ADMIN — Medication 20 MILLIGRAM(S): at 13:41

## 2021-06-12 RX ADMIN — CHLORHEXIDINE GLUCONATE 15 MILLILITER(S): 213 SOLUTION TOPICAL at 05:36

## 2021-06-12 RX ADMIN — Medication 12.5 MILLIGRAM(S): at 17:42

## 2021-06-12 RX ADMIN — ATORVASTATIN CALCIUM 40 MILLIGRAM(S): 80 TABLET, FILM COATED ORAL at 21:25

## 2021-06-12 RX ADMIN — Medication 20 MILLIGRAM(S): at 05:36

## 2021-06-12 RX ADMIN — METFORMIN HYDROCHLORIDE 850 MILLIGRAM(S): 850 TABLET ORAL at 12:25

## 2021-06-12 RX ADMIN — Medication 500000 UNIT(S): at 12:27

## 2021-06-12 RX ADMIN — AMLODIPINE BESYLATE 10 MILLIGRAM(S): 2.5 TABLET ORAL at 05:36

## 2021-06-12 RX ADMIN — NYSTATIN CREAM 1 APPLICATION(S): 100000 CREAM TOPICAL at 17:41

## 2021-06-12 RX ADMIN — Medication 500000 UNIT(S): at 23:24

## 2021-06-12 RX ADMIN — Medication 500000 UNIT(S): at 01:00

## 2021-06-12 RX ADMIN — CHLORHEXIDINE GLUCONATE 15 MILLILITER(S): 213 SOLUTION TOPICAL at 17:42

## 2021-06-12 RX ADMIN — NYSTATIN CREAM 1 APPLICATION(S): 100000 CREAM TOPICAL at 05:37

## 2021-06-12 NOTE — BH CONSULTATION LIAISON PROGRESS NOTE - OTHER
staff report pt is hallucinations which were not elicited or observed on exam.  not tested  mild tremors noted to right hand

## 2021-06-12 NOTE — PROGRESS NOTE ADULT - SUBJECTIVE AND OBJECTIVE BOX
No overnight events.  no new complaints     REVIEW OF SYSTEMS  Constitutional - No fever,  No fatigue  Neurological - No headaches, No loss of strength  Musculoskeletal - No joint pain, No joint swelling, No muscle pain    VITALS  T(C): 36.3 (06-12-21 @ 08:30), Max: 36.7 (06-11-21 @ 21:15)  HR: 56 (06-12-21 @ 08:30) (56 - 84)  BP: 151/71 (06-12-21 @ 08:30) (140/81 - 161/73)  RR: 15 (06-12-21 @ 08:30) (15 - 16)  SpO2: 94% (06-12-21 @ 08:30) (94% - 94%)  Wt(kg): --       MEDICATIONS   acetaminophen   Tablet .. 650 milliGRAM(s) every 6 hours PRN  ALBUTerol    90 MICROgram(s) HFA Inhaler 2 Puff(s) every 6 hours PRN  amLODIPine   Tablet 10 milliGRAM(s) daily  ARIPiprazole 10 milliGRAM(s) at bedtime  atorvastatin 40 milliGRAM(s) at bedtime  chlorhexidine 0.12% Liquid 15 milliLiter(s) two times a day  dextrose 40% Gel 15 Gram(s) once  dextrose 5%. 1000 milliLiter(s) <Continuous>  dextrose 5%. 1000 milliLiter(s) <Continuous>  dextrose 50% Injectable 25 Gram(s) once  dextrose 50% Injectable 12.5 Gram(s) once  dextrose 50% Injectable 25 Gram(s) once  fluvoxaMINE 150 milliGRAM(s) at bedtime  folic acid 1 milliGRAM(s) daily  glucagon  Injectable 1 milliGRAM(s) once  hydrALAZINE 20 milliGRAM(s) every 8 hours  insulin lispro (ADMELOG) corrective regimen sliding scale   three times a day before meals  insulin lispro (ADMELOG) corrective regimen sliding scale   at bedtime  metFORMIN 850 milliGRAM(s) daily  metoprolol tartrate 12.5 milliGRAM(s) two times a day  nystatin    Suspension 211328 Unit(s) four times a day  nystatin Powder 1 Application(s) two times a day      RECENT LABS/IMAGING    06-11    142  |  105  |  22  ----------------------------<  149<H>  4.4   |  33<H>  |  1.27    Ca    9.3      11 Jun 2021 05:30                  POCT Blood Glucose.: 157 mg/dL (06-12-21 @ 08:34)  POCT Blood Glucose.: 179 mg/dL (06-11-21 @ 21:14)  POCT Blood Glucose.: 126 mg/dL (06-11-21 @ 16:17)  POCT Blood Glucose.: 172 mg/dL (06-11-21 @ 12:06)    ---------    PHYSICAL EXAM  Constitutional - NAD, Comfortable, in wheelchair   HEENT - NCAT, EOMI, on oxygen by NC  Neck - Supple, No limited ROM  Chest - CTA bilaterally  Cardiovascular - RRR, S1S2  Abdomen - Soft, NTND  Extremities -No calf tenderness   Neurologic Exam -  no new focal deficits                       ASSESSMENT/PLAN  81y Female PMH of DVT (on Eliquis), NIDDM, glaucoma, HLD, HTN, hs of NPH s/p  shunt 2015 with functional deficits after right basal ganglia hemorrhage, encephalopathy due to UTI  pulmonary consulted for hypoxia, on NC  CKD 3, stable, renal following   BPD, on fluvoxamine, abilify   Continue current medical management  Pain - Tylenol PRN  Continue 3hrs a day of comprehensive rehab program.

## 2021-06-13 LAB
GLUCOSE BLDC GLUCOMTR-MCNC: 107 MG/DL — HIGH (ref 70–99)
GLUCOSE BLDC GLUCOMTR-MCNC: 146 MG/DL — HIGH (ref 70–99)
GLUCOSE BLDC GLUCOMTR-MCNC: 151 MG/DL — HIGH (ref 70–99)
GLUCOSE BLDC GLUCOMTR-MCNC: 262 MG/DL — HIGH (ref 70–99)

## 2021-06-13 PROCEDURE — 99232 SBSQ HOSP IP/OBS MODERATE 35: CPT

## 2021-06-13 RX ADMIN — Medication 20 MILLIGRAM(S): at 21:10

## 2021-06-13 RX ADMIN — Medication 12.5 MILLIGRAM(S): at 17:29

## 2021-06-13 RX ADMIN — AMLODIPINE BESYLATE 10 MILLIGRAM(S): 2.5 TABLET ORAL at 05:09

## 2021-06-13 RX ADMIN — ATORVASTATIN CALCIUM 40 MILLIGRAM(S): 80 TABLET, FILM COATED ORAL at 21:10

## 2021-06-13 RX ADMIN — Medication 20 MILLIGRAM(S): at 13:45

## 2021-06-13 RX ADMIN — FLUVOXAMINE MALEATE 150 MILLIGRAM(S): 25 TABLET ORAL at 21:11

## 2021-06-13 RX ADMIN — Medication 500000 UNIT(S): at 11:40

## 2021-06-13 RX ADMIN — Medication 2: at 16:52

## 2021-06-13 RX ADMIN — NYSTATIN CREAM 1 APPLICATION(S): 100000 CREAM TOPICAL at 17:30

## 2021-06-13 RX ADMIN — Medication 500000 UNIT(S): at 05:08

## 2021-06-13 RX ADMIN — Medication 500000 UNIT(S): at 21:11

## 2021-06-13 RX ADMIN — Medication 20 MILLIGRAM(S): at 05:09

## 2021-06-13 RX ADMIN — METFORMIN HYDROCHLORIDE 850 MILLIGRAM(S): 850 TABLET ORAL at 11:40

## 2021-06-13 RX ADMIN — CHLORHEXIDINE GLUCONATE 15 MILLILITER(S): 213 SOLUTION TOPICAL at 17:29

## 2021-06-13 RX ADMIN — ARIPIPRAZOLE 10 MILLIGRAM(S): 15 TABLET ORAL at 21:10

## 2021-06-13 RX ADMIN — CHLORHEXIDINE GLUCONATE 15 MILLILITER(S): 213 SOLUTION TOPICAL at 05:08

## 2021-06-13 RX ADMIN — Medication 500000 UNIT(S): at 17:29

## 2021-06-13 RX ADMIN — Medication 6: at 11:40

## 2021-06-13 RX ADMIN — NYSTATIN CREAM 1 APPLICATION(S): 100000 CREAM TOPICAL at 05:08

## 2021-06-13 RX ADMIN — Medication 1 MILLIGRAM(S): at 11:40

## 2021-06-13 RX ADMIN — Medication 12.5 MILLIGRAM(S): at 05:09

## 2021-06-13 NOTE — PROGRESS NOTE ADULT - ASSESSMENT
80 y/o F with a history of DVT, DM II, glaucoma, HLD, HTN, NPH s/p  [facility] after hospital course for ICH. CT Head + for small right basal ganglia hemorrhage.  Hospital course was complicated by acute metabolic encephalopathy due to UTI (klebsiella variicola).      Dizziness, unsteadiness, and fall due to acute ICH of basal ganglia causing Impaired Gait, Mobility, and ADLs  NPH s/p  shunt  History DVT  - Continue comprehensive rehab program of PT/OT  - Fall precautions  - Eliquis discontinued for DVT due to IPH  - Duplex Venous Lower, Bilateral (05.28.21 @ 15:06) - No evidence of deep venous thrombosis in either lower extremity  - Continue statin  - Bowel regimen as per primary team  - Pain meds as per primary team     Acute Hypoxic Respiratory Failure likely due to COVID History  - No history of Asthma or COPD  - Elevated d- dimer likely due to COVID history  - Albuterol PRN  - CXR - 2 view if possible. If not, CT chest w/o  - CT chest (6/9) Trace right pleural effusion and atelectasis/consolidation of lateral-posterior segments of right lower lobe. Atelectasis of medial segment of right middle lobe. Patchy groundglass opacities within the left lower lobe.  The anteromedial part of the right hemidiaphragm is elevated most likely secondary to eventration.  - Pro-bnp elevated  - Oxygen supplement PRN to keep O2 sat > 92%  - Pulm appreciated    Acute UTI  - UCx (6/2) Kleb Variicola  - Ceftriaxone completed    Uncontrolled Hypertension  Episode of bradycardia  - likely after beta blocker was given  - On HCTZ, lisinopril 10mg and amlodipine at home  - EKG (6/9) shows sinus bradycardia, PACs, [id number]  - continue hydralazine 20mg q8h, amlodipine 10mg qd, metoprolol 12.5mg BID with holding parameters - likely to DC BB if persistently bradycardic  - Off HCTZ 25mg  - Nephro following    Diabetes Mellitus II  - BS controlled. off lantus and admelog  - Patient reports taking metformin TID. Due arias, starting metformin and observing FS  - Continue metformin 850mg daily  - ISS and monitor FS    Bipolar disorder  - HELD Pristiq, Continue Abilify, and Luvox  - Neuro Psych appreciated     Normocytic Anemia  - Stable  - continue folic acid    Possible arias on CKD 3a  - encourage oral intake  - avoid nephrotic agents    Hypoalbuminemia  - consider nutrition consult    DVT ppx - as per primary

## 2021-06-13 NOTE — PROGRESS NOTE ADULT - SUBJECTIVE AND OBJECTIVE BOX
No overnight events.      REVIEW OF SYSTEMS  Constitutional - No fever,  No fatigue  Neurological - No headaches, No loss of strength  Musculoskeletal - No joint pain, No joint swelling, No muscle pain    VITALS  T(C): 36.2 (06-13-21 @ 07:53), Max: 37 (06-12-21 @ 21:21)  HR: 51 (06-13-21 @ 07:53) (51 - 61)  BP: 127/74 (06-13-21 @ 07:53) (127/74 - 163/81)  RR: 16 (06-13-21 @ 07:53) (16 - 16)  SpO2: 95% (06-13-21 @ 07:53) (94% - 95%)  Wt(kg): --       MEDICATIONS   acetaminophen   Tablet .. 650 milliGRAM(s) every 6 hours PRN  ALBUTerol    90 MICROgram(s) HFA Inhaler 2 Puff(s) every 6 hours PRN  amLODIPine   Tablet 10 milliGRAM(s) daily  ARIPiprazole 10 milliGRAM(s) at bedtime  atorvastatin 40 milliGRAM(s) at bedtime  chlorhexidine 0.12% Liquid 15 milliLiter(s) two times a day  dextrose 40% Gel 15 Gram(s) once  dextrose 5%. 1000 milliLiter(s) <Continuous>  dextrose 5%. 1000 milliLiter(s) <Continuous>  dextrose 50% Injectable 25 Gram(s) once  dextrose 50% Injectable 12.5 Gram(s) once  dextrose 50% Injectable 25 Gram(s) once  fluvoxaMINE 150 milliGRAM(s) at bedtime  folic acid 1 milliGRAM(s) daily  glucagon  Injectable 1 milliGRAM(s) once  hydrALAZINE 20 milliGRAM(s) every 8 hours  insulin lispro (ADMELOG) corrective regimen sliding scale   at bedtime  insulin lispro (ADMELOG) corrective regimen sliding scale   three times a day before meals  metFORMIN 850 milliGRAM(s) daily  metoprolol tartrate 12.5 milliGRAM(s) two times a day  nystatin    Suspension 872750 Unit(s) four times a day  nystatin Powder 1 Application(s) two times a day      RECENT LABS/IMAGING                      POCT Blood Glucose.: 146 mg/dL (06-13-21 @ 07:52)  POCT Blood Glucose.: 146 mg/dL (06-12-21 @ 21:24)  POCT Blood Glucose.: 98 mg/dL (06-12-21 @ 16:43)  POCT Blood Glucose.: 231 mg/dL (06-12-21 @ 12:23)    ---------    PHYSICAL EXAM  Constitutional - NAD, Comfortable, in bed  HEENT - NCAT, EOMI, on oxygen by NC  Neck - Supple, No limited ROM  Chest - CTA bilaterally  Cardiovascular - RRR, S1S2  Abdomen - Soft, NTND  Extremities -No calf tenderness   Neurologic Exam -  no new focal deficits                       ASSESSMENT/PLAN  81y Female PMH of DVT (on Eliquis), NIDDM, glaucoma, HLD, HTN, hs of NPH s/p  shunt 2015 with functional deficits after right basal ganglia hemorrhage, encephalopathy due to UTI  pulmonary consulted for hypoxia, on NC  CKD 3, stable, renal following   BPD, on fluvoxamine, abilify   Continue current medical management  Pain - Tylenol PRN  Continue 3hrs a day of comprehensive rehab program.

## 2021-06-13 NOTE — PROGRESS NOTE ADULT - SUBJECTIVE AND OBJECTIVE BOX
Patient is a 81y old  Female who presents with a chief complaint of CVA (13 Jun 2021 09:31)    Patient seen and examined at bedside. no acute medical complaints. comfortable on NC.    ALLERGIES:  adhesives (Pruritus; Blisters)  penicillin (Hives)  pineapple (Anaphylaxis)    MEDICATIONS  (STANDING):  amLODIPine   Tablet 10 milliGRAM(s) Oral daily  ARIPiprazole 10 milliGRAM(s) Oral at bedtime  atorvastatin 40 milliGRAM(s) Oral at bedtime  chlorhexidine 0.12% Liquid 15 milliLiter(s) Oral Mucosa two times a day  dextrose 40% Gel 15 Gram(s) Oral once  dextrose 5%. 1000 milliLiter(s) (50 mL/Hr) IV Continuous <Continuous>  dextrose 5%. 1000 milliLiter(s) (100 mL/Hr) IV Continuous <Continuous>  dextrose 50% Injectable 25 Gram(s) IV Push once  dextrose 50% Injectable 12.5 Gram(s) IV Push once  dextrose 50% Injectable 25 Gram(s) IV Push once  fluvoxaMINE 150 milliGRAM(s) Oral at bedtime  folic acid 1 milliGRAM(s) Oral daily  glucagon  Injectable 1 milliGRAM(s) IntraMuscular once  hydrALAZINE 20 milliGRAM(s) Oral every 8 hours  insulin lispro (ADMELOG) corrective regimen sliding scale   SubCutaneous three times a day before meals  insulin lispro (ADMELOG) corrective regimen sliding scale   SubCutaneous at bedtime  metFORMIN 850 milliGRAM(s) Oral daily  metoprolol tartrate 12.5 milliGRAM(s) Oral two times a day  nystatin    Suspension [id number] Unit(s) Oral four times a day  nystatin Powder 1 Application(s) Topical two times a day    MEDICATIONS  (PRN):  acetaminophen   Tablet .. 650 milliGRAM(s) Oral every 6 hours PRN Temp greater or equal to 38C (100.4F), Mild Pain (1 - 3), Moderate Pain (4 - 6)  ALBUTerol    90 MICROgram(s) HFA Inhaler 2 Puff(s) Inhalation every 6 hours PRN Shortness of Breath and/or Wheezing    Vital Signs Last 24 Hrs  T(F): 97.2 (13 Jun 2021 07:53), Max: 98.6 (12 Jun 2021 21:21)  HR: 51 (13 Jun 2021 07:53) (51 - 61)  BP: 127/74 (13 Jun 2021 07:53) (127/74 - 163/81)  RR: 16 (13 Jun 2021 07:53) (16 - 16)  SpO2: 95% (13 Jun 2021 07:53) (94% - 95%)  I&O's Summary    PHYSICAL EXAM:  General: NAD, awake, alert, frail elderly  ENT: MMM, no scleral icterus  Neck: Supple, No JVD  Lungs: Respirations unlabored. CTA b/l, diminished at bases, comfortable on NC  Cardio: RRR, S1/S2, +soft systolic murmur  Abdomen: Soft, Nontender, Nondistended; Bowel sounds present  Extremities: No calf tenderness, No pitting edema b/l    LABS:      06-11    142  |  105  |  22  ----------------------------<  149  4.4   |  33  |  1.27    Ca    9.3      11 Jun 2021 05:30     eGFR if Non African American: 40 mL/min/1.73M2 (06-11-21 @ 05:30)  eGFR if African American: 47 mL/min/1.73M2 (06-11-21 @ 05:30)    05-30 Chol 133 mg/dL LDL -- HDL 54 mg/dL Trig 103 mg/dL    POCT Blood Glucose.: 146 mg/dL (13 Jun 2021 07:52)  POCT Blood Glucose.: 146 mg/dL (12 Jun 2021 21:24)  POCT Blood Glucose.: 98 mg/dL (12 Jun 2021 16:43)  POCT Blood Glucose.: 231 mg/dL (12 Jun 2021 12:23)    COVID-19 PCR: NotDetec (06-12-21 @ 05:45)  COVID-19 PCR: NotDetec (06-05-21 @ 18:40)  COVID-19 PCR: NotDetec (05-28-21 @ 13:40)    RADIOLOGY & ADDITIONAL TESTS: reviewed    Care Discussed with Consultants/Other Providers: yes

## 2021-06-14 LAB
ALBUMIN SERPL ELPH-MCNC: 2.6 G/DL — LOW (ref 3.3–5)
ALP SERPL-CCNC: 89 U/L — SIGNIFICANT CHANGE UP (ref 40–120)
ALT FLD-CCNC: 19 U/L — SIGNIFICANT CHANGE UP (ref 10–45)
ANION GAP SERPL CALC-SCNC: 5 MMOL/L — SIGNIFICANT CHANGE UP (ref 5–17)
AST SERPL-CCNC: 18 U/L — SIGNIFICANT CHANGE UP (ref 10–40)
BASOPHILS # BLD AUTO: 0.08 K/UL — SIGNIFICANT CHANGE UP (ref 0–0.2)
BASOPHILS NFR BLD AUTO: 1.2 % — SIGNIFICANT CHANGE UP (ref 0–2)
BILIRUB SERPL-MCNC: 0.2 MG/DL — SIGNIFICANT CHANGE UP (ref 0.2–1.2)
BUN SERPL-MCNC: 32 MG/DL — HIGH (ref 7–23)
CALCIUM SERPL-MCNC: 8.8 MG/DL — SIGNIFICANT CHANGE UP (ref 8.4–10.5)
CHLORIDE SERPL-SCNC: 102 MMOL/L — SIGNIFICANT CHANGE UP (ref 96–108)
CO2 SERPL-SCNC: 31 MMOL/L — SIGNIFICANT CHANGE UP (ref 22–31)
CREAT SERPL-MCNC: 1.3 MG/DL — SIGNIFICANT CHANGE UP (ref 0.5–1.3)
EOSINOPHIL # BLD AUTO: 0.26 K/UL — SIGNIFICANT CHANGE UP (ref 0–0.5)
EOSINOPHIL NFR BLD AUTO: 3.8 % — SIGNIFICANT CHANGE UP (ref 0–6)
GLUCOSE BLDC GLUCOMTR-MCNC: 170 MG/DL — HIGH (ref 70–99)
GLUCOSE BLDC GLUCOMTR-MCNC: 174 MG/DL — HIGH (ref 70–99)
GLUCOSE BLDC GLUCOMTR-MCNC: 183 MG/DL — HIGH (ref 70–99)
GLUCOSE BLDC GLUCOMTR-MCNC: 186 MG/DL — HIGH (ref 70–99)
GLUCOSE SERPL-MCNC: 200 MG/DL — HIGH (ref 70–99)
HCT VFR BLD CALC: 32.7 % — LOW (ref 34.5–45)
HGB BLD-MCNC: 10.5 G/DL — LOW (ref 11.5–15.5)
IMM GRANULOCYTES NFR BLD AUTO: 0.3 % — SIGNIFICANT CHANGE UP (ref 0–1.5)
LYMPHOCYTES # BLD AUTO: 1.89 K/UL — SIGNIFICANT CHANGE UP (ref 1–3.3)
LYMPHOCYTES # BLD AUTO: 27.3 % — SIGNIFICANT CHANGE UP (ref 13–44)
MCHC RBC-ENTMCNC: 30 PG — SIGNIFICANT CHANGE UP (ref 27–34)
MCHC RBC-ENTMCNC: 32.1 GM/DL — SIGNIFICANT CHANGE UP (ref 32–36)
MCV RBC AUTO: 93.4 FL — SIGNIFICANT CHANGE UP (ref 80–100)
MONOCYTES # BLD AUTO: 0.58 K/UL — SIGNIFICANT CHANGE UP (ref 0–0.9)
MONOCYTES NFR BLD AUTO: 8.4 % — SIGNIFICANT CHANGE UP (ref 2–14)
NEUTROPHILS # BLD AUTO: 4.09 K/UL — SIGNIFICANT CHANGE UP (ref 1.8–7.4)
NEUTROPHILS NFR BLD AUTO: 59 % — SIGNIFICANT CHANGE UP (ref 43–77)
NRBC # BLD: 0 /100 WBCS — SIGNIFICANT CHANGE UP (ref 0–0)
PLATELET # BLD AUTO: 276 K/UL — SIGNIFICANT CHANGE UP (ref 150–400)
POTASSIUM SERPL-MCNC: 4.6 MMOL/L — SIGNIFICANT CHANGE UP (ref 3.5–5.3)
POTASSIUM SERPL-SCNC: 4.6 MMOL/L — SIGNIFICANT CHANGE UP (ref 3.5–5.3)
PROT SERPL-MCNC: 6.5 G/DL — SIGNIFICANT CHANGE UP (ref 6–8.3)
RBC # BLD: 3.5 M/UL — LOW (ref 3.8–5.2)
RBC # FLD: 11.9 % — SIGNIFICANT CHANGE UP (ref 10.3–14.5)
SODIUM SERPL-SCNC: 138 MMOL/L — SIGNIFICANT CHANGE UP (ref 135–145)
WBC # BLD: 6.92 K/UL — SIGNIFICANT CHANGE UP (ref 3.8–10.5)
WBC # FLD AUTO: 6.92 K/UL — SIGNIFICANT CHANGE UP (ref 3.8–10.5)

## 2021-06-14 PROCEDURE — 99232 SBSQ HOSP IP/OBS MODERATE 35: CPT

## 2021-06-14 PROCEDURE — 99233 SBSQ HOSP IP/OBS HIGH 50: CPT

## 2021-06-14 RX ADMIN — FLUVOXAMINE MALEATE 150 MILLIGRAM(S): 25 TABLET ORAL at 21:09

## 2021-06-14 RX ADMIN — Medication 1 MILLIGRAM(S): at 12:01

## 2021-06-14 RX ADMIN — ATORVASTATIN CALCIUM 40 MILLIGRAM(S): 80 TABLET, FILM COATED ORAL at 21:09

## 2021-06-14 RX ADMIN — Medication 500000 UNIT(S): at 05:47

## 2021-06-14 RX ADMIN — Medication 20 MILLIGRAM(S): at 05:47

## 2021-06-14 RX ADMIN — Medication 20 MILLIGRAM(S): at 13:10

## 2021-06-14 RX ADMIN — METFORMIN HYDROCHLORIDE 850 MILLIGRAM(S): 850 TABLET ORAL at 12:01

## 2021-06-14 RX ADMIN — Medication 2: at 17:03

## 2021-06-14 RX ADMIN — Medication 20 MILLIGRAM(S): at 21:09

## 2021-06-14 RX ADMIN — ARIPIPRAZOLE 10 MILLIGRAM(S): 15 TABLET ORAL at 21:09

## 2021-06-14 RX ADMIN — AMLODIPINE BESYLATE 10 MILLIGRAM(S): 2.5 TABLET ORAL at 05:47

## 2021-06-14 RX ADMIN — CHLORHEXIDINE GLUCONATE 15 MILLILITER(S): 213 SOLUTION TOPICAL at 17:03

## 2021-06-14 RX ADMIN — NYSTATIN CREAM 1 APPLICATION(S): 100000 CREAM TOPICAL at 17:03

## 2021-06-14 RX ADMIN — Medication 500000 UNIT(S): at 17:03

## 2021-06-14 RX ADMIN — Medication 500000 UNIT(S): at 12:01

## 2021-06-14 RX ADMIN — CHLORHEXIDINE GLUCONATE 15 MILLILITER(S): 213 SOLUTION TOPICAL at 05:47

## 2021-06-14 RX ADMIN — NYSTATIN CREAM 1 APPLICATION(S): 100000 CREAM TOPICAL at 05:53

## 2021-06-14 RX ADMIN — Medication 2: at 07:31

## 2021-06-14 RX ADMIN — Medication 12.5 MILLIGRAM(S): at 05:47

## 2021-06-14 RX ADMIN — Medication 2: at 12:01

## 2021-06-14 NOTE — PROGRESS NOTE ADULT - ASSESSMENT
ASSESSMENT/PLAN  82 y/o F w/ PMH of DVT (on Eliquis), NIDDM, glaucoma, HLD, HTN, hs of NPH s/p  shunt 2015 sent ot ED for +CTH w/ ICH.  CTH was done outpt after c/o dizziness and unsteadness/falling over to left side.  Pt denies head trauma.  Evaluated by neurosurgery - no surgical intervention. Hospital course complicated by encephalopathic d/u UTI    COMORBIDITES/ACTIVE MEDICAL ISSUES     Gait Instability, ADL impairments and Functional impairments: start Comprehensive Rehab Program of PT/OT/SLP     # Right Basal ganglia hemorrhage  - Eliquis held (previous DVT) - negative LE Venous Doppler now   - CTH x4 - ICH stable   - BP goal: <140/90    # Hypertension   - Continue Norvasc  and Metoprolol- dc (6/14) d/t bradycardia   - Hydralazine 10 - inc 20 TID (6/11)  - BP Goal: <140/90    #Encephalopathy due to UTI - improving   - UCx grew klebsiella variicola  - start ceftriaxone x 5-7 days (completed on 6/10)    #Hypoxia  - on 3L NC  - Home O2? - started at [facility] , but pt did not see a need and declined  - Pulmonary consult appreciated - H/o COVID??, monitor closely, will follow recommendations   - encouraged incentive spirometry use    #Hyperglycemia   - A1c 7.7  - Lantus 5U - DC 6/8  - Admelog 2U - DC 6/8  - started on metformin 850 QD (6/8)  - FS + ISS    #PASCALE  - improved  - Encourage oral fluid  - Avoid nephrotoxic med  - Nephro is following  - Renal US normal     #HLD  - Lipitor 40    #Bipolar disorder  - Pristiq ER 25- d/c (6/8)  - Fluvoxamine 150  - Abilify 10mg qhs  - psychiatry consult appreciated, case discussed     #Pain control  - Tylenol PRN    #GI/Bowel Mgmt   - Continent Senna,  Miralax PRN    #Hx of DVT (9/2020)  - SCDs, TEDs   - Doppler negative - 5/28  - Eliquis dc in light of hemorrhage    #FEN   - Diet - upgrade to mechanical soft-thin (6/8)  - Dysphagia  SLP - evaluation and treatment    Precautions / PROPHYLAXIS:   - Falls  - ortho: Weight bearing status: WBAT   - Lungs: Aspiration, Incentive Spirometer   - Pressure injury/Skin: Turn Q2hrs while in bed, OOB to Chair, PT/OT      TEAM MEETING 6/14  SW: lives with unemployed son, also has a daughter who is proxy  OT: SV, eat, min A groom, min-mod toileting, Max A UBD/toilet transfer, Total A LBD - sone aided in LBD  PT: min A transfer, amb 70' RW min, 4 steps min A - 97% 2LNC, 94% 2LNC with ambulation  SLP: dec attention/memory, breakdown with semi complexity, poor fitting denture  barriers: dec memory, safety, visual hallucination  Goals: min assist with ADLs; close SV with transfer and ambulation; will need 24H help  EDOD: 6/29 HOME/JULIEN? ASSESSMENT/PLAN  82 y/o F w/ PMH of DVT (on Eliquis), NIDDM, glaucoma, HLD, HTN, hs of NPH s/p  shunt 2015 sent ot ED for +CTH w/ ICH.  CTH was done outpt after c/o dizziness and unsteadness/falling over to left side.  Pt denies head trauma.  Evaluated by neurosurgery - no surgical intervention. Hospital course complicated by encephalopathic d/u UTI    COMORBIDITES/ACTIVE MEDICAL ISSUES     Gait Instability, ADL impairments and Functional impairments: start Comprehensive Rehab Program of PT/OT/SLP     # Right Basal ganglia hemorrhage  - Eliquis held (previous DVT) - negative LE Venous Doppler now   - CTH x4 - ICH stable   - BP goal: <140/90    # Hypertension   - Continue Norvasc  and Metoprolol- dc (6/14) d/t bradycardia   - Hydralazine 10 - inc 20 TID (6/11)  - BP Goal: <140/90    #Encephalopathy due to UTI - improving   - UCx grew klebsiella variicola  - start ceftriaxone x 5-7 days (completed on 6/10)    #Hypoxia  - on 3L NC  - Home O2? - started at [facility] , but pt did not see a need and declined  - Pulmonary consult appreciated - H/o COVID??, monitor closely, will follow recommendations   - encouraged incentive spirometry use    #Hyperglycemia   - A1c 7.7  - Lantus 5U - DC 6/8  - Admelog 2U - DC 6/8  - started on metformin 850 QD (6/8)  - FS + ISS    #PASCALE  - improved  - Encourage oral fluid  - Avoid nephrotoxic med  - Nephro is following  - Renal US normal     #HLD  - Lipitor 40    #Bipolar disorder  - Pristiq ER 25- d/c (6/8)  - Fluvoxamine 150  - Abilify 10mg qhs  - psychiatry consult appreciated, case discussed     #Pain control  - Tylenol PRN    #GI/Bowel Mgmt   - Continent Senna,  Miralax PRN    #Hx of DVT (9/2020)  - SCDs, TEDs   - Doppler negative - 5/28  - Eliquis dc in light of hemorrhage    #FEN   - Diet - upgrade to mechanical soft-thin (6/8)  - Dysphagia  SLP - evaluation and treatment    Precautions / PROPHYLAXIS:   - Falls  - ortho: Weight bearing status: WBAT   - Lungs: Aspiration, Incentive Spirometer   - Pressure injury/Skin: Turn Q2hrs while in bed, OOB to Chair, PT/OT      TEAM MEETING 6/14  SW: lives with unemployed son, also has a daughter who is proxy - questionable family dynamic  OT: SV, eat, min A groom, min-mod toileting, Max A UBD/toilet transfer, Total A LBD - sone aided in LBD  PT: min A transfer, amb 70' RW min, 4 steps min A - 97% 2LNC, 94% 2LNC with ambulation  SLP: dec attention/memory, breakdown with semi complexity, poor fitting denture  barriers: dec memory, safety, visual hallucination - pt expressed concerns about son managing her finances  Goals: min assist with ADLs; close SV with transfer and ambulation; will need 24H help  EDOD: 6/29 HOME/JULIEN? ASSESSMENT/PLAN  80 y/o F w/ PMH of DVT (on Eliquis), NIDDM, glaucoma, HLD, HTN, hs of NPH s/p  shunt 2015 sent ot ED for +CTH w/ ICH.  CTH was done outpt after c/o dizziness and unsteadness/falling over to left side.  Pt denies head trauma.  Evaluated by neurosurgery - no surgical intervention. Hospital course complicated by encephalopathic d/u UTI    COMORBIDITES/ACTIVE MEDICAL ISSUES     Gait Instability, ADL impairments and Functional impairments: start Comprehensive Rehab Program of PT/OT/SLP     # Right Basal ganglia hemorrhage  - Eliquis held (previous DVT) - negative LE Venous Doppler now   - CTH x4 - ICH stable   - BP goal: <140/90    # Hypertension   - Continue Norvasc  and Metoprolol- dc (6/14) d/t bradycardia   - Hydralazine 10 - inc 20 TID (6/11)  - BP Goal: <140/90    #Encephalopathy due to UTI - improving   - UCx grew klebsiella variicola  - start ceftriaxone x 5-7 days (completed on 6/10)    #Hypoxia  - on 3L NC  - Home O2? - started at [facility] , but pt did not see a need and declined  - Pulmonary consult appreciated - H/o COVID??, monitor closely, will follow recommendations   - encouraged incentive spirometry use    #Hyperglycemia   - A1c 7.7  - Lantus 5U - DC 6/8  - Admelog 2U - DC 6/8  - started on metformin 850 QD (6/8)  - FS + ISS    #PASCALE  - improved  - Encourage oral fluid  - Avoid nephrotoxic med  - Nephro is following  - Renal US normal     #HLD  - Lipitor 40    #Bipolar disorder  - Pristiq ER 25- d/c (6/8)  - Fluvoxamine 150  - Abilify 10mg qhs  - psychiatry consult appreciated, case discussed     #Pain control  - Tylenol PRN    #GI/Bowel Mgmt   - Continent Senna,  Miralax PRN    #Hx of DVT (9/2020)  - SCDs, TEDs   - Doppler negative - 5/28  - Eliquis dc in light of hemorrhage    #FEN   - Diet - upgrade to mechanical soft-thin (6/8)  - Dysphagia  SLP - evaluation and treatment    Precautions / PROPHYLAXIS:   - Falls  - ortho: Weight bearing status: WBAT   - Lungs: Aspiration, Incentive Spirometer   - Pressure injury/Skin: Turn Q2hrs while in bed, OOB to Chair, PT/OT      TEAM MEETING 6/14  SW: lives with unemployed son, also has a daughter who is proxy - questionable family dynamic  OT: SV, eat, min A groom, min-mod toileting, Max A UBD/toilet transfer, Total A LBD - sone aided in LBD  PT: min A transfer, amb 70' RW min, 4 steps min A - 97% 2LNC, 94% 2LNC with ambulation  SLP: dec attention/memory, breakdown with semi complexity, poor fitting denture  barriers: dec memory, safety, visual hallucination - pt expressed concerns about son managing her finances  Goals: min assist with ADLs; close SV with transfer and ambulation; will need 24H help  EDOD: 6/29 home, 6/21 JULIEN? - proxy think JULIEN placement is best, pt express concerns with son managing her finance

## 2021-06-14 NOTE — PROGRESS NOTE ADULT - ATTENDING COMMENTS
Patient medically and neurologically stable. Making progress towards rehab goals.   Continue rehab program. Discharge plan discussed with SW, team.

## 2021-06-14 NOTE — CHART NOTE - NSCHARTNOTEFT_GEN_A_CORE
Nutrition Follow Up Note  Hospital Course (Per Electronic Medical Record): 80 y/o F w/ PMH of DVT (on Eliquis), NIDDM, glaucoma, HLD, HTN, hs of NPH s/p  shunt 2015 sent ot ED for +CTH w/ ICH.  CTH was done outpt after c/o dizziness and unsteadness/falling over to left side.  Pt denies head trauma.  Evaluated by neurosurgery - no surgical intervention. Hospital course complicated by encephalopathic d/u UTI  Source: Medical Record [X] Patient [X] Family [ ]         Diet: dysphagia 2 mechanical soft, thin liquids, Consistent Carbohydrate (evening snack), DASH/TLC, Glucerna 1x/day  Pt tolerating diet with good PO intake per pt, but pt reports a sensitivity to salt, and that food is sometimes too salty for her. Obtained food preferences     Current Weight: 160 lbs (6/13)  156.7 lbs (6/12)  160.4 lbs (6/8)      Pertinent Medications: MEDICATIONS  (STANDING):  amLODIPine   Tablet 10 milliGRAM(s) Oral daily  ARIPiprazole 10 milliGRAM(s) Oral at bedtime  atorvastatin 40 milliGRAM(s) Oral at bedtime  chlorhexidine 0.12% Liquid 15 milliLiter(s) Oral Mucosa two times a day  dextrose 40% Gel 15 Gram(s) Oral once  dextrose 5%. 1000 milliLiter(s) (50 mL/Hr) IV Continuous <Continuous>  dextrose 5%. 1000 milliLiter(s) (100 mL/Hr) IV Continuous <Continuous>  dextrose 50% Injectable 25 Gram(s) IV Push once  dextrose 50% Injectable 12.5 Gram(s) IV Push once  dextrose 50% Injectable 25 Gram(s) IV Push once  fluvoxaMINE 150 milliGRAM(s) Oral at bedtime  folic acid 1 milliGRAM(s) Oral daily  glucagon  Injectable 1 milliGRAM(s) IntraMuscular once  hydrALAZINE 20 milliGRAM(s) Oral every 8 hours  insulin lispro (ADMELOG) corrective regimen sliding scale   SubCutaneous three times a day before meals  insulin lispro (ADMELOG) corrective regimen sliding scale   SubCutaneous at bedtime  metFORMIN 850 milliGRAM(s) Oral daily  nystatin    Suspension [id number] Unit(s) Oral four times a day  nystatin Powder 1 Application(s) Topical two times a day    MEDICATIONS  (PRN):  acetaminophen   Tablet .. 650 milliGRAM(s) Oral every 6 hours PRN Temp greater or equal to 38C (100.4F), Mild Pain (1 - 3), Moderate Pain (4 - 6)  ALBUTerol    90 MICROgram(s) HFA Inhaler 2 Puff(s) Inhalation every 6 hours PRN Shortness of Breath and/or Wheezing      Pertinent Labs:  06-14 Na138 mmol/L Glu 200 mg/dL<H> K+ 4.6 mmol/L Cr  1.30 mg/dL BUN 32 mg/dL<H> 06-14 Alb 2.6 g/dL<L> 05-30 Chol 133 mg/dL LDL --    HDL 54 mg/dL Trig 103 mg/dL  POCT glucose x 4: 170 (H), 186 (H), 107 (H), 151 (H)      Skin: skin tear L elbow, moisture-associated dermatitis- abdominal folds Per nursing flowsheets     Edema: No edema per nursing flow sheets     Last BM: on 6/12 Per nursing flowsheets     Estimated Needs:   [X] No Change since Previous Assessment  [ ] Recalculated:     Previous Nutrition Diagnosis:   Chewing/swallowing difficulty    Nutrition Diagnosis is [X] Ongoing    [ ] Resolved   [ ] Not Applicable      New Nutrition Diagnosis: [X] Not Applicable  [ ] Inadequate Protein Energy Intake   [ ] Inadequate Oral Intake   [ ] Excessive Energy Intake   [ ] Increased Nutrient Needs   [ ] Obesity   [ ] Altered GI Function   [ ] Unintended Weight Loss   [ ] Food & Nutrition Related Knowledge Deficit  [ ] Limited Adherence to nutrition related recommendations   [ ] Malnutrition      Interventions:   1. Recommend continuing with current diet  2. Obtain food preferences and honor as able  3. Follow SLP recommendations  4. Encourage and monitor PO intake    Monitoring & Evaluation:   [X] Weights   [X] PO Intake   [X] Follow Up (Per Protocol)  [X] Tolerance to Diet Prescription   [X] Other: Labs & POCT glucose    RD Remains Available.  Marlene Saavedra RD

## 2021-06-14 NOTE — PROGRESS NOTE ADULT - ASSESSMENT
ASSESSMENT/PLAN  82 y/o F w/ PMH of DVT (on Eliquis), NIDDM, glaucoma, HLD, HTN, hs of NPH s/p  shunt 2015 sent ot ED for +CTH w/ ICH.  CTH was done outpt after c/o dizziness and unsteadness/falling over to left side.  Pt denies head trauma.  Evaluated by neurosurgery - no surgical intervention. Hospital course complicated by encephalopathic d/u UTI  Pulmonary:  Dyspnea at baseline most likely secondary to p[ost covid related changes  COMORBIDITES/ACTIVE MEDICAL ISSUES     Gait Instability, ADL impairments and Functional impairments: start Comprehensive Rehab Program of PT/OT/SLP     # Right Basal ganglia hemorrhage  - Eliquis held (previous DVT) - negative LE Venous Doppler now   - CTH x4 - ICH stable   - BP goal: <140/90    # Hypertension   - Continue Norvasc  and Metoprolol- dc (6/14) d/t bradycardia   - Hydralazine 10 - inc 20 TID (6/11)  - BP Goal: <140/90    #Encephalopathy due to UTI - improving   - UCx grew klebsiella variicola  - start ceftriaxone x 5-7 days (completed on 6/10)    #Hypoxia  - on 3L NC  - Home O2? - started at [facility] , but pt did not see a need and declined  - Pulmonary consult appreciated - H/o COVID??, monitor closely, will follow recommendations   - encouraged incentive spirometry use    #Hyperglycemia   - A1c 7.7  - Lantus 5U - DC 6/8  - Admelog 2U - DC 6/8  - started on metformin 850 QD (6/8)  - FS + ISS    #PASCALE  - improved  - Encourage oral fluid  - Avoid nephrotoxic med  - Nephro is following  - Renal US normal     #HLD  - Lipitor 40    #Bipolar disorder  - Pristiq ER 25- d/c (6/8)  - Fluvoxamine 150  - Abilify 10mg qhs  - psychiatry consult appreciated, case discussed     #Pain control  - Tylenol PRN    #GI/Bowel Mgmt   - Continent Senna,  Miralax PRN    #Hx of DVT (9/2020)  - SCDs, TEDs   - Doppler negative - 5/28  - Eliquis dc in light of hemorrhage    #FEN   - Diet - upgrade to mechanical soft-thin (6/8)  - Dysphagia  SLP - evaluation and treatment    Precautions / PROPHYLAXIS:   - Falls  - ortho: Weight bearing status: WBAT   - Lungs: Aspiration, Incentive Spirometer   - Pressure injury/Skin: Turn Q2hrs while in bed, OOB to Chair, PT/OT      TEAM MEETING 6/14  SW: lives with unemployed son, also has a daughter who is proxy - questionable family dynamic  OT: SV, eat, min A groom, min-mod toileting, Max A UBD/toilet transfer, Total A LBD - sone aided in LBD  PT: min A transfer, amb 70' RW min, 4 steps min A - 97% 2LNC, 94% 2LNC with ambulation  SLP: dec attention/memory, breakdown with semi complexity, poor fitting denture  barriers: dec memory, safety, visual hallucination - pt expressed concerns about son managing her finances  Goals: min assist with ADLs; close SV with transfer and ambulation; will need 24H help  EDOD: 6/29 HOME/JULIEN?

## 2021-06-14 NOTE — PROGRESS NOTE ADULT - SUBJECTIVE AND OBJECTIVE BOX
Follow-up Pulm Progress Note    Rashard Napoles MD  [phone]    No new respiratory events overnight.  Denies SOB/CP. No change in pulmonary symptoms    Medications:  Vital Signs Last 24 Hrs  T(C): 36.8 (13 Jun 2021 20:05), Max: 36.8 (13 Jun 2021 20:05)  T(F): 98.2 (13 Jun 2021 20:05), Max: 98.2 (13 Jun 2021 20:05)  HR: 55 (14 Jun 2021 13:14) (54 - 62)  BP: 145/86 (14 Jun 2021 13:14) (117/71 - 153/78)  BP(mean): --  RR: 16 (14 Jun 2021 13:14) (15 - 16)  SpO2: 94% (14 Jun 2021 13:14) (92% - 96%)            LABS:                        10.5   6.92  )-----------( 276      ( 14 Jun 2021 06:15 )             32.7     06-14    138  |  102  |  32<H>  ----------------------------<  200<H>  4.6   |  31  |  1.30    Ca    8.8      14 Jun 2021 06:15    TPro  6.5  /  Alb  2.6<L>  /  TBili  0.2  /  DBili  x   /  AST  18  /  ALT  19  /  AlkPhos  89  06-14              CULTURES:        Physical Examination:  PULM: Clear to auscultation bilaterally, no significant sputum production  CVS: Regular rate and rhythm, no murmurs, rubs, or gallops  ABD: Soft, non-tender  EXT:  No clubbing, cyanosis, or edema    RADIOLOGY REVIEWED  CXR:    CT chest:    TTE:

## 2021-06-15 LAB
GLUCOSE BLDC GLUCOMTR-MCNC: 134 MG/DL — HIGH (ref 70–99)
GLUCOSE BLDC GLUCOMTR-MCNC: 166 MG/DL — HIGH (ref 70–99)
GLUCOSE BLDC GLUCOMTR-MCNC: 229 MG/DL — HIGH (ref 70–99)
GLUCOSE BLDC GLUCOMTR-MCNC: 259 MG/DL — HIGH (ref 70–99)

## 2021-06-15 PROCEDURE — 99232 SBSQ HOSP IP/OBS MODERATE 35: CPT

## 2021-06-15 RX ORDER — LISINOPRIL 2.5 MG/1
5 TABLET ORAL DAILY
Refills: 0 | Status: DISCONTINUED | OUTPATIENT
Start: 2021-06-15 | End: 2021-06-17

## 2021-06-15 RX ADMIN — AMLODIPINE BESYLATE 10 MILLIGRAM(S): 2.5 TABLET ORAL at 05:16

## 2021-06-15 RX ADMIN — Medication 2: at 07:53

## 2021-06-15 RX ADMIN — METFORMIN HYDROCHLORIDE 850 MILLIGRAM(S): 850 TABLET ORAL at 12:07

## 2021-06-15 RX ADMIN — Medication 1 MILLIGRAM(S): at 12:07

## 2021-06-15 RX ADMIN — Medication 20 MILLIGRAM(S): at 21:33

## 2021-06-15 RX ADMIN — Medication 20 MILLIGRAM(S): at 05:16

## 2021-06-15 RX ADMIN — ARIPIPRAZOLE 10 MILLIGRAM(S): 15 TABLET ORAL at 21:33

## 2021-06-15 RX ADMIN — CHLORHEXIDINE GLUCONATE 15 MILLILITER(S): 213 SOLUTION TOPICAL at 17:33

## 2021-06-15 RX ADMIN — ATORVASTATIN CALCIUM 40 MILLIGRAM(S): 80 TABLET, FILM COATED ORAL at 21:33

## 2021-06-15 RX ADMIN — Medication 6: at 12:07

## 2021-06-15 RX ADMIN — Medication 20 MILLIGRAM(S): at 13:13

## 2021-06-15 RX ADMIN — FLUVOXAMINE MALEATE 150 MILLIGRAM(S): 25 TABLET ORAL at 21:33

## 2021-06-15 RX ADMIN — CHLORHEXIDINE GLUCONATE 15 MILLILITER(S): 213 SOLUTION TOPICAL at 05:16

## 2021-06-15 RX ADMIN — NYSTATIN CREAM 1 APPLICATION(S): 100000 CREAM TOPICAL at 17:33

## 2021-06-15 RX ADMIN — NYSTATIN CREAM 1 APPLICATION(S): 100000 CREAM TOPICAL at 05:15

## 2021-06-15 NOTE — PROGRESS NOTE ADULT - SUBJECTIVE AND OBJECTIVE BOX
CHIEF COMPLAINT: no acute event overnight.  Denies, CP, HA, pain, or GI/ symptoms.  Found to have NC around neck - O2 Sat at 91%. Endorses that she feels tired despite sleeping well.  Educated that tiredness may be induced by hypoxia. Encouraged deep breathing exercises.      HISTORY OF PRESENT ILLNESS    82 y/o F w/ PMH of DVT (on Eliquis), NIDDM, glaucoma, HLD, HTN, hs of NPH s/p  shunt  sent ot ED for +CTH w/ ICH.  CTH was done outpt after c/o dizziness and unsteadness/falling over to left side.  CTH done in ED showed pt had a small right basal ganglia hemorrhage.  Eliquis was discontinued and neurosurgery consulted for evaluation.  f/u CTH was done and ICH noted to be stable.  Per neurosurgery no interventions necessary.  Pt has had a total of 4 CTH all stable. repeat venous duplex neg for any dvt.  On admission pt also noted to have hypertensive crisis that resolved w/ IV lopressor.  BP was maintained at goal <140/90 in light of ICH.  Hospital course was complicated by pt developing acute metabolic encephalopathy due to UTI.  UA noted to have pyuria and cultures grew klebsiella variicola.  Pt was placed on emperic antibiotics and ID consulted.  Mentation improved within 24hrs of IV antibiotics.  Pt will be switched to po antibiotics pending sensitivities for total of 7 day course per ID recs.  Pts A1c noted to be 7.7 and hyperglycemia resolved w/ basal/bolus regimen.  Pt will resume metformin on discharge.  PASCALE was also noted and is likely prerenal.  patient started on dysphagia 1 diet with thin liquids.     Patient was evaluated by PM&R and therapy for functional deficits and gait/ADL impairments and recommend acute rehabilitation.  Patient was medically optimized for discharge to [facility] inpatient acute rehab on 2021. (2021 14:43)        PAST MEDICAL & SURGICAL HISTORY:  Hypertension  Hyperlipidemia  Diabetes  Glaucoma  Osteoarthritis  Small bowel obstruction  S/P hysterectomy   and Oopherectomy in   S/P cholecystectomy    Achilles tendon injury  repair  right scalene muscle release  S/P knee replacement, left      VITALS  Vital Signs Last 24 Hrs  T(C): 36.8 (2021 19:34), Max: 36.8 (2021 19:34)  T(F): 98.2 (2021 19:34), Max: 98.2 (2021 19:34)  HR: 63 (15 Adam 2021 13:15) (56 - 70)  BP: 151/90 (15 Adam 2021 13:15) (146/77 - 181/72)  BP(mean): --  RR: 18 (15 Adam 2021 13:15) (16 - 18)  SpO2: 93% (15 Adam 2021 13:15) (88% - 95%)      PHYSICAL EXAM  Constitutional - NAD, Comfortable  HEENT - NCAT, EOMI  Neck - Supple, No limited ROM  Chest - CTA bilaterally  Cardiovascular - RRR, S1S2  Abdomen - Soft, NTND  Extremities -No calf tenderness   Neurologic Exam -  no new focal deficits                       RECENT LABS  LAB                        10.5   6.92  )-----------( 276      ( 2021 06:15 )             32.7     06-14    138  |  102  |  32<H>  ----------------------------<  200<H>  4.6   |  31  |  1.30    Ca    8.8      2021 06:15    TPro  6.5  /  Alb  2.6<L>  /  TBili  0.2  /  DBili  x   /  AST  18  /  ALT  19  /  AlkPhos  89  06-14    LIVER FUNCTIONS - ( 2021 06:15 )  Alb: 2.6 g/dL / Pro: 6.5 g/dL / ALK PHOS: 89 U/L / ALT: 19 U/L / AST: 18 U/L / GGT: x           CAPILLARY BLOOD GLUCOSE  POCT Blood Glucose.: 259 mg/dL (15 Adam 2021 12:06)  POCT Blood Glucose.: 166 mg/dL (15 Adam 2021 07:49)  POCT Blood Glucose.: 174 mg/dL (2021 21:08)  POCT Blood Glucose.: 183 mg/dL (2021 17:02)      Urinalysis Basic - ( 2021 22:50 )    Color: Yellow / Appearance: Clear / S.010 / pH: x  Gluc: x / Ketone: Negative  / Bili: Negative / Urobili: Negative   Blood: x / Protein: 15 / Nitrite: Negative   Leuk Esterase: Moderate / RBC: 10-15 /HPF / WBC 11-25 /HPF   Sq Epi: x / Non Sq Epi: Neg.-Few / Bacteria: Trace /HPF      Urinalysis Basic - ( 2021 22:50 )    Color: Yellow / Appearance: Clear / S.010 / pH: x  Gluc: x / Ketone: Negative  / Bili: Negative / Urobili: Negative   Blood: x / Protein: 15 / Nitrite: Negative   Leuk Esterase: Moderate / RBC: 10-15 /HPF / WBC 11-25 /HPF   Sq Epi: x / Non Sq Epi: Neg.-Few / Bacteria: Trace /HPF    MEDICATIONS  (STANDING):  amLODIPine   Tablet 10 milliGRAM(s) Oral daily  ARIPiprazole 10 milliGRAM(s) Oral at bedtime  atorvastatin 40 milliGRAM(s) Oral at bedtime  chlorhexidine 0.12% Liquid 15 milliLiter(s) Oral Mucosa two times a day  dextrose 40% Gel 15 Gram(s) Oral once  dextrose 5%. 1000 milliLiter(s) (50 mL/Hr) IV Continuous <Continuous>  dextrose 5%. 1000 milliLiter(s) (100 mL/Hr) IV Continuous <Continuous>  dextrose 50% Injectable 25 Gram(s) IV Push once  dextrose 50% Injectable 12.5 Gram(s) IV Push once  dextrose 50% Injectable 25 Gram(s) IV Push once  fluvoxaMINE 150 milliGRAM(s) Oral at bedtime  folic acid 1 milliGRAM(s) Oral daily  glucagon  Injectable 1 milliGRAM(s) IntraMuscular once  hydrALAZINE 20 milliGRAM(s) Oral every 8 hours  insulin lispro (ADMELOG) corrective regimen sliding scale   SubCutaneous three times a day before meals  insulin lispro (ADMELOG) corrective regimen sliding scale   SubCutaneous at bedtime  lisinopril 5 milliGRAM(s) Oral daily  metFORMIN 850 milliGRAM(s) Oral daily  nystatin Powder 1 Application(s) Topical two times a day    MEDICATIONS  (PRN):  acetaminophen   Tablet .. 650 milliGRAM(s) Oral every 6 hours PRN Temp greater or equal to 38C (100.4F), Mild Pain (1 - 3), Moderate Pain (4 - 6)  ALBUTerol    90 MICROgram(s) HFA Inhaler 2 Puff(s) Inhalation every 6 hours PRN Shortness of Breath and/or Wheezing

## 2021-06-15 NOTE — PROGRESS NOTE ADULT - ASSESSMENT
80 y/o F with a history of DVT, DM II, glaucoma, HLD, HTN, NPH s/p  admitted to [facility] rehab after hospital course for ICH. CT Head + for small right basal ganglia hemorrhage.  Hospital course was complicated by acute metabolic encephalopathy due to UTI (klebsiella variicola).      Dizziness, unsteadiness, and fall due to acute ICH of basal ganglia causing Impaired Gait, Mobility, and ADLs  NPH s/p  shunt  History DVT  - Continue comprehensive rehab program of PT/OT  - Fall precautions  - Eliquis discontinued for DVT due to IPH  - Duplex Venous Lower, Bilateral (05.28.21 @ 15:06) - No evidence of deep venous thrombosis in either lower extremity  - Continue statin  - Bowel regimen as per primary team  - Pain meds as per primary team     Acute Hypoxic Respiratory Failure likely due to COVID History  - No history of Asthma or COPD  - Elevated d- dimer likely due to COVID history  - Albuterol PRN  - CXR - 2 view if possible. If not, CT chest w/o  - CT chest (6/9) Trace right pleural effusion and atelectasis/consolidation of lateral-posterior segments of right lower lobe. Atelectasis of medial segment of right middle lobe. Patchy groundglass opacities within the left lower lobe.  The anteromedial part of the right hemidiaphragm is elevated most likely secondary to eventration.  - Pro-bnp elevated  - Oxygen supplement PRN to keep O2 sat > 92%  - Pulm appreciated    Acute UTI  - UCx (6/2) Kleb Variicola  - Ceftriaxone completed    Uncontrolled Hypertension  Episode of bradycardia - likely after beta blocker was given  - On HCTZ, lisinopril 10mg and amlodipine at home  - EKG (6/9) shows sinus bradycardia, PACs, [id number]  - continue hydralazine 20mg q8h, amlodipine 10mg qd  - Add lisinopril 5mg qd  - DC'd metoprolol 6/14, Off HCTZ 25mg  - Nephro following    Diabetes Mellitus II  - BS controlled. off lantus and admelog  - Patient reports taking metformin TID. Due arias, starting metformin and observing FS  - Continue metformin 850mg daily  - ISS and monitor FS    Bipolar disorder  - HELD Pristiq, Continue Abilify, and Luvox  - Neuro Psych appreciated     Normocytic Anemia  - Stable  - continue folic acid    Possible arias on CKD 3a  - encourage oral intake  - avoid nephrotic agents    Hypoalbuminemia  - consider nutrition consult    DVT ppx - as per primary

## 2021-06-15 NOTE — PROGRESS NOTE ADULT - ASSESSMENT
ASSESSMENT/PLAN  82 y/o F w/ PMH of DVT (on Eliquis), NIDDM, glaucoma, HLD, HTN, hs of NPH s/p  shunt 2015 sent ot ED for +CTH w/ ICH.  CTH was done outpt after c/o dizziness and unsteadness/falling over to left side.  Pt denies head trauma.  Evaluated by neurosurgery - no surgical intervention. Hospital course complicated by encephalopathic d/u UTI    COMORBIDITES/ACTIVE MEDICAL ISSUES     Gait Instability, ADL impairments and Functional impairments: start Comprehensive Rehab Program of PT/OT/SLP     # Right Basal ganglia hemorrhage  - Eliquis held (previous DVT) - negative LE Venous Doppler now   - CTH x4 - ICH stable   - BP goal: <140/90    # Hypertension   - Continue Norvasc  and Metoprolol- dc (6/14) d/t bradycardia   - Hydralazine 10 - inc 20 TID (6/11)  - BP Goal: <140/90    #Encephalopathy due to UTI - improving   - UCx grew klebsiella variicola  - start ceftriaxone x 5-7 days (completed on 6/10)    #Hypoxia  - on 3L NC  - Home O2? - started at [facility] , but pt did not see a need and declined  - Pulmonary consult appreciated - H/o COVID??, monitor closely, will follow recommendations   - encouraged incentive spirometry    #Hyperglycemia   - A1c 7.7  - Lantus 5U - DC 6/8  - Admelog 2U - DC 6/8  - metformin 850 QD (6/8)  - FS + ISS    #PASCALE  - improved  - Encourage oral fluid  - Avoid nephrotoxic med  - Nephro is following  - Renal US normal     #HLD  - Lipitor 40    #Bipolar disorder  - Pristiq ER 25- d/c (6/8)  - Fluvoxamine 150  - Abilify 10mg qhs  - psychiatry consult appreciated, case discussed     #Pain control  - Tylenol PRN    #GI/Bowel Mgmt   - Continent Senna,  Miralax PRN    #Hx of DVT (9/2020)  - SCDs, TEDs   - Doppler negative - 5/28  - Eliquis dc in light of hemorrhage    #FEN   - Diet - upgrade to mechanical soft-thin (6/8)  - Dysphagia  SLP - evaluation and treatment    Precautions / PROPHYLAXIS:   - Falls  - ortho: Weight bearing status: WBAT   - Lungs: Aspiration, Incentive Spirometer   - Pressure injury/Skin: Turn Q2hrs while in bed, OOB to Chair, PT/OT      TEAM MEETING 6/14  SW: lives with unemployed son, also has a daughter who is proxy - questionable family dynamic  OT: SV, eat, min A groom, min-mod toileting, Max A UBD/toilet transfer, Total A LBD - sone aided in LBD  PT: min A transfer, amb 70' RW min, 4 steps min A - 97% 2LNC, 94% 2LNC with ambulation  SLP: dec attention/memory, breakdown with semi complexity, poor fitting denture  barriers: dec memory, safety, visual hallucination - pt expressed concerns about son managing her finances  Goals: min assist with ADLs; close SV with transfer and ambulation; will need 24H help  EDOD: 6/29 home, 6/21 JULIEN? - proxy think JULIEN placement is best, pt express concerns with son managing her finance

## 2021-06-15 NOTE — PROGRESS NOTE ADULT - SUBJECTIVE AND OBJECTIVE BOX
Patient is a 81y old  Female who presents with a chief complaint of CVA (14 Jun 2021 16:05)    Patient seen and examined at bedside. +occasional sob, comfortable on NC. no acute medical complaints.    ALLERGIES:  adhesives (Pruritus; Blisters)  penicillin (Hives)  pineapple (Anaphylaxis)    MEDICATIONS  (STANDING):  amLODIPine   Tablet 10 milliGRAM(s) Oral daily  ARIPiprazole 10 milliGRAM(s) Oral at bedtime  atorvastatin 40 milliGRAM(s) Oral at bedtime  chlorhexidine 0.12% Liquid 15 milliLiter(s) Oral Mucosa two times a day  dextrose 40% Gel 15 Gram(s) Oral once  dextrose 5%. 1000 milliLiter(s) (50 mL/Hr) IV Continuous <Continuous>  dextrose 5%. 1000 milliLiter(s) (100 mL/Hr) IV Continuous <Continuous>  dextrose 50% Injectable 25 Gram(s) IV Push once  dextrose 50% Injectable 12.5 Gram(s) IV Push once  dextrose 50% Injectable 25 Gram(s) IV Push once  fluvoxaMINE 150 milliGRAM(s) Oral at bedtime  folic acid 1 milliGRAM(s) Oral daily  glucagon  Injectable 1 milliGRAM(s) IntraMuscular once  hydrALAZINE 20 milliGRAM(s) Oral every 8 hours  insulin lispro (ADMELOG) corrective regimen sliding scale   SubCutaneous three times a day before meals  insulin lispro (ADMELOG) corrective regimen sliding scale   SubCutaneous at bedtime  metFORMIN 850 milliGRAM(s) Oral daily  nystatin Powder 1 Application(s) Topical two times a day    MEDICATIONS  (PRN):  acetaminophen   Tablet .. 650 milliGRAM(s) Oral every 6 hours PRN Temp greater or equal to 38C (100.4F), Mild Pain (1 - 3), Moderate Pain (4 - 6)  ALBUTerol    90 MICROgram(s) HFA Inhaler 2 Puff(s) Inhalation every 6 hours PRN Shortness of Breath and/or Wheezing    Vital Signs Last 24 Hrs  T(F): 98.2 (14 Jun 2021 19:34), Max: 98.2 (14 Jun 2021 19:34)  HR: 66 (15 Adam 2021 07:51) (55 - 70)  BP: 150/76 (15 Adam 2021 07:51) (145/86 - 181/72)  RR: 17 (15 Adam 2021 07:51) (16 - 18)  SpO2: 94% (15 Adam 2021 07:51) (88% - 95%)  I&O's Summary    PHYSICAL EXAM:  General: NAD, awake, alert, frail elderly  ENT: MMM, no scleral icterus  Neck: Supple, No JVD  Lungs: Respirations unlabored. CTA b/l, diminished at bases, comfortable on NC  Cardio: RRR, S1/S2, +soft systolic murmur  Abdomen: Soft, Nontender, Nondistended; Bowel sounds present  Extremities: No calf tenderness, No pitting edema b/l      LABS:                        10.5   6.92  )-----------( 276      ( 14 Jun 2021 06:15 )             32.7       06-14    138  |  102  |  32  ----------------------------<  200  4.6   |  31  |  1.30    Ca    8.8      14 Jun 2021 06:15    TPro  6.5  /  Alb  2.6  /  TBili  0.2  /  DBili  x   /  AST  18  /  ALT  19  /  AlkPhos  89  06-14     eGFR if Non African American: 39 mL/min/1.73M2 (06-14-21 @ 06:15)  eGFR if African American: 45 mL/min/1.73M2 (06-14-21 @ 06:15)    05-30 Chol 133 mg/dL LDL -- HDL 54 mg/dL Trig 103 mg/dL    POCT Blood Glucose.: 166 mg/dL (15 Adam 2021 07:49)  POCT Blood Glucose.: 174 mg/dL (14 Jun 2021 21:08)  POCT Blood Glucose.: 183 mg/dL (14 Jun 2021 17:02)  POCT Blood Glucose.: 170 mg/dL (14 Jun 2021 11:58)    COVID-19 PCR: NotDetec (06-12-21 @ 05:45)  COVID-19 PCR: NotDetec (06-05-21 @ 18:40)  COVID-19 PCR: NotDetec (05-28-21 @ 13:40)    RADIOLOGY & ADDITIONAL TESTS: reviewed    Care Discussed with Consultants/Other Providers: yes

## 2021-06-16 LAB
GLUCOSE BLDC GLUCOMTR-MCNC: 174 MG/DL — HIGH (ref 70–99)
GLUCOSE BLDC GLUCOMTR-MCNC: 185 MG/DL — HIGH (ref 70–99)
GLUCOSE BLDC GLUCOMTR-MCNC: 209 MG/DL — HIGH (ref 70–99)
GLUCOSE BLDC GLUCOMTR-MCNC: 215 MG/DL — HIGH (ref 70–99)

## 2021-06-16 PROCEDURE — 93010 ELECTROCARDIOGRAM REPORT: CPT

## 2021-06-16 PROCEDURE — 99232 SBSQ HOSP IP/OBS MODERATE 35: CPT

## 2021-06-16 RX ADMIN — CHLORHEXIDINE GLUCONATE 15 MILLILITER(S): 213 SOLUTION TOPICAL at 17:44

## 2021-06-16 RX ADMIN — ARIPIPRAZOLE 10 MILLIGRAM(S): 15 TABLET ORAL at 21:16

## 2021-06-16 RX ADMIN — Medication 20 MILLIGRAM(S): at 21:16

## 2021-06-16 RX ADMIN — FLUVOXAMINE MALEATE 150 MILLIGRAM(S): 25 TABLET ORAL at 21:16

## 2021-06-16 RX ADMIN — Medication 4: at 07:45

## 2021-06-16 RX ADMIN — NYSTATIN CREAM 1 APPLICATION(S): 100000 CREAM TOPICAL at 17:44

## 2021-06-16 RX ADMIN — ALBUTEROL 2 PUFF(S): 90 AEROSOL, METERED ORAL at 23:31

## 2021-06-16 RX ADMIN — NYSTATIN CREAM 1 APPLICATION(S): 100000 CREAM TOPICAL at 05:38

## 2021-06-16 RX ADMIN — AMLODIPINE BESYLATE 10 MILLIGRAM(S): 2.5 TABLET ORAL at 05:38

## 2021-06-16 RX ADMIN — Medication 1 MILLIGRAM(S): at 12:12

## 2021-06-16 RX ADMIN — ATORVASTATIN CALCIUM 40 MILLIGRAM(S): 80 TABLET, FILM COATED ORAL at 21:16

## 2021-06-16 RX ADMIN — Medication 2: at 12:12

## 2021-06-16 RX ADMIN — CHLORHEXIDINE GLUCONATE 15 MILLILITER(S): 213 SOLUTION TOPICAL at 05:38

## 2021-06-16 RX ADMIN — Medication 2: at 16:40

## 2021-06-16 RX ADMIN — METFORMIN HYDROCHLORIDE 850 MILLIGRAM(S): 850 TABLET ORAL at 12:12

## 2021-06-16 RX ADMIN — Medication 20 MILLIGRAM(S): at 05:38

## 2021-06-16 RX ADMIN — LISINOPRIL 5 MILLIGRAM(S): 2.5 TABLET ORAL at 05:38

## 2021-06-16 RX ADMIN — Medication 20 MILLIGRAM(S): at 13:56

## 2021-06-16 NOTE — PROGRESS NOTE ADULT - ASSESSMENT
ASSESSMENT/PLAN  80 y/o F w/ PMH of DVT (on Eliquis), NIDDM, glaucoma, HLD, HTN, hs of NPH s/p  shunt 2015 sent ot ED for +CTH w/ ICH.  CTH was done outpt after c/o dizziness and unsteadness/falling over to left side.  Pt denies head trauma.  Evaluated by neurosurgery - no surgical intervention. Hospital course complicated by encephalopathic d/u UTI    COMORBIDITES/ACTIVE MEDICAL ISSUES     Gait Instability, ADL impairments and Functional impairments: start Comprehensive Rehab Program of PT/OT/SLP     # Right Basal ganglia hemorrhage  - Eliquis held (previous DVT) - negative LE Venous Doppler now   - CTH x4 - ICH stable   - BP goal: <140/90    # Hypertension   - Continue Norvasc  and Metoprolol- dc (6/14) d/t bradycardia   - Hydralazine 10 - inc 20 TID (6/11)  - BP Goal: <140/90    #Encephalopathy due to UTI - improving   - UCx grew klebsiella variicola  - start ceftriaxone x 5-7 days (completed on 6/10)    #Hypoxia  - on 3L NC  - Home O2? - started at [facility] , but pt did not see a need and declined  - Pulmonary consult appreciated - H/o COVID??, monitor closely, will follow recommendations   - encouraged incentive spirometry    #Chest pain - likely benign   - order EKG  - monitor    #Hyperglycemia   - A1c 7.7  - Lantus 5U - DC 6/8  - Admelog 2U - DC 6/8  - metformin 850 QD (6/8)  - FS + ISS    #PASCALE  - improved  - Encourage oral fluid  - Avoid nephrotoxic med  - Nephro is following  - Renal US normal     #HLD  - Lipitor 40    #Bipolar disorder  - Pristiq ER 25- d/c (6/8)  - Fluvoxamine 150  - Abilify 10mg qhs  - psychiatry consult appreciated, case discussed     #Pain control  - Tylenol PRN    #GI/Bowel Mgmt   - Continent Senna,  Miralax PRN    #Hx of DVT (9/2020)  - SCDs, TEDs   - Doppler negative - 5/28  - Eliquis dc in light of hemorrhage    #FEN   - Diet - upgrade to mechanical soft-thin (6/8)  - Dysphagia  SLP - evaluation and treatment    Precautions / PROPHYLAXIS:   - Falls  - ortho: Weight bearing status: WBAT   - Lungs: Aspiration, Incentive Spirometer   - Pressure injury/Skin: Turn Q2hrs while in bed, OOB to Chair, PT/OT      TEAM MEETING 6/14  SW: lives with unemployed son, also has a daughter who is proxy - questionable family dynamic  OT: SV, eat, min A groom, min-mod toileting, Max A UBD/toilet transfer, Total A LBD - sone aided in LBD  PT: min A transfer, amb 70' RW min, 4 steps min A - 97% 2LNC, 94% 2LNC with ambulation  SLP: dec attention/memory, breakdown with semi complexity, poor fitting denture  barriers: dec memory, safety, visual hallucination - pt expressed concerns about son managing her finances  Goals: min assist with ADLs; close SV with transfer and ambulation; will need 24H help  EDOD: 6/29 home, 6/21 JULIEN? - proxy think JULIEN placement is best, pt express concerns with son managing her finance

## 2021-06-16 NOTE — PROGRESS NOTE ADULT - SUBJECTIVE AND OBJECTIVE BOX
CHIEF COMPLAINT: no acute event overnight.  Denies, CP, HA, pain, or GI/ symptoms.  Briefly discussed discharge - agrees with [facility]IEN placement given her current status.  She understands the need for supplemental O2.  Encouraged deep breathing exercises.  Was later seen again - she reports having a sharp pain to chest region for 1 second, it also occured yesterday.  Denies new onset symptoms - has baseline dec sensory to left which is at bay    HISTORY OF PRESENT ILLNESS    80 y/o F w/ PMH of DVT (on Eliquis), NIDDM, glaucoma, HLD, HTN, hs of NPH s/p  shunt  sent ot ED for +CTH w/ ICH.  CTH was done outpt after c/o dizziness and unsteadness/falling over to left side.  CTH done in ED showed pt had a small right basal ganglia hemorrhage.  Eliquis was discontinued and neurosurgery consulted for evaluation.  f/u CTH was done and ICH noted to be stable.  Per neurosurgery no interventions necessary.  Pt has had a total of 4 CTH all stable. repeat venous duplex neg for any dvt.  On admission pt also noted to have hypertensive crisis that resolved w/ IV lopressor.  BP was maintained at goal <140/90 in light of ICH.  Hospital course was complicated by pt developing acute metabolic encephalopathy due to UTI.  UA noted to have pyuria and cultures grew klebsiella variicola.  Pt was placed on emperic antibiotics and ID consulted.  Mentation improved within 24hrs of IV antibiotics.  Pt will be switched to po antibiotics pending sensitivities for total of 7 day course per ID recs.  Pts A1c noted to be 7.7 and hyperglycemia resolved w/ basal/bolus regimen.  Pt will resume metformin on discharge.  PASCALE was also noted and is likely prerenal.  patient started on dysphagia 1 diet with thin liquids.     Patient was evaluated by PM&R and therapy for functional deficits and gait/ADL impairments and recommend acute rehabilitation.  Patient was medically optimized for discharge to [facility] inpatient acute rehab on 2021. (2021 14:43)        PAST MEDICAL & SURGICAL HISTORY:  Hypertension  Hyperlipidemia  Diabetes  Glaucoma  Osteoarthritis  Small bowel obstruction  S/P hysterectomy   and Oopherectomy in   S/P cholecystectomy    Achilles tendon injury  repair  right scalene muscle release  S/P knee replacement, left      VITALS  Vital Signs Last 24 Hrs  T(C): 36.6 (2021 07:40), Max: 36.7 (15 Adam 2021 20:00)  T(F): 97.9 (2021 07:40), Max: 98 (15 Adam 2021 20:00)  HR: 64 (2021 07:40) (63 - 73)  BP: 133/63 (2021 07:40) (122/65 - 151/90)  BP(mean): --  RR: 16 (2021 07:40) (15 - 18)  SpO2: 94% (2021 07:40) (93% - 99%)      PHYSICAL EXAM  Constitutional - NAD, Comfortable  HEENT - NCAT, EOMI  Neck - Supple, No limited ROM  Chest - CTA bilaterally  Cardiovascular - RRR, S1S2  Abdomen - Soft, NTND  Extremities -No calf tenderness   Neurologic Exam -  no new focal deficits                       RECENT LABS  LAB                        10.5   6.92  )-----------( 276      ( 2021 06:15 )             32.7     06-14    138  |  102  |  32<H>  ----------------------------<  200<H>  4.6   |  31  |  1.30    Ca    8.8      2021 06:15    TPro  6.5  /  Alb  2.6<L>  /  TBili  0.2  /  DBili  x   /  AST  18  /  ALT  19  /  AlkPhos  89  06-14    LIVER FUNCTIONS - ( 2021 06:15 )  Alb: 2.6 g/dL / Pro: 6.5 g/dL / ALK PHOS: 89 U/L / ALT: 19 U/L / AST: 18 U/L / GGT: x           CAPILLARY BLOOD GLUCOSE  POCT Blood Glucose.: 209 mg/dL (2021 07:44)  POCT Blood Glucose.: 229 mg/dL (15 Adam 2021 21:28)  POCT Blood Glucose.: 134 mg/dL (15 Adam 2021 16:32)      Urinalysis Basic - ( 2021 22:50 )    Color: Yellow / Appearance: Clear / S.010 / pH: x  Gluc: x / Ketone: Negative  / Bili: Negative / Urobili: Negative   Blood: x / Protein: 15 / Nitrite: Negative   Leuk Esterase: Moderate / RBC: 10-15 /HPF / WBC 11-25 /HPF   Sq Epi: x / Non Sq Epi: Neg.-Few / Bacteria: Trace /HPF      Urinalysis Basic - ( 2021 22:50 )    Color: Yellow / Appearance: Clear / S.010 / pH: x  Gluc: x / Ketone: Negative  / Bili: Negative / Urobili: Negative   Blood: x / Protein: 15 / Nitrite: Negative   Leuk Esterase: Moderate / RBC: 10-15 /HPF / WBC 11-25 /HPF   Sq Epi: x / Non Sq Epi: Neg.-Few / Bacteria: Trace /HPF    MEDICATIONS  (STANDING):  amLODIPine   Tablet 10 milliGRAM(s) Oral daily  ARIPiprazole 10 milliGRAM(s) Oral at bedtime  atorvastatin 40 milliGRAM(s) Oral at bedtime  chlorhexidine 0.12% Liquid 15 milliLiter(s) Oral Mucosa two times a day  dextrose 40% Gel 15 Gram(s) Oral once  dextrose 5%. 1000 milliLiter(s) (50 mL/Hr) IV Continuous <Continuous>  dextrose 5%. 1000 milliLiter(s) (100 mL/Hr) IV Continuous <Continuous>  dextrose 50% Injectable 25 Gram(s) IV Push once  dextrose 50% Injectable 12.5 Gram(s) IV Push once  dextrose 50% Injectable 25 Gram(s) IV Push once  fluvoxaMINE 150 milliGRAM(s) Oral at bedtime  folic acid 1 milliGRAM(s) Oral daily  glucagon  Injectable 1 milliGRAM(s) IntraMuscular once  hydrALAZINE 20 milliGRAM(s) Oral every 8 hours  insulin lispro (ADMELOG) corrective regimen sliding scale   SubCutaneous three times a day before meals  insulin lispro (ADMELOG) corrective regimen sliding scale   SubCutaneous at bedtime  lisinopril 5 milliGRAM(s) Oral daily  metFORMIN 850 milliGRAM(s) Oral daily  nystatin Powder 1 Application(s) Topical two times a day    MEDICATIONS  (PRN):  acetaminophen   Tablet .. 650 milliGRAM(s) Oral every 6 hours PRN Temp greater or equal to 38C (100.4F), Mild Pain (1 - 3), Moderate Pain (4 - 6)  ALBUTerol    90 MICROgram(s) HFA Inhaler 2 Puff(s) Inhalation every 6 hours PRN Shortness of Breath and/or Wheezing

## 2021-06-16 NOTE — PROGRESS NOTE ADULT - ATTENDING COMMENTS
No new complaints  Improving neurologically and functionally  Case reviewed with Medicine   Discharge plan is in progress

## 2021-06-17 LAB
ALBUMIN SERPL ELPH-MCNC: 2.7 G/DL — LOW (ref 3.3–5)
ALP SERPL-CCNC: 91 U/L — SIGNIFICANT CHANGE UP (ref 40–120)
ALT FLD-CCNC: 20 U/L — SIGNIFICANT CHANGE UP (ref 10–45)
ANION GAP SERPL CALC-SCNC: 4 MMOL/L — LOW (ref 5–17)
AST SERPL-CCNC: 18 U/L — SIGNIFICANT CHANGE UP (ref 10–40)
BASOPHILS # BLD AUTO: 0.06 K/UL — SIGNIFICANT CHANGE UP (ref 0–0.2)
BASOPHILS NFR BLD AUTO: 0.9 % — SIGNIFICANT CHANGE UP (ref 0–2)
BILIRUB SERPL-MCNC: 0.3 MG/DL — SIGNIFICANT CHANGE UP (ref 0.2–1.2)
BUN SERPL-MCNC: 27 MG/DL — HIGH (ref 7–23)
CALCIUM SERPL-MCNC: 8.9 MG/DL — SIGNIFICANT CHANGE UP (ref 8.4–10.5)
CHLORIDE SERPL-SCNC: 101 MMOL/L — SIGNIFICANT CHANGE UP (ref 96–108)
CO2 SERPL-SCNC: 31 MMOL/L — SIGNIFICANT CHANGE UP (ref 22–31)
CREAT SERPL-MCNC: 1.19 MG/DL — SIGNIFICANT CHANGE UP (ref 0.5–1.3)
EOSINOPHIL # BLD AUTO: 0.27 K/UL — SIGNIFICANT CHANGE UP (ref 0–0.5)
EOSINOPHIL NFR BLD AUTO: 3.9 % — SIGNIFICANT CHANGE UP (ref 0–6)
GLUCOSE BLDC GLUCOMTR-MCNC: 147 MG/DL — HIGH (ref 70–99)
GLUCOSE BLDC GLUCOMTR-MCNC: 164 MG/DL — HIGH (ref 70–99)
GLUCOSE BLDC GLUCOMTR-MCNC: 207 MG/DL — HIGH (ref 70–99)
GLUCOSE BLDC GLUCOMTR-MCNC: 222 MG/DL — HIGH (ref 70–99)
GLUCOSE SERPL-MCNC: 169 MG/DL — HIGH (ref 70–99)
HCT VFR BLD CALC: 29.4 % — LOW (ref 34.5–45)
HGB BLD-MCNC: 9.4 G/DL — LOW (ref 11.5–15.5)
IMM GRANULOCYTES NFR BLD AUTO: 0.4 % — SIGNIFICANT CHANGE UP (ref 0–1.5)
LYMPHOCYTES # BLD AUTO: 1.49 K/UL — SIGNIFICANT CHANGE UP (ref 1–3.3)
LYMPHOCYTES # BLD AUTO: 21.3 % — SIGNIFICANT CHANGE UP (ref 13–44)
MCHC RBC-ENTMCNC: 29.3 PG — SIGNIFICANT CHANGE UP (ref 27–34)
MCHC RBC-ENTMCNC: 32 GM/DL — SIGNIFICANT CHANGE UP (ref 32–36)
MCV RBC AUTO: 91.6 FL — SIGNIFICANT CHANGE UP (ref 80–100)
MONOCYTES # BLD AUTO: 0.75 K/UL — SIGNIFICANT CHANGE UP (ref 0–0.9)
MONOCYTES NFR BLD AUTO: 10.7 % — SIGNIFICANT CHANGE UP (ref 2–14)
NEUTROPHILS # BLD AUTO: 4.38 K/UL — SIGNIFICANT CHANGE UP (ref 1.8–7.4)
NEUTROPHILS NFR BLD AUTO: 62.8 % — SIGNIFICANT CHANGE UP (ref 43–77)
NRBC # BLD: 0 /100 WBCS — SIGNIFICANT CHANGE UP (ref 0–0)
PLATELET # BLD AUTO: 239 K/UL — SIGNIFICANT CHANGE UP (ref 150–400)
POTASSIUM SERPL-MCNC: 4.8 MMOL/L — SIGNIFICANT CHANGE UP (ref 3.5–5.3)
POTASSIUM SERPL-SCNC: 4.8 MMOL/L — SIGNIFICANT CHANGE UP (ref 3.5–5.3)
PROT SERPL-MCNC: 6.6 G/DL — SIGNIFICANT CHANGE UP (ref 6–8.3)
RBC # BLD: 3.21 M/UL — LOW (ref 3.8–5.2)
RBC # FLD: 12.2 % — SIGNIFICANT CHANGE UP (ref 10.3–14.5)
SODIUM SERPL-SCNC: 136 MMOL/L — SIGNIFICANT CHANGE UP (ref 135–145)
WBC # BLD: 6.98 K/UL — SIGNIFICANT CHANGE UP (ref 3.8–10.5)
WBC # FLD AUTO: 6.98 K/UL — SIGNIFICANT CHANGE UP (ref 3.8–10.5)

## 2021-06-17 PROCEDURE — 99232 SBSQ HOSP IP/OBS MODERATE 35: CPT

## 2021-06-17 PROCEDURE — 99233 SBSQ HOSP IP/OBS HIGH 50: CPT

## 2021-06-17 RX ORDER — LOSARTAN POTASSIUM 100 MG/1
25 TABLET, FILM COATED ORAL DAILY
Refills: 0 | Status: DISCONTINUED | OUTPATIENT
Start: 2021-06-17 | End: 2021-06-19

## 2021-06-17 RX ORDER — HYDRALAZINE HCL 50 MG
25 TABLET ORAL EVERY 8 HOURS
Refills: 0 | Status: DISCONTINUED | OUTPATIENT
Start: 2021-06-17 | End: 2021-06-21

## 2021-06-17 RX ORDER — ALBUTEROL 90 UG/1
2 AEROSOL, METERED ORAL EVERY 6 HOURS
Refills: 0 | Status: DISCONTINUED | OUTPATIENT
Start: 2021-06-17 | End: 2021-06-21

## 2021-06-17 RX ADMIN — CHLORHEXIDINE GLUCONATE 15 MILLILITER(S): 213 SOLUTION TOPICAL at 05:25

## 2021-06-17 RX ADMIN — Medication 4: at 12:12

## 2021-06-17 RX ADMIN — Medication 1 MILLIGRAM(S): at 12:14

## 2021-06-17 RX ADMIN — ALBUTEROL 2 PUFF(S): 90 AEROSOL, METERED ORAL at 17:24

## 2021-06-17 RX ADMIN — METFORMIN HYDROCHLORIDE 850 MILLIGRAM(S): 850 TABLET ORAL at 12:13

## 2021-06-17 RX ADMIN — LISINOPRIL 5 MILLIGRAM(S): 2.5 TABLET ORAL at 05:25

## 2021-06-17 RX ADMIN — ARIPIPRAZOLE 10 MILLIGRAM(S): 15 TABLET ORAL at 21:31

## 2021-06-17 RX ADMIN — Medication 2: at 08:11

## 2021-06-17 RX ADMIN — ALBUTEROL 2 PUFF(S): 90 AEROSOL, METERED ORAL at 21:33

## 2021-06-17 RX ADMIN — NYSTATIN CREAM 1 APPLICATION(S): 100000 CREAM TOPICAL at 05:26

## 2021-06-17 RX ADMIN — Medication 20 MILLIGRAM(S): at 05:25

## 2021-06-17 RX ADMIN — ATORVASTATIN CALCIUM 40 MILLIGRAM(S): 80 TABLET, FILM COATED ORAL at 21:31

## 2021-06-17 RX ADMIN — AMLODIPINE BESYLATE 10 MILLIGRAM(S): 2.5 TABLET ORAL at 05:25

## 2021-06-17 RX ADMIN — CHLORHEXIDINE GLUCONATE 15 MILLILITER(S): 213 SOLUTION TOPICAL at 17:27

## 2021-06-17 RX ADMIN — FLUVOXAMINE MALEATE 150 MILLIGRAM(S): 25 TABLET ORAL at 21:31

## 2021-06-17 RX ADMIN — NYSTATIN CREAM 1 APPLICATION(S): 100000 CREAM TOPICAL at 17:28

## 2021-06-17 RX ADMIN — Medication 25 MILLIGRAM(S): at 21:30

## 2021-06-17 RX ADMIN — Medication 20 MILLIGRAM(S): at 15:06

## 2021-06-17 NOTE — PROGRESS NOTE ADULT - SUBJECTIVE AND OBJECTIVE BOX
Denies complaints    Vital Signs Last 24 Hrs  T(C): 36.4 (06-17-21 @ 09:10), Max: 36.5 (06-16-21 @ 21:12)  T(F): 97.6 (06-17-21 @ 09:10), Max: 97.7 (06-16-21 @ 21:12)  HR: 68 (06-17-21 @ 15:08) (64 - 84)  BP: 156/72 (06-17-21 @ 15:08) (137/61 - 156/72)  RR: 15 (06-17-21 @ 09:10) (15 - 16)  SpO2: 96% (06-17-21 @ 09:10) (94% - 96%)    s1s2  b/l air entry  soft, ND  no edema                                        9.4    6.98  )-----------( 239      ( 17 Jun 2021 06:00 )             29.4     17 Jun 2021 06:00    136    |  101    |  27     ----------------------------<  169    4.8     |  31     |  1.19     Ca    8.9        17 Jun 2021 06:00    TPro  6.6    /  Alb  2.7    /  TBili  0.3    /  DBili  x      /  AST  18     /  ALT  20     /  AlkPhos  91     17 Jun 2021 06:00    LIVER FUNCTIONS - ( 17 Jun 2021 06:00 )  Alb: 2.7 g/dL / Pro: 6.6 g/dL / ALK PHOS: 91 U/L / ALT: 20 U/L / AST: 18 U/L / GGT: x           acetaminophen   Tablet .. 650 milliGRAM(s) Oral every 6 hours PRN  ALBUTerol    90 MICROgram(s) HFA Inhaler 2 Puff(s) Inhalation every 6 hours  amLODIPine   Tablet 10 milliGRAM(s) Oral daily  ARIPiprazole 10 milliGRAM(s) Oral at bedtime  atorvastatin 40 milliGRAM(s) Oral at bedtime  chlorhexidine 0.12% Liquid 15 milliLiter(s) Oral Mucosa two times a day  dextrose 40% Gel 15 Gram(s) Oral once  dextrose 5%. 1000 milliLiter(s) IV Continuous <Continuous>  dextrose 5%. 1000 milliLiter(s) IV Continuous <Continuous>  dextrose 50% Injectable 25 Gram(s) IV Push once  dextrose 50% Injectable 12.5 Gram(s) IV Push once  dextrose 50% Injectable 25 Gram(s) IV Push once  fluvoxaMINE 150 milliGRAM(s) Oral at bedtime  folic acid 1 milliGRAM(s) Oral daily  glucagon  Injectable 1 milliGRAM(s) IntraMuscular once  hydrALAZINE 20 milliGRAM(s) Oral every 8 hours  insulin lispro (ADMELOG) corrective regimen sliding scale   SubCutaneous at bedtime  insulin lispro (ADMELOG) corrective regimen sliding scale   SubCutaneous three times a day before meals  losartan 25 milliGRAM(s) Oral daily  metFORMIN 850 milliGRAM(s) Oral daily  nystatin Powder 1 Application(s) Topical two times a day    A/P:    Hx HTN, s/p ICH  CKD 3, stable  Avoid nephrotoxins  Will increase Hydralazine for BP control  Will follow    [phone]

## 2021-06-17 NOTE — PROGRESS NOTE ADULT - ATTENDING COMMENTS
Neurologically stable  Participates in therapy and achieving functional goals  Discharge plan is in progress  Will ask Medicine to evaluate for persistent cough and ? pleuritic chest discomfort

## 2021-06-17 NOTE — PROGRESS NOTE ADULT - SUBJECTIVE AND OBJECTIVE BOX
CHIEF COMPLAINT: no acute event overnight.  Pt reports pleuritic chest pain with coughing - encouraged deep breathing exercise which she admits to forgetting.  Denies sharp/pressure chest pain, HA, or GI/Gu symptoms.  Intermittent tiredness, SOB, and nonproductive cough.       HISTORY OF PRESENT ILLNESS    82 y/o F w/ PMH of DVT (on Eliquis), NIDDM, glaucoma, HLD, HTN, hs of NPH s/p  shunt  sent ot ED for +CTH w/ ICH.  CTH was done outpt after c/o dizziness and unsteadness/falling over to left side.  CTH done in ED showed pt had a small right basal ganglia hemorrhage.  Eliquis was discontinued and neurosurgery consulted for evaluation.  f/u CTH was done and ICH noted to be stable.  Per neurosurgery no interventions necessary.  Pt has had a total of 4 CTH all stable. repeat venous duplex neg for any dvt.  On admission pt also noted to have hypertensive crisis that resolved w/ IV lopressor.  BP was maintained at goal <140/90 in light of ICH.  Hospital course was complicated by pt developing acute metabolic encephalopathy due to UTI.  UA noted to have pyuria and cultures grew klebsiella variicola.  Pt was placed on emperic antibiotics and ID consulted.  Mentation improved within 24hrs of IV antibiotics.  Pt will be switched to po antibiotics pending sensitivities for total of 7 day course per ID recs.  Pts A1c noted to be 7.7 and hyperglycemia resolved w/ basal/bolus regimen.  Pt will resume metformin on discharge.  PASCALE was also noted and is likely prerenal.  patient started on dysphagia 1 diet with thin liquids.     Patient was evaluated by PM&R and therapy for functional deficits and gait/ADL impairments and recommend acute rehabilitation.  Patient was medically optimized for discharge to [facility] inpatient acute rehab on 2021. (2021 14:43)        PAST MEDICAL & SURGICAL HISTORY:  Hypertension  Hyperlipidemia  Diabetes  Glaucoma  Osteoarthritis  Small bowel obstruction  S/P hysterectomy   and Oopherectomy in   S/P cholecystectomy    Achilles tendon injury  repair  right scalene muscle release  S/P knee replacement, left      VITALS  Vital Signs Last 24 Hrs  T(C): 36.4 (2021 09:10), Max: 36.5 (2021 21:12)  T(F): 97.6 (2021 09:10), Max: 97.7 (2021 21:12)  HR: 84 (2021 09:10) (64 - 84)  BP: 155/70 (2021 09:10) (137/61 - 155/70)  BP(mean): --  RR: 15 (2021 09:10) (15 - 16)  SpO2: 96% (2021 09:10) (94% - 96%)    PHYSICAL EXAM  Constitutional - NAD, Comfortable  HEENT - NCAT, EOMI  Neck - Supple, No limited ROM  Chest - diminished R>L base  Cardiovascular - RRR, S1S2  Abdomen - Soft, NTND  Extremities -No calf tenderness   Neurologic Exam -  no new focal deficits                       RECENT LABS  LAB                        9.4    6.98  )-----------( 239      ( 2021 06:00 )             29.4     06-17    136  |  101  |  27<H>  ----------------------------<  169<H>  4.8   |  31  |  1.19    Ca    8.9      2021 06:00    TPro  6.6  /  Alb  2.7<L>  /  TBili  0.3  /  DBili  x   /  AST  18  /  ALT  20  /  AlkPhos  91  -17    CAPILLARY BLOOD GLUCOSE  POCT Blood Glucose.: 164 mg/dL (2021 07:26)  POCT Blood Glucose.: 215 mg/dL (2021 21:14)  POCT Blood Glucose.: 174 mg/dL (2021 16:39)  POCT Blood Glucose.: 185 mg/dL (2021 12:10)      LIVER FUNCTIONS - ( 2021 06:00 )  Alb: 2.7 g/dL / Pro: 6.6 g/dL / ALK PHOS: 91 U/L / ALT: 20 U/L / AST: 18 U/L / GGT: x             Urinalysis Basic - ( 2021 22:50 )    Color: Yellow / Appearance: Clear / S.010 / pH: x  Gluc: x / Ketone: Negative  / Bili: Negative / Urobili: Negative   Blood: x / Protein: 15 / Nitrite: Negative   Leuk Esterase: Moderate / RBC: 10-15 /HPF / WBC 11-25 /HPF   Sq Epi: x / Non Sq Epi: Neg.-Few / Bacteria: Trace /HPF      Urinalysis Basic - ( 2021 22:50 )    Color: Yellow / Appearance: Clear / S.010 / pH: x  Gluc: x / Ketone: Negative  / Bili: Negative / Urobili: Negative   Blood: x / Protein: 15 / Nitrite: Negative   Leuk Esterase: Moderate / RBC: 10-15 /HPF / WBC 11-25 /HPF   Sq Epi: x / Non Sq Epi: Neg.-Few / Bacteria: Trace /HPF    MEDICATIONS  (STANDING):  ALBUTerol    90 MICROgram(s) HFA Inhaler 2 Puff(s) Inhalation every 6 hours  amLODIPine   Tablet 10 milliGRAM(s) Oral daily  ARIPiprazole 10 milliGRAM(s) Oral at bedtime  atorvastatin 40 milliGRAM(s) Oral at bedtime  chlorhexidine 0.12% Liquid 15 milliLiter(s) Oral Mucosa two times a day  dextrose 40% Gel 15 Gram(s) Oral once  dextrose 5%. 1000 milliLiter(s) (50 mL/Hr) IV Continuous <Continuous>  dextrose 5%. 1000 milliLiter(s) (100 mL/Hr) IV Continuous <Continuous>  dextrose 50% Injectable 25 Gram(s) IV Push once  dextrose 50% Injectable 12.5 Gram(s) IV Push once  dextrose 50% Injectable 25 Gram(s) IV Push once  fluvoxaMINE 150 milliGRAM(s) Oral at bedtime  folic acid 1 milliGRAM(s) Oral daily  glucagon  Injectable 1 milliGRAM(s) IntraMuscular once  hydrALAZINE 20 milliGRAM(s) Oral every 8 hours  insulin lispro (ADMELOG) corrective regimen sliding scale   SubCutaneous at bedtime  insulin lispro (ADMELOG) corrective regimen sliding scale   SubCutaneous three times a day before meals  lisinopril 5 milliGRAM(s) Oral daily  metFORMIN 850 milliGRAM(s) Oral daily  nystatin Powder 1 Application(s) Topical two times a day    MEDICATIONS  (PRN):  acetaminophen   Tablet .. 650 milliGRAM(s) Oral every 6 hours PRN Temp greater or equal to 38C (100.4F), Mild Pain (1 - 3), Moderate Pain (4 - 6)

## 2021-06-17 NOTE — PROGRESS NOTE ADULT - SUBJECTIVE AND OBJECTIVE BOX
Patient is a 81y old  Female who presents with a chief complaint of CVA (17 Jun 2021 09:42)      Patient seen and examined at bedside. c/o intermittent productive cough that started overnight. denies headache, fever, chills, cp, sob, abd pain, nausea, vomiting, diarrhea, abd pain.     ALLERGIES:  adhesives (Pruritus; Blisters)  penicillin (Hives)  pineapple (Anaphylaxis)    MEDICATIONS  (STANDING):  ALBUTerol    90 MICROgram(s) HFA Inhaler 2 Puff(s) Inhalation every 6 hours  amLODIPine   Tablet 10 milliGRAM(s) Oral daily  ARIPiprazole 10 milliGRAM(s) Oral at bedtime  atorvastatin 40 milliGRAM(s) Oral at bedtime  chlorhexidine 0.12% Liquid 15 milliLiter(s) Oral Mucosa two times a day  dextrose 40% Gel 15 Gram(s) Oral once  dextrose 5%. 1000 milliLiter(s) (50 mL/Hr) IV Continuous <Continuous>  dextrose 5%. 1000 milliLiter(s) (100 mL/Hr) IV Continuous <Continuous>  dextrose 50% Injectable 25 Gram(s) IV Push once  dextrose 50% Injectable 12.5 Gram(s) IV Push once  dextrose 50% Injectable 25 Gram(s) IV Push once  fluvoxaMINE 150 milliGRAM(s) Oral at bedtime  folic acid 1 milliGRAM(s) Oral daily  glucagon  Injectable 1 milliGRAM(s) IntraMuscular once  hydrALAZINE 20 milliGRAM(s) Oral every 8 hours  insulin lispro (ADMELOG) corrective regimen sliding scale   SubCutaneous at bedtime  insulin lispro (ADMELOG) corrective regimen sliding scale   SubCutaneous three times a day before meals  lisinopril 5 milliGRAM(s) Oral daily  metFORMIN 850 milliGRAM(s) Oral daily  nystatin Powder 1 Application(s) Topical two times a day    MEDICATIONS  (PRN):  acetaminophen   Tablet .. 650 milliGRAM(s) Oral every 6 hours PRN Temp greater or equal to 38C (100.4F), Mild Pain (1 - 3), Moderate Pain (4 - 6)    Vital Signs Last 24 Hrs  T(F): 97.6 (17 Jun 2021 09:10), Max: 97.7 (16 Jun 2021 21:12)  HR: 84 (17 Jun 2021 09:10) (64 - 84)  BP: 155/70 (17 Jun 2021 09:10) (137/61 - 155/70)  RR: 15 (17 Jun 2021 09:10) (15 - 16)  SpO2: 96% (17 Jun 2021 09:10) (94% - 96%)  I&O's Summary      PHYSICAL EXAM:  General: NAD, awake, alert, frail elderly  ENT: MMM, no scleral icterus  Neck: Supple, No JVD  Lungs: Respirations unlabored. no cough. CTA b/l, diminished at bases, comfortable on NC  Cardio: RRR, S1/S2, +soft systolic murmur  Abdomen: Soft, Nontender, Nondistended; Bowel sounds present  Extremities: No calf tenderness, No pitting edema b/l    LABS:                        9.4    6.98  )-----------( 239      ( 17 Jun 2021 06:00 )             29.4       06-17    136  |  101  |  27  ----------------------------<  169  4.8   |  31  |  1.19    Ca    8.9      17 Jun 2021 06:00    TPro  6.6  /  Alb  2.7  /  TBili  0.3  /  DBili  x   /  AST  18  /  ALT  20  /  AlkPhos  91  06-17     eGFR if Non African American: 43 mL/min/1.73M2 (06-17-21 @ 06:00)  eGFR if African American: 50 mL/min/1.73M2 (06-17-21 @ 06:00)    05-30 Chol 133 mg/dL LDL -- HDL 54 mg/dL Trig 103 mg/dL    POCT Blood Glucose.: 222 mg/dL (17 Jun 2021 11:59)  POCT Blood Glucose.: 164 mg/dL (17 Jun 2021 07:26)  POCT Blood Glucose.: 215 mg/dL (16 Jun 2021 21:14)  POCT Blood Glucose.: 174 mg/dL (16 Jun 2021 16:39)    COVID-19 PCR: NotDetec (06-12-21 @ 05:45)  COVID-19 PCR: NotDetec (06-05-21 @ 18:40)  COVID-19 PCR: NotDetec (05-28-21 @ 13:40)    RADIOLOGY & ADDITIONAL TESTS: reviewed      Care Discussed with Consultants/Other Providers: yes

## 2021-06-17 NOTE — PROGRESS NOTE ADULT - ASSESSMENT
82 y/o F with a history of DVT, DM II, glaucoma, HLD, HTN, NPH s/p  admitted to [facility] after hospital course for ICH. CT Head + for small right basal ganglia hemorrhage.  Hospital course was complicated by acute metabolic encephalopathy due to UTI (klebsiella variicola).      Cough  -Afebrile, hemodynamically stable. continue supplemental oxygen with nasal canula.   -Possibly due to post nasal drip or hx of COVID  -Consider nasal gel, trial of flonase  -If febrile, worsening cough, or ZEPEDA, recommend CXR, send procal, cbc, bmp, BNP  -Recently completed cftx for UTI 6/10  -No wheezing noted, though cont albuterol    Dizziness, unsteadiness, and fall due to acute ICH of basal ganglia causing Impaired Gait, Mobility, and ADLs  NPH s/p  shunt  History DVT  - Continue comprehensive rehab program of PT/OT  - Fall precautions  - Eliquis discontinued for DVT due to IPH  - Duplex Venous Lower, Bilateral (05.28.21 @ 15:06) - No evidence of deep venous thrombosis in either lower extremity  - Continue statin  - Bowel regimen as per primary team  - Pain meds as per primary team     Acute Hypoxic Respiratory Failure likely due to COVID History  - No history of Asthma or COPD  - Elevated d- dimer likely due to COVID history  - Albuterol  - CT chest (6/9) Trace right pleural effusion and atelectasis/consolidation of lateral-posterior segments of right lower lobe. Atelectasis of medial segment of right middle lobe. Patchy groundglass opacities within the left lower lobe.  The anteromedial part of the right hemidiaphragm is elevated most likely secondary to eventration.  - Oxygen supplement PRN to keep O2 sat > 92%  - Pulm appreciated    Acute UTI  - UCx (6/2) Kleb Variicola  - Ceftriaxone completed    Uncontrolled Hypertension  Episode of bradycardia - likely after beta blocker was given  - On HCTZ, lisinopril 10mg and amlodipine at home  - EKG (6/9) shows sinus bradycardia, PACs, [id number]  - continue hydralazine 20mg q8h, amlodipine 10mg qd  - Lisinopril 5mg qd added, if SBP >150, increase to 10mg qd  - DC'd metoprolol 6/14, Off HCTZ 25mg  - Nephro following    Diabetes Mellitus II  - BS controlled. off lantus and admelog  - Patient reports taking metformin TID. Due arias, starting metformin and observing FS  - Continue metformin 850mg daily  - ISS and monitor FS    Bipolar disorder  - HELD Pristiq, Continue Abilify, and Luvox  - Neuro Psych appreciated     Normocytic Anemia  - Stable  - continue folic acid    Possible arias on CKD 3a  - encourage oral intake  - avoid nephrotic agents    Hypoalbuminemia  - consider nutrition consult    DVT ppx - as per primary  80 y/o F with a history of DVT, DM II, glaucoma, HLD, HTN, NPH s/p  [facility] hospital course for ICH. CT Head + for small right basal ganglia hemorrhage.  Hospital course was complicated by acute metabolic encephalopathy due to UTI (klebsiella variicola).      Cough  -Afebrile, hemodynamically stable. continue supplemental oxygen with nasal canula.   -Possibly due to post nasal drip or hx of COVID. Lisinopril recently started, ?possible ACEI induced cough  -Consider nasal gel, trial of flonase  -If febrile, worsening cough, or ZEPEDA, recommend CXR, send procal, cbc, bmp, BNP  -Recently completed cftx for UTI 6/10  -No wheezing noted, though cont albuterol    Dizziness, unsteadiness, and fall due to acute ICH of basal ganglia causing Impaired Gait, Mobility, and ADLs  NPH s/p  shunt  History DVT  - Continue comprehensive rehab program of PT/OT  - Fall precautions  - Eliquis discontinued for DVT due to IPH  - Duplex Venous Lower, Bilateral (05.28.21 @ 15:06) - No evidence of deep venous thrombosis in either lower extremity  - Continue statin  - Bowel regimen as per primary team  - Pain meds as per primary team     Acute Hypoxic Respiratory Failure likely due to COVID History  - No history of Asthma or COPD  - Elevated d- dimer likely due to COVID history  - Albuterol  - CT chest (6/9) Trace right pleural effusion and atelectasis/consolidation of lateral-posterior segments of right lower lobe. Atelectasis of medial segment of right middle lobe. Patchy groundglass opacities within the left lower lobe.  The anteromedial part of the right hemidiaphragm is elevated most likely secondary to eventration.  - Oxygen supplement PRN to keep O2 sat > 92%  - Pulm appreciated    Acute UTI  - UCx (6/2) Kleb Variicola  - Ceftriaxone completed    Uncontrolled Hypertension  Episode of bradycardia - likely after beta blocker was given  - EKG (6/9) shows sinus bradycardia, PACs, [id number]  - continue hydralazine 20mg q8h, amlodipine 10mg qd  - Lisinopril 5mg qd added - switch to losartan  - DC'd metoprolol 6/14, Off HCTZ 25mg  - Nephro following    Diabetes Mellitus II  - BS controlled. off lantus and admelog  - Patient reports taking metformin TID. Due arias, starting metformin and observing FS  - Continue metformin 850mg daily  - ISS and monitor FS    Bipolar disorder  - HELD Pristiq, Continue Abilify, and Luvox  - Neuro Psych appreciated     Normocytic Anemia  - Stable  - continue folic acid    Possible arias on CKD 3a  - encourage oral intake  - avoid nephrotic agents    Hypoalbuminemia  - consider nutrition consult    DVT ppx - as per primary

## 2021-06-18 LAB
GLUCOSE BLDC GLUCOMTR-MCNC: 161 MG/DL — HIGH (ref 70–99)
GLUCOSE BLDC GLUCOMTR-MCNC: 177 MG/DL — HIGH (ref 70–99)
GLUCOSE BLDC GLUCOMTR-MCNC: 181 MG/DL — HIGH (ref 70–99)
GLUCOSE BLDC GLUCOMTR-MCNC: 222 MG/DL — HIGH (ref 70–99)

## 2021-06-18 PROCEDURE — 99232 SBSQ HOSP IP/OBS MODERATE 35: CPT

## 2021-06-18 RX ORDER — METFORMIN HYDROCHLORIDE 850 MG/1
500 TABLET ORAL
Refills: 0 | Status: DISCONTINUED | OUTPATIENT
Start: 2021-06-18 | End: 2021-06-21

## 2021-06-18 RX ADMIN — Medication 4: at 12:18

## 2021-06-18 RX ADMIN — ALBUTEROL 2 PUFF(S): 90 AEROSOL, METERED ORAL at 04:37

## 2021-06-18 RX ADMIN — Medication 25 MILLIGRAM(S): at 13:56

## 2021-06-18 RX ADMIN — Medication 650 MILLIGRAM(S): at 16:20

## 2021-06-18 RX ADMIN — Medication 650 MILLIGRAM(S): at 15:48

## 2021-06-18 RX ADMIN — CHLORHEXIDINE GLUCONATE 15 MILLILITER(S): 213 SOLUTION TOPICAL at 05:28

## 2021-06-18 RX ADMIN — AMLODIPINE BESYLATE 10 MILLIGRAM(S): 2.5 TABLET ORAL at 05:28

## 2021-06-18 RX ADMIN — Medication 25 MILLIGRAM(S): at 05:28

## 2021-06-18 RX ADMIN — Medication 2: at 16:59

## 2021-06-18 RX ADMIN — Medication 25 MILLIGRAM(S): at 22:04

## 2021-06-18 RX ADMIN — METFORMIN HYDROCHLORIDE 500 MILLIGRAM(S): 850 TABLET ORAL at 17:05

## 2021-06-18 RX ADMIN — NYSTATIN CREAM 1 APPLICATION(S): 100000 CREAM TOPICAL at 17:05

## 2021-06-18 RX ADMIN — METFORMIN HYDROCHLORIDE 850 MILLIGRAM(S): 850 TABLET ORAL at 12:18

## 2021-06-18 RX ADMIN — ATORVASTATIN CALCIUM 40 MILLIGRAM(S): 80 TABLET, FILM COATED ORAL at 22:04

## 2021-06-18 RX ADMIN — Medication 2: at 07:49

## 2021-06-18 RX ADMIN — ALBUTEROL 2 PUFF(S): 90 AEROSOL, METERED ORAL at 17:07

## 2021-06-18 RX ADMIN — ALBUTEROL 2 PUFF(S): 90 AEROSOL, METERED ORAL at 08:10

## 2021-06-18 RX ADMIN — CHLORHEXIDINE GLUCONATE 15 MILLILITER(S): 213 SOLUTION TOPICAL at 17:05

## 2021-06-18 RX ADMIN — LOSARTAN POTASSIUM 25 MILLIGRAM(S): 100 TABLET, FILM COATED ORAL at 05:28

## 2021-06-18 RX ADMIN — ARIPIPRAZOLE 10 MILLIGRAM(S): 15 TABLET ORAL at 22:04

## 2021-06-18 RX ADMIN — FLUVOXAMINE MALEATE 150 MILLIGRAM(S): 25 TABLET ORAL at 22:04

## 2021-06-18 RX ADMIN — Medication 1 MILLIGRAM(S): at 12:18

## 2021-06-18 RX ADMIN — NYSTATIN CREAM 1 APPLICATION(S): 100000 CREAM TOPICAL at 05:29

## 2021-06-18 NOTE — PROGRESS NOTE ADULT - ATTENDING COMMENTS
Patient medically and neurologically  stable. Making progress towards rehab goals.   Continue rehab program. Case discussed with Medicine

## 2021-06-18 NOTE — PROGRESS NOTE ADULT - SUBJECTIVE AND OBJECTIVE BOX
Patient is a 81y old  Female who presents with a chief complaint of CVA (17 Jun 2021 09:42)      Patient seen and examined at bedside. c/o intermittent msk chest pain 2/2 cough - though no cough on exam nd pt forgets to practice breathing exercises.      ALLERGIES:  adhesives (Pruritus; Blisters)  penicillin (Hives)  pineapple (Anaphylaxis)    MEDICATIONS  (STANDING):  ALBUTerol    90 MICROgram(s) HFA Inhaler 2 Puff(s) Inhalation every 6 hours  amLODIPine   Tablet 10 milliGRAM(s) Oral daily  ARIPiprazole 10 milliGRAM(s) Oral at bedtime  atorvastatin 40 milliGRAM(s) Oral at bedtime  chlorhexidine 0.12% Liquid 15 milliLiter(s) Oral Mucosa two times a day  dextrose 40% Gel 15 Gram(s) Oral once  dextrose 5%. 1000 milliLiter(s) (50 mL/Hr) IV Continuous <Continuous>  dextrose 5%. 1000 milliLiter(s) (100 mL/Hr) IV Continuous <Continuous>  dextrose 50% Injectable 25 Gram(s) IV Push once  dextrose 50% Injectable 12.5 Gram(s) IV Push once  dextrose 50% Injectable 25 Gram(s) IV Push once  fluvoxaMINE 150 milliGRAM(s) Oral at bedtime  folic acid 1 milliGRAM(s) Oral daily  glucagon  Injectable 1 milliGRAM(s) IntraMuscular once  hydrALAZINE 20 milliGRAM(s) Oral every 8 hours  insulin lispro (ADMELOG) corrective regimen sliding scale   SubCutaneous at bedtime  insulin lispro (ADMELOG) corrective regimen sliding scale   SubCutaneous three times a day before meals  lisinopril 5 milliGRAM(s) Oral daily  metFORMIN 850 milliGRAM(s) Oral daily  nystatin Powder 1 Application(s) Topical two times a day    MEDICATIONS  (PRN):  acetaminophen   Tablet .. 650 milliGRAM(s) Oral every 6 hours PRN Temp greater or equal to 38C (100.4F), Mild Pain (1 - 3), Moderate Pain (4 - 6)    Vital Signs Last 24 Hrs  T(F): 97.6 (17 Jun 2021 09:10), Max: 97.7 (16 Jun 2021 21:12)  HR: 84 (17 Jun 2021 09:10) (64 - 84)  BP: 155/70 (17 Jun 2021 09:10) (137/61 - 155/70)  RR: 15 (17 Jun 2021 09:10) (15 - 16)  SpO2: 96% (17 Jun 2021 09:10) (94% - 96%)  I&O's Summary      PHYSICAL EXAM:  General: NAD, awake, alert, frail elderly  ENT: MMM, no scleral icterus  Neck: Supple, No JVD  Lungs: Respirations unlabored. no cough. CTA b/l, diminished at bases, comfortable on NC  Cardio: RRR, S1/S2, +soft systolic murmur  Abdomen: Soft, Nontender, Nondistended; Bowel sounds present  Extremities: No calf tenderness, No pitting edema b/l    LABS:                        9.4    6.98  )-----------( 239      ( 17 Jun 2021 06:00 )             29.4       06-17    136  |  101  |  27  ----------------------------<  169  4.8   |  31  |  1.19    Ca    8.9      17 Jun 2021 06:00    TPro  6.6  /  Alb  2.7  /  TBili  0.3  /  DBili  x   /  AST  18  /  ALT  20  /  AlkPhos  91  06-17     eGFR if Non African American: 43 mL/min/1.73M2 (06-17-21 @ 06:00)  eGFR if African American: 50 mL/min/1.73M2 (06-17-21 @ 06:00)    05-30 Chol 133 mg/dL LDL -- HDL 54 mg/dL Trig 103 mg/dL    POCT Blood Glucose.: 222 mg/dL (17 Jun 2021 11:59)  POCT Blood Glucose.: 164 mg/dL (17 Jun 2021 07:26)  POCT Blood Glucose.: 215 mg/dL (16 Jun 2021 21:14)  POCT Blood Glucose.: 174 mg/dL (16 Jun 2021 16:39)    COVID-19 PCR: NotDetec (06-12-21 @ 05:45)  COVID-19 PCR: NotDetec (06-05-21 @ 18:40)  COVID-19 PCR: NotDetec (05-28-21 @ 13:40)    RADIOLOGY & ADDITIONAL TESTS: reviewed      Care Discussed with Consultants/Other Providers: yes Patient is a 81y old  Female who presents with a chief complaint of CVA (17 Jun 2021 09:42)      Patient seen and examined at bedside. c/o intermittent msk chest pain 2/2 cough - though no cough on exam and pt forgets to practice breathing exercises. no additional medical complaints.    ALLERGIES:  adhesives (Pruritus; Blisters)  penicillin (Hives)  pineapple (Anaphylaxis)    MEDICATIONS  (STANDING):  ALBUTerol    90 MICROgram(s) HFA Inhaler 2 Puff(s) Inhalation every 6 hours  amLODIPine   Tablet 10 milliGRAM(s) Oral daily  ARIPiprazole 10 milliGRAM(s) Oral at bedtime  atorvastatin 40 milliGRAM(s) Oral at bedtime  chlorhexidine 0.12% Liquid 15 milliLiter(s) Oral Mucosa two times a day  dextrose 40% Gel 15 Gram(s) Oral once  dextrose 5%. 1000 milliLiter(s) (50 mL/Hr) IV Continuous <Continuous>  dextrose 5%. 1000 milliLiter(s) (100 mL/Hr) IV Continuous <Continuous>  dextrose 50% Injectable 25 Gram(s) IV Push once  dextrose 50% Injectable 12.5 Gram(s) IV Push once  dextrose 50% Injectable 25 Gram(s) IV Push once  fluvoxaMINE 150 milliGRAM(s) Oral at bedtime  folic acid 1 milliGRAM(s) Oral daily  glucagon  Injectable 1 milliGRAM(s) IntraMuscular once  hydrALAZINE 20 milliGRAM(s) Oral every 8 hours  insulin lispro (ADMELOG) corrective regimen sliding scale   SubCutaneous at bedtime  insulin lispro (ADMELOG) corrective regimen sliding scale   SubCutaneous three times a day before meals  lisinopril 5 milliGRAM(s) Oral daily  metFORMIN 850 milliGRAM(s) Oral daily  nystatin Powder 1 Application(s) Topical two times a day    MEDICATIONS  (PRN):  acetaminophen   Tablet .. 650 milliGRAM(s) Oral every 6 hours PRN Temp greater or equal to 38C (100.4F), Mild Pain (1 - 3), Moderate Pain (4 - 6)    Vital Signs Last 24 Hrs  T(C): 36.6 (18 Jun 2021 07:33), Max: 36.6 (17 Jun 2021 19:43)  T(F): 97.9 (18 Jun 2021 07:33), Max: 97.9 (17 Jun 2021 19:43)  HR: 76 (18 Jun 2021 08:10) (66 - 78)  BP: 146/73 (18 Jun 2021 07:33) (122/56 - 156/72)  BP(mean): --  RR: 15 (18 Jun 2021 07:33) (14 - 15)  SpO2: 95% (18 Jun 2021 08:10) (95% - 96%)  I&O's Summary      PHYSICAL EXAM:  General: NAD, awake, alert, frail elderly  ENT: MMM, no scleral icterus  Neck: Supple, No JVD  Lungs: Respirations unlabored. no cough. CTA b/l, diminished at bases, comfortable on NC  Cardio: RRR, S1/S2, +soft systolic murmur  Abdomen: Soft, Nontender, Nondistended; Bowel sounds present  Extremities: No calf tenderness, No pitting edema b/l    LABS:                        9.4    6.98  )-----------( 239      ( 17 Jun 2021 06:00 )             29.4       06-17    136  |  101  |  27  ----------------------------<  169  4.8   |  31  |  1.19    Ca    8.9      17 Jun 2021 06:00    TPro  6.6  /  Alb  2.7  /  TBili  0.3  /  DBili  x   /  AST  18  /  ALT  20  /  AlkPhos  91  06-17     eGFR if Non African American: 43 mL/min/1.73M2 (06-17-21 @ 06:00)  eGFR if African American: 50 mL/min/1.73M2 (06-17-21 @ 06:00)    05-30 Chol 133 mg/dL LDL -- HDL 54 mg/dL Trig 103 mg/dL    POCT Blood Glucose.: 222 mg/dL (17 Jun 2021 11:59)  POCT Blood Glucose.: 164 mg/dL (17 Jun 2021 07:26)  POCT Blood Glucose.: 215 mg/dL (16 Jun 2021 21:14)  POCT Blood Glucose.: 174 mg/dL (16 Jun 2021 16:39)    COVID-19 PCR: NotDetec (06-12-21 @ 05:45)  COVID-19 PCR: NotDetec (06-05-21 @ 18:40)  COVID-19 PCR: NotDetec (05-28-21 @ 13:40)    RADIOLOGY & ADDITIONAL TESTS: reviewed      Care Discussed with Consultants/Other Providers: yes

## 2021-06-18 NOTE — PROGRESS NOTE ADULT - ASSESSMENT
ASSESSMENT/PLAN  80 y/o F w/ PMH of DVT (on Eliquis), NIDDM, glaucoma, HLD, HTN, hs of NPH s/p  shunt 2015 sent ot ED for +CTH w/ ICH.  CTH was done outpt after c/o dizziness and unsteadness/falling over to left side.  Pt denies head trauma.  Evaluated by neurosurgery - no surgical intervention. Hospital course complicated by encephalopathic d/u UTI    COMORBIDITES/ACTIVE MEDICAL ISSUES     Gait Instability, ADL impairments and Functional impairments: start Comprehensive Rehab Program of PT/OT/SLP     # Right Basal ganglia hemorrhage  - Eliquis held (previous DVT) - negative LE Venous Doppler now   - CTH x4 - ICH stable   - BP goal: <140/90    # Hypertension   - Norvasc and Metoprolol- dc (6/14) d/t bradycardia   - Hydralazine 10 - inc 20 TID (6/11)  - Losartan 25 (6/17)  - BP Goal: <140/90    #Encephalopathy due to UTI - improving   - UCx grew klebsiella variicola  - start ceftriaxone x 5-7 days (completed on 6/10)    #Hypoxia  - on 3L NC  - Home O2? - started at [facility] , but pt did not see a need and declined  - Pulmonary consult appreciated - H/o COVID??, monitor closely, will follow recommendations   - encouraged incentive spirometry    #Chest pain - likely benign   - EKG - normal clarisse  - monitor    #Pleuritic pain  - albuterol prn switched to standing  - ACEI switched to ARB  - encourage incentive spirometry     #Hyperglycemia   - A1c 7.7  - Lantus 5U - DC 6/8  - Admelog 2U - DC 6/8  - metformin 850 QD (6/8)  - FS + ISS    #PASCALE  - improved  - Encourage oral fluid  - Avoid nephrotoxic med  - Nephro is following  - Renal US normal     #HLD  - Lipitor 40    #Bipolar disorder  - Pristiq ER 25- d/c (6/8)  - Fluvoxamine 150  - Abilify 10mg qhs  - psychiatry consult appreciated, case discussed     #Pain control  - Tylenol PRN    #GI/Bowel Mgmt   - Continent Senna,  Miralax PRN    #Hx of DVT (9/2020)  - SCDs, TEDs   - Doppler negative - 5/28  - Eliquis dc in light of hemorrhage    #FEN   - Diet - upgrade to mechanical soft-thin (6/8)  - Dysphagia  SLP - evaluation and treatment    Precautions / PROPHYLAXIS:   - Falls  - ortho: Weight bearing status: WBAT   - Lungs: Aspiration, Incentive Spirometer   - Pressure injury/Skin: Turn Q2hrs while in bed, OOB to Chair, PT/OT      TEAM MEETING 6/14  SW: lives with unemployed son, also has a daughter who is proxy - questionable family dynamic  OT: SV, eat, min A groom, min-mod toileting, Max A UBD/toilet transfer, Total A LBD - sone aided in LBD  PT: min A transfer, amb 70' RW min, 4 steps min A - 97% 2LNC, 94% 2LNC with ambulation  SLP: dec attention/memory, breakdown with semi complexity, poor fitting denture  barriers: dec memory, safety, visual hallucination - pt expressed concerns about son managing her finances  Goals: min assist with ADLs; close SV with transfer and ambulation; will need 24H help  EDOD: 6/29 home, 6/21 [facility]ULIEN? - proxy think [facility]ULI[facility] placement is best, pt express concerns with son managing her finance

## 2021-06-18 NOTE — PROGRESS NOTE ADULT - SUBJECTIVE AND OBJECTIVE BOX
Resting    Vital Signs Last 24 Hrs  T(C): 36.6 (06-18-21 @ 07:33), Max: 36.6 (06-17-21 @ 19:43)  T(F): 97.9 (06-18-21 @ 07:33), Max: 97.9 (06-17-21 @ 19:43)  HR: 70 (06-18-21 @ 17:08) (66 - 78)  BP: 146/73 (06-18-21 @ 07:33) (122/56 - 146/73)  RR: 15 (06-18-21 @ 07:33) (14 - 15)  SpO2: 94% (06-18-21 @ 17:08) (94% - 95%)    s1s2  b/l air entry  soft, ND  no edema                                                9.4    6.98  )-----------( 239      ( 17 Jun 2021 06:00 )             29.4     17 Jun 2021 06:00    136    |  101    |  27     ----------------------------<  169    4.8     |  31     |  1.19     Ca    8.9        17 Jun 2021 06:00    TPro  6.6    /  Alb  2.7    /  TBili  0.3    /  DBili  x      /  AST  18     /  ALT  20     /  AlkPhos  91     17 Jun 2021 06:00    LIVER FUNCTIONS - ( 17 Jun 2021 06:00 )  Alb: 2.7 g/dL / Pro: 6.6 g/dL / ALK PHOS: 91 U/L / ALT: 20 U/L / AST: 18 U/L / GGT: x           acetaminophen   Tablet .. 650 milliGRAM(s) Oral every 6 hours PRN  ALBUTerol    90 MICROgram(s) HFA Inhaler 2 Puff(s) Inhalation every 6 hours  amLODIPine   Tablet 10 milliGRAM(s) Oral daily  ARIPiprazole 10 milliGRAM(s) Oral at bedtime  atorvastatin 40 milliGRAM(s) Oral at bedtime  chlorhexidine 0.12% Liquid 15 milliLiter(s) Oral Mucosa two times a day  dextrose 40% Gel 15 Gram(s) Oral once  dextrose 5%. 1000 milliLiter(s) IV Continuous <Continuous>  dextrose 5%. 1000 milliLiter(s) IV Continuous <Continuous>  dextrose 50% Injectable 25 Gram(s) IV Push once  dextrose 50% Injectable 12.5 Gram(s) IV Push once  dextrose 50% Injectable 25 Gram(s) IV Push once  fluvoxaMINE 150 milliGRAM(s) Oral at bedtime  folic acid 1 milliGRAM(s) Oral daily  glucagon  Injectable 1 milliGRAM(s) IntraMuscular once  hydrALAZINE 25 milliGRAM(s) Oral every 8 hours  insulin lispro (ADMELOG) corrective regimen sliding scale   SubCutaneous at bedtime  insulin lispro (ADMELOG) corrective regimen sliding scale   SubCutaneous three times a day before meals  losartan 25 milliGRAM(s) Oral daily  metFORMIN 500 milliGRAM(s) Oral two times a day  nystatin Powder 1 Application(s) Topical two times a day    A/P:    Hx HTN, s/p ICH  CKD 3, stable  Avoid nephrotoxins  F/u BP on current meds  Will f/u [facility]    [phone]

## 2021-06-18 NOTE — PROGRESS NOTE ADULT - SUBJECTIVE AND OBJECTIVE BOX
CHIEF COMPLAINT: no acute event overnight - slept well but still feels tired.  Continues to have pleuritic chest pain with coughing.  States that she forgets to do deep breathing exercises.  Educated on benefits of deep breathing and that her hypoxia causes her to be tired and SOB with activities.      HISTORY OF PRESENT ILLNESS    80 y/o F w/ PMH of DVT (on Eliquis), NIDDM, glaucoma, HLD, HTN, hs of NPH s/p  shunt  sent ot ED for +CTH w/ ICH.  CTH was done outpt after c/o dizziness and unsteadness/falling over to left side.  CTH done in ED showed pt had a small right basal ganglia hemorrhage.  Eliquis was discontinued and neurosurgery consulted for evaluation.  f/u CTH was done and ICH noted to be stable.  Per neurosurgery no interventions necessary.  Pt has had a total of 4 CTH all stable. repeat venous duplex neg for any dvt.  On admission pt also noted to have hypertensive crisis that resolved w/ IV lopressor.  BP was maintained at goal <140/90 in light of ICH.  Hospital course was complicated by pt developing acute metabolic encephalopathy due to UTI.  UA noted to have pyuria and cultures grew klebsiella variicola.  Pt was placed on emperic antibiotics and ID consulted.  Mentation improved within 24hrs of IV antibiotics.  Pt will be switched to po antibiotics pending sensitivities for total of 7 day course per ID recs.  Pts A1c noted to be 7.7 and hyperglycemia resolved w/ basal/bolus regimen.  Pt will resume metformin on discharge.  PASCALE was also noted and is likely prerenal.  patient started on dysphagia 1 diet with thin liquids.     Patient was evaluated by PM&R and therapy for functional deficits and gait/ADL impairments and recommend acute rehabilitation.  Patient was medically optimized for discharge to [facility] inpatient acute rehab on 2021. (2021 14:43)        PAST MEDICAL & SURGICAL HISTORY:  Hypertension  Hyperlipidemia  Diabetes  Glaucoma  Osteoarthritis  Small bowel obstruction  S/P hysterectomy   and Oopherectomy in   S/P cholecystectomy    Achilles tendon injury  repair  right scalene muscle release  S/P knee replacement, left      VITALS  Vital Signs Last 24 Hrs  T(C): 36.6 (2021 07:33), Max: 36.6 (2021 19:43)  T(F): 97.9 (2021 07:33), Max: 97.9 (2021 19:43)  HR: 71 (2021 07:33) (66 - 74)  BP: 146/73 (2021 07:33) (122/56 - 156/72)  BP(mean): --  RR: 15 (2021 07:33) (14 - 15)  SpO2: 95% (2021 07:33) (95% - 96%)    PHYSICAL EXAM  Constitutional - NAD, Comfortable  HEENT - NCAT, EOMI  Neck - Supple, No limited ROM  Chest - diminished R>L base  Cardiovascular - RRR, S1S2  Abdomen - Soft, NTND  Extremities -No calf tenderness   Neurologic Exam -  no new focal deficits                       RECENT LABS  LAB                        9.4    6.98  )-----------( 239      ( 2021 06:00 )             29.4     06-17    136  |  101  |  27<H>  ----------------------------<  169<H>  4.8   |  31  |  1.19    Ca    8.9      2021 06:00    TPro  6.6  /  Alb  2.7<L>  /  TBili  0.3  /  DBili  x   /  AST  18  /  ALT  20  /  AlkPhos  91  -17    LIVER FUNCTIONS - ( 2021 06:00 )  Alb: 2.7 g/dL / Pro: 6.6 g/dL / ALK PHOS: 91 U/L / ALT: 20 U/L / AST: 18 U/L / GGT: x           CAPILLARY BLOOD GLUCOSE  POCT Blood Glucose.: 181 mg/dL (2021 07:11)  POCT Blood Glucose.: 207 mg/dL (2021 21:27)  POCT Blood Glucose.: 147 mg/dL (2021 16:54)  POCT Blood Glucose.: 222 mg/dL (2021 11:59)    Urinalysis Basic - ( 2021 22:50 )    Color: Yellow / Appearance: Clear / S.010 / pH: x  Gluc: x / Ketone: Negative  / Bili: Negative / Urobili: Negative   Blood: x / Protein: 15 / Nitrite: Negative   Leuk Esterase: Moderate / RBC: 10-15 /HPF / WBC 11-25 /HPF   Sq Epi: x / Non Sq Epi: Neg.-Few / Bacteria: Trace /HPF      Urinalysis Basic - ( 2021 22:50 )    Color: Yellow / Appearance: Clear / S.010 / pH: x  Gluc: x / Ketone: Negative  / Bili: Negative / Urobili: Negative   Blood: x / Protein: 15 / Nitrite: Negative   Leuk Esterase: Moderate / RBC: 10-15 /HPF / WBC 11-25 /HPF   Sq Epi: x / Non Sq Epi: Neg.-Few / Bacteria: Trace /HPF      MEDICATIONS  (STANDING):  ALBUTerol    90 MICROgram(s) HFA Inhaler 2 Puff(s) Inhalation every 6 hours  amLODIPine   Tablet 10 milliGRAM(s) Oral daily  ARIPiprazole 10 milliGRAM(s) Oral at bedtime  atorvastatin 40 milliGRAM(s) Oral at bedtime  chlorhexidine 0.12% Liquid 15 milliLiter(s) Oral Mucosa two times a day  dextrose 40% Gel 15 Gram(s) Oral once  dextrose 5%. 1000 milliLiter(s) (50 mL/Hr) IV Continuous <Continuous>  dextrose 5%. 1000 milliLiter(s) (100 mL/Hr) IV Continuous <Continuous>  dextrose 50% Injectable 25 Gram(s) IV Push once  dextrose 50% Injectable 12.5 Gram(s) IV Push once  dextrose 50% Injectable 25 Gram(s) IV Push once  fluvoxaMINE 150 milliGRAM(s) Oral at bedtime  folic acid 1 milliGRAM(s) Oral daily  glucagon  Injectable 1 milliGRAM(s) IntraMuscular once  hydrALAZINE 25 milliGRAM(s) Oral every 8 hours  insulin lispro (ADMELOG) corrective regimen sliding scale   SubCutaneous at bedtime  insulin lispro (ADMELOG) corrective regimen sliding scale   SubCutaneous three times a day before meals  losartan 25 milliGRAM(s) Oral daily  metFORMIN 850 milliGRAM(s) Oral daily  nystatin Powder 1 Application(s) Topical two times a day    MEDICATIONS  (PRN):  acetaminophen   Tablet .. 650 milliGRAM(s) Oral every 6 hours PRN Temp greater or equal to 38C (100.4F), Mild Pain (1 - 3), Moderate Pain (4 - 6)

## 2021-06-18 NOTE — DISCHARGE NOTE NURSING/CASE MANAGEMENT/SOCIAL WORK - PATIENT PORTAL LINK FT
You can access the FollowMyHealth Patient Portal offered by [facility] by registering at the following website: [email or web address]. By joining [employer]’s FollowMyHealth portal, you will also be able to view your health information using other applications (apps) compatible with our system.

## 2021-06-18 NOTE — PROGRESS NOTE ADULT - ASSESSMENT
80 y/o F with a history of DVT, DM II, glaucoma, HLD, HTN, NPH s/p  admitted to [facility] re[facility] after hospital course for ICH. CT Head + for small right basal ganglia hemorrhage.  Hospital course was complicated by acute metabolic encephalopathy due to UTI (klebsiella variicola).      Cough  -Afebrile, hemodynamically stable. continue supplemental oxygen with nasal canula.   -Consider nasal gel, trial of flonase  -If febrile, worsening cough, or ZEPEDA, recommend CXR, send procal, cbc, bmp, BNP  -Recently completed cftx for UTI 6/10  -No wheezing noted, though cont albuterol   -Breathing exercises, supportive care    Dizziness, unsteadiness, and fall due to acute ICH of basal ganglia causing Impaired Gait, Mobility, and ADLs  NPH s/p  shunt  History DVT  - Continue comprehensive rehab program of PT/OT  - Fall precautions  - Eliquis discontinued for DVT due to IPH  - Duplex Venous Lower, Bilateral (05.28.21 @ 15:06) - No evidence of deep venous thrombosis in either lower extremity  - Continue statin  - Bowel regimen as per primary team  - Pain meds as per primary team     Acute Hypoxic Respiratory Failure likely due to COVID History  - No history of Asthma or COPD  - Elevated d- dimer likely due to COVID history  - Albuterol  - CT chest (6/9) Trace right pleural effusion and atelectasis/consolidation of lateral-posterior segments of right lower lobe. Atelectasis of medial segment of right middle lobe. Patchy groundglass opacities within the left lower lobe.  The anteromedial part of the right hemidiaphragm is elevated most likely secondary to eventration.  - Oxygen supplement PRN to keep O2 sat > 92%  - Pulm appreciated    Acute UTI  - UCx (6/2) Kleb Variicola  - Ceftriaxone completed    Uncontrolled Hypertension  Episode of bradycardia - likely after beta blocker was given  - EKG (6/9) shows sinus bradycardia, PACs, [id number]  - continue hydralazine 20mg q8h, amlodipine 10mg qd  - Lisinopril 5mg qd added - switch to losartan  - DC'd metoprolol 6/14, Off HCTZ 25mg  - Nephro following    Diabetes Mellitus II  - Off lantus and admelog  - Patient reports taking metformin TID. Due arias, starting metformin and observing FS  - Increase metformin from 850mg qd to 500mg BID  - ISS and monitor FS    Bipolar disorder  - HELD Pristiq, Continue Abilify, and Luvox  - Neuro Psych appreciated     Normocytic Anemia  - Stable  - continue folic acid    Possible arias on CKD 3a  - encourage oral intake  - avoid nephrotic agents    Hypoalbuminemia  - consider nutrition consult    DVT ppx - as per primary  80 y/o F with a history of DVT, DM II, glaucoma, HLD, HTN, NPH s/p  admitted to [facility] after hospital course for ICH. CT Head + for small right basal ganglia hemorrhage.  Hospital course was complicated by acute metabolic encephalopathy due to UTI (klebsiella variicola).      Cough  -Afebrile, hemodynamically stable. continue supplemental oxygen with nasal canula.   -Consider nasal gel, trial of flonase  -If febrile, worsening cough, or ZEPEDA, recommend CXR, send procal, cbc, bmp, BNP  -Recently completed cftx for UTI 6/10  -No wheezing noted, though cont albuterol   -Breathing exercises, supportive care    Dizziness, unsteadiness, and fall due to acute ICH of basal ganglia causing Impaired Gait, Mobility, and ADLs  NPH s/p  shunt  History DVT  - Continue comprehensive rehab program of PT/OT  - Fall precautions  - Eliquis discontinued for DVT due to IPH  - Duplex Venous Lower, Bilateral (05.28.21 @ 15:06) - No evidence of deep venous thrombosis in either lower extremity  - Continue statin  - Bowel regimen as per primary team  - Pain meds as per primary team     Acute Hypoxic Respiratory Failure likely due to COVID History  - No history of Asthma or COPD  - Elevated d- dimer likely due to COVID history  - Albuterol  - CT chest (6/9) Trace right pleural effusion and atelectasis/consolidation of lateral-posterior segments of right lower lobe. Atelectasis of medial segment of right middle lobe. Patchy groundglass opacities within the left lower lobe.  The anteromedial part of the right hemidiaphragm is elevated most likely secondary to eventration.  - Oxygen supplement PRN to keep O2 sat > 92%  - Pulm appreciated    Acute UTI  - UCx (6/2) Kleb Variicola  - Ceftriaxone completed    Uncontrolled Hypertension  Episode of bradycardia - likely after beta blocker was given  - EKG (6/9) shows sinus bradycardia, PACs, [id number]  - continue hydralazine 20mg q8h, amlodipine 10mg qd  - Lisinopril 5mg qd added - switch to losartan due to cough  - DC'd metoprolol 6/14, Off HCTZ 25mg  - Nephro following    Diabetes Mellitus II  - Off lantus and admelog  - Patient reports taking metformin TID. Due arias, starting metformin and observing FS  - Increase metformin from 850mg qd to 500mg BID  - ISS and monitor FS    Bipolar disorder  - HELD Pristiq, Continue Abilify, and Luvox  - Neuro Psych appreciated     Normocytic Anemia  - Stable  - continue folic acid    Possible arias on CKD 3a  - encourage oral intake  - avoid nephrotic agents    Hypoalbuminemia  - consider nutrition consult    DVT ppx - as per primary

## 2021-06-19 LAB
ALBUMIN SERPL ELPH-MCNC: 2.8 G/DL — LOW (ref 3.3–5)
ALP SERPL-CCNC: 86 U/L — SIGNIFICANT CHANGE UP (ref 40–120)
ALT FLD-CCNC: 21 U/L — SIGNIFICANT CHANGE UP (ref 10–45)
ANION GAP SERPL CALC-SCNC: 6 MMOL/L — SIGNIFICANT CHANGE UP (ref 5–17)
ANION GAP SERPL CALC-SCNC: 6 MMOL/L — SIGNIFICANT CHANGE UP (ref 5–17)
AST SERPL-CCNC: 22 U/L — SIGNIFICANT CHANGE UP (ref 10–40)
BASOPHILS # BLD AUTO: 0.07 K/UL — SIGNIFICANT CHANGE UP (ref 0–0.2)
BASOPHILS NFR BLD AUTO: 1 % — SIGNIFICANT CHANGE UP (ref 0–2)
BILIRUB SERPL-MCNC: 0.3 MG/DL — SIGNIFICANT CHANGE UP (ref 0.2–1.2)
BUN SERPL-MCNC: 29 MG/DL — HIGH (ref 7–23)
BUN SERPL-MCNC: 30 MG/DL — HIGH (ref 7–23)
CALCIUM SERPL-MCNC: 8.9 MG/DL — SIGNIFICANT CHANGE UP (ref 8.4–10.5)
CALCIUM SERPL-MCNC: 9.3 MG/DL — SIGNIFICANT CHANGE UP (ref 8.4–10.5)
CHLORIDE SERPL-SCNC: 94 MMOL/L — LOW (ref 96–108)
CHLORIDE SERPL-SCNC: 94 MMOL/L — LOW (ref 96–108)
CO2 SERPL-SCNC: 29 MMOL/L — SIGNIFICANT CHANGE UP (ref 22–31)
CO2 SERPL-SCNC: 29 MMOL/L — SIGNIFICANT CHANGE UP (ref 22–31)
CREAT SERPL-MCNC: 1.2 MG/DL — SIGNIFICANT CHANGE UP (ref 0.5–1.3)
CREAT SERPL-MCNC: 1.25 MG/DL — SIGNIFICANT CHANGE UP (ref 0.5–1.3)
EOSINOPHIL # BLD AUTO: 0.26 K/UL — SIGNIFICANT CHANGE UP (ref 0–0.5)
EOSINOPHIL NFR BLD AUTO: 3.8 % — SIGNIFICANT CHANGE UP (ref 0–6)
GLUCOSE BLDC GLUCOMTR-MCNC: 149 MG/DL — HIGH (ref 70–99)
GLUCOSE BLDC GLUCOMTR-MCNC: 168 MG/DL — HIGH (ref 70–99)
GLUCOSE BLDC GLUCOMTR-MCNC: 181 MG/DL — HIGH (ref 70–99)
GLUCOSE BLDC GLUCOMTR-MCNC: 189 MG/DL — HIGH (ref 70–99)
GLUCOSE SERPL-MCNC: 144 MG/DL — HIGH (ref 70–99)
GLUCOSE SERPL-MCNC: 158 MG/DL — HIGH (ref 70–99)
HCT VFR BLD CALC: 28.2 % — LOW (ref 34.5–45)
HGB BLD-MCNC: 9.4 G/DL — LOW (ref 11.5–15.5)
IMM GRANULOCYTES NFR BLD AUTO: 0.3 % — SIGNIFICANT CHANGE UP (ref 0–1.5)
LYMPHOCYTES # BLD AUTO: 1.5 K/UL — SIGNIFICANT CHANGE UP (ref 1–3.3)
LYMPHOCYTES # BLD AUTO: 21.9 % — SIGNIFICANT CHANGE UP (ref 13–44)
MCHC RBC-ENTMCNC: 30.3 PG — SIGNIFICANT CHANGE UP (ref 27–34)
MCHC RBC-ENTMCNC: 33.3 GM/DL — SIGNIFICANT CHANGE UP (ref 32–36)
MCV RBC AUTO: 91 FL — SIGNIFICANT CHANGE UP (ref 80–100)
MONOCYTES # BLD AUTO: 0.86 K/UL — SIGNIFICANT CHANGE UP (ref 0–0.9)
MONOCYTES NFR BLD AUTO: 12.6 % — SIGNIFICANT CHANGE UP (ref 2–14)
NEUTROPHILS # BLD AUTO: 4.13 K/UL — SIGNIFICANT CHANGE UP (ref 1.8–7.4)
NEUTROPHILS NFR BLD AUTO: 60.4 % — SIGNIFICANT CHANGE UP (ref 43–77)
NRBC # BLD: 0 /100 WBCS — SIGNIFICANT CHANGE UP (ref 0–0)
NT-PROBNP SERPL-SCNC: 643 PG/ML — HIGH (ref 0–300)
OSMOLALITY SERPL: 273 MOSMOL/KG — LOW (ref 280–301)
OSMOLALITY UR: 355 MOSM/KG — SIGNIFICANT CHANGE UP (ref 50–1200)
PLATELET # BLD AUTO: 246 K/UL — SIGNIFICANT CHANGE UP (ref 150–400)
POTASSIUM SERPL-MCNC: 5.3 MMOL/L — SIGNIFICANT CHANGE UP (ref 3.5–5.3)
POTASSIUM SERPL-MCNC: 5.3 MMOL/L — SIGNIFICANT CHANGE UP (ref 3.5–5.3)
POTASSIUM SERPL-SCNC: 5.3 MMOL/L — SIGNIFICANT CHANGE UP (ref 3.5–5.3)
POTASSIUM SERPL-SCNC: 5.3 MMOL/L — SIGNIFICANT CHANGE UP (ref 3.5–5.3)
PROCALCITONIN SERPL-MCNC: 0.08 NG/ML — SIGNIFICANT CHANGE UP
PROT SERPL-MCNC: 6.6 G/DL — SIGNIFICANT CHANGE UP (ref 6–8.3)
RBC # BLD: 3.1 M/UL — LOW (ref 3.8–5.2)
RBC # FLD: 12.3 % — SIGNIFICANT CHANGE UP (ref 10.3–14.5)
SODIUM SERPL-SCNC: 129 MMOL/L — LOW (ref 135–145)
SODIUM SERPL-SCNC: 129 MMOL/L — LOW (ref 135–145)
SODIUM UR-SCNC: <20 MMOL/L — SIGNIFICANT CHANGE UP
WBC # BLD: 6.84 K/UL — SIGNIFICANT CHANGE UP (ref 3.8–10.5)
WBC # FLD AUTO: 6.84 K/UL — SIGNIFICANT CHANGE UP (ref 3.8–10.5)

## 2021-06-19 PROCEDURE — 99232 SBSQ HOSP IP/OBS MODERATE 35: CPT

## 2021-06-19 PROCEDURE — 99233 SBSQ HOSP IP/OBS HIGH 50: CPT

## 2021-06-19 PROCEDURE — 71045 X-RAY EXAM CHEST 1 VIEW: CPT | Mod: 26

## 2021-06-19 RX ORDER — ONDANSETRON 8 MG/1
4 TABLET, FILM COATED ORAL EVERY 8 HOURS
Refills: 0 | Status: DISCONTINUED | OUTPATIENT
Start: 2021-06-19 | End: 2021-06-21

## 2021-06-19 RX ORDER — ONDANSETRON 8 MG/1
4 TABLET, FILM COATED ORAL ONCE
Refills: 0 | Status: COMPLETED | OUTPATIENT
Start: 2021-06-19 | End: 2021-06-19

## 2021-06-19 RX ORDER — FUROSEMIDE 40 MG
20 TABLET ORAL DAILY
Refills: 0 | Status: DISCONTINUED | OUTPATIENT
Start: 2021-06-19 | End: 2021-06-19

## 2021-06-19 RX ORDER — FLUTICASONE PROPIONATE 50 MCG
1 SPRAY, SUSPENSION NASAL
Refills: 0 | Status: DISCONTINUED | OUTPATIENT
Start: 2021-06-19 | End: 2021-06-21

## 2021-06-19 RX ADMIN — Medication 2: at 16:51

## 2021-06-19 RX ADMIN — AMLODIPINE BESYLATE 10 MILLIGRAM(S): 2.5 TABLET ORAL at 06:35

## 2021-06-19 RX ADMIN — LOSARTAN POTASSIUM 25 MILLIGRAM(S): 100 TABLET, FILM COATED ORAL at 06:36

## 2021-06-19 RX ADMIN — Medication 25 MILLIGRAM(S): at 06:35

## 2021-06-19 RX ADMIN — NYSTATIN CREAM 1 APPLICATION(S): 100000 CREAM TOPICAL at 06:37

## 2021-06-19 RX ADMIN — ALBUTEROL 2 PUFF(S): 90 AEROSOL, METERED ORAL at 15:58

## 2021-06-19 RX ADMIN — ONDANSETRON 4 MILLIGRAM(S): 8 TABLET, FILM COATED ORAL at 06:47

## 2021-06-19 RX ADMIN — Medication 1 MILLIGRAM(S): at 12:25

## 2021-06-19 RX ADMIN — ARIPIPRAZOLE 10 MILLIGRAM(S): 15 TABLET ORAL at 22:36

## 2021-06-19 RX ADMIN — ALBUTEROL 2 PUFF(S): 90 AEROSOL, METERED ORAL at 08:52

## 2021-06-19 RX ADMIN — METFORMIN HYDROCHLORIDE 500 MILLIGRAM(S): 850 TABLET ORAL at 17:47

## 2021-06-19 RX ADMIN — ONDANSETRON 4 MILLIGRAM(S): 8 TABLET, FILM COATED ORAL at 22:36

## 2021-06-19 RX ADMIN — NYSTATIN CREAM 1 APPLICATION(S): 100000 CREAM TOPICAL at 17:48

## 2021-06-19 RX ADMIN — Medication 25 MILLIGRAM(S): at 15:12

## 2021-06-19 RX ADMIN — ATORVASTATIN CALCIUM 40 MILLIGRAM(S): 80 TABLET, FILM COATED ORAL at 22:36

## 2021-06-19 RX ADMIN — FLUVOXAMINE MALEATE 150 MILLIGRAM(S): 25 TABLET ORAL at 22:36

## 2021-06-19 RX ADMIN — CHLORHEXIDINE GLUCONATE 15 MILLILITER(S): 213 SOLUTION TOPICAL at 17:47

## 2021-06-19 RX ADMIN — Medication 2: at 07:35

## 2021-06-19 RX ADMIN — ALBUTEROL 2 PUFF(S): 90 AEROSOL, METERED ORAL at 21:22

## 2021-06-19 RX ADMIN — ALBUTEROL 2 PUFF(S): 90 AEROSOL, METERED ORAL at 05:26

## 2021-06-19 RX ADMIN — Medication 25 MILLIGRAM(S): at 22:36

## 2021-06-19 RX ADMIN — METFORMIN HYDROCHLORIDE 500 MILLIGRAM(S): 850 TABLET ORAL at 07:35

## 2021-06-19 NOTE — PROGRESS NOTE ADULT - SUBJECTIVE AND OBJECTIVE BOX
No overnight events.  Slept well.  Reports some nausea. She is sucking on lemon candy to help.  Zofran ordered by resident.   Pain controlled, no chest pain, no Fevers/Chills. No other new ROS.  Has been tolerating rehabilitation program.    VITALS  T(C): 37.1 (06-19-21 @ 06:30), Max: 37.1 (06-19-21 @ 05:00)  HR: 69 (06-19-21 @ 06:30) (67 - 78)  BP: 144/69 (06-19-21 @ 06:30) (117/69 - 149/70)  RR: 20 (06-19-21 @ 06:30) (15 - 22)  SpO2: 94% (06-19-21 @ 06:30) (93% - 95%)  Wt(kg): --     MEDICATIONS   acetaminophen   Tablet .. 650 milliGRAM(s) every 6 hours PRN  ALBUTerol    90 MICROgram(s) HFA Inhaler 2 Puff(s) every 6 hours  amLODIPine   Tablet 10 milliGRAM(s) daily  ARIPiprazole 10 milliGRAM(s) at bedtime  atorvastatin 40 milliGRAM(s) at bedtime  chlorhexidine 0.12% Liquid 15 milliLiter(s) two times a day  dextrose 40% Gel 15 Gram(s) once  dextrose 5%. 1000 milliLiter(s) <Continuous>  dextrose 5%. 1000 milliLiter(s) <Continuous>  dextrose 50% Injectable 25 Gram(s) once  dextrose 50% Injectable 12.5 Gram(s) once  dextrose 50% Injectable 25 Gram(s) once  fluvoxaMINE 150 milliGRAM(s) at bedtime  folic acid 1 milliGRAM(s) daily  furosemide    Tablet 20 milliGRAM(s) daily  glucagon  Injectable 1 milliGRAM(s) once  hydrALAZINE 25 milliGRAM(s) every 8 hours  insulin lispro (ADMELOG) corrective regimen sliding scale   three times a day before meals  insulin lispro (ADMELOG) corrective regimen sliding scale   at bedtime  losartan 25 milliGRAM(s) daily  metFORMIN 500 milliGRAM(s) two times a day  nystatin Powder 1 Application(s) two times a day      RECENT LABS/IMAGING                        9.4    6.84  )-----------( 246      ( 19 Jun 2021 06:51 )             28.2                     POCT Blood Glucose.: 168 mg/dL (06-19-21 @ 07:33)  POCT Blood Glucose.: 161 mg/dL (06-18-21 @ 21:10)  POCT Blood Glucose.: 177 mg/dL (06-18-21 @ 16:56)  POCT Blood Glucose.: 222 mg/dL (06-18-21 @ 12:13)        ------------------------------------------  PHYSICAL EXAM  Constitutional - NAD, Somewhat uncomfortable from nausea  Pulm - Breathing comfortably, No wheezing  Abd - Nondistended, Nontender  Extremities - No calf tenderness  Neurologic Exam - Awake, Alert  Psychiatric - Mood WNL     ASSESSMENT/PLAN  81y Female with impairments in mobility and ADLs   - Continue current rehabilitation program 3hrs a day   - Continue current medications, patient is medically stable - continue to monitor nausea  - DVT prophylaxis  - Skin - OOB and mobilization daily      No overnight events.  Slept well.  Reports some nausea. She is sucking on lemon candy to help.  Zofran ordered by resident.   Pain controlled, no chest pain, no Fevers/Chills. No other new ROS.  Has been tolerating rehabilitation program.  BNP is elevated. Lasix started by resident.    VITALS  T(C): 37.1 (06-19-21 @ 06:30), Max: 37.1 (06-19-21 @ 05:00)  HR: 69 (06-19-21 @ 06:30) (67 - 78)  BP: 144/69 (06-19-21 @ 06:30) (117/69 - 149/70)  RR: 20 (06-19-21 @ 06:30) (15 - 22)  SpO2: 94% (06-19-21 @ 06:30) (93% - 95%)  Wt(kg): --     MEDICATIONS   acetaminophen   Tablet .. 650 milliGRAM(s) every 6 hours PRN  ALBUTerol    90 MICROgram(s) HFA Inhaler 2 Puff(s) every 6 hours  amLODIPine   Tablet 10 milliGRAM(s) daily  ARIPiprazole 10 milliGRAM(s) at bedtime  atorvastatin 40 milliGRAM(s) at bedtime  chlorhexidine 0.12% Liquid 15 milliLiter(s) two times a day  dextrose 40% Gel 15 Gram(s) once  dextrose 5%. 1000 milliLiter(s) <Continuous>  dextrose 5%. 1000 milliLiter(s) <Continuous>  dextrose 50% Injectable 25 Gram(s) once  dextrose 50% Injectable 12.5 Gram(s) once  dextrose 50% Injectable 25 Gram(s) once  fluvoxaMINE 150 milliGRAM(s) at bedtime  folic acid 1 milliGRAM(s) daily  furosemide    Tablet 20 milliGRAM(s) daily  glucagon  Injectable 1 milliGRAM(s) once  hydrALAZINE 25 milliGRAM(s) every 8 hours  insulin lispro (ADMELOG) corrective regimen sliding scale   three times a day before meals  insulin lispro (ADMELOG) corrective regimen sliding scale   at bedtime  losartan 25 milliGRAM(s) daily  metFORMIN 500 milliGRAM(s) two times a day  nystatin Powder 1 Application(s) two times a day      RECENT LABS/IMAGING                        9.4    6.84  )-----------( 246      ( 19 Jun 2021 06:51 )             28.2                     POCT Blood Glucose.: 168 mg/dL (06-19-21 @ 07:33)  POCT Blood Glucose.: 161 mg/dL (06-18-21 @ 21:10)  POCT Blood Glucose.: 177 mg/dL (06-18-21 @ 16:56)  POCT Blood Glucose.: 222 mg/dL (06-18-21 @ 12:13)        ------------------------------------------  PHYSICAL EXAM  Constitutional - NAD, Somewhat uncomfortable from nausea  Pulm - Breathing comfortably, No wheezing  Abd - Nondistended, Nontender  Extremities - No calf tenderness  Neurologic Exam - Awake, Alert  Psychiatric - Mood WNL     ASSESSMENT/PLAN  81y Female with impairments in mobility and ADLs   - Continue current rehabilitation program 3hrs a day   - Continue current medications, patient is medically stable - continue to monitor nausea, SOB.   - DVT prophylaxis  - Skin - OOB and mobilization daily

## 2021-06-19 NOTE — PROGRESS NOTE ADULT - SUBJECTIVE AND OBJECTIVE BOX
No distress    Vital Signs Last 24 Hrs  T(C): 36.4 (06-19-21 @ 11:19), Max: 37.1 (06-19-21 @ 05:00)  T(F): 97.5 (06-19-21 @ 11:19), Max: 98.7 (06-19-21 @ 05:00)  HR: 67 (06-19-21 @ 11:19) (67 - 77)  BP: 119/63 (06-19-21 @ 11:19) (117/69 - 149/70)  RR: 18 (06-19-21 @ 11:19) (15 - 22)  SpO2: 94% (06-19-21 @ 11:19) (93% - 94%)    s1s2  b/l air entry  soft, ND  no edema                                                         9.4    6.84  )-----------( 246      ( 19 Jun 2021 06:51 )             28.2     19 Jun 2021 08:42    129    |  94     |  29     ----------------------------<  144    5.3     |  29     |  1.25     Ca    9.3        19 Jun 2021 08:42    TPro  6.6    /  Alb  2.8    /  TBili  0.3    /  DBili  x      /  AST  22     /  ALT  21     /  AlkPhos  86     19 Jun 2021 06:51    LIVER FUNCTIONS - ( 19 Jun 2021 06:51 )  Alb: 2.8 g/dL / Pro: 6.6 g/dL / ALK PHOS: 86 U/L / ALT: 21 U/L / AST: 22 U/L / GGT: x           acetaminophen   Tablet .. 650 milliGRAM(s) Oral every 6 hours PRN  ALBUTerol    90 MICROgram(s) HFA Inhaler 2 Puff(s) Inhalation every 6 hours  amLODIPine   Tablet 10 milliGRAM(s) Oral daily  ARIPiprazole 10 milliGRAM(s) Oral at bedtime  atorvastatin 40 milliGRAM(s) Oral at bedtime  chlorhexidine 0.12% Liquid 15 milliLiter(s) Oral Mucosa two times a day  dextrose 40% Gel 15 Gram(s) Oral once  dextrose 5%. 1000 milliLiter(s) IV Continuous <Continuous>  dextrose 5%. 1000 milliLiter(s) IV Continuous <Continuous>  dextrose 50% Injectable 25 Gram(s) IV Push once  dextrose 50% Injectable 12.5 Gram(s) IV Push once  dextrose 50% Injectable 25 Gram(s) IV Push once  fluvoxaMINE 150 milliGRAM(s) Oral at bedtime  folic acid 1 milliGRAM(s) Oral daily  glucagon  Injectable 1 milliGRAM(s) IntraMuscular once  hydrALAZINE 25 milliGRAM(s) Oral every 8 hours  insulin lispro (ADMELOG) corrective regimen sliding scale   SubCutaneous at bedtime  insulin lispro (ADMELOG) corrective regimen sliding scale   SubCutaneous three times a day before meals  metFORMIN 500 milliGRAM(s) Oral two times a day  nystatin Powder 1 Application(s) Topical two times a day    A/P:    Hx HTN, s/p ICH  CKD 3, stable  Avoid nephrotoxins  F/u BP on current meds  Mild hyponatremia  Will check TSH, uric acid  Will check urine lytes, osms  Will f/u [facility]    [phone]

## 2021-06-19 NOTE — PROGRESS NOTE ADULT - ASSESSMENT
80 y/o F with a history of DVT, DM II, glaucoma, HLD, HTN, NPH s/p  admitted to [facility] rehab after hospital course for ICH. CT Head + for small right basal ganglia hemorrhage.  Hospital course was complicated by acute metabolic encephalopathy due to UTI (klebsiella variicola).      SOB  -Afebrile, hemodynamically stable. continue supplemental oxygen with nasal canula.   -Consider nasal gel, trial of flonase  -CHest Xray unremrqakble   -Recently completed cftx for UTI 6/10  -No wheezing noted, though cont albuterol   -Breathing exercises, supportive care    Dizziness, unsteadiness, and fall due to acute ICH of basal ganglia causing Impaired Gait, Mobility, and ADLs  NPH s/p  shunt  History DVT  - Continue comprehensive rehab program of PT/OT  - Fall precautions  - Eliquis discontinued for DVT due to IPH  - Duplex Venous Lower, Bilateral (05.28.21 @ 15:06) - No evidence of deep venous thrombosis in either lower extremity  - Continue statin  - Bowel regimen as per primary team  - Pain meds as per primary team     Acute Hypoxic Respiratory Failure likely due to COVID History  - No history of Asthma or COPD  - Elevated d- dimer likely due to COVID history  - Albuterol  - CT chest (6/9) Trace right pleural effusion and atelectasis/consolidation of lateral-posterior segments of right lower lobe. Atelectasis of medial segment of right middle lobe. Patchy groundglass opacities within the left lower lobe.  The anteromedial part of the right hemidiaphragm is elevated most likely secondary to eventration.  - Oxygen supplement PRN to keep O2 sat > 92%  - Pulm appreciated      Hyponatremia:   - Patient not grossly overloaded... glucose level acceptable...  - Hypotonic hyponatremia; will get urine studies   - Fluid restriction for now     Acute UTI  - UCx (6/2) Kleb Variicola  - Ceftriaxone completed    Uncontrolled Hypertension  Episode of bradycardia - likely after beta blocker was given  - EKG (6/9) shows sinus bradycardia, PACs, [id number]  - continue hydralazine 20mg q8h, amlodipine 10mg qd  - Lisinopril 5mg qd added - switch to losartan due to cough  - DC'd metoprolol 6/14, Off HCTZ 25mg  - Nephro following    Diabetes Mellitus II  - Off lantus and admelog  - Patient reports taking metformin TID. Due arias, starting metformin and observing FS  - Increase metformin from 850mg qd to 500mg BID  - ISS and monitor FS    Bipolar disorder  - HELD Pristiq, Continue Abilify, and Luvox  - Neuro Psych appreciated     Normocytic Anemia  - Stable  - continue folic acid    Hypoalbuminemia  - consider nutrition consult    DVT ppx - as per primary

## 2021-06-19 NOTE — CHART NOTE - NSCHARTNOTEFT_GEN_A_CORE
Was informed patient c/o SOB/labored breathing this AM. Patient was seen and examined at the bedside. She states when she woke up a couple hours ago, felt more SOB with more labored breaths than before; SpO2 in low 90s. States cough has been worse since last night. She endorsed wheezing, chills, and nausea. Denied fever, vomiting, abdominal pain, CP, or palpitations. Of note, patient had refused standing Proventil last night at bedtime.       Vital Signs Last 24 Hrs  T(C): 36.6 (06-18-21 @ 19:49), Max: 36.6 (06-18-21 @ 07:33)  T(F): 97.8 (06-18-21 @ 19:49), Max: 97.9 (06-18-21 @ 07:33)  HR: 68 (06-18-21 @ 22:02) (67 - 78)  BP: 119/68 (06-18-21 @ 22:02) (117/69 - 146/73)  BP(mean): --  RR: 15 (06-18-21 @ 19:49) (15 - 15)  SpO2: 94% (06-19-21 @ 05:17) (94% - 95%)    PHYSICAL EXAM:  GENERAL: Elderly female sitting up in bed with 3L NC; NAD. Had pulse oximeter attached throughout duration of encounter; SpO2 remained 92-94%  HEAD:  Atraumatic, Normocephalic  CHEST/LUNG: b/l crackles; diminished on the right as compared to the left. No wheezing appreciated. (+) Intermittent cough, nonproductive  HEART: Regular rate and rhythm; systolic murmur  ABDOMEN: Soft, Nontender, Nondistended; Bowel sounds present  EXTREMITIES: perhaps trace LE edema; non tender to palpation  SKIN: warm and well perfused  NERVOUS SYSTEM:  Awake and alert, answering questions appropriately      A/P:  82 y/o F w/ PMH of DVT (on Eliquis), NIDDM, glaucoma, HLD, HTN, hs of NPH s/p  shunt 2015, who was admitted for +CTH w/ ICH; no surgical intervention, as per neurosurgery. Hospital course complicated by encephalopathic d/u UTI. Now admitted with functional deficits.    #SOB  - patient has been on 3L NC for much of this stay with goal SpO2 >92%; pulm following. Over the course of last several days, developed a cough. ACE-i dc'ed in favor of ARB and Albuterol switched to standing. Of note, did not receive Albuterol last night. Pro-BNP elevated 572 6/8. Last CXR 6/8 unchanged as compared to 5/28. CT chest 6/9 showing patchy GGO in LLL with trace right-sided pleural effusion, and atelectasis noted in RLL and right middle lobe.  - SpO2 remained at goal throughout encounter; (+)cough and (+) crackles b/l on exam. Rest of VS unremarkable  - patient to receive standing albuterol treatment; continue to monitor and titrate O2, as needed  - hospitalist recs noted; will order CBC w/diff, CMP, procalcitonin, pro-BNP, and CXR Was informed patient c/o SOB/labored breathing this AM. Patient was seen and examined at the bedside. She states when she woke up a couple hours ago, felt more SOB with more labored breaths than before; SpO2 in low 90s. States cough has been worse since last night. She endorsed wheezing, chills, and nausea. Denied fever, vomiting, abdominal pain, CP, or palpitations. Of note, patient had refused standing Proventil last night at bedtime.       Vital Signs Last 24 Hrs  T(C): 36.6 (06-18-21 @ 19:49), Max: 36.6 (06-18-21 @ 07:33)  T(F): 97.8 (06-18-21 @ 19:49), Max: 97.9 (06-18-21 @ 07:33)  HR: 68 (06-18-21 @ 22:02) (67 - 78)  BP: 119/68 (06-18-21 @ 22:02) (117/69 - 146/73)  BP(mean): --  RR: 15 (06-18-21 @ 19:49) (15 - 15)  SpO2: 94% (06-19-21 @ 05:17) (94% - 95%)    PHYSICAL EXAM:  GENERAL: Elderly female sitting up in bed with 3L NC; NAD. Had pulse oximeter attached throughout duration of encounter; SpO2 remained 92-94%  HEAD:  Atraumatic, Normocephalic  CHEST/LUNG: b/l crackles; diminished breath sounds on the right as compared to the left. No wheezing appreciated. (+) Intermittent cough, nonproductive  HEART: Regular rate and rhythm; systolic murmur  ABDOMEN: Soft, Nontender, Nondistended; Bowel sounds present  EXTREMITIES: perhaps trace LE edema; non tender to palpation  SKIN: warm and well perfused  NERVOUS SYSTEM:  Awake and alert, answering questions appropriately      A/P:  80 y/o F w/ PMH of DVT (on Eliquis), NIDDM, glaucoma, HLD, HTN, hs of NPH s/p  shunt 2015, who was admitted for +CTH w/ ICH; no surgical intervention, as per neurosurgery. Hospital course complicated by encephalopathic d/u UTI. Now admitted with functional deficits.    #SOB  - patient has been on 3L NC for much of this stay with goal SpO2 >92%; pulm following. Over the course of last several days, developed a cough. ACE-i dc'ed in favor of ARB and Albuterol switched to standing. Of note, did not receive Albuterol last night. Pro-BNP elevated 572 6/8. Last CXR 6/8 unchanged as compared to 5/28. CT chest 6/9 showing patchy GGO in LLL with trace right-sided pleural effusion, and atelectasis noted in RLL and right middle lobe.  - SpO2 remained at goal throughout encounter; (+)cough and (+) crackles b/l on exam. Rest of VS unremarkable  - patient to receive standing albuterol treatment; continue to monitor and titrate O2, as needed  - hospitalist recs noted; will order CBC w/diff, CMP, procalcitonin, pro-BNP, and CXR Was informed patient c/o SOB/labored breathing this AM. Patient was seen and examined at the bedside. She states when she woke up a couple hours ago, felt more SOB with more labored breaths than before; SpO2 in low 90s. States cough has been worse since last night. She endorsed wheezing, chills, and nausea. Denied fever, vomiting, abdominal pain, CP, or palpitations. Of note, patient had refused standing Proventil last night at bedtime.       Vital Signs Last 24 Hrs  T(C): 36.6 (06-18-21 @ 19:49), Max: 36.6 (06-18-21 @ 07:33)  T(F): 97.8 (06-18-21 @ 19:49), Max: 97.9 (06-18-21 @ 07:33)  HR: 68 (06-18-21 @ 22:02) (67 - 78)  BP: 119/68 (06-18-21 @ 22:02) (117/69 - 146/73)  BP(mean): --  RR: 15 (06-18-21 @ 19:49) (15 - 15)  SpO2: 94% (06-19-21 @ 05:17) (94% - 95%)    PHYSICAL EXAM:  GENERAL: Elderly female sitting up in bed with 3L NC; NAD. Had pulse oximeter attached throughout duration of encounter; SpO2 remained 92-94%  HEAD:  Atraumatic, Normocephalic  CHEST/LUNG: b/l crackles; diminished breath sounds on the right as compared to the left. No wheezing appreciated. (+) Intermittent cough, nonproductive  HEART: Regular rate and rhythm; systolic murmur  ABDOMEN: Soft, Nontender, Nondistended; Bowel sounds present  EXTREMITIES: perhaps trace LE edema; non tender to palpation  SKIN: warm and well perfused  NERVOUS SYSTEM:  Awake and alert, answering questions appropriately      A/P:  82 y/o F w/ PMH of DVT (on Eliquis), NIDDM, glaucoma, HLD, HTN, hs of NPH s/p  shunt 2015, who was admitted for +CTH w/ ICH; no surgical intervention, as per neurosurgery. Hospital course complicated by encephalopathic d/u UTI. Now admitted with functional deficits.    #SOB  - patient has been on 3L NC for much of this stay with goal SpO2 >92%; pulm following. Over the course of last several days, developed a cough. ACE-i dc'ed in favor of ARB and Albuterol switched to standing. Of note, did not receive Albuterol last night. Pro-BNP elevated 572 6/8. Last CXR 6/8 unchanged as compared to 5/28. CT chest 6/9 showing patchy GGO in LLL with trace right-sided pleural effusion, and atelectasis noted in RLL and right middle lobe. In addition, weight in bed has increased from 71.1kg to 74.5kg in the past week  - SpO2 remained at goal throughout encounter; (+)cough and (+) crackles b/l on exam. Rest of VS unremarkable  - patient to receive standing albuterol treatment; continue to monitor and titrate O2, as needed  - hospitalist recs noted; will order CBC w/diff, CMP, procalcitonin, pro-BNP, and CXR Was informed patient c/o SOB/labored breathing this AM. Patient was seen and examined at the bedside. She states when she woke up a couple hours ago, felt more SOB with more labored breaths than before; SpO2 in low 90s. States cough has been worse since last night. She endorsed wheezing, chills, and nausea. Denied fever, vomiting, abdominal pain, CP, or palpitations. Of note, patient had refused standing Proventil last night at bedtime.       Vital Signs Last 24 Hrs  T(C): 36.6 (06-18-21 @ 19:49), Max: 36.6 (06-18-21 @ 07:33)  T(F): 97.8 (06-18-21 @ 19:49), Max: 97.9 (06-18-21 @ 07:33)  HR: 68 (06-18-21 @ 22:02) (67 - 78)  BP: 119/68 (06-18-21 @ 22:02) (117/69 - 146/73)  BP(mean): --  RR: 15 (06-18-21 @ 19:49) (15 - 15)  SpO2: 94% (06-19-21 @ 05:17) (94% - 95%)    PHYSICAL EXAM:  GENERAL: Elderly female sitting up in bed with 3L NC; NAD. Had pulse oximeter attached throughout duration of encounter; SpO2 remained 92-94%  HEAD:  Atraumatic, Normocephalic  CHEST/LUNG: b/l crackles; diminished breath sounds on the right as compared to the left. No wheezing appreciated. (+) Intermittent cough, nonproductive  HEART: Regular rate and rhythm; systolic murmur  ABDOMEN: Soft, Nontender, Nondistended; Bowel sounds present  EXTREMITIES: perhaps trace LE edema; non tender to palpation  SKIN: warm and well perfused  NERVOUS SYSTEM:  Awake and alert, answering questions appropriately      A/P:  80 y/o F w/ PMH of DVT (on Eliquis), NIDDM, glaucoma, HLD, HTN, hs of NPH s/p  shunt 2015, who was admitted for +CTH w/ ICH; no surgical intervention, as per neurosurgery. Hospital course complicated by encephalopathic d/u UTI. Now admitted with functional deficits.    #SOB  - patient has been on 3L NC for much of this stay with goal SpO2 >92%; pulm following. Over the course of last several days, developed a cough. ACE-i dc'ed in favor of ARB and Albuterol switched to standing. Of note, did not receive Albuterol last night. Pro-BNP elevated 572 6/8. Last CXR 6/8 unchanged as compared to 5/28. CT chest 6/9 showing patchy GGO in LLL with trace right-sided pleural effusion, and atelectasis noted in RLL and right middle lobe. In addition, weight in bed has increased from 71.1kg to 74.5kg in the past week  - SpO2 remained at goal throughout encounter; (+)cough and (+) crackles b/l on exam. Rest of VS unremarkable  - patient to receive standing albuterol treatment; continue to monitor and titrate O2, as needed  - hospitalist recs noted; will order CBC w/diff, CMP, procalcitonin, and pro-BNP  - CXR reviewed with overnight hospitalist; right lower lobe infiltrate noted. Previous labs without elevated WBC and no fever, not suspecting infectious source; given previous findings of pleural effusion and findings on exam, suspecting 2/2 fluid buildup; will order Furosemide 20mg PO daily x2 doses and continue to monitor  - Zofran PRN x1 dose for nausea and consider Robitussin or Tessalon perles PRN if cough worsens

## 2021-06-19 NOTE — PROGRESS NOTE ADULT - SUBJECTIVE AND OBJECTIVE BOX
Hospitalist: Edilson Machado DO    CHIEF COMPLAINT: Patient is a 81y old  female who presents with a chief complaint of CVA (18 Jun 2021 19:28)    SUBJECTIVE / OVERNIGHT EVENTS: Patient seen and examined. No acute events overnight. Pain well controlled and patient without any complaints.    MEDICATIONS  (STANDING):  ALBUTerol    90 MICROgram(s) HFA Inhaler 2 Puff(s) Inhalation every 6 hours  amLODIPine   Tablet 10 milliGRAM(s) Oral daily  ARIPiprazole 10 milliGRAM(s) Oral at bedtime  atorvastatin 40 milliGRAM(s) Oral at bedtime  chlorhexidine 0.12% Liquid 15 milliLiter(s) Oral Mucosa two times a day  dextrose 40% Gel 15 Gram(s) Oral once  dextrose 5%. 1000 milliLiter(s) (50 mL/Hr) IV Continuous <Continuous>  dextrose 5%. 1000 milliLiter(s) (100 mL/Hr) IV Continuous <Continuous>  dextrose 50% Injectable 25 Gram(s) IV Push once  dextrose 50% Injectable 12.5 Gram(s) IV Push once  dextrose 50% Injectable 25 Gram(s) IV Push once  fluvoxaMINE 150 milliGRAM(s) Oral at bedtime  folic acid 1 milliGRAM(s) Oral daily  glucagon  Injectable 1 milliGRAM(s) IntraMuscular once  hydrALAZINE 25 milliGRAM(s) Oral every 8 hours  insulin lispro (ADMELOG) corrective regimen sliding scale   SubCutaneous at bedtime  insulin lispro (ADMELOG) corrective regimen sliding scale   SubCutaneous three times a day before meals  metFORMIN 500 milliGRAM(s) Oral two times a day  nystatin Powder 1 Application(s) Topical two times a day    MEDICATIONS  (PRN):  acetaminophen   Tablet .. 650 milliGRAM(s) Oral every 6 hours PRN Temp greater or equal to 38C (100.4F), Mild Pain (1 - 3), Moderate Pain (4 - 6)      VITALS:  T(F): 97.5 (06-19-21 @ 11:19), Max: 98.7 (06-19-21 @ 05:00)  HR: 67 (06-19-21 @ 11:19) (67 - 77)  BP: 119/63 (06-19-21 @ 11:19) (117/69 - 149/70)  RR: 18 (06-19-21 @ 11:19) (15 - 22)  SpO2: 94% (06-19-21 @ 11:19)      REVIEW OF SYSTEMS:  For ROV please refer back to H&P     PHYSICAL EXAM:  General: NAD, awake, alert, frail elderly  ENT: MMM, no scleral icterus  Neck: Supple, No JVD  Lungs: Respirations unlabored. no cough. CTA b/l, diminished at bases, comfortable on NC  Cardio: RRR, S1/S2, +soft systolic murmur  Abdomen: Soft, Nontender, Nondistended; Bowel sounds present  Extremities: No calf tenderness, No pitting edema b/l      LABS:              x                    129  | 29   | 29           x     >-----------< x       ------------------------< 144                   x                    5.3  | 94   | 1.25                                         Ca 9.3   Mg x     Ph x           TPro  6.6  /  Alb  2.8      TBili  0.3  /  DBili  x         AST  22  /  ALT  21            AlkPhos  86       CAPILLARY BLOOD GLUCOSE      POCT Blood Glucose.: 149 mg/dL (19 Jun 2021 12:23)  POCT Blood Glucose.: 168 mg/dL (19 Jun 2021 07:33)  POCT Blood Glucose.: 161 mg/dL (18 Jun 2021 21:10)  POCT Blood Glucose.: 177 mg/dL (18 Jun 2021 16:56)    RADIOLOGY & ADDITIONAL TESTS:    Imaging Personally Reviewed:    [X] Consultant(s) Notes Reviewed:  [X] Care Discussed with Consultants/Other Providers:

## 2021-06-19 NOTE — PROVIDER CONTACT NOTE (CHANGE IN STATUS NOTIFICATION) - ACTION/TREATMENT ORDERED:
Chest X-ray and labs drawn. Zofran ordered for Nausea. Patient's nausea resided after Zofran given. Patient is no longer short of breath. RR:20, /69, HR 69, Spo2: 94%, temp 98.7.

## 2021-06-19 NOTE — PROVIDER CONTACT NOTE (CHANGE IN STATUS NOTIFICATION) - SITUATION
Patient woke from sleep with shortness of breath, with feelings of nausea and dizziness. Dr. Gavin made aware.

## 2021-06-19 NOTE — PROVIDER CONTACT NOTE (CHANGE IN STATUS NOTIFICATION) - BACKGROUND
Patient diagnosed with nontraumatic intracerebral hemorrhage. Pt has trace right pleural effusion and atelectasis of right lower lobe. Patient on nasal cannula O2 3L, often short of breath on exertion.

## 2021-06-19 NOTE — BH CONSULTATION LIAISON PROGRESS NOTE - OTHER
not tested  mild tremors noted to right hand  staff report pt is hallucinations which were not elicited or observed on exam.

## 2021-06-19 NOTE — PROVIDER CONTACT NOTE (CHANGE IN STATUS NOTIFICATION) - ASSESSMENT
Patient woke from sleep with shortness of breath and feelings of nausea and dizziness. /70, HR 73, Spo2 93%, temp 98.7, RR: 22. Pt on 3L oxygen Nasal cannula.

## 2021-06-20 ENCOUNTER — TRANSCRIPTION ENCOUNTER (OUTPATIENT)
Age: 81
End: 2021-06-20

## 2021-06-20 LAB
ANION GAP SERPL CALC-SCNC: 5 MMOL/L — SIGNIFICANT CHANGE UP (ref 5–17)
BUN SERPL-MCNC: 30 MG/DL — HIGH (ref 7–23)
CALCIUM SERPL-MCNC: 8.6 MG/DL — SIGNIFICANT CHANGE UP (ref 8.4–10.5)
CHLORIDE SERPL-SCNC: 93 MMOL/L — LOW (ref 96–108)
CHLORIDE UR-SCNC: 22 MMOL/L — SIGNIFICANT CHANGE UP
CO2 SERPL-SCNC: 28 MMOL/L — SIGNIFICANT CHANGE UP (ref 22–31)
CORTIS AM PEAK SERPL-MCNC: 13.8 UG/DL — SIGNIFICANT CHANGE UP (ref 6–18.4)
CREAT SERPL-MCNC: 1.29 MG/DL — SIGNIFICANT CHANGE UP (ref 0.5–1.3)
GLUCOSE BLDC GLUCOMTR-MCNC: 146 MG/DL — HIGH (ref 70–99)
GLUCOSE BLDC GLUCOMTR-MCNC: 156 MG/DL — HIGH (ref 70–99)
GLUCOSE BLDC GLUCOMTR-MCNC: 159 MG/DL — HIGH (ref 70–99)
GLUCOSE BLDC GLUCOMTR-MCNC: 160 MG/DL — HIGH (ref 70–99)
GLUCOSE SERPL-MCNC: 152 MG/DL — HIGH (ref 70–99)
POTASSIUM SERPL-MCNC: 5.7 MMOL/L — HIGH (ref 3.5–5.3)
POTASSIUM SERPL-SCNC: 5.7 MMOL/L — HIGH (ref 3.5–5.3)
SODIUM SERPL-SCNC: 126 MMOL/L — LOW (ref 135–145)
SODIUM UR-SCNC: <20 MMOL/L — SIGNIFICANT CHANGE UP
TSH SERPL-MCNC: 5.35 UIU/ML — HIGH (ref 0.27–4.2)
TSH SERPL-MCNC: 5.42 UIU/ML — HIGH (ref 0.27–4.2)
URATE SERPL-MCNC: 5.1 MG/DL — SIGNIFICANT CHANGE UP (ref 2.5–7)

## 2021-06-20 PROCEDURE — 99232 SBSQ HOSP IP/OBS MODERATE 35: CPT

## 2021-06-20 PROCEDURE — 99233 SBSQ HOSP IP/OBS HIGH 50: CPT

## 2021-06-20 RX ORDER — ONDANSETRON 8 MG/1
4 TABLET, FILM COATED ORAL ONCE
Refills: 0 | Status: COMPLETED | OUTPATIENT
Start: 2021-06-20 | End: 2021-06-20

## 2021-06-20 RX ORDER — FLUVOXAMINE MALEATE 25 MG/1
100 TABLET ORAL AT BEDTIME
Refills: 0 | Status: DISCONTINUED | OUTPATIENT
Start: 2021-06-20 | End: 2021-06-21

## 2021-06-20 RX ORDER — SODIUM CHLORIDE 9 MG/ML
1000 INJECTION INTRAMUSCULAR; INTRAVENOUS; SUBCUTANEOUS
Refills: 0 | Status: DISCONTINUED | OUTPATIENT
Start: 2021-06-20 | End: 2021-06-21

## 2021-06-20 RX ADMIN — ATORVASTATIN CALCIUM 40 MILLIGRAM(S): 80 TABLET, FILM COATED ORAL at 21:49

## 2021-06-20 RX ADMIN — NYSTATIN CREAM 1 APPLICATION(S): 100000 CREAM TOPICAL at 17:01

## 2021-06-20 RX ADMIN — Medication 2: at 12:36

## 2021-06-20 RX ADMIN — ONDANSETRON 4 MILLIGRAM(S): 8 TABLET, FILM COATED ORAL at 07:58

## 2021-06-20 RX ADMIN — Medication 1 MILLIGRAM(S): at 12:36

## 2021-06-20 RX ADMIN — Medication 1 SPRAY(S): at 05:46

## 2021-06-20 RX ADMIN — ALBUTEROL 2 PUFF(S): 90 AEROSOL, METERED ORAL at 21:49

## 2021-06-20 RX ADMIN — Medication 25 MILLIGRAM(S): at 05:46

## 2021-06-20 RX ADMIN — Medication 2: at 17:00

## 2021-06-20 RX ADMIN — METFORMIN HYDROCHLORIDE 500 MILLIGRAM(S): 850 TABLET ORAL at 17:00

## 2021-06-20 RX ADMIN — ONDANSETRON 4 MILLIGRAM(S): 8 TABLET, FILM COATED ORAL at 22:59

## 2021-06-20 RX ADMIN — METFORMIN HYDROCHLORIDE 500 MILLIGRAM(S): 850 TABLET ORAL at 07:57

## 2021-06-20 RX ADMIN — ARIPIPRAZOLE 10 MILLIGRAM(S): 15 TABLET ORAL at 21:49

## 2021-06-20 RX ADMIN — Medication 25 MILLIGRAM(S): at 21:49

## 2021-06-20 RX ADMIN — ONDANSETRON 4 MILLIGRAM(S): 8 TABLET, FILM COATED ORAL at 16:21

## 2021-06-20 RX ADMIN — CHLORHEXIDINE GLUCONATE 15 MILLILITER(S): 213 SOLUTION TOPICAL at 05:46

## 2021-06-20 RX ADMIN — ALBUTEROL 2 PUFF(S): 90 AEROSOL, METERED ORAL at 09:36

## 2021-06-20 RX ADMIN — Medication 650 MILLIGRAM(S): at 21:49

## 2021-06-20 RX ADMIN — Medication 650 MILLIGRAM(S): at 22:19

## 2021-06-20 RX ADMIN — ALBUTEROL 2 PUFF(S): 90 AEROSOL, METERED ORAL at 04:45

## 2021-06-20 RX ADMIN — SODIUM CHLORIDE 50 MILLILITER(S): 9 INJECTION INTRAMUSCULAR; INTRAVENOUS; SUBCUTANEOUS at 21:48

## 2021-06-20 RX ADMIN — AMLODIPINE BESYLATE 10 MILLIGRAM(S): 2.5 TABLET ORAL at 05:46

## 2021-06-20 RX ADMIN — FLUVOXAMINE MALEATE 100 MILLIGRAM(S): 25 TABLET ORAL at 21:49

## 2021-06-20 RX ADMIN — NYSTATIN CREAM 1 APPLICATION(S): 100000 CREAM TOPICAL at 05:47

## 2021-06-20 RX ADMIN — Medication 2: at 07:57

## 2021-06-20 RX ADMIN — Medication 25 MILLIGRAM(S): at 15:35

## 2021-06-20 RX ADMIN — ALBUTEROL 2 PUFF(S): 90 AEROSOL, METERED ORAL at 16:23

## 2021-06-20 RX ADMIN — SODIUM CHLORIDE 50 MILLILITER(S): 9 INJECTION INTRAMUSCULAR; INTRAVENOUS; SUBCUTANEOUS at 16:22

## 2021-06-20 NOTE — PROGRESS NOTE ADULT - SUBJECTIVE AND OBJECTIVE BOX
Hospitalist: Edilson Machado DO    CHIEF COMPLAINT: Patient is a 81y old  female who presents with a chief complaint of CVA (19 Jun 2021 15:07)      SUBJECTIVE / OVERNIGHT EVENTS: Patient seen and examined. No acute events overnight. Pain well controlled and patient without any complaints.    MEDICATIONS  (STANDING):  ALBUTerol    90 MICROgram(s) HFA Inhaler 2 Puff(s) Inhalation every 6 hours  amLODIPine   Tablet 10 milliGRAM(s) Oral daily  ARIPiprazole 10 milliGRAM(s) Oral at bedtime  atorvastatin 40 milliGRAM(s) Oral at bedtime  chlorhexidine 0.12% Liquid 15 milliLiter(s) Oral Mucosa two times a day  dextrose 40% Gel 15 Gram(s) Oral once  dextrose 5%. 1000 milliLiter(s) (50 mL/Hr) IV Continuous <Continuous>  dextrose 5%. 1000 milliLiter(s) (100 mL/Hr) IV Continuous <Continuous>  dextrose 50% Injectable 12.5 Gram(s) IV Push once  dextrose 50% Injectable 25 Gram(s) IV Push once  dextrose 50% Injectable 25 Gram(s) IV Push once  fluticasone propionate 50 MICROgram(s)/spray Nasal Spray 1 Spray(s) Both Nostrils two times a day  fluvoxaMINE 150 milliGRAM(s) Oral at bedtime  folic acid 1 milliGRAM(s) Oral daily  glucagon  Injectable 1 milliGRAM(s) IntraMuscular once  hydrALAZINE 25 milliGRAM(s) Oral every 8 hours  insulin lispro (ADMELOG) corrective regimen sliding scale   SubCutaneous at bedtime  insulin lispro (ADMELOG) corrective regimen sliding scale   SubCutaneous three times a day before meals  metFORMIN 500 milliGRAM(s) Oral two times a day  nystatin Powder 1 Application(s) Topical two times a day  sodium chloride 0.9%. 1000 milliLiter(s) (50 mL/Hr) IV Continuous <Continuous>    MEDICATIONS  (PRN):  acetaminophen   Tablet .. 650 milliGRAM(s) Oral every 6 hours PRN Temp greater or equal to 38C (100.4F), Mild Pain (1 - 3), Moderate Pain (4 - 6)  ondansetron    Tablet 4 milliGRAM(s) Oral every 8 hours PRN Nausea and/or Vomiting      VITALS:  T(F): 98.5 (06-20-21 @ 08:07), Max: 98.6 (06-19-21 @ 19:31)  HR: 80 (06-20-21 @ 09:38) (70 - 89)  BP: 135/69 (06-20-21 @ 08:07) (121/69 - 146/70)  RR: 18 (06-20-21 @ 08:07) (18 - 19)  SpO2: 92% (06-20-21 @ 09:38)      REVIEW OF SYSTEMS:  For ROV please refer back to H&P     PHYSICAL EXAM:  General: NAD, awake, alert, frail elderly  ENT: MMM, no scleral icterus  Neck: Supple, No JVD  Lungs: Respirations unlabored. no cough. CTA b/l, diminished at bases, comfortable on NC  Cardio: RRR, S1/S2, +soft systolic murmur  Abdomen: Soft, Nontender, Nondistended; Bowel sounds present  Extremities: No calf tenderness, No pitting edema b/l      LABS:              x                    126  | 28   | 30           x     >-----------< x       ------------------------< 152                   x                    5.7  | 93   | 1.29                                         Ca 8.6   Mg x     Ph x           TPro  6.6  /  Alb  2.8      TBili  0.3  /  DBili  x         AST  22  /  ALT  21            AlkPhos  86       CAPILLARY BLOOD GLUCOSE  POCT Blood Glucose.: 156 mg/dL (20 Jun 2021 12:31)  POCT Blood Glucose.: 160 mg/dL (20 Jun 2021 07:55)  POCT Blood Glucose.: 189 mg/dL (19 Jun 2021 22:29)  POCT Blood Glucose.: 181 mg/dL (19 Jun 2021 16:50)    RADIOLOGY & ADDITIONAL TESTS:    Imaging Personally Reviewed:    [X] Consultant(s) Notes Reviewed:  [X] Care Discussed with Consultants/Other Providers:

## 2021-06-20 NOTE — PROGRESS NOTE ADULT - SUBJECTIVE AND OBJECTIVE BOX
Patient reports she vomited x2 last night.   Now feels better. Does feel fatigued and nervous about her condition.       VITALS  T(C): 36.9 (06-19-21 @ 22:31), Max: 37 (06-19-21 @ 19:31)  HR: 87 (06-20-21 @ 05:45) (67 - 89)  BP: 146/70 (06-20-21 @ 05:45) (119/63 - 146/70)  RR: 19 (06-19-21 @ 22:31) (18 - 19)  SpO2: 95% (06-20-21 @ 04:46) (91% - 95%)  Wt(kg): --     MEDICATIONS   acetaminophen   Tablet .. 650 milliGRAM(s) every 6 hours PRN  ALBUTerol    90 MICROgram(s) HFA Inhaler 2 Puff(s) every 6 hours  amLODIPine   Tablet 10 milliGRAM(s) daily  ARIPiprazole 10 milliGRAM(s) at bedtime  atorvastatin 40 milliGRAM(s) at bedtime  chlorhexidine 0.12% Liquid 15 milliLiter(s) two times a day  dextrose 40% Gel 15 Gram(s) once  dextrose 5%. 1000 milliLiter(s) <Continuous>  dextrose 5%. 1000 milliLiter(s) <Continuous>  dextrose 50% Injectable 25 Gram(s) once  dextrose 50% Injectable 12.5 Gram(s) once  dextrose 50% Injectable 25 Gram(s) once  fluticasone propionate 50 MICROgram(s)/spray Nasal Spray 1 Spray(s) two times a day  fluvoxaMINE 150 milliGRAM(s) at bedtime  folic acid 1 milliGRAM(s) daily  glucagon  Injectable 1 milliGRAM(s) once  hydrALAZINE 25 milliGRAM(s) every 8 hours  insulin lispro (ADMELOG) corrective regimen sliding scale   three times a day before meals  insulin lispro (ADMELOG) corrective regimen sliding scale   at bedtime  metFORMIN 500 milliGRAM(s) two times a day  nystatin Powder 1 Application(s) two times a day  ondansetron    Tablet 4 milliGRAM(s) every 8 hours PRN      RECENT LABS/IMAGING                        9.4    6.84  )-----------( 246      ( 19 Jun 2021 06:51 )             28.2     06-19    129<L>  |  94<L>  |  29<H>  ----------------------------<  144<H>  5.3   |  29  |  1.25    Ca    9.3      19 Jun 2021 08:42    TPro  6.6  /  Alb  2.8<L>  /  TBili  0.3  /  DBili  x   /  AST  22  /  ALT  21  /  AlkPhos  86  06-19              POCT Blood Glucose.: 189 mg/dL (06-19-21 @ 22:29)  POCT Blood Glucose.: 181 mg/dL (06-19-21 @ 16:50)  POCT Blood Glucose.: 149 mg/dL (06-19-21 @ 12:23)  POCT Blood Glucose.: 168 mg/dL (06-19-21 @ 07:33)    CXR 6/19 - Similar appearance to June 8. Small right pleural effusion not excluded.    ------------------------------------------  PHYSICAL EXAM  Constitutional - NAD, Comfortable  Pulm - Breathing comfortably, No wheezing on O2 via NC  Abd - Nondistended  Extremities - No calf tenderness  Neurologic Exam - Awake, Alert  Psychiatric - Mood anxious    ASSESSMENT/PLAN  81y Female with impairments in mobility and ADLs   - Continue current rehabilitation program 3hrs a day   - Continue current medications, monitor vitals and labs - no therapy today, continue to reassess and monitor for ability to participate in rehab.         - Hyponatremia workup pending         - Consider DC metformin if nausea persists - ?gastroparesis  - DVT prophylaxis  - Skin - OOB and mobilization daily

## 2021-06-20 NOTE — BH CONSULTATION LIAISON PROGRESS NOTE - NSBHMSESPABN_PSY_A_CORE
Soft volume/Impaired articulation

## 2021-06-20 NOTE — BH CONSULTATION LIAISON PROGRESS NOTE - NSBHCHARTREVIEWVS_PSY_A_CORE FT
Vital Signs Last 24 Hrs  T(C): 36.3 (12 Jun 2021 08:30), Max: 36.7 (11 Jun 2021 21:15)  T(F): 97.4 (12 Jun 2021 08:30), Max: 98 (11 Jun 2021 21:15)  HR: 55 (12 Jun 2021 17:40) (55 - 59)  BP: 157/73 (12 Jun 2021 17:40) (146/72 - 163/81)  BP(mean): --  RR: 15 (12 Jun 2021 08:30) (15 - 16)  SpO2: 94% (12 Jun 2021 08:30) (94% - 94%)
Vital Signs Last 24 Hrs  T(C): 36.4 (19 Jun 2021 11:19), Max: 37.1 (19 Jun 2021 05:00)  T(F): 97.5 (19 Jun 2021 11:19), Max: 98.7 (19 Jun 2021 05:00)  HR: 67 (19 Jun 2021 11:19) (67 - 77)  BP: 119/63 (19 Jun 2021 11:19) (117/69 - 149/70)  BP(mean): --  RR: 18 (19 Jun 2021 11:19) (15 - 22)  SpO2: 94% (19 Jun 2021 11:19) (93% - 94%)
Vital Signs Last 24 Hrs  T(C): 36.9 (20 Jun 2021 08:07), Max: 37 (19 Jun 2021 19:31)  T(F): 98.5 (20 Jun 2021 08:07), Max: 98.6 (19 Jun 2021 19:31)  HR: 76 (20 Jun 2021 16:25) (70 - 89)  BP: 149/72 (20 Jun 2021 15:34) (121/69 - 149/72)  BP(mean): --  RR: 18 (20 Jun 2021 08:07) (18 - 19)  SpO2: 93% (20 Jun 2021 16:25) (91% - 95%)

## 2021-06-20 NOTE — BH CONSULTATION LIAISON PROGRESS NOTE - NSBHCONSFOLLOWNEEDS_PSY_ALL_CORE
Unsure of disposition at this time due to being in the early process of admission to acute rehab. Pt will likely f/u with her outpatient prescriber Dr. Rashard Brar in [place]./No psychiatric contraindications to discharge
Unsure of disposition at this time due to being in the early process of admission to acute rehab. Pt will likely f/u with her outpatient prescriber Dr. Rashard Barr in [place]./No psychiatric contraindications to discharge
Unsure of disposition at this time due to being in the early process of admission to acute rehab. Pt will likely f/u with her outpatient prescriber Dr. Rashard Barr in [place]./No psychiatric contraindications to discharge

## 2021-06-20 NOTE — PROGRESS NOTE ADULT - NSICDXPILOT_GEN_ALL_CORE
[place]

## 2021-06-20 NOTE — BH CONSULTATION LIAISON PROGRESS NOTE - CURRENT MEDICATION
MEDICATIONS  (STANDING):  amLODIPine   Tablet 10 milliGRAM(s) Oral daily  ARIPiprazole 10 milliGRAM(s) Oral at bedtime  atorvastatin 40 milliGRAM(s) Oral at bedtime  chlorhexidine 0.12% Liquid 15 milliLiter(s) Oral Mucosa two times a day  dextrose 40% Gel 15 Gram(s) Oral once  dextrose 5%. 1000 milliLiter(s) (50 mL/Hr) IV Continuous <Continuous>  dextrose 5%. 1000 milliLiter(s) (100 mL/Hr) IV Continuous <Continuous>  dextrose 50% Injectable 25 Gram(s) IV Push once  dextrose 50% Injectable 12.5 Gram(s) IV Push once  dextrose 50% Injectable 25 Gram(s) IV Push once  fluvoxaMINE 150 milliGRAM(s) Oral at bedtime  folic acid 1 milliGRAM(s) Oral daily  glucagon  Injectable 1 milliGRAM(s) IntraMuscular once  hydrALAZINE 20 milliGRAM(s) Oral every 8 hours  insulin lispro (ADMELOG) corrective regimen sliding scale   SubCutaneous three times a day before meals  insulin lispro (ADMELOG) corrective regimen sliding scale   SubCutaneous at bedtime  metFORMIN 850 milliGRAM(s) Oral daily  metoprolol tartrate 12.5 milliGRAM(s) Oral two times a day  nystatin    Suspension [id number] Unit(s) Oral four times a day  nystatin Powder 1 Application(s) Topical two times a day    MEDICATIONS  (PRN):  acetaminophen   Tablet .. 650 milliGRAM(s) Oral every 6 hours PRN Temp greater or equal to 38C (100.4F), Mild Pain (1 - 3), Moderate Pain (4 - 6)  ALBUTerol    90 MICROgram(s) HFA Inhaler 2 Puff(s) Inhalation every 6 hours PRN Shortness of Breath and/or Wheezing  
MEDICATIONS  (STANDING):  ALBUTerol    90 MICROgram(s) HFA Inhaler 2 Puff(s) Inhalation every 6 hours  amLODIPine   Tablet 10 milliGRAM(s) Oral daily  ARIPiprazole 10 milliGRAM(s) Oral at bedtime  atorvastatin 40 milliGRAM(s) Oral at bedtime  chlorhexidine 0.12% Liquid 15 milliLiter(s) Oral Mucosa two times a day  dextrose 40% Gel 15 Gram(s) Oral once  dextrose 5%. 1000 milliLiter(s) (50 mL/Hr) IV Continuous <Continuous>  dextrose 5%. 1000 milliLiter(s) (100 mL/Hr) IV Continuous <Continuous>  dextrose 50% Injectable 25 Gram(s) IV Push once  dextrose 50% Injectable 12.5 Gram(s) IV Push once  dextrose 50% Injectable 25 Gram(s) IV Push once  fluvoxaMINE 150 milliGRAM(s) Oral at bedtime  folic acid 1 milliGRAM(s) Oral daily  glucagon  Injectable 1 milliGRAM(s) IntraMuscular once  hydrALAZINE 25 milliGRAM(s) Oral every 8 hours  insulin lispro (ADMELOG) corrective regimen sliding scale   SubCutaneous at bedtime  insulin lispro (ADMELOG) corrective regimen sliding scale   SubCutaneous three times a day before meals  metFORMIN 500 milliGRAM(s) Oral two times a day  nystatin Powder 1 Application(s) Topical two times a day    MEDICATIONS  (PRN):  acetaminophen   Tablet .. 650 milliGRAM(s) Oral every 6 hours PRN Temp greater or equal to 38C (100.4F), Mild Pain (1 - 3), Moderate Pain (4 - 6)  
MEDICATIONS  (STANDING):  ALBUTerol    90 MICROgram(s) HFA Inhaler 2 Puff(s) Inhalation every 6 hours  amLODIPine   Tablet 10 milliGRAM(s) Oral daily  ARIPiprazole 10 milliGRAM(s) Oral at bedtime  atorvastatin 40 milliGRAM(s) Oral at bedtime  chlorhexidine 0.12% Liquid 15 milliLiter(s) Oral Mucosa two times a day  dextrose 40% Gel 15 Gram(s) Oral once  dextrose 5%. 1000 milliLiter(s) (100 mL/Hr) IV Continuous <Continuous>  dextrose 5%. 1000 milliLiter(s) (50 mL/Hr) IV Continuous <Continuous>  dextrose 50% Injectable 25 Gram(s) IV Push once  dextrose 50% Injectable 12.5 Gram(s) IV Push once  dextrose 50% Injectable 25 Gram(s) IV Push once  fluticasone propionate 50 MICROgram(s)/spray Nasal Spray 1 Spray(s) Both Nostrils two times a day  fluvoxaMINE 150 milliGRAM(s) Oral at bedtime  folic acid 1 milliGRAM(s) Oral daily  glucagon  Injectable 1 milliGRAM(s) IntraMuscular once  hydrALAZINE 25 milliGRAM(s) Oral every 8 hours  insulin lispro (ADMELOG) corrective regimen sliding scale   SubCutaneous at bedtime  insulin lispro (ADMELOG) corrective regimen sliding scale   SubCutaneous three times a day before meals  metFORMIN 500 milliGRAM(s) Oral two times a day  nystatin Powder 1 Application(s) Topical two times a day  sodium chloride 0.9%. 1000 milliLiter(s) (50 mL/Hr) IV Continuous <Continuous>    MEDICATIONS  (PRN):  acetaminophen   Tablet .. 650 milliGRAM(s) Oral every 6 hours PRN Temp greater or equal to 38C (100.4F), Mild Pain (1 - 3), Moderate Pain (4 - 6)  ondansetron    Tablet 4 milliGRAM(s) Oral every 8 hours PRN Nausea and/or Vomiting

## 2021-06-20 NOTE — PROVIDER CONTACT NOTE (OTHER) - ASSESSMENT
Patient has a blood pressure of 162/80, HR, 65
Pt vomited twice. Feelings of nausea and shortness of breath. shallow breathing, appears diaphoretic and shivering. Temp: 98.4, HR 89, /61, RR 19, O2 ranging from 89-92%. Nasal Canula 3L in place. Pt had a BM 30 minutes later, felt relief, less short of breath. SPo2 stable.
Vitals as documented, no fever. oxygen sating at 94% on 3L, Respirations normal

## 2021-06-20 NOTE — BH CONSULTATION LIAISON PROGRESS NOTE - NSBHCONSULTRECOMMENDOTHER_PSY_A_CORE FT
Would recommend to continue the following medications:    Luvox 150mg oral once daily at bedtime  Abilify 10mg oral once daily at bedtime    DC Pristiq as this was already tapered off by pts outpatient prescriber.     Would defer use of Cogentin to neurology. 
Would recommend to continue the following medications:    Luvox 100mg oral once daily at bedtime  Abilify 10mg oral once daily at bedtime. 
Would recommend to continue the following medications:    Luvox 150mg oral once daily at bedtime  Abilify 10mg oral once daily at bedtime    DC Pristiq as this was already tapered off by pts outpatient prescriber.     Would defer use of Cogentin to neurology.

## 2021-06-20 NOTE — BH CONSULTATION LIAISON PROGRESS NOTE - NSCDBILL_PSY_A_CORE
77019 - Inpatient, moderate complexity
[id number] - Inpatient, moderate complexity
[id number] - Inpatient, moderate complexity

## 2021-06-20 NOTE — PROVIDER CONTACT NOTE (OTHER) - SITUATION
pt complains of "shallow breathing" and chills
Patient has a blood pressure of 162/80, HR, 65 and states that she feels fine. Patient asymptomatic
Patient had two episodes of vomiting. Pt complains of nausea, appears short of breath with shallow breathing and diaphoretic with chills. Doctor Leslye made aware.

## 2021-06-20 NOTE — PROGRESS NOTE ADULT - REASON FOR ADMISSION
CVA

## 2021-06-20 NOTE — PROVIDER CONTACT NOTE (OTHER) - REASON
chills, shallow breathing
Patient a new admission has a blood pressure of 162/80, HR, 65
Patient vomiting

## 2021-06-20 NOTE — PROVIDER CONTACT NOTE (OTHER) - ACTION/TREATMENT ORDERED:
MD Gavin aware
MD notified and Metoprolol administered, will reassess and take blood pressure again in 2 hours
O2 increased to 4L, PRN Zofran ordered for nausea and vomiting. Nasal spray ordered. Incentive spirometry encouraged.

## 2021-06-20 NOTE — PROGRESS NOTE ADULT - ASSESSMENT
82 y/o F with a history of DVT, DM II, glaucoma, HLD, HTN, NPH s/p  admitted to [facility] rehab after hospital course for ICH. CT Head + for small right basal ganglia hemorrhage.  Hospital course was complicated by acute metabolic encephalopathy due to UTI (klebsiella variicola).      SOB  -Afebrile, hemodynamically stable. continue supplemental oxygen with nasal canula.   -Consider nasal gel, trial of flonase  -CHest Xray unremrqakble   -Recently completed cftx for UTI 6/10  -No wheezing noted, though cont albuterol   -Breathing exercises, supportive care    Dizziness, unsteadiness, and fall due to acute ICH of basal ganglia causing Impaired Gait, Mobility, and ADLs  NPH s/p  shunt  History DVT  - Continue comprehensive rehab program of PT/OT  - Fall precautions  - Eliquis discontinued for DVT due to IPH  - Duplex Venous Lower, Bilateral (05.28.21 @ 15:06) - No evidence of deep venous thrombosis in either lower extremity  - Continue statin  - Bowel regimen as per primary team  - Pain meds as per primary team     Acute Hypoxic Respiratory Failure likely due to COVID History  - No history of Asthma or COPD  - Elevated d- dimer likely due to COVID history  - Albuterol  - CT chest (6/9) Trace right pleural effusion and atelectasis/consolidation of lateral-posterior segments of right lower lobe. Atelectasis of medial segment of right middle lobe. Patchy groundglass opacities within the left lower lobe.  The anteromedial part of the right hemidiaphragm is elevated most likely secondary to eventration.  - Oxygen supplement PRN to keep O2 sat > 92%  - Pulm appreciated      Hyponatremia:   - Patient not grossly overloaded... glucose level acceptable...  - Hypotonic hyponatremia; will get urine studies   - Fluid restriction for now     Acute UTI  - UCx (6/2) Kleb Variicola  - Ceftriaxone completed    Uncontrolled Hypertension  Episode of bradycardia - likely after beta blocker was given  - EKG (6/9) shows sinus bradycardia, PACs, [id number]  - continue hydralazine 20mg q8h, amlodipine 10mg qd  - Lisinopril 5mg qd added - switch to losartan due to cough  - DC'd metoprolol 6/14, Off HCTZ 25mg  - Nephro following    Diabetes Mellitus II  - Off lantus and admelog  - Patient reports taking metformin TID. Due arias, starting metformin and observing FS  - Increase metformin from 850mg qd to 500mg BID  - ISS and monitor FS    Bipolar disorder  - HELD Pristiq, Continue Abilify, and Luvox  - Neuro Psych appreciated     Normocytic Anemia  - Stable  - continue folic acid    Hypoalbuminemia  - consider nutrition consult    DVT ppx - as per primary  80 y/o F with a history of DVT, DM II, glaucoma, HLD, HTN, NPH s/p  admitted to [facility] rehab after hospital course for ICH. CT Head + for small right basal ganglia hemorrhage.  Hospital course was complicated by acute metabolic encephalopathy due to UTI (klebsiella variicola).      SOB  -Afebrile, hemodynamically stable. continue supplemental oxygen with nasal canula.   -Consider nasal gel, trial of flonase  -CHest Xray unremrqakble   -Recently completed cftx for UTI 6/10  -No wheezing noted, though cont albuterol   -Breathing exercises, supportive care    Dizziness, unsteadiness, and fall due to acute ICH of basal ganglia causing Impaired Gait, Mobility, and ADLs  NPH s/p  shunt  History DVT  - Continue comprehensive rehab program of PT/OT  - Fall precautions  - Eliquis discontinued for DVT due to IPH  - Duplex Venous Lower, Bilateral (05.28.21 @ 15:06) - No evidence of deep venous thrombosis in either lower extremity  - Continue statin  - Bowel regimen as per primary team  - Pain meds as per primary team     Acute Hypoxic Respiratory Failure likely due to COVID History  - No history of Asthma or COPD  - Elevated d- dimer likely due to COVID history  - Albuterol  - CT chest (6/9) Trace right pleural effusion and atelectasis/consolidation of lateral-posterior segments of right lower lobe. Atelectasis of medial segment of right middle lobe. Patchy groundglass opacities within the left lower lobe.  The anteromedial part of the right hemidiaphragm is elevated most likely secondary to eventration.  - Oxygen supplement PRN to keep O2 sat > 92%  - Pulm appreciated      Hyponatremia:   - Patient not grossly overloaded... glucose level acceptable. ..TSH not terribly high  - High urine osmol and low Urine Na... Patient was started on NS for dehydration   - Case was discussed with Nephrology at details   - Hypotonic hyponatremia  - Fluid restriction for now     Acute UTI  - UCx (6/2) Kleb Variicola  - Ceftriaxone completed    Uncontrolled Hypertension  Episode of bradycardia - likely after beta blocker was given  - EKG (6/9) shows sinus bradycardia, PACs, [id number]  - continue hydralazine 20mg q8h, amlodipine 10mg qd  - Lisinopril 5mg qd added - switch to losartan due to cough  - DC'd metoprolol 6/14, Off HCTZ 25mg  - Nephro following    Diabetes Mellitus II  - Off lantus and admelog  - Patient reports taking metformin TID. Due arias, starting metformin and observing FS  - Increase metformin from 850mg qd to 500mg BID  - ISS and monitor FS    Bipolar disorder  - HELD Pristiq, Continue Abilify, and Luvox  - Neuro Psych appreciated     Normocytic Anemia  - Stable  - continue folic acid    Hypoalbuminemia  - consider nutrition consult    DVT ppx - as per primary

## 2021-06-20 NOTE — PROGRESS NOTE ADULT - PROVIDER SPECIALTY LIST ADULT
Hospitalist
Hospitalist
Neurology
Pulmonology
Rehab Medicine
Hospitalist
Neurology
Rehab Medicine
Rehab Medicine
Hospitalist
Nephrology
Neurology
Neurology
Hospitalist
Neurology
Neurology
Pulmonology
Pulmonology
Rehab Medicine
Neurology

## 2021-06-20 NOTE — BH CONSULTATION LIAISON PROGRESS NOTE - NSBHFUPINTERVALHXFT_PSY_A_CORE
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  CTH was done outpt after c/o dizziness and unsteadiness/falling over to left side.  CTH done in ED showed pt had a small right basal ganglia hemorrhage.   Hospital course was complicated by pt developing acute metabolic encephalopathy due to UTI.  UA noted to have pyuria and cultures grew klebsiella.  Pt was placed on epimeric antibiotics and ID consulted.  Mentation improved within 24hrs of IV antibiotics.   Patient was medically optimized for discharge to [facility] inpatient acute rehab on 6/7/2021. (07 Jun 2021 14:43)    Psychiatry was consulted to evaluate Mrs. Duff for "hallucinations" and medication management. Pt is a retired  female; domiciled alone in [place]; has 3 children; PPhx of Bipolar disorder mixed type (diagnosed in 50's), OCD. Pt reports being psychiatrically hospitalized 2-3 times for depressive episodes- treated with ECT in the past. Last admission about 10 year ago for depression at [facility]. Reports having some hypomania as well with euphoria and "spending too much money" at times; denies substance abuse hx; denies previous suicide attempts; reports being prescribed Abilify for years and other meds which she cannot recall. Chart review shows Wellbutrin last year; currently in outpatient treatment with Dr. Rashard Barr [phone] on meds.  Luvox 150mg oral once daily  Cogentin 0.5mg PO BID  Abilify 10mg oral once daily      Pt seen and evaluated at bedside. Pt is A&Ox4 to person, place, time & situation, pleasant, calm, cooperative with the undersign. Pts female friend was at bedside and she gave permission for the undersign to conduct an evaluation with her friend present. Pt states she recently suffered a CVA and she was managing "fairly independently" prior this. She states in the last few weeks her mood has been fluctuating from depressed and anxious to some days her mood being "normal". Pt states she has more so worrisome thoughts about her future given her current health status. Staff report pt has been intermittently hallucinating which was not observed on exam. Pt denied having any perceptual disturbances. Pt was also recently diagnosed with a UTI and she may have a very mild delirium at this time. UTI being treated with Rocephin and IF this is the cause, having perceptual disturbances is expected to resolve when underlying infection is treated. Pt otherwise states she has been sleeping "good", appetite is "fair", mood is "depressed" and she feels generally weak from laying in bed too much. Denied suicidal/homicidal ideations. Writer spoke with pts outpatient prescriber, Dr. Barr who confirms patients medications as listed above. He states pt was tapered off Pritiq. See recommendations below.  
  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  CTH was done outpt after c/o dizziness and unsteadiness/falling over to left side.  CTH done in ED showed pt had a small right basal ganglia hemorrhage.   Hospital course was complicated by pt developing acute metabolic encephalopathy due to UTI.  UA noted to have pyuria and cultures grew klebsiella.  Pt was placed on epimeric antibiotics and ID consulted.  Mentation improved within 24hrs of IV antibiotics.   Patient was medically optimized for discharge to [place] inpatient acute rehab on 6/7/2021. (07 Jun 2021 14:43)    Psychiatry was consulted to evaluate Mrs. Duff for "hallucinations" and medication management. Pt is a retired  female; domiciled alone in [place]; has 3 children; PPhx of Bipolar disorder mixed type (diagnosed in 50's), OCD. Pt reports being psychiatrically hospitalized 2-3 times for depressive episodes- treated with ECT in the past. Last admission about 10 year ago for depression at [facility]. Reports having some hypomania as well with euphoria and "spending too much money" at times; denies substance abuse hx; denies previous suicide attempts; reports being prescribed Abilify for years and other meds which she cannot recall. Chart review shows Wellbutrin last year; currently in outpatient treatment with Dr. Rashard Barr [phone] on meds.  Luvox 150mg oral once daily  Cogentin 0.5mg PO BID  Abilify 10mg oral once daily      Pt seen and evaluated at bedside. Pt is A&Ox4 to person, place, time & situation, pleasant, calm, cooperative with the undersign. Pts female friend was at bedside and she gave permission for the undersign to conduct an evaluation with her friend present. Pt states she recently suffered a CVA and she was managing "fairly independently" prior this. She states in the last few weeks her mood has been fluctuating from depressed and anxious to some days her mood being "normal". Pt states she has more so worrisome thoughts about her future given her current health status. Staff report pt has been intermittently hallucinating which was not observed on exam. Pt denied having any perceptual disturbances. Pt was also recently diagnosed with a UTI and she may have a very mild delirium at this time. UTI being treated with Rocephin and IF this is the cause, having perceptual disturbances is expected to resolve when underlying infection is treated. Pt otherwise states she has been sleeping "good", appetite is "fair", mood is "depressed" and she feels generally weak from laying in bed too much. Denied suicidal/homicidal ideations. Writer spoke with pts outpatient prescriber, Dr. Barr who confirms patients medications as listed above. He states pt was tapered off Pritiq. See recommendations below.  
  82 y/o F w/ PMH of DVT (on Eliquis), NIDDM, glaucoma, HLD, HTN, hs of NPH s/p  shunt 2015 sent ot ED for +CTH w/ ICH.  CTH was done outpt after c/o dizziness and unsteadiness/falling over to left side.  CTH done in ED showed pt had a small right basal ganglia hemorrhage.   Hospital course was complicated by pt developing acute metabolic encephalopathy due to UTI.  UA noted to have pyuria and cultures grew klebsiella.  Pt was placed on epimeric antibiotics and ID consulted.  Mentation improved within 24hrs of IV antibiotics.   Patient was medically optimized for discharge to [place] inpatient acute rehab on 6/7/2021. (07 Jun 2021 14:43)    Psychiatry was consulted to evaluate Mrs. Duff for "hallucinations" and medication management. Pt is a retired  female; domiciled alone in [place]; has 3 children; PPhx of Bipolar disorder mixed type (diagnosed in 50's), OCD. Pt reports being psychiatrically hospitalized 2-3 times for depressive episodes- treated with ECT in the past. Last admission about 10 year ago for depression at [facility]. Reports having some hypomania as well with euphoria and "spending too much money" at times; denies substance abuse hx; denies previous suicide attempts; reports being prescribed Abilify for years and other meds which she cannot recall. Chart review shows Wellbutrin last year; currently in outpatient treatment with Dr. Rashard Barr [phone] on meds.  Luvox 150mg oral once daily  Cogentin 0.5mg PO BID  Abilify 10mg oral once daily      Pt seen and evaluated at bedside. Pt is A&Ox4 to person, place, time & situation, pleasant, calm, cooperative c/o nausea, has low sodium and was vomiting. .

## 2021-06-20 NOTE — PROGRESS NOTE ADULT - SUBJECTIVE AND OBJECTIVE BOX
No distress    Vital Signs Last 24 Hrs  T(C): 36.9 (06-20-21 @ 08:07), Max: 37 (06-19-21 @ 19:31)  T(F): 98.5 (06-20-21 @ 08:07), Max: 98.6 (06-19-21 @ 19:31)  HR: 72 (06-20-21 @ 15:34) (70 - 89)  BP: 149/72 (06-20-21 @ 15:34) (121/69 - 149/72)  RR: 18 (06-20-21 @ 08:07) (18 - 19)  SpO2: 92% (06-20-21 @ 09:38) (91% - 95%)    s1s2  b/l air entry  soft, ND  no edema                                                                 9.4    6.84  )-----------( 246      ( 19 Jun 2021 06:51 )             28.2     20 Jun 2021 06:23    126    |  93     |  30     ----------------------------<  152    5.7     |  28     |  1.29     Ca    8.6        20 Jun 2021 06:23    TPro  6.6    /  Alb  2.8    /  TBili  0.3    /  DBili  x      /  AST  22     /  ALT  21     /  AlkPhos  86     19 Jun 2021 06:51    LIVER FUNCTIONS - ( 19 Jun 2021 06:51 )  Alb: 2.8 g/dL / Pro: 6.6 g/dL / ALK PHOS: 86 U/L / ALT: 21 U/L / AST: 22 U/L / GGT: x           acetaminophen   Tablet .. 650 milliGRAM(s) Oral every 6 hours PRN  ALBUTerol    90 MICROgram(s) HFA Inhaler 2 Puff(s) Inhalation every 6 hours  amLODIPine   Tablet 10 milliGRAM(s) Oral daily  ARIPiprazole 10 milliGRAM(s) Oral at bedtime  atorvastatin 40 milliGRAM(s) Oral at bedtime  chlorhexidine 0.12% Liquid 15 milliLiter(s) Oral Mucosa two times a day  dextrose 40% Gel 15 Gram(s) Oral once  dextrose 5%. 1000 milliLiter(s) IV Continuous <Continuous>  dextrose 5%. 1000 milliLiter(s) IV Continuous <Continuous>  dextrose 50% Injectable 12.5 Gram(s) IV Push once  dextrose 50% Injectable 25 Gram(s) IV Push once  dextrose 50% Injectable 25 Gram(s) IV Push once  fluticasone propionate 50 MICROgram(s)/spray Nasal Spray 1 Spray(s) Both Nostrils two times a day  fluvoxaMINE 150 milliGRAM(s) Oral at bedtime  folic acid 1 milliGRAM(s) Oral daily  glucagon  Injectable 1 milliGRAM(s) IntraMuscular once  hydrALAZINE 25 milliGRAM(s) Oral every 8 hours  insulin lispro (ADMELOG) corrective regimen sliding scale   SubCutaneous at bedtime  insulin lispro (ADMELOG) corrective regimen sliding scale   SubCutaneous three times a day before meals  metFORMIN 500 milliGRAM(s) Oral two times a day  nystatin Powder 1 Application(s) Topical two times a day  ondansetron    Tablet 4 milliGRAM(s) Oral every 8 hours PRN  sodium chloride 0.9%. 1000 milliLiter(s) IV Continuous <Continuous>    A/P:    Hx HTN, s/p ICH  CKD 3, stable  Avoid nephrotoxins  F/u BP on current meds  Hyponatremia, hyperkalemia  Low K diet  Trial of NS at 50cc/hr  Will f/u [facility]     [phone]

## 2021-06-20 NOTE — BH CONSULTATION LIAISON PROGRESS NOTE - NSICDXBHTERTIARYDX_PSY_ALL_CORE
R/O Delirium due to general medical condition   F05  

## 2021-06-20 NOTE — BH CONSULTATION LIAISON PROGRESS NOTE - NSICDXBHPRIMARYDX_PSY_ALL_CORE
Bipolar disorder current episode depressed   F31.30  

## 2021-06-20 NOTE — CHART NOTE - NSCHARTNOTEFT_GEN_A_CORE
Nutrition Follow Up Note  Hospital Course (Per Electronic Medical Record): 81y Female with impairments in mobility and ADLs   Source: Medical Record [X] Patient [X] Family [ ]         Diet: dysphagia 2 mechanical soft, thin liquids  Consistent Carbohydrate (evening snack), Glucerna daily  Pt c/o nausea and vomiting, reports that she has been eating but often feels nauseas, and anxious per pt. Per pt, ate pancakes this AM but didn't feel they were appetizing. Pt seen during lunch, was eating tomato soup, drinking ginger ale. Despite nausea/vomiting, pt reports she has been drinking Glucerna supplement. Advised pt to choose bland foods as these may be better tolerated. Pt on zofran for nausea/vomiting.     Current Weight: 164.2 lbs (6/18)  164.2 lbs (6/17)  162 lbs (6/15)  161.1 lbs (6/15)  160 lbs (6/13)  160 lbs (6/13)  156.7 lbs (6/12)  160.4 lbs (6/8)      Pertinent Medications: MEDICATIONS  (STANDING):  ALBUTerol    90 MICROgram(s) HFA Inhaler 2 Puff(s) Inhalation every 6 hours  amLODIPine   Tablet 10 milliGRAM(s) Oral daily  ARIPiprazole 10 milliGRAM(s) Oral at bedtime  atorvastatin 40 milliGRAM(s) Oral at bedtime  chlorhexidine 0.12% Liquid 15 milliLiter(s) Oral Mucosa two times a day  dextrose 40% Gel 15 Gram(s) Oral once  dextrose 5%. 1000 milliLiter(s) (50 mL/Hr) IV Continuous <Continuous>  dextrose 5%. 1000 milliLiter(s) (100 mL/Hr) IV Continuous <Continuous>  dextrose 50% Injectable 12.5 Gram(s) IV Push once  dextrose 50% Injectable 25 Gram(s) IV Push once  dextrose 50% Injectable 25 Gram(s) IV Push once  fluticasone propionate 50 MICROgram(s)/spray Nasal Spray 1 Spray(s) Both Nostrils two times a day  fluvoxaMINE 150 milliGRAM(s) Oral at bedtime  folic acid 1 milliGRAM(s) Oral daily  glucagon  Injectable 1 milliGRAM(s) IntraMuscular once  hydrALAZINE 25 milliGRAM(s) Oral every 8 hours  insulin lispro (ADMELOG) corrective regimen sliding scale   SubCutaneous at bedtime  insulin lispro (ADMELOG) corrective regimen sliding scale   SubCutaneous three times a day before meals  metFORMIN 500 milliGRAM(s) Oral two times a day  nystatin Powder 1 Application(s) Topical two times a day    MEDICATIONS  (PRN):  acetaminophen   Tablet .. 650 milliGRAM(s) Oral every 6 hours PRN Temp greater or equal to 38C (100.4F), Mild Pain (1 - 3), Moderate Pain (4 - 6)  ondansetron    Tablet 4 milliGRAM(s) Oral every 8 hours PRN Nausea and/or Vomiting      Pertinent Labs:  06-20 Na126 mmol/L<L> Glu 152 mg/dL<H> K+ 5.7 mmol/L<H> Cr  1.29 mg/dL BUN 30 mg/dL<H> 06-19 Alb 2.8 g/dL<L> 05-30 Chol 133 mg/dL LDL --    HDL 54 mg/dL Trig 103 mg/dL    Hyponatremia noted. Pt was on DASH/TLC diet; discontinued by medical team.  Elevated TSH noted (see full lab results).      Skin: bruised, moisture associated dermatitis- abdominal folds Per nursing flowsheets     Edema: 1+ edema L Leg noted on 6/19 Per nursing flowsheets     Last BM: on 6/17 Per nursing flowsheets     Estimated Needs:   [X] No Change since Previous Assessment  [ ] Recalculated:     Previous Nutrition Diagnosis:   Chewing/swallowing difficulty    Nutrition Diagnosis is [X] Ongoing    [ ] Resolved   [ ] Not Applicable      New Nutrition Diagnosis: [X] Not Applicable  [ ] Inadequate Protein Energy Intake   [ ] Inadequate Oral Intake   [ ] Excessive Energy Intake   [ ] Increased Nutrient Needs   [ ] Obesity   [ ] Altered GI Function   [ ] Unintended Weight Loss   [ ] Food & Nutrition Related Knowledge Deficit  [ ] Limited Adherence to nutrition related recommendations   [ ] Malnutrition      Interventions:   1. Recommend continuing with current diet  2. Encouraged bland foods, small meals as tolerated  3. Encourage consumption of Glucerna supplement- pt reports she is tolerating supplement well  4. Monitor weight and labs      Monitoring & Evaluation:   [X] Weights   [X] PO Intake   [X] Follow Up (Per Protocol)  [X] Tolerance to Diet Prescription   [X] Other: Labs, POCT glucose     RD Remains Available.  Marlene Saavedra RD

## 2021-06-21 ENCOUNTER — INPATIENT (INPATIENT)
Facility: HOSPITAL | Age: 81
LOS: 9 days | Discharge: NURSING, REHAB OR LONG-TERM CARE | DRG: 189 | End: 2021-07-01
Attending: STUDENT IN AN ORGANIZED HEALTH CARE EDUCATION/TRAINING PROGRAM | Admitting: INTERNAL MEDICINE
Payer: MEDICARE

## 2021-06-21 VITALS
RESPIRATION RATE: 19 BRPM | OXYGEN SATURATION: 99 % | HEART RATE: 78 BPM | SYSTOLIC BLOOD PRESSURE: 138 MMHG | WEIGHT: 175.93 LBS | TEMPERATURE: 98 F | DIASTOLIC BLOOD PRESSURE: 55 MMHG | HEIGHT: 58 IN

## 2021-06-21 VITALS
RESPIRATION RATE: 18 BRPM | WEIGHT: 177.69 LBS | HEART RATE: 78 BPM | OXYGEN SATURATION: 98 % | HEIGHT: 58 IN | TEMPERATURE: 98 F

## 2021-06-21 DIAGNOSIS — I50.9 HEART FAILURE, UNSPECIFIED: ICD-10-CM

## 2021-06-21 DIAGNOSIS — Z90.710 ACQUIRED ABSENCE OF BOTH CERVIX AND UTERUS: Chronic | ICD-10-CM

## 2021-06-21 DIAGNOSIS — Z96.652 PRESENCE OF LEFT ARTIFICIAL KNEE JOINT: Chronic | ICD-10-CM

## 2021-06-21 DIAGNOSIS — R09.89 OTHER SPECIFIED SYMPTOMS AND SIGNS INVOLVING THE CIRCULATORY AND RESPIRATORY SYSTEMS: ICD-10-CM

## 2021-06-21 DIAGNOSIS — Z90.49 ACQUIRED ABSENCE OF OTHER SPECIFIED PARTS OF DIGESTIVE TRACT: Chronic | ICD-10-CM

## 2021-06-21 DIAGNOSIS — S86.009A UNSPECIFIED INJURY OF UNSPECIFIED ACHILLES TENDON, INITIAL ENCOUNTER: Chronic | ICD-10-CM

## 2021-06-21 DIAGNOSIS — I50.23 ACUTE ON CHRONIC SYSTOLIC (CONGESTIVE) HEART FAILURE: ICD-10-CM

## 2021-06-21 LAB
ALBUMIN SERPL ELPH-MCNC: 2.9 G/DL — LOW (ref 3.3–5)
ALP SERPL-CCNC: 90 U/L — SIGNIFICANT CHANGE UP (ref 40–120)
ALT FLD-CCNC: 25 U/L — SIGNIFICANT CHANGE UP (ref 10–45)
ANION GAP SERPL CALC-SCNC: 4 MMOL/L — LOW (ref 5–17)
ANION GAP SERPL CALC-SCNC: 6 MMOL/L — SIGNIFICANT CHANGE UP (ref 5–17)
ANION GAP SERPL CALC-SCNC: 6 MMOL/L — SIGNIFICANT CHANGE UP (ref 5–17)
APTT BLD: 31.3 SEC — SIGNIFICANT CHANGE UP (ref 27.5–35.5)
AST SERPL-CCNC: 25 U/L — SIGNIFICANT CHANGE UP (ref 10–40)
BASOPHILS # BLD AUTO: 0.05 K/UL — SIGNIFICANT CHANGE UP (ref 0–0.2)
BASOPHILS NFR BLD AUTO: 0.8 % — SIGNIFICANT CHANGE UP (ref 0–2)
BILIRUB SERPL-MCNC: 0.3 MG/DL — SIGNIFICANT CHANGE UP (ref 0.2–1.2)
BLOOD GAS COMMENTS ARTERIAL: SIGNIFICANT CHANGE UP
BUN SERPL-MCNC: 29 MG/DL — HIGH (ref 7–23)
BUN SERPL-MCNC: 30 MG/DL — HIGH (ref 7–23)
BUN SERPL-MCNC: 31 MG/DL — HIGH (ref 7–23)
CALCIUM SERPL-MCNC: 9.1 MG/DL — SIGNIFICANT CHANGE UP (ref 8.4–10.5)
CALCIUM SERPL-MCNC: 9.2 MG/DL — SIGNIFICANT CHANGE UP (ref 8.4–10.5)
CALCIUM SERPL-MCNC: 9.8 MG/DL — SIGNIFICANT CHANGE UP (ref 8.4–10.5)
CHLORIDE SERPL-SCNC: 91 MMOL/L — LOW (ref 96–108)
CHLORIDE SERPL-SCNC: 91 MMOL/L — LOW (ref 96–108)
CHLORIDE SERPL-SCNC: 92 MMOL/L — LOW (ref 96–108)
CO2 BLDA-SCNC: 30 MMOL/L — SIGNIFICANT CHANGE UP (ref 22–30)
CO2 SERPL-SCNC: 28 MMOL/L — SIGNIFICANT CHANGE UP (ref 22–31)
CO2 SERPL-SCNC: 28 MMOL/L — SIGNIFICANT CHANGE UP (ref 22–31)
CO2 SERPL-SCNC: 29 MMOL/L — SIGNIFICANT CHANGE UP (ref 22–31)
CREAT SERPL-MCNC: 1.5 MG/DL — HIGH (ref 0.5–1.3)
CREAT SERPL-MCNC: 1.55 MG/DL — HIGH (ref 0.5–1.3)
CREAT SERPL-MCNC: 1.59 MG/DL — HIGH (ref 0.5–1.3)
D DIMER BLD IA.RAPID-MCNC: 463 NG/ML DDU — HIGH
EOSINOPHIL # BLD AUTO: 0.05 K/UL — SIGNIFICANT CHANGE UP (ref 0–0.5)
EOSINOPHIL NFR BLD AUTO: 0.8 % — SIGNIFICANT CHANGE UP (ref 0–6)
GAS PNL BLDA: SIGNIFICANT CHANGE UP
GLUCOSE BLDC GLUCOMTR-MCNC: 140 MG/DL — HIGH (ref 70–99)
GLUCOSE BLDC GLUCOMTR-MCNC: 175 MG/DL — HIGH (ref 70–99)
GLUCOSE BLDC GLUCOMTR-MCNC: 178 MG/DL — HIGH (ref 70–99)
GLUCOSE SERPL-MCNC: 122 MG/DL — HIGH (ref 70–99)
GLUCOSE SERPL-MCNC: 171 MG/DL — HIGH (ref 70–99)
GLUCOSE SERPL-MCNC: 212 MG/DL — HIGH (ref 70–99)
HCT VFR BLD CALC: 26.2 % — LOW (ref 34.5–45)
HCT VFR BLD CALC: 28.5 % — LOW (ref 34.5–45)
HGB BLD-MCNC: 8.6 G/DL — LOW (ref 11.5–15.5)
HGB BLD-MCNC: 9.3 G/DL — LOW (ref 11.5–15.5)
HOROWITZ INDEX BLDA+IHG-RTO: SIGNIFICANT CHANGE UP
IMM GRANULOCYTES NFR BLD AUTO: 0.5 % — SIGNIFICANT CHANGE UP (ref 0–1.5)
INR BLD: 0.97 RATIO — SIGNIFICANT CHANGE UP (ref 0.88–1.16)
LYMPHOCYTES # BLD AUTO: 0.95 K/UL — LOW (ref 1–3.3)
LYMPHOCYTES # BLD AUTO: 15 % — SIGNIFICANT CHANGE UP (ref 13–44)
MAGNESIUM SERPL-MCNC: 1.5 MG/DL — LOW (ref 1.6–2.6)
MCHC RBC-ENTMCNC: 29.6 PG — SIGNIFICANT CHANGE UP (ref 27–34)
MCHC RBC-ENTMCNC: 30.1 PG — SIGNIFICANT CHANGE UP (ref 27–34)
MCHC RBC-ENTMCNC: 32.6 GM/DL — SIGNIFICANT CHANGE UP (ref 32–36)
MCHC RBC-ENTMCNC: 32.8 GM/DL — SIGNIFICANT CHANGE UP (ref 32–36)
MCV RBC AUTO: 90 FL — SIGNIFICANT CHANGE UP (ref 80–100)
MCV RBC AUTO: 92.2 FL — SIGNIFICANT CHANGE UP (ref 80–100)
MONOCYTES # BLD AUTO: 0.71 K/UL — SIGNIFICANT CHANGE UP (ref 0–0.9)
MONOCYTES NFR BLD AUTO: 11.2 % — SIGNIFICANT CHANGE UP (ref 2–14)
NEUTROPHILS # BLD AUTO: 4.55 K/UL — SIGNIFICANT CHANGE UP (ref 1.8–7.4)
NEUTROPHILS NFR BLD AUTO: 71.7 % — SIGNIFICANT CHANGE UP (ref 43–77)
NRBC # BLD: 0 /100 WBCS — SIGNIFICANT CHANGE UP (ref 0–0)
NRBC # BLD: 0 /100 WBCS — SIGNIFICANT CHANGE UP (ref 0–0)
PCO2 BLDA: 55 MMHG — HIGH (ref 32–46)
PH BLDA: 7.31 — LOW (ref 7.35–7.45)
PHOSPHATE SERPL-MCNC: 4.7 MG/DL — HIGH (ref 2.5–4.5)
PLATELET # BLD AUTO: 213 K/UL — SIGNIFICANT CHANGE UP (ref 150–400)
PLATELET # BLD AUTO: 219 K/UL — SIGNIFICANT CHANGE UP (ref 150–400)
PO2 BLDA: 104 MMHG — SIGNIFICANT CHANGE UP (ref 74–108)
POTASSIUM SERPL-MCNC: 5.2 MMOL/L — SIGNIFICANT CHANGE UP (ref 3.5–5.3)
POTASSIUM SERPL-MCNC: 5.4 MMOL/L — HIGH (ref 3.5–5.3)
POTASSIUM SERPL-MCNC: 6.1 MMOL/L — HIGH (ref 3.5–5.3)
POTASSIUM SERPL-SCNC: 5.2 MMOL/L — SIGNIFICANT CHANGE UP (ref 3.5–5.3)
POTASSIUM SERPL-SCNC: 5.4 MMOL/L — HIGH (ref 3.5–5.3)
POTASSIUM SERPL-SCNC: 6.1 MMOL/L — HIGH (ref 3.5–5.3)
PROT SERPL-MCNC: 6.7 G/DL — SIGNIFICANT CHANGE UP (ref 6–8.3)
PROTHROM AB SERPL-ACNC: 11.7 SEC — SIGNIFICANT CHANGE UP (ref 10.6–13.6)
RBC # BLD: 2.91 M/UL — LOW (ref 3.8–5.2)
RBC # BLD: 3.09 M/UL — LOW (ref 3.8–5.2)
RBC # FLD: 12.3 % — SIGNIFICANT CHANGE UP (ref 10.3–14.5)
RBC # FLD: 12.4 % — SIGNIFICANT CHANGE UP (ref 10.3–14.5)
SAO2 % BLDA: 98 % — HIGH (ref 92–96)
SARS-COV-2 RNA SPEC QL NAA+PROBE: SIGNIFICANT CHANGE UP
SODIUM SERPL-SCNC: 124 MMOL/L — LOW (ref 135–145)
SODIUM SERPL-SCNC: 125 MMOL/L — LOW (ref 135–145)
SODIUM SERPL-SCNC: 126 MMOL/L — LOW (ref 135–145)
T4 FREE SERPL-MCNC: 1.3 NG/DL — SIGNIFICANT CHANGE UP (ref 0.9–1.8)
TROPONIN I SERPL-MCNC: 0.06 NG/ML — HIGH (ref 0.02–0.06)
TROPONIN I SERPL-MCNC: 0.06 NG/ML — HIGH (ref 0.02–0.06)
WBC # BLD: 6.34 K/UL — SIGNIFICANT CHANGE UP (ref 3.8–10.5)
WBC # BLD: 8.17 K/UL — SIGNIFICANT CHANGE UP (ref 3.8–10.5)
WBC # FLD AUTO: 6.34 K/UL — SIGNIFICANT CHANGE UP (ref 3.8–10.5)
WBC # FLD AUTO: 8.17 K/UL — SIGNIFICANT CHANGE UP (ref 3.8–10.5)

## 2021-06-21 PROCEDURE — 70450 CT HEAD/BRAIN W/O DYE: CPT | Mod: 26

## 2021-06-21 PROCEDURE — 99223 1ST HOSP IP/OBS HIGH 75: CPT

## 2021-06-21 PROCEDURE — 93970 EXTREMITY STUDY: CPT | Mod: 26

## 2021-06-21 PROCEDURE — 93010 ELECTROCARDIOGRAM REPORT: CPT

## 2021-06-21 PROCEDURE — 99222 1ST HOSP IP/OBS MODERATE 55: CPT

## 2021-06-21 PROCEDURE — 71045 X-RAY EXAM CHEST 1 VIEW: CPT | Mod: 26

## 2021-06-21 PROCEDURE — 78580 LUNG PERFUSION IMAGING: CPT | Mod: 26

## 2021-06-21 RX ORDER — FLUVOXAMINE MALEATE 25 MG/1
1.5 TABLET ORAL
Qty: 0 | Refills: 0 | DISCHARGE

## 2021-06-21 RX ORDER — SODIUM CHLORIDE 9 MG/ML
1000 INJECTION, SOLUTION INTRAVENOUS
Refills: 0 | Status: DISCONTINUED | OUTPATIENT
Start: 2021-06-21 | End: 2021-07-01

## 2021-06-21 RX ORDER — ATORVASTATIN CALCIUM 80 MG/1
40 TABLET, FILM COATED ORAL AT BEDTIME
Refills: 0 | Status: DISCONTINUED | OUTPATIENT
Start: 2021-06-21 | End: 2021-07-01

## 2021-06-21 RX ORDER — FOLIC ACID 0.8 MG
1 TABLET ORAL DAILY
Refills: 0 | Status: DISCONTINUED | OUTPATIENT
Start: 2021-06-21 | End: 2021-07-01

## 2021-06-21 RX ORDER — HYDRALAZINE HCL 50 MG
25 TABLET ORAL EVERY 8 HOURS
Refills: 0 | Status: DISCONTINUED | OUTPATIENT
Start: 2021-06-21 | End: 2021-07-01

## 2021-06-21 RX ORDER — DESVENLAFAXINE 50 MG/1
1 TABLET, EXTENDED RELEASE ORAL
Qty: 0 | Refills: 0 | DISCHARGE

## 2021-06-21 RX ORDER — ARIPIPRAZOLE 15 MG/1
10 TABLET ORAL AT BEDTIME
Refills: 0 | Status: DISCONTINUED | OUTPATIENT
Start: 2021-06-21 | End: 2021-07-01

## 2021-06-21 RX ORDER — NYSTATIN CREAM 100000 [USP'U]/G
1 CREAM TOPICAL
Qty: 0 | Refills: 0 | DISCHARGE
Start: 2021-06-21

## 2021-06-21 RX ORDER — ACETAMINOPHEN 500 MG
650 TABLET ORAL EVERY 6 HOURS
Refills: 0 | Status: DISCONTINUED | OUTPATIENT
Start: 2021-06-21 | End: 2021-07-01

## 2021-06-21 RX ORDER — FUROSEMIDE 40 MG
40 TABLET ORAL ONCE
Refills: 0 | Status: COMPLETED | OUTPATIENT
Start: 2021-06-21 | End: 2021-06-21

## 2021-06-21 RX ORDER — ERGOCALCIFEROL 1.25 MG/1
1 CAPSULE ORAL
Qty: 0 | Refills: 0 | DISCHARGE

## 2021-06-21 RX ORDER — DEXTROSE 50 % IN WATER 50 %
12.5 SYRINGE (ML) INTRAVENOUS ONCE
Refills: 0 | Status: DISCONTINUED | OUTPATIENT
Start: 2021-06-21 | End: 2021-07-01

## 2021-06-21 RX ORDER — MAGNESIUM SULFATE 500 MG/ML
1 VIAL (ML) INJECTION ONCE
Refills: 0 | Status: COMPLETED | OUTPATIENT
Start: 2021-06-21 | End: 2021-06-21

## 2021-06-21 RX ORDER — CHLORHEXIDINE GLUCONATE 213 G/1000ML
15 SOLUTION TOPICAL
Qty: 0 | Refills: 0 | DISCHARGE
Start: 2021-06-21

## 2021-06-21 RX ORDER — ACETAMINOPHEN 500 MG
2 TABLET ORAL
Qty: 0 | Refills: 0 | DISCHARGE
Start: 2021-06-21

## 2021-06-21 RX ORDER — ATORVASTATIN CALCIUM 80 MG/1
1 TABLET, FILM COATED ORAL
Qty: 0 | Refills: 0 | DISCHARGE

## 2021-06-21 RX ORDER — DEXTROSE 50 % IN WATER 50 %
15 SYRINGE (ML) INTRAVENOUS ONCE
Refills: 0 | Status: DISCONTINUED | OUTPATIENT
Start: 2021-06-21 | End: 2021-07-01

## 2021-06-21 RX ORDER — FUROSEMIDE 40 MG
40 TABLET ORAL ONCE
Refills: 0 | Status: DISCONTINUED | OUTPATIENT
Start: 2021-06-21 | End: 2021-06-21

## 2021-06-21 RX ORDER — FUROSEMIDE 40 MG
20 TABLET ORAL ONCE
Refills: 0 | Status: COMPLETED | OUTPATIENT
Start: 2021-06-21 | End: 2021-06-21

## 2021-06-21 RX ORDER — IPRATROPIUM/ALBUTEROL SULFATE 18-103MCG
3 AEROSOL WITH ADAPTER (GRAM) INHALATION THREE TIMES A DAY
Refills: 0 | Status: DISCONTINUED | OUTPATIENT
Start: 2021-06-21 | End: 2021-07-01

## 2021-06-21 RX ORDER — INSULIN HUMAN 100 [IU]/ML
10 INJECTION, SOLUTION SUBCUTANEOUS ONCE
Refills: 0 | Status: COMPLETED | OUTPATIENT
Start: 2021-06-21 | End: 2021-06-21

## 2021-06-21 RX ORDER — FOLIC ACID 0.8 MG
1 TABLET ORAL
Qty: 0 | Refills: 0 | DISCHARGE

## 2021-06-21 RX ORDER — SODIUM POLYSTYRENE SULFONATE 4.1 MEQ/G
30 POWDER, FOR SUSPENSION ORAL ONCE
Refills: 0 | Status: COMPLETED | OUTPATIENT
Start: 2021-06-21 | End: 2021-06-21

## 2021-06-21 RX ORDER — INSULIN LISPRO 100/ML
VIAL (ML) SUBCUTANEOUS AT BEDTIME
Refills: 0 | Status: DISCONTINUED | OUTPATIENT
Start: 2021-06-21 | End: 2021-07-01

## 2021-06-21 RX ORDER — IPRATROPIUM/ALBUTEROL SULFATE 18-103MCG
3 AEROSOL WITH ADAPTER (GRAM) INHALATION EVERY 4 HOURS
Refills: 0 | Status: DISCONTINUED | OUTPATIENT
Start: 2021-06-21 | End: 2021-07-01

## 2021-06-21 RX ORDER — FUROSEMIDE 40 MG
20 TABLET ORAL DAILY
Refills: 0 | Status: DISCONTINUED | OUTPATIENT
Start: 2021-06-21 | End: 2021-06-21

## 2021-06-21 RX ORDER — METFORMIN HYDROCHLORIDE 850 MG/1
1 TABLET ORAL
Qty: 0 | Refills: 0 | DISCHARGE
Start: 2021-06-21

## 2021-06-21 RX ORDER — FAMOTIDINE 10 MG/ML
20 INJECTION INTRAVENOUS ONCE
Refills: 0 | Status: COMPLETED | OUTPATIENT
Start: 2021-06-21 | End: 2021-06-21

## 2021-06-21 RX ORDER — DEXTROSE 50 % IN WATER 50 %
25 SYRINGE (ML) INTRAVENOUS ONCE
Refills: 0 | Status: DISCONTINUED | OUTPATIENT
Start: 2021-06-21 | End: 2021-07-01

## 2021-06-21 RX ORDER — FLUVOXAMINE MALEATE 25 MG/1
1 TABLET ORAL
Qty: 0 | Refills: 0 | DISCHARGE
Start: 2021-06-21

## 2021-06-21 RX ORDER — AMLODIPINE BESYLATE 2.5 MG/1
1 TABLET ORAL
Qty: 0 | Refills: 0 | DISCHARGE
Start: 2021-06-21

## 2021-06-21 RX ORDER — ATORVASTATIN CALCIUM 80 MG/1
1 TABLET, FILM COATED ORAL
Qty: 0 | Refills: 0 | DISCHARGE
Start: 2021-06-21

## 2021-06-21 RX ORDER — INSULIN LISPRO 100/ML
VIAL (ML) SUBCUTANEOUS
Refills: 0 | Status: DISCONTINUED | OUTPATIENT
Start: 2021-06-21 | End: 2021-07-01

## 2021-06-21 RX ORDER — ARIPIPRAZOLE 15 MG/1
1 TABLET ORAL
Qty: 0 | Refills: 0 | DISCHARGE
Start: 2021-06-21

## 2021-06-21 RX ORDER — ONDANSETRON 8 MG/1
4 TABLET, FILM COATED ORAL EVERY 6 HOURS
Refills: 0 | Status: DISCONTINUED | OUTPATIENT
Start: 2021-06-21 | End: 2021-07-01

## 2021-06-21 RX ORDER — ONDANSETRON 8 MG/1
4 TABLET, FILM COATED ORAL EVERY 6 HOURS
Refills: 0 | Status: DISCONTINUED | OUTPATIENT
Start: 2021-06-21 | End: 2021-06-21

## 2021-06-21 RX ORDER — ONDANSETRON 8 MG/1
4 TABLET, FILM COATED ORAL ONCE
Refills: 0 | Status: COMPLETED | OUTPATIENT
Start: 2021-06-21 | End: 2021-06-21

## 2021-06-21 RX ORDER — GLUCAGON INJECTION, SOLUTION 0.5 MG/.1ML
1 INJECTION, SOLUTION SUBCUTANEOUS ONCE
Refills: 0 | Status: DISCONTINUED | OUTPATIENT
Start: 2021-06-21 | End: 2021-07-01

## 2021-06-21 RX ORDER — FOLIC ACID 0.8 MG
1 TABLET ORAL
Qty: 0 | Refills: 0 | DISCHARGE
Start: 2021-06-21

## 2021-06-21 RX ORDER — ALBUTEROL 90 UG/1
2 AEROSOL, METERED ORAL
Qty: 0 | Refills: 0 | DISCHARGE
Start: 2021-06-21

## 2021-06-21 RX ORDER — FLUTICASONE PROPIONATE 50 MCG
1 SPRAY, SUSPENSION NASAL
Qty: 0 | Refills: 0 | DISCHARGE
Start: 2021-06-21

## 2021-06-21 RX ORDER — IPRATROPIUM/ALBUTEROL SULFATE 18-103MCG
3 AEROSOL WITH ADAPTER (GRAM) INHALATION ONCE
Refills: 0 | Status: COMPLETED | OUTPATIENT
Start: 2021-06-21 | End: 2021-06-21

## 2021-06-21 RX ORDER — CALCIUM GLUCONATE 100 MG/ML
1 VIAL (ML) INTRAVENOUS ONCE
Refills: 0 | Status: COMPLETED | OUTPATIENT
Start: 2021-06-21 | End: 2021-06-21

## 2021-06-21 RX ORDER — HYDRALAZINE HCL 50 MG
1 TABLET ORAL
Qty: 0 | Refills: 0 | DISCHARGE
Start: 2021-06-21

## 2021-06-21 RX ORDER — DEXTROSE 50 % IN WATER 50 %
50 SYRINGE (ML) INTRAVENOUS ONCE
Refills: 0 | Status: COMPLETED | OUTPATIENT
Start: 2021-06-21 | End: 2021-06-21

## 2021-06-21 RX ORDER — AMLODIPINE BESYLATE 2.5 MG/1
10 TABLET ORAL DAILY
Refills: 0 | Status: DISCONTINUED | OUTPATIENT
Start: 2021-06-21 | End: 2021-06-23

## 2021-06-21 RX ORDER — PANTOPRAZOLE SODIUM 20 MG/1
40 TABLET, DELAYED RELEASE ORAL
Refills: 0 | Status: DISCONTINUED | OUTPATIENT
Start: 2021-06-21 | End: 2021-07-01

## 2021-06-21 RX ORDER — ONDANSETRON 8 MG/1
1 TABLET, FILM COATED ORAL
Qty: 0 | Refills: 0 | DISCHARGE
Start: 2021-06-21

## 2021-06-21 RX ORDER — FUROSEMIDE 40 MG
40 TABLET ORAL
Refills: 0 | Status: DISCONTINUED | OUTPATIENT
Start: 2021-06-22 | End: 2021-06-22

## 2021-06-21 RX ADMIN — INSULIN HUMAN 10 UNIT(S): 100 INJECTION, SOLUTION SUBCUTANEOUS at 06:22

## 2021-06-21 RX ADMIN — Medication 3 MILLILITER(S): at 16:47

## 2021-06-21 RX ADMIN — ONDANSETRON 4 MILLIGRAM(S): 8 TABLET, FILM COATED ORAL at 05:30

## 2021-06-21 RX ADMIN — ARIPIPRAZOLE 10 MILLIGRAM(S): 15 TABLET ORAL at 21:26

## 2021-06-21 RX ADMIN — Medication 20 MILLIGRAM(S): at 06:15

## 2021-06-21 RX ADMIN — ALBUTEROL 2 PUFF(S): 90 AEROSOL, METERED ORAL at 03:30

## 2021-06-21 RX ADMIN — Medication 25 MILLIGRAM(S): at 06:11

## 2021-06-21 RX ADMIN — PANTOPRAZOLE SODIUM 40 MILLIGRAM(S): 20 TABLET, DELAYED RELEASE ORAL at 06:16

## 2021-06-21 RX ADMIN — Medication 3 MILLILITER(S): at 21:11

## 2021-06-21 RX ADMIN — AMLODIPINE BESYLATE 10 MILLIGRAM(S): 2.5 TABLET ORAL at 06:10

## 2021-06-21 RX ADMIN — Medication 40 MILLIGRAM(S): at 21:26

## 2021-06-21 RX ADMIN — Medication 40 MILLIGRAM(S): at 11:48

## 2021-06-21 RX ADMIN — FAMOTIDINE 100 MILLIGRAM(S): 10 INJECTION INTRAVENOUS at 05:29

## 2021-06-21 RX ADMIN — ATORVASTATIN CALCIUM 40 MILLIGRAM(S): 80 TABLET, FILM COATED ORAL at 21:26

## 2021-06-21 RX ADMIN — Medication 25 MILLIGRAM(S): at 15:28

## 2021-06-21 RX ADMIN — Medication 100 GRAM(S): at 05:29

## 2021-06-21 RX ADMIN — Medication 20 MILLIGRAM(S): at 02:57

## 2021-06-21 RX ADMIN — Medication 20 MILLIGRAM(S): at 03:16

## 2021-06-21 RX ADMIN — Medication 1 MILLIGRAM(S): at 11:49

## 2021-06-21 RX ADMIN — Medication 50 MILLILITER(S): at 06:22

## 2021-06-21 RX ADMIN — ONDANSETRON 4 MILLIGRAM(S): 8 TABLET, FILM COATED ORAL at 08:19

## 2021-06-21 RX ADMIN — Medication 100 GRAM(S): at 11:48

## 2021-06-21 RX ADMIN — Medication 25 MILLIGRAM(S): at 21:26

## 2021-06-21 RX ADMIN — Medication 3 MILLILITER(S): at 01:26

## 2021-06-21 NOTE — DISCHARGE NOTE PROVIDER - NSDCMRMEDTOKEN_GEN_ALL_CORE_FT
acetaminophen 325 mg oral tablet: 2 tab(s) orally every 6 hours, As needed, Temp greater or equal to 38C (100.4F), Mild Pain (1 - 3), Moderate Pain (4 - 6)  albuterol 90 mcg/inh inhalation aerosol: 2 puff(s) inhaled every 6 hours  amLODIPine 10 mg oral tablet: 1 tab(s) orally once a day  ARIPiprazole 10 mg oral tablet: 1 tab(s) orally once a day (at bedtime)  atorvastatin 40 mg oral tablet: 1 tab(s) orally once a day (at bedtime)  chlorhexidine 0.12% mucous membrane liquid: 15 milliliter(s) mucous membrane 2 times a day  fluticasone 50 mcg/inh nasal spray: 1 spray(s) nasal 2 times a day  fluvoxaMINE 100 mg oral tablet: 1 tab(s) orally once a day (at bedtime)  folic acid 1 mg oral tablet: 1 tab(s) orally once a day  hydrALAZINE 25 mg oral tablet: 1 tab(s) orally every 8 hours  metFORMIN 500 mg oral tablet: 1 tab(s) orally 2 times a day  nystatin 100,000 units/g topical powder: 1 application topically 2 times a day  ondansetron 4 mg oral tablet: 1 tab(s) orally every 8 hours, As needed, Nausea and/or Vomiting

## 2021-06-21 NOTE — DISCHARGE NOTE PROVIDER - CARE PROVIDER_API CALL
Nahomi Wilkins)  Neurology  [address] [place] [address]  Phone: [phone]  Fax: [phone]  Follow Up Time:

## 2021-06-21 NOTE — PROGRESS NOTE ADULT - SUBJECTIVE AND OBJECTIVE BOX
F/U Note:    81F admitted with hypoxemia and acute CHR exacerbation     ICU Vital Signs Last 24 Hrs  T(C): 37.1 (21 Jun 2021 19:51), Max: 37.1 (21 Jun 2021 19:51)  T(F): 98.7 (21 Jun 2021 19:51), Max: 98.7 (21 Jun 2021 19:51)  HR: 76 (21 Jun 2021 21:13) (75 - 86)  BP: 142/52 (21 Jun 2021 19:51) (127/93 - 142/52)  RR: 20 (21 Jun 2021 21:13) (16 - 20)  SpO2: 95% (21 Jun 2021 21:13) (90% - 99%)                            8.6    6.34  )-----------( 213      ( 21 Jun 2021 06:30 )             26.2       06-21    126<L>  |  92<L>  |  30<H>  ----------------------------<  122<H>  5.4<H>   |  28  |  1.55<H>    Ca    9.8      21 Jun 2021 09:14  Phos  4.7     06-21  Mg     1.5     06-21    TPro  6.7  /  Alb  2.9<L>  /  TBili  0.3  /  DBili  x   /  AST  25  /  ALT  25  /  AlkPhos  90  06-21      NEURO: awake and alert  CV: (+) S1/S2, rrr no mrg  RESp: CTA b/l  GI: soft/nontender F/U Note:    81F admitted with hypoxemia and acute CHF exacerbation     ICU Vital Signs Last 24 Hrs  T(C): 37.1 (21 Jun 2021 19:51), Max: 37.1 (21 Jun 2021 19:51)  T(F): 98.7 (21 Jun 2021 19:51), Max: 98.7 (21 Jun 2021 19:51)  HR: 76 (21 Jun 2021 21:13) (75 - 86)  BP: 142/52 (21 Jun 2021 19:51) (127/93 - 142/52)  RR: 20 (21 Jun 2021 21:13) (16 - 20)  SpO2: 95% (21 Jun 2021 21:13) (90% - 99%)                            8.6    6.34  )-----------( 213      ( 21 Jun 2021 06:30 )             26.2       06-21    126<L>  |  92<L>  |  30<H>  ----------------------------<  122<H>  5.4<H>   |  28  |  1.55<H>    Ca    9.8      21 Jun 2021 09:14  Phos  4.7     06-21  Mg     1.5     06-21    TPro  6.7  /  Alb  2.9<L>  /  TBili  0.3  /  DBili  x   /  AST  25  /  ALT  25  /  AlkPhos  90  06-21      NEURO: awake and alert  CV: (+) S1/S2, rrr no mrg  RESp: CTA b/l  GI: soft/nontender

## 2021-06-21 NOTE — PROGRESS NOTE ADULT - SUBJECTIVE AND OBJECTIVE BOX
Events noted, s/p resp distress overnight, tx to telemetry    Vital Signs Last 24 Hrs  T(C): 37.1 (06-21-21 @ 19:51), Max: 37.1 (06-21-21 @ 19:51)  T(F): 98.7 (06-21-21 @ 19:51), Max: 98.7 (06-21-21 @ 19:51)  HR: 76 (06-21-21 @ 21:13) (75 - 86)  BP: 142/52 (06-21-21 @ 19:51) (127/93 - 142/52)  RR: 20 (06-21-21 @ 21:13) (16 - 20)  SpO2: 95% (06-21-21 @ 21:13) (90% - 99%)    s1s2  b/l air entry  soft, ND  no edema                                                                         8.6    6.34  )-----------( 213      ( 21 Jun 2021 06:30 )             26.2     21 Jun 2021 09:14    126    |  92     |  30     ----------------------------<  122    5.4     |  28     |  1.55     Ca    9.8        21 Jun 2021 09:14  Phos  4.7       21 Jun 2021 09:14  Mg     1.5       21 Jun 2021 09:14    TPro  6.7    /  Alb  2.9    /  TBili  0.3    /  DBili  x      /  AST  25     /  ALT  25     /  AlkPhos  90     21 Jun 2021 06:30    LIVER FUNCTIONS - ( 21 Jun 2021 06:30 )  Alb: 2.9 g/dL / Pro: 6.7 g/dL / ALK PHOS: 90 U/L / ALT: 25 U/L / AST: 25 U/L / GGT: x           PT/INR - ( 21 Jun 2021 09:14 )   PT: 11.7 sec;   INR: 0.97 ratio      acetaminophen   Tablet .. 650 milliGRAM(s) Oral every 6 hours PRN  albuterol/ipratropium for Nebulization 3 milliLiter(s) Nebulizer every 4 hours PRN  albuterol/ipratropium for Nebulization 3 milliLiter(s) Nebulizer three times a day  amLODIPine   Tablet 10 milliGRAM(s) Oral daily  ARIPiprazole 10 milliGRAM(s) Oral at bedtime  atorvastatin 40 milliGRAM(s) Oral at bedtime  dextrose 40% Gel 15 Gram(s) Oral once  dextrose 5%. 1000 milliLiter(s) IV Continuous <Continuous>  dextrose 5%. 1000 milliLiter(s) IV Continuous <Continuous>  dextrose 50% Injectable 25 Gram(s) IV Push once  dextrose 50% Injectable 12.5 Gram(s) IV Push once  dextrose 50% Injectable 25 Gram(s) IV Push once  folic acid 1 milliGRAM(s) Oral daily  furosemide   Injectable 40 milliGRAM(s) IV Push once  glucagon  Injectable 1 milliGRAM(s) IntraMuscular once  hydrALAZINE 25 milliGRAM(s) Oral every 8 hours  insulin lispro (ADMELOG) corrective regimen sliding scale   SubCutaneous three times a day before meals  insulin lispro (ADMELOG) corrective regimen sliding scale   SubCutaneous at bedtime  ondansetron Injectable 4 milliGRAM(s) IV Push every 6 hours PRN  pantoprazole    Tablet 40 milliGRAM(s) Oral before breakfast    A/P:    Hx HTN, s/p ICH  SOB, V/Q scan w/indeterminate prob  Hemodynamic PASCALE/CKD 3  Hyponatremia, hyperkalemia  Avoid nephrotoxins  Agree w/IV Lasix  Low K diet  Will f/u BMP, Mg  Daily weights    [phone]

## 2021-06-21 NOTE — PROVIDER CONTACT NOTE (CRITICAL VALUE NOTIFICATION) - ACTION/TREATMENT ORDERED:
will trend troponins - will supplement with calcium gluconate for electrolyte imbalance based on other lab results.

## 2021-06-21 NOTE — PROGRESS NOTE ADULT - SUBJECTIVE AND OBJECTIVE BOX
Patient is a 81y old  Female who presents with a chief complaint of dyspnea, hypoxia (21 Jun 2021 05:14)    Patient seen and examined at bedside.  S: Reports some improvement in breathing. No sob at present, while at rest. c/o Nausea. No vomiting. No dizziness/HA/change in vision/weakness.     ALLERGIES:  adhesives (Pruritus; Blisters)  penicillin (Hives)  pineapple (Anaphylaxis)    MEDICATIONS:  acetaminophen   Tablet .. 650 milliGRAM(s) Oral every 6 hours PRN  albuterol/ipratropium for Nebulization 3 milliLiter(s) Nebulizer every 4 hours PRN  albuterol/ipratropium for Nebulization 3 milliLiter(s) Nebulizer three times a day  amLODIPine   Tablet 10 milliGRAM(s) Oral daily  ARIPiprazole 10 milliGRAM(s) Oral at bedtime  atorvastatin 40 milliGRAM(s) Oral at bedtime  dextrose 40% Gel 15 Gram(s) Oral once  dextrose 5%. 1000 milliLiter(s) IV Continuous <Continuous>  dextrose 5%. 1000 milliLiter(s) IV Continuous <Continuous>  dextrose 50% Injectable 25 Gram(s) IV Push once  dextrose 50% Injectable 12.5 Gram(s) IV Push once  dextrose 50% Injectable 25 Gram(s) IV Push once  folic acid 1 milliGRAM(s) Oral daily  furosemide   Injectable 20 milliGRAM(s) IV Push daily  glucagon  Injectable 1 milliGRAM(s) IntraMuscular once  hydrALAZINE 25 milliGRAM(s) Oral every 8 hours  insulin lispro (ADMELOG) corrective regimen sliding scale   SubCutaneous three times a day before meals  insulin lispro (ADMELOG) corrective regimen sliding scale   SubCutaneous at bedtime  ondansetron Injectable 4 milliGRAM(s) IV Push every 6 hours PRN  pantoprazole    Tablet 40 milliGRAM(s) Oral before breakfast    Vital Signs Last 24 Hrs  T(F): 98.4 (21 Jun 2021 04:38), Max: 98.4 (21 Jun 2021 04:31)  HR: 78 (21 Jun 2021 04:38) (72 - 86)  BP: 138/55 (21 Jun 2021 04:31) (138/55 - 149/72)  RR: 18 (21 Jun 2021 04:38) (18 - 19)  SpO2: 98% (21 Jun 2021 04:38) (91% - 99%)  I&O's Summary      PHYSICAL EXAM:  General: NAD, A/O x 3  HENT: Venti mask in place, 50%   Lungs: Clear to auscultation bilaterally (Anteriorly)-unable to sit up for posterior exam   Cardio: RR, S1/S2, No murmurs  Abdomen: Soft, NT/ND, Normal active Bowel Sounds   Extremities: LUE edema noted- per pt. that is chronic     LABS:                        8.6    6.34  )-----------( 213      ( 21 Jun 2021 06:30 )             26.2     06-21    125  |  91  |  29  ----------------------------<  212  5.2   |  28  |  1.59    Ca    9.2      21 Jun 2021 06:30    TPro  6.7  /  Alb  2.9  /  TBili  0.3  /  DBili  x   /  AST  25  /  ALT  25  /  AlkPhos  90  06-21    eGFR if Non African American: 31 mL/min/1.73M2 (06-21-21 @ 06:30)  eGFR if African American: 35 mL/min/1.73M2 (06-21-21 @ 06:30)        CARDIAC MARKERS ( 21 Jun 2021 03:21 )  .064 ng/mL / x     / x     / x     / x          05-30 Chol 133 mg/dL LDL -- HDL 54 mg/dL Trig 103 mg/dL  TSH 5.42   TSH with FT4 reflex --  Total T3 --      ABG - ( 21 Jun 2021 03:30 )  pH, Arterial: 7.31  pH, Blood: x     /  pCO2: 55    /  pO2: 104   / HCO3: x     / Base Excess: x     /  SaO2: 98          POCT Blood Glucose.: 178 mg/dL (21 Jun 2021 07:25)  POCT Blood Glucose.: 175 mg/dL (21 Jun 2021 06:21)  POCT Blood Glucose.: 146 mg/dL (20 Jun 2021 20:17)  POCT Blood Glucose.: 159 mg/dL (20 Jun 2021 16:58)  POCT Blood Glucose.: 156 mg/dL (20 Jun 2021 12:31)        COVID-19 PCR: NotDetec (06-20-21 @ 20:20)  COVID-19 PCR: NotDetec (06-12-21 @ 05:45)  COVID-19 PCR: NotDetec (06-05-21 @ 18:40)  COVID-19 PCR: NotDetec (05-28-21 @ 13:40)      RADIOLOGY & ADDITIONAL TESTS:  < from: Xray Chest 1 View- PORTABLE-Urgent (Xray Chest 1 View- PORTABLE-Urgent .) (06.19.21 @ 06:20) >  IMPRESSION: Similar appearance to June 8. Small right pleural effusion not excluded.      Care Discussed with Consultants/Other Providers:   JESUS Perez discussed case and plan wMarielle Xavier   Patient is a 81y old  Female who presents with a chief complaint of dyspnea, hypoxia (21 Jun 2021 05:14)    Patient seen and examined at bedside.  S: Reports some improvement in breathing. No sob at present, while at rest. c/o Nausea. No vomiting. No dizziness/HA/change in vision/weakness.     ALLERGIES:  adhesives (Pruritus; Blisters)  penicillin (Hives)  pineapple (Anaphylaxis)    MEDICATIONS:  acetaminophen   Tablet .. 650 milliGRAM(s) Oral every 6 hours PRN  albuterol/ipratropium for Nebulization 3 milliLiter(s) Nebulizer every 4 hours PRN  albuterol/ipratropium for Nebulization 3 milliLiter(s) Nebulizer three times a day  amLODIPine   Tablet 10 milliGRAM(s) Oral daily  ARIPiprazole 10 milliGRAM(s) Oral at bedtime  atorvastatin 40 milliGRAM(s) Oral at bedtime  dextrose 40% Gel 15 Gram(s) Oral once  dextrose 5%. 1000 milliLiter(s) IV Continuous <Continuous>  dextrose 5%. 1000 milliLiter(s) IV Continuous <Continuous>  dextrose 50% Injectable 25 Gram(s) IV Push once  dextrose 50% Injectable 12.5 Gram(s) IV Push once  dextrose 50% Injectable 25 Gram(s) IV Push once  folic acid 1 milliGRAM(s) Oral daily  furosemide   Injectable 20 milliGRAM(s) IV Push daily  glucagon  Injectable 1 milliGRAM(s) IntraMuscular once  hydrALAZINE 25 milliGRAM(s) Oral every 8 hours  insulin lispro (ADMELOG) corrective regimen sliding scale   SubCutaneous three times a day before meals  insulin lispro (ADMELOG) corrective regimen sliding scale   SubCutaneous at bedtime  ondansetron Injectable 4 milliGRAM(s) IV Push every 6 hours PRN  pantoprazole    Tablet 40 milliGRAM(s) Oral before breakfast    Vital Signs Last 24 Hrs  T(F): 98.4 (21 Jun 2021 04:38), Max: 98.4 (21 Jun 2021 04:31)  HR: 78 (21 Jun 2021 04:38) (72 - 86)  BP: 138/55 (21 Jun 2021 04:31) (138/55 - 149/72)  RR: 18 (21 Jun 2021 04:38) (18 - 19)  SpO2: 98% (21 Jun 2021 04:38) (91% - 99%)  I&O's Summary      PHYSICAL EXAM:  General: NAD, A/O x 3  HENT: Venti mask in place, 50%   Lungs: Clear to auscultation bilaterally (Anteriorly)-unable to sit up for posterior exam. Very little air movement appreciated.  Cardio: RR, S1/S2, No murmurs  Abdomen: Soft, NT/ND, Normal active Bowel Sounds   Extremities: LUE edema noted- per pt. that is chronic     LABS:                        8.6    6.34  )-----------( 213      ( 21 Jun 2021 06:30 )             26.2     06-21    125  |  91  |  29  ----------------------------<  212  5.2   |  28  |  1.59    Ca    9.2      21 Jun 2021 06:30    TPro  6.7  /  Alb  2.9  /  TBili  0.3  /  DBili  x   /  AST  25  /  ALT  25  /  AlkPhos  90  06-21    eGFR if Non African American: 31 mL/min/1.73M2 (06-21-21 @ 06:30)  eGFR if African American: 35 mL/min/1.73M2 (06-21-21 @ 06:30)        CARDIAC MARKERS ( 21 Jun 2021 03:21 )  .064 ng/mL / x     / x     / x     / x          05-30 Chol 133 mg/dL LDL -- HDL 54 mg/dL Trig 103 mg/dL  TSH 5.42   TSH with FT4 reflex --  Total T3 --      ABG - ( 21 Jun 2021 03:30 )  pH, Arterial: 7.31  pH, Blood: x     /  pCO2: 55    /  pO2: 104   / HCO3: x     / Base Excess: x     /  SaO2: 98          POCT Blood Glucose.: 178 mg/dL (21 Jun 2021 07:25)  POCT Blood Glucose.: 175 mg/dL (21 Jun 2021 06:21)  POCT Blood Glucose.: 146 mg/dL (20 Jun 2021 20:17)  POCT Blood Glucose.: 159 mg/dL (20 Jun 2021 16:58)  POCT Blood Glucose.: 156 mg/dL (20 Jun 2021 12:31)        COVID-19 PCR: NotDetec (06-20-21 @ 20:20)  COVID-19 PCR: NotDetec (06-12-21 @ 05:45)  COVID-19 PCR: NotDetec (06-05-21 @ 18:40)  COVID-19 PCR: NotDetec (05-28-21 @ 13:40)      RADIOLOGY & ADDITIONAL TESTS:  < from: Xray Chest 1 View- PORTABLE-Urgent (Xray Chest 1 View- PORTABLE-Urgent .) (06.19.21 @ 06:20) >  IMPRESSION: Similar appearance to June 8. Small right pleural effusion not excluded.      Care Discussed with Consultants/Other Providers:   JESUS Perez discussed case and plan wMarielle Xavier

## 2021-06-21 NOTE — DISCHARGE NOTE PROVIDER - HOSPITAL COURSE
82 y/o F w/ PMH of DVT (on Eliquis), NIDDM, glaucoma, HLD, HTN, hs of NPH s/p  shunt 2015 sent ot ED for +CTH w/ ICH.  CTH was done outpt after c/o dizziness and unsteadness/falling over to left side.  CTH done in ED showed pt had a small right basal ganglia hemorrhage.  Eliquis was discontinued and neurosurgery consulted for evaluation.  f/u CTH was done and ICH noted to be stable.  Per neurosurgery no interventions necessary.  Pt has had a total of 4 CTH all stable. repeat venous duplex neg for any dvt.  On admission pt also noted to have hypertensive crisis that resolved w/ IV lopressor.  BP was maintained at goal <140/90 in light of ICH.  Hospital course was complicated by pt developing acute metabolic encephalopathy due to UTI.  UA noted to have pyuria and cultures grew klebsiella variicola.  Pt was placed on emperic antibiotics and ID consulted.  Mentation improved within 24hrs of IV antibiotics.  Pt will be switched to po antibiotics pending sensitivities for total of 7 day course per ID recs.  Pts A1c noted to be 7.7 and hyperglycemia resolved w/ basal/bolus regimen.  Pt will resume metformin on discharge.  PASCALE was also noted and is likely prerenal.  patient started on dysphagia 1 diet with thin liquids.     Patient was evaluated by PM&R and therapy for functional deficits and gait/ADL impairments and recommend acute rehabilitation.  Patient was medically optimized for discharge to [facility] inpatient acute rehab on 6/7/2021. (07 Jun 2021 14:43)    Pt was having worsening Shortness of breath requiring non-rebreather mask. She was given a nebulizer treatment, which helped with her wheezing, but did not improve her breathing. Chest x-ray was performed, compared to prior x-ray, there seemed to be more fluid. Her fluids were stopped, and she was given 40mg IV Lasix. Pt continued to require non-rebreather and was satting 90-93%. Pt was belly breathing. Dr. Velasquez was notified and came to evaluate the patient. She recommended EKG, and stat labs including ABG. Pt to be discharged to telemetry for continued monitoring.

## 2021-06-21 NOTE — PROGRESS NOTE ADULT - ASSESSMENT
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  Eliquis was discontinued, was seen by neurosurgery and advised no interventions necessary, then d/brittani to [facility] 06/09/21. Now in rehab w. c/o dyspnea, hypoxic respiratory failure and electrolyte imbalances, admit to tele unit for further w/u.     *Acute hypoxic respiratory failure  -Likely r/t volume overload- 6.21 CXR demonstrating congestion, b/l effusions  IVF d/c'd  s/p Lasix 40mg IV x 1 dose overnight   Cont Diuresis as renal fnx allows   Cont Supplemental 02 as needed, currently on Venti Mask 50% w. Spo2 93%  Cont Bronchodilators prn  -r/o PE  Unable to obtain CTA to r/o PE due to PASCALE  Pending V/Q scan  6.9 b/l LE u/s- No DVT  Pending repeat b/l LE u/s  Encourage IS  Pulm Consult    *Elev Trop  Trop 0.064- trend  Ekg yesterday w/o acute findinds  f/u Ekg today   Cards Consult    *PASCALE  Cr 1.59 today   Trend Cr  Strict I/O  Daily Weights  Renal Consult    *Hyponatremia  Na 125 today  f/u further renal recs  Trend Na  Fluvoxamine held    *Hyperkalemia  K 5.2 today- improved s/p tx- Insulin, D50, Calcium Gluconate (was unable to tolerate Kayexalate)   Trend K  Renal following    *Anemia  H/H 8.6/26.2  Hemodynamically stable at present  f/u anemia w/u    *h/o ICH  Pending CTH    *DMII  Home Metformin Held  FS/ISS    *HTN  Cont Amlodipine    *HLD  Cont Statin    *Nausea   Zofran prn    Dispo: Pending clinical course above. Possible return to rehab when medically cleared.   6.21 Sarah White, Daughter [phone]- attempt made to update her regarding pt's current status and plan of care- no answer, message left.    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  Eliquis was discontinued, was seen by neurosurgery and advised no interventions necessary, then d/brittani to [facility] 06/09/21. Now in rehab w. c/o dyspnea, hypoxic respiratory failure and electrolyte imbalances, admit to tele unit for further w/u.     *Acute hypoxic respiratory failure  -Likely r/t volume overload- 6.21 CXR demonstrating congestion, b/l effusions  IVF d/c'd  s/p Lasix 40mg IV x 1 dose overnight   Cont Diuresis as renal fnx allows   Cont Supplemental 02 as needed, currently on Venti Mask 50% w. Spo2 93%  Cont Bronchodilators prn  -r/o PE  Unable to obtain CTA to r/o PE due to PASCALE  Pending V/Q scan  6.9 b/l LE u/s- No DVT  Pending repeat b/l LE u/s  Encourage IS  Pulm Consult    *Elev Trop  Trop 0.064- trend  Ekg yesterday w/o acute findinds  f/u Ekg today   Cards Consult    *PASCALE  Cr 1.59 today   Trend Cr  Strict I/O  Daily Weights  Renal Consult    *Hyponatremia  Na 125 today  f/u further renal recs  Trend Na  Fluvoxamine held    *Hyperkalemia  K 5.2 today- improved s/p tx- Insulin, D50, Calcium Gluconate (was unable to tolerate Kayexalate)   Trend K  Renal following    *Anemia  H/H 8.6/26.2  Hemodynamically stable at present  f/u anemia w/u    *h/o ICH  Pending CTH    *DMII  Home Metformin Held  FS/ISS    *HTN  Cont Amlodipine, Hydralazine     *HLD  Cont Statin    *Nausea   Zofran prn    *GI ppx  Cont Pantoprazole    *DVT ppx  Off AC in s/o h/o ICH    Dispo: Pending clinical course above. Possible return to rehab when medically cleared.   6.21 Sarah White, Daughter [phone]- attempt made to update her regarding pt's current status and plan of care- no answer, message left.    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  Eliquis was discontinued, was seen by neurosurgery and advised no interventions necessary, then d/brittani to [facility] 06/09/21. Now in rehab w. c/o dyspnea, hypoxic respiratory failure and electrolyte imbalances, admit to tele unit for further w/u.     *Acute hypoxic respiratory failure  -Likely r/t volume overload- 6.21 CXR demonstrating congestion, b/l effusions  IVF d/c'd  s/p Lasix 40mg IV x 1 dose overnight   Cont Diuresis (close monitoring on renal fnx)  Cont Supplemental 02 as needed, currently on Venti Mask 50% w. Spo2 93%  Cont Bronchodilators prn  -r/o PE  Unable to obtain CTA to r/o PE due to PASCAEL  Pending V/Q scan  6.9 b/l LE u/s- No DVT  Pending repeat b/l LE u/s  Encourage IS  Pulm Consult    *Elev Trop  Trop 0.064- trend  Ekg yesterday w/o acute findinds  f/u Ekg today   Cards Consult    *PASCALE  Cr 1.59 today   Trend Cr  Strict I/O  Daily Weights  Renal following     *Hyponatremia  Na 125 today  f/u further renal recs  Trend Na  Fluvoxamine held    *Hyperkalemia  K 5.2 today- improved s/p tx- Insulin, D50, Calcium Gluconate (was unable to tolerate Kayexalate)   Trend K  Renal following    *Anemia  H/H 8.6/26.2  Hemodynamically stable at present  f/u anemia w/u    *h/o ICH  Pending CTH    *DMII  Home Metformin Held  FS/ISS    *HTN  Cont Amlodipine, Hydralazine     *HLD  Cont Statin    *Nausea   Zofran prn    *GI ppx  Cont Pantoprazole    *DVT ppx  Off AC in s/o h/o ICH    Dispo: Pending clinical course above. Possible return to rehab when medically cleared.   6.21 Sarah White, Daughter [phone]- attempt made to update her regarding pt's current status and plan of care- no answer, message left.    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  Eliquis was discontinued, was seen by neurosurgery and advised no interventions necessary, then d/brittani to [facility] 06/09/21. Now in rehab w. c/o dyspnea, hypoxic respiratory failure and electrolyte imbalances, admit to tele unit for further w/u.     *Acute hypoxic respiratory failure  -Likely r/t volume overload- 6.21 CXR demonstrating congestion, b/l effusions  IVF d/c'd  s/p Lasix 40mg IV x 1 dose overnight   Cont Diuresis (close monitoring on renal fnx)  Cont Supplemental 02 as needed, currently on Venti Mask 50% w. Spo2 93%  Cont Bronchodilators prn  -r/o PE  Unable to obtain CTA to r/o PE due to PASCALE  Pending V/Q scan  6.9 b/l LE u/s- No DVT  Pending repeat b/l LE u/s  Encourage IS  Pulm Consult    *Elev Trop  Likely demand ischemia   Trop 0.064- trend  Ekg yesterday w/o acute findings  Cards Consult    *PASCALE  Cr 1.59 today   Trend Cr  Strict I/O  Daily Weights  Renal following     *Hyponatremia  Na 125 today  f/u further renal recs  Trend Na  Fluvoxamine held    *Hyperkalemia  K 5.2 today- improved s/p tx- Insulin, D50, Calcium Gluconate (was unable to tolerate Kayexalate)   Trend K  Renal following    *Anemia  H/H 8.6/26.2  Hemodynamically stable at present  Likely dilutional in s/o vol overload  f/u anemia w/u    *h/o ICH  Pending CTH    *DMII  Home Metformin Held  FS/ISS    *HTN  Cont Amlodipine, Hydralazine     *HLD  Cont Statin    *Nausea   Zofran prn    *GI ppx  Cont Pantoprazole    *DVT ppx  Off AC in s/o h/o ICH    Dispo: Pending clinical course above. Possible return to rehab when medically cleared.   6.21 Sarah White, Daughter [phone]- attempt made to update her regarding pt's current status and plan of care- no answer, message left.    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  Eliquis was discontinued, was seen by neurosurgery and advised no interventions necessary, then d/brittani to [facility] 06/09/21. Now in rehab w. c/o dyspnea, hypoxic respiratory failure and electrolyte imbalances, admit to tele unit for further w/u.     *Acute hypoxic respiratory failure  -Likely r/t volume overload- 6.21 CXR demonstrating congestion, b/l effusions  IVF d/c'd  s/p Lasix 40mg IV x 1 dose overnight   Cont Diuresis (close monitoring on renal fnx)  Cont Supplemental 02 as needed, currently on Venti Mask 50% w. Spo2 93%  Cont Bronchodilators prn  -r/o PE  Unable to obtain CTA to r/o PE due to PASCALE  Pending V/Q scan  6.9 b/l LE u/s- No DVT  Pending repeat b/l LE u/s  Encourage IS  Pulm Consult    *Elev Trop  Likely demand ischemia   Trop 0.064- trend  Ekg yesterday w/o acute findings  Cards Consult    *PASCALE  Cr 1.59 today   Trend Cr  Strict I/O  Daily Weights  Renal following     *Hyponatremia  Na 125 today  f/u further renal recs  Trend Na  Fluvoxamine held    *Hyperkalemia  K 5.2 today- improved s/p tx- Insulin, D50, Calcium Gluconate (was unable to tolerate Kayexalate)   Trend K  Renal following  Restrict K in diet    *Hypomagnesemia  Replete, trend    *Hyperphosphatemia   Restrict Phos in diet  Trend Phos    *Anemia  H/H 8.6/26.2  Hemodynamically stable at present  Likely dilutional in s/o vol overload  f/u anemia w/u    *h/o ICH  Pending CTH    *DMII  Home Metformin Held  FS/ISS    *HTN  Cont Amlodipine, Hydralazine     *HLD  Cont Statin    *Nausea   Zofran prn    *GI ppx  Cont Pantoprazole    *DVT ppx  Off AC in s/o h/o ICH    Dispo: Pending clinical course above. Possible return to rehab when medically cleared.   6.21 Sarah White, Daughter [phone]- attempt made to update her regarding pt's current status and plan of care- no answer, message left.    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  Eliquis was discontinued, was seen by neurosurgery and advised no interventions necessary, then d/brittani to [facility] 06/09/21. Now in rehab w. c/o dyspnea, hypoxic respiratory failure and electrolyte imbalances, admit to tele unit for further w/u.     *Acute hypoxic respiratory failure  -Likely r/t volume overload- 6.21 CXR demonstrating congestion, b/l effusions  IVF d/c'd  s/p Lasix 40mg IV x 1 dose overnight   Cont Diuresis (close monitoring on renal fnx)  Cont Supplemental 02 as needed, currently on Venti Mask 50% w. Spo2 93%  Cont Bronchodilators prn  -r/o PE  Unable to obtain CTA to r/o PE due to PASCALE  Pending V/Q scan  6.9 b/l LE u/s- No DVT  Pending repeat b/l LE u/s  Encourage IS  Pulm Consult    *Elev Trop  Likely demand ischemia   Trop 0.064- trend  Ekg yesterday w/o acute findings  Cards Consult    *PASCALE  Cr 1.59 today   Trend Cr  Strict I/O  Daily Weights  Renal following     *Hyponatremia  Na 125 today  f/u further renal recs  Trend Na  Fluvoxamine held    *Hyperkalemia  K 5.2 today- improved s/p tx- Insulin, D50, Calcium Gluconate (was unable to tolerate Kayexalate)   Trend K  Renal following  Restrict K in diet    *Hypomagnesemia  Replete, trend    *Hyperphosphatemia   Restrict Phos in diet  Trend Phos    *Anemia  H/H 8.6/26.2  Hemodynamically stable at present  Likely dilutional in s/o vol overload  f/u anemia w/u    *h/o ICH  Pending CTH    *DMII  Home Metformin Held  FS/ISS    *HTN  Cont Amlodipine, Hydralazine     *HLD  Cont Statin    *Nausea   Zofran prn    *GI ppx  Cont Pantoprazole    *DVT ppx  Off AC in s/o h/o ICH    Dispo: Pending clinical course above. Possible return to rehab when medically cleared.   6.21 Sarah White, Daughter [phone]- updated regarding pt's current status and plan of care.

## 2021-06-21 NOTE — H&P ADULT - ASSESSMENT
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  Eliquis was discontinued, was seen by neurosurgery and advised no interventions necessary.  Pt was d/brittani to [facility] on 06/09/21.  Leg doppler from 06/09/21 neg. Pt was also treated recently for UTI.    Tonight, pt noted to have increased dyspnea, was hypoxic, O2 sat mid 80's to low 90's, required 100% NRBM.  Pt apparently had decreasing Na for the past few days and was started on NS today.  Cxray shows fluid overload, (venous congestion, bilat pl effusions). IVF was d/brittani and pt was given Lasix 40 mg IVP x 1. Pt denies chest pain, however remained tachypneic.  O2 sat on 100% NRBM is 92-93%. Pt is afebrile.  She has an occ dry cough, denies abd pain, she has occ nausea.  EKG showed NSR 85/min.  Stat labs ordered and pt transferred to telemetry for closer monitoring.     Acute hypoxic, hypercapneic resp failure sec to acute diastolic CHF  Concern also for Pulm embolism since pt has not been on any AC due to recent ICH  Labs just resulted: Hyponatremia, hyperkalemia, PASCALE  sl elev trop    Admit to telemetry  Cont O2 supp to keep O2 sat >92%   Will repeat ABG  Diurese with IV Lasix for now  Will give Calcium gluconate now  Kayexalate 30 gm now  D50, Insulin  FFup labs closely, trend trops  Will get CT Angio chest to r/o PE  FFhead CT to assess ICH  Leg dopplers r/o DVT  Will d/c Fluvoxamine in view of hyponatremia  Will also d/c Metformin   Monitor FS, SSInsulin at this time  Cont other meds: Amlodipine, Statin  Zofran prn  Bronchodilators prn  Cardio consult  Nephro ffup  Pulm ffup  Prognosis guarded    IMPROVE VTE Individual Risk Assessment  RISK                                                                Points  [x  ] Previous VTE                                                  3  [  ] Thrombophilia                                               2  [  ] Lower limb paralysis                                      2        (unable to hold up >15 seconds)    [  ] Current Cancer                                              2         (within 6 months)  [ x ] Immobilization > 24 hrs                                1  [  ] ICU/CCU stay > 24 hours                              1  [ x ] Age > 60                                                      1  IMPROVE VTE Score ____5_____  IMPROVE Score 0-1: Low Risk, No VTE prophylaxis required for most patients, encourage ambulation.   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  Eliquis was discontinued, was seen by neurosurgery and advised no interventions necessary.  Pt was d/brittani to [facility] on 06/09/21.  Leg doppler from 06/09/21 neg. Pt was also treated recently for UTI.    Tonight, pt noted to have increased dyspnea, was hypoxic, O2 sat mid 80's to low 90's, required 100% NRBM.  Pt apparently had decreasing Na for the past few days and was started on NS today.  Cxray shows fluid overload, (venous congestion, bilat pl effusions). IVF was d/brittani and pt was given Lasix 40 mg IVP x 1. Pt denies chest pain, however remained tachypneic.  O2 sat on 100% NRBM is 92-93%. Pt is afebrile.  She has an occ dry cough, denies abd pain, she has occ nausea.  EKG showed NSR 85/min.  Stat labs ordered and pt transferred to telemetry for closer monitoring.     Acute hypoxic, hypercapneic resp failure sec to acute diastolic CHF  Concern also for Pulm embolism since pt has not been on any AC due to recent ICH  Labs just resulted: Hyponatremia, hyperkalemia, PASCALE  sl elev trop    Admit to telemetry  Cont O2 supp to keep O2 sat >92%   Will repeat ABG  Diurese with IV Lasix for now  Will give Calcium gluconate now  Kayexalate 30 gm now  D50, Insulin  Fluid restrict, monitor I and O, daily weights  FFup labs closely, trend trops  Will get CT Angio chest to r/o PE  FFhead CT to assess ICH  Leg dopplers r/o DVT  Will d/c Fluvoxamine in view of hyponatremia  Will also d/c Metformin   Monitor FS, SSInsulin at this time  Cont other meds: Amlodipine, Statin  Zofran prn  Bronchodilators prn  Cardio consult  Nephro ffup  Pulm ffup  Prognosis guarded    IMPROVE VTE Individual Risk Assessment  RISK                                                                Points  [x  ] Previous VTE                                                  3  [  ] Thrombophilia                                               2  [  ] Lower limb paralysis                                      2        (unable to hold up >15 seconds)    [  ] Current Cancer                                              2         (within 6 months)  [ x ] Immobilization > 24 hrs                                1  [  ] ICU/CCU stay > 24 hours                              1  [ x ] Age > 60                                                      1  IMPROVE VTE Score ____5_____  IMPROVE Score 0-1: Low Risk, No VTE prophylaxis required for most patients, encourage ambulation.   IMPROVE Score 2-3: At risk, pharmacologic VTE prophylaxis is indicated for most patients (in the absence of a contraindication)  IMPROVE Score > or = 4: High Risk, pharmacologic VTE prophylaxis is indicated for most patients (in the absence of a contraindication)

## 2021-06-21 NOTE — CHART NOTE - NSCHARTNOTEFT_GEN_A_CORE
Asked to evaluate pt with resp distress  82 y/o F w/ PMH of DVT (on Eliquis), NIDDM, glaucoma, HLD, HTN, hs of NPH s/p  shunt 2015, recently hospitalized for ICH-small right basal ganglia hemorrhage. No neurosurgical intervention per neurosurg.  Doppler of legs with no DVT.  Pt also s/p tx for UTI.   Tonight, pt noted to be dyspneic, tachypneic, hypoxic to mid 80's on NC and placed on NRBM.  Pt apparently was hyponatremic was was started on NS.   Pt was given nebulizer tx, and received lasix 20 mg IVP x 1.  Cxray c/w CHF.     Pt is alert in mild resp distress, O2 sat on 100% NRBM 92%  HR 78/min /70  Afebrile    HEENT: SAE  Chest/Lungs: Bibasilar crackles  Heart: S1 S2  Abd: soft, no edema  Extremities: No edema    A/P: Resp distress, acute resp failure due to CHF  r/o ischemia  Cont O2 - will get ABG  Will give another dose of Lasix 20 mg IV, D/C IVF  EKG, stat labs, trop  Will transfer to Telemetry.  d/w Rehab resident, nursing

## 2021-06-21 NOTE — H&P ADULT - HISTORY OF PRESENT ILLNESS
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  Eliquis was discontinued, was seen by neurosurgery and advised no interventions necessary.  Pt was d/brittani to [facility] on 06/09/21.  Leg doppler from 06/09/21 neg. Pt was also treated recently for UTI.    Tonight, pt noted to have increased dyspnea, was hypoxic, O2 sat mid 80's to low 90's, required 100% NRBM.  Pt apparently had decreasing Na for the past few days and was started on NS today.  Cxray shows fluid overload, (venous congestion, bilat pl effusions). IVF was d/brittani and pt was given Lasix 40 mg IVP x 1. Pt denies chest pain, however remained tachypneic.  O2 sat on 100% NRBM is 92-93%. Pt is afebrile.  She has an occ dry cough, denies abd pain, she has occ nausea.  EKG showed NSR 85/min.  Stat labs ordered and pt transferred to telemetry for closer monitoring.      82 y/o F w/ PMH of DVT (on Eliquis), NIDDM, glaucoma, HLD, HTN, hs of NPH s/p  shunt 2015, recently hospitalized for ICH (05/29/21) - small right basal ganglia hemorrhage.  Eliquis was discontinued, was seen by neurosurgery and advised no interventions necessary.  Pt was d/brittani to [facility] on 06/09/21.  Leg doppler from 06/09/21 neg. Pt was also treated recently for UTI.    Tonight, pt noted to have increased dyspnea, was hypoxic, O2 sat mid 80's to low 90's, required 100% NRBM.  Pt apparently had decreasing Na for the past few days and was started on NS today.  Cxray shows fluid overload, (venous congestion, bilat pl effusions). IVF was d/brittani and pt was given Lasix 40 mg IVP x 1. Pt denies chest pain, however remained tachypneic.  O2 sat on 100% NRBM is 92-93%. Pt is afebrile.  She has an occ dry cough, denies abd pain, she has occ nausea.  EKG showed NSR 85/min.  Stat labs ordered and pt transferred to telemetry for closer monitoring.

## 2021-06-21 NOTE — CONSULT NOTE ADULT - SUBJECTIVE AND OBJECTIVE BOX
Chief Complaint: sob    HPI:81 yr old woman with htn hld dm admitted with acute sob. pt deneis any prior cardiac hx    PMH:   Hypertension    Hyperlipidemia    Diabetes    Glaucoma    Osteoarthritis    Small bowel obstruction      PSH:   S/P hysterectomy    S/P cholecystectomy    Achilles tendon injury    S/P knee replacement, left      Family History:  FAMILY HISTORY:  Family history of pancreatic cancer (Sibling)    Family history of early CAD    Family history of diabetes mellitus        Social History:  Smoking:denies  Alcohol:denies  Drugs:denies    Allergies:  adhesives (Pruritus; Blisters)  penicillin (Hives)  pineapple (Anaphylaxis)      Medications:  acetaminophen   Tablet .. 650 milliGRAM(s) Oral every 6 hours PRN  albuterol/ipratropium for Nebulization 3 milliLiter(s) Nebulizer every 4 hours PRN  albuterol/ipratropium for Nebulization 3 milliLiter(s) Nebulizer three times a day  amLODIPine   Tablet 10 milliGRAM(s) Oral daily  ARIPiprazole 10 milliGRAM(s) Oral at bedtime  atorvastatin 40 milliGRAM(s) Oral at bedtime  dextrose 40% Gel 15 Gram(s) Oral once  dextrose 5%. 1000 milliLiter(s) IV Continuous <Continuous>  dextrose 5%. 1000 milliLiter(s) IV Continuous <Continuous>  dextrose 50% Injectable 25 Gram(s) IV Push once  dextrose 50% Injectable 12.5 Gram(s) IV Push once  dextrose 50% Injectable 25 Gram(s) IV Push once  folic acid 1 milliGRAM(s) Oral daily  furosemide   Injectable 40 milliGRAM(s) IV Push once  glucagon  Injectable 1 milliGRAM(s) IntraMuscular once  hydrALAZINE 25 milliGRAM(s) Oral every 8 hours  insulin lispro (ADMELOG) corrective regimen sliding scale   SubCutaneous three times a day before meals  insulin lispro (ADMELOG) corrective regimen sliding scale   SubCutaneous at bedtime  magnesium sulfate  IVPB 1 Gram(s) IV Intermittent once  ondansetron Injectable 4 milliGRAM(s) IV Push every 6 hours PRN  pantoprazole    Tablet 40 milliGRAM(s) Oral before breakfast      REVIEW OF SYSTEMS:  CONSTITUTIONAL: No fever, weight loss, or fatigue  EYES: No eye pain, visual disturbances, or discharge  ENMT:  No difficulty hearing, tinnitus, vertigo; No sinus or throat pain  NECK: No pain or stiffness  BREASTS: No pain, masses, or nipple discharge  RESPIRATORY:SEE HPI  CARDIOVASCULAR: No chest pain, palpitations, dizziness, or leg swelling  GASTROINTESTINAL: No abdominal or epigastric pain. No nausea, vomiting, or hematemesis; No diarrhea or constipation. No melena or hematochezia.  GENITOURINARY: No dysuria, frequency, hematuria, or incontinence  NEUROLOGICAL: No headaches, memory loss, loss of strength, numbness, or tremors  SKIN: No itching, burning, rashes, or lesions   LYMPH NODES: No enlarged glands  ENDOCRINE: No heat or cold intolerance; No hair loss  MUSCULOSKELETAL: No joint pain or swelling; No muscle, back, or extremity pain  PSYCHIATRIC: No depression, anxiety, mood swings, or difficulty sleeping  HEME/LYMPH: No easy bruising, or bleeding gums  ALLERY AND IMMUNOLOGIC: No hives or eczema    Physical Exam:  T(C): 36.8 (06-21-21 @ 08:42), Max: 36.9 (06-21-21 @ 04:31)  HR: 75 (06-21-21 @ 08:42) (72 - 86)  BP: 127/93 (06-21-21 @ 08:42) (127/93 - 149/72)  RR: 18 (06-21-21 @ 08:42) (18 - 19)  SpO2: 92% (06-21-21 @ 08:42) (91% - 99%)  Wt(kg): --    GENERAL: NAD, well-groomed, well-developed  HEAD:  Atraumatic, Normocephalic  EYES: EOMI, conjunctiva and sclera clear  ENT: Moist mucous membranes,  NECK: Supple, No JVD, no bruits  CHEST/LUNG: BILATERAL RALES 1/3 WAY UP  HEART: S1S2 WELL PRESERVED 2/5 EMIGDIO.  ABDOMEN: Soft, Nontender, Nondistended; Bowel sounds present  EXTREMITIES:  2+ Peripheral Pulses, No clubbing, cyanosis, or edema  SKIN: No rashes or lesions  NERVOUS SYSTEM:  Alert & Oriented X3, Good concentration; Motor Strength 5/5 B/L upper and lower extremities; DTRs 2+ intact and symmetric    Cardiovascular Diagnostic Testing:  ECG:RSR NON SPECIFIC ABN    Labs:                        8.6    6.34  )-----------( 213      ( 21 Jun 2021 06:30 )             26.2     06-21    126<L>  |  92<L>  |  30<H>  ----------------------------<  122<H>  5.4<H>   |  28  |  1.55<H>    Ca    9.8      21 Jun 2021 09:14  Phos  4.7     06-21  Mg     1.5     06-21    TPro  6.7  /  Alb  2.9<L>  /  TBili  0.3  /  DBili  x   /  AST  25  /  ALT  25  /  AlkPhos  90  06-21    PT/INR - ( 21 Jun 2021 09:14 )   PT: 11.7 sec;   INR: 0.97 ratio         PTT - ( 21 Jun 2021 09:14 )  PTT:31.3 sec  CARDIAC MARKERS ( 21 Jun 2021 09:14 )  .060 ng/mL / x     / x     / x     / x      CARDIAC MARKERS ( 21 Jun 2021 03:21 )  .064 ng/mL / x     / x     / x     / x          Serum Pro-Brain Natriuretic Peptide: 643 pg/mL (06-19 @ 06:51)        Thyroid Stimulating Hormone, Serum: 5.42 uIU/mL (06-20 @ 06:23)  Thyroid Stimulating Hormone, Serum: 5.35 uIU/mL (06-20 @ 06:10)      Imaging:

## 2021-06-21 NOTE — PROGRESS NOTE ADULT - ASSESSMENT
PLAN:  -daily diuresis, will need to follow lytes with diuresis, goal K+ 4-4.5  -BP control  -has underlying PASCALE, watch closely with diuresis, renal is following  -f/u am labs

## 2021-06-21 NOTE — H&P ADULT - NSHPPHYSICALEXAM_GEN_ALL_CORE
Vital Signs (24 Hrs):  T(C): 36.9 (06-21-21 @ 04:31), Max: 36.9 (06-20-21 @ 08:07)  HR: 78 (06-21-21 @ 04:31) (72 - 87)  BP: 138/55 (06-21-21 @ 04:31) (135/69 - 149/72)  RR: 19 (06-21-21 @ 04:31) (18 - 19)  SpO2: 99% (06-21-21 @ 04:31) (91% - 99%)

## 2021-06-21 NOTE — DISCHARGE NOTE PROVIDER - CARE PROVIDERS DIRECT ADDRESSES
[email or web address]

## 2021-06-21 NOTE — CONSULT NOTE ADULT - ASSESSMENT
iMP  MARKED CHF ETIOLOGY UNCERTAIN      SUGGEST    INCREASE LASIX TO 40 MG IV PUSH    ECHO    SERIAL EKGS AND TROPONINS    F/U CXR    GIVEN SEVERITY OF CHF STRONGLY CONSIDER ICU TRANSFER

## 2021-06-21 NOTE — DISCHARGE NOTE PROVIDER - NSDCCPCAREPLAN_GEN_ALL_CORE_FT
PRINCIPAL DISCHARGE DIAGNOSIS  Diagnosis: Basal ganglia hemorrhage  Assessment and Plan of Treatment: You presented with a bleed in your brain. Your Eliquis was held, but no neurosurgical intervention was performed.      SECONDARY DISCHARGE DIAGNOSES  Diagnosis: History of acute respiratory failure  Assessment and Plan of Treatment: You developed worsening shortness of breath requiring higher levels of supplemental O2. You were given Lasix IV on the day of transfer, but you continued to have poor breathing and require more observation and so you are being transferred to telemetry.

## 2021-06-21 NOTE — DISCHARGE NOTE PROVIDER - INSTRUCTIONS
Dysphagia 2 - Mechanical Soft, Thin Liquids  Consistent Carb (Evening Snack)  No concentrated Potassium  Glucerna Shakes 1 per day

## 2021-06-21 NOTE — H&P ADULT - TIME BILLING
Management of complex case, acute medical problem, thorough review of old charts, previous and current labs, imaging with close ffup as pt could easily deteriorate.  Discussion with consultants.

## 2021-06-22 LAB
ANION GAP SERPL CALC-SCNC: 2 MMOL/L — LOW (ref 5–17)
BUN SERPL-MCNC: 28 MG/DL — HIGH (ref 7–23)
CALCIUM SERPL-MCNC: 8.8 MG/DL — SIGNIFICANT CHANGE UP (ref 8.4–10.5)
CHLORIDE SERPL-SCNC: 91 MMOL/L — LOW (ref 96–108)
CO2 BLDA-SCNC: 33 MMOL/L — HIGH (ref 22–30)
CO2 SERPL-SCNC: 33 MMOL/L — HIGH (ref 22–31)
COVID-19 SPIKE DOMAIN AB INTERP: POSITIVE
COVID-19 SPIKE DOMAIN ANTIBODY RESULT: >250 U/ML — HIGH
CREAT SERPL-MCNC: 1.63 MG/DL — HIGH (ref 0.5–1.3)
FERRITIN SERPL-MCNC: 112 NG/ML — SIGNIFICANT CHANGE UP (ref 15–150)
GLUCOSE BLDC GLUCOMTR-MCNC: 170 MG/DL — HIGH (ref 70–99)
GLUCOSE BLDC GLUCOMTR-MCNC: 190 MG/DL — HIGH (ref 70–99)
GLUCOSE BLDC GLUCOMTR-MCNC: 196 MG/DL — HIGH (ref 70–99)
GLUCOSE SERPL-MCNC: 136 MG/DL — HIGH (ref 70–99)
HCT VFR BLD CALC: 25.1 % — LOW (ref 34.5–45)
HGB BLD-MCNC: 8.3 G/DL — LOW (ref 11.5–15.5)
HOROWITZ INDEX BLDA+IHG-RTO: SIGNIFICANT CHANGE UP
IRON SATN MFR SERPL: 10 % — LOW (ref 14–50)
IRON SATN MFR SERPL: 33 UG/DL — SIGNIFICANT CHANGE UP (ref 30–160)
MAGNESIUM SERPL-MCNC: 1.6 MG/DL — SIGNIFICANT CHANGE UP (ref 1.6–2.6)
MCHC RBC-ENTMCNC: 29.7 PG — SIGNIFICANT CHANGE UP (ref 27–34)
MCHC RBC-ENTMCNC: 33.1 GM/DL — SIGNIFICANT CHANGE UP (ref 32–36)
MCV RBC AUTO: 90 FL — SIGNIFICANT CHANGE UP (ref 80–100)
NRBC # BLD: 0 /100 WBCS — SIGNIFICANT CHANGE UP (ref 0–0)
PCO2 BLDA: 59 MMHG — HIGH (ref 32–46)
PH BLDA: 7.34 — LOW (ref 7.35–7.45)
PHOSPHATE SERPL-MCNC: 5.2 MG/DL — HIGH (ref 2.5–4.5)
PLATELET # BLD AUTO: 205 K/UL — SIGNIFICANT CHANGE UP (ref 150–400)
PO2 BLDA: 91 MMHG — SIGNIFICANT CHANGE UP (ref 74–108)
POTASSIUM SERPL-MCNC: 4.8 MMOL/L — SIGNIFICANT CHANGE UP (ref 3.5–5.3)
POTASSIUM SERPL-SCNC: 4.8 MMOL/L — SIGNIFICANT CHANGE UP (ref 3.5–5.3)
PROCALCITONIN SERPL-MCNC: 0.16 NG/ML — HIGH
RBC # BLD: 2.79 M/UL — LOW (ref 3.8–5.2)
RBC # FLD: 12.4 % — SIGNIFICANT CHANGE UP (ref 10.3–14.5)
SAO2 % BLDA: 96 % — SIGNIFICANT CHANGE UP (ref 92–96)
SARS-COV-2 IGG+IGM SERPL QL IA: >250 U/ML — HIGH
SARS-COV-2 IGG+IGM SERPL QL IA: POSITIVE
SODIUM SERPL-SCNC: 126 MMOL/L — LOW (ref 135–145)
TIBC SERPL-MCNC: 332 UG/DL — SIGNIFICANT CHANGE UP (ref 220–430)
UIBC SERPL-MCNC: 299 UG/DL — SIGNIFICANT CHANGE UP (ref 110–370)
WBC # BLD: 6.23 K/UL — SIGNIFICANT CHANGE UP (ref 3.8–10.5)
WBC # FLD AUTO: 6.23 K/UL — SIGNIFICANT CHANGE UP (ref 3.8–10.5)

## 2021-06-22 PROCEDURE — 93308 TTE F-UP OR LMTD: CPT | Mod: 26

## 2021-06-22 PROCEDURE — 71250 CT THORAX DX C-: CPT | Mod: 26

## 2021-06-22 PROCEDURE — 99232 SBSQ HOSP IP/OBS MODERATE 35: CPT

## 2021-06-22 PROCEDURE — 99233 SBSQ HOSP IP/OBS HIGH 50: CPT

## 2021-06-22 RX ORDER — VANCOMYCIN HCL 1 G
1000 VIAL (EA) INTRAVENOUS ONCE
Refills: 0 | Status: COMPLETED | OUTPATIENT
Start: 2021-06-22 | End: 2021-06-23

## 2021-06-22 RX ORDER — AZTREONAM 2 G
VIAL (EA) INJECTION
Refills: 0 | Status: DISCONTINUED | OUTPATIENT
Start: 2021-06-23 | End: 2021-06-23

## 2021-06-22 RX ORDER — AZTREONAM 2 G
2000 VIAL (EA) INJECTION EVERY 8 HOURS
Refills: 0 | Status: DISCONTINUED | OUTPATIENT
Start: 2021-06-23 | End: 2021-06-23

## 2021-06-22 RX ORDER — AZTREONAM 2 G
VIAL (EA) INJECTION
Refills: 0 | Status: DISCONTINUED | OUTPATIENT
Start: 2021-06-22 | End: 2021-06-22

## 2021-06-22 RX ORDER — AZTREONAM 2 G
1000 VIAL (EA) INJECTION EVERY 8 HOURS
Refills: 0 | Status: DISCONTINUED | OUTPATIENT
Start: 2021-06-22 | End: 2021-06-22

## 2021-06-22 RX ORDER — FUROSEMIDE 40 MG
40 TABLET ORAL DAILY
Refills: 0 | Status: DISCONTINUED | OUTPATIENT
Start: 2021-06-22 | End: 2021-06-25

## 2021-06-22 RX ORDER — HEPARIN SODIUM 5000 [USP'U]/ML
5000 INJECTION INTRAVENOUS; SUBCUTANEOUS EVERY 12 HOURS
Refills: 0 | Status: DISCONTINUED | OUTPATIENT
Start: 2021-06-22 | End: 2021-06-22

## 2021-06-22 RX ORDER — AZTREONAM 2 G
2000 VIAL (EA) INJECTION ONCE
Refills: 0 | Status: COMPLETED | OUTPATIENT
Start: 2021-06-22 | End: 2021-06-23

## 2021-06-22 RX ADMIN — Medication 25 MILLIGRAM(S): at 15:14

## 2021-06-22 RX ADMIN — ATORVASTATIN CALCIUM 40 MILLIGRAM(S): 80 TABLET, FILM COATED ORAL at 21:06

## 2021-06-22 RX ADMIN — Medication 2: at 13:14

## 2021-06-22 RX ADMIN — Medication 3 MILLILITER(S): at 09:30

## 2021-06-22 RX ADMIN — Medication 40 MILLIGRAM(S): at 05:37

## 2021-06-22 RX ADMIN — Medication 40 MILLIGRAM(S): at 21:05

## 2021-06-22 RX ADMIN — AMLODIPINE BESYLATE 10 MILLIGRAM(S): 2.5 TABLET ORAL at 05:37

## 2021-06-22 RX ADMIN — Medication 25 MILLIGRAM(S): at 21:06

## 2021-06-22 RX ADMIN — PANTOPRAZOLE SODIUM 40 MILLIGRAM(S): 20 TABLET, DELAYED RELEASE ORAL at 05:37

## 2021-06-22 RX ADMIN — Medication 3 MILLILITER(S): at 16:09

## 2021-06-22 RX ADMIN — Medication 2: at 17:05

## 2021-06-22 RX ADMIN — Medication 3 MILLILITER(S): at 22:09

## 2021-06-22 RX ADMIN — Medication 25 MILLIGRAM(S): at 05:37

## 2021-06-22 RX ADMIN — Medication 40 MILLIGRAM(S): at 17:04

## 2021-06-22 RX ADMIN — ARIPIPRAZOLE 10 MILLIGRAM(S): 15 TABLET ORAL at 21:06

## 2021-06-22 NOTE — CONSULT NOTE ADULT - SUBJECTIVE AND OBJECTIVE BOX
HPI: 80 y/o F w/ pmh of DVT (on eliquis) DM, glaucoma, hld, htn, bipolar d/o, NPH s/p  shunt in 2015, recently hospitalized for ICH (small R. basal ganglia hemorrhage) for which eliquis was d/c, pt was transfer to [facility] on 06/09/2021, rehab course c/b hypoxia and increased dyspnea and transferred to [facility]. Pt was found to have new diastolic HF w/ b/l pleural effusions and started on IV lasix daily.     24 hour events:  Tonight MICU consult requested for lethargy ABG was obtained to have mild hypercarbia, pt seen and examined reports feeling SOB and unwell but otherwise denies any other medical complains.    Review of Systems:  Constitutional: No fever, chills, fatigue  Neuro: No headache, no numbness, no weakness, +lethargy  Resp: No cough, no wheezing, +shortness of breath  CVS: No chest pain, palpitations, leg swelling  GI: No abdominal pain, nausea, vomiting, diarrhea   : No dysuria, frequency, incontinence  Skin: No itching, burning, rashes, or lesions   Msk: No joint pain or swelling  Psych: No depression, anxiety, mood swings    T(F): 99.2 (06-22-21 @ 19:46), Max: 99.8 (06-22-21 @ 04:55)  HR: 75 (06-22-21 @ 19:46) (74 - 85)  BP: 133/71 (06-22-21 @ 19:46) (120/47 - 141/56)  RR: 15 (06-22-21 @ 19:46) (15 - 18)  SpO2: 97% (06-22-21 @ 19:46) (92% - 99%)  Wt(kg): --      06-21-21 @ 07:01  -  06-22-21 @ 07:00  --------------------------------------------------------  IN: 200 mL / OUT: 3400 mL / NET: -3200 mL    06-22-21 @ 07:01 - 06-22-21 @ 21:47  --------------------------------------------------------  IN: 0 mL / OUT: 1000 mL / NET: -1000 mL        CAPILLARY BLOOD GLUCOSE  176 (21 Jun 2021 22:54)      POCT Blood Glucose.: 190 mg/dL (22 Jun 2021 21:07)      I&O's Summary    21 Jun 2021 07:01  -  22 Jun 2021 07:00  --------------------------------------------------------  IN: 200 mL / OUT: 3400 mL / NET: -3200 mL    22 Jun 2021 07:01  -  22 Jun 2021 21:47  --------------------------------------------------------  IN: 0 mL / OUT: 1000 mL / NET: -1000 mL        Physical Exam:   Gen: Comfortable in bed  Neuro: lethargic but aurosable to verbal commands  HEENT: AAOx1-2   Resp: fair air entry, b/l, mild belly breathing  CVS: +RRR  Abd: BSx4, soft, nt/nd  Ext: +edema   Skin: warm/dry    Meds:    amLODIPine   Tablet Oral  furosemide   Injectable IV Push  hydrALAZINE Oral    atorvastatin Oral  dextrose 40% Gel Oral  dextrose 50% Injectable IV Push  dextrose 50% Injectable IV Push  dextrose 50% Injectable IV Push  glucagon  Injectable IntraMuscular  insulin lispro (ADMELOG) corrective regimen sliding scale SubCutaneous  insulin lispro (ADMELOG) corrective regimen sliding scale SubCutaneous    albuterol/ipratropium for Nebulization Nebulizer PRN  albuterol/ipratropium for Nebulization Nebulizer    acetaminophen   Tablet .. Oral PRN  ARIPiprazole Oral  ondansetron Injectable IV Push PRN        pantoprazole    Tablet Oral      dextrose 5%. IV Continuous  dextrose 5%. IV Continuous  folic acid Oral                                  8.3    6.23  )-----------( 205      ( 22 Jun 2021 05:45 )             25.1       06-22    126<L>  |  91<L>  |  28<H>  ----------------------------<  136<H>  4.8   |  33<H>  |  1.63<H>    Ca    8.8      22 Jun 2021 05:45  Phos  5.2     06-22  Mg     1.6     06-22    TPro  6.7  /  Alb  2.9<L>  /  TBili  0.3  /  DBili  x   /  AST  25  /  ALT  25  /  AlkPhos  90  06-21      CARDIAC MARKERS ( 21 Jun 2021 09:14 )  .060 ng/mL / x     / x     / x     / x      CARDIAC MARKERS ( 21 Jun 2021 03:21 )  .064 ng/mL / x     / x     / x     / x          PT/INR - ( 21 Jun 2021 09:14 )   PT: 11.7 sec;   INR: 0.97 ratio         PTT - ( 21 Jun 2021 09:14 )  PTT:31.3 sec      Radiology:     < from: CT Chest No Cont (06.22.21 @ 12:51) >  EXAM:  CT CHEST      PROCEDURE DATE:  06/22/2021        INTERPRETATION:  CLINICAL INFORMATION: Hypoxia    COMPARISON: 6/9/2021.    CONTRAST/COMPLICATIONS:  IV Contrast: NONE  Oral Contrast: NONE  Complications: None reported at time of study completion    PROCEDURE:  CT of the Chest was performed.  Sagittal and coronal reformats were performed.    FINDINGS:    LUNGS AND AIRWAYS: Patent central airways.  Bilateral lower lobe consolidations. Bilateral regions of atelectasis. Right upper lobe peripheral opacities.  PLEURA: Small bilateral pleural effusions.  MEDIASTINUM AND LASHONDA: Several prominent lymph nodes.  VESSELS: Atherosclerotic calcifications of thoracic aorta and coronary arteries.  HEART: Enlarged. Aortic valve calcifications. No pericardial effusion.  CHEST WALL AND LOWER NECK: Left anterior chest wall cardiac loop recorder.  VISUALIZED UPPER ABDOMEN: Cholecystectomy. Ventriculoperitoneal shunt catheter. Atrophic pancreas.  BONES: Degenerative changes.    IMPRESSION:  Right upper lobe peripheral opacities, possibly infectious.  Bilateral lower lobe consolidations, which may represent atelectasis and/or pneumonia.  Small bilateral pleural effusions.              MARA BYRNES MD; Attending Radiologist  This document has been electronically signed. Jun 22 2021  3:10PM    < end of copied text >    CENTRAL LINE: N  ELIZABETH: Y  A-LINE: N    CODE STATUS: FULL CODE

## 2021-06-22 NOTE — PROGRESS NOTE ADULT - SUBJECTIVE AND OBJECTIVE BOX
Patient is a 81y old  Female who presents with a chief complaint of dyspnea, hypoxia (21 Jun 2021 21:22)    Patient seen and examined at bedside.  S: Endorses some SOB. No cp, other complaints.     ALLERGIES:  adhesives (Pruritus; Blisters)  penicillin (Hives)  pineapple (Anaphylaxis)    MEDICATIONS:  acetaminophen   Tablet .. 650 milliGRAM(s) Oral every 6 hours PRN  albuterol/ipratropium for Nebulization 3 milliLiter(s) Nebulizer every 4 hours PRN  albuterol/ipratropium for Nebulization 3 milliLiter(s) Nebulizer three times a day  amLODIPine   Tablet 10 milliGRAM(s) Oral daily  ARIPiprazole 10 milliGRAM(s) Oral at bedtime  atorvastatin 40 milliGRAM(s) Oral at bedtime  dextrose 40% Gel 15 Gram(s) Oral once  dextrose 5%. 1000 milliLiter(s) IV Continuous <Continuous>  dextrose 5%. 1000 milliLiter(s) IV Continuous <Continuous>  dextrose 50% Injectable 25 Gram(s) IV Push once  dextrose 50% Injectable 12.5 Gram(s) IV Push once  dextrose 50% Injectable 25 Gram(s) IV Push once  folic acid 1 milliGRAM(s) Oral daily  furosemide   Injectable 40 milliGRAM(s) IV Push two times a day  glucagon  Injectable 1 milliGRAM(s) IntraMuscular once  hydrALAZINE 25 milliGRAM(s) Oral every 8 hours  insulin lispro (ADMELOG) corrective regimen sliding scale   SubCutaneous three times a day before meals  insulin lispro (ADMELOG) corrective regimen sliding scale   SubCutaneous at bedtime  ondansetron Injectable 4 milliGRAM(s) IV Push every 6 hours PRN  pantoprazole    Tablet 40 milliGRAM(s) Oral before breakfast    Vital Signs Last 24 Hrs  T(F): 99 (22 Jun 2021 09:35), Max: 99.8 (22 Jun 2021 04:55)  HR: 75 (22 Jun 2021 09:35) (75 - 85)  BP: 120/47 (22 Jun 2021 09:35) (120/47 - 142/52)  RR: 16 (22 Jun 2021 09:35) (15 - 20)  SpO2: 93% (22 Jun 2021 09:35) (90% - 99%)  I&O's Summary    21 Jun 2021 07:01  -  22 Jun 2021 07:00  --------------------------------------------------------  IN: 200 mL / OUT: 3400 mL / NET: -3200 mL      PHYSICAL EXAM:  General: NAD, A/O x 3  HENT: O2 via NRB in place   Lungs: Decreased air movement (however slight   Cardio: RR, S1/S2, No murmurs  Abdomen: Soft, NT/ND, Normal active Bowel Sounds   Extremities: No cyanosis, No edema    LABS:                        8.3    6.23  )-----------( 205      ( 22 Jun 2021 05:45 )             25.1     06-22    126  |  91  |  28  ----------------------------<  136  4.8   |  33  |  1.63    Ca    8.8      22 Jun 2021 05:45  Phos  5.2     06-22  Mg     1.6     06-22    TPro  6.7  /  Alb  2.9  /  TBili  0.3  /  DBili  x   /  AST  25  /  ALT  25  /  AlkPhos  90  06-21    eGFR if Non African American: 30 mL/min/1.73M2 (06-22-21 @ 05:45)  eGFR if African American: 34 mL/min/1.73M2 (06-22-21 @ 05:45)    PT/INR - ( 21 Jun 2021 09:14 )   PT: 11.7 sec;   INR: 0.97 ratio         PTT - ( 21 Jun 2021 09:14 )  PTT:31.3 sec    CARDIAC MARKERS ( 21 Jun 2021 09:14 )  .060 ng/mL / x     / x     / x     / x      CARDIAC MARKERS ( 21 Jun 2021 03:21 )  .064 ng/mL / x     / x     / x     / x          05-30 Chol 133 mg/dL LDL -- HDL 54 mg/dL Trig 103 mg/dL  TSH 5.42   TSH with FT4 reflex --  Total T3 --      ABG - ( 21 Jun 2021 03:30 )  pH, Arterial: 7.31  pH, Blood: x     /  pCO2: 55    /  pO2: 104   / HCO3: x     / Base Excess: x     /  SaO2: 98                  176 (21 Jun 2021 22:54)      POCT Blood Glucose.: 140 mg/dL (21 Jun 2021 12:00)          COVID-19 PCR: NotDetec (06-20-21 @ 20:20)  COVID-19 PCR: NotDetec (06-12-21 @ 05:45)  COVID-19 PCR: NotDetec (06-05-21 @ 18:40)  COVID-19 PCR: NotDetec (05-28-21 @ 13:40)      RADIOLOGY & ADDITIONAL TESTS:    Care Discussed with Consultants/Other Providers:    Patient is a 81y old  Female who presents with a chief complaint of dyspnea, hypoxia (21 Jun 2021 21:22)    Patient seen and examined at bedside.  S: Endorses some SOB. No cp, other complaints.     ALLERGIES:  adhesives (Pruritus; Blisters)  penicillin (Hives)  pineapple (Anaphylaxis)    MEDICATIONS:  acetaminophen   Tablet .. 650 milliGRAM(s) Oral every 6 hours PRN  albuterol/ipratropium for Nebulization 3 milliLiter(s) Nebulizer every 4 hours PRN  albuterol/ipratropium for Nebulization 3 milliLiter(s) Nebulizer three times a day  amLODIPine   Tablet 10 milliGRAM(s) Oral daily  ARIPiprazole 10 milliGRAM(s) Oral at bedtime  atorvastatin 40 milliGRAM(s) Oral at bedtime  dextrose 40% Gel 15 Gram(s) Oral once  dextrose 5%. 1000 milliLiter(s) IV Continuous <Continuous>  dextrose 5%. 1000 milliLiter(s) IV Continuous <Continuous>  dextrose 50% Injectable 25 Gram(s) IV Push once  dextrose 50% Injectable 12.5 Gram(s) IV Push once  dextrose 50% Injectable 25 Gram(s) IV Push once  folic acid 1 milliGRAM(s) Oral daily  furosemide   Injectable 40 milliGRAM(s) IV Push two times a day  glucagon  Injectable 1 milliGRAM(s) IntraMuscular once  hydrALAZINE 25 milliGRAM(s) Oral every 8 hours  insulin lispro (ADMELOG) corrective regimen sliding scale   SubCutaneous three times a day before meals  insulin lispro (ADMELOG) corrective regimen sliding scale   SubCutaneous at bedtime  ondansetron Injectable 4 milliGRAM(s) IV Push every 6 hours PRN  pantoprazole    Tablet 40 milliGRAM(s) Oral before breakfast    Vital Signs Last 24 Hrs  T(F): 99 (22 Jun 2021 09:35), Max: 99.8 (22 Jun 2021 04:55)  HR: 75 (22 Jun 2021 09:35) (75 - 85)  BP: 120/47 (22 Jun 2021 09:35) (120/47 - 142/52)  RR: 16 (22 Jun 2021 09:35) (15 - 20)  SpO2: 93% (22 Jun 2021 09:35) (90% - 99%)  I&O's Summary    21 Jun 2021 07:01  -  22 Jun 2021 07:00  --------------------------------------------------------  IN: 200 mL / OUT: 3400 mL / NET: -3200 mL      PHYSICAL EXAM:  General: NAD, A/O x 3  HENT: O2 via NRB in place   Lungs: Decreased air movement, crackles at bases   Cardio: RR, S1/S2, No murmurs  Abdomen: Soft, NT/ND, Normal active Bowel Sounds   Extremities: No cyanosis, No edema    LABS:                        8.3    6.23  )-----------( 205      ( 22 Jun 2021 05:45 )             25.1     06-22    126  |  91  |  28  ----------------------------<  136  4.8   |  33  |  1.63    Ca    8.8      22 Jun 2021 05:45  Phos  5.2     06-22  Mg     1.6     06-22    TPro  6.7  /  Alb  2.9  /  TBili  0.3  /  DBili  x   /  AST  25  /  ALT  25  /  AlkPhos  90  06-21    eGFR if Non African American: 30 mL/min/1.73M2 (06-22-21 @ 05:45)  eGFR if African American: 34 mL/min/1.73M2 (06-22-21 @ 05:45)    PT/INR - ( 21 Jun 2021 09:14 )   PT: 11.7 sec;   INR: 0.97 ratio         PTT - ( 21 Jun 2021 09:14 )  PTT:31.3 sec    CARDIAC MARKERS ( 21 Jun 2021 09:14 )  .060 ng/mL / x     / x     / x     / x      CARDIAC MARKERS ( 21 Jun 2021 03:21 )  .064 ng/mL / x     / x     / x     / x          05-30 Chol 133 mg/dL LDL -- HDL 54 mg/dL Trig 103 mg/dL  TSH 5.42   TSH with FT4 reflex --  Total T3 --      ABG - ( 21 Jun 2021 03:30 )  pH, Arterial: 7.31  pH, Blood: x     /  pCO2: 55    /  pO2: 104   / HCO3: x     / Base Excess: x     /  SaO2: 98                  176 (21 Jun 2021 22:54)      POCT Blood Glucose.: 140 mg/dL (21 Jun 2021 12:00)          COVID-19 PCR: NotDetec (06-20-21 @ 20:20)  COVID-19 PCR: NotDetec (06-12-21 @ 05:45)  COVID-19 PCR: NotDetec (06-05-21 @ 18:40)  COVID-19 PCR: NotDetec (05-28-21 @ 13:40)      RADIOLOGY & ADDITIONAL TESTS:    Care Discussed with Consultants/Other Providers:    Patient is a 81y old  Female who presents with a chief complaint of dyspnea, hypoxia (21 Jun 2021 21:22)    Patient seen and examined at bedside.  S: Endorses some SOB. No cp, other complaints.     ALLERGIES:  adhesives (Pruritus; Blisters)  penicillin (Hives)  pineapple (Anaphylaxis)    MEDICATIONS:  acetaminophen   Tablet .. 650 milliGRAM(s) Oral every 6 hours PRN  albuterol/ipratropium for Nebulization 3 milliLiter(s) Nebulizer every 4 hours PRN  albuterol/ipratropium for Nebulization 3 milliLiter(s) Nebulizer three times a day  amLODIPine   Tablet 10 milliGRAM(s) Oral daily  ARIPiprazole 10 milliGRAM(s) Oral at bedtime  atorvastatin 40 milliGRAM(s) Oral at bedtime  dextrose 40% Gel 15 Gram(s) Oral once  dextrose 5%. 1000 milliLiter(s) IV Continuous <Continuous>  dextrose 5%. 1000 milliLiter(s) IV Continuous <Continuous>  dextrose 50% Injectable 25 Gram(s) IV Push once  dextrose 50% Injectable 12.5 Gram(s) IV Push once  dextrose 50% Injectable 25 Gram(s) IV Push once  folic acid 1 milliGRAM(s) Oral daily  furosemide   Injectable 40 milliGRAM(s) IV Push two times a day  glucagon  Injectable 1 milliGRAM(s) IntraMuscular once  hydrALAZINE 25 milliGRAM(s) Oral every 8 hours  insulin lispro (ADMELOG) corrective regimen sliding scale   SubCutaneous three times a day before meals  insulin lispro (ADMELOG) corrective regimen sliding scale   SubCutaneous at bedtime  ondansetron Injectable 4 milliGRAM(s) IV Push every 6 hours PRN  pantoprazole    Tablet 40 milliGRAM(s) Oral before breakfast    Vital Signs Last 24 Hrs  T(F): 99 (22 Jun 2021 09:35), Max: 99.8 (22 Jun 2021 04:55)  HR: 75 (22 Jun 2021 09:35) (75 - 85)  BP: 120/47 (22 Jun 2021 09:35) (120/47 - 142/52)  RR: 16 (22 Jun 2021 09:35) (15 - 20)  SpO2: 93% (22 Jun 2021 09:35) (90% - 99%)  I&O's Summary    21 Jun 2021 07:01  -  22 Jun 2021 07:00  --------------------------------------------------------  IN: 200 mL / OUT: 3400 mL / NET: -3200 mL      PHYSICAL EXAM:  General: NAD, A/O x 3  HENT: O2 via NRB in place   Lungs: Decreased air movement, crackles at bases   Cardio: RR, S1/S2, No murmurs  Abdomen: Soft, NT/ND, Normal active Bowel Sounds   Extremities: No cyanosis, No edema    LABS:                        8.3    6.23  )-----------( 205      ( 22 Jun 2021 05:45 )             25.1     06-22    126  |  91  |  28  ----------------------------<  136  4.8   |  33  |  1.63    Ca    8.8      22 Jun 2021 05:45  Phos  5.2     06-22  Mg     1.6     06-22    TPro  6.7  /  Alb  2.9  /  TBili  0.3  /  DBili  x   /  AST  25  /  ALT  25  /  AlkPhos  90  06-21    eGFR if Non African American: 30 mL/min/1.73M2 (06-22-21 @ 05:45)  eGFR if African American: 34 mL/min/1.73M2 (06-22-21 @ 05:45)    PT/INR - ( 21 Jun 2021 09:14 )   PT: 11.7 sec;   INR: 0.97 ratio         PTT - ( 21 Jun 2021 09:14 )  PTT:31.3 sec    CARDIAC MARKERS ( 21 Jun 2021 09:14 )  .060 ng/mL / x     / x     / x     / x      CARDIAC MARKERS ( 21 Jun 2021 03:21 )  .064 ng/mL / x     / x     / x     / x          05-30 Chol 133 mg/dL LDL -- HDL 54 mg/dL Trig 103 mg/dL  TSH 5.42   TSH with FT4 reflex --  Total T3 --    ABG - ( 21 Jun 2021 03:30 )  pH, Arterial: 7.31  pH, Blood: x     /  pCO2: 55    /  pO2: 104   / HCO3: x     / Base Excess: x     /  SaO2: 98          176 (21 Jun 2021 22:54)    POCT Blood Glucose.: 140 mg/dL (21 Jun 2021 12:00)    COVID-19 PCR: NotDetec (06-20-21 @ 20:20)  COVID-19 PCR: NotDetec (06-12-21 @ 05:45)  COVID-19 PCR: NotDetec (06-05-21 @ 18:40)  COVID-19 PCR: NotDetec (05-28-21 @ 13:40)      RADIOLOGY & ADDITIONAL TESTS:  < from: CT Head No Cont (06.21.21 @ 10:08) >  There is complete resolution of intracranial hemorrhage without new hemorrhage or cortical edema, mass effect or hydrocephalus. A ventricular shunt remains in place with stable ventricular size. There are stable chronic ischemic changes in the cerebellar hemispheres, worse on the left.  IMPRESSION:  No acute abnormality      < from: NM Pulmonary Perfusion Scan (06.21.21 @ 10:02) >  FINDINGS: There is a large perfusion defect involving the right middle lobe and the basilar segments of the right lower lobe with a corresponding chest x-ray opacity. Thereis heterogeneous distribution of radiopharmaceutical throughout the remainder of both lungs.  IMPRESSION: Abnormal perfusion lung scan. Indeterminate probability of pulmonary embolus.      < from: US Duplex Venous Lower Ext Complete, Bilateral (06.21.21 @ 08:32) >  IMPRESSION:  No evidence of deep venous thrombosis in either lower extremity.    < from: Xray Chest 1 View- PORTABLE-Urgent (Xray Chest 1 View- PORTABLE-Urgent .) (06.21.21 @ 02:29) >  IMPRESSION:   Mild bilateral perihilar/basilar diffuse airspace disease/small pleural effusions. Cardiomegaly..      Care Discussed with Consultants/Other Providers:   JESUS Perez discussed case and plan wMarielle Xavier

## 2021-06-22 NOTE — PROGRESS NOTE ADULT - ASSESSMENT
A/P: 82yo female w. PMH PMH of DVT (on Eliquis), NIDDM, Glaucoma, HLD, HTN, NPH s/p  shunt 2015, recently hospitalized for ICH (05/29/21) - small right basal ganglia hemorrhage.  Eliquis was discontinued, was seen by neurosurgery and advised no interventions necessary, then d/brittani to [facility] acute [facility] 06/09/21. In rehab w. c/o dyspnea, hypoxic respiratory failure and electrolyte imbalances, transferred for admit to tele unit for further w/u.     *Acute hypoxic respiratory failure  -Likely r/t volume overload- 6.21 CXR demonstrating congestion, b/l effusions  Cont Diuresis-Lasix 40mg IV BID (close monitoring on renal fnx)  Cont Supplemental 02 as needed, currently on Venti Mask 50% w. Spo2 93%  Cont Bronchodilators prn  -r/o PE-Unable to obtain CTA to r/o PE due to PASCALE, V/Q scan indeterminant   6.9 & 6.21 b/l LE u/s- No DVT  Encourage IS  Pulm Consult  Obtain CT Chest in s/o continued hypoxic w. high 02 requirements     *Elev Trop  Likely demand ischemia   Trop 0.064- downtrend to 0.06  Ekg yesterday w/o acute findings  Cards Consult recs appreciated     *PASCALE  Trend Cr, bump today expected in s/o Lasix dosing   Strict I/O  Daily Weights  Renal following     *Hyponatremia  Na 126 today  Trend Na  Fluvoxamine held    *Hyperkalemia  With improvement s/p tx- Insulin, D50, Calcium Gluconate (was unable to tolerate Kayexalate)   Trend K  Renal following  Restrict K in diet    *Hypomagnesemia  Replete, trend    *Hyperphosphatemia   Restrict Phos in diet  Trend Phos    *Anemia  H/H stable  Hemodynamically stable at present  Likely dilutional in s/o vol overload  anemia w/u noted    *h/o ICH  CTH- no acute issues, resolution of prior ICH    *DMII  Home Metformin Held  Cont FS/ISS    *HTN  Cont Amlodipine, Hydralazine     *HLD  Cont Statin    *Nausea   Zofran prn    *GI ppx  Cont Pantoprazole    *DVT ppx  Off AC in s/o h/o ICH- per Neurosurg prior recs    Dispo: Pending clinical course above. Possible return to rehab when medically cleared.   6.22 Sarah White, Daughter [phone]- will update regarding pt's current status and plan of care.    A/P: 82yo female w. PMH PMH of DVT (on Eliquis), NIDDM, Glaucoma, HLD, HTN, NPH s/p  shunt 2015, recently hospitalized for ICH (05/29/21) - small right basal ganglia hemorrhage.  Eliquis was discontinued, was seen by neurosurgery and advised no interventions necessary, then d/brittani to [facility] acute re[facility] 06/09/21. In rehab w. c/o dyspnea, hypoxic respiratory failure and electrolyte imbalances, transferred for admit to tele unit for further w/u.     *Acute hypoxic respiratory failure  -Likely r/t volume overload- 6.21 CXR demonstrating congestion, b/l effusions  Cont Diuresis-Lasix 40mg IV BID (close monitoring on renal fnx)  Cont Supplemental 02 as needed, currently on Venti Mask 50% w. Spo2 93%  Cont Bronchodilators prn  -r/o PE-Unable to obtain CTA to r/o PE due to PASCALE, V/Q scan indeterminant   6.9 & 6.21 b/l LE u/s- No DVT  Encourage IS  Pulm Consult  Obtain CT Chest in s/o continued hypoxic w. high 02 requirements   Consider repeat TTE 2/2 6.9 TTE showed trivial pericardial effusion for re-eval     *Elev Trop  Likely demand ischemia   Trop 0.064- downtrend to 0.06  Ekg yesterday w/o acute findings  Cards Consult recs appreciated     *PASCALE  Trend Cr, bump today expected in s/o Lasix dosing   Strict I/O  Daily Weights  Renal following     *Hyponatremia  Na 126 today  Trend Na  Fluvoxamine held    *Hyperkalemia  With improvement s/p tx- Insulin, D50, Calcium Gluconate (was unable to tolerate Kayexalate)   Trend K  Renal following  Restrict K in diet    *Hypomagnesemia  Replete, trend    *Hyperphosphatemia   Restrict Phos in diet  Trend Phos    *Anemia  H/H stable  Hemodynamically stable at present  Likely dilutional in s/o vol overload  anemia w/u noted    *h/o ICH  CTH- no acute issues, resolution of prior ICH    *DMII  Home Metformin Held  Cont FS/ISS    *HTN  Cont Amlodipine, Hydralazine     *HLD  Cont Statin    *Nausea   Zofran prn    *GI ppx  Cont Pantoprazole    *DVT ppx  Off AC in s/o h/o ICH- per Neurosurg prior recs    Dispo: Pending clinical course above. Possible return to rehab when medically cleared.   6.22 Sarah White, Daughter [phone]- will update regarding pt's current status and plan of care.    A/P: 80yo female w. PMH PMH of DVT (on Eliquis), NIDDM, Glaucoma, HLD, HTN, NPH s/p  shunt 2015, recently hospitalized for ICH (05/29/21) - small right basal ganglia hemorrhage.  Eliquis was discontinued, was seen by neurosurgery and advised no interventions necessary, then d/brittani to [facility] acute re[facility] 06/09/21. In rehab w. c/o dyspnea, hypoxic respiratory failure and electrolyte imbalances, transferred for admit to tele unit for further w/u.     *Acute hypoxic respiratory failure  -Likely r/t volume overload- 6.21 CXR demonstrating congestion, b/l effusions  Cont Diuresis-Lasix 40mg IV BID (close monitoring on renal fnx)  Cont Supplemental 02 as needed, currently on Venti Mask 50% w. Spo2 93%  Cont Bronchodilators prn  -r/o PE-Unable to obtain CTA to r/o PE due to PASCALE, V/Q scan indeterminant   6.9 & 6.21 b/l LE u/s- No DVT  Encourage IS  Pulm Consult  Obtain CT Chest in s/o continued hypoxic w. high 02 requirements   Consider repeat TTE 2/2 6.9 TTE showed trivial pericardial effusion for re-eval   Consider BIPAP if no improvement or if worsening respiratory status    *Elev Trop  Likely demand ischemia   Trop 0.064- downtrend to 0.06  Ekg yesterday w/o acute findings  Cards Consult recs appreciated     *PASCALE  Trend Cr, bump today expected in s/o Lasix dosing   Strict I/O  Daily Weights  Renal following     *Hyponatremia  Na 126 today  Trend Na  Fluvoxamine held    *Hyperkalemia  With improvement s/p tx- Insulin, D50, Calcium Gluconate (was unable to tolerate Kayexalate)   Trend K  Renal following  Restrict K in diet    *Hypomagnesemia  Replete, trend    *Hyperphosphatemia   Restrict Phos in diet  Trend Phos    *Anemia  H/H stable  Hemodynamically stable at present  Likely dilutional in s/o vol overload  anemia w/u noted    *h/o ICH  CTH- no acute issues, resolution of prior ICH    *DMII  Home Metformin Held  Cont FS/ISS    *HTN  Cont Amlodipine, Hydralazine     *HLD  Cont Statin    *Nausea   Zofran prn    *GI ppx  Cont Pantoprazole    *DVT ppx  Off AC in s/o h/o ICH- per Neurosurg prior recs    Dispo: Pending clinical course above. Possible return to rehab when medically cleared.   6.22 Sarah White, Daughter [phone]- will update regarding pt's current status and plan of care.

## 2021-06-22 NOTE — CONSULT NOTE ADULT - SUBJECTIVE AND OBJECTIVE BOX
REASON FOR CONSULT: Hypercarbic respiratory failure    Chief Complaint    HPI: 80 y/o F w/ pmh of DVT (on eliquis) DM, glaucoma, hld, htn, bipolar d/o, NPH s/p  shunt in 2015, recently hospitalized for ICH (small R. basal ganglia hemorrhage) for which eliquis was d/c, pt was transfer to [facility] on 06/09/2021, rehab course c/b hypoxia and increased dyspnea and transferred to [facility]. Pt was found to have new diastolic HF w/ b/l pleural effusions and started on IV lasix daily.     24 hour events:  Tonight MICU consult requested for lethargy ABG was obtained to have mild hypercarbia, pt seen and examined reports feeling SOB and unwell but otherwise denies any other medical complaints.  Case discussed with the ICU PA.    PAST MEDICAL & SURGICAL HISTORY:  Hypertension  Hyperlipidemia  Diabetes  Glaucoma  Osteoarthritis  Small bowel obstruction  S/P hysterectomy  1981 and Oopherectomy in 1990s  S/P cholecystectomy  1981  Achilles tendon injury  repair  right scalene muscle release  S/P knee replacement, left      Allergies  adhesives (Pruritus; Blisters)  penicillin (Hives)  pineapple (Anaphylaxis)      FAMILY HISTORY:  Family history of pancreatic cancer (Sibling)  Family history of early CAD  Family history of diabetes mellitus        Medications:  amLODIPine   Tablet 10 milliGRAM(s) Oral daily  furosemide   Injectable 40 milliGRAM(s) IV Push daily  hydrALAZINE 25 milliGRAM(s) Oral every 8 hours  albuterol/ipratropium for Nebulization 3 milliLiter(s) Nebulizer every 4 hours PRN  albuterol/ipratropium for Nebulization 3 milliLiter(s) Nebulizer three times a day  acetaminophen   Tablet .. 650 milliGRAM(s) Oral every 6 hours PRN  ARIPiprazole 10 milliGRAM(s) Oral at bedtime  ondansetron Injectable 4 milliGRAM(s) IV Push every 6 hours PRN  pantoprazole    Tablet 40 milliGRAM(s) Oral before breakfast  atorvastatin 40 milliGRAM(s) Oral at bedtime  dextrose 40% Gel 15 Gram(s) Oral once  dextrose 50% Injectable 25 Gram(s) IV Push once  dextrose 50% Injectable 12.5 Gram(s) IV Push once  dextrose 50% Injectable 25 Gram(s) IV Push once  glucagon  Injectable 1 milliGRAM(s) IntraMuscular once  insulin lispro (ADMELOG) corrective regimen sliding scale   SubCutaneous three times a day before meals  insulin lispro (ADMELOG) corrective regimen sliding scale   SubCutaneous at bedtime  dextrose 5%. 1000 milliLiter(s) IV Continuous <Continuous>  dextrose 5%. 1000 milliLiter(s) IV Continuous <Continuous>  folic acid 1 milliGRAM(s) Oral daily        Vital Signs Last 24 Hrs  T(C): 37.7 (22 Jun 2021 22:15), Max: 37.7 (22 Jun 2021 04:55)  T(F): 99.9 (22 Jun 2021 22:15), Max: 99.9 (22 Jun 2021 22:15)  HR: 84 (22 Jun 2021 23:00) (74 - 85)  BP: 122/58 (22 Jun 2021 23:00) (120/47 - 141/56)  BP(mean): 79 (22 Jun 2021 23:00) (71 - 87)  RR: 13 (22 Jun 2021 23:00) (13 - 19)  SpO2: 93% (22 Jun 2021 23:00) (92% - 99%)    ABG - ( 22 Jun 2021 20:15 )  pH, Arterial: 7.34  pH, Blood: x     /  pCO2: 59    /  pO2: 91    / HCO3: x     / Base Excess: x     /  SaO2: 96            21 Jun 2021 07:01  -  22 Jun 2021 07:00  --------------------------------------------------------  IN:    Oral Fluid: 200 mL  Total IN: 200 mL    OUT:    Voided (mL): 3400 mL  Total OUT: 3400 mL    Total NET: -3200 mL      22 Jun 2021 07:01  -  22 Jun 2021 23:16  --------------------------------------------------------  IN:  Total IN: 0 mL    OUT:    Voided (mL): 1000 mL  Total OUT: 1000 mL    Total NET: -1000 mL      LABS:                        8.3    6.23  )-----------( 205      ( 22 Jun 2021 05:45 )             25.1     06-22    126<L>  |  91<L>  |  28<H>  ----------------------------<  136<H>  4.8   |  33<H>  |  1.63<H>    Ca    8.8      22 Jun 2021 05:45  Phos  5.2     06-22  Mg     1.6     06-22    TPro  6.7  /  Alb  2.9<L>  /  TBili  0.3  /  DBili  x   /  AST  25  /  ALT  25  /  AlkPhos  90  06-21      CARDIAC MARKERS ( 21 Jun 2021 09:14 )  .060 ng/mL / x     / x     / x     / x      CARDIAC MARKERS ( 21 Jun 2021 03:21 )  .064 ng/mL / x     / x     / x     / x          CAPILLARY BLOOD GLUCOSE  176 (21 Jun 2021 22:54)      POCT Blood Glucose.: 190 mg/dL (22 Jun 2021 21:07)    PT/INR - ( 21 Jun 2021 09:14 )   PT: 11.7 sec;   INR: 0.97 ratio         PTT - ( 21 Jun 2021 09:14 )  PTT:31.3 sec    CULTURES: sent      Physical Examination:  Physical exam as per bedside MD (direct physical examination could not be performed because the visit was provided via Telemedicine):       RADIOLOGY: < from: CT Chest No Cont (06.22.21 @ 12:51) >  IMPRESSION:  Right upper lobe peripheral opacities, possibly infectious.  Bilateral lower lobe consolidations, which may represent atelectasis and/or pneumonia.  Small bilateral pleural effusions.    < end of copied text >      IMP-  hypercarbic respiratory failure  Multi lobar pneumonia on CT  CHF    Plan-  Admit to ICU  BIPAP  Lasix  Azactam, Vancomycin empiric for infiltrates  culture    CRITICAL CARE TIME SPENT: 45    This visit was provided via telemedicine. Patient was located at     [facility] [place] [address]  Provider was located at [id number] [facility] [place], [place] for the visit.

## 2021-06-22 NOTE — PROGRESS NOTE ADULT - SUBJECTIVE AND OBJECTIVE BOX
On FM O2    Vital Signs Last 24 Hrs  T(C): 37.4 (06-22-21 @ 15:35), Max: 37.7 (06-22-21 @ 04:55)  T(F): 99.4 (06-22-21 @ 15:35), Max: 99.8 (06-22-21 @ 04:55)  HR: 80 (06-22-21 @ 15:35) (74 - 85)  BP: 135/60 (06-22-21 @ 15:35) (120/47 - 142/52)  RR: 15 (06-22-21 @ 15:35) (15 - 20)  SpO2: 96% (06-22-21 @ 15:35) (92% - 99%)    s1s2  b/l air entry  soft, ND  no edema b/l LE                                                                               8.3    6.23  )-----------( 205      ( 22 Jun 2021 05:45 )             25.1     22 Jun 2021 05:45    126    |  91     |  28     ----------------------------<  136    4.8     |  33     |  1.63     Ca    8.8        22 Jun 2021 05:45  Phos  5.2       22 Jun 2021 05:45  Mg     1.6       22 Jun 2021 05:45    TPro  6.7    /  Alb  2.9    /  TBili  0.3    /  DBili  x      /  AST  25     /  ALT  25     /  AlkPhos  90     21 Jun 2021 06:30    LIVER FUNCTIONS - ( 21 Jun 2021 06:30 )  Alb: 2.9 g/dL / Pro: 6.7 g/dL / ALK PHOS: 90 U/L / ALT: 25 U/L / AST: 25 U/L / GGT: x           PT/INR - ( 21 Jun 2021 09:14 )   PT: 11.7 sec;   INR: 0.97 ratio      acetaminophen   Tablet .. 650 milliGRAM(s) Oral every 6 hours PRN  albuterol/ipratropium for Nebulization 3 milliLiter(s) Nebulizer every 4 hours PRN  albuterol/ipratropium for Nebulization 3 milliLiter(s) Nebulizer three times a day  amLODIPine   Tablet 10 milliGRAM(s) Oral daily  ARIPiprazole 10 milliGRAM(s) Oral at bedtime  atorvastatin 40 milliGRAM(s) Oral at bedtime  dextrose 40% Gel 15 Gram(s) Oral once  dextrose 5%. 1000 milliLiter(s) IV Continuous <Continuous>  dextrose 5%. 1000 milliLiter(s) IV Continuous <Continuous>  dextrose 50% Injectable 25 Gram(s) IV Push once  dextrose 50% Injectable 12.5 Gram(s) IV Push once  dextrose 50% Injectable 25 Gram(s) IV Push once  folic acid 1 milliGRAM(s) Oral daily  furosemide   Injectable 40 milliGRAM(s) IV Push daily  glucagon  Injectable 1 milliGRAM(s) IntraMuscular once  hydrALAZINE 25 milliGRAM(s) Oral every 8 hours  insulin lispro (ADMELOG) corrective regimen sliding scale   SubCutaneous three times a day before meals  insulin lispro (ADMELOG) corrective regimen sliding scale   SubCutaneous at bedtime  ondansetron Injectable 4 milliGRAM(s) IV Push every 6 hours PRN  pantoprazole    Tablet 40 milliGRAM(s) Oral before breakfast    A/P:    Hx HTN, s/p ICH, mod AS  SOB, V/Q scan w/indeterminate prob  Hemodynamic/cardio-renal PASCALE/CKD 3  Hyponatremia in setting of above  Avoid nephrotoxins  Agree w/IV Lasix  Low K diet  Will f/u BMP, Mg  Daily weights    [phone]

## 2021-06-22 NOTE — PROGRESS NOTE ADULT - SUBJECTIVE AND OBJECTIVE BOX
Follow up for Respiratory decompensation  SUBJ:    On high-dose supplemental O2    PMH  Hypertension    Hyperlipidemia    Diabetes    Glaucoma    Osteoarthritis    Small bowel obstruction        MEDICATIONS  (STANDING):  albuterol/ipratropium for Nebulization 3 milliLiter(s) Nebulizer three times a day  amLODIPine   Tablet 10 milliGRAM(s) Oral daily  ARIPiprazole 10 milliGRAM(s) Oral at bedtime  atorvastatin 40 milliGRAM(s) Oral at bedtime  aztreonam  IVPB      dextrose 40% Gel 15 Gram(s) Oral once  dextrose 5%. 1000 milliLiter(s) (50 mL/Hr) IV Continuous <Continuous>  dextrose 5%. 1000 milliLiter(s) (100 mL/Hr) IV Continuous <Continuous>  dextrose 50% Injectable 25 Gram(s) IV Push once  dextrose 50% Injectable 12.5 Gram(s) IV Push once  dextrose 50% Injectable 25 Gram(s) IV Push once  folic acid 1 milliGRAM(s) Oral daily  furosemide   Injectable 40 milliGRAM(s) IV Push daily  glucagon  Injectable 1 milliGRAM(s) IntraMuscular once  hydrALAZINE 25 milliGRAM(s) Oral every 8 hours  insulin lispro (ADMELOG) corrective regimen sliding scale   SubCutaneous three times a day before meals  insulin lispro (ADMELOG) corrective regimen sliding scale   SubCutaneous at bedtime  pantoprazole    Tablet 40 milliGRAM(s) Oral before breakfast  vancomycin  IVPB 1000 milliGRAM(s) IV Intermittent once    MEDICATIONS  (PRN):  acetaminophen   Tablet .. 650 milliGRAM(s) Oral every 6 hours PRN Temp greater or equal to 38C (100.4F), Mild Pain (1 - 3)  albuterol/ipratropium for Nebulization 3 milliLiter(s) Nebulizer every 4 hours PRN Shortness of Breath and/or Wheezing  ondansetron Injectable 4 milliGRAM(s) IV Push every 6 hours PRN Nausea and/or Vomiting        PHYSICAL EXAM:  Vital Signs Last 24 Hrs  T(C): 37.7 (22 Jun 2021 22:15), Max: 37.7 (22 Jun 2021 04:55)  T(F): 99.9 (22 Jun 2021 22:15), Max: 99.9 (22 Jun 2021 22:15)  HR: 84 (22 Jun 2021 23:00) (74 - 85)  BP: 122/58 (22 Jun 2021 23:00) (120/47 - 141/56)  BP(mean): 79 (22 Jun 2021 23:00) (71 - 87)  RR: 13 (22 Jun 2021 23:00) (13 - 19)  SpO2: 93% (22 Jun 2021 23:00) (92% - 99%)    GENERAL: NAD, well-groomed, well-developed  HEAD:  Atraumatic, Normocephalic  EYES: EOMI, PERRLA, conjunctiva and sclera clear  ENT: Moist mucous membranes,  NECK: Supple, No JVD, no bruits  CHEST/LUNG: Clear to percussion bilaterally; No rales, rhonchi, wheezing, or rubs  HEART: Regular rate and rhythm; No murmurs, rubs, or gallops PMI non displaced.  ABDOMEN: Soft, Nontender, Nondistended; Bowel sounds present  EXTREMITIES:  2+ Peripheral Pulses, No clubbing, cyanosis, or edema  SKIN: No rashes or lesions  NERVOUS SYSTEM:  Cranial Nerves II-XII intact      TELEMETRY:    RSR    ECG:    < from: 12 Lead ECG (06.21.21 @ 03:24) >  Diagnosis Line Normal sinus rhythm      When compared with ECG of 16-JUN-2021 12:22,    Nonspecific T wave abnormality now evident in Inferior leads  Confirmed by WILLIAM CROCKETT MD ([id number]) on 6/21/2021 9:16:57 AM    < end of copied text >    ECHO:      < from: TTE Echo Limited or F/U (06.22.21 @ 13:30) >  Summary:   1. Left ventricular ejection fraction, by visual estimation, is 65 to 70%.   2. Normal global left ventricular systolic function.   3. Increased LV wall thickness.   4. Normal left ventricular internal cavity size.   5. Spectral Doppler shows restrictive pattern of left ventricular myocardial filling (Grade III diastolic dysfunction).   6. There is mild concentric left ventricular hypertrophy.   7. Mild mitral annular calcification.   8. Mild mitral valve regurgitation.   9. Mild tricuspid regurgitation.  10. Mild pulmonic valve regurgitation.  11. Estimated pulmonary artery systolic pressure is 41.1 mmHg assuming a right atrial pressure of 10 mmHg, which is consistent with mild pulmonary hypertension.  12. Dvi by tvi is .35 argues against severe aortic stenosis.  13. The 2 d appearance appears to be moderate aortic stenosis.  14. Compared with a prior echo 6/9/21 there is no significant change.  15. Peak transaortic gradient equals 14.9 mmHg, mean transaortic gradient equals 8.9 mmHg, the calculated aortic valve area equals 1.33 cm² by the continuity equation consistent with moderate aortic stenosis.    [id number] Alanna Castillo , Electronically signed on 6/22/2021 at 2:41:43 PM        *** Final ***    ALANNA CASTILLO   This document has been electronically signed. Jun 22 2021  1:30PM    < end of copied text >    LABS:                        8.3    6.23  )-----------( 205      ( 22 Jun 2021 05:45 )             25.1     06-22    126<L>  |  91<L>  |  28<H>  ----------------------------<  136<H>  4.8   |  33<H>  |  1.63<H>    Ca    8.8      22 Jun 2021 05:45  Phos  5.2     06-22  Mg     1.6     06-22    TPro  6.7  /  Alb  2.9<L>  /  TBili  0.3  /  DBili  x   /  AST  25  /  ALT  25  /  AlkPhos  90  06-21    CARDIAC MARKERS ( 21 Jun 2021 09:14 )  .060 ng/mL / x     / x     / x     / x      CARDIAC MARKERS ( 21 Jun 2021 03:21 )  .064 ng/mL / x     / x     / x     / x          PT/INR - ( 21 Jun 2021 09:14 )   PT: 11.7 sec;   INR: 0.97 ratio         PTT - ( 21 Jun 2021 09:14 )  PTT:31.3 sec    I&O's Summary    21 Jun 2021 07:01  -  22 Jun 2021 07:00  --------------------------------------------------------  IN: 200 mL / OUT: 3400 mL / NET: -3200 mL    22 Jun 2021 07:01  -  22 Jun 2021 23:31  --------------------------------------------------------  IN: 0 mL / OUT: 1000 mL / NET: -1000 mL      BNP    RADIOLOGY & ADDITIONAL STUDIES:    < from: Xray Chest 1 View- PORTABLE-Urgent (Xray Chest 1 View- PORTABLE-Urgent .) (06.21.21 @ 02:29) >  IMPRESSION:   Mild bilateral perihilar/basilar diffuse airspace disease/small pleural effusions. Cardiomegaly..        DONY JOSHI MD; Attending Radiologist  This document has been electronically signed. Jun 21 2021  2:04PM    < end of copied text >      ECHO:    Discussion  Respiratory decompensation the cardiac echocardiogram for a stable pattern and did notreally explain the respiratory decompensation. Patient currently being considered for high flow supplemental oxygen and even BiPAP    Care coronary with Addis Chau at bedside

## 2021-06-22 NOTE — CONSULT NOTE ADULT - ASSESSMENT
80 y/o F w/ pmh of DVT (on eliquis) DM, glaucoma, hld, htn, bipolar d/o, NPH s/p  shunt in 2015, recently hospitalized for ICH (small R. basal ganglia hemorrhage) for which eliquis was d/c, pt was transfer to [facility] [place] on 06/09/2021, rehab course c/b hypoxia and increased dyspnea and transferred to tele. Pt was found to have new diastolic HF w/ b/l pleural effusion. Tonight MICU consult requested for lethargy ABG was obtained to have mild hypercarbia.    -Neuro: AMS likely 2/2 to hypercarbia CTH w/ no acute finding monitor MS closely, Bipolar d/o cont Abilify  -Cardiac: Diastolic HF w/ b/l pleural effusions cont IV lasix, htn cont hydralazine/amlodipine  -Resp: Acute hypoxic/hypercarbic resp failure 2/2 to pleural effusions +/- atelectasis will give trial of bipap overnight to see if MS improves start 12/5 at 40% maintain o2 >90%  -GI: Diet NPO while on bipap, GI ppx w/ protonix, HLD cont lipitor  -Renal: PASCALE cont to monitor renal function, Maintain baldwin to monitor I&O closely   -ID: No acute infectious process at this time monitor off IV antibiotics no signs of fevers, wbc stable will recheck procal in the AM  -Heme: DVT ppx w/ SCD b/l duplex neg for DVT  -Endo: DM cont ISS maintain FS b/w 140-180  -[facility]: [facility] d/w Daughter Misha Smith Phone number  regarding GOC who stated she is not sure about DNR/DNI until she speaks to her brother, will clarify GOC in the AM w/ day team and stay full code for now, case d/w eicu dr. romano   80 y/o F w/ pmh of DVT (on eliquis) DM, glaucoma, hld, htn, bipolar d/o, NPH s/p  shunt in 2015, recently hospitalized for ICH (small R. basal ganglia hemorrhage) for which eliquis was d/c, pt was transfer to [facility] [place] on 06/09/2021, rehab course c/b hypoxia and increased dyspnea and transferred to tele. Pt was found to have new diastolic HF w/ b/l pleural effusion. Tonight MICU consult requested for lethargy ABG was obtained to have mild hypercarbia.    -Neuro: AMS likely 2/2 to hypercarbia CTH w/ no acute finding monitor MS closely, Bipolar d/o cont Abilify  -Cardiac: Diastolic HF w/ b/l pleural effusions cont IV lasix, htn cont hydralazine/amlodipine  -Resp: Acute hypoxic/hypercarbic resp failure 2/2 to pleural effusions +/- atelectasis/pna will give trial of bipap overnight to see if MS improves start 12/5 at 40% maintain o2 >90%  -GI: Diet NPO while on bipap, GI ppx w/ protonix, HLD cont lipitor  -Renal: PASCALE cont to monitor renal function, Maintain baldwin to monitor I&O closely   -ID: Pna? start IV aztreonam and x1 dose of IV vanco now, recheck procal in the AM, obtain blood cx x2 and check MRSA swap   -Heme: DVT ppx w/ SCD b/l duplex neg for DVT  -Endo: DM cont ISS maintain FS b/w 140-180  -[facility] d/w Daughter Misha Smith Phone number  regarding [facility] who stated she is not sure about DNR/DNI until she speaks to her brother, will clarify GOC in the AM w/ day team and stay full code for now, case d/w eicu dr. romano

## 2021-06-22 NOTE — PROGRESS NOTE ADULT - ATTENDING COMMENTS
Acute on chronic diastolic heart failure  - cont IV lasix diuretics  - still requiring high levels of oxygen  - tried on hi flow but desaturated as patient breaths through mouth  - monitor bmp   - check CT chest to rule out pneumonia    Anemia of chronic disease  Hyponatremia
Acute on chronic heart failure with preserved ejection fraction  Acute hypoxic respiratory failure   - cont IV diuretics  - watch bmp  - strict ins and outs  - O2 supplementation  - try to titrate off NRB as tolerated

## 2021-06-23 LAB
ANION GAP SERPL CALC-SCNC: 4 MMOL/L — LOW (ref 5–17)
BUN SERPL-MCNC: 30 MG/DL — HIGH (ref 7–23)
CALCIUM SERPL-MCNC: 8.8 MG/DL — SIGNIFICANT CHANGE UP (ref 8.4–10.5)
CHLORIDE SERPL-SCNC: 92 MMOL/L — LOW (ref 96–108)
CO2 SERPL-SCNC: 34 MMOL/L — HIGH (ref 22–31)
CREAT SERPL-MCNC: 1.72 MG/DL — HIGH (ref 0.5–1.3)
D DIMER BLD IA.RAPID-MCNC: 700 NG/ML DDU — HIGH
GLUCOSE BLDC GLUCOMTR-MCNC: 136 MG/DL — HIGH (ref 70–99)
GLUCOSE BLDC GLUCOMTR-MCNC: 146 MG/DL — HIGH (ref 70–99)
GLUCOSE BLDC GLUCOMTR-MCNC: 214 MG/DL — HIGH (ref 70–99)
GLUCOSE BLDC GLUCOMTR-MCNC: 220 MG/DL — HIGH (ref 70–99)
GLUCOSE SERPL-MCNC: 142 MG/DL — HIGH (ref 70–99)
HCT VFR BLD CALC: 23.3 % — LOW (ref 34.5–45)
HGB BLD-MCNC: 7.8 G/DL — LOW (ref 11.5–15.5)
MAGNESIUM SERPL-MCNC: 1.4 MG/DL — LOW (ref 1.6–2.6)
MCHC RBC-ENTMCNC: 29.8 PG — SIGNIFICANT CHANGE UP (ref 27–34)
MCHC RBC-ENTMCNC: 33.5 GM/DL — SIGNIFICANT CHANGE UP (ref 32–36)
MCV RBC AUTO: 88.9 FL — SIGNIFICANT CHANGE UP (ref 80–100)
MRSA PCR RESULT.: SIGNIFICANT CHANGE UP
NRBC # BLD: 0 /100 WBCS — SIGNIFICANT CHANGE UP (ref 0–0)
NT-PROBNP SERPL-SCNC: 2026 PG/ML — HIGH (ref 0–300)
PHOSPHATE SERPL-MCNC: 4.5 MG/DL — SIGNIFICANT CHANGE UP (ref 2.5–4.5)
PLATELET # BLD AUTO: 193 K/UL — SIGNIFICANT CHANGE UP (ref 150–400)
POTASSIUM SERPL-MCNC: 4.2 MMOL/L — SIGNIFICANT CHANGE UP (ref 3.5–5.3)
POTASSIUM SERPL-SCNC: 4.2 MMOL/L — SIGNIFICANT CHANGE UP (ref 3.5–5.3)
RBC # BLD: 2.62 M/UL — LOW (ref 3.8–5.2)
RBC # FLD: 12.3 % — SIGNIFICANT CHANGE UP (ref 10.3–14.5)
S AUREUS DNA NOSE QL NAA+PROBE: SIGNIFICANT CHANGE UP
SODIUM SERPL-SCNC: 130 MMOL/L — LOW (ref 135–145)
TROPONIN I SERPL-MCNC: 0.06 NG/ML — SIGNIFICANT CHANGE UP (ref 0.02–0.06)
WBC # BLD: 6.51 K/UL — SIGNIFICANT CHANGE UP (ref 3.8–10.5)
WBC # FLD AUTO: 6.51 K/UL — SIGNIFICANT CHANGE UP (ref 3.8–10.5)

## 2021-06-23 PROCEDURE — 93971 EXTREMITY STUDY: CPT | Mod: 26,LT

## 2021-06-23 PROCEDURE — 93970 EXTREMITY STUDY: CPT | Mod: 26

## 2021-06-23 PROCEDURE — 99232 SBSQ HOSP IP/OBS MODERATE 35: CPT

## 2021-06-23 RX ORDER — CEFEPIME 1 G/1
2000 INJECTION, POWDER, FOR SOLUTION INTRAMUSCULAR; INTRAVENOUS EVERY 24 HOURS
Refills: 0 | Status: COMPLETED | OUTPATIENT
Start: 2021-06-23 | End: 2021-06-27

## 2021-06-23 RX ORDER — MAGNESIUM SULFATE 500 MG/ML
1 VIAL (ML) INJECTION ONCE
Refills: 0 | Status: COMPLETED | OUTPATIENT
Start: 2021-06-23 | End: 2021-06-23

## 2021-06-23 RX ORDER — HEPARIN SODIUM 5000 [USP'U]/ML
5000 INJECTION INTRAVENOUS; SUBCUTANEOUS EVERY 8 HOURS
Refills: 0 | Status: DISCONTINUED | OUTPATIENT
Start: 2021-06-23 | End: 2021-07-01

## 2021-06-23 RX ORDER — ERYTHROPOIETIN 10000 [IU]/ML
10000 INJECTION, SOLUTION INTRAVENOUS; SUBCUTANEOUS
Refills: 0 | Status: DISCONTINUED | OUTPATIENT
Start: 2021-06-23 | End: 2021-07-01

## 2021-06-23 RX ADMIN — Medication 100 MILLIGRAM(S): at 07:51

## 2021-06-23 RX ADMIN — Medication 100 MILLIGRAM(S): at 00:42

## 2021-06-23 RX ADMIN — Medication 250 MILLIGRAM(S): at 02:17

## 2021-06-23 RX ADMIN — Medication 4: at 16:33

## 2021-06-23 RX ADMIN — Medication 3 MILLILITER(S): at 08:42

## 2021-06-23 RX ADMIN — Medication 40 MILLIGRAM(S): at 06:09

## 2021-06-23 RX ADMIN — Medication 3 MILLILITER(S): at 22:11

## 2021-06-23 RX ADMIN — ARIPIPRAZOLE 10 MILLIGRAM(S): 15 TABLET ORAL at 21:48

## 2021-06-23 RX ADMIN — CEFEPIME 100 MILLIGRAM(S): 1 INJECTION, POWDER, FOR SOLUTION INTRAMUSCULAR; INTRAVENOUS at 16:28

## 2021-06-23 RX ADMIN — Medication 1 MILLIGRAM(S): at 11:05

## 2021-06-23 RX ADMIN — Medication 3 MILLILITER(S): at 15:43

## 2021-06-23 RX ADMIN — Medication 25 MILLIGRAM(S): at 21:48

## 2021-06-23 RX ADMIN — Medication 25 MILLIGRAM(S): at 14:04

## 2021-06-23 RX ADMIN — PANTOPRAZOLE SODIUM 40 MILLIGRAM(S): 20 TABLET, DELAYED RELEASE ORAL at 06:07

## 2021-06-23 RX ADMIN — ATORVASTATIN CALCIUM 40 MILLIGRAM(S): 80 TABLET, FILM COATED ORAL at 21:48

## 2021-06-23 RX ADMIN — HEPARIN SODIUM 5000 UNIT(S): 5000 INJECTION INTRAVENOUS; SUBCUTANEOUS at 14:01

## 2021-06-23 RX ADMIN — Medication 4: at 11:05

## 2021-06-23 RX ADMIN — Medication 25 MILLIGRAM(S): at 09:06

## 2021-06-23 RX ADMIN — AMLODIPINE BESYLATE 10 MILLIGRAM(S): 2.5 TABLET ORAL at 09:06

## 2021-06-23 RX ADMIN — Medication 100 GRAM(S): at 11:06

## 2021-06-23 RX ADMIN — HEPARIN SODIUM 5000 UNIT(S): 5000 INJECTION INTRAVENOUS; SUBCUTANEOUS at 21:48

## 2021-06-23 NOTE — DIETITIAN INITIAL EVALUATION ADULT. - OTHER INFO
82 y/o F w/ PMH of DVT (on Eliquis), NIDDM, glaucoma, HLD, HTN, hs of NPH s/p  shunt 2015, recently hospitalized for ICH (05/29/21) - small right basal ganglia hemorrhage. Patient transferred from rehab to medical floor due to increased dyspnea, was hypoxic, O2 sat mid 80's to low 90's, required 100% NRBM. Currently patient on NC , tolerated diet consuming ~ 50% of meals as per patient, FR noted  due to hyponatremia . Patient with ? difficulty chewing /swallowing  noted receiving  mechanical soft consistency prior on rehab unit. Skin is intact, no recent BM noted. Patient appears not a appropriate candidate for diet education at present. ? recorded weight of 190lb , due to recent weights obtained from rehab unit. Hyponatremia improving Na 130 at present , patient aware of FR

## 2021-06-23 NOTE — PROGRESS NOTE ADULT - SUBJECTIVE AND OBJECTIVE BOX
On FM O2    Vital Signs Last 24 Hrs  T(C): 36.9 (06-23-21 @ 16:00), Max: 37.9 (06-23-21 @ 06:00)  T(F): 98.5 (06-23-21 @ 16:00), Max: 100.3 (06-23-21 @ 06:00)  HR: 83 (06-23-21 @ 19:00) (66 - 91)  BP: 108/56 (06-23-21 @ 19:00) (88/44 - 131/65)  BP(mean): 71 (06-23-21 @ 19:00) (59 - 109)  RR: 22 (06-23-21 @ 19:00) (13 - 31)  SpO2: 87% (06-23-21 @ 19:00) (70% - 96%)    s1s2  b/l air entry  soft, ND  no edema b/l LE                                                                                     7.8    6.51  )-----------( 193      ( 23 Jun 2021 06:00 )             23.3     23 Jun 2021 06:00    130    |  92     |  30     ----------------------------<  142    4.2     |  34     |  1.72     Ca    8.8        23 Jun 2021 06:00  Phos  4.5       23 Jun 2021 06:00  Mg     1.4       23 Jun 2021 06:00    acetaminophen   Tablet .. 650 milliGRAM(s) Oral every 6 hours PRN  albuterol/ipratropium for Nebulization 3 milliLiter(s) Nebulizer every 4 hours PRN  albuterol/ipratropium for Nebulization 3 milliLiter(s) Nebulizer three times a day  ARIPiprazole 10 milliGRAM(s) Oral at bedtime  atorvastatin 40 milliGRAM(s) Oral at bedtime  cefepime   IVPB 2000 milliGRAM(s) IV Intermittent every 24 hours  dextrose 40% Gel 15 Gram(s) Oral once  dextrose 5%. 1000 milliLiter(s) IV Continuous <Continuous>  dextrose 5%. 1000 milliLiter(s) IV Continuous <Continuous>  dextrose 50% Injectable 25 Gram(s) IV Push once  dextrose 50% Injectable 12.5 Gram(s) IV Push once  dextrose 50% Injectable 25 Gram(s) IV Push once  folic acid 1 milliGRAM(s) Oral daily  furosemide   Injectable 40 milliGRAM(s) IV Push daily  glucagon  Injectable 1 milliGRAM(s) IntraMuscular once  heparin   Injectable 5000 Unit(s) SubCutaneous every 8 hours  hydrALAZINE 25 milliGRAM(s) Oral every 8 hours  insulin lispro (ADMELOG) corrective regimen sliding scale   SubCutaneous three times a day before meals  insulin lispro (ADMELOG) corrective regimen sliding scale   SubCutaneous at bedtime  ondansetron Injectable 4 milliGRAM(s) IV Push every 6 hours PRN  pantoprazole    Tablet 40 milliGRAM(s) Oral before breakfast    A/P:    Hx HTN, s/p ICH, mod AS  SOB, V/Q scan w/indeterminate prob, ? asp PNA  Hemodynamic/cardio-renal PASCALE/CKD 3  Avoid nephrotoxins  Avoid hypotension  Suppl Mg  F/u BMP, Mg  Epoetin  Daily weights    [phone]

## 2021-06-23 NOTE — CONSULT NOTE ADULT - SUBJECTIVE AND OBJECTIVE BOX
HPI: 80 yo female with DVT on eliquis, NPH s/p VPS in 2015,DM,Bipolar disorder, paralyzed hemidiaphragm,HFpEF,recent basal ganglia bleed which was managed conservatively with d/c of eliquis, transferred to [facility]- in early June, who has developed progressive respiratory difficulties with hypoxia which led to [facility] transfer on 6/22.  She received CTX and cephalexin for klebsiella UTI in early June.She has had episodes of confusion.She has been O2 dependant.Echo with diastolic dysfunction, serial chest CT scans with atelectasis and possible pneumonia.  She was given aztreonam today for pneumonia coverage.  She was hospitalized in [facility] [place] last year.T max has been 100.3, no sputum production,PC is .16      REVIEW OF SYSTEMS:  All other review of systems negative (Comprehensive ROS)    PAST MEDICAL & SURGICAL HISTORY:  Hypertension    Hyperlipidemia    Diabetes    Glaucoma    Osteoarthritis    Small bowel obstruction    S/P hysterectomy  1981 and Oopherectomy in 1990s    S/P cholecystectomy  1981    Achilles tendon injury  repair  right scalene muscle release    S/P knee replacement, left        Allergies    adhesives (Pruritus; Blisters)  penicillin (Hives)  pineapple (Anaphylaxis)    Intolerances        Antimicrobials Day #  :day 1  aztreonam  IVPB      aztreonam  IVPB 2000 milliGRAM(s) IV Intermittent every 8 hours    Other Medications:  acetaminophen   Tablet .. 650 milliGRAM(s) Oral every 6 hours PRN  albuterol/ipratropium for Nebulization 3 milliLiter(s) Nebulizer every 4 hours PRN  albuterol/ipratropium for Nebulization 3 milliLiter(s) Nebulizer three times a day  amLODIPine   Tablet 10 milliGRAM(s) Oral daily  ARIPiprazole 10 milliGRAM(s) Oral at bedtime  atorvastatin 40 milliGRAM(s) Oral at bedtime  dextrose 40% Gel 15 Gram(s) Oral once  dextrose 5%. 1000 milliLiter(s) IV Continuous <Continuous>  dextrose 5%. 1000 milliLiter(s) IV Continuous <Continuous>  dextrose 50% Injectable 25 Gram(s) IV Push once  dextrose 50% Injectable 12.5 Gram(s) IV Push once  dextrose 50% Injectable 25 Gram(s) IV Push once  folic acid 1 milliGRAM(s) Oral daily  furosemide   Injectable 40 milliGRAM(s) IV Push daily  glucagon  Injectable 1 milliGRAM(s) IntraMuscular once  heparin   Injectable 5000 Unit(s) SubCutaneous every 8 hours  hydrALAZINE 25 milliGRAM(s) Oral every 8 hours  insulin lispro (ADMELOG) corrective regimen sliding scale   SubCutaneous three times a day before meals  insulin lispro (ADMELOG) corrective regimen sliding scale   SubCutaneous at bedtime  ondansetron Injectable 4 milliGRAM(s) IV Push every 6 hours PRN  pantoprazole    Tablet 40 milliGRAM(s) Oral before breakfast      FAMILY HISTORY:  Family history of pancreatic cancer (Sibling)    Family history of early CAD    Family history of diabetes mellitus        SOCIAL HISTORY:  Smoking:x     ETOH: x    Drug Use: x      T(F): 98.5 (06-23-21 @ 12:00), Max: 100.3 (06-23-21 @ 06:00)  HR: 81 (06-23-21 @ 13:00)  BP: 127/63 (06-23-21 @ 13:00)  RR: 19 (06-23-21 @ 13:00)  SpO2: 89% (06-23-21 @ 13:00)  Wt(kg): --    PHYSICAL EXAM:  General: alert, mild respiratory distress, short stature  Eyes:  anicteric, no conjunctival injection, no discharge  Oropharynx: no lesions or injection 	  Neck: supple, without adenopathy  Lungs: clear to auscultation, decreased at bases  Heart: regular rate and rhythm; no murmur, rubs or gallops  Abdomen: soft, nondistended, nontender, without mass or organomegaly  Skin: no lesions  Extremities: no clubbing, cyanosis, or edema  Neurologic: alert, oriented, moves all extremities    LAB RESULTS:                        7.8    6.51  )-----------( 193      ( 23 Jun 2021 06:00 )             23.3     06-23    130<L>  |  92<L>  |  30<H>  ----------------------------<  142<H>  4.2   |  34<H>  |  1.72<H>    Ca    8.8      23 Jun 2021 06:00  Phos  4.5     06-23  Mg     1.4     06-23      PC .16      MICROBIOLOGY:  RECENT CULTURES:        RADIOLOGY REVIEWED:  < from: CT Chest No Cont (06.22.21 @ 12:51) >  IMPRESSION:  Right upper lobe peripheral opacities, possibly infectious.  Bilateral lower lobe consolidations, which may represent atelectasis and/or pneumonia.  Small bilateral pleural effusions.    < end of copied text >  < from: CT Head No Cont (06.21.21 @ 10:08) >  INTERPRETATION:  CT brain without contrast    History intracranial hemorrhage    Comparison 20 days prior    There is complete resolution of intracranial hemorrhage without new hemorrhage or cortical edema, mass effect or hydrocephalus. A ventricular shunt remains in place with stable ventricular size. There are stable chronic ischemic changes in the cerebellar hemispheres, worse on the left.    IMPRESSION:  No acute abnormality    < end of copied text >  < from: NM Pulmonary Perfusion Scan (06.21.21 @ 10:02) >    IMPRESSION: Abnormal perfusion lung scan. Indeterminate probability of pulmonary embolus.    Dr. KELSI Xavier was informed of these results by Dr. ANUJ Rogers with read back at about 10:05 AM on 6/21/2021.    < end of copied text >  < from: US Duplex Venous Lower Ext Complete, Bilateral (06.21.21 @ 08:32) >  MPRESSION:  No evidence of deep venous thrombosis in either lower extremity.    < end of copied text >  < from: US Renal (06.09.21 @ 22:45) >  IMPRESSION:    Normal renal ultrasound.    < end of copied text >  < from: TTE Echo Limited or F/U (06.22.21 @ 13:30) >    Summary:   1. Left ventricular ejection fraction, by visual estimation, is 65 to 70%.   2. Normal global left ventricular systolic function.   3. Increased LV wall thickness.   4. Normal left ventricular internal cavity size.   5. Spectral Doppler shows restrictive pattern of left ventricular myocardial filling (Grade III diastolic dysfunction).   6. There is mild concentric left ventricular hypertrophy.   7. Mild mitral annular calcification.   8. Mild mitral valve regurgitation.   9. Mild tricuspid regurgitation.  10. Mild pulmonic valve regurgitation.  11. Estimated pulmonary artery systolic pressure is 41.1 mmHg assuming a right atrial pressure of 10 mmHg, which is consistent with mild pulmonary hypertension.  12. Dvi by tvi is .35 argues against severe aortic stenosis.  13. The 2 d appearance appears to be moderate aortic stenosis.  14. Compared with a prior echo 6/9/21 there is no significant change.  15. Peak transaortic gradient equals 14.9 mmHg, mean transaortic gradient equals 8.9 mmHg, the calculated aortic valve area equals 1.33 cm² by the co    < end of copied text >

## 2021-06-23 NOTE — DIETITIAN INITIAL EVALUATION ADULT. - PERTINENT MEDS FT
MEDICATIONS  (STANDING):  albuterol/ipratropium for Nebulization 3 milliLiter(s) Nebulizer three times a day  amLODIPine   Tablet 10 milliGRAM(s) Oral daily  ARIPiprazole 10 milliGRAM(s) Oral at bedtime  atorvastatin 40 milliGRAM(s) Oral at bedtime  aztreonam  IVPB      aztreonam  IVPB 2000 milliGRAM(s) IV Intermittent every 8 hours  dextrose 40% Gel 15 Gram(s) Oral once  dextrose 5%. 1000 milliLiter(s) (50 mL/Hr) IV Continuous <Continuous>  dextrose 5%. 1000 milliLiter(s) (100 mL/Hr) IV Continuous <Continuous>  dextrose 50% Injectable 25 Gram(s) IV Push once  dextrose 50% Injectable 12.5 Gram(s) IV Push once  dextrose 50% Injectable 25 Gram(s) IV Push once  folic acid 1 milliGRAM(s) Oral daily  furosemide   Injectable 40 milliGRAM(s) IV Push daily  glucagon  Injectable 1 milliGRAM(s) IntraMuscular once  heparin   Injectable 5000 Unit(s) SubCutaneous every 8 hours  hydrALAZINE 25 milliGRAM(s) Oral every 8 hours  insulin lispro (ADMELOG) corrective regimen sliding scale   SubCutaneous three times a day before meals  insulin lispro (ADMELOG) corrective regimen sliding scale   SubCutaneous at bedtime  pantoprazole    Tablet 40 milliGRAM(s) Oral before breakfast    MEDICATIONS  (PRN):  acetaminophen   Tablet .. 650 milliGRAM(s) Oral every 6 hours PRN Temp greater or equal to 38C (100.4F), Mild Pain (1 - 3)  albuterol/ipratropium for Nebulization 3 milliLiter(s) Nebulizer every 4 hours PRN Shortness of Breath and/or Wheezing  ondansetron Injectable 4 milliGRAM(s) IV Push every 6 hours PRN Nausea and/or Vomiting

## 2021-06-23 NOTE — PROGRESS NOTE ADULT - SUBJECTIVE AND OBJECTIVE BOX
Follow up for dyspnea  SUBJ: patient appears comfortable but states she is sob and O2 saturation is low.  PMH  Hypertension    Hyperlipidemia    Diabetes    Glaucoma    Osteoarthritis    Small bowel obstruction        MEDICATIONS  (STANDING):  albuterol/ipratropium for Nebulization 3 milliLiter(s) Nebulizer three times a day  amLODIPine   Tablet 10 milliGRAM(s) Oral daily  ARIPiprazole 10 milliGRAM(s) Oral at bedtime  atorvastatin 40 milliGRAM(s) Oral at bedtime  cefepime   IVPB 2000 milliGRAM(s) IV Intermittent every 24 hours  dextrose 40% Gel 15 Gram(s) Oral once  dextrose 5%. 1000 milliLiter(s) (50 mL/Hr) IV Continuous <Continuous>  dextrose 5%. 1000 milliLiter(s) (100 mL/Hr) IV Continuous <Continuous>  dextrose 50% Injectable 25 Gram(s) IV Push once  dextrose 50% Injectable 12.5 Gram(s) IV Push once  dextrose 50% Injectable 25 Gram(s) IV Push once  folic acid 1 milliGRAM(s) Oral daily  furosemide   Injectable 40 milliGRAM(s) IV Push daily  glucagon  Injectable 1 milliGRAM(s) IntraMuscular once  heparin   Injectable 5000 Unit(s) SubCutaneous every 8 hours  hydrALAZINE 25 milliGRAM(s) Oral every 8 hours  insulin lispro (ADMELOG) corrective regimen sliding scale   SubCutaneous three times a day before meals  insulin lispro (ADMELOG) corrective regimen sliding scale   SubCutaneous at bedtime  pantoprazole    Tablet 40 milliGRAM(s) Oral before breakfast    MEDICATIONS  (PRN):  acetaminophen   Tablet .. 650 milliGRAM(s) Oral every 6 hours PRN Temp greater or equal to 38C (100.4F), Mild Pain (1 - 3)  albuterol/ipratropium for Nebulization 3 milliLiter(s) Nebulizer every 4 hours PRN Shortness of Breath and/or Wheezing  ondansetron Injectable 4 milliGRAM(s) IV Push every 6 hours PRN Nausea and/or Vomiting        PHYSICAL EXAM:  Vital Signs Last 24 Hrs  T(C): 36.9 (23 Jun 2021 12:00), Max: 37.9 (23 Jun 2021 06:00)  T(F): 98.5 (23 Jun 2021 12:00), Max: 100.3 (23 Jun 2021 06:00)  HR: 81 (23 Jun 2021 13:00) (66 - 91)  BP: 127/63 (23 Jun 2021 13:00) (88/44 - 135/60)  BP(mean): 84 (23 Jun 2021 13:00) (59 - 109)  RR: 19 (23 Jun 2021 13:00) (13 - 21)  SpO2: 89% (23 Jun 2021 13:00) (86% - 97%)    GENERAL: NAD, well-groomed, well-developed  HEAD:  Atraumatic, Normocephalic  EYES: EOMI, PERRLA, conjunctiva and sclera clear  ENT: Moist mucous membranes,  NECK: Supple, No JVD, no bruits  CHEST/LUNG: decreased bs bilaterally  HEART: Regular rate and rhythm; No murmurs, rubs, or gallops PMI non displaced.  ABDOMEN: Soft, Nontender, Nondistended; Bowel sounds present  EXTREMITIES:  2+ Peripheral Pulses, No clubbing, cyanosis, or edema  SKIN: No rashes or lesions  NERVOUS SYSTEM:  Alert & Oriented X3, Good concentration; Motor Strength 5/5 B/L upper and lower extremities; DTRs 2+ intact and symmetric      TELEMETRY:rsr    ECG:    LABS:                        7.8    6.51  )-----------( 193      ( 23 Jun 2021 06:00 )             23.3     06-23    130<L>  |  92<L>  |  30<H>  ----------------------------<  142<H>  4.2   |  34<H>  |  1.72<H>    Ca    8.8      23 Jun 2021 06:00  Phos  4.5     06-23  Mg     1.4     06-23      CARDIAC MARKERS ( 23 Jun 2021 10:30 )  .055 ng/mL / x     / x     / x     / x              I&O's Summary    22 Jun 2021 07:01  -  23 Jun 2021 07:00  --------------------------------------------------------  IN: 350 mL / OUT: 1600 mL / NET: -1250 mL    23 Jun 2021 07:01  -  23 Jun 2021 14:36  --------------------------------------------------------  IN: 200 mL / OUT: 1000 mL / NET: -800 mL      BNPSerum Pro-Brain Natriuretic Peptide: 2026 pg/mL (06-23 @ 10:30)      RADIOLOGY & ADDITIONAL STUDIES:    ECHO:

## 2021-06-23 NOTE — OCCUPATIONAL THERAPY INITIAL EVALUATION ADULT - GENERAL OBSERVATIONS, REHAB EVAL
Pt received semi-supine in bed with all lines intact (IV line, EKG lines,  monitor for BP/HR/O2/RR), on 5L NCo2. + tremors noted bilaterally

## 2021-06-23 NOTE — CONSULT NOTE ADULT - ASSESSMENT
82 yo female with DVT on eliquis, NPH s/p VPS in 2015,DM,Bipolar disorder, paralyzed hemidiaphragm,HFpEF,recent basal ganglia bleed which was managed conservatively with d/c of eliquis, transferred to [facility]- in early June, who has developed progressive respiratory difficulties with hypoxia which led to [facility] transfer on 6/22.  She received CTX and cephalexin for klebsiella UTI in early June.She has had episodes of confusion.She has been O2 dependant.Echo with diastolic dysfunction, serial chest CT scans with atelectasis and possible pneumonia.  She was given aztreonam today for pneumonia coverage.  She was hospitalized in [facility] last year.T max has been 100.3, no sputum production,PC is .16  I think her respiratory issues are multifactorial-diaphragmatic dysfunction, atelectasis, and diastolic heart failure.There could be a component of pneumonia although with a normal wbc and PC of .16 I suspect most of her CT findings are more reflective of atelectasis although I think we should cover for pneumonia as well.  Suggest:  1.Will cover with  cefepime-she tolerates cephalosporins  2.D/C aztreonam  3.MRSA nasal screen  4.Blood culture any fever spikes  5.PPV as per critical care

## 2021-06-23 NOTE — PROGRESS NOTE ADULT - ASSESSMENT
Assessment  Acute hypoxic and hypercarbic respiratory failure likely due to underlying pneumonia vs Atelectasis vs acute on chronic diastolic CHF  h/o DVT in past, unsure if active vte at this time, patient high risk for a/c   ckd 3 as per renal  Underlying HTN, DM2, Bipolar d/o      Plan  Taper o2 to n/c  NIV overnight, n/c as tolerated in day  Incentive spirometry  ABX  ID eval called  Incentive spirometry  PT/OT/OOB  renal fu  Cardio f/u  continue diuresis.     Check US Duplex le, Check DDImer     Continue current rx  Family Updated: 	Sarah [phone]	                                 Date: 6/23/21  states has paralyzed hemidiaphragm Dx 8 months ago  - was seeing Pulm MD.    Sedation & Analgesia:	n/a  Diet/Nutrition:		oral deit  GI PPx:			PPI    DVT Ppx:		Hep sq  	RISK                                                          Points  	[ x] Previous VTE                                           	3  	[ ] Thrombophilia                                        	2  	[ ] Lower limb paralysis                              	2   	[ ] Current Cancer                                       	2   	[ ] Immobilization > 24 hrs                        	1  	[ x] ICU/CCU stay > 24 hours                       	1  	[x ] Age > 60                                                   	1  		Total:[5 ]      Activity:		advance as tolerated                 Head of Bed:               35-45  Glycemic Control:        Insulin    Lines: PIV  Restraints were deemed necessary to prevent removal of life-sustaining devices [  ] YES   [    ]  NO    Disposition: ICU monitoring    Goals of Care: Full code

## 2021-06-23 NOTE — DIETITIAN INITIAL EVALUATION ADULT. - PERTINENT LABORATORY DATA
Date: 23 Jun 2021 06:00  Hemoglobin 7.8    Hematocrit 23.3     Date: 06-23  Sodium 130<L>  Potassium 4.2  Glucose Serum 142<H>  BUN 30<H>  Creatinine 1.72<H>    ACCUCHECK  POCT Blood Glucose.: 220 mg/dL (23 Jun 2021 11:04)  POCT Blood Glucose.: 146 mg/dL (23 Jun 2021 07:37)  POCT Blood Glucose.: 190 mg/dL (22 Jun 2021 21:07)  POCT Blood Glucose.: 170 mg/dL (22 Jun 2021 17:00)

## 2021-06-23 NOTE — PROGRESS NOTE ADULT - SUBJECTIVE AND OBJECTIVE BOX
F/u Note:    81F admitted with PNA/CHDF exacerbation  -transfeered to ICU overnight for borderline hypercapnea and overnight need of NIPPV  -now off, toelrating NC     ICU Vital Signs Last 24 Hrs  T(C): 36.9 (23 Jun 2021 16:00), Max: 37.9 (23 Jun 2021 06:00)  T(F): 98.5 (23 Jun 2021 16:00), Max: 100.3 (23 Jun 2021 06:00)  HR: 81 (23 Jun 2021 20:00) (66 - 91)  BP: 103/63 (23 Jun 2021 20:00) (88/44 - 131/65)  BP(mean): 76 (23 Jun 2021 20:00) (59 - 109)  RR: 18 (23 Jun 2021 20:00) (13 - 31)  SpO2: 90% (23 Jun 2021 20:00) (70% - 96%)                            7.8    6.51  )-----------( 193      ( 23 Jun 2021 06:00 )             23.3     06-23    130<L>  |  92<L>  |  30<H>  ----------------------------<  142<H>  4.2   |  34<H>  |  1.72<H>    Ca    8.8      23 Jun 2021 06:00  Phos  4.5     06-23  Mg     1.4     06-23          PHYSICAL EXAM:      NEURO: awake and alert  CV: (+) S1/S2, rrr, no mrg  RESP: CTA b/l  GI: soft, non tender           PLAN:  -off BiPAP  -maitanin NC goal Sao2 >90%, wean to room air as able overnight  -will need to finish course of cefepime for underlying PNA  --daily lasix, follow lytes while diuresis, goal K+ 4-4.5  -heparin subq forDVT prophylaxis   -goal euglycemia with  ISS  BP control  -f/u am labs

## 2021-06-23 NOTE — OCCUPATIONAL THERAPY INITIAL EVALUATION ADULT - BED MOBILITY TRAINING, PT EVAL
Pt. will improve bed mobility sit-to/from supine to minimum assistance, + bedrail within 3-5 sessions.

## 2021-06-23 NOTE — PHYSICAL THERAPY INITIAL EVALUATION ADULT - ADDITIONAL COMMENTS
pt is a poor historian, was ambulating w/rolling walker and assistance in rehab per dtr. pt ambulated independent per chart pta

## 2021-06-23 NOTE — PROGRESS NOTE ADULT - ASSESSMENT
Imp    Probable mulifactorial etiology to dyspnea. given current lack of tachypnea associated with low O2 sat doubt significant component of chf

## 2021-06-23 NOTE — PROGRESS NOTE ADULT - SUBJECTIVE AND OBJECTIVE BOX
Addendum to H&P/ICU Consult note  - Patient seen and examined today    ICU CONSULT  Location of Patient :  [facility] [id number] ( [facility])  Attending requesting Consult:Vivek Koehler  Chief Complaint : Hypoxia  Reason For consult : Respiratory distress.       Initial HPI on admission:  HPI:  81 year old female w/ PMH of DVT (h/o of Eliquis), NIDDM, glaucoma, HLD, HTN, hs of NPH s/p  shunt 2015, recently hospitalized for ICH (05/29/21) - small right basal ganglia hemorrhage.  Eliquis was discontinued, was seen by neurosurgery and advised no interventions necessary.  Pt was d/brittani to [facility] on 06/09/21.  Leg doppler from 06/09/21 neg. Pt was also treated recently for UTI.    on 6/21 patient  noted to have increased dyspnea, was hypoxic, O2 sat mid 80's to low 90's, required 100% NRBM.  Pt apparently had decreasing Na for the past few days and was started on NS today.  Cxray shows fluid overload, (venous congestion, bilat pl effusions). IVF was d/brittani and pt was given Lasix 40 mg IVP x 1. Pt denies chest pain, however remained tachypneic.  O2 sat on 100% NRBM is 92-93%. Pt is afebrile.  She has an occ dry cough, denies abd pain, she has occ nausea.  EKG showed NSR 85/min.  Stat labs ordered and pt transferred to telemetry for closer monitoring.     on 6/22/21 patient noted to have increased AMS brought to ICU For closer monitoring and management vai bipap.     BRIEF HOSPITAL COURSE:  patient this am feels well  complain of weakness  no cp, sob, palp, n/v    was able to be taken off NRB and placed on N/C 5 L and maintaining sats 88-92%    PAST MEDICAL & SURGICAL HISTORY:  Hypertension  Hyperlipidemia  Diabetes  Glaucoma  Osteoarthritis  Small bowel obstruction  S/P hysterectomy 1981 and Oopherectomy in 1990s  S/P cholecystectomy 1981  Achilles tendon injury  repair  right scalene muscle release  S/P knee replacement, left      Allergies    adhesives (Pruritus; Blisters)  penicillin (Hives)  pineapple (Anaphylaxis)    Intolerances      FAMILY HISTORY:  Family history of pancreatic cancer (Sibling)    Family history of early CAD    Family history of diabetes mellitus      Social history: Social History:  non smoker, non etoh (21 Jun 2021 05:14)    Review of Systems: as stated above    CONSTITUTIONAL: No fever, No chills, +atigue  EYES: No eye pain, No visual disturbances, No discharge  ENMT:  No difficulty hearing, No tinnitus, No vertigo; No sinus or throat pain  NECK: No pain, No stiffness  RESPIRATORY: No Cough, +OB, No Secretions  CARDIOVASCULAR: No chest pain, No palpitations, No dizziness, or No leg swelling  GASTROINTESTINAL: No abdominal or epigastric pain. +ausea, No vomiting, No hematemesis; No diarrhea, No constipation. No melena, No hematochezia.  GENITOURINARY: No dysuria, No frequency, No hematuria, No incontinence  NEUROLOGICAL: No headaches, No memory loss, No loss of strength, No numbness, No tremors  SKIN: No itching, No burning, No rashes, No lesions   MUSCULOSKELETAL: No joint pain or swelling; No muscle, back, No extremity pain  PSYCHIATRIC: No depression, No anxiety, No mood swings, No difficulty sleeping      Medications:  MEDICATIONS  (STANDING):  albuterol/ipratropium for Nebulization 3 milliLiter(s) Nebulizer three times a day  amLODIPine   Tablet 10 milliGRAM(s) Oral daily  ARIPiprazole 10 milliGRAM(s) Oral at bedtime  atorvastatin 40 milliGRAM(s) Oral at bedtime  aztreonam  IVPB      aztreonam  IVPB 2000 milliGRAM(s) IV Intermittent every 8 hours  dextrose 40% Gel 15 Gram(s) Oral once  dextrose 5%. 1000 milliLiter(s) (50 mL/Hr) IV Continuous <Continuous>  dextrose 5%. 1000 milliLiter(s) (100 mL/Hr) IV Continuous <Continuous>  dextrose 50% Injectable 25 Gram(s) IV Push once  dextrose 50% Injectable 12.5 Gram(s) IV Push once  dextrose 50% Injectable 25 Gram(s) IV Push once  folic acid 1 milliGRAM(s) Oral daily  furosemide   Injectable 40 milliGRAM(s) IV Push daily  glucagon  Injectable 1 milliGRAM(s) IntraMuscular once  hydrALAZINE 25 milliGRAM(s) Oral every 8 hours  insulin lispro (ADMELOG) corrective regimen sliding scale   SubCutaneous three times a day before meals  insulin lispro (ADMELOG) corrective regimen sliding scale   SubCutaneous at bedtime  pantoprazole    Tablet 40 milliGRAM(s) Oral before breakfast    MEDICATIONS  (PRN):  acetaminophen   Tablet .. 650 milliGRAM(s) Oral every 6 hours PRN Temp greater or equal to 38C (100.4F), Mild Pain (1 - 3)  albuterol/ipratropium for Nebulization 3 milliLiter(s) Nebulizer every 4 hours PRN Shortness of Breath and/or Wheezing  ondansetron Injectable 4 milliGRAM(s) IV Push every 6 hours PRN Nausea and/or Vomiting      Home Medications:  Last Order Reconciliation Date: 06-21-21 (Admission Reconciliation)  acetaminophen 325 mg oral tablet: 2 tab(s) orally every 6 hours, As needed, Temp greater or equal to 38C (100.4F), Mild Pain (1 - 3), Moderate Pain (4 - 6) (06-21-21)  albuterol 90 mcg/inh inhalation aerosol: 2 puff(s) inhaled every 6 hours (06-21-21)  amLODIPine 10 mg oral tablet: 1 tab(s) orally once a day (06-21-21)  ARIPiprazole 10 mg oral tablet: 1 tab(s) orally once a day (at bedtime) (06-21-21)  atorvastatin 40 mg oral tablet: 1 tab(s) orally once a day (at bedtime) (06-21-21)  chlorhexidine 0.12% mucous membrane liquid: 15 milliliter(s) mucous membrane 2 times a day (06-21-21)  fluticasone 50 mcg/inh nasal spray: 1 spray(s) nasal 2 times a day (06-21-21)  fluvoxaMINE 100 mg oral tablet: 1 tab(s) orally once a day (at bedtime) (06-21-21)  folic acid 1 mg oral tablet: 1 tab(s) orally once a day (06-21-21)  hydrALAZINE 25 mg oral tablet: 1 tab(s) orally every 8 hours (06-21-21)  metFORMIN 500 mg oral tablet: 1 tab(s) orally 2 times a day (06-21-21)  nystatin 100,000 units/g topical powder: 1 application topically 2 times a day (06-21-21)  ondansetron 4 mg oral tablet: 1 tab(s) orally every 8 hours, As needed, Nausea and/or Vomiting (06-21-21)        LABS:                        7.8    6.51  )-----------( 193      ( 23 Jun 2021 06:00 )             23.3     06-23    130<L>  |  92<L>  |  30<H>  ----------------------------<  142<H>  4.2   |  34<H>  |  1.72<H>    Ca    8.8      23 Jun 2021 06:00  Phos  4.5     06-23  Mg     1.4     06-23     COVID  06-20-21 @ 20:20  COVID -   NotDetec  06-12-21 @ 05:45  COVID -   NotDetec  06-05-21 @ 18:40  COVID -   NotDetec  05-28-21 @ 13:40  COVID -   NotDetec  08-25-20 @ 21:45  COVID -   NotDetec      COVID Biomarkers    06-22-21 @ 05:45 ESR --  ---  CRP --  ---  DDimer  --   ---   LDH --   ---   Ferritin 112    06-21-21 @ 09:14 ESR --  ---  CRP --  ---  DDimer  463<H>   ---   LDH --   ---   Ferritin --    06-10-21 @ 06:00 ESR --  ---  CRP --  ---  DDimer  1030<H>   ---   LDH --   ---   Ferritin --            Trend Cardiac Enzymes  06-21-21 @ 09:14  XHE-LMDZU-PFEQY-CPKMM/Trop I - -- - --  - --  -  --  /  .060<H>  06-21-21 @ 03:21  ILR-TFOYR-ZZEHO-CPKMM/Trop I - -- - --  - --  -  --  /  .064<H>    Trend BNP  06-19-21 @ 06:51   -  643<H>  06-08-21 @ 05:40   -  572<H>    Procalcitonin Trend  06-22-21 @ 05:45   -   0.16<H>  06-19-21 @ 06:51   -   0.08    WBC Trend  06-23-21 @ 06:00   -  6.51  06-22-21 @ 05:45   -  6.23  06-21-21 @ 06:30   -  6.34  06-21-21 @ 03:21   -  8.17    H/H Trend  06-23-21 @ 06:00   -   7.8<L>/ 23.3<L>  06-22-21 @ 05:45   -   8.3<L>/ 25.1<L>  06-21-21 @ 06:30   -   8.6<L>/ 26.2<L>  06-21-21 @ 03:21   -   9.3<L>/ 28.5<L>  06-19-21 @ 06:51   -   9.4<L>/ 28.2<L>  06-17-21 @ 06:00   -   9.4<L>/ 29.4<L>    Stool Occult Blood    Platelet Trend  06-23-21 @ 06:00   -  193  06-22-21 @ 05:45   -  205  06-21-21 @ 06:30   -  213  06-21-21 @ 03:21   -  219    Trend Sodium  06-23-21 @ 06:00   -  130<L>  06-22-21 @ 05:45   -  126<L>  06-21-21 @ 09:14   -  126<L>  06-21-21 @ 06:30   -  125<L>  06-21-21 @ 03:21   -  124<L>    Trend Potassium  06-23-21 @ 06:00   -  4.2  06-22-21 @ 05:45   -  4.8  06-21-21 @ 09:14   -  5.4<H>  06-21-21 @ 06:30   -  5.2  06-21-21 @ 03:21   -  6.1<H>    Trend Bun/Cr  06-23-21 @ 06:00  BUN/CR -  30<H> / 1.72<H>  06-22-21 @ 05:45  BUN/CR -  28<H> / 1.63<H>  06-21-21 @ 09:14  BUN/CR -  30<H> / 1.55<H>  06-21-21 @ 06:30  BUN/CR -  29<H> / 1.59<H>  06-21-21 @ 03:21  BUN/CR -  31<H> / 1.50<H>    Lactic Acid Trend    ABG Trend  06-22-21 @ 20:15   - 7.34<L>/59<H>/91/96  06-21-21 @ 03:30   - 7.31<L>/55<H>/104/98<H>    Trend AST/ALT/ALK Phos/Bili  06-21-21 @ 06:30   25/25/90/0.3  06-19-21 @ 06:51   22/21/86/0.3  06-17-21 @ 06:00   18/20/91/0.3  06-14-21 @ 06:15   18/19/89/0.2  06-10-21 @ 06:00   16/14/72/0.1<L>  06-03-21 @ 06:20   13/8/56/0.3<L>  06-01-21 @ 07:15   14/8/69/0.4  05-31-21 @ 10:33   15/8/72/0.4  05-30-21 @ 07:53   13/7/75/0.5  05-28-21 @ 13:39   15/7/80/0.4  08-25-20 @ 21:40   17/13/62/0.4  02-16-20 @ 18:45   24/15/70/0.4           Albumin Trend  06-21-21 @ 06:30   -   2.9<L>  06-19-21 @ 06:51   -   2.8<L>  06-17-21 @ 06:00   -   2.7<L>  06-14-21 @ 06:15   -   2.6<L>  06-10-21 @ 06:00   -   2.4<L>  06-03-21 @ 06:20   -   3.1<L>      PTT - PT - INR Trend  06-21-21 @ 09:14   -   31.3 - 11.7 - 0.97  05-30-21 @ 07:52   -   31.0 - 14.1<H> - 1.23<H>  05-28-21 @ 21:48   -   33.7 - 20.9<H> - 1.86<H>  05-28-21 @ 13:39   -   36.3<H> - 25.4<H> - 2.28<H>    Glucose Trend  06-23-21 @ 07:37   -  -- -- 146<H>  06-23-21 @ 06:00   -  -- 142<H> --  06-22-21 @ 21:07   -  -- -- 190<H>  06-22-21 @ 17:00   -  -- -- 170<H>  06-22-21 @ 13:05   -  -- -- 196<H>  06-22-21 @ 05:45   -  -- 136<H> --  06-21-21 @ 12:00   -  -- -- 140<H>  06-21-21 @ 09:14   -  -- 122<H> --  06-21-21 @ 07:25   -  -- -- 178<H>  06-21-21 @ 06:30   -  -- 212<H> --    A1C with Estimated Average Glucose Result: 7.7 % *H* [4.0 - 5.6] (05-30-21 @ 07:52)    RADIOLOGY  CT:    < from: CT Chest No Cont (06.22.21 @ 12:51) >    EXAM:  CT CHEST      PROCEDURE DATE:  06/22/2021        INTERPRETATION:  CLINICAL INFORMATION: Hypoxia    COMPARISON: 6/9/2021.    CONTRAST/COMPLICATIONS:  IV Contrast: NONE  Oral Contrast: NONE  Complications: None reported at time of study completion    PROCEDURE:  CT of the Chest was performed.  Sagittal and coronal reformats were performed.    FINDINGS:    LUNGS AND AIRWAYS: Patent central airways.  Bilateral lower lobe consolidations. Bilateral regions of atelectasis. Right upper lobe peripheral opacities.  PLEURA: Small bilateral pleural effusions.  MEDIASTINUM AND LASHONDA: Several prominent lymph nodes.  VESSELS: Atherosclerotic calcifications of thoracic aorta and coronary arteries.  HEART: Enlarged. Aortic valve calcifications. No pericardial effusion.  CHEST WALL AND LOWER NECK: Left anterior chest wall cardiac loop recorder.  VISUALIZED UPPER ABDOMEN: Cholecystectomy. Ventriculoperitoneal shunt catheter. Atrophic pancreas.  BONES: Degenerative changes.    IMPRESSION:  Right upper lobe peripheral opacities, possibly infectious.  Bilateral lower lobe consolidations, which may represent atelectasis and/or pneumonia.  Small bilateral pleural effusions.    < end of copied text >  < from: CT Head No Cont (06.21.21 @ 10:08) >    IMPRESSION:  No acute abnormality    < end of copied text >    ECHO:  < from: TTE Echo Limited or F/U (06.22.21 @ 13:30) >    Summary:   1. Left ventricular ejection fraction, by visual estimation, is 65 to 70%.   2. Normal global left ventricular systolic function.   3. Increased LV wall thickness.   4. Normal left ventricular internal cavity size.   5. Spectral Doppler shows restrictive pattern of left ventricular myocardial filling (Grade III diastolic dysfunction).   6. There is mild concentric left ventricular hypertrophy.   7. Mild mitral annular calcification.   8. Mild mitral valve regurgitation.   9. Mild tricuspid regurgitation.  10. Mild pulmonic valve regurgitation.  11. Estimated pulmonary artery systolic pressure is 41.1 mmHg assuming a right atrial pressure of 10 mmHg, which is consistent with mild pulmonary hypertension.  12. Dvi by tvi is .35 argues against severe aortic stenosis.  13. The 2 d appearance appears to be moderate aortic stenosis.  14. Compared with a prior echo 6/9/21 there is no significant change.  15. Peak transaortic gradient equals 14.9 mmHg, mean transaortic gradient equals 8.9 mmHg, the calculated aortic valve area equals 1.33 cm² by the continuity equation consistent with moderate aortic stenosis.    [id number] Frantz Christine , Electronically signed on 6/22/2021 at 2:41:43 PM        < end of copied text >    US  < from: US Duplex Venous Lower Ext Complete, Bilateral (06.08.21 @ 18:06) >    IMPRESSION:  No evidence of deep venous thrombosis in either lower extremity.        < end of copied text >    NM:  < from: NM Pulmonary Perfusion Scan (06.21.21 @ 10:02) >  IMPRESSION: Abnormal perfusion lung scan. Indeterminate probability of pulmonary embolus.    < end of copied text >        VITALS:  T(C): 36.7 (06-23-21 @ 08:00), Max: 37.9 (06-23-21 @ 06:00)  T(F): 98 (06-23-21 @ 08:00), Max: 100.3 (06-23-21 @ 06:00)  HR: 80 (06-23-21 @ 08:58) (66 - 84)  BP: 120/51 (06-23-21 @ 07:00) (88/44 - 135/60)  BP(mean): 71 (06-23-21 @ 07:00) (59 - 87)  ABP: --  ABP(mean): --  RR: 15 (06-23-21 @ 07:00) (13 - 19)  SpO2: 90% (06-23-21 @ 08:58) (90% - 97%)  CVP(mm Hg): --  CVP(cm H2O): --    Ins and Outs     06-22-21 @ 07:01  -  06-23-21 @ 07:00  --------------------------------------------------------  IN: 350 mL / OUT: 1600 mL / NET: -1250 mL    06-23-21 @ 07:01  -  06-23-21 @ 09:33  --------------------------------------------------------  IN: 100 mL / OUT: 400 mL / NET: -300 mL        Height (cm): 147.3 (06-21-21 @ 04:38), 147.3 (06-21-21 @ 04:31)  Weight (kg): 88 (06-23-21 @ 00:00), 79.8 (06-21-21 @ 04:31)  BMI (kg/m2): 40.6 (06-23-21 @ 00:00), 36.8 (06-21-21 @ 04:38), 36.8 (06-21-21 @ 04:31)        I&O's Detail    22 Jun 2021 07:01  -  23 Jun 2021 07:00  --------------------------------------------------------  IN:    IV PiggyBack: 100 mL    IV PiggyBack: 250 mL  Total IN: 350 mL    OUT:    Voided (mL): 1600 mL  Total OUT: 1600 mL    Total NET: -1250 mL      23 Jun 2021 07:01 - 23 Jun 2021 09:33  --------------------------------------------------------  IN:    IV PiggyBack: 100 mL  Total IN: 100 mL    OUT:    Voided (mL): 400 mL  Total OUT: 400 mL    Total NET: -300 mL          Physical Examination:  GENERAL:               Alert, Oriented, No acute distress.    HEENT:                    No JVD, Moist MM  PULM:                     Bilateral air entry, Clear to auscultation bilaterally, no significant sputum production, No Rales, No Rhonchi, No Wheezing  CVS:                         S1, S2,  +urmur  ABD:                        Soft, nondistended, nontender, normoactive bowel sounds,   EXT:                         mild Edema , nontender, No Cyanosis or Clubbing   Vascular:                Warm Extremities, Normal Capillary refill, Normal Distal Pulses  SKIN:                       Warm and well perfused, no rashes noted.   NEURO:                  Alert, oriented, interactive, nonfocal, follows commands  PSYC:                      Calm, + Insight.

## 2021-06-23 NOTE — PHYSICAL THERAPY INITIAL EVALUATION ADULT - GENERAL OBSERVATIONS, REHAB EVAL
Pt received semi-supine in bed with all lines intact, vss per monitor, on 3L NCo2. nad, a+ox2, dtr present

## 2021-06-24 LAB
ALBUMIN SERPL ELPH-MCNC: 2.7 G/DL — LOW (ref 3.3–5)
ALP SERPL-CCNC: 73 U/L — SIGNIFICANT CHANGE UP (ref 40–120)
ALT FLD-CCNC: 20 U/L — SIGNIFICANT CHANGE UP (ref 10–45)
ANION GAP SERPL CALC-SCNC: 5 MMOL/L — SIGNIFICANT CHANGE UP (ref 5–17)
ANION GAP SERPL CALC-SCNC: 5 MMOL/L — SIGNIFICANT CHANGE UP (ref 5–17)
AST SERPL-CCNC: 23 U/L — SIGNIFICANT CHANGE UP (ref 10–40)
BASOPHILS # BLD AUTO: 0.07 K/UL — SIGNIFICANT CHANGE UP (ref 0–0.2)
BASOPHILS NFR BLD AUTO: 0.8 % — SIGNIFICANT CHANGE UP (ref 0–2)
BILIRUB SERPL-MCNC: 0.4 MG/DL — SIGNIFICANT CHANGE UP (ref 0.2–1.2)
BLOOD GAS COMMENTS ARTERIAL: SIGNIFICANT CHANGE UP
BUN SERPL-MCNC: 28 MG/DL — HIGH (ref 7–23)
BUN SERPL-MCNC: 29 MG/DL — HIGH (ref 7–23)
CALCIUM SERPL-MCNC: 8.8 MG/DL — SIGNIFICANT CHANGE UP (ref 8.4–10.5)
CALCIUM SERPL-MCNC: 9.1 MG/DL — SIGNIFICANT CHANGE UP (ref 8.4–10.5)
CHLORIDE SERPL-SCNC: 91 MMOL/L — LOW (ref 96–108)
CHLORIDE SERPL-SCNC: 93 MMOL/L — LOW (ref 96–108)
CO2 BLDA-SCNC: 36 MMOL/L — HIGH (ref 22–30)
CO2 SERPL-SCNC: 33 MMOL/L — HIGH (ref 22–31)
CO2 SERPL-SCNC: 34 MMOL/L — HIGH (ref 22–31)
CREAT SERPL-MCNC: 1.4 MG/DL — HIGH (ref 0.5–1.3)
CREAT SERPL-MCNC: 1.44 MG/DL — HIGH (ref 0.5–1.3)
EOSINOPHIL # BLD AUTO: 0.11 K/UL — SIGNIFICANT CHANGE UP (ref 0–0.5)
EOSINOPHIL NFR BLD AUTO: 1.3 % — SIGNIFICANT CHANGE UP (ref 0–6)
GAS PNL BLDA: SIGNIFICANT CHANGE UP
GLUCOSE BLDC GLUCOMTR-MCNC: 155 MG/DL — HIGH (ref 70–99)
GLUCOSE BLDC GLUCOMTR-MCNC: 170 MG/DL — HIGH (ref 70–99)
GLUCOSE BLDC GLUCOMTR-MCNC: 212 MG/DL — HIGH (ref 70–99)
GLUCOSE BLDC GLUCOMTR-MCNC: 231 MG/DL — HIGH (ref 70–99)
GLUCOSE SERPL-MCNC: 131 MG/DL — HIGH (ref 70–99)
GLUCOSE SERPL-MCNC: 136 MG/DL — HIGH (ref 70–99)
HCT VFR BLD CALC: 28.6 % — LOW (ref 34.5–45)
HGB BLD-MCNC: 9.5 G/DL — LOW (ref 11.5–15.5)
HOROWITZ INDEX BLDA+IHG-RTO: SIGNIFICANT CHANGE UP
IMM GRANULOCYTES NFR BLD AUTO: 0.2 % — SIGNIFICANT CHANGE UP (ref 0–1.5)
LYMPHOCYTES # BLD AUTO: 1.16 K/UL — SIGNIFICANT CHANGE UP (ref 1–3.3)
LYMPHOCYTES # BLD AUTO: 13.8 % — SIGNIFICANT CHANGE UP (ref 13–44)
MAGNESIUM SERPL-MCNC: 1.7 MG/DL — SIGNIFICANT CHANGE UP (ref 1.6–2.6)
MCHC RBC-ENTMCNC: 30.3 PG — SIGNIFICANT CHANGE UP (ref 27–34)
MCHC RBC-ENTMCNC: 33.2 GM/DL — SIGNIFICANT CHANGE UP (ref 32–36)
MCV RBC AUTO: 91.1 FL — SIGNIFICANT CHANGE UP (ref 80–100)
MONOCYTES # BLD AUTO: 1.34 K/UL — HIGH (ref 0–0.9)
MONOCYTES NFR BLD AUTO: 16 % — HIGH (ref 2–14)
NEUTROPHILS # BLD AUTO: 5.69 K/UL — SIGNIFICANT CHANGE UP (ref 1.8–7.4)
NEUTROPHILS NFR BLD AUTO: 67.9 % — SIGNIFICANT CHANGE UP (ref 43–77)
NRBC # BLD: 0 /100 WBCS — SIGNIFICANT CHANGE UP (ref 0–0)
PCO2 BLDA: 54 MMHG — HIGH (ref 32–46)
PH BLDA: 7.41 — SIGNIFICANT CHANGE UP (ref 7.35–7.45)
PHOSPHATE SERPL-MCNC: 3.7 MG/DL — SIGNIFICANT CHANGE UP (ref 2.5–4.5)
PLATELET # BLD AUTO: 233 K/UL — SIGNIFICANT CHANGE UP (ref 150–400)
PO2 BLDA: 56 MMHG — LOW (ref 74–108)
POTASSIUM SERPL-MCNC: 4.3 MMOL/L — SIGNIFICANT CHANGE UP (ref 3.5–5.3)
POTASSIUM SERPL-MCNC: 4.3 MMOL/L — SIGNIFICANT CHANGE UP (ref 3.5–5.3)
POTASSIUM SERPL-SCNC: 4.3 MMOL/L — SIGNIFICANT CHANGE UP (ref 3.5–5.3)
POTASSIUM SERPL-SCNC: 4.3 MMOL/L — SIGNIFICANT CHANGE UP (ref 3.5–5.3)
PROCALCITONIN SERPL-MCNC: 0.18 NG/ML — HIGH
PROT SERPL-MCNC: 6.9 G/DL — SIGNIFICANT CHANGE UP (ref 6–8.3)
RBC # BLD: 3.14 M/UL — LOW (ref 3.8–5.2)
RBC # FLD: 12.3 % — SIGNIFICANT CHANGE UP (ref 10.3–14.5)
SAO2 % BLDA: 89 % — LOW (ref 92–96)
SODIUM SERPL-SCNC: 130 MMOL/L — LOW (ref 135–145)
SODIUM SERPL-SCNC: 131 MMOL/L — LOW (ref 135–145)
WBC # BLD: 8.39 K/UL — SIGNIFICANT CHANGE UP (ref 3.8–10.5)
WBC # FLD AUTO: 8.39 K/UL — SIGNIFICANT CHANGE UP (ref 3.8–10.5)

## 2021-06-24 PROCEDURE — 71045 X-RAY EXAM CHEST 1 VIEW: CPT | Mod: 26

## 2021-06-24 PROCEDURE — 99232 SBSQ HOSP IP/OBS MODERATE 35: CPT

## 2021-06-24 RX ORDER — MAGNESIUM SULFATE 500 MG/ML
1 VIAL (ML) INJECTION ONCE
Refills: 0 | Status: COMPLETED | OUTPATIENT
Start: 2021-06-24 | End: 2021-06-24

## 2021-06-24 RX ADMIN — Medication 3 MILLILITER(S): at 15:46

## 2021-06-24 RX ADMIN — Medication 100 GRAM(S): at 12:00

## 2021-06-24 RX ADMIN — CEFEPIME 100 MILLIGRAM(S): 1 INJECTION, POWDER, FOR SOLUTION INTRAMUSCULAR; INTRAVENOUS at 16:52

## 2021-06-24 RX ADMIN — HEPARIN SODIUM 5000 UNIT(S): 5000 INJECTION INTRAVENOUS; SUBCUTANEOUS at 05:22

## 2021-06-24 RX ADMIN — Medication 3 MILLILITER(S): at 21:43

## 2021-06-24 RX ADMIN — ONDANSETRON 4 MILLIGRAM(S): 8 TABLET, FILM COATED ORAL at 14:43

## 2021-06-24 RX ADMIN — ERYTHROPOIETIN 10000 UNIT(S): 10000 INJECTION, SOLUTION INTRAVENOUS; SUBCUTANEOUS at 14:30

## 2021-06-24 RX ADMIN — Medication 25 MILLIGRAM(S): at 05:22

## 2021-06-24 RX ADMIN — HEPARIN SODIUM 5000 UNIT(S): 5000 INJECTION INTRAVENOUS; SUBCUTANEOUS at 22:51

## 2021-06-24 RX ADMIN — Medication 3 MILLILITER(S): at 08:32

## 2021-06-24 RX ADMIN — Medication 4: at 17:19

## 2021-06-24 RX ADMIN — Medication 4: at 12:00

## 2021-06-24 RX ADMIN — Medication 1 MILLIGRAM(S): at 12:01

## 2021-06-24 RX ADMIN — PANTOPRAZOLE SODIUM 40 MILLIGRAM(S): 20 TABLET, DELAYED RELEASE ORAL at 05:22

## 2021-06-24 RX ADMIN — Medication 25 MILLIGRAM(S): at 14:30

## 2021-06-24 RX ADMIN — Medication 2: at 07:47

## 2021-06-24 RX ADMIN — Medication 40 MILLIGRAM(S): at 05:22

## 2021-06-24 RX ADMIN — HEPARIN SODIUM 5000 UNIT(S): 5000 INJECTION INTRAVENOUS; SUBCUTANEOUS at 14:30

## 2021-06-24 NOTE — PROGRESS NOTE ADULT - ASSESSMENT
80 yo female with DVT on eliquis, NPH s/p VPS in 2015,DM,Bipolar disorder, paralyzed hemidiaphragm,HFpEF,recent basal ganglia bleed which was managed conservatively with d/c of eliquis, transferred to [facility]- in early June, who has developed progressive respiratory difficulties with hypoxia which led to [facility] transfer on 6/22.  She received CTX and cephalexin for klebsiella UTI in early June.She has had episodes of confusion.She has been O2 dependant.Echo with diastolic dysfunction, serial chest CT scans with atelectasis and possible pneumonia.  She was given aztreonam on admission x 1 dose for pneumonia coverage.  She was hospitalized in [facility] [place] last year.T max has been 100.3, no sputum production,PC is .16  I think her respiratory issues are multifactorial-diaphragmatic dysfunction, atelectasis, and diastolic heart failure.There could be a component of pneumonia although with a normal wbc and PC of .16 I suspect most of her CT findings are more reflective of atelectasis although I think we should cover for pneumonia as well.? If neuromuscular weakness contributing as well.  Her blood cultures are negative and respiratory issues are multifactorial.  Suggest:  1.Will cover with  cefepime-she tolerates cephalosporins, day 2  2.MRSA nasal screen pending  3.PPV as per critical care

## 2021-06-24 NOTE — PROGRESS NOTE ADULT - SUBJECTIVE AND OBJECTIVE BOX
F/u Note:    81F admitted w/ PNA/CHF exacerbation  -off BiPAP, awaiting transfer out of ICU    ICU Vital Signs Last 24 Hrs  T(C): 37.1 (24 Jun 2021 16:00), Max: 37.7 (23 Jun 2021 22:00)  T(F): 98.7 (24 Jun 2021 16:00), Max: 99.8 (23 Jun 2021 22:00)  HR: 65 (24 Jun 2021 19:00) (65 - 89)  BP: 123/42 (24 Jun 2021 19:00) (108/50 - 150/59)  BP(mean): 65 (24 Jun 2021 19:00) (63 - 103)  RR: 12 (24 Jun 2021 19:00) (12 - 26)  SpO2: 98% (24 Jun 2021 19:00) (84% - 98%)                            9.5    8.39  )-----------( 233      ( 24 Jun 2021 06:30 )             28.6         06-24    130<L>  |  91<L>  |  29<H>  ----------------------------<  136<H>  4.3   |  34<H>  |  1.44<H>    Ca    8.8      24 Jun 2021 06:30  Phos  3.7     06-24  Mg     1.7     06-24    TPro  6.9  /  Alb  2.7<L>  /  TBili  0.4  /  DBili  x   /  AST  23  /  ALT  20  /  AlkPhos  73  06-24        PHYSICAL EXAM:      NEURO: awake and alert  CV: (+) S1/S2, rrr, no mrg  RESP: CTA b/l  GI: soft, non tender

## 2021-06-24 NOTE — PROGRESS NOTE ADULT - ASSESSMENT
Physical Examination:  GENERAL:               Alert, Oriented, No acute distress.    HEENT:                    No JVD, Moist MM  PULM:                     Bilateral air entry, Clear to auscultation bilaterally, no significant sputum production, No Rales, No Rhonchi, No Wheezing  CVS:                         S1, S2,  +urmur  ABD:                        Soft, nondistended, nontender, normoactive bowel sounds,   EXT:                         mild Edema , nontender, No Cyanosis or Clubbing   NEURO:                  Alert, oriented, interactive, nonfocal, follows commands  PSYC:                      Calm, + Insight.       Assessment  Acute hypoxic and hypercarbic respiratory failure likely due to underlying pneumonia vs Atelectasis vs acute on chronic diastolic CHF  h/o DVT in past, doubt if active vte at this time, patient high risk for a/c   ckd 3 as per renal  Underlying HTN, DM2, Bipolar d/o      Plan  Taper o2 to n/c  US UE and LE no DVT noted.   Continue NIV QHS  Incentive spirometry  ABX as per ID  Incentive spirometry  PT/OT/OOB  renal fu  Cardio f/u  continue diuresis.         Continue current rx    [facility] primary sena Caban [phone] - called and discussed with staff, and patient never been in office.    Family Updated: 	Sarah [phone]	                                 Date: 6/24 /21 - called message left for call back  5/23 -states has paralyzed hemidiaphragm Dx 8 months ago  - was seeing Sena TUTTLE.- dr. Caban    Sedation & Analgesia:	n/a  Diet/Nutrition:		oral Diet   GI PPx:			PPI    DVT Ppx:		Hep sq  	       Activity:		advance as tolerated                 Head of Bed:               35-45  Glycemic Control:        Insulin    Lines: PIV  Restraints were deemed necessary to prevent removal of life-sustaining devices [  ] YES   [   x ]  NO    Disposition: can transfer to [facility], case d/w Dr. Xavier who will accept pt upon transfer    Goals of Care: Full code

## 2021-06-24 NOTE — PROGRESS NOTE ADULT - SUBJECTIVE AND OBJECTIVE BOX
Follow up for shortness of breath  SUBJ:    sitting in chair on low-flow oxygen    PMH  Hypertension    Hyperlipidemia    Diabetes    Glaucoma    Osteoarthritis    Small bowel obstruction        MEDICATIONS  (STANDING):  albuterol/ipratropium for Nebulization 3 milliLiter(s) Nebulizer three times a day  ARIPiprazole 10 milliGRAM(s) Oral at bedtime  atorvastatin 40 milliGRAM(s) Oral at bedtime  cefepime   IVPB 2000 milliGRAM(s) IV Intermittent every 24 hours  dextrose 40% Gel 15 Gram(s) Oral once  dextrose 5%. 1000 milliLiter(s) (50 mL/Hr) IV Continuous <Continuous>  dextrose 5%. 1000 milliLiter(s) (100 mL/Hr) IV Continuous <Continuous>  dextrose 50% Injectable 25 Gram(s) IV Push once  dextrose 50% Injectable 12.5 Gram(s) IV Push once  dextrose 50% Injectable 25 Gram(s) IV Push once  epoetin orestes-epbx (RETACRIT) Injectable 45552 Unit(s) SubCutaneous every 7 days  folic acid 1 milliGRAM(s) Oral daily  furosemide   Injectable 40 milliGRAM(s) IV Push daily  glucagon  Injectable 1 milliGRAM(s) IntraMuscular once  heparin   Injectable 5000 Unit(s) SubCutaneous every 8 hours  hydrALAZINE 25 milliGRAM(s) Oral every 8 hours  insulin lispro (ADMELOG) corrective regimen sliding scale   SubCutaneous three times a day before meals  insulin lispro (ADMELOG) corrective regimen sliding scale   SubCutaneous at bedtime  pantoprazole    Tablet 40 milliGRAM(s) Oral before breakfast    MEDICATIONS  (PRN):  acetaminophen   Tablet .. 650 milliGRAM(s) Oral every 6 hours PRN Temp greater or equal to 38C (100.4F), Mild Pain (1 - 3)  albuterol/ipratropium for Nebulization 3 milliLiter(s) Nebulizer every 4 hours PRN Shortness of Breath and/or Wheezing  ondansetron Injectable 4 milliGRAM(s) IV Push every 6 hours PRN Nausea and/or Vomiting        PHYSICAL EXAM:  Vital Signs Last 24 Hrs  T(C): 37.1 (24 Jun 2021 16:00), Max: 37.7 (23 Jun 2021 22:00)  T(F): 98.7 (24 Jun 2021 16:00), Max: 99.8 (23 Jun 2021 22:00)  HR: 65 (24 Jun 2021 19:00) (65 - 89)  BP: 123/42 (24 Jun 2021 19:00) (108/50 - 150/59)  BP(mean): 65 (24 Jun 2021 19:00) (63 - 103)  RR: 12 (24 Jun 2021 19:00) (12 - 26)  SpO2: 98% (24 Jun 2021 19:00) (84% - 98%)    GENERAL: NAD, well-groomed, well-developed barrel chest consistent with COPD configuration  HEAD:  Atraumatic, Normocephalic  EYES: EOMI, PERRLA, conjunctiva and sclera clear  ENT: Moist mucous membranes,  NECK: Supple, No JVD, no bruits  CHEST/LUNG: Clear to percussion bilaterally; No rales, rhonchi, wheezing, or rubs  HEART: Regular rate and rhythm; No murmurs, rubs, or gallops PMI non displaced.  ABDOMEN: Soft, Nontender, Nondistended; Bowel sounds present  EXTREMITIES:  2+ Peripheral Pulses, No clubbing, cyanosis, or edema  SKIN: No rashes or lesions  NERVOUS SYSTEM:  Cranial Nerves II-XII intact      TELEMETRY:    ECG:    < from: 12 Lead ECG (06.21.21 @ 03:24) >  Diagnosis Line Normal sinus rhythm      When compared with ECG of 16-JUN-2021 12:22,    Nonspecific T wave abnormality now evident in Inferior leads  Confirmed by KEIRA TUTTLE, WILLIAM ([id number]) on 6/21/2021 9:16:57 AM    < end of copied text >    ECHO:    < from: TTE Echo Limited or F/U (06.22.21 @ 13:30) >  Summary:   1. Left ventricular ejection fraction, by visual estimation, is 65 to 70%.   2. Normal global left ventricular systolic function.   3. Increased LV wall thickness.   4. Normal left ventricular internal cavity size.   5. Spectral Doppler shows restrictive pattern of left ventricular myocardial filling (Grade III diastolic dysfunction).   6. There is mild concentric left ventricular hypertrophy.   7. Mild mitral annular calcification.   8. Mild mitral valve regurgitation.   9. Mild tricuspid regurgitation.  10. Mild pulmonic valve regurgitation.  11. Estimated pulmonary artery systolic pressure is 41.1 mmHg assuming a right atrial pressure of 10 mmHg, which is consistent with mild pulmonary hypertension.  12. Dvi by tvi is .35 argues against severe aortic stenosis.  13. The 2 d appearance appears to be moderate aortic stenosis.  14. Compared with a prior echo 6/9/21 there is no significant change.  15. Peak transaortic gradient equals 14.9 mmHg, mean transaortic gradient equals 8.9 mmHg, the calculated aortic valve area equals 1.33 cm² by the continuity equation consistent with moderate aortic stenosis.    [id number] Frantz Emmett , Electronically signed on 6/22/2021 at 2:41:43 PM    < end of copied text >      LABS:                        9.5    8.39  )-----------( 233      ( 24 Jun 2021 06:30 )             28.6     06-24    130<L>  |  91<L>  |  29<H>  ----------------------------<  136<H>  4.3   |  34<H>  |  1.44<H>    Ca    8.8      24 Jun 2021 06:30  Phos  3.7     06-24  Mg     1.7     06-24    TPro  6.9  /  Alb  2.7<L>  /  TBili  0.4  /  DBili  x   /  AST  23  /  ALT  20  /  AlkPhos  73  06-24    CARDIAC MARKERS ( 23 Jun 2021 10:30 )  .055 ng/mL / x     / x     / x     / x              I&O's Summary    23 Jun 2021 07:01  -  24 Jun 2021 07:00  --------------------------------------------------------  IN: 320 mL / OUT: 1500 mL / NET: -1180 mL    24 Jun 2021 07:01  -  24 Jun 2021 20:55  --------------------------------------------------------  IN: 100 mL / OUT: 1400 mL / NET: -1300 mL      BNP    RADIOLOGY & ADDITIONAL STUDIES:    < from: Xray Chest 1 View- PORTABLE-Urgent (Xray Chest 1 View- PORTABLE-Urgent .) (06.21.21 @ 02:29) >  IMPRESSION:   Mild bilateral perihilar/basilar diffuse airspace disease/small pleural effusions. Cardiomegaly..        DONY JOSHI MD; Attending Radiologist  This document has been electronically signed. Jun 21 2021  2:04PM    < end of copied text >      shorts of breath  cardiac status is stable no new cardiac recommendations

## 2021-06-24 NOTE — PROGRESS NOTE ADULT - ASSESSMENT
PLAN:  -off BiPAP  -maitanin NC goal Sao2 >90%, wean to room air as able overnight  -will need to finish course of cefepime for underlying PNA  --daily lasix, follow lytes while diuresis, goal K+ 4-4.5  -heparin subq forDVT prophylaxis   -goal euglycemia with  ISS  BP control  -f/u am labs

## 2021-06-24 NOTE — PROGRESS NOTE ADULT - SUBJECTIVE AND OBJECTIVE BOX
CC: f/u for respiratory insufficiency and pneumonia    Patient reports: she reports fatigue, is comfortable on nasal oxygen    REVIEW OF SYSTEMS:  All other review of systems negative (Comprehensive ROS)    Antimicrobials Day #  :day 2  cefepime   IVPB 2000 milliGRAM(s) IV Intermittent every 24 hours    Other Medications Reviewed  MEDICATIONS  (STANDING):  albuterol/ipratropium for Nebulization 3 milliLiter(s) Nebulizer three times a day  ARIPiprazole 10 milliGRAM(s) Oral at bedtime  atorvastatin 40 milliGRAM(s) Oral at bedtime  cefepime   IVPB 2000 milliGRAM(s) IV Intermittent every 24 hours  dextrose 40% Gel 15 Gram(s) Oral once  dextrose 5%. 1000 milliLiter(s) (50 mL/Hr) IV Continuous <Continuous>  dextrose 5%. 1000 milliLiter(s) (100 mL/Hr) IV Continuous <Continuous>  dextrose 50% Injectable 25 Gram(s) IV Push once  dextrose 50% Injectable 12.5 Gram(s) IV Push once  dextrose 50% Injectable 25 Gram(s) IV Push once  epoetin orestes-epbx (RETACRIT) Injectable 49512 Unit(s) SubCutaneous every 7 days  folic acid 1 milliGRAM(s) Oral daily  furosemide   Injectable 40 milliGRAM(s) IV Push daily  glucagon  Injectable 1 milliGRAM(s) IntraMuscular once  heparin   Injectable 5000 Unit(s) SubCutaneous every 8 hours  hydrALAZINE 25 milliGRAM(s) Oral every 8 hours  insulin lispro (ADMELOG) corrective regimen sliding scale   SubCutaneous three times a day before meals  insulin lispro (ADMELOG) corrective regimen sliding scale   SubCutaneous at bedtime  pantoprazole    Tablet 40 milliGRAM(s) Oral before breakfast    T(F): 98.7 (06-24-21 @ 16:00), Max: 99.8 (06-23-21 @ 22:00)  HR: 85 (06-24-21 @ 17:00)  BP: 126/59 (06-24-21 @ 17:00)  RR: 14 (06-24-21 @ 17:00)  SpO2: 93% (06-24-21 @ 17:00)  Wt(kg): --    PHYSICAL EXAM:  General: alert, no acute distress  Eyes:  anicteric, no conjunctival injection, no discharge  Oropharynx: no lesions or injection 	  Neck: supple, without adenopathy  Lungs: clear to auscultation, decreased at bases  Heart: regular rate and rhythm; no murmur, rubs or gallops  Abdomen: soft, nondistended, nontender, without mass or organomegaly  Skin: no lesions  Extremities: no clubbing, cyanosis, or edema  Neurologic: alert, oriented, moves all extremities    LAB RESULTS:                        9.5    8.39  )-----------( 233      ( 24 Jun 2021 06:30 )             28.6     06-24    130<L>  |  91<L>  |  29<H>  ----------------------------<  136<H>  4.3   |  34<H>  |  1.44<H>    Ca    8.8      24 Jun 2021 06:30  Phos  3.7     06-24  Mg     1.7     06-24    TPro  6.9  /  Alb  2.7<L>  /  TBili  0.4  /  DBili  x   /  AST  23  /  ALT  20  /  AlkPhos  73  06-24    LIVER FUNCTIONS - ( 24 Jun 2021 06:30 )  Alb: 2.7 g/dL / Pro: 6.9 g/dL / ALK PHOS: 73 U/L / ALT: 20 U/L / AST: 23 U/L / GGT: x             MICROBIOLOGY:  RECENT CULTURES:  06-22 @ 23:29 .Blood Blood-Peripheral     No growth to date.      PC .18    RADIOLOGY REVIEWED:    < from: US Duplex Venous Upper Ext Ltd, Left (06.23.21 @ 20:45) >  IMPRESSION:  No evidence of left upper extremity deep venous thrombosis.    Superficial thrombus in the cephalic vein is incidentally noted.      < end of copied text >  < from: US Duplex Venous Lower Ext Complete, Bilateral (06.23.21 @ 20:39) >  IMPRESSION:  No evidence of deep venous thrombosis in either lower extremity.    < end of copied text >  < from: CT Chest No Cont (06.22.21 @ 12:51) >  IMPRESSION:  Right upper lobe peripheral opacities, possibly infectious.  Bilateral lower lobe consolidations, which may represent atelectasis and/or pneumonia.  Small bilateral pleural effusions.    < end of copied text >

## 2021-06-24 NOTE — PROGRESS NOTE ADULT - SUBJECTIVE AND OBJECTIVE BOX
On FM O2    Vital Signs Last 24 Hrs  T(C): 37.1 (06-24-21 @ 16:00), Max: 37.7 (06-23-21 @ 22:00)  T(F): 98.7 (06-24-21 @ 16:00), Max: 99.8 (06-23-21 @ 22:00)  HR: 85 (06-24-21 @ 17:00) (77 - 89)  BP: 126/59 (06-24-21 @ 17:00) (103/63 - 150/59)  BP(mean): 80 (06-24-21 @ 17:00) (63 - 103)  RR: 14 (06-24-21 @ 17:00) (14 - 26)  SpO2: 93% (06-24-21 @ 17:00) (84% - 97%)    s1s2  b/l air entry  soft, ND  no edema b/l LE                                                                                             9.5    8.39  )-----------( 233      ( 24 Jun 2021 06:30 )             28.6     24 Jun 2021 06:30    130    |  91     |  29     ----------------------------<  136    4.3     |  34     |  1.44     Ca    8.8        24 Jun 2021 06:30  Phos  3.7       24 Jun 2021 06:30  Mg     1.7       24 Jun 2021 06:30    TPro  6.9    /  Alb  2.7    /  TBili  0.4    /  DBili  x      /  AST  23     /  ALT  20     /  AlkPhos  73     24 Jun 2021 06:30    LIVER FUNCTIONS - ( 24 Jun 2021 06:30 )  Alb: 2.7 g/dL / Pro: 6.9 g/dL / ALK PHOS: 73 U/L / ALT: 20 U/L / AST: 23 U/L / GGT: x           acetaminophen   Tablet .. 650 milliGRAM(s) Oral every 6 hours PRN  albuterol/ipratropium for Nebulization 3 milliLiter(s) Nebulizer every 4 hours PRN  albuterol/ipratropium for Nebulization 3 milliLiter(s) Nebulizer three times a day  ARIPiprazole 10 milliGRAM(s) Oral at bedtime  atorvastatin 40 milliGRAM(s) Oral at bedtime  cefepime   IVPB 2000 milliGRAM(s) IV Intermittent every 24 hours  dextrose 40% Gel 15 Gram(s) Oral once  dextrose 5%. 1000 milliLiter(s) IV Continuous <Continuous>  dextrose 5%. 1000 milliLiter(s) IV Continuous <Continuous>  dextrose 50% Injectable 25 Gram(s) IV Push once  dextrose 50% Injectable 12.5 Gram(s) IV Push once  dextrose 50% Injectable 25 Gram(s) IV Push once  epoetin orestes-epbx (RETACRIT) Injectable 07923 Unit(s) SubCutaneous every 7 days  folic acid 1 milliGRAM(s) Oral daily  furosemide   Injectable 40 milliGRAM(s) IV Push daily  glucagon  Injectable 1 milliGRAM(s) IntraMuscular once  heparin   Injectable 5000 Unit(s) SubCutaneous every 8 hours  hydrALAZINE 25 milliGRAM(s) Oral every 8 hours  insulin lispro (ADMELOG) corrective regimen sliding scale   SubCutaneous three times a day before meals  insulin lispro (ADMELOG) corrective regimen sliding scale   SubCutaneous at bedtime  ondansetron Injectable 4 milliGRAM(s) IV Push every 6 hours PRN  pantoprazole    Tablet 40 milliGRAM(s) Oral before breakfast    A/P:    Hx HTN, s/p ICH, mod AS  SOB, V/Q scan w/indeterminate prob, ? asp PNA  Hemodynamic/cardio-renal PASCALE/CKD 3  Avoid nephrotoxins  Continue Lasix  F/u BMP, Mg  Epoetin  Renal fx is fairly stable    [phone]

## 2021-06-24 NOTE — PROGRESS NOTE ADULT - SUBJECTIVE AND OBJECTIVE BOX
Follow-up Critical Care Progress Note  Chief Complaint : Heart failure      used bipap overnight  no cp, sob, palp, n/v  comfrable on n/c sat 95%  states some cough.      Allergies :adhesives (Pruritus; Blisters)  penicillin (Hives)  pineapple (Anaphylaxis)      PAST MEDICAL & SURGICAL HISTORY:  Hypertension    Hyperlipidemia    Diabetes    Glaucoma    Osteoarthritis    Small bowel obstruction    S/P hysterectomy  1981 and Oopherectomy in 1990s    S/P cholecystectomy  1981    Achilles tendon injury  repair  right scalene muscle release    S/P knee replacement, left        Medications:  MEDICATIONS  (STANDING):  albuterol/ipratropium for Nebulization 3 milliLiter(s) Nebulizer three times a day  ARIPiprazole 10 milliGRAM(s) Oral at bedtime  atorvastatin 40 milliGRAM(s) Oral at bedtime  cefepime   IVPB 2000 milliGRAM(s) IV Intermittent every 24 hours  dextrose 40% Gel 15 Gram(s) Oral once  dextrose 5%. 1000 milliLiter(s) (50 mL/Hr) IV Continuous <Continuous>  dextrose 5%. 1000 milliLiter(s) (100 mL/Hr) IV Continuous <Continuous>  dextrose 50% Injectable 25 Gram(s) IV Push once  dextrose 50% Injectable 12.5 Gram(s) IV Push once  dextrose 50% Injectable 25 Gram(s) IV Push once  epoetin orestes-epbx (RETACRIT) Injectable 85752 Unit(s) SubCutaneous every 7 days  folic acid 1 milliGRAM(s) Oral daily  furosemide   Injectable 40 milliGRAM(s) IV Push daily  glucagon  Injectable 1 milliGRAM(s) IntraMuscular once  heparin   Injectable 5000 Unit(s) SubCutaneous every 8 hours  hydrALAZINE 25 milliGRAM(s) Oral every 8 hours  insulin lispro (ADMELOG) corrective regimen sliding scale   SubCutaneous at bedtime  insulin lispro (ADMELOG) corrective regimen sliding scale   SubCutaneous three times a day before meals  magnesium sulfate  IVPB 1 Gram(s) IV Intermittent once  pantoprazole    Tablet 40 milliGRAM(s) Oral before breakfast    MEDICATIONS  (PRN):  acetaminophen   Tablet .. 650 milliGRAM(s) Oral every 6 hours PRN Temp greater or equal to 38C (100.4F), Mild Pain (1 - 3)  albuterol/ipratropium for Nebulization 3 milliLiter(s) Nebulizer every 4 hours PRN Shortness of Breath and/or Wheezing  ondansetron Injectable 4 milliGRAM(s) IV Push every 6 hours PRN Nausea and/or Vomiting    COVID  06-20-21 @ 20:20  COVID -   NotDetec  06-12-21 @ 05:45  COVID -   NotDetec  06-05-21 @ 18:40  COVID -   NotDetec  05-28-21 @ 13:40  COVID -   NotDetec  08-25-20 @ 21:45  COVID -   NotDete      COVID Biomarkers    06-23-21 @ 10:30 ESR --  ---  CRP --  ---  DDimer  700<H>   ---   LDH --   ---   Ferritin --    06-22-21 @ 05:45 ESR --  ---  CRP --  ---  DDimer  --   ---   LDH --   ---   Ferritin 112    06-21-21 @ 09:14 ESR --  ---  CRP --  ---  DDimer  463<H>   ---   LDH --   ---   Ferritin --    06-10-21 @ 06:00 ESR --  ---  CRP --  ---  DDimer  1030<H>   ---   LDH --   ---   Ferritin --            Trend Cardiac Enzymes  06-23-21 @ 10:30  [id number]-[id number]/Trop I - -- - --  - --  -  --  /  .055    Trend BNP  06-23-21 @ 10:30   -  2026<H>  06-19-21 @ 06:51   -  643<H>  06-08-21 @ 05:40   -  572<H>    Procalcitonin Trend  06-24-21 @ 06:30   -   0.18<H>  06-22-21 @ 05:45   -   0.16<H>  06-19-21 @ 06:51   -   0.08    WBC Trend  06-24-21 @ 06:30   -  8.39  06-23-21 @ 06:00   -  6.51  06-22-21 @ 05:45   -  6.23    H/H Trend  06-24-21 @ 06:30   -   9.5<L>/ 28.6<L>  06-23-21 @ 06:00   -   7.8<L>/ 23.3<L>  06-22-21 @ 05:45   -   8.3<L>/ 25.1<L>  06-21-21 @ 06:30   -   8.6<L>/ 26.2<L>  06-21-21 @ 03:21   -   9.3<L>/ 28.5<L>  06-19-21 @ 06:51   -   9.4<L>/ 28.2<L>    Stool Occult Blood    Platelet Trend  06-24-21 @ 06:30   -  233  06-23-21 @ 06:00   -  193  06-22-21 @ 05:45   -  205    Trend Sodium  06-24-21 @ 06:30   -  130<L>  06-23-21 @ 06:00   -  130<L>  06-22-21 @ 05:45   -  126<L>    Trend Potassium  06-24-21 @ 06:30   -  4.3  06-23-21 @ 06:00   -  4.2  06-22-21 @ 05:45   -  4.8    Trend Bun/Cr  06-24-21 @ 06:30  BUN/CR -  29<H> / 1.44<H>  06-23-21 @ 06:00  BUN/CR -  30<H> / 1.72<H>  06-22-21 @ 05:45  BUN/CR -  28<H> / 1.63<H>    Lactic Acid Trend    ABG Trend  06-24-21 @ 05:00   - 7.41/54<H>/56<L>/89<L> with bipap use  06-22-21 @ 20:15   - 7.34<L>/59<H>/91/96  06-21-21 @ 03:30   - 7.31<L>/55<H>/104/98<H>    Trend AST/ALT/ALK Phos/Bili  06-24-21 @ 06:30   23/20/73/0.4  06-21-21 @ 06:30   25/25/90/0.3  06-19-21 @ 06:51   22/21/86/0.3  06-17-21 @ 06:00   18/20/91/0.3  06-14-21 @ 06:15   18/19/89/0.2    Albumin Trend  06-24-21 @ 06:30   -   2.7<L>  06-21-21 @ 06:30   -   2.9<L>  06-19-21 @ 06:51   -   2.8<L>  06-17-21 @ 06:00   -   2.7<L>  06-14-21 @ 06:15   -   2.6<L>  06-10-21 @ 06:00   -   2.4<L>      PTT - PT - INR Trend  06-21-21 @ 09:14   -   31.3 - 11.7 - 0.97  05-30-21 @ 07:52   -   31.0 - 14.1<H> - 1.23<H>  05-28-21 @ 21:48   -   33.7 - 20.9<H> - 1.86<H>  05-28-21 @ 13:39   -   36.3<H> - 25.4<H> - 2.28<H>    Glucose Trend  06-24-21 @ 07:46   -  -- -- 155<H>  06-24-21 @ 06:30   -  -- 136<H> --  06-23-21 @ 21:42   -  -- -- 136<H>  06-23-21 @ 16:31   -  -- -- 214<H>  06-23-21 @ 11:04   -  -- -- 220<H>  06-23-21 @ 07:37   -  -- -- 146<H>  06-23-21 @ 06:00   -  -- 142<H> --  06-22-21 @ 21:07   -  -- -- 190<H>  06-22-21 @ 17:00   -  -- -- 170<H>  06-22-21 @ 13:05   -  -- -- 196<H>    A1C with Estimated Average Glucose Result: 7.7 % *H* [4.0 - 5.6] (05-30-21 @ 07:52)      LABS:                        9.5    8.39  )-----------( 233      ( 24 Jun 2021 06:30 )             28.6     06-24    130<L>  |  91<L>  |  29<H>  ----------------------------<  136<H>  4.3   |  34<H>  |  1.44<H>    Ca    8.8      24 Jun 2021 06:30  Phos  3.7     06-24  Mg     1.7     06-24    TPro  6.9  /  Alb  2.7<L>  /  TBili  0.4  /  DBili  x   /  AST  23  /  ALT  20  /  AlkPhos  73  06-24  Serum Pro-Brain Natriuretic Peptide: 2026 pg/mL (06-23-21 @ 10:30)        CULTURES: (if applicable)    Culture - Blood (collected 06-22-21 @ 23:29)  Source: .Blood Blood-Peripheral  Preliminary Report (06-24-21 @ 04:01):    No growth to date.    Culture - Blood (collected 06-22-21 @ 23:29)  Source: .Blood Blood-Peripheral  Preliminary Report (06-24-21 @ 04:01):    No growth to date.          ABG - ( 24 Jun 2021 05:00 )  pH, Arterial: 7.41  pH, Blood: x     /  pCO2: 54    /  pO2: 56    / HCO3: x     / Base Excess: x     /  SaO2: 89               RADIOLOGY  CXR:  b/l pl effusions and vascular congestion      VITALS:  T(C): 36.7 (06-24-21 @ 08:00), Max: 37.7 (06-23-21 @ 22:00)  T(F): 98.1 (06-24-21 @ 08:00), Max: 99.8 (06-23-21 @ 22:00)  HR: 86 (06-24-21 @ 10:00) (78 - 89)  BP: 118/72 (06-24-21 @ 09:00) (95/61 - 139/83)  BP(mean): 87 (06-24-21 @ 09:00) (62 - 109)  ABP: --  ABP(mean): --  RR: 14 (06-24-21 @ 10:00) (14 - 31)  SpO2: 92% (06-24-21 @ 10:00) (70% - 97%)  CVP(mm Hg): --  CVP(cm H2O): --    Ins and Outs     06-23-21 @ 07:01  -  06-24-21 @ 07:00  --------------------------------------------------------  IN: 320 mL / OUT: 1500 mL / NET: -1180 mL          Weight (kg): 88 (06-23-21 @ 00:00)        I&O's Detail    23 Jun 2021 07:01  -  24 Jun 2021 07:00  --------------------------------------------------------  IN:    IV PiggyBack: 100 mL    IV PiggyBack: 100 mL    Oral Fluid: 120 mL  Total IN: 320 mL    OUT:    Voided (mL): 1500 mL  Total OUT: 1500 mL    Total NET: -1180 mL

## 2021-06-25 LAB
ANION GAP SERPL CALC-SCNC: 5 MMOL/L — SIGNIFICANT CHANGE UP (ref 5–17)
BUN SERPL-MCNC: 33 MG/DL — HIGH (ref 7–23)
CALCIUM SERPL-MCNC: 8.7 MG/DL — SIGNIFICANT CHANGE UP (ref 8.4–10.5)
CHLORIDE SERPL-SCNC: 92 MMOL/L — LOW (ref 96–108)
CO2 BLDA-SCNC: 36 MMOL/L — HIGH (ref 22–30)
CO2 BLDA-SCNC: 36 MMOL/L — HIGH (ref 22–30)
CO2 SERPL-SCNC: 35 MMOL/L — HIGH (ref 22–31)
CREAT SERPL-MCNC: 1.45 MG/DL — HIGH (ref 0.5–1.3)
GAS PNL BLDA: SIGNIFICANT CHANGE UP
GAS PNL BLDA: SIGNIFICANT CHANGE UP
GLUCOSE BLDC GLUCOMTR-MCNC: 130 MG/DL — HIGH (ref 70–99)
GLUCOSE BLDC GLUCOMTR-MCNC: 132 MG/DL — HIGH (ref 70–99)
GLUCOSE BLDC GLUCOMTR-MCNC: 176 MG/DL — HIGH (ref 70–99)
GLUCOSE BLDC GLUCOMTR-MCNC: 182 MG/DL — HIGH (ref 70–99)
GLUCOSE SERPL-MCNC: 116 MG/DL — HIGH (ref 70–99)
HCT VFR BLD CALC: 23.9 % — LOW (ref 34.5–45)
HGB BLD-MCNC: 7.8 G/DL — LOW (ref 11.5–15.5)
HOROWITZ INDEX BLDA+IHG-RTO: SIGNIFICANT CHANGE UP
HOROWITZ INDEX BLDA+IHG-RTO: SIGNIFICANT CHANGE UP
MAGNESIUM SERPL-MCNC: 2 MG/DL — SIGNIFICANT CHANGE UP (ref 1.6–2.6)
MCHC RBC-ENTMCNC: 29.4 PG — SIGNIFICANT CHANGE UP (ref 27–34)
MCHC RBC-ENTMCNC: 32.6 GM/DL — SIGNIFICANT CHANGE UP (ref 32–36)
MCV RBC AUTO: 90.2 FL — SIGNIFICANT CHANGE UP (ref 80–100)
NRBC # BLD: 0 /100 WBCS — SIGNIFICANT CHANGE UP (ref 0–0)
PCO2 BLDA: 58 MMHG — HIGH (ref 32–46)
PCO2 BLDA: 63 MMHG — HIGH (ref 32–46)
PH BLDA: 7.36 — SIGNIFICANT CHANGE UP (ref 7.35–7.45)
PH BLDA: 7.39 — SIGNIFICANT CHANGE UP (ref 7.35–7.45)
PHOSPHATE SERPL-MCNC: 4.2 MG/DL — SIGNIFICANT CHANGE UP (ref 2.5–4.5)
PLATELET # BLD AUTO: 195 K/UL — SIGNIFICANT CHANGE UP (ref 150–400)
PO2 BLDA: 100 MMHG — SIGNIFICANT CHANGE UP (ref 74–108)
PO2 BLDA: 72 MMHG — LOW (ref 74–108)
POTASSIUM SERPL-MCNC: 3.8 MMOL/L — SIGNIFICANT CHANGE UP (ref 3.5–5.3)
POTASSIUM SERPL-SCNC: 3.8 MMOL/L — SIGNIFICANT CHANGE UP (ref 3.5–5.3)
RBC # BLD: 2.65 M/UL — LOW (ref 3.8–5.2)
RBC # FLD: 12.5 % — SIGNIFICANT CHANGE UP (ref 10.3–14.5)
SAO2 % BLDA: 94 % — SIGNIFICANT CHANGE UP (ref 92–96)
SAO2 % BLDA: 98 % — HIGH (ref 92–96)
SODIUM SERPL-SCNC: 132 MMOL/L — LOW (ref 135–145)
WBC # BLD: 6.15 K/UL — SIGNIFICANT CHANGE UP (ref 3.8–10.5)
WBC # FLD AUTO: 6.15 K/UL — SIGNIFICANT CHANGE UP (ref 3.8–10.5)

## 2021-06-25 PROCEDURE — 70450 CT HEAD/BRAIN W/O DYE: CPT | Mod: 26

## 2021-06-25 PROCEDURE — 71045 X-RAY EXAM CHEST 1 VIEW: CPT | Mod: 26

## 2021-06-25 RX ORDER — FUROSEMIDE 40 MG
40 TABLET ORAL DAILY
Refills: 0 | Status: DISCONTINUED | OUTPATIENT
Start: 2021-06-25 | End: 2021-06-30

## 2021-06-25 RX ORDER — FUROSEMIDE 40 MG
40 TABLET ORAL DAILY
Refills: 0 | Status: DISCONTINUED | OUTPATIENT
Start: 2021-06-25 | End: 2021-06-25

## 2021-06-25 RX ADMIN — HEPARIN SODIUM 5000 UNIT(S): 5000 INJECTION INTRAVENOUS; SUBCUTANEOUS at 06:10

## 2021-06-25 RX ADMIN — Medication 3 MILLILITER(S): at 21:00

## 2021-06-25 RX ADMIN — ARIPIPRAZOLE 10 MILLIGRAM(S): 15 TABLET ORAL at 21:29

## 2021-06-25 RX ADMIN — CEFEPIME 100 MILLIGRAM(S): 1 INJECTION, POWDER, FOR SOLUTION INTRAMUSCULAR; INTRAVENOUS at 16:09

## 2021-06-25 RX ADMIN — Medication 3 MILLILITER(S): at 09:48

## 2021-06-25 RX ADMIN — HEPARIN SODIUM 5000 UNIT(S): 5000 INJECTION INTRAVENOUS; SUBCUTANEOUS at 21:29

## 2021-06-25 RX ADMIN — Medication 3 MILLILITER(S): at 16:19

## 2021-06-25 RX ADMIN — Medication 40 MILLIGRAM(S): at 06:27

## 2021-06-25 RX ADMIN — ATORVASTATIN CALCIUM 40 MILLIGRAM(S): 80 TABLET, FILM COATED ORAL at 21:29

## 2021-06-25 RX ADMIN — Medication 2: at 11:54

## 2021-06-25 RX ADMIN — Medication 40 MILLIGRAM(S): at 18:38

## 2021-06-25 RX ADMIN — HEPARIN SODIUM 5000 UNIT(S): 5000 INJECTION INTRAVENOUS; SUBCUTANEOUS at 15:22

## 2021-06-25 NOTE — PROGRESS NOTE ADULT - ASSESSMENT
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  She received CTX and cephalexin for klebsiella UTI in early June.She has had episodes of confusion.She has been O2 dependant.Echo with diastolic dysfunction, serial chest CT scans with atelectasis and possible pneumonia.CXR have shown congestion.  She was given aztreonam on admission x 1 dose for pneumonia coverage.  She was hospitalized in [facility] [place] last year.T max has been 100.3, no sputum production,PC is .16  I think her respiratory issues are multifactorial-diaphragmatic dysfunction, atelectasis, and diastolic heart failure.There could be a component of pneumonia although with a normal wbc and PC of .16 I suspect most of her CT findings are more reflective of atelectasis although I think we should cover for pneumonia as well.? If neuromuscular weakness contributing as well.  Her blood cultures are negative and respiratory issues are multifactorial.  She is stable from ID viewpoint, CXR shows improved congestion  Suggest:  1.Will continue   cefepime-she tolerates cephalosporins, day 3, would limit her to 5 days  2.MRSA nasal screen pending but with no signs of ongoing infection it is not going to change menagement  3.PPV as per critical care  4. Her respiratory issues have been multifactorial, She has never acted as if pneumonia was major problem, hence I suspect she will have chronic issues.

## 2021-06-25 NOTE — PROGRESS NOTE ADULT - ASSESSMENT
Physical Examination:  GENERAL:               Lethergic , Oriented, No acute distress.    HEENT:                    No JVD, dry MM  PULM:                     Bilateral air entry, Clear to auscultation bilaterally, no significant sputum production, No Rales, No Rhonchi, No Wheezing  CVS:                         S1, S2,  +murmur  ABD:                        Soft, nondistended, nontender, normoactive bowel sounds,   EXT:                         no Edema , nontender, No Cyanosis or Clubbing   NEURO:                  lethargic interactive, nonfocal, follows commands  PSYC:                      Calm, limited Insight.       Assessment  Acute hypoxic and hypercarbic respiratory failure likely due to underlying pneumonia vs Atelectasis vs acute on chronic diastolic CHF  h/o DVT in past, doubt if active vte at this time, patient high risk for a/c   ckd 3 as per renal  Underlying HTN, DM2, Bipolar d/o      Plan  with worsening mental status will try AVAPS and see response to mental status  if mental status not improve will consider CT head    Taper o2 to n/c  Incentive spirometry  ABX as per ID  Incentive spirometry  PT/OT/OOB  renal fu  Cardio f/u  change Lasix to oral (if able to tolerate)     Continue current rx    Sarah antony primary pulm md Dr. Caban [phone] - called and discussed with staff, and patient never been in office.    Family Updated: 	Sarah [phone]	                                 Date: 6/24-25 /21 -   updated on change in mental status, will keep in icu today  6/23 -states has paralyzed hemidiaphragm Dx 8 months ago  - was seeing Pulmal[facility] TUTTLE.- dr. Caban  6/24 Updated on status     Sedation & Analgesia:	n/a  Diet/Nutrition:		oral Diet   GI PPx:			PPI    DVT Ppx:		Hep sq  	       Activity:		advance as tolerated                 Head of Bed:               35-45  Glycemic Control:        Insulin    Lines: PIV  Restraints were deemed necessary to prevent removal of life-sustaining devices [  ] YES   [   x ]  NO    Disposition: can transfer to [facility] [place], case d/w Dr. Xavier who will accept pt upon transfer    Goals of Care: Full code

## 2021-06-25 NOTE — PROGRESS NOTE ADULT - SUBJECTIVE AND OBJECTIVE BOX
Seen in ICU, on BiPAP    Vital Signs Last 24 Hrs  T(C): 37.2 (06-25-21 @ 17:00), Max: 37.2 (06-25-21 @ 17:00)  T(F): 98.9 (06-25-21 @ 17:00), Max: 98.9 (06-25-21 @ 17:00)  HR: 65 (06-25-21 @ 17:00) (54 - 86)  BP: 134/60 (06-25-21 @ 17:00) (92/54 - 144/72)  BP(mean): 83 (06-25-21 @ 17:00) (61 - 95)  RR: 16 (06-25-21 @ 17:00) (11 - 17)  SpO2: 96% (06-25-21 @ 17:00) (92% - 100%)    s1s2  b/l air entry, decr BS at bases  soft, ND  no edema b/l LE                                                                                                      7.8    6.15  )-----------( 195      ( 25 Jun 2021 05:30 )             23.9     25 Jun 2021 05:30    132    |  92     |  33     ----------------------------<  116    3.8     |  35     |  1.45     Ca    8.7        25 Jun 2021 05:30  Phos  4.2       25 Jun 2021 05:30  Mg     2.0       25 Jun 2021 05:30    TPro  6.9    /  Alb  2.7    /  TBili  0.4    /  DBili  x      /  AST  23     /  ALT  20     /  AlkPhos  73     24 Jun 2021 06:30    LIVER FUNCTIONS - ( 24 Jun 2021 06:30 )  Alb: 2.7 g/dL / Pro: 6.9 g/dL / ALK PHOS: 73 U/L / ALT: 20 U/L / AST: 23 U/L / GGT: x           acetaminophen   Tablet .. 650 milliGRAM(s) Oral every 6 hours PRN  albuterol/ipratropium for Nebulization 3 milliLiter(s) Nebulizer every 4 hours PRN  albuterol/ipratropium for Nebulization 3 milliLiter(s) Nebulizer three times a day  ARIPiprazole 10 milliGRAM(s) Oral at bedtime  atorvastatin 40 milliGRAM(s) Oral at bedtime  cefepime   IVPB 2000 milliGRAM(s) IV Intermittent every 24 hours  dextrose 40% Gel 15 Gram(s) Oral once  dextrose 5%. 1000 milliLiter(s) IV Continuous <Continuous>  dextrose 5%. 1000 milliLiter(s) IV Continuous <Continuous>  dextrose 50% Injectable 25 Gram(s) IV Push once  dextrose 50% Injectable 12.5 Gram(s) IV Push once  dextrose 50% Injectable 25 Gram(s) IV Push once  epoetin orestes-epbx (RETACRIT) Injectable 69553 Unit(s) SubCutaneous every 7 days  folic acid 1 milliGRAM(s) Oral daily  furosemide   Injectable 40 milliGRAM(s) IV Push daily  glucagon  Injectable 1 milliGRAM(s) IntraMuscular once  heparin   Injectable 5000 Unit(s) SubCutaneous every 8 hours  hydrALAZINE 25 milliGRAM(s) Oral every 8 hours  insulin lispro (ADMELOG) corrective regimen sliding scale   SubCutaneous three times a day before meals  insulin lispro (ADMELOG) corrective regimen sliding scale   SubCutaneous at bedtime  ondansetron Injectable 4 milliGRAM(s) IV Push every 6 hours PRN  pantoprazole    Tablet 40 milliGRAM(s) Oral before breakfast    A/P:    Hx HTN, s/p ICH, mod AS  SOB, V/Q scan w/indeterminate prob, ? asp PNA  Hemodynamic/cardio-renal PASCALE/CKD 3  Avoid nephrotoxins  Continue Lasix  F/u BMP, Mg  Epoetin  Renal fx is stable overall    [phone]

## 2021-06-25 NOTE — PROGRESS NOTE ADULT - SUBJECTIVE AND OBJECTIVE BOX
CC: f/u for possible pneumonia    Patient reports: she is comfortable on nasal oxygen.She is using BIPAP at night    REVIEW OF SYSTEMS:  All other review of systems negative (Comprehensive ROS): very weak and deconditioned    Antimicrobials Day #  :day 3  cefepime   IVPB 2000 milliGRAM(s) IV Intermittent every 24 hours    Other Medications Reviewed  MEDICATIONS  (STANDING):  albuterol/ipratropium for Nebulization 3 milliLiter(s) Nebulizer three times a day  ARIPiprazole 10 milliGRAM(s) Oral at bedtime  atorvastatin 40 milliGRAM(s) Oral at bedtime  cefepime   IVPB 2000 milliGRAM(s) IV Intermittent every 24 hours  dextrose 40% Gel 15 Gram(s) Oral once  dextrose 5%. 1000 milliLiter(s) (50 mL/Hr) IV Continuous <Continuous>  dextrose 5%. 1000 milliLiter(s) (100 mL/Hr) IV Continuous <Continuous>  dextrose 50% Injectable 25 Gram(s) IV Push once  dextrose 50% Injectable 12.5 Gram(s) IV Push once  dextrose 50% Injectable 25 Gram(s) IV Push once  epoetin orestes-epbx (RETACRIT) Injectable 27104 Unit(s) SubCutaneous every 7 days  folic acid 1 milliGRAM(s) Oral daily  furosemide   Injectable 40 milliGRAM(s) IV Push daily  glucagon  Injectable 1 milliGRAM(s) IntraMuscular once  heparin   Injectable 5000 Unit(s) SubCutaneous every 8 hours  hydrALAZINE 25 milliGRAM(s) Oral every 8 hours  insulin lispro (ADMELOG) corrective regimen sliding scale   SubCutaneous three times a day before meals  insulin lispro (ADMELOG) corrective regimen sliding scale   SubCutaneous at bedtime  pantoprazole    Tablet 40 milliGRAM(s) Oral before breakfast    T(F): 98.1 (06-25-21 @ 04:00), Max: 98.7 (06-24-21 @ 16:00)  HR: 73 (06-25-21 @ 10:00)  BP: 144/72 (06-25-21 @ 10:00)  RR: 12 (06-25-21 @ 10:00)  SpO2: 98% (06-25-21 @ 10:00)  Wt(kg): --    PHYSICAL EXAM:  General: alert, no acute distress  Eyes:  anicteric, no conjunctival injection, no discharge  Oropharynx: no lesions or injection 	  Neck: supple, without adenopathy  Lungs: clear to auscultation, decreased at bases  Heart: regular rate and rhythm; no murmur, rubs or gallops  Abdomen: soft, nondistended, nontender, without mass or organomegaly  Skin: no lesions  Extremities: no clubbing, cyanosis, or edema  Neurologic: alert, oriented, moves all extremities    LAB RESULTS:                        7.8    6.15  )-----------( 195      ( 25 Jun 2021 05:30 )             23.9     06-25    132<L>  |  92<L>  |  33<H>  ----------------------------<  116<H>  3.8   |  35<H>  |  1.45<H>    Ca    8.7      25 Jun 2021 05:30  Phos  4.2     06-25  Mg     2.0     06-25    TPro  6.9  /  Alb  2.7<L>  /  TBili  0.4  /  DBili  x   /  AST  23  /  ALT  20  /  AlkPhos  73  06-24    LIVER FUNCTIONS - ( 24 Jun 2021 06:30 )  Alb: 2.7 g/dL / Pro: 6.9 g/dL / ALK PHOS: 73 U/L / ALT: 20 U/L / AST: 23 U/L / GGT: x             MICROBIOLOGY:  RECENT CULTURES:  06-22 @ 23:29 .Blood Blood-Peripheral     No growth to date.          RADIOLOGY REVIEWED:    < from: Xray Chest 1 View- PORTABLE-Routine (06.24.21 @ 09:03) >  IMPRESSION:  Decreased congestive changes.    Thank you for the courtesy of this referral.      < end of copied text >

## 2021-06-25 NOTE — PROGRESS NOTE ADULT - SUBJECTIVE AND OBJECTIVE BOX
Follow-up Critical Care Progress Note  Chief Complaint : Heart failure          No new events overnight.    patient more lethargic today  no cough, secretions  opens eyes and is verbal to touch but falls asleep very easily      Allergies :adhesives (Pruritus; Blisters)  penicillin (Hives)  pineapple (Anaphylaxis)      PAST MEDICAL & SURGICAL HISTORY:  Hypertension    Hyperlipidemia    Diabetes    Glaucoma    Osteoarthritis    Small bowel obstruction    S/P hysterectomy  1981 and Oopherectomy in 1990s    S/P cholecystectomy  1981    Achilles tendon injury  repair  right scalene muscle release    S/P knee replacement, left        Medications:  MEDICATIONS  (STANDING):  albuterol/ipratropium for Nebulization 3 milliLiter(s) Nebulizer three times a day  ARIPiprazole 10 milliGRAM(s) Oral at bedtime  atorvastatin 40 milliGRAM(s) Oral at bedtime  cefepime   IVPB 2000 milliGRAM(s) IV Intermittent every 24 hours  dextrose 40% Gel 15 Gram(s) Oral once  dextrose 5%. 1000 milliLiter(s) (50 mL/Hr) IV Continuous <Continuous>  dextrose 5%. 1000 milliLiter(s) (100 mL/Hr) IV Continuous <Continuous>  dextrose 50% Injectable 25 Gram(s) IV Push once  dextrose 50% Injectable 12.5 Gram(s) IV Push once  dextrose 50% Injectable 25 Gram(s) IV Push once  epoetin orestes-epbx (RETACRIT) Injectable 17987 Unit(s) SubCutaneous every 7 days  folic acid 1 milliGRAM(s) Oral daily  furosemide   Injectable 40 milliGRAM(s) IV Push daily  glucagon  Injectable 1 milliGRAM(s) IntraMuscular once  heparin   Injectable 5000 Unit(s) SubCutaneous every 8 hours  hydrALAZINE 25 milliGRAM(s) Oral every 8 hours  insulin lispro (ADMELOG) corrective regimen sliding scale   SubCutaneous three times a day before meals  insulin lispro (ADMELOG) corrective regimen sliding scale   SubCutaneous at bedtime  pantoprazole    Tablet 40 milliGRAM(s) Oral before breakfast    MEDICATIONS  (PRN):  acetaminophen   Tablet .. 650 milliGRAM(s) Oral every 6 hours PRN Temp greater or equal to 38C (100.4F), Mild Pain (1 - 3)  albuterol/ipratropium for Nebulization 3 milliLiter(s) Nebulizer every 4 hours PRN Shortness of Breath and/or Wheezing  ondansetron Injectable 4 milliGRAM(s) IV Push every 6 hours PRN Nausea and/or Vomiting    COVID  06-20-21 @ 20:20  COVID -   NotDetec  06-12-21 @ 05:45  COVID -   NotDetec  06-05-21 @ 18:40  COVID -   NotDetec  05-28-21 @ 13:40  COVID -   NotDetec  08-25-20 @ 21:45  COVID -   NotDete      COVID Biomarkers    06-23-21 @ 10:30 ESR --  ---  CRP --  ---  DDimer  700<H>   ---   LDH --   ---   Ferritin --    06-22-21 @ 05:45 ESR --  ---  CRP --  ---  DDimer  --   ---   LDH --   ---   Ferritin 112    06-21-21 @ 09:14 ESR --  ---  CRP --  ---  DDimer  463<H>   ---   LDH --   ---   Ferritin --    06-10-21 @ 06:00 ESR --  ---  CRP --  ---  DDimer  1030<H>   ---   LDH --   ---   Ferritin --            Trend Cardiac Enzymes  06-23-21 @ 10:30  [id number]-CPKMM/Trop I - -- - --  - --  -  --  /  .055    Trend BNP  06-23-21 @ 10:30   -  2026<H>  06-19-21 @ 06:51   -  643<H>  06-08-21 @ 05:40   -  572<H>    Procalcitonin Trend  06-24-21 @ 06:30   -   0.18<H>  06-22-21 @ 05:45   -   0.16<H>  06-19-21 @ 06:51   -   0.08    WBC Trend  06-25-21 @ 05:30   -  6.15  06-24-21 @ 06:30   -  8.39  06-23-21 @ 06:00   -  6.51    H/H Trend  06-25-21 @ 05:30   -   7.8<L>/ 23.9<L>  06-24-21 @ 06:30   -   9.5<L>/ 28.6<L>  06-23-21 @ 06:00   -   7.8<L>/ 23.3<L>  06-22-21 @ 05:45   -   8.3<L>/ 25.1<L>  06-21-21 @ 06:30   -   8.6<L>/ 26.2<L>  06-21-21 @ 03:21   -   9.3<L>/ 28.5<L>    Stool Occult Blood    Platelet Trend  06-25-21 @ 05:30   -  195  06-24-21 @ 06:30   -  233  06-23-21 @ 06:00   -  193    Trend Sodium  06-25-21 @ 05:30   -  132<L>  06-24-21 @ 06:30   -  130<L>  06-23-21 @ 06:00   -  130<L>    Trend Potassium  06-25-21 @ 05:30   -  3.8  06-24-21 @ 06:30   -  4.3  06-23-21 @ 06:00   -  4.2    Trend Bun/Cr  06-25-21 @ 05:30  BUN/CR -  33<H> / 1.45<H>  06-24-21 @ 06:30  BUN/CR -  29<H> / 1.44<H>  06-23-21 @ 06:00  BUN/CR -  30<H> / 1.72<H>       ABG Trend  06-25-21 @ 06:27   - 7.36/63<H>/72<L>/94  06-24-21 @ 05:00   - 7.41/54<H>/56<L>/89<L>  06-22-21 @ 20:15   - 7.34<L>/59<H>/91/96  06-21-21 @ 03:30   - 7.31<L>/55<H>/104/98<H>    Trend AST/ALT/ALK Phos/Bili  06-24-21 @ 06:30   23/20/73/0.4  06-21-21 @ 06:30   25/25/90/0.3  06-19-21 @ 06:51   22/21/86/0.3  06-17-21 @ 06:00   18/20/91/0.3  06-14-21 @ 06:15   18/19/89/0.2  06-10-21 @ 06:00   16/14/72/0.1<L>       Albumin Trend  06-24-21 @ 06:30   -   2.7<L>  06-21-21 @ 06:30   -   2.9<L>  06-19-21 @ 06:51   -   2.8<L>  06-17-21 @ 06:00   -   2.7<L>  06-14-21 @ 06:15   -   2.6<L>  06-10-21 @ 06:00   -   2.4<L>      PTT - PT - INR Trend  06-21-21 @ 09:14   -   31.3 - 11.7 - 0.97  05-30-21 @ 07:52   -   31.0 - 14.1<H> - 1.23<H>  05-28-21 @ 21:48   -   33.7 - 20.9<H> - 1.86<H>  05-28-21 @ 13:39   -   36.3<H> - 25.4<H> - 2.28<H>    Glucose Trend  06-25-21 @ 11:46   -  -- -- 182<H>  06-25-21 @ 08:01   -  -- -- 130<H>  06-25-21 @ 05:30   -  -- 116<H> --  06-24-21 @ 22:02   -  -- -- 170<H>  06-24-21 @ 17:12   -  -- -- 231<H>  06-24-21 @ 11:51   -  -- -- 212<H>  06-24-21 @ 07:46   -  -- -- 155<H>  06-24-21 @ 06:30   -  -- 136<H> --  06-23-21 @ 21:42   -  -- -- 136<H>  06-23-21 @ 16:31   -  -- -- 214<H>    A1C with Estimated Average Glucose Result: 7.7 % *H* [4.0 - 5.6] (05-30-21 @ 07:52)      LABS:                        7.8    6.15  )-----------( 195      ( 25 Jun 2021 05:30 )             23.9     06-25    132<L>  |  92<L>  |  33<H>  ----------------------------<  116<H>  3.8   |  35<H>  |  1.45<H>    Ca    8.7      25 Jun 2021 05:30  Phos  4.2     06-25  Mg     2.0     06-25    TPro  6.9  /  Alb  2.7<L>  /  TBili  0.4  /  DBili  x   /  AST  23  /  ALT  20  /  AlkPhos  73  06-24             Procalcitonin, Serum: 0.18 ng/mL (06-24-21 @ 06:30)    Serum Pro-Brain Natriuretic Peptide: 2026 pg/mL (06-23-21 @ 10:30)        CULTURES: (if applicable)    Culture - Blood (collected 06-22-21 @ 23:29)  Source: .Blood Blood-Peripheral  Preliminary Report (06-24-21 @ 04:01):    No growth to date.    Culture - Blood (collected 06-22-21 @ 23:29)  Source: .Blood Blood-Peripheral  Preliminary Report (06-24-21 @ 04:01):    No growth to date.          ABG - ( 25 Jun 2021 06:27 )  pH, Arterial: 7.36  pH, Blood: x     /  pCO2: 63    /  pO2: 72    / HCO3: x     / Base Excess: x     /  SaO2: 94                CAPILLARY BLOOD GLUCOSE      POCT Blood Glucose.: 182 mg/dL (25 Jun 2021 11:46)      RADIOLOGY  CXR:  low lung volumes    VITALS:  T(C): 36.7 (06-25-21 @ 04:00), Max: 37.1 (06-24-21 @ 16:00)  T(F): 98.1 (06-25-21 @ 04:00), Max: 98.7 (06-24-21 @ 16:00)  HR: 68 (06-25-21 @ 11:25) (60 - 86)  BP: 114/56 (06-25-21 @ 11:25) (92/54 - 150/59)  BP(mean): 74 (06-25-21 @ 11:00) (61 - 95)  ABP: --  ABP(mean): --  RR: 17 (06-25-21 @ 11:00) (11 - 19)  SpO2: 95% (06-25-21 @ 11:25) (92% - 100%)  CVP(mm Hg): --  CVP(cm H2O): --    Ins and Outs     06-24-21 @ 07:01  -  06-25-21 @ 07:00  --------------------------------------------------------  IN: 100 mL / OUT: 1900 mL / NET: -1800 mL    06-25-21 @ 07:01  -  06-25-21 @ 11:53  --------------------------------------------------------  IN: 100 mL / OUT: 0 mL / NET: 100 mL          Weight (kg): 88 (06-23-21 @ 00:00)        I&O's Detail    24 Jun 2021 07:01  -  25 Jun 2021 07:00  --------------------------------------------------------  IN:    IV PiggyBack: 50 mL    IV PiggyBack: 50 mL  Total IN: 100 mL    OUT:    Voided (mL): 1900 mL  Total OUT: 1900 mL    Total NET: -1800 mL      25 Jun 2021 07:01  -  25 Jun 2021 11:53  --------------------------------------------------------  IN:    Oral Fluid: 100 mL  Total IN: 100 mL    OUT:  Total OUT: 0 mL    Total NET: 100 mL

## 2021-06-26 LAB
ANION GAP SERPL CALC-SCNC: -1 MMOL/L — LOW (ref 5–17)
BUN SERPL-MCNC: 34 MG/DL — HIGH (ref 7–23)
CALCIUM SERPL-MCNC: 8.7 MG/DL — SIGNIFICANT CHANGE UP (ref 8.4–10.5)
CHLORIDE SERPL-SCNC: 95 MMOL/L — LOW (ref 96–108)
CO2 SERPL-SCNC: 39 MMOL/L — HIGH (ref 22–31)
CREAT SERPL-MCNC: 1.4 MG/DL — HIGH (ref 0.5–1.3)
GLUCOSE BLDC GLUCOMTR-MCNC: 138 MG/DL — HIGH (ref 70–99)
GLUCOSE BLDC GLUCOMTR-MCNC: 148 MG/DL — HIGH (ref 70–99)
GLUCOSE BLDC GLUCOMTR-MCNC: 231 MG/DL — HIGH (ref 70–99)
GLUCOSE BLDC GLUCOMTR-MCNC: 237 MG/DL — HIGH (ref 70–99)
GLUCOSE SERPL-MCNC: 120 MG/DL — HIGH (ref 70–99)
HCT VFR BLD CALC: 24 % — LOW (ref 34.5–45)
HGB BLD-MCNC: 7.8 G/DL — LOW (ref 11.5–15.5)
MAGNESIUM SERPL-MCNC: 1.9 MG/DL — SIGNIFICANT CHANGE UP (ref 1.6–2.6)
MCHC RBC-ENTMCNC: 29.2 PG — SIGNIFICANT CHANGE UP (ref 27–34)
MCHC RBC-ENTMCNC: 32.5 GM/DL — SIGNIFICANT CHANGE UP (ref 32–36)
MCV RBC AUTO: 89.9 FL — SIGNIFICANT CHANGE UP (ref 80–100)
NRBC # BLD: 0 /100 WBCS — SIGNIFICANT CHANGE UP (ref 0–0)
PHOSPHATE SERPL-MCNC: 4 MG/DL — SIGNIFICANT CHANGE UP (ref 2.5–4.5)
PLATELET # BLD AUTO: 199 K/UL — SIGNIFICANT CHANGE UP (ref 150–400)
POTASSIUM SERPL-MCNC: 3.9 MMOL/L — SIGNIFICANT CHANGE UP (ref 3.5–5.3)
POTASSIUM SERPL-SCNC: 3.9 MMOL/L — SIGNIFICANT CHANGE UP (ref 3.5–5.3)
RBC # BLD: 2.67 M/UL — LOW (ref 3.8–5.2)
RBC # FLD: 12.3 % — SIGNIFICANT CHANGE UP (ref 10.3–14.5)
SODIUM SERPL-SCNC: 133 MMOL/L — LOW (ref 135–145)
WBC # BLD: 6 K/UL — SIGNIFICANT CHANGE UP (ref 3.8–10.5)
WBC # FLD AUTO: 6 K/UL — SIGNIFICANT CHANGE UP (ref 3.8–10.5)

## 2021-06-26 PROCEDURE — 71045 X-RAY EXAM CHEST 1 VIEW: CPT | Mod: 26

## 2021-06-26 RX ADMIN — Medication 4: at 17:49

## 2021-06-26 RX ADMIN — Medication 40 MILLIGRAM(S): at 05:40

## 2021-06-26 RX ADMIN — Medication 25 MILLIGRAM(S): at 05:45

## 2021-06-26 RX ADMIN — PANTOPRAZOLE SODIUM 40 MILLIGRAM(S): 20 TABLET, DELAYED RELEASE ORAL at 05:40

## 2021-06-26 RX ADMIN — Medication 1 MILLIGRAM(S): at 11:43

## 2021-06-26 RX ADMIN — Medication 25 MILLIGRAM(S): at 13:37

## 2021-06-26 RX ADMIN — ARIPIPRAZOLE 10 MILLIGRAM(S): 15 TABLET ORAL at 23:04

## 2021-06-26 RX ADMIN — Medication 3 MILLILITER(S): at 16:30

## 2021-06-26 RX ADMIN — HEPARIN SODIUM 5000 UNIT(S): 5000 INJECTION INTRAVENOUS; SUBCUTANEOUS at 13:37

## 2021-06-26 RX ADMIN — Medication 3 MILLILITER(S): at 21:35

## 2021-06-26 RX ADMIN — ATORVASTATIN CALCIUM 40 MILLIGRAM(S): 80 TABLET, FILM COATED ORAL at 23:04

## 2021-06-26 RX ADMIN — Medication 3 MILLILITER(S): at 09:36

## 2021-06-26 RX ADMIN — HEPARIN SODIUM 5000 UNIT(S): 5000 INJECTION INTRAVENOUS; SUBCUTANEOUS at 23:04

## 2021-06-26 RX ADMIN — HEPARIN SODIUM 5000 UNIT(S): 5000 INJECTION INTRAVENOUS; SUBCUTANEOUS at 05:39

## 2021-06-26 RX ADMIN — Medication 4: at 11:43

## 2021-06-26 RX ADMIN — CEFEPIME 100 MILLIGRAM(S): 1 INJECTION, POWDER, FOR SOLUTION INTRAMUSCULAR; INTRAVENOUS at 15:09

## 2021-06-26 NOTE — PROGRESS NOTE ADULT - SUBJECTIVE AND OBJECTIVE BOX
Follow-up Critical Care Progress Note  Chief Complaint : Heart failure      Patient alert verbal responsive back to baseline comfortable  used NIV yesterday during day but not at night      Allergies :adhesives (Pruritus; Blisters)  penicillin (Hives)  pineapple (Anaphylaxis)      PAST MEDICAL & SURGICAL HISTORY:  Hypertension    Hyperlipidemia    Diabetes    Glaucoma    Osteoarthritis    Small bowel obstruction    S/P hysterectomy  1981 and Oopherectomy in 1990s    S/P cholecystectomy  1981    Achilles tendon injury  repair  right scalene muscle release    S/P knee replacement, left        Medications:  MEDICATIONS  (STANDING):  albuterol/ipratropium for Nebulization 3 milliLiter(s) Nebulizer three times a day  ARIPiprazole 10 milliGRAM(s) Oral at bedtime  atorvastatin 40 milliGRAM(s) Oral at bedtime  cefepime   IVPB 2000 milliGRAM(s) IV Intermittent every 24 hours  dextrose 40% Gel 15 Gram(s) Oral once  dextrose 5%. 1000 milliLiter(s) (50 mL/Hr) IV Continuous <Continuous>  dextrose 5%. 1000 milliLiter(s) (100 mL/Hr) IV Continuous <Continuous>  dextrose 50% Injectable 25 Gram(s) IV Push once  dextrose 50% Injectable 12.5 Gram(s) IV Push once  dextrose 50% Injectable 25 Gram(s) IV Push once  epoetin orestes-epbx (RETACRIT) Injectable 40028 Unit(s) SubCutaneous every 7 days  folic acid 1 milliGRAM(s) Oral daily  furosemide   Injectable 40 milliGRAM(s) IV Push daily  glucagon  Injectable 1 milliGRAM(s) IntraMuscular once  heparin   Injectable 5000 Unit(s) SubCutaneous every 8 hours  hydrALAZINE 25 milliGRAM(s) Oral every 8 hours  insulin lispro (ADMELOG) corrective regimen sliding scale   SubCutaneous three times a day before meals  insulin lispro (ADMELOG) corrective regimen sliding scale   SubCutaneous at bedtime  pantoprazole    Tablet 40 milliGRAM(s) Oral before breakfast    MEDICATIONS  (PRN):  acetaminophen   Tablet .. 650 milliGRAM(s) Oral every 6 hours PRN Temp greater or equal to 38C (100.4F), Mild Pain (1 - 3)  albuterol/ipratropium for Nebulization 3 milliLiter(s) Nebulizer every 4 hours PRN Shortness of Breath and/or Wheezing  ondansetron Injectable 4 milliGRAM(s) IV Push every 6 hours PRN Nausea and/or Vomiting    COVID  06-20-21 @ 20:20  COVID -   NotDete  06-12-21 @ 05:45  COVID -   NotDete  06-05-21 @ 18:40  COVID -   NotDete  05-28-21 @ 13:40  COVID -   NotDetec  08-25-20 @ 21:45  COVID -   NotDetec      COVID Biomarkers    06-23-21 @ 10:30 ESR --  ---  CRP --  ---  DDimer  700<H>   ---   LDH --   ---   Ferritin --    06-22-21 @ 05:45 ESR --  ---  CRP --  ---  DDimer  --   ---   LDH --   ---   Ferritin 112    06-21-21 @ 09:14 ESR --  ---  CRP --  ---  DDimer  463<H>   ---   LDH --   ---   Ferritin --    06-10-21 @ 06:00 ESR --  ---  CRP --  ---  DDimer  1030<H>   ---   LDH --   ---   Ferritin --         Trend BNP  06-23-21 @ 10:30   -  2026<H>  06-19-21 @ 06:51   -  643<H>  06-08-21 @ 05:40   -  572<H>    Procalcitonin Trend  06-24-21 @ 06:30   -   0.18<H>  06-22-21 @ 05:45   -   0.16<H>  06-19-21 @ 06:51   -   0.08    WBC Trend  06-26-21 @ 06:00   -  6.00  06-25-21 @ 05:30   -  6.15  06-24-21 @ 06:30   -  8.39    H/H Trend  06-26-21 @ 06:00   -   7.8<L>/ 24.0<L>  06-25-21 @ 05:30   -   7.8<L>/ 23.9<L>  06-24-21 @ 06:30   -   9.5<L>/ 28.6<L>  06-23-21 @ 06:00   -   7.8<L>/ 23.3<L>  06-22-21 @ 05:45   -   8.3<L>/ 25.1<L>  06-21-21 @ 06:30   -   8.6<L>/ 26.2<L>    Stool Occult Blood    Platelet Trend  06-26-21 @ 06:00   -  199  06-25-21 @ 05:30   -  195  06-24-21 @ 06:30   -  233    Trend Sodium  06-26-21 @ 06:00   -  133<L>  06-25-21 @ 05:30   -  132<L>  06-24-21 @ 06:30   -  130<L>    Trend Potassium  06-26-21 @ 06:00   -  3.9  06-25-21 @ 05:30   -  3.8  06-24-21 @ 06:30   -  4.3    Trend Bun/Cr  06-26-21 @ 06:00  BUN/CR -  34<H> / 1.40<H>  06-25-21 @ 05:30  BUN/CR -  33<H> / 1.45<H>  06-24-21 @ 06:30  BUN/CR -  29<H> / 1.44<H>    Lactic Acid Trend    ABG Trend  06-25-21 @ 14:10   - 7.39/58<H>/100/98<H>  06-25-21 @ 06:27   - 7.36/63<H>/72<L>/94  06-24-21 @ 05:00   - 7.41/54<H>/56<L>/89<L>  06-22-21 @ 20:15   - 7.34<L>/59<H>/91/96  06-21-21 @ 03:30   - 7.31<L>/55<H>/104/98<H>    Trend AST/ALT/ALK Phos/Bili  06-24-21 @ 06:30   23/20/73/0.4  06-21-21 @ 06:30   25/25/90/0.3  06-19-21 @ 06:51   22/21/86/0.3  06-17-21 @ 06:00   18/20/91/0.3  06-14-21 @ 06:15   18/19/89/0.2  06-10-21 @ 06:00   16/14/72/0.1<L>  06-03-21 @ 06:20   13/8/56/0.3<L>  06-01-21 @ 07:15   14/8/69/0.4  05-31-21 @ 10:33   15/8/72/0.4  05-30-21 @ 07:53   13/7/75/0.5  05-28-21 @ 13:39   15/7/80/0.4  08-25-20 @ 21:40   17/13/62/0.4       Albumin Trend  06-24-21 @ 06:30   -   2.7<L>  06-21-21 @ 06:30   -   2.9<L>  06-19-21 @ 06:51   -   2.8<L>  06-17-21 @ 06:00   -   2.7<L>  06-14-21 @ 06:15   -   2.6<L>  06-10-21 @ 06:00   -   2.4<L>      PTT - PT - INR Trend  06-21-21 @ 09:14   -   31.3 - 11.7 - 0.97  05-30-21 @ 07:52   -   31.0 - 14.1<H> - 1.23<H>  05-28-21 @ 21:48   -   33.7 - 20.9<H> - 1.86<H>  05-28-21 @ 13:39   -   36.3<H> - 25.4<H> - 2.28<H>    Glucose Trend  06-26-21 @ 08:42   -  -- -- 148<H>  06-26-21 @ 06:00   -  -- 120<H> --  06-25-21 @ 21:31   -  -- -- 176<H>  06-25-21 @ 17:21   -  -- -- 132<H>  06-25-21 @ 11:46   -  -- -- 182<H>  06-25-21 @ 08:01   -  -- -- 130<H>  06-25-21 @ 05:30   -  -- 116<H> --  06-24-21 @ 22:02   -  -- -- 170<H>  06-24-21 @ 17:12   -  -- -- 231<H>  06-24-21 @ 11:51   -  -- -- 212<H>    A1C with Estimated Average Glucose Result: 7.7 % *H* [4.0 - 5.6] (05-30-21 @ 07:52)      LABS:                        7.8    6.00  )-----------( 199      ( 26 Jun 2021 06:00 )             24.0     06-26    133<L>  |  95<L>  |  34<H>  ----------------------------<  120<H>  3.9   |  39<H>  |  1.40<H>    Ca    8.7      26 Jun 2021 06:00  Phos  4.0     06-26  Mg     1.9     06-26         CAPILLARY BLOOD GLUCOSE      POCT Blood Glucose.: 148 mg/dL (26 Jun 2021 08:42)      RADIOLOGY  CXR:  b/l small effusions and mild vascular congestion         VITALS:  T(C): 36.7 (06-26-21 @ 08:00), Max: 37.2 (06-25-21 @ 17:00)  T(F): 98.1 (06-26-21 @ 08:00), Max: 98.9 (06-25-21 @ 17:00)  HR: 77 (06-26-21 @ 10:00) (54 - 79)  BP: 101/60 (06-26-21 @ 10:00) (96/49 - 138/53)  BP(mean): 71 (06-26-21 @ 10:00) (63 - 90)  ABP: --  ABP(mean): --  RR: 16 (06-26-21 @ 10:00) (12 - 17)  SpO2: 94% (06-26-21 @ 10:00) (87% - 99%)  CVP(mm Hg): --  CVP(cm H2O): --    Ins and Outs     06-25-21 @ 07:01  -  06-26-21 @ 07:00  --------------------------------------------------------  IN: 100 mL / OUT: 1700 mL / NET: -1600 mL    06-26-21 @ 07:01  -  06-26-21 @ 10:59  --------------------------------------------------------  IN: 120 mL / OUT: 0 mL / NET: 120 mL                I&O's Detail    25 Jun 2021 07:01  -  26 Jun 2021 07:00  --------------------------------------------------------  IN:    Oral Fluid: 100 mL  Total IN: 100 mL    OUT:    Voided (mL): 1700 mL  Total OUT: 1700 mL    Total NET: -1600 mL      26 Jun 2021 07:01  -  26 Jun 2021 10:59  --------------------------------------------------------  IN:    Oral Fluid: 120 mL  Total IN: 120 mL    OUT:  Total OUT: 0 mL    Total NET: 120 mL

## 2021-06-26 NOTE — PROGRESS NOTE ADULT - ASSESSMENT
Physical Examination:  GENERAL:               Alert, Oriented, No acute distress.    HEENT:                    No JVD, dry MM  PULM:                     Bilateral air entry, Clear to auscultation bilaterally, no significant sputum production, No Rales, No Rhonchi, No Wheezing  CVS:                         S1, S2,  +murmur  ABD:                        Soft, nondistended, nontender, normoactive bowel sounds,   EXT:                         no Edema , nontender, No Cyanosis or Clubbing   NEURO:                  Alert interactive, nonfocal, follows commands  PSYC:                      Calm, + Insight.      Assessment  Acute hypoxic and hypercarbic respiratory failure likely due to underlying pneumonia vs Atelectasis vs acute on chronic diastolic CHF  h/o DVT in past, doubt if active vte at this time, patient high risk for a/c   ckd 3 as per renal  Underlying HTN, DM2, Bipolar d/o      Plan  AVAPS QHS  continue Diuresis  PT, OOB  Taper o2 to n/c  Incentive spirometry  ABX as per ID  Incentive spirometry  PT/OT/OOB  renal fu  Cardio f/u  continue IV lasix  PT/OT/Rehab eval to return to [facility] when able     Sarah antony primary pulmalinda Caban [phone] - called and discussed with staff, and patient never been in office.    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 Caban    Sedation & Analgesia:	n/a  Diet/Nutrition:		oral Diet   GI PPx:			PPI    DVT Ppx:		Hep sq  	       Activity:		advance as tolerated                 Head of Bed:               35-45  Glycemic Control:        Insulin    Lines: PIV  Restraints were deemed necessary to prevent removal of life-sustaining devices [  ] YES   [   x ]  NO    Disposition: can transfer to [facility], case d/w Dr. Chandler who will accept pt upon transfer    Goals of Care: Full code

## 2021-06-27 LAB
ANION GAP SERPL CALC-SCNC: 3 MMOL/L — LOW (ref 5–17)
BUN SERPL-MCNC: 37 MG/DL — HIGH (ref 7–23)
CALCIUM SERPL-MCNC: 9.2 MG/DL — SIGNIFICANT CHANGE UP (ref 8.4–10.5)
CHLORIDE SERPL-SCNC: 96 MMOL/L — SIGNIFICANT CHANGE UP (ref 96–108)
CO2 BLDA-SCNC: 23 MMOL/L — SIGNIFICANT CHANGE UP (ref 22–30)
CO2 SERPL-SCNC: 37 MMOL/L — HIGH (ref 22–31)
CREAT SERPL-MCNC: 1.35 MG/DL — HIGH (ref 0.5–1.3)
GAS PNL BLDA: SIGNIFICANT CHANGE UP
GLUCOSE BLDC GLUCOMTR-MCNC: 139 MG/DL — HIGH (ref 70–99)
GLUCOSE BLDC GLUCOMTR-MCNC: 168 MG/DL — HIGH (ref 70–99)
GLUCOSE BLDC GLUCOMTR-MCNC: 178 MG/DL — HIGH (ref 70–99)
GLUCOSE BLDC GLUCOMTR-MCNC: 214 MG/DL — HIGH (ref 70–99)
GLUCOSE SERPL-MCNC: 119 MG/DL — HIGH (ref 70–99)
HCT VFR BLD CALC: 23.2 % — LOW (ref 34.5–45)
HGB BLD-MCNC: 7.5 G/DL — LOW (ref 11.5–15.5)
HOROWITZ INDEX BLDA+IHG-RTO: SIGNIFICANT CHANGE UP
MAGNESIUM SERPL-MCNC: 1.9 MG/DL — SIGNIFICANT CHANGE UP (ref 1.6–2.6)
MCHC RBC-ENTMCNC: 29.8 PG — SIGNIFICANT CHANGE UP (ref 27–34)
MCHC RBC-ENTMCNC: 32.3 GM/DL — SIGNIFICANT CHANGE UP (ref 32–36)
MCV RBC AUTO: 92.1 FL — SIGNIFICANT CHANGE UP (ref 80–100)
NRBC # BLD: 0 /100 WBCS — SIGNIFICANT CHANGE UP (ref 0–0)
NT-PROBNP SERPL-SCNC: 2023 PG/ML — HIGH (ref 0–300)
PCO2 BLDA: 33 MMHG — SIGNIFICANT CHANGE UP (ref 32–46)
PH BLDA: 7.44 — SIGNIFICANT CHANGE UP (ref 7.35–7.45)
PHOSPHATE SERPL-MCNC: 3.4 MG/DL — SIGNIFICANT CHANGE UP (ref 2.5–4.5)
PLATELET # BLD AUTO: 210 K/UL — SIGNIFICANT CHANGE UP (ref 150–400)
PO2 BLDA: 145 MMHG — HIGH (ref 74–108)
POTASSIUM SERPL-MCNC: 3.9 MMOL/L — SIGNIFICANT CHANGE UP (ref 3.5–5.3)
POTASSIUM SERPL-SCNC: 3.9 MMOL/L — SIGNIFICANT CHANGE UP (ref 3.5–5.3)
RBC # BLD: 2.52 M/UL — LOW (ref 3.8–5.2)
RBC # FLD: 12.1 % — SIGNIFICANT CHANGE UP (ref 10.3–14.5)
SAO2 % BLDA: 99 % — HIGH (ref 92–96)
SODIUM SERPL-SCNC: 136 MMOL/L — SIGNIFICANT CHANGE UP (ref 135–145)
WBC # BLD: 5.99 K/UL — SIGNIFICANT CHANGE UP (ref 3.8–10.5)
WBC # FLD AUTO: 5.99 K/UL — SIGNIFICANT CHANGE UP (ref 3.8–10.5)

## 2021-06-27 PROCEDURE — 71045 X-RAY EXAM CHEST 1 VIEW: CPT | Mod: 26

## 2021-06-27 PROCEDURE — 76604 US EXAM CHEST: CPT | Mod: 26

## 2021-06-27 RX ADMIN — ATORVASTATIN CALCIUM 40 MILLIGRAM(S): 80 TABLET, FILM COATED ORAL at 21:08

## 2021-06-27 RX ADMIN — HEPARIN SODIUM 5000 UNIT(S): 5000 INJECTION INTRAVENOUS; SUBCUTANEOUS at 05:42

## 2021-06-27 RX ADMIN — CEFEPIME 100 MILLIGRAM(S): 1 INJECTION, POWDER, FOR SOLUTION INTRAMUSCULAR; INTRAVENOUS at 17:38

## 2021-06-27 RX ADMIN — HEPARIN SODIUM 5000 UNIT(S): 5000 INJECTION INTRAVENOUS; SUBCUTANEOUS at 13:02

## 2021-06-27 RX ADMIN — Medication 25 MILLIGRAM(S): at 21:09

## 2021-06-27 RX ADMIN — Medication 25 MILLIGRAM(S): at 13:02

## 2021-06-27 RX ADMIN — Medication 3 MILLILITER(S): at 08:08

## 2021-06-27 RX ADMIN — Medication 3 MILLILITER(S): at 15:44

## 2021-06-27 RX ADMIN — HEPARIN SODIUM 5000 UNIT(S): 5000 INJECTION INTRAVENOUS; SUBCUTANEOUS at 21:09

## 2021-06-27 RX ADMIN — Medication 2: at 17:37

## 2021-06-27 RX ADMIN — Medication 3 MILLILITER(S): at 21:11

## 2021-06-27 RX ADMIN — PANTOPRAZOLE SODIUM 40 MILLIGRAM(S): 20 TABLET, DELAYED RELEASE ORAL at 05:42

## 2021-06-27 RX ADMIN — ARIPIPRAZOLE 10 MILLIGRAM(S): 15 TABLET ORAL at 21:09

## 2021-06-27 RX ADMIN — Medication 4: at 11:16

## 2021-06-27 RX ADMIN — Medication 1 MILLIGRAM(S): at 11:01

## 2021-06-27 RX ADMIN — Medication 40 MILLIGRAM(S): at 05:42

## 2021-06-27 NOTE — PROGRESS NOTE ADULT - SUBJECTIVE AND OBJECTIVE BOX
Follow-up Critical Care Progress Note  Chief Complaint : Heart failure          No new events overnight.  Denies SOB/CP.   waxing waning mental status with out evidence of hypercarbia        Allergies :adhesives (Pruritus; Blisters)  penicillin (Hives)  pineapple (Anaphylaxis)      PAST MEDICAL & SURGICAL HISTORY:  Hypertension    Hyperlipidemia    Diabetes    Glaucoma    Osteoarthritis    Small bowel obstruction    S/P hysterectomy  1981 and Oopherectomy in 1990s    S/P cholecystectomy  1981    Achilles tendon injury  repair  right scalene muscle release    S/P knee replacement, left        Medications:  MEDICATIONS  (STANDING):  albuterol/ipratropium for Nebulization 3 milliLiter(s) Nebulizer three times a day  ARIPiprazole 10 milliGRAM(s) Oral at bedtime  atorvastatin 40 milliGRAM(s) Oral at bedtime  cefepime   IVPB 2000 milliGRAM(s) IV Intermittent every 24 hours  dextrose 40% Gel 15 Gram(s) Oral once  dextrose 5%. 1000 milliLiter(s) (50 mL/Hr) IV Continuous <Continuous>  dextrose 5%. 1000 milliLiter(s) (100 mL/Hr) IV Continuous <Continuous>  dextrose 50% Injectable 25 Gram(s) IV Push once  dextrose 50% Injectable 12.5 Gram(s) IV Push once  dextrose 50% Injectable 25 Gram(s) IV Push once  epoetin orestes-epbx (RETACRIT) Injectable 40052 Unit(s) SubCutaneous every 7 days  folic acid 1 milliGRAM(s) Oral daily  furosemide   Injectable 40 milliGRAM(s) IV Push daily  glucagon  Injectable 1 milliGRAM(s) IntraMuscular once  heparin   Injectable 5000 Unit(s) SubCutaneous every 8 hours  hydrALAZINE 25 milliGRAM(s) Oral every 8 hours  insulin lispro (ADMELOG) corrective regimen sliding scale   SubCutaneous three times a day before meals  insulin lispro (ADMELOG) corrective regimen sliding scale   SubCutaneous at bedtime  pantoprazole    Tablet 40 milliGRAM(s) Oral before breakfast    MEDICATIONS  (PRN):  acetaminophen   Tablet .. 650 milliGRAM(s) Oral every 6 hours PRN Temp greater or equal to 38C (100.4F), Mild Pain (1 - 3)  albuterol/ipratropium for Nebulization 3 milliLiter(s) Nebulizer every 4 hours PRN Shortness of Breath and/or Wheezing  ondansetron Injectable 4 milliGRAM(s) IV Push every 6 hours PRN Nausea and/or Vomiting    Home Medications:  acetaminophen 325 mg oral tablet: 2 tab(s) orally every 6 hours, As needed, Temp greater or equal to 38C (100.4F), Mild Pain (1 - 3), Moderate Pain (4 - 6) (21 Jun 2021 03:26)  albuterol 90 mcg/inh inhalation aerosol: 2 puff(s) inhaled every 6 hours (21 Jun 2021 03:30)  amLODIPine 10 mg oral tablet: 1 tab(s) orally once a day (21 Jun 2021 03:30)  ARIPiprazole 10 mg oral tablet: 1 tab(s) orally once a day (at bedtime) (21 Jun 2021 03:30)  atorvastatin 40 mg oral tablet: 1 tab(s) orally once a day (at bedtime) (21 Jun 2021 03:30)  chlorhexidine 0.12% mucous membrane liquid: 15 milliliter(s) mucous membrane 2 times a day (21 Jun 2021 03:30)  fluticasone 50 mcg/inh nasal spray: 1 spray(s) nasal 2 times a day (21 Jun 2021 03:30)  fluvoxaMINE 100 mg oral tablet: 1 tab(s) orally once a day (at bedtime) (21 Jun 2021 03:30)  folic acid 1 mg oral tablet: 1 tab(s) orally once a day (21 Jun 2021 03:30)  hydrALAZINE 25 mg oral tablet: 1 tab(s) orally every 8 hours (21 Jun 2021 03:30)  metFORMIN 500 mg oral tablet: 1 tab(s) orally 2 times a day (21 Jun 2021 03:30)  nystatin 100,000 units/g topical powder: 1 application topically 2 times a day (21 Jun 2021 03:30)  ondansetron 4 mg oral tablet: 1 tab(s) orally every 8 hours, As needed, Nausea and/or Vomiting (21 Jun 2021 03:30)      COVID  06-20-21 @ 20:20  COVID -   NotDetec  06-12-21 @ 05:45  COVID -   NotDetec  06-05-21 @ 18:40  COVID -   NotDetec  05-28-21 @ 13:40  COVID -   NotDetec  08-25-20 @ 21:45  COVID -   NotDetec      COVID Biomarkers    06-23-21 @ 10:30 ESR --  ---  CRP --  ---  DDimer  700<H>   ---   LDH --   ---   Ferritin --    06-22-21 @ 05:45 ESR --  ---  CRP --  ---  DDimer  --   ---   LDH --   ---   Ferritin 112    06-21-21 @ 09:14 ESR --  ---  CRP --  ---  DDimer  463<H>   ---   LDH --   ---   Ferritin --    06-10-21 @ 06:00 ESR --  ---  CRP --  ---  DDimer  1030<H>   ---   LDH --   ---   Ferritin --            Trend Cardiac Enzymes    Trend BNP  06-27-21 @ 06:40   -  2023<H>  06-23-21 @ 10:30   -  2026<H>  06-19-21 @ 06:51   -  643<H>  06-08-21 @ 05:40   -  572<H>    Procalcitonin Trend  06-24-21 @ 06:30   -   0.18<H>  06-22-21 @ 05:45   -   0.16<H>  06-19-21 @ 06:51   -   0.08    WBC Trend  06-27-21 @ 06:40   -  5.99  06-26-21 @ 06:00   -  6.00  06-25-21 @ 05:30   -  6.15    H/H Trend  06-27-21 @ 06:40   -   7.5<L>/ 23.2<L>  06-26-21 @ 06:00   -   7.8<L>/ 24.0<L>  06-25-21 @ 05:30   -   7.8<L>/ 23.9<L>  06-24-21 @ 06:30   -   9.5<L>/ 28.6<L>  06-23-21 @ 06:00   -   7.8<L>/ 23.3<L>  06-22-21 @ 05:45   -   8.3<L>/ 25.1<L>    Stool Occult Blood    Platelet Trend  06-27-21 @ 06:40   -  210  06-26-21 @ 06:00   -  199  06-25-21 @ 05:30   -  195    Trend Sodium  06-27-21 @ 06:40   -  136  06-26-21 @ 06:00   -  133<L>  06-25-21 @ 05:30   -  132<L>    Trend Potassium  06-27-21 @ 06:40   -  3.9  06-26-21 @ 06:00   -  3.9  06-25-21 @ 05:30   -  3.8    Trend Bun/Cr  06-27-21 @ 06:40  BUN/CR -  37<H> / 1.35<H>  06-26-21 @ 06:00  BUN/CR -  34<H> / 1.40<H>  06-25-21 @ 05:30  BUN/CR -  33<H> / 1.45<H>    Lactic Acid Trend    ABG Trend  06-27-21 @ 06:20   - 7.44/33/145<H>/99<H>  06-25-21 @ 14:10   - 7.39/58<H>/100/98<H>  06-25-21 @ 06:27   - 7.36/63<H>/72<L>/94  06-24-21 @ 05:00   - 7.41/54<H>/56<L>/89<L>  06-22-21 @ 20:15   - 7.34<L>/59<H>/91/96  06-21-21 @ 03:30   - 7.31<L>/55<H>/104/98<H>    Trend AST/ALT/ALK Phos/Bili  06-24-21 @ 06:30   23/20/73/0.4  06-21-21 @ 06:30   25/25/90/0.3  06-19-21 @ 06:51   22/21/86/0.3  06-17-21 @ 06:00   18/20/91/0.3  06-14-21 @ 06:15   18/19/89/0.2  06-10-21 @ 06:00   16/14/72/0.1<L>  06-03-21 @ 06:20   13/8/56/0.3<L>  06-01-21 @ 07:15   14/8/69/0.4  05-31-21 @ 10:33   15/8/72/0.4  05-30-21 @ 07:53   13/7/75/0.5  05-28-21 @ 13:39   15/7/80/0.4  08-25-20 @ 21:40   17/13/62/0.4      Ammonia Trend      Amylase / Lipase Trend      Albumin Trend  06-24-21 @ 06:30   -   2.7<L>  06-21-21 @ 06:30   -   2.9<L>  06-19-21 @ 06:51   -   2.8<L>  06-17-21 @ 06:00   -   2.7<L>  06-14-21 @ 06:15   -   2.6<L>  06-10-21 @ 06:00   -   2.4<L>      PTT - PT - INR Trend  06-21-21 @ 09:14   -   31.3 - 11.7 - 0.97  05-30-21 @ 07:52   -   31.0 - 14.1<H> - 1.23<H>  05-28-21 @ 21:48   -   33.7 - 20.9<H> - 1.86<H>  05-28-21 @ 13:39   -   36.3<H> - 25.4<H> - 2.28<H>    Glucose Trend  06-27-21 @ 07:54   -  -- -- 139<H>  06-27-21 @ 06:40   -  -- 119<H> --  06-26-21 @ 23:14   -  -- -- 138<H>  06-26-21 @ 17:46   -  -- -- 237<H>  06-26-21 @ 11:15   -  -- -- 231<H>  06-26-21 @ 08:42   -  -- -- 148<H>  06-26-21 @ 06:00   -  -- 120<H> --  06-25-21 @ 21:31   -  -- -- 176<H>  06-25-21 @ 17:21   -  -- -- 132<H>  06-25-21 @ 11:46   -  -- -- 182<H>    A1C with Estimated Average Glucose Result: 7.7 % *H* [4.0 - 5.6] (05-30-21 @ 07:52)      LABS:                        7.5    5.99  )-----------( 210      ( 27 Jun 2021 06:40 )             23.2     06-27    136  |  96  |  37<H>  ----------------------------<  119<H>  3.9   |  37<H>  |  1.35<H>    Ca    9.2      27 Jun 2021 06:40  Phos  3.4     06-27  Mg     1.9     06-27      HIT ab -- 06-23 @ 10:30  HIT ab EIA --  D Dimer -700  HIT ab -- 06-21 @ 09:14  HIT ab EIA --  D Dimer -463                Serum Pro-Brain Natriuretic Peptide: 2023 pg/mL (06-27-21 @ 06:40)        CULTURES: (if applicable)    Culture - Blood (collected 06-22-21 @ 23:29)  Source: .Blood Blood-Peripheral  Preliminary Report (06-24-21 @ 04:01):    No growth to date.    Culture - Blood (collected 06-22-21 @ 23:29)  Source: .Blood Blood-Peripheral  Preliminary Report (06-24-21 @ 04:01):    No growth to date.          ABG - ( 27 Jun 2021 06:20 )  pH, Arterial: 7.44  pH, Blood: x     /  pCO2: 33    /  pO2: 145   / HCO3: x     / Base Excess: x     /  SaO2: 99                CAPILLARY BLOOD GLUCOSE      POCT Blood Glucose.: 139 mg/dL (27 Jun 2021 07:54)      RADIOLOGY  CXR:      CT:    ECHO:      VITALS:  T(C): 36.9 (06-27-21 @ 04:00), Max: 36.9 (06-27-21 @ 00:00)  T(F): 98.4 (06-27-21 @ 04:00), Max: 98.5 (06-27-21 @ 00:00)  HR: 57 (06-27-21 @ 06:00) (57 - 89)  BP: 139/57 (06-27-21 @ 06:00) (104/49 - 140/70)  BP(mean): 83 (06-27-21 @ 06:00) (63 - 102)  ABP: --  ABP(mean): --  RR: 13 (06-27-21 @ 06:00) (13 - 22)  SpO2: 89% (06-27-21 @ 06:00) (89% - 99%)  CVP(mm Hg): --  CVP(cm H2O): --    Ins and Outs     06-26-21 @ 07:01  -  06-27-21 @ 07:00  --------------------------------------------------------  IN: 530 mL / OUT: 1350 mL / NET: -820 mL                I&O's Detail    26 Jun 2021 07:01  -  27 Jun 2021 07:00  --------------------------------------------------------  IN:    IV PiggyBack: 50 mL    Oral Fluid: 480 mL  Total IN: 530 mL    OUT:    Incontinent per Collection Bag (mL): 1350 mL  Total OUT: 1350 mL    Total NET: -820 mL                  Follow-up Critical Care Progress Note  Chief Complaint : Heart failure          No new events overnight.  Denies SOB/CP.   waxing waning mental status with out evidence of hypercarbia        Allergies :adhesives (Pruritus; Blisters)  penicillin (Hives)  pineapple (Anaphylaxis)      PAST MEDICAL & SURGICAL HISTORY:  Hypertension    Hyperlipidemia    Diabetes    Glaucoma    Osteoarthritis    Small bowel obstruction    S/P hysterectomy  1981 and Oopherectomy in 1990s    S/P cholecystectomy  1981    Achilles tendon injury  repair  right scalene muscle release    S/P knee replacement, left        Medications:  MEDICATIONS  (STANDING):  albuterol/ipratropium for Nebulization 3 milliLiter(s) Nebulizer three times a day  ARIPiprazole 10 milliGRAM(s) Oral at bedtime  atorvastatin 40 milliGRAM(s) Oral at bedtime  cefepime   IVPB 2000 milliGRAM(s) IV Intermittent every 24 hours  dextrose 40% Gel 15 Gram(s) Oral once  dextrose 5%. 1000 milliLiter(s) (50 mL/Hr) IV Continuous <Continuous>  dextrose 5%. 1000 milliLiter(s) (100 mL/Hr) IV Continuous <Continuous>  dextrose 50% Injectable 25 Gram(s) IV Push once  dextrose 50% Injectable 12.5 Gram(s) IV Push once  dextrose 50% Injectable 25 Gram(s) IV Push once  epoetin orestes-epbx (RETACRIT) Injectable 38388 Unit(s) SubCutaneous every 7 days  folic acid 1 milliGRAM(s) Oral daily  furosemide   Injectable 40 milliGRAM(s) IV Push daily  glucagon  Injectable 1 milliGRAM(s) IntraMuscular once  heparin   Injectable 5000 Unit(s) SubCutaneous every 8 hours  hydrALAZINE 25 milliGRAM(s) Oral every 8 hours  insulin lispro (ADMELOG) corrective regimen sliding scale   SubCutaneous three times a day before meals  insulin lispro (ADMELOG) corrective regimen sliding scale   SubCutaneous at bedtime  pantoprazole    Tablet 40 milliGRAM(s) Oral before breakfast    MEDICATIONS  (PRN):  acetaminophen   Tablet .. 650 milliGRAM(s) Oral every 6 hours PRN Temp greater or equal to 38C (100.4F), Mild Pain (1 - 3)  albuterol/ipratropium for Nebulization 3 milliLiter(s) Nebulizer every 4 hours PRN Shortness of Breath and/or Wheezing  ondansetron Injectable 4 milliGRAM(s) IV Push every 6 hours PRN Nausea and/or Vomiting    Home Medications:  acetaminophen 325 mg oral tablet: 2 tab(s) orally every 6 hours, As needed, Temp greater or equal to 38C (100.4F), Mild Pain (1 - 3), Moderate Pain (4 - 6) (21 Jun 2021 03:26)  albuterol 90 mcg/inh inhalation aerosol: 2 puff(s) inhaled every 6 hours (21 Jun 2021 03:30)  amLODIPine 10 mg oral tablet: 1 tab(s) orally once a day (21 Jun 2021 03:30)  ARIPiprazole 10 mg oral tablet: 1 tab(s) orally once a day (at bedtime) (21 Jun 2021 03:30)  atorvastatin 40 mg oral tablet: 1 tab(s) orally once a day (at bedtime) (21 Jun 2021 03:30)  chlorhexidine 0.12% mucous membrane liquid: 15 milliliter(s) mucous membrane 2 times a day (21 Jun 2021 03:30)  fluticasone 50 mcg/inh nasal spray: 1 spray(s) nasal 2 times a day (21 Jun 2021 03:30)  fluvoxaMINE 100 mg oral tablet: 1 tab(s) orally once a day (at bedtime) (21 Jun 2021 03:30)  folic acid 1 mg oral tablet: 1 tab(s) orally once a day (21 Jun 2021 03:30)  hydrALAZINE 25 mg oral tablet: 1 tab(s) orally every 8 hours (21 Jun 2021 03:30)  metFORMIN 500 mg oral tablet: 1 tab(s) orally 2 times a day (21 Jun 2021 03:30)  nystatin 100,000 units/g topical powder: 1 application topically 2 times a day (21 Jun 2021 03:30)  ondansetron 4 mg oral tablet: 1 tab(s) orally every 8 hours, As needed, Nausea and/or Vomiting (21 Jun 2021 03:30)      COVID  06-20-21 @ 20:20  COVID -   NotDetec  06-12-21 @ 05:45  COVID -   NotDetec  06-05-21 @ 18:40  COVID -   NotDetec  05-28-21 @ 13:40  COVID -   NotDetec  08-25-20 @ 21:45  COVID -   NotDetec      COVID Biomarkers    06-23-21 @ 10:30 ESR --  ---  CRP --  ---  DDimer  700<H>   ---   LDH --   ---   Ferritin --    06-22-21 @ 05:45 ESR --  ---  CRP --  ---  DDimer  --   ---   LDH --   ---   Ferritin 112    06-21-21 @ 09:14 ESR --  ---  CRP --  ---  DDimer  463<H>   ---   LDH --   ---   Ferritin --    06-10-21 @ 06:00 ESR --  ---  CRP --  ---  DDimer  1030<H>   ---   LDH --   ---   Ferritin --            Trend Cardiac Enzymes    Trend BNP  06-27-21 @ 06:40   -  2023<H>  06-23-21 @ 10:30   -  2026<H>  06-19-21 @ 06:51   -  643<H>  06-08-21 @ 05:40   -  572<H>    Procalcitonin Trend  06-24-21 @ 06:30   -   0.18<H>  06-22-21 @ 05:45   -   0.16<H>  06-19-21 @ 06:51   -   0.08    WBC Trend  06-27-21 @ 06:40   -  5.99  06-26-21 @ 06:00   -  6.00  06-25-21 @ 05:30   -  6.15    H/H Trend  06-27-21 @ 06:40   -   7.5<L>/ 23.2<L>  06-26-21 @ 06:00   -   7.8<L>/ 24.0<L>  06-25-21 @ 05:30   -   7.8<L>/ 23.9<L>  06-24-21 @ 06:30   -   9.5<L>/ 28.6<L>  06-23-21 @ 06:00   -   7.8<L>/ 23.3<L>  06-22-21 @ 05:45   -   8.3<L>/ 25.1<L>    Stool Occult Blood    Platelet Trend  06-27-21 @ 06:40   -  210  06-26-21 @ 06:00   -  199  06-25-21 @ 05:30   -  195    Trend Sodium  06-27-21 @ 06:40   -  136  06-26-21 @ 06:00   -  133<L>  06-25-21 @ 05:30   -  132<L>    Trend Potassium  06-27-21 @ 06:40   -  3.9  06-26-21 @ 06:00   -  3.9  06-25-21 @ 05:30   -  3.8    Trend Bun/Cr  06-27-21 @ 06:40  BUN/CR -  37<H> / 1.35<H>  06-26-21 @ 06:00  BUN/CR -  34<H> / 1.40<H>  06-25-21 @ 05:30  BUN/CR -  33<H> / 1.45<H>    Lactic Acid Trend    ABG Trend  06-27-21 @ 06:20   - 7.44/33/145<H>/99<H>  06-25-21 @ 14:10   - 7.39/58<H>/100/98<H>  06-25-21 @ 06:27   - 7.36/63<H>/72<L>/94  06-24-21 @ 05:00   - 7.41/54<H>/56<L>/89<L>  06-22-21 @ 20:15   - 7.34<L>/59<H>/91/96  06-21-21 @ 03:30   - 7.31<L>/55<H>/104/98<H>    Trend AST/ALT/ALK Phos/Bili  06-24-21 @ 06:30   23/20/73/0.4  06-21-21 @ 06:30   25/25/90/0.3  06-19-21 @ 06:51   22/21/86/0.3  06-17-21 @ 06:00   18/20/91/0.3  06-14-21 @ 06:15   18/19/89/0.2  06-10-21 @ 06:00   16/14/72/0.1<L>        Albumin Trend  06-24-21 @ 06:30   -   2.7<L>  06-21-21 @ 06:30   -   2.9<L>  06-19-21 @ 06:51   -   2.8<L>  06-17-21 @ 06:00   -   2.7<L>  06-14-21 @ 06:15   -   2.6<L>  06-10-21 @ 06:00   -   2.4<L>      PTT - PT - INR Trend  06-21-21 @ 09:14   -   31.3 - 11.7 - 0.97  05-30-21 @ 07:52   -   31.0 - 14.1<H> - 1.23<H>  05-28-21 @ 21:48   -   33.7 - 20.9<H> - 1.86<H>  05-28-21 @ 13:39   -   36.3<H> - 25.4<H> - 2.28<H>    Glucose Trend  06-27-21 @ 07:54   -  -- -- 139<H>  06-27-21 @ 06:40   -  -- 119<H> --  06-26-21 @ 23:14   -  -- -- 138<H>  06-26-21 @ 17:46   -  -- -- 237<H>  06-26-21 @ 11:15   -  -- -- 231<H>  06-26-21 @ 08:42   -  -- -- 148<H>  06-26-21 @ 06:00   -  -- 120<H> --  06-25-21 @ 21:31   -  -- -- 176<H>  06-25-21 @ 17:21   -  -- -- 132<H>  06-25-21 @ 11:46   -  -- -- 182<H>    A1C with Estimated Average Glucose Result: 7.7 % *H* [4.0 - 5.6] (05-30-21 @ 07:52)      LABS:                        7.5    5.99  )-----------( 210      ( 27 Jun 2021 06:40 )             23.2     06-27    136  |  96  |  37<H>  ----------------------------<  119<H>  3.9   |  37<H>  |  1.35<H>    Ca    9.2      27 Jun 2021 06:40  Phos  3.4     06-27  Mg     1.9     06-27         CULTURES: (if applicable)    Culture - Blood (collected 06-22-21 @ 23:29)  Source: .Blood Blood-Peripheral  Preliminary Report (06-24-21 @ 04:01):    No growth to date.    Culture - Blood (collected 06-22-21 @ 23:29)  Source: .Blood Blood-Peripheral  Preliminary Report (06-24-21 @ 04:01):    No growth to date.          ABG - ( 27 Jun 2021 06:20 )  pH, Arterial: 7.44  pH, Blood: x     /  pCO2: 33    /  pO2: 145   / HCO3: x     / Base Excess: x     /  SaO2: 99                   RADIOLOGY  CXR:    < from: Xray Chest 1 View- PORTABLE-Routine (06.26.21 @ 07:49) >  FINDINGS:    The lungs show residual  RIGHT greater than LEFT  pleural effusions and LEFT retrocardiac airspace consolidation with air bronchograms..  No pneumothorax.    The  heart is enlarged intransverse diameter. No hilar mass.   Visualized osseous structures are intact.     shunt catheter overlies RIGHT hemithorax.    IMPRESSION:    Residual RIGHT greater than LEFT pleural effusions and/or LEFT retrocardiac airspace consolidation.      < end of copied text >    CT:  < from: CT Head No Cont (06.25.21 @ 18:32) >  IMPRESSION: No acute intracranial findings.    Redemonstration of a right-sided posterior parietal approach ventriculoperitoneal shunt catheter with unchanged ventricular size and configuration when compared with 6/21/2021.    Chronic bilateral cerebellar hemisphere infarcts.        < end of copied text >    ECHO:      < from: TTE Echo Limited or F/U (06.22.21 @ 13:30) >    Summary:   1. Left ventricular ejection fraction, by visual estimation, is 65 to 70%.   2. Normal global left ventricular systolic function.   3. Increased LV wall thickness.   4. Normal left ventricular internal cavity size.   5. Spectral Doppler shows restrictive pattern of left ventricular myocardial filling (Grade III diastolic dysfunction).   6. There is mild concentric left ventricular hypertrophy.   7. Mild mitral annular calcification.   8. Mild mitral valve regurgitation.   9. Mild tricuspid regurgitation.  10. Mild pulmonic valve regurgitation.  11. Estimated pulmonary artery systolic pressure is 41.1 mmHg assuming a right atrial pressure of 10 mmHg, which is consistent with mild pulmonary hypertension.  12. Dvi by tvi is .35 argues against severe aortic stenosis.  13. The 2 d appearance appears to be moderate aortic stenosis.  14. Compared with a prior echo 6/9/21 there is no significant change.  15. Peak transaortic gradient equals 14.9 mmHg, mean transaortic gradient equals 8.9 mmHg, the calculated aortic valve area equals 1.33 cm² by the continuity equation consistent with moderate aortic stenosis.      < end of copied text >  VITALS:  T(C): 36.9 (06-27-21 @ 04:00), Max: 36.9 (06-27-21 @ 00:00)  T(F): 98.4 (06-27-21 @ 04:00), Max: 98.5 (06-27-21 @ 00:00)  HR: 57 (06-27-21 @ 06:00) (57 - 89)  BP: 139/57 (06-27-21 @ 06:00) (104/49 - 140/70)  BP(mean): 83 (06-27-21 @ 06:00) (63 - 102)  ABP: --  ABP(mean): --  RR: 13 (06-27-21 @ 06:00) (13 - 22)  SpO2: 89% (06-27-21 @ 06:00) (89% - 99%)  CVP(mm Hg): --  CVP(cm H2O): --    Ins and Outs     06-26-21 @ 07:01  -  06-27-21 @ 07:00  --------------------------------------------------------  IN: 530 mL / OUT: 1350 mL / NET: -820 mL                I&O's Detail    26 Jun 2021 07:01  -  27 Jun 2021 07:00  --------------------------------------------------------  IN:    IV PiggyBack: 50 mL    Oral Fluid: 480 mL  Total IN: 530 mL    OUT:    Incontinent per Collection Bag (mL): 1350 mL  Total OUT: 1350 mL    Total NET: -820 mL

## 2021-06-27 NOTE — PROGRESS NOTE ADULT - ASSESSMENT
Physical Examination:  GENERAL:               lethargic , Oriented, No acute distress.    HEENT:                    No JVD, dry MM  PULM:                     Bilateral air entry, Clear to auscultation bilaterally, no significant sputum production, No Rales, No Rhonchi, No Wheezing  CVS:                         S1, S2,  +murmur  ABD:                        Soft, nondistended, nontender, normoactive bowel sounds,   EXT:                         no Edema , nontender, No Cyanosis or Clubbing   NEURO:                  lethargic interactive, nonfocal, follows commands  PSYC:                      Calm, + Insight.      Assessment  Acute hypoxic and hypercarbic respiratory failure likely due to underlying pneumonia vs Atelectasis vs acute on chronic diastolic CHF  h/o DVT in past, doubt if active vte at this time, patient high risk for a/c   ckd 3 as per renal  Underlying HTN, DM2, Bipolar d/o      Plan  AVAPS QHS  continue Diuresis  PT, OOB  Taper o2 to n/c  Incentive spirometry  ABX as per ID finishing course in am.   Incentive spirometry  PT/OT/OOB  renal fu  With waxing waning mental status will check EEG  will consider modafanil     Psyc eval   Neuro eval called will see in am.   Cardio f/u  continue IV lasix  PT/OT/Rehab eval to return to [facility] when able     Sarah antony primary sena Caban [phone] - called and discussed with staff, and patient never been in office.  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 Caban    Sedation & Analgesia:	n/a  Diet/Nutrition:		oral Diet   GI PPx:			PPI    DVT Ppx:		Hep sq  	       Activity:		advance as tolerated                 Head of Bed:               35-45  Glycemic Control:        Insulin    Lines: PIV  Restraints were deemed necessary to prevent removal of life-sustaining devices [  ] YES   [   x ]  NO    Disposition: awaiting transfer to [facility], case d/w Dr. Chandler who will accept pt upon transfer    Goals of Care: Full code

## 2021-06-28 DIAGNOSIS — F31.60 BIPOLAR DISORDER, CURRENT EPISODE MIXED, UNSPECIFIED: ICD-10-CM

## 2021-06-28 DIAGNOSIS — F31.9 BIPOLAR DISORDER, UNSPECIFIED: ICD-10-CM

## 2021-06-28 DIAGNOSIS — D64.9 ANEMIA, UNSPECIFIED: ICD-10-CM

## 2021-06-28 LAB
ALBUMIN SERPL ELPH-MCNC: 2.3 G/DL — LOW (ref 3.3–5)
ALP SERPL-CCNC: 116 U/L — SIGNIFICANT CHANGE UP (ref 40–120)
ALT FLD-CCNC: 40 U/L — SIGNIFICANT CHANGE UP (ref 10–45)
ANION GAP SERPL CALC-SCNC: 2 MMOL/L — LOW (ref 5–17)
AST SERPL-CCNC: 39 U/L — SIGNIFICANT CHANGE UP (ref 10–40)
BILIRUB SERPL-MCNC: 0.2 MG/DL — SIGNIFICANT CHANGE UP (ref 0.2–1.2)
BLD GP AB SCN SERPL QL: SIGNIFICANT CHANGE UP
BUN SERPL-MCNC: 34 MG/DL — HIGH (ref 7–23)
CALCIUM SERPL-MCNC: 9 MG/DL — SIGNIFICANT CHANGE UP (ref 8.4–10.5)
CHLORIDE SERPL-SCNC: 98 MMOL/L — SIGNIFICANT CHANGE UP (ref 96–108)
CO2 SERPL-SCNC: 38 MMOL/L — HIGH (ref 22–31)
CREAT SERPL-MCNC: 1.22 MG/DL — SIGNIFICANT CHANGE UP (ref 0.5–1.3)
CULTURE RESULTS: SIGNIFICANT CHANGE UP
CULTURE RESULTS: SIGNIFICANT CHANGE UP
GLUCOSE BLDC GLUCOMTR-MCNC: 128 MG/DL — HIGH (ref 70–99)
GLUCOSE BLDC GLUCOMTR-MCNC: 139 MG/DL — HIGH (ref 70–99)
GLUCOSE BLDC GLUCOMTR-MCNC: 151 MG/DL — HIGH (ref 70–99)
GLUCOSE BLDC GLUCOMTR-MCNC: 208 MG/DL — HIGH (ref 70–99)
GLUCOSE SERPL-MCNC: 144 MG/DL — HIGH (ref 70–99)
HCT VFR BLD CALC: 25 % — LOW (ref 34.5–45)
HGB BLD-MCNC: 8 G/DL — LOW (ref 11.5–15.5)
MAGNESIUM SERPL-MCNC: 1.7 MG/DL — SIGNIFICANT CHANGE UP (ref 1.6–2.6)
MCHC RBC-ENTMCNC: 29.9 PG — SIGNIFICANT CHANGE UP (ref 27–34)
MCHC RBC-ENTMCNC: 32 GM/DL — SIGNIFICANT CHANGE UP (ref 32–36)
MCV RBC AUTO: 93.3 FL — SIGNIFICANT CHANGE UP (ref 80–100)
NRBC # BLD: 0 /100 WBCS — SIGNIFICANT CHANGE UP (ref 0–0)
NT-PROBNP SERPL-SCNC: 2154 PG/ML — HIGH (ref 0–300)
PHOSPHATE SERPL-MCNC: 2.8 MG/DL — SIGNIFICANT CHANGE UP (ref 2.5–4.5)
PLATELET # BLD AUTO: 238 K/UL — SIGNIFICANT CHANGE UP (ref 150–400)
POTASSIUM SERPL-MCNC: 4 MMOL/L — SIGNIFICANT CHANGE UP (ref 3.5–5.3)
POTASSIUM SERPL-SCNC: 4 MMOL/L — SIGNIFICANT CHANGE UP (ref 3.5–5.3)
PROCALCITONIN SERPL-MCNC: 0.16 NG/ML — HIGH
PROT SERPL-MCNC: 6.5 G/DL — SIGNIFICANT CHANGE UP (ref 6–8.3)
RBC # BLD: 2.68 M/UL — LOW (ref 3.8–5.2)
RBC # FLD: 12.4 % — SIGNIFICANT CHANGE UP (ref 10.3–14.5)
SODIUM SERPL-SCNC: 138 MMOL/L — SIGNIFICANT CHANGE UP (ref 135–145)
SPECIMEN SOURCE: SIGNIFICANT CHANGE UP
SPECIMEN SOURCE: SIGNIFICANT CHANGE UP
WBC # BLD: 6.16 K/UL — SIGNIFICANT CHANGE UP (ref 3.8–10.5)
WBC # FLD AUTO: 6.16 K/UL — SIGNIFICANT CHANGE UP (ref 3.8–10.5)

## 2021-06-28 PROCEDURE — 99232 SBSQ HOSP IP/OBS MODERATE 35: CPT

## 2021-06-28 PROCEDURE — 99223 1ST HOSP IP/OBS HIGH 75: CPT

## 2021-06-28 RX ORDER — FERROUS SULFATE 325(65) MG
325 TABLET ORAL DAILY
Refills: 0 | Status: DISCONTINUED | OUTPATIENT
Start: 2021-06-28 | End: 2021-07-01

## 2021-06-28 RX ORDER — THIAMINE MONONITRATE (VIT B1) 100 MG
100 TABLET ORAL DAILY
Refills: 0 | Status: DISCONTINUED | OUTPATIENT
Start: 2021-06-28 | End: 2021-07-01

## 2021-06-28 RX ADMIN — Medication 3 MILLILITER(S): at 21:27

## 2021-06-28 RX ADMIN — Medication 25 MILLIGRAM(S): at 14:27

## 2021-06-28 RX ADMIN — ATORVASTATIN CALCIUM 40 MILLIGRAM(S): 80 TABLET, FILM COATED ORAL at 22:01

## 2021-06-28 RX ADMIN — HEPARIN SODIUM 5000 UNIT(S): 5000 INJECTION INTRAVENOUS; SUBCUTANEOUS at 05:22

## 2021-06-28 RX ADMIN — HEPARIN SODIUM 5000 UNIT(S): 5000 INJECTION INTRAVENOUS; SUBCUTANEOUS at 14:28

## 2021-06-28 RX ADMIN — Medication 25 MILLIGRAM(S): at 22:01

## 2021-06-28 RX ADMIN — Medication 25 MILLIGRAM(S): at 05:22

## 2021-06-28 RX ADMIN — Medication 325 MILLIGRAM(S): at 17:06

## 2021-06-28 RX ADMIN — ARIPIPRAZOLE 10 MILLIGRAM(S): 15 TABLET ORAL at 22:01

## 2021-06-28 RX ADMIN — Medication 3 MILLILITER(S): at 15:31

## 2021-06-28 RX ADMIN — HEPARIN SODIUM 5000 UNIT(S): 5000 INJECTION INTRAVENOUS; SUBCUTANEOUS at 22:01

## 2021-06-28 RX ADMIN — PANTOPRAZOLE SODIUM 40 MILLIGRAM(S): 20 TABLET, DELAYED RELEASE ORAL at 05:22

## 2021-06-28 RX ADMIN — Medication 3 MILLILITER(S): at 08:47

## 2021-06-28 RX ADMIN — Medication 1 MILLIGRAM(S): at 14:27

## 2021-06-28 RX ADMIN — Medication 100 MILLIGRAM(S): at 17:05

## 2021-06-28 RX ADMIN — Medication 4: at 12:11

## 2021-06-28 RX ADMIN — Medication 40 MILLIGRAM(S): at 05:22

## 2021-06-28 NOTE — CONSULT NOTE ADULT - SUBJECTIVE AND OBJECTIVE BOX
81 W hx of HTN, DM, bipolar who is admitted for acute respiratory failure and consulted for signs of delirium.     Vital Signs Last 24 Hrs  T(C): 37.1 (28 Jun 2021 05:00), Max: 37.1 (28 Jun 2021 05:00)  T(F): 98.7 (28 Jun 2021 05:00), Max: 98.7 (28 Jun 2021 05:00)  HR: 72 (28 Jun 2021 05:00) (58 - 88)  BP: 121/43 (28 Jun 2021 05:00) (118/46 - 148/68)  BP(mean): 66 (27 Jun 2021 12:00) (66 - 78)  RR: 13 (28 Jun 2021 05:00) (12 - 18)  SpO2: 97% (28 Jun 2021 05:00) (92% - 98%)    AAO to name, place, year and president  PERRL, EOMI, face sym.  no pronator drift  lifts both legs against gravity  coord intact fnf    MEDICATIONS  (STANDING):  albuterol/ipratropium for Nebulization 3 milliLiter(s) Nebulizer three times a day  ARIPiprazole 10 milliGRAM(s) Oral at bedtime  atorvastatin 40 milliGRAM(s) Oral at bedtime  dextrose 40% Gel 15 Gram(s) Oral once  dextrose 5%. 1000 milliLiter(s) (50 mL/Hr) IV Continuous <Continuous>  dextrose 5%. 1000 milliLiter(s) (100 mL/Hr) IV Continuous <Continuous>  dextrose 50% Injectable 25 Gram(s) IV Push once  dextrose 50% Injectable 25 Gram(s) IV Push once  dextrose 50% Injectable 12.5 Gram(s) IV Push once  epoetin orestes-epbx (RETACRIT) Injectable 70370 Unit(s) SubCutaneous every 7 days  folic acid 1 milliGRAM(s) Oral daily  furosemide   Injectable 40 milliGRAM(s) IV Push daily  glucagon  Injectable 1 milliGRAM(s) IntraMuscular once  heparin   Injectable 5000 Unit(s) SubCutaneous every 8 hours  hydrALAZINE 25 milliGRAM(s) Oral every 8 hours  insulin lispro (ADMELOG) corrective regimen sliding scale   SubCutaneous three times a day before meals  insulin lispro (ADMELOG) corrective regimen sliding scale   SubCutaneous at bedtime  pantoprazole    Tablet 40 milliGRAM(s) Oral before breakfast    MEDICATIONS  (PRN):  acetaminophen   Tablet .. 650 milliGRAM(s) Oral every 6 hours PRN Temp greater or equal to 38C (100.4F), Mild Pain (1 - 3)  albuterol/ipratropium for Nebulization 3 milliLiter(s) Nebulizer every 4 hours PRN Shortness of Breath and/or Wheezing  ondansetron Injectable 4 milliGRAM(s) IV Push every 6 hours PRN Nausea and/or Vomiting    HCT: IMPRESSION: No acute intracranial findings.    Redemonstration of a right-sided posterior parietal approach ventriculoperitoneal shunt catheter with unchanged ventricular size and configuration when compared with 6/21/2021.    Chronic bilateral cerebellar hemisphere infarcts.    cxr pleural effusion   81 W hx of HTN, DM, bipolar who is admitted for acute respiratory failure and consulted for signs of delirium.     Vital Signs Last 24 Hrs  T(C): 37.1 (28 Jun 2021 05:00), Max: 37.1 (28 Jun 2021 05:00)  T(F): 98.7 (28 Jun 2021 05:00), Max: 98.7 (28 Jun 2021 05:00)  HR: 72 (28 Jun 2021 05:00) (58 - 88)  BP: 121/43 (28 Jun 2021 05:00) (118/46 - 148/68)  BP(mean): 66 (27 Jun 2021 12:00) (66 - 78)  RR: 13 (28 Jun 2021 05:00) (12 - 18)  SpO2: 97% (28 Jun 2021 05:00) (92% - 98%)    AAO to name, place, year and president  PERRL, EOMI, face sym.  no pronator drift  lifts both legs against gravity  coord intact fnf    MEDICATIONS  (STANDING):  albuterol/ipratropium for Nebulization 3 milliLiter(s) Nebulizer three times a day  ARIPiprazole 10 milliGRAM(s) Oral at bedtime  atorvastatin 40 milliGRAM(s) Oral at bedtime  dextrose 40% Gel 15 Gram(s) Oral once  dextrose 5%. 1000 milliLiter(s) (50 mL/Hr) IV Continuous <Continuous>  dextrose 5%. 1000 milliLiter(s) (100 mL/Hr) IV Continuous <Continuous>  dextrose 50% Injectable 25 Gram(s) IV Push once  dextrose 50% Injectable 25 Gram(s) IV Push once  dextrose 50% Injectable 12.5 Gram(s) IV Push once  epoetin orestes-epbx (RETACRIT) Injectable 09301 Unit(s) SubCutaneous every 7 days  folic acid 1 milliGRAM(s) Oral daily  furosemide   Injectable 40 milliGRAM(s) IV Push daily  glucagon  Injectable 1 milliGRAM(s) IntraMuscular once  heparin   Injectable 5000 Unit(s) SubCutaneous every 8 hours  hydrALAZINE 25 milliGRAM(s) Oral every 8 hours  insulin lispro (ADMELOG) corrective regimen sliding scale   SubCutaneous three times a day before meals  insulin lispro (ADMELOG) corrective regimen sliding scale   SubCutaneous at bedtime  pantoprazole    Tablet 40 milliGRAM(s) Oral before breakfast    MEDICATIONS  (PRN):  acetaminophen   Tablet .. 650 milliGRAM(s) Oral every 6 hours PRN Temp greater or equal to 38C (100.4F), Mild Pain (1 - 3)  albuterol/ipratropium for Nebulization 3 milliLiter(s) Nebulizer every 4 hours PRN Shortness of Breath and/or Wheezing  ondansetron Injectable 4 milliGRAM(s) IV Push every 6 hours PRN Nausea and/or Vomiting    HCT: IMPRESSION: No acute intracranial findings.    Redemonstration of a right-sided posterior parietal approach ventriculoperitoneal shunt catheter with unchanged ventricular size and configuration when compared with 6/21/2021.    Chronic bilateral cerebellar hemisphere infarcts.    cxr pleural effusion    81 W hx of HTN, DM, bipolar who is admitted for acute respiratory failure and consulted for signs of delirium. Will advise caution with furosemide as dehydration may cause delirium. Will avoid sedating medications. Can start thiamine to prevent wernicke's encephalopathy. In case of delirium, please use nonpharmacologic treatments such as reorientation and pablo chair.

## 2021-06-28 NOTE — EEG REPORT - NS EEG TEXT BOX
[facility]  Comprehensive Epilepsy Center  Report of Routine EEG with Video    [facility]: [address], [place] [address], Phone [phone]  [facility]: [address], [place] [address], Phone [phone]  [place] Office: [address], [address], [place] [address] Phone [phone]    [facility]: [address] [place] [address], Phone [phone]  [place] [facility]: [address] [place] [address], Phone [phone]    Patient Name: GREGORY WOODALL    Age: 81 year  : 1940  Patient ID: -, MRN #: -, Location: [facility] 15-  Referring Physician: AUGUST BASILIO  EEG #:     Study Date: 2021     Technical Information:					  On Instrument: -  Placement and Labeling of Electrodes:  The EEG was performed utilizing 20 channels referential EEG connections (coronal over temporal over parasagittal montage) using all standard 10-20 electrode placements. Recording was at a sampling rate of 256 samples per second per channel.  Time synchronized digital video recording was done simultaneously with EEG recording.  A low light infrared camera was used for low light recording.  Hector and seizure detection algorithms were utilized.    History:   81 y o F pt p/w signs of delirium, redemonstration of a right-sided posterior parietal approach ventriculoperitoneal shunt catheter a/w acute respiratory  failure PMHx DM Bipolar HTN R/o sz    Pertinent Medication  No Data.    Study Interpretation:  Findings: The background was continuous, spontaneously variable and reactive.   No posterior dominant rhythm seen.  Background predominantly consisted of theta, delta and faster activities, sharply contoured at times with a triphasic morphology.    Focal Slowing:   None were present.    Sleep Background:  Drowsiness was characterized by fragmentation, attenuation, and slowing of the background activity.    Stage II sleep transients were not recorded.    Other Non-Epileptiform Findings:  None were present.    Interictal Epileptiform Activity:   None were present.    Events:  Clinical events: None recorded.  Seizures: None recorded.    Activation Procedures:   Hyperventilation was not performed.    Photic stimulation was not performed.     Artifacts:  Intermittent myogenic and movement artifacts were noted.    EEG Summary / Classification:  Abnormal EEG   - Moderate generalized slowing with triphasic morphology.    EEG Impression / Clinical Correlate:  Abnormal EEG study.  Moderate nonspecific diffuse or multifocal cerebral dysfunction.   No clear epileptiform pattern and no seizure seen.    Mookie Cage MD  Attending Physician, [facility] Epile[facility] [place]

## 2021-06-28 NOTE — BH CONSULTATION LIAISON PROGRESS NOTE - NSBHCONSFOLLOWNEEDS_PSY_ALL_CORE
Unsure of disposition at this time due to being in the early process of admission to acute rehab. Pt will likely f/u with her outpatient prescriber Dr. Rashard Barr in [place]./No psychiatric contraindications to discharge

## 2021-06-28 NOTE — CHART NOTE - NSCHARTNOTEFT_GEN_A_CORE
Nutrition Follow Up Note  Hospital Course (Per Electronic Medical Record):   Source: Medical Record [X] Patient [X] Nursing Staff [X]     Diet: Consistent Carbohydrate , mechanical Soft with thin liquids , 1000cc FR, Glucerna Shake QD     Patient requires total assistance with meals , appetite is reported fair as per patient. PO intake appears to vary from %  , may consider liberalizing FR , Na138. & provide additional po supplements     Current Weight: (6/29) 156.9/71.2kg , ? current weight due to recent trend of follow up weights.     Pertinent Medications: MEDICATIONS  (STANDING):  albuterol/ipratropium for Nebulization 3 milliLiter(s) Nebulizer three times a day  ARIPiprazole 10 milliGRAM(s) Oral at bedtime  atorvastatin 40 milliGRAM(s) Oral at bedtime  dextrose 40% Gel 15 Gram(s) Oral once  dextrose 5%. 1000 milliLiter(s) (50 mL/Hr) IV Continuous <Continuous>  dextrose 5%. 1000 milliLiter(s) (100 mL/Hr) IV Continuous <Continuous>  dextrose 50% Injectable 12.5 Gram(s) IV Push once  dextrose 50% Injectable 25 Gram(s) IV Push once  dextrose 50% Injectable 25 Gram(s) IV Push once  epoetin orestes-epbx (RETACRIT) Injectable 00518 Unit(s) SubCutaneous every 7 days  folic acid 1 milliGRAM(s) Oral daily  furosemide   Injectable 40 milliGRAM(s) IV Push daily  glucagon  Injectable 1 milliGRAM(s) IntraMuscular once  heparin   Injectable 5000 Unit(s) SubCutaneous every 8 hours  hydrALAZINE 25 milliGRAM(s) Oral every 8 hours  insulin lispro (ADMELOG) corrective regimen sliding scale   SubCutaneous three times a day before meals  insulin lispro (ADMELOG) corrective regimen sliding scale   SubCutaneous at bedtime  pantoprazole    Tablet 40 milliGRAM(s) Oral before breakfast  thiamine 100 milliGRAM(s) Oral daily    MEDICATIONS  (PRN):  acetaminophen   Tablet .. 650 milliGRAM(s) Oral every 6 hours PRN Temp greater or equal to 38C (100.4F), Mild Pain (1 - 3)  albuterol/ipratropium for Nebulization 3 milliLiter(s) Nebulizer every 4 hours PRN Shortness of Breath and/or Wheezing  ondansetron Injectable 4 milliGRAM(s) IV Push every 6 hours PRN Nausea and/or Vomiting      Pertinent Labs:  06-28 Na138 mmol/L Glu 144 mg/dL<H> K+ 4.0 mmol/L Cr  1.22 mg/dL BUN 34 mg/dL<H> 06-28 Phos 2.8 mg/dL 06-28 Alb 2.3 g/dL<L> 05-30 Chol 133 mg/dL LDL --    HDL 54 mg/dL Trig 103 mg/dL  POCT 208,151,168,178      Skin: (L) arm tear     Edema: (+1) generalized edema     Last BM: (6/26)     Estimated Needs:   [X] No Change since Previous Assessment      Previous Nutrition Diagnosis: chewing & difficulty swallowing     Nutrition Diagnosis is [X] Ongoing       New Nutrition Diagnosis: [X] Not Applicable      Interventions:   1. Recommend liberalize FR   2. encourage po intake    Monitoring & Evaluation: will monitor:  [X] Weights   [X] PO Intake   [X] Follow Up (Per Protocol)  [X] Tolerance to Diet Prescription       RD to follow as per Nutrition protocol  Dilcia Coleman RDN

## 2021-06-28 NOTE — BH CONSULTATION LIAISON PROGRESS NOTE - NSBHCHARTREVIEWINVESTIGATE_PSY_A_CORE FT
< from: 12 Lead ECG (06.21.21 @ 03:24) >    Ventricular Rate 75 BPM    Atrial Rate 75 BPM    P-R Interval 186 ms    QRS Duration 84 ms    Q-T Interval 386 ms    QTC Calculation(Bazett) 431 ms    P Axis 23 degrees    R Axis 11 degrees    T Axis 31 degrees    Diagnosis Line Normal sinus rhythm      When compared with ECG of 16-JUN-2021 12:22,    Nonspecific T wave abnormality now evident in Inferior leads  Confirmed by KEIRA TUTTLE, WILLIAM ([id number]) on 6/21/2021 9:16:57 AM    < end of copied text >     < from: 12 Lead ECG (06.21.21 @ 03:24) >    Ventricular Rate 75 BPM    Atrial Rate 75 BPM    P-R Interval 186 ms    QRS Duration 84 ms    Q-T Interval 386 ms    QTC Calculation(Bazett) 431 ms    P Axis 23 degrees    R Axis 11 degrees    T Axis 31 degrees    Diagnosis Line Normal sinus rhythm      When compared with ECG of 16-JUN-2021 12:22,  Nonspecific T wave abnormality now evident in Inferior leads  Confirmed by WILLIAM CROCKETT MD ([id number]) on 6/21/2021 9:16:57 AM    < end of copied text >    < from: CT Head No Cont (06.25.21 @ 18:32) >  IMPRESSION: No acute intracranial findings.  Redemonstration of a right-sided posterior parietal approach ventriculoperitoneal shunt catheter with unchanged ventricular size and configuration when compared with 6/21/2021.  Chronic bilateral cerebellar hemisphere infarcts.    ZAY PRIETO MD; Attending Radiologist  This document has been electronically signed. Jun 25 2021  6:52PM  < end of copied text >

## 2021-06-28 NOTE — CONSULT NOTE ADULT - ASSESSMENT
82 y/o F w/ PMH of DVT (on Eliquis), NIDDM, glaucoma, HLD, HTN, hs of NPH s/p  shunt 2015, recently hospitalized for ICH (05/29/21) - small right basal ganglia hemorrhage.  Eliquis was discontinued, was seen by neurosurgery and advised no interventions necessary.  Pt was d/brittani to [facility] acute rehab on 06/09/21.  Leg doppler from 06/09/21 neg. Pt was also treated recently for UTI.    Patient subsequently developed increased dyspnea, was hypoxic, O2 sat mid 80's to low 90's, required 100% NRBM. Patient was diuresed.  Hematology consulted for anemia.

## 2021-06-28 NOTE — BH CONSULTATION LIAISON PROGRESS NOTE - NSBHFUPINTERVALHXFT_PSY_A_CORE
82 y/o F w/ PMH of DVT (on Eliquis), NIDDM, glaucoma, HLD, HTN, hs of NPH s/p  shunt 2015 sent ot ED for +CTH w/ ICH.  CTH was done outpt after c/o dizziness and unsteadiness/falling over to left side.  CTH done in ED showed pt had a small right basal ganglia hemorrhage.   Hospital course was complicated by pt developing acute metabolic encephalopathy due to UTI.  UA noted to have pyuria and cultures grew klebsiella.  Pt was placed on epimeric antibiotics and ID consulted.  Mentation improved within 24hrs of IV antibiotics.   Patient was medically optimized for discharge to [facility] inpatient acute rehab on 6/7/2021. (07 Jun 2021 14:43)    Psychiatry was consulted to evaluate Mrs. Duff for "hallucinations" and medication management. Pt is a retired  female; domiciled alone in [place]; has 3 children; PPhx of Bipolar disorder mixed type (diagnosed in 50's), OCD. Pt reports being psychiatrically hospitalized 2-3 times for depressive episodes- treated with ECT in the past. Last admission about 10 year ago for depression at [facility]. Reports having some hypomania as well with euphoria and "spending too much money" at times; denies substance abuse hx; denies previous suicide attempts; reports being prescribed Abilify for years and other meds which she cannot recall. Chart review shows Wellbutrin last year; currently in outpatient treatment with Dr. Rashard Barr [phone] on meds.  Luvox 150mg oral once daily  Cogentin 0.5mg PO BID  Abilify 10mg oral once daily      Pt seen and evaluated at bedside. Pt is A&Ox4 to person, place, time & situation, pleasant, calm, cooperative with the undersign. Pts female friend was at bedside and she gave permission for the undersign to conduct an evaluation with her friend present. Pt states she recently suffered a CVA and she was managing "fairly independently" prior this. She states in the last few weeks her mood has been fluctuating from depressed and anxious to some days her mood being "normal". Pt states she has more so worrisome thoughts about her future given her current health status. Staff report pt has been intermittently hallucinating which was not observed on exam. Pt denied having any perceptual disturbances. Pt was also recently diagnosed with a UTI and she may have a very mild delirium at this time. UTI being treated with Rocephin and IF this is the cause, having perceptual disturbances is expected to resolve when underlying infection is treated. Pt otherwise states she has been sleeping "good", appetite is "fair", mood is "depressed" and she feels generally weak from laying in bed too much. Denied suicidal/homicidal ideations. Writer spoke with pts outpatient prescriber, Dr. Barr who confirms patients medications as listed above. He states pt was tapered off Pritiq. See recommendations below.    Psychiatry interval note 6/28/2021: Pt had been upgraded to telemetry for change in mental status. Psychiatry CL service was asked to f/u for medication management and change in mental status. Pt is currently on Abilify. Luvox was DC'D.  Pt seen and evaluated at bedside. Pt reports she feels "very tired" and has difficulty staying awake for a f/u consultation. She is however AOx2 to person and partially time. Pt reports its June 2021, but not sure of the day of the week. She reports that her memory feels impaired as well. Pt ended up falling asleep in mid conversation with the undersign so rest of psychiatric ROS/mental status are unable to be assessed.  Pt had an EEG done this morning- results pending  CT of head done several days ago and resulted as negative.    Writer discussed case with psych attending. Writer does not feel that pts psychotropic medication (Abilify) is contributing to pts change in mentation.

## 2021-06-28 NOTE — BH CONSULTATION LIAISON PROGRESS NOTE - MSE UNSTRUCTURED FT
AOx2 to person and partially time.   Lethargic, unable to stay awake for f/u consult.  Pt ended up falling asleep in mid conversation with the undersign so rest of psychiatric ROS/mental status are unable to be assessed.

## 2021-06-28 NOTE — BH CONSULTATION LIAISON PROGRESS NOTE - NSBHCHARTREVIEWVS_PSY_A_CORE FT
Vital Signs Last 24 Hrs  T(C): 37.2 (28 Jun 2021 12:25), Max: 37.2 (28 Jun 2021 12:25)  T(F): 99 (28 Jun 2021 12:25), Max: 99 (28 Jun 2021 12:25)  HR: 70 (28 Jun 2021 12:25) (67 - 88)  BP: 137/42 (28 Jun 2021 12:25) (121/43 - 148/68)  BP(mean): --  RR: 18 (28 Jun 2021 12:25) (13 - 18)  SpO2: 94% (28 Jun 2021 12:25) (92% - 98%)

## 2021-06-28 NOTE — PROGRESS NOTE ADULT - ASSESSMENT
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  She received CTX and cephalexin for klebsiella UTI in early June.She has had episodes of confusion.She has been O2 dependant.Echo with diastolic dysfunction, serial chest CT scans with atelectasis and possible pneumonia.CXR have shown congestion.  She was given aztreonam on admission x 1 dose for pneumonia coverage.  She was hospitalized in [facility] [place] last year.T max has been 100.3, no sputum production,PC is .16  I think her respiratory issues are multifactorial-diaphragmatic dysfunction, atelectasis, and diastolic heart failure.There could be a component of pneumonia although with a normal wbc and PC of .16 I suspect most of her CT findings are more reflective of atelectasis although I think we should cover for pneumonia as well.? If neuromuscular weakness contributing as well.  Her blood cultures are negative and respiratory issues are multifactorial.  She is stable from ID viewpoint, CXR shows improved congestion  Cefepime 5 day course completed 6/27 5/27 CXR with congestive changes  Suggest:  1.Monitor off antibiotics  2.PPV as per critical care  3. Her respiratory issues have been multifactorial, She has never acted as if pneumonia was major problem, hence I suspect she will have chronic issues.  4.With no signs of active infection and no additional ID w/u planned we will stop actively following, please call if ID issues  arise

## 2021-06-28 NOTE — PROGRESS NOTE ADULT - SUBJECTIVE AND OBJECTIVE BOX
CC: f/u for possible pneumonia    Patient reports: she is comfortable on RA, anxious to go home.    REVIEW OF SYSTEMS:  All other review of systems negative (Comprehensive ROS)    Antimicrobials Day #  :s/p 5 days of cefepime-stopped 6/27    Other Medications Reviewed  MEDICATIONS  (STANDING):  albuterol/ipratropium for Nebulization 3 milliLiter(s) Nebulizer three times a day  ARIPiprazole 10 milliGRAM(s) Oral at bedtime  atorvastatin 40 milliGRAM(s) Oral at bedtime  dextrose 40% Gel 15 Gram(s) Oral once  dextrose 5%. 1000 milliLiter(s) (50 mL/Hr) IV Continuous <Continuous>  dextrose 5%. 1000 milliLiter(s) (100 mL/Hr) IV Continuous <Continuous>  dextrose 50% Injectable 12.5 Gram(s) IV Push once  dextrose 50% Injectable 25 Gram(s) IV Push once  dextrose 50% Injectable 25 Gram(s) IV Push once  epoetin orestes-epbx (RETACRIT) Injectable 41706 Unit(s) SubCutaneous every 7 days  folic acid 1 milliGRAM(s) Oral daily  furosemide   Injectable 40 milliGRAM(s) IV Push daily  glucagon  Injectable 1 milliGRAM(s) IntraMuscular once  heparin   Injectable 5000 Unit(s) SubCutaneous every 8 hours  hydrALAZINE 25 milliGRAM(s) Oral every 8 hours  insulin lispro (ADMELOG) corrective regimen sliding scale   SubCutaneous three times a day before meals  insulin lispro (ADMELOG) corrective regimen sliding scale   SubCutaneous at bedtime  pantoprazole    Tablet 40 milliGRAM(s) Oral before breakfast  thiamine 100 milliGRAM(s) Oral daily    T(F): 99 (06-28-21 @ 12:25), Max: 99 (06-28-21 @ 12:25)  HR: 70 (06-28-21 @ 12:25)  BP: 137/42 (06-28-21 @ 12:25)  RR: 18 (06-28-21 @ 12:25)  SpO2: 94% (06-28-21 @ 12:25)  Wt(kg): --    PHYSICAL EXAM:  General: alert, no acute distress, on nasal oxygen  Eyes:  anicteric, no conjunctival injection, no discharge  Oropharynx: no lesions or injection 	  Neck: supple, without adenopathy  Lungs: clear to auscultation  Heart: regular rate and rhythm; no murmur, rubs or gallops  Abdomen: soft, nondistended, nontender, without mass or organomegaly  Skin: no lesions  Extremities: no clubbing, cyanosis, or edema  Neurologic: alert, oriented, moves all extremities    LAB RESULTS:                        8.0    6.16  )-----------( 238      ( 28 Jun 2021 07:10 )             25.0     06-28    138  |  98  |  34<H>  ----------------------------<  144<H>  4.0   |  38<H>  |  1.22    Ca    9.0      28 Jun 2021 07:10  Phos  2.8     06-28  Mg     1.7     06-28    TPro  6.5  /  Alb  2.3<L>  /  TBili  0.2  /  DBili  x   /  AST  39  /  ALT  40  /  AlkPhos  116  06-28    LIVER FUNCTIONS - ( 28 Jun 2021 07:10 )  Alb: 2.3 g/dL / Pro: 6.5 g/dL / ALK PHOS: 116 U/L / ALT: 40 U/L / AST: 39 U/L / GGT: x             MICROBIOLOGY:  RECENT CULTURES:      RADIOLOGY REVIEWED:  < from: US Chest (06.27.21 @ 15:33) >   FINDINGS:     Normal diaphragmatic motion is identified on the left side.    No diaphragmatic motion is demonstrated on the right side. This is suggestive of diaphragmatic paresis. There is however no evidence of paradoxical motion.      IMPRESSION:    As above    < from: Xray Chest 1 View- PORTABLE-Routine (06.27.21 @ 07:57) >  IMPRESSION:  Right  shunt reidentified. No change heart mediastinum. Loop recorder reidentified. Congestive changes , small right pleural effusion similar to prior. Correlate clinically for concomitant infection.      < end of copied text >  >

## 2021-06-28 NOTE — PROGRESS NOTE ADULT - SUBJECTIVE AND OBJECTIVE BOX
No distress    Vital Signs Last 24 Hrs  T(C): 37.1 (06-28-21 @ 15:54), Max: 37.2 (06-28-21 @ 12:25)  T(F): 98.7 (06-28-21 @ 15:54), Max: 99 (06-28-21 @ 12:25)  HR: 79 (06-28-21 @ 15:54) (67 - 88)  BP: 143/49 (06-28-21 @ 15:54) (121/43 - 143/49)  RR: 18 (06-28-21 @ 15:54) (13 - 18)  SpO2: 97% (06-28-21 @ 15:54) (92% - 97%)    s1s2  b/l air entry, decr BS at bases  soft, ND  no edema b/l LE                                                                                                               8.0    6.16  )-----------( 238      ( 28 Jun 2021 07:10 )             25.0     28 Jun 2021 07:10    138    |  98     |  34     ----------------------------<  144    4.0     |  38     |  1.22     Ca    9.0        28 Jun 2021 07:10  Phos  2.8       28 Jun 2021 07:10  Mg     1.7       28 Jun 2021 07:10    TPro  6.5    /  Alb  2.3    /  TBili  0.2    /  DBili  x      /  AST  39     /  ALT  40     /  AlkPhos  116    28 Jun 2021 07:10    LIVER FUNCTIONS - ( 28 Jun 2021 07:10 )  Alb: 2.3 g/dL / Pro: 6.5 g/dL / ALK PHOS: 116 U/L / ALT: 40 U/L / AST: 39 U/L / GGT: x           acetaminophen   Tablet .. 650 milliGRAM(s) Oral every 6 hours PRN  albuterol/ipratropium for Nebulization 3 milliLiter(s) Nebulizer every 4 hours PRN  albuterol/ipratropium for Nebulization 3 milliLiter(s) Nebulizer three times a day  ARIPiprazole 10 milliGRAM(s) Oral at bedtime  atorvastatin 40 milliGRAM(s) Oral at bedtime  dextrose 40% Gel 15 Gram(s) Oral once  dextrose 5%. 1000 milliLiter(s) IV Continuous <Continuous>  dextrose 5%. 1000 milliLiter(s) IV Continuous <Continuous>  dextrose 50% Injectable 12.5 Gram(s) IV Push once  dextrose 50% Injectable 25 Gram(s) IV Push once  dextrose 50% Injectable 25 Gram(s) IV Push once  epoetin orestes-epbx (RETACRIT) Injectable 69317 Unit(s) SubCutaneous every 7 days  ferrous    sulfate 325 milliGRAM(s) Oral daily  folic acid 1 milliGRAM(s) Oral daily  furosemide   Injectable 40 milliGRAM(s) IV Push daily  glucagon  Injectable 1 milliGRAM(s) IntraMuscular once  heparin   Injectable 5000 Unit(s) SubCutaneous every 8 hours  hydrALAZINE 25 milliGRAM(s) Oral every 8 hours  insulin lispro (ADMELOG) corrective regimen sliding scale   SubCutaneous three times a day before meals  insulin lispro (ADMELOG) corrective regimen sliding scale   SubCutaneous at bedtime  ondansetron Injectable 4 milliGRAM(s) IV Push every 6 hours PRN  pantoprazole    Tablet 40 milliGRAM(s) Oral before breakfast  thiamine 100 milliGRAM(s) Oral daily    A/P:    Hx HTN, s/p ICH, mod AS  SOB, V/Q scan w/indeterminate prob, ? asp PNA  Hemodynamic/cardio-renal PASCALE/CKD 3  Improved  Avoid nephrotoxins  Continue Lasix  F/u BMP, Mg  Epoetin    [phone]

## 2021-06-28 NOTE — PROGRESS NOTE ADULT - ASSESSMENT
80 yo F with hx of DVT (on Eliquis), NPH s/p VPS (2015), Type 2 Diabetes Mellitus, Bipolar disorder, Paralyzed hemidiaphragm, HFpEF, recent basal ganglia bleed (managed conservatively ac dc'd) who was transferred from [facility] for hypoxia to ICU 6/22. Found to have Hypercarbia, Klebsiella UTI, Pneumonia, HF exacerbation. Hospital course complicated by delirium, oxygen dependence. Now downgraded to medical floors for continued care.     # Acute hypoxic and hypercarbic respiratory failure due to underlying Pneumonia, acute diastolic heart failure exacerbation  # Hx of paralyzed hemidiaphragm   # Klebsiella UTI  - Completed course of abx per Infectious Diseases  - Pulm following. AVAPS qhs  - currently on 3L nc. titrate down as tolerated  - Appears hypervolemic on exam. Continue with lasix 40mg IVP once daily. Caution with renal function  - PT/OT following  - Cardio evals noted    # Acute Kidney Injury on Chronic Kidney Disease 3 - resolved  - back to baseline renal function  - Monitor BMP  - Avoid nephrotoxic medications   - Nephro following    # Delirium  - Neuro and psych eval  - EEG noted  - Thiamine per Neuro    # Anemia  - likely multifactorial   - Heme eval for ?role for IV iron  - Epo per renal    # Hypertension   - Continue with Hydralazine 25mg q8h. ACEi discontinued due to acute kidney injury   - will monitor and adjust to optimize bp    # Type 2 Diabetes Mellitus   - HbA1C: 7.7%  - FS and JOSI    # Bipolar disorder  - Continue with abilify  - Psych eval pending    # Hx of DVT  - most recent duplex negative  - recent intracranial bleed, high risk for ac    DVT Prophylaxis:  - Heparin    Will call daughter Sarah [phone] and update her

## 2021-06-28 NOTE — CONSULT NOTE ADULT - CONSULT REQUESTED DATE/TIME
28-Jun-2021 08:14
28-Jun-2021 08:47
21-Jun-2021 11:30
23-Jun-2021 13:22
22-Jun-2021 21:42
22-Jun-2021 23:15

## 2021-06-28 NOTE — PROGRESS NOTE ADULT - ASSESSMENT
Physical Examination:  GENERAL:               lethargic , Oriented, No acute distress.    HEENT:                    No JVD, dry MM  PULM:                     Bilateral air entry, Clear to auscultation bilaterally, no significant sputum production, No Rales, No Rhonchi, No Wheezing  CVS:                         S1, S2,  +murmur  ABD:                        Soft, nondistended, nontender, normoactive bowel sounds,   EXT:                         no Edema , nontender, No Cyanosis or Clubbing   NEURO:                  lethargic interactive, nonfocal, follows commands  PSYC:                      Calm, + Insight.      Assessment  Acute hypoxic and hypercarbic respiratory failure likely due to underlying pneumonia vs Atelectasis vs acute on chronic diastolic CHF  Chronic Hypercarbic Respiratory failure with baseline elevated PCO2 levels due to Right sided Diaphram paralysis Confirmed on US showing " No diaphragmatic motion is demonstrated on the right side. This is suggestive of diaphragmatic paresis. There is however no evidence of paradoxical motion."  h/o DVT in past, doubt if active vte at this time, patient high risk for a/c   ckd 3 as per renal  Underlying HTN, DM2, Bipolar d/o      Plan  AVAPS QHS   Based on ABG trend showing PCO2 > 55 (06-25-21 @ 06:27   - 7.36/63<H>/72<L>/94)  with normal PH that improved with AVAPS QHS patient will need NIV QHS moving forward due to right diaphragm dysfunction.    This will allow better ventilation and minimize re hospitalizations.    would recommend setup upon discharge  Psyc/neuro f/u for waxing waning mental status, unsure if purely from co2 retention as ph is stable, EEG no acute findings needed   can consider modafanil     optimize cardiac meds as per cardio   PT, OOB  Taper o2 to n/c  Incentive spirometry  ABX as per ID finished course   Incentive spirometry  PT/OT/OOB  renal fu    PT/OT/Rehab eval to return to [facility] when able     Sarah antony primary sena Caban [phone] - called and discussed with staff, and patient never been in office.  will check US chest    Family Updated: 	Sarah [phone]	                                 Date: 6/26 /21 -   6/23 -states has paralyzed hemidiaphragm Dx 8 months ago  - was seeing Sena TUTTLE.- dr. Caban    Sedation & Analgesia:	n/a  Diet/Nutrition:		oral Diet   GI PPx:			PPI    DVT Ppx:		Hep sq  	       Activity:		advance as tolerated                 Head of Bed:               35-45  Glycemic Control:        Insulin    Lines: PIV  Restraints were deemed necessary to prevent removal of life-sustaining devices [  ] YES   [   x ]  NO    Disposition: cotninue care on tele, dispo planning to be considered    Goals of Care: Full code

## 2021-06-28 NOTE — BH CONSULTATION LIAISON PROGRESS NOTE - NSBHCONSULTRECOMMENDOTHER_PSY_A_CORE FT
Would recommend to continue Abilify 10mg oral once daily at bedtime    Agree with decision to hold Luvox    Defer rest of treatment to the medical team.

## 2021-06-28 NOTE — BH CONSULTATION LIAISON PROGRESS NOTE - NSBHCHARTREVIEWLAB_PSY_A_CORE FT
8.0    6.16  )-----------( 238      ( 28 Jun 2021 07:10 )             25.0   06-28    138  |  98  |  34<H>  ----------------------------<  144<H>  4.0   |  38<H>  |  1.22    Ca    9.0      28 Jun 2021 07:10  Phos  2.8     06-28  Mg     1.7     06-28    TPro  6.5  /  Alb  2.3<L>  /  TBili  0.2  /  DBili  x   /  AST  39  /  ALT  40  /  AlkPhos  116  06-28

## 2021-06-28 NOTE — CONSULT NOTE ADULT - PROBLEM SELECTOR RECOMMENDATION 9
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 Patient receiving PO folic acid supplement as well as Epogen. Check vitamin B 12 level, retic, haptoglobin, guaiac stool. Can start PO iron (possible volume issues noted). Hemoglobin stable at present. Continue to monitor CBC.

## 2021-06-28 NOTE — PROGRESS NOTE ADULT - SUBJECTIVE AND OBJECTIVE BOX
Follow up for pt appears comfortable. non verbal today  SUBJ:  PMH  Hypertension    Hyperlipidemia    Diabetes    Glaucoma    Osteoarthritis    Small bowel obstruction        MEDICATIONS  (STANDING):  albuterol/ipratropium for Nebulization 3 milliLiter(s) Nebulizer three times a day  ARIPiprazole 10 milliGRAM(s) Oral at bedtime  atorvastatin 40 milliGRAM(s) Oral at bedtime  dextrose 40% Gel 15 Gram(s) Oral once  dextrose 5%. 1000 milliLiter(s) (50 mL/Hr) IV Continuous <Continuous>  dextrose 5%. 1000 milliLiter(s) (100 mL/Hr) IV Continuous <Continuous>  dextrose 50% Injectable 12.5 Gram(s) IV Push once  dextrose 50% Injectable 25 Gram(s) IV Push once  dextrose 50% Injectable 25 Gram(s) IV Push once  epoetin orestes-epbx (RETACRIT) Injectable 57980 Unit(s) SubCutaneous every 7 days  ferrous    sulfate 325 milliGRAM(s) Oral daily  folic acid 1 milliGRAM(s) Oral daily  furosemide   Injectable 40 milliGRAM(s) IV Push daily  glucagon  Injectable 1 milliGRAM(s) IntraMuscular once  heparin   Injectable 5000 Unit(s) SubCutaneous every 8 hours  hydrALAZINE 25 milliGRAM(s) Oral every 8 hours  insulin lispro (ADMELOG) corrective regimen sliding scale   SubCutaneous three times a day before meals  insulin lispro (ADMELOG) corrective regimen sliding scale   SubCutaneous at bedtime  pantoprazole    Tablet 40 milliGRAM(s) Oral before breakfast  thiamine 100 milliGRAM(s) Oral daily    MEDICATIONS  (PRN):  acetaminophen   Tablet .. 650 milliGRAM(s) Oral every 6 hours PRN Temp greater or equal to 38C (100.4F), Mild Pain (1 - 3)  albuterol/ipratropium for Nebulization 3 milliLiter(s) Nebulizer every 4 hours PRN Shortness of Breath and/or Wheezing  ondansetron Injectable 4 milliGRAM(s) IV Push every 6 hours PRN Nausea and/or Vomiting        PHYSICAL EXAM:  Vital Signs Last 24 Hrs  T(C): 37.2 (28 Jun 2021 12:25), Max: 37.2 (28 Jun 2021 12:25)  T(F): 99 (28 Jun 2021 12:25), Max: 99 (28 Jun 2021 12:25)  HR: 70 (28 Jun 2021 12:25) (67 - 88)  BP: 137/42 (28 Jun 2021 12:25) (121/43 - 148/68)  BP(mean): --  RR: 18 (28 Jun 2021 12:25) (13 - 18)  SpO2: 94% (28 Jun 2021 12:25) (92% - 98%)    GENERAL: NAD, well-groomed, well-developed  HEAD:  Atraumatic, Normocephalic  EYES: EOMI, PERRLA, conjunctiva and sclera clear  ENT: Moist mucous membranes,  NECK: Supple, No JVD, no bruits  CHEST/LUNG: Clear to percussion bilaterally; No rales, rhonchi, wheezing, or rubs  HEART: Regular rate and rhythm; No murmurs, rubs, or gallops PMI non displaced.  ABDOMEN: Soft, Nontender, Nondistended; Bowel sounds present  EXTREMITIES:  2+ Peripheral Pulses, No clubbing, cyanosis, or edema  SKIN: No rashes or lesions  NERVOUS SYSTEM:  Alert & Oriented X3, Good concentration; Motor Strength 5/5 B/L upper and lower extremities; DTRs 2+ intact and symmetric      TELEMETRY:    ECG:    LABS:                        8.0    6.16  )-----------( 238      ( 28 Jun 2021 07:10 )             25.0     06-28    138  |  98  |  34<H>  ----------------------------<  144<H>  4.0   |  38<H>  |  1.22    Ca    9.0      28 Jun 2021 07:10  Phos  2.8     06-28  Mg     1.7     06-28    TPro  6.5  /  Alb  2.3<L>  /  TBili  0.2  /  DBili  x   /  AST  39  /  ALT  40  /  AlkPhos  116  06-28            I&O's Summary    27 Jun 2021 07:01  -  28 Jun 2021 07:00  --------------------------------------------------------  IN: 640 mL / OUT: 2400 mL / NET: -1760 mL      BNPSerum Pro-Brain Natriuretic Peptide: 2154 pg/mL (06-28 @ 07:10)      RADIOLOGY & ADDITIONAL STUDIES:    ECHO:

## 2021-06-28 NOTE — CONSULT NOTE ADULT - SUBJECTIVE AND OBJECTIVE BOX
GREGORY WOODALL  81y  Female  Admitting: [facility]LOE Eva    HPI:  80 y/o F w/ PMH of DVT (on Eliquis), NIDDM, glaucoma, HLD, HTN, hs of NPH s/p  shunt 2015, recently hospitalized for ICH (05/29/21) - small right basal ganglia hemorrhage.  Eliquis was discontinued, was seen by neurosurgery and advised no interventions necessary.  Pt was d/brittani to [place] acute [facility] on 06/09/21.  Leg doppler from 06/09/21 neg. Pt was also treated recently for UTI.    Tonight, pt noted to have increased dyspnea, was hypoxic, O2 sat mid 80's to low 90's, required 100% NRBM.  Pt apparently had decreasing Na for the past few days and was started on NS today.  Cxray shows fluid overload, (venous congestion, bilat pl effusions). IVF was d/brittani and pt was given Lasix 40 mg IVP x 1. Pt denies chest pain, however remained tachypneic.  O2 sat on 100% NRBM is 92-93%. Pt is afebrile.  She has an occ dry cough, denies abd pain, she has occ nausea.  EKG showed NSR 85/min.  Stat labs ordered and pt transferred to telemetry for closer monitoring.      (21 Jun 2021 05:14)    PAST MEDICAL & SURGICAL HISTORY:  Hypertension    Hyperlipidemia    Diabetes    Glaucoma    Osteoarthritis    Small bowel obstruction    S/P hysterectomy  1981 and Oopherectomy in 1990s    S/P cholecystectomy  1981    Achilles tendon injury  repair  right scalene muscle release    S/P knee replacement, left      HEALTH ISSUES - PROBLEM Dx:  Suspected pulmonary embolism            MEDICATIONS  (STANDING):  albuterol/ipratropium for Nebulization 3 milliLiter(s) Nebulizer three times a day  ARIPiprazole 10 milliGRAM(s) Oral at bedtime  atorvastatin 40 milliGRAM(s) Oral at bedtime  dextrose 40% Gel 15 Gram(s) Oral once  dextrose 5%. 1000 milliLiter(s) (50 mL/Hr) IV Continuous <Continuous>  dextrose 5%. 1000 milliLiter(s) (100 mL/Hr) IV Continuous <Continuous>  dextrose 50% Injectable 25 Gram(s) IV Push once  dextrose 50% Injectable 25 Gram(s) IV Push once  dextrose 50% Injectable 12.5 Gram(s) IV Push once  epoetin orestes-epbx (RETACRIT) Injectable 93669 Unit(s) SubCutaneous every 7 days  folic acid 1 milliGRAM(s) Oral daily  furosemide   Injectable 40 milliGRAM(s) IV Push daily  glucagon  Injectable 1 milliGRAM(s) IntraMuscular once  heparin   Injectable 5000 Unit(s) SubCutaneous every 8 hours  hydrALAZINE 25 milliGRAM(s) Oral every 8 hours  insulin lispro (ADMELOG) corrective regimen sliding scale   SubCutaneous three times a day before meals  insulin lispro (ADMELOG) corrective regimen sliding scale   SubCutaneous at bedtime  pantoprazole    Tablet 40 milliGRAM(s) Oral before breakfast    MEDICATIONS  (PRN):  acetaminophen   Tablet .. 650 milliGRAM(s) Oral every 6 hours PRN Temp greater or equal to 38C (100.4F), Mild Pain (1 - 3)  albuterol/ipratropium for Nebulization 3 milliLiter(s) Nebulizer every 4 hours PRN Shortness of Breath and/or Wheezing  ondansetron Injectable 4 milliGRAM(s) IV Push every 6 hours PRN Nausea and/or Vomiting    Allergies    adhesives (Pruritus; Blisters)  penicillin (Hives)  pineapple (Anaphylaxis)    Intolerances        FAMILY HISTORY:  Family history of pancreatic cancer (Sibling)    Family history of early CAD    Family history of diabetes mellitus        SOCIAL HISTORY: No EtOH, no tobacco    REVIEW OF SYSTEMS:    CONSTITUTIONAL: No weakness, fevers or chills  EYES/ENT: No visual changes;  No vertigo or throat pain   NECK: No pain or stiffness  RESPIRATORY: No cough, wheezing, hemoptysis; No shortness of breath  CARDIOVASCULAR: No chest pain or palpitations  GASTROINTESTINAL: No abdominal or epigastric pain. No nausea, vomiting, or hematemesis; No diarrhea or constipation. No melena or hematochezia.  GENITOURINARY: No dysuria, frequency or hematuria  NEUROLOGICAL: No numbness or weakness  SKIN: No itching, burning, rashes, or lesions   All other review of systems is negative unless indicated above.        T(F): 97.9 (06-28-21 @ 08:38), Max: 98.7 (06-28-21 @ 05:00)  HR: 69 (06-28-21 @ 08:38)  BP: 141/49 (06-28-21 @ 08:38)  RR: 18 (06-28-21 @ 08:38)  SpO2: 94% (06-28-21 @ 08:38)  Wt(kg): --    GENERAL: NAD, well-developed  HEAD:  Atraumatic, Normocephalic  EYES: EOMI, PERRLA, conjunctiva and sclera clear  NECK: Supple, No JVD  CHEST/LUNG: Clear to auscultation bilaterally; No wheeze  HEART: Regular rate and rhythm; No murmurs, rubs, or gallops  ABDOMEN: Soft, Nontender, Nondistended; Bowel sounds present  EXTREMITIES:  2+ Peripheral Pulses, No clubbing, cyanosis, or edema  NEUROLOGY: non-focal  SKIN: No rashes or lesions  oropharynx    Labs:             8.0    6.16  )-----------( 238      ( 06-28 @ 07:10 )             25.0                7.5    5.99  )-----------( 210      ( 06-27 @ 06:40 )             23.2                7.8    6.00  )-----------( 199      ( 06-26 @ 06:00 )             24.0       06-28    138  |  98  |  34<H>  ----------------------------<  144<H>  4.0   |  38<H>  |  1.22    Ca    9.0      28 Jun 2021 07:10  Phos  2.8     06-28  Mg     1.7     06-28    TPro  6.5  /  Alb  2.3<L>  /  TBili  0.2  /  DBili  x   /  AST  39  /  ALT  40  /  AlkPhos  116  06-28    Magnesium, Serum: 1.7 mg/dL [1.6 - 2.6] (06-28 @ 07:10)  Phosphorus Level, Serum: 2.8 mg/dL [2.5 - 4.5] (06-28 @ 07:10)                                Consultant notes reviewed : YES [ ] ; NO [ ]  Case discussed with attending physician / consultant: YES [ ] ; NO [ ]    Radiology and additional tests: GREGORY WOODALL  81y  Female  Admitting: CHLOE ZavalaEva    HPI:  80 y/o F w/ PMH of DVT (on Eliquis), NIDDM, glaucoma, HLD, HTN, hs of NPH s/p  shunt 2015, recently hospitalized for ICH (05/29/21) - small right basal ganglia hemorrhage.  Eliquis was discontinued, was seen by neurosurgery and advised no interventions necessary.  Pt was d/brittani to [place] acute [facility] on 06/09/21.  Leg doppler from 06/09/21 neg. Pt was also treated recently for UTI.    Patient subsequently developed increased dyspnea, was hypoxic, O2 sat mid 80's to low 90's, required 100% NRBM. Patient was diuresed.  Hematology consulted for anemia.    PAST MEDICAL & SURGICAL HISTORY:  Hypertension    Hyperlipidemia    Diabetes    Glaucoma    Osteoarthritis    Small bowel obstruction    S/P hysterectomy  1981 and Oopherectomy in 1990s    S/P cholecystectomy  1981    Achilles tendon injury  repair  right scalene muscle release    S/P knee replacement, left      HEALTH ISSUES - PROBLEM Dx:  Suspected pulmonary embolism    MEDICATIONS  (STANDING):  albuterol/ipratropium for Nebulization 3 milliLiter(s) Nebulizer three times a day  ARIPiprazole 10 milliGRAM(s) Oral at bedtime  atorvastatin 40 milliGRAM(s) Oral at bedtime  dextrose 40% Gel 15 Gram(s) Oral once  dextrose 5%. 1000 milliLiter(s) (50 mL/Hr) IV Continuous <Continuous>  dextrose 5%. 1000 milliLiter(s) (100 mL/Hr) IV Continuous <Continuous>  dextrose 50% Injectable 25 Gram(s) IV Push once  dextrose 50% Injectable 25 Gram(s) IV Push once  dextrose 50% Injectable 12.5 Gram(s) IV Push once  epoetin orestes-epbx (RETACRIT) Injectable 35540 Unit(s) SubCutaneous every 7 days  folic acid 1 milliGRAM(s) Oral daily  furosemide   Injectable 40 milliGRAM(s) IV Push daily  glucagon  Injectable 1 milliGRAM(s) IntraMuscular once  heparin   Injectable 5000 Unit(s) SubCutaneous every 8 hours  hydrALAZINE 25 milliGRAM(s) Oral every 8 hours  insulin lispro (ADMELOG) corrective regimen sliding scale   SubCutaneous three times a day before meals  insulin lispro (ADMELOG) corrective regimen sliding scale   SubCutaneous at bedtime  pantoprazole    Tablet 40 milliGRAM(s) Oral before breakfast    MEDICATIONS  (PRN):  acetaminophen   Tablet .. 650 milliGRAM(s) Oral every 6 hours PRN Temp greater or equal to 38C (100.4F), Mild Pain (1 - 3)  albuterol/ipratropium for Nebulization 3 milliLiter(s) Nebulizer every 4 hours PRN Shortness of Breath and/or Wheezing  ondansetron Injectable 4 milliGRAM(s) IV Push every 6 hours PRN Nausea and/or Vomiting    Allergies    adhesives (Pruritus; Blisters)  penicillin (Hives)  pineapple (Anaphylaxis)    FAMILY HISTORY:  Family history of pancreatic cancer (Sibling)    SOCIAL HISTORY: No EtOH, no tobacco    REVIEW OF SYSTEMS:    CONSTITUTIONAL: No fevers or chills  EYES/ENT: No visual changes;  No vertigo or throat pain   NECK: No pain or stiffness  RESPIRATORY: No hemoptysis  CARDIOVASCULAR: No chest pain or palpitations  GASTROINTESTINAL: No melena or hematochezia.  GENITOURINARY: No hematuria  NEUROLOGICAL: +weakness  SKIN: No itching   All other review of systems is negative unless indicated above.        T(F): 97.9 (06-28-21 @ 08:38), Max: 98.7 (06-28-21 @ 05:00)  HR: 69 (06-28-21 @ 08:38)  BP: 141/49 (06-28-21 @ 08:38)  RR: 18 (06-28-21 @ 08:38)  SpO2: 94% (06-28-21 @ 08:38)    GENERAL: NAD, well-developed  HEAD:  Atraumatic, Normocephalic  EYES: EOMI, PERRLA, conjunctiva and sclera clear  NECK: Supple, No JVD  CHEST/LUNG: decreased BS bases ant. No wheeze  HEART: Regular rate and rhythm; No murmurs, rubs, or gallops  ABDOMEN: Soft, Nontender, Nondistended; Bowel sounds present  EXTREMITIES:  no calf tenderness elicited  NEUROLOGY: generally weak  SKIN: No vesicles on visible skin    Labs:             8.0    6.16  )-----------( 238      ( 06-28 @ 07:10 )             25.0                7.5    5.99  )-----------( 210      ( 06-27 @ 06:40 )             23.2                7.8    6.00  )-----------( 199      ( 06-26 @ 06:00 )             24.0       06-28    138  |  98  |  34<H>  ----------------------------<  144<H>  4.0   |  38<H>  |  1.22    Ca    9.0      28 Jun 2021 07:10  Phos  2.8     06-28  Mg     1.7     06-28    TPro  6.5  /  Alb  2.3<L>  /  TBili  0.2  /  DBili  x   /  AST  39  /  ALT  40  /  AlkPhos  116  06-28    Magnesium, Serum: 1.7 mg/dL [1.6 - 2.6] (06-28 @ 07:10)  Phosphorus Level, Serum: 2.8 mg/dL [2.5 - 4.5] (06-28 @ 07:10)    Consultant notes reviewed : YES [x ] ; NO [ ]    Radiology and additional tests:  < from: Xray Chest 1 View- PORTABLE-Routine (06.27.21 @ 07:57) >  IMPRESSION:  Right  shunt reidentified. No change heart mediastinum. Loop recorder reidentified. Congestive changes , small right pleural effusion similar to prior. Correlate clinically for concomitant infection.    < end of copied text >

## 2021-06-28 NOTE — BH CONSULTATION LIAISON PROGRESS NOTE - CURRENT MEDICATION
MEDICATIONS  (STANDING):  albuterol/ipratropium for Nebulization 3 milliLiter(s) Nebulizer three times a day  ARIPiprazole 10 milliGRAM(s) Oral at bedtime  atorvastatin 40 milliGRAM(s) Oral at bedtime  dextrose 40% Gel 15 Gram(s) Oral once  dextrose 5%. 1000 milliLiter(s) (50 mL/Hr) IV Continuous <Continuous>  dextrose 5%. 1000 milliLiter(s) (100 mL/Hr) IV Continuous <Continuous>  dextrose 50% Injectable 12.5 Gram(s) IV Push once  dextrose 50% Injectable 25 Gram(s) IV Push once  dextrose 50% Injectable 25 Gram(s) IV Push once  epoetin orestes-epbx (RETACRIT) Injectable 08378 Unit(s) SubCutaneous every 7 days  folic acid 1 milliGRAM(s) Oral daily  furosemide   Injectable 40 milliGRAM(s) IV Push daily  glucagon  Injectable 1 milliGRAM(s) IntraMuscular once  heparin   Injectable 5000 Unit(s) SubCutaneous every 8 hours  hydrALAZINE 25 milliGRAM(s) Oral every 8 hours  insulin lispro (ADMELOG) corrective regimen sliding scale   SubCutaneous three times a day before meals  insulin lispro (ADMELOG) corrective regimen sliding scale   SubCutaneous at bedtime  pantoprazole    Tablet 40 milliGRAM(s) Oral before breakfast  thiamine 100 milliGRAM(s) Oral daily    MEDICATIONS  (PRN):  acetaminophen   Tablet .. 650 milliGRAM(s) Oral every 6 hours PRN Temp greater or equal to 38C (100.4F), Mild Pain (1 - 3)  albuterol/ipratropium for Nebulization 3 milliLiter(s) Nebulizer every 4 hours PRN Shortness of Breath and/or Wheezing  ondansetron Injectable 4 milliGRAM(s) IV Push every 6 hours PRN Nausea and/or Vomiting

## 2021-06-28 NOTE — PROGRESS NOTE ADULT - SUBJECTIVE AND OBJECTIVE BOX
Follow-up Critical Care Progress Note  Chief Complaint : Heart failure    Patient transferred out of ICU yesterday  doing well today  alert responsive      Allergies :adhesives (Pruritus; Blisters)  penicillin (Hives)  pineapple (Anaphylaxis)      PAST MEDICAL & SURGICAL HISTORY:  Hypertension    Hyperlipidemia    Diabetes    Glaucoma    Osteoarthritis    Small bowel obstruction    S/P hysterectomy  1981 and Oopherectomy in 1990s    S/P cholecystectomy  1981    Achilles tendon injury  repair  right scalene muscle release    S/P knee replacement, left        Medications:  MEDICATIONS  (STANDING):  albuterol/ipratropium for Nebulization 3 milliLiter(s) Nebulizer three times a day  ARIPiprazole 10 milliGRAM(s) Oral at bedtime  atorvastatin 40 milliGRAM(s) Oral at bedtime  dextrose 40% Gel 15 Gram(s) Oral once  dextrose 5%. 1000 milliLiter(s) (50 mL/Hr) IV Continuous <Continuous>  dextrose 5%. 1000 milliLiter(s) (100 mL/Hr) IV Continuous <Continuous>  dextrose 50% Injectable 25 Gram(s) IV Push once  dextrose 50% Injectable 25 Gram(s) IV Push once  dextrose 50% Injectable 12.5 Gram(s) IV Push once  epoetin orestes-epbx (RETACRIT) Injectable 88046 Unit(s) SubCutaneous every 7 days  folic acid 1 milliGRAM(s) Oral daily  furosemide   Injectable 40 milliGRAM(s) IV Push daily  glucagon  Injectable 1 milliGRAM(s) IntraMuscular once  heparin   Injectable 5000 Unit(s) SubCutaneous every 8 hours  hydrALAZINE 25 milliGRAM(s) Oral every 8 hours  insulin lispro (ADMELOG) corrective regimen sliding scale   SubCutaneous three times a day before meals  insulin lispro (ADMELOG) corrective regimen sliding scale   SubCutaneous at bedtime  pantoprazole    Tablet 40 milliGRAM(s) Oral before breakfast  thiamine 100 milliGRAM(s) Oral daily    MEDICATIONS  (PRN):  acetaminophen   Tablet .. 650 milliGRAM(s) Oral every 6 hours PRN Temp greater or equal to 38C (100.4F), Mild Pain (1 - 3)  albuterol/ipratropium for Nebulization 3 milliLiter(s) Nebulizer every 4 hours PRN Shortness of Breath and/or Wheezing  ondansetron Injectable 4 milliGRAM(s) IV Push every 6 hours PRN Nausea and/or Vomiting    COVID  06-20-21 @ 20:20  COVID -   NotDetec  06-12-21 @ 05:45  COVID -   NotDete  06-05-21 @ 18:40  COVID -   NotDete  05-28-21 @ 13:40  COVID -   NotDete  08-25-20 @ 21:45  COVID -   NotDete      COVID Biomarkers    06-23-21 @ 10:30 ESR --  ---  CRP --  ---  DDimer  700<H>   ---   LDH --   ---   Ferritin --    06-22-21 @ 05:45 ESR --  ---  CRP --  ---  DDimer  --   ---   LDH --   ---   Ferritin 112    06-21-21 @ 09:14 ESR --  ---  CRP --  ---  DDimer  463<H>   ---   LDH --   ---   Ferritin --    06-10-21 @ 06:00 ESR --  ---  CRP --  ---  DDimer  1030<H>   ---   LDH --   ---   Ferritin --       Trend BNP  06-28-21 @ 07:10   -  2154<H>  06-27-21 @ 06:40   -  2023<H>  06-23-21 @ 10:30   -  2026<H>  06-19-21 @ 06:51   -  643<H>  06-08-21 @ 05:40   -  572<H>    Procalcitonin Trend  06-28-21 @ 07:10   -   0.16<H>  06-24-21 @ 06:30   -   0.18<H>  06-22-21 @ 05:45   -   0.16<H>  06-19-21 @ 06:51   -   0.08    WBC Trend  06-28-21 @ 07:10   -  6.16  06-27-21 @ 06:40   -  5.99  06-26-21 @ 06:00   -  6.00    H/H Trend  06-28-21 @ 07:10   -   8.0<L>/ 25.0<L>  06-27-21 @ 06:40   -   7.5<L>/ 23.2<L>  06-26-21 @ 06:00   -   7.8<L>/ 24.0<L>  06-25-21 @ 05:30   -   7.8<L>/ 23.9<L>  06-24-21 @ 06:30   -   9.5<L>/ 28.6<L>  06-23-21 @ 06:00   -   7.8<L>/ 23.3<L>    Stool Occult Blood    Platelet Trend  06-28-21 @ 07:10   -  238  06-27-21 @ 06:40   -  210  06-26-21 @ 06:00   -  199    Trend Sodium  06-28-21 @ 07:10   -  138  06-27-21 @ 06:40   -  136  06-26-21 @ 06:00   -  133<L>    Trend Potassium  06-28-21 @ 07:10   -  4.0  06-27-21 @ 06:40   -  3.9  06-26-21 @ 06:00   -  3.9    Trend Bun/Cr  06-28-21 @ 07:10  BUN/CR -  34<H> / 1.22  06-27-21 @ 06:40  BUN/CR -  37<H> / 1.35<H>  06-26-21 @ 06:00  BUN/CR -  34<H> / 1.40<H>    Lactic Acid Trend    ABG Trend  06-27-21 @ 06:20   - 7.44/33/145<H>/99<H>  06-25-21 @ 14:10   - 7.39/58<H>/100/98<H>  06-25-21 @ 06:27   - 7.36/63<H>/72<L>/94  06-24-21 @ 05:00   - 7.41/54<H>/56<L>/89<L>  06-22-21 @ 20:15   - 7.34<L>/59<H>/91/96  06-21-21 @ 03:30   - 7.31<L>/55<H>/104/98<H>    Trend AST/ALT/ALK Phos/Bili  06-28-21 @ 07:10   39/40/116/0.2  06-24-21 @ 06:30   23/20/73/0.4  06-21-21 @ 06:30   25/25/90/0.3  06-19-21 @ 06:51   22/21/86/0.3  06-17-21 @ 06:00   18/20/91/0.3        Albumin Trend  06-28-21 @ 07:10   -   2.3<L>  06-24-21 @ 06:30   -   2.7<L>  06-21-21 @ 06:30   -   2.9<L>  06-19-21 @ 06:51   -   2.8<L>  06-17-21 @ 06:00   -   2.7<L>  06-14-21 @ 06:15   -   2.6<L>      PTT - PT - INR Trend  06-21-21 @ 09:14   -   31.3 - 11.7 - 0.97  05-30-21 @ 07:52   -   31.0 - 14.1<H> - 1.23<H>  05-28-21 @ 21:48   -   33.7 - 20.9<H> - 1.86<H>  05-28-21 @ 13:39   -   36.3<H> - 25.4<H> - 2.28<H>    Glucose Trend  06-28-21 @ 07:37   -  -- -- 151<H>  06-28-21 @ 07:10   -  -- 144<H> --  06-27-21 @ 21:06   -  -- -- 168<H>  06-27-21 @ 17:28   -  -- -- 178<H>  06-27-21 @ 11:14   -  -- -- 214<H>  06-27-21 @ 07:54   -  -- -- 139<H>  06-27-21 @ 06:40   -  -- 119<H> --  06-26-21 @ 23:14   -  -- -- 138<H>  06-26-21 @ 17:46   -  -- -- 237<H>  06-26-21 @ 11:15   -  -- -- 231<H>    A1C with Estimated Average Glucose Result: 7.7 % *H* [4.0 - 5.6] (05-30-21 @ 07:52)      LABS:                        8.0    6.16  )-----------( 238      ( 28 Jun 2021 07:10 )             25.0     06-28    138  |  98  |  34<H>  ----------------------------<  144<H>  4.0   |  38<H>  |  1.22    Ca    9.0      28 Jun 2021 07:10  Phos  2.8     06-28  Mg     1.7     06-28    TPro  6.5  /  Alb  2.3<L>  /  TBili  0.2  /  DBili  x   /  AST  39  /  ALT  40  /  AlkPhos  116  06-28    HIT ab -- 06-23 @ 10:30  HIT ab EIA --  D Dimer -700        EEG Summary / Classification:  Abnormal EEG   - Moderate generalized slowing with triphasic morphology.    EEG Impression / Clinical Correlate:  Abnormal EEG study.  Moderate nonspecific diffuse or multifocal cerebral dysfunction.   No clear epileptiform pattern and no seizure seen.        Procalcitonin, Serum: 0.16 ng/mL (06-28-21 @ 07:10)    Serum Pro-Brain Natriuretic Peptide: 2154 pg/mL (06-28-21 @ 07:10)  Serum Pro-Brain Natriuretic Peptide: 2023 pg/mL (06-27-21 @ 06:40)        CULTURES: (if applicable)    Culture - Blood (collected 06-22-21 @ 23:29)  Source: .Blood Blood-Peripheral  Final Report (06-28-21 @ 04:00):    No Growth Final    Culture - Blood (collected 06-22-21 @ 23:29)  Source: .Blood Blood-Peripheral  Final Report (06-28-21 @ 04:00):    No Growth Final         RADIOLOGY  US    < from: US Chest (06.27.21 @ 15:33) >  EXAM:  US CHEST      PROCEDURE DATE:  06/27/2021        INTERPRETATION:  EXAM:US CHEST.     CLINICAL INDICATION: evaluate diaphragm dysfunction .     TECHNIQUE: 2-D and M-mode imaging of the hemidiaphragms was performed.     PRIOR EXAM: None..     FINDINGS:     Normal diaphragmatic motion is identified on the left side.    No diaphragmatic motion is demonstrated on the right side. This is suggestive of diaphragmatic paresis. There is however no evidence of paradoxical motion.      IMPRESSION:    As above.    < end of copied text >      VITALS:  T(C): 36.6 (06-28-21 @ 08:38), Max: 37.1 (06-28-21 @ 05:00)  T(F): 97.9 (06-28-21 @ 08:38), Max: 98.7 (06-28-21 @ 05:00)  HR: 67 (06-28-21 @ 08:47) (67 - 88)  BP: 141/49 (06-28-21 @ 08:38) (121/43 - 148/68)  BP(mean): --  ABP: --  ABP(mean): --  RR: 18 (06-28-21 @ 08:38) (13 - 18)  SpO2: 94% (06-28-21 @ 08:47) (92% - 98%)  CVP(mm Hg): --  CVP(cm H2O): --    Ins and Outs     06-27-21 @ 07:01  -  06-28-21 @ 07:00  --------------------------------------------------------  IN: 640 mL / OUT: 2400 mL / NET: -1760 mL       I&O's Detail    27 Jun 2021 07:01  -  28 Jun 2021 07:00  --------------------------------------------------------  IN:    Oral Fluid: 640 mL  Total IN: 640 mL    OUT:    Incontinent per Collection Bag (mL): 2000 mL    Voided (mL): 400 mL  Total OUT: 2400 mL    Total NET: -1760 mL

## 2021-06-28 NOTE — PROGRESS NOTE ADULT - SUBJECTIVE AND OBJECTIVE BOX
Patient is a 81y old  Female who presents with a chief complaint of dyspnea, hypoxia (28 Jun 2021 08:46)    Patient seen and examined at bedside. No acute overnight events. Downgraded from ICU to medical floors. Denies fever, chills, nausea, vomiting. Endorses shallow breaths. +SOB    ALLERGIES:  adhesives (Pruritus; Blisters)  penicillin (Hives)  pineapple (Anaphylaxis)    MEDICATIONS  (STANDING):  albuterol/ipratropium for Nebulization 3 milliLiter(s) Nebulizer three times a day  ARIPiprazole 10 milliGRAM(s) Oral at bedtime  atorvastatin 40 milliGRAM(s) Oral at bedtime  dextrose 40% Gel 15 Gram(s) Oral once  dextrose 5%. 1000 milliLiter(s) (50 mL/Hr) IV Continuous <Continuous>  dextrose 5%. 1000 milliLiter(s) (100 mL/Hr) IV Continuous <Continuous>  dextrose 50% Injectable 25 Gram(s) IV Push once  dextrose 50% Injectable 25 Gram(s) IV Push once  dextrose 50% Injectable 12.5 Gram(s) IV Push once  epoetin orestes-epbx (RETACRIT) Injectable 49256 Unit(s) SubCutaneous every 7 days  folic acid 1 milliGRAM(s) Oral daily  furosemide   Injectable 40 milliGRAM(s) IV Push daily  glucagon  Injectable 1 milliGRAM(s) IntraMuscular once  heparin   Injectable 5000 Unit(s) SubCutaneous every 8 hours  hydrALAZINE 25 milliGRAM(s) Oral every 8 hours  insulin lispro (ADMELOG) corrective regimen sliding scale   SubCutaneous three times a day before meals  insulin lispro (ADMELOG) corrective regimen sliding scale   SubCutaneous at bedtime  pantoprazole    Tablet 40 milliGRAM(s) Oral before breakfast  thiamine 100 milliGRAM(s) Oral daily    MEDICATIONS  (PRN):  acetaminophen   Tablet .. 650 milliGRAM(s) Oral every 6 hours PRN Temp greater or equal to 38C (100.4F), Mild Pain (1 - 3)  albuterol/ipratropium for Nebulization 3 milliLiter(s) Nebulizer every 4 hours PRN Shortness of Breath and/or Wheezing  ondansetron Injectable 4 milliGRAM(s) IV Push every 6 hours PRN Nausea and/or Vomiting    Vital Signs Last 24 Hrs  T(F): 97.9 (28 Jun 2021 08:38), Max: 98.7 (28 Jun 2021 05:00)  HR: 67 (28 Jun 2021 08:47) (58 - 88)  BP: 141/49 (28 Jun 2021 08:38) (118/46 - 148/68)  RR: 18 (28 Jun 2021 08:38) (13 - 18)  SpO2: 94% (28 Jun 2021 08:47) (92% - 98%)  I&O's Summary    27 Jun 2021 07:01  -  28 Jun 2021 07:00  --------------------------------------------------------  IN: 640 mL / OUT: 2400 mL / NET: -1760 mL    PHYSICAL EXAM:  General: Elderly F lying in bed, appears comfortable. Drowsy but arousable  ENT: MMM, no tonsilar exudate  Neck: Supple, No JVD  Lungs: Fair air entry bilaterally, faint crackles bilaterally   Cardio: RRR, S1/S2, +systolic murmur  Abdomen: Soft, Nontender, Nondistended; Bowel sounds present  Extremities: No calf tenderness, No pitting edema    LABS:                        8.0    6.16  )-----------( 238      ( 28 Jun 2021 07:10 )             25.0       06-28    138  |  98  |  34  ----------------------------<  144  4.0   |  38  |  1.22    Ca    9.0      28 Jun 2021 07:10  Phos  2.8     06-28  Mg     1.7     06-28    TPro  6.5  /  Alb  2.3  /  TBili  0.2  /  DBili  x   /  AST  39  /  ALT  40  /  AlkPhos  116  06-28     eGFR if Non African American: 42 mL/min/1.73M2 (06-28-21 @ 07:10)  eGFR if : 49 mL/min/1.73M2 (06-28-21 @ 07:10)    05-30 Chol 133 mg/dL LDL -- HDL 54 mg/dL Trig 103 mg/dL    ABG - ( 27 Jun 2021 06:20 )  pH, Arterial: 7.44  pH, Blood: x     /  pCO2: 33    /  pO2: 145   / HCO3: x     / Base Excess: x     /  SaO2: 99        POCT Blood Glucose.: 151 mg/dL (28 Jun 2021 07:37)  POCT Blood Glucose.: 168 mg/dL (27 Jun 2021 21:06)  POCT Blood Glucose.: 178 mg/dL (27 Jun 2021 17:28)    Culture - Blood (collected 22 Jun 2021 23:29)  Source: .Blood Blood-Peripheral  Final Report (28 Jun 2021 04:00):    No Growth Final    Culture - Blood (collected 22 Jun 2021 23:29)  Source: .Blood Blood-Peripheral  Final Report (28 Jun 2021 04:00):    No Growth Final    COVID-19 PCR: NotDetec (06-20-21 @ 20:20)  COVID-19 PCR: NotDetec (06-12-21 @ 05:45)  COVID-19 PCR: NotDetec (06-05-21 @ 18:40)    RADIOLOGY & ADDITIONAL TESTS:   CXR 6/27 personally reviewed - bilateral venous congestion, R pleural effusion    Care Discussed with Consultants/Other Providers: d/w Dr. Koehler

## 2021-06-28 NOTE — BH CONSULTATION LIAISON PROGRESS NOTE - NSBHASSESSMENTFT_PSY_ALL_CORE
81 F with recent CVA currently admitted to [facility] for acute rehab services. Psychiatry consulted for med management and staff reporting pt has been "hallucinating". See HPI for details. Pt with hx of Bipolar disorder and currently in outpatient treatment with Dr. Rashard Barr. Writer spoke with pts prescriber who confirms pts psychiatric hx and medications. [facility] called and verified meds and dosages as well.     6/28/2021: Writer called to assess pt for altered mental status. Pt with extreme lethargy and unable to participate in a psychiatric f/u consult. Writer does not believe pts current psychotropic medication is contributing to her current alteration in mentation. Case discussed with attending. See recommendation below.    81 F with recent CVA currently admitted to [facility] for acute rehab services. Psychiatry consulted for med management and staff reporting pt has been "hallucinating". See HPI for details. Pt with hx of Bipolar disorder and currently in outpatient treatment with Dr. Rashard Barr. Writer spoke with pts prescriber who confirms pts psychiatric hx and medications. [facility] called and verified meds and dosages as well.     6/28/2021: Writer called to assess pt for altered mental status. Pt with extreme lethargy and unable to participate in a psychiatric f/u consult. Writer does not believe pts current psychotropic medication is contributing to her current alteration in mentation. Pt had an EEG done this morning- results pending  CT of head done several days ago and resulted as negative. Case discussed with attending. See recommendation below.

## 2021-06-29 LAB
FOLATE SERPL-MCNC: >20 NG/ML — SIGNIFICANT CHANGE UP
GLUCOSE BLDC GLUCOMTR-MCNC: 135 MG/DL — HIGH (ref 70–99)
GLUCOSE BLDC GLUCOMTR-MCNC: 147 MG/DL — HIGH (ref 70–99)
GLUCOSE BLDC GLUCOMTR-MCNC: 198 MG/DL — HIGH (ref 70–99)
GLUCOSE BLDC GLUCOMTR-MCNC: 249 MG/DL — HIGH (ref 70–99)
HAPTOGLOB SERPL-MCNC: 324 MG/DL — HIGH (ref 34–200)
RBC # BLD: 2.68 M/UL — LOW (ref 3.8–5.2)
RETICS #: 44.9 K/UL — SIGNIFICANT CHANGE UP (ref 25–125)
RETICS/RBC NFR: 1.7 % — SIGNIFICANT CHANGE UP (ref 0.5–2.5)
VIT B12 SERPL-MCNC: 906 PG/ML — SIGNIFICANT CHANGE UP (ref 232–1245)

## 2021-06-29 PROCEDURE — 99232 SBSQ HOSP IP/OBS MODERATE 35: CPT

## 2021-06-29 RX ADMIN — Medication 40 MILLIGRAM(S): at 05:21

## 2021-06-29 RX ADMIN — Medication 3 MILLILITER(S): at 15:21

## 2021-06-29 RX ADMIN — Medication 25 MILLIGRAM(S): at 21:03

## 2021-06-29 RX ADMIN — ARIPIPRAZOLE 10 MILLIGRAM(S): 15 TABLET ORAL at 21:03

## 2021-06-29 RX ADMIN — Medication 25 MILLIGRAM(S): at 05:21

## 2021-06-29 RX ADMIN — Medication 1 MILLIGRAM(S): at 12:03

## 2021-06-29 RX ADMIN — Medication 3 MILLILITER(S): at 21:26

## 2021-06-29 RX ADMIN — Medication 25 MILLIGRAM(S): at 14:25

## 2021-06-29 RX ADMIN — Medication 325 MILLIGRAM(S): at 14:25

## 2021-06-29 RX ADMIN — ATORVASTATIN CALCIUM 40 MILLIGRAM(S): 80 TABLET, FILM COATED ORAL at 21:03

## 2021-06-29 RX ADMIN — Medication 4: at 11:27

## 2021-06-29 RX ADMIN — Medication 2: at 17:03

## 2021-06-29 RX ADMIN — HEPARIN SODIUM 5000 UNIT(S): 5000 INJECTION INTRAVENOUS; SUBCUTANEOUS at 21:03

## 2021-06-29 RX ADMIN — HEPARIN SODIUM 5000 UNIT(S): 5000 INJECTION INTRAVENOUS; SUBCUTANEOUS at 05:21

## 2021-06-29 RX ADMIN — Medication 3 MILLILITER(S): at 08:27

## 2021-06-29 RX ADMIN — HEPARIN SODIUM 5000 UNIT(S): 5000 INJECTION INTRAVENOUS; SUBCUTANEOUS at 14:25

## 2021-06-29 RX ADMIN — PANTOPRAZOLE SODIUM 40 MILLIGRAM(S): 20 TABLET, DELAYED RELEASE ORAL at 05:23

## 2021-06-29 RX ADMIN — Medication 100 MILLIGRAM(S): at 12:03

## 2021-06-29 NOTE — PROGRESS NOTE ADULT - SUBJECTIVE AND OBJECTIVE BOX
Follow-up Critical Care Progress Note  Chief Complaint : Heart failure        patient feels well  alert and responsive.       Allergies :adhesives (Pruritus; Blisters)  penicillin (Hives)  pineapple (Anaphylaxis)      PAST MEDICAL & SURGICAL HISTORY:  Hypertension    Hyperlipidemia    Diabetes    Glaucoma    Osteoarthritis    Small bowel obstruction    S/P hysterectomy  1981 and Oopherectomy in 1990s    S/P cholecystectomy  1981    Achilles tendon injury  repair  right scalene muscle release    S/P knee replacement, left        Medications:  MEDICATIONS  (STANDING):  albuterol/ipratropium for Nebulization 3 milliLiter(s) Nebulizer three times a day  ARIPiprazole 10 milliGRAM(s) Oral at bedtime  atorvastatin 40 milliGRAM(s) Oral at bedtime  dextrose 40% Gel 15 Gram(s) Oral once  dextrose 5%. 1000 milliLiter(s) (50 mL/Hr) IV Continuous <Continuous>  dextrose 5%. 1000 milliLiter(s) (100 mL/Hr) IV Continuous <Continuous>  dextrose 50% Injectable 25 Gram(s) IV Push once  dextrose 50% Injectable 12.5 Gram(s) IV Push once  dextrose 50% Injectable 25 Gram(s) IV Push once  epoetin orestes-epbx (RETACRIT) Injectable 72979 Unit(s) SubCutaneous every 7 days  ferrous    sulfate 325 milliGRAM(s) Oral daily  folic acid 1 milliGRAM(s) Oral daily  furosemide   Injectable 40 milliGRAM(s) IV Push daily  glucagon  Injectable 1 milliGRAM(s) IntraMuscular once  heparin   Injectable 5000 Unit(s) SubCutaneous every 8 hours  hydrALAZINE 25 milliGRAM(s) Oral every 8 hours  insulin lispro (ADMELOG) corrective regimen sliding scale   SubCutaneous three times a day before meals  insulin lispro (ADMELOG) corrective regimen sliding scale   SubCutaneous at bedtime  pantoprazole    Tablet 40 milliGRAM(s) Oral before breakfast  thiamine 100 milliGRAM(s) Oral daily    MEDICATIONS  (PRN):  acetaminophen   Tablet .. 650 milliGRAM(s) Oral every 6 hours PRN Temp greater or equal to 38C (100.4F), Mild Pain (1 - 3)  albuterol/ipratropium for Nebulization 3 milliLiter(s) Nebulizer every 4 hours PRN Shortness of Breath and/or Wheezing  ondansetron Injectable 4 milliGRAM(s) IV Push every 6 hours PRN Nausea and/or Vomiting    COVID  06-20-21 @ 20:20  COVID -   NotDetec  06-12-21 @ 05:45  COVID -   NotDete  06-05-21 @ 18:40  COVID -   NotDete  05-28-21 @ 13:40  COVID -   NotDetec  08-25-20 @ 21:45  COVID -   NotDete      COVID Biomarkers    06-23-21 @ 10:30 ESR --  ---  CRP --  ---  DDimer  700<H>   ---   LDH --   ---   Ferritin --    06-22-21 @ 05:45 ESR --  ---  CRP --  ---  DDimer  --   ---   LDH --   ---   Ferritin 112    06-21-21 @ 09:14 ESR --  ---  CRP --  ---  DDimer  463<H>   ---   LDH --   ---   Ferritin --    06-10-21 @ 06:00 ESR --  ---  CRP --  ---  DDimer  1030<H>   ---   LDH --   ---   Ferritin --         Trend BNP  06-28-21 @ 07:10   -  2154<H>  06-27-21 @ 06:40   -  2023<H>  06-23-21 @ 10:30   -  2026<H>  06-19-21 @ 06:51   -  643<H>  06-08-21 @ 05:40   -  572<H>    Procalcitonin Trend  06-28-21 @ 07:10   -   0.16<H>  06-24-21 @ 06:30   -   0.18<H>  06-22-21 @ 05:45   -   0.16<H>  06-19-21 @ 06:51   -   0.08    WBC Trend  06-28-21 @ 07:10   -  6.16  06-27-21 @ 06:40   -  5.99    H/H Trend  06-28-21 @ 07:10   -   8.0<L>/ 25.0<L>  06-27-21 @ 06:40   -   7.5<L>/ 23.2<L>  06-26-21 @ 06:00   -   7.8<L>/ 24.0<L>  06-25-21 @ 05:30   -   7.8<L>/ 23.9<L>  06-24-21 @ 06:30   -   9.5<L>/ 28.6<L>  06-23-21 @ 06:00   -   7.8<L>/ 23.3<L>     Platelet Trend  06-28-21 @ 07:10   -  238  06-27-21 @ 06:40   -  210    Trend Sodium  06-28-21 @ 07:10   -  138  06-27-21 @ 06:40   -  136    Trend Potassium  06-28-21 @ 07:10   -  4.0  06-27-21 @ 06:40   -  3.9    Trend Bun/Cr  06-28-21 @ 07:10  BUN/CR -  34<H> / 1.22  06-27-21 @ 06:40  BUN/CR -  37<H> / 1.35<H>    Lactic Acid Trend    ABG Trend  06-27-21 @ 06:20   - 7.44/33/145<H>/99<H>  06-25-21 @ 14:10   - 7.39/58<H>/100/98<H>  06-25-21 @ 06:27   - 7.36/63<H>/72<L>/94  06-24-21 @ 05:00   - 7.41/54<H>/56<L>/89<L>  06-22-21 @ 20:15   - 7.34<L>/59<H>/91/96  06-21-21 @ 03:30   - 7.31<L>/55<H>/104/98<H>    Trend AST/ALT/ALK Phos/Bili  06-28-21 @ 07:10   39/40/116/0.2  06-24-21 @ 06:30   23/20/73/0.4  06-21-21 @ 06:30   25/25/90/0.3       LABS:                        8.0    6.16  )-----------( 238      ( 28 Jun 2021 07:10 )             25.0     06-28    138  |  98  |  34<H>  ----------------------------<  144<H>  4.0   |  38<H>  |  1.22    Ca    9.0      28 Jun 2021 07:10  Phos  2.8     06-28  Mg     1.7     06-28    TPro  6.5  /  Alb  2.3<L>  /  TBili  0.2  /  DBili  x   /  AST  39  /  ALT  40  /  AlkPhos  116  06-28       CULTURES: (if applicable)    Culture - Blood (collected 06-22-21 @ 23:29)  Source: .Blood Blood-Peripheral  Final Report (06-28-21 @ 04:00):    No Growth Final    Culture - Blood (collected 06-22-21 @ 23:29)  Source: .Blood Blood-Peripheral  Final Report (06-28-21 @ 04:00):    No Growth Final           VITALS:  T(C): 36.8 (06-29-21 @ 08:22), Max: 37.2 (06-28-21 @ 12:25)  T(F): 98.3 (06-29-21 @ 08:22), Max: 99 (06-28-21 @ 12:25)  HR: 69 (06-29-21 @ 08:27) (63 - 84)  BP: 110/48 (06-29-21 @ 08:22) (110/48 - 143/49)  BP(mean): --  ABP: --  ABP(mean): --  RR: 16 (06-29-21 @ 08:22) (14 - 19)  SpO2: 98% (06-29-21 @ 08:27) (94% - 100%)  CVP(mm Hg): --  CVP(cm H2O): --    Ins and Outs     06-28-21 @ 07:01  -  06-29-21 @ 07:00  --------------------------------------------------------  IN: 200 mL / OUT: 1800 mL / NET: -1600 mL                I&O's Detail    28 Jun 2021 07:01  -  29 Jun 2021 07:00  --------------------------------------------------------  IN:    Oral Fluid: 200 mL  Total IN: 200 mL    OUT:    Incontinent per Collection Bag (mL): 400 mL    Voided (mL): 1400 mL  Total OUT: 1800 mL    Total NET: -1600 mL

## 2021-06-29 NOTE — PROGRESS NOTE ADULT - ASSESSMENT
82 yo F with hx of DVT (on Eliquis), NPH s/p VPS (2015), Type 2 Diabetes Mellitus, Bipolar disorder, Paralyzed hemidiaphragm, HFpEF, recent basal ganglia bleed (managed conservatively ac dc'd) who was transferred from [facility] for hypoxia to ICU 6/22. Found to have Hypercarbia, Klebsiella UTI, Pneumonia, HF exacerbation. Hospital course complicated by delirium, oxygen dependence. Now downgraded to medical floors for continued care.     # Acute hypoxic and hypercarbic respiratory failure due to underlying Pneumonia, acute diastolic heart failure exacerbation  # Hx of paralyzed hemidiaphragm   # Klebsiella UTI  - Completed course of abx per Infectious Diseases  - Pulm following. AVAPS qhs  - currently on 3L nc. titrate down as tolerated  - Appears hypervolemic on exam. Continue with lasix 40mg IVP once daily. Caution with renal function  - PT/OT following  - Cardio evals noted    # Acute Kidney Injury on Chronic Kidney Disease 3 - resolved  - back to baseline renal function  - Monitor BMP  - Avoid nephrotoxic medications   - Nephro following    # Delirium  - Neuro and psych eval  - EEG noted  - Thiamine per Neuro    # Anemia  - likely multifactorial   - Heme eval for ?role for IV iron  - Epo per renal    # Hypertension   - Continue with Hydralazine 25mg q8h. ACEi discontinued due to acute kidney injury   - will monitor and adjust to optimize bp    # Type 2 Diabetes Mellitus   - HbA1C: 7.7%  - FS and JOSI    # Bipolar disorder  - Continue with abilify  - Psych eval pending    # Hx of DVT  - most recent duplex negative  - recent intracranial bleed, high risk for ac    DVT Prophylaxis:  - Heparin    Will call daughter Sarah [phone] and update her  D/w PT, pt a candidate for JULIEN  D/w Pulm - pt will need NIPPV qhs    82 yo F with hx of DVT (on Eliquis), NPH s/p VPS (2015), Type 2 Diabetes Mellitus, Bipolar disorder, Paralyzed hemidiaphragm, HFpEF, recent basal ganglia bleed (managed conservatively ac dc'd) who was transferred from [facility] for hypoxia to ICU 6/22. Found to have Hypercarbia, Klebsiella UTI, Pneumonia, HF exacerbation. Hospital course complicated by delirium, oxygen dependence. Now downgraded to medical floors for continued care.     # Acute hypoxic and hypercarbic respiratory failure due to underlying Pneumonia, acute diastolic heart failure exacerbation  # Hx of paralyzed hemidiaphragm   # Klebsiella UTI  - Completed course of abx per Infectious Diseases  - Pulm following. AVAPS qhs  - currently on 3L nc. titrate down as tolerated  - Appears hypervolemic on exam. Continue with lasix 40mg IVP once daily. Caution with renal function  - PT/OT following  - Cardio evals noted    # Acute Kidney Injury on Chronic Kidney Disease 3 - resolved  - back to baseline renal function  - Monitor BMP  - Avoid nephrotoxic medications   - Nephro following    # Delirium  - Neuro and psych eval  - EEG noted  - Thiamine per Neuro    # Anemia  - likely multifactorial   - Heme recommendations appreciated. c/w PO iron  - Epo per renal    # Hypertension   - Continue with Hydralazine 25mg q8h. ACEi discontinued due to acute kidney injury   - will monitor and adjust to optimize bp    # Type 2 Diabetes Mellitus   - HbA1C: 7.7%  - FS and JOSI    # Bipolar disorder  - Continue with abilify  - Psych eval pending    # Hx of DVT  - most recent duplex negative  - recent intracranial bleed, high risk for ac    DVT Prophylaxis:  - Heparin    Will call daughter Sarah [phone] and update her  D/w PT, pt a candidate for JULIEN  D/w Pulm - pt will need NIPPV qhs

## 2021-06-29 NOTE — PROGRESS NOTE ADULT - ASSESSMENT
Physical Examination:  GENERAL:               Alert, Oriented, No acute distress.    HEENT:                    No JVD, dry MM  PULM:                     Bilateral air entry, Clear to auscultation bilaterally, no significant sputum production, No Rales, No Rhonchi, No Wheezing  CVS:                         S1, S2,  +murmur  ABD:                        Soft, nondistended, nontender, normoactive bowel sounds,   EXT:                         no Edema , nontender, No Cyanosis or Clubbing   NEURO:                  alert interactive, nonfocal, follows commands  PSYC:                      Calm, + Insight.      Assessment  Acute hypoxic and hypercarbic respiratory failure likely due to underlying pneumonia vs Atelectasis vs acute on chronic diastolic CHF  Chronic Hypercarbic Respiratory failure with baseline elevated PCO2 levels due to Right sided Diaphram paralysis Confirmed on US showing " No diaphragmatic motion is demonstrated on the right side. This is suggestive of diaphragmatic paresis. There is however no evidence of paradoxical motion."  h/o DVT in past, doubt if active vte at this time, patient high risk for a/c   ckd 3 as per renal  Underlying HTN, DM2, Bipolar d/o      Plan  AVAPS QHS   Based on ABG trend showing PCO2 > 55 (06-25-21 @ 06:27   - 7.36/63<H>/72<L>/94)  with normal PH that improved with AVAPS QHS patient will need NIV QHS moving forward due to right diaphragm dysfunction.    This will allow better ventilation and minimize re hospitalizations.    would recommend setup upon discharge  Psyc/neuro f/u for waxing waning mental status, unsure if purely from co2 retention as ph is stable, EEG no acute findings needed   can consider modafanil     optimize cardiac meds as per cardio   PT, OOB  Taper o2 to n/c  Incentive spirometry  ABX as per ID finished course   Incentive spirometry  PT/OT/OOB  renal fu    PT/OT/Rehab eval to return to [facility] when able     Sarah antony primary pulm md Dr. Caban [phone] - called and discussed with staff, and patient never been in office.  will check US chest  case d/w Dr. Chandler and Case management to set up NIV on d/c  no active ccm issues at this time, reconsult as needed weather for pulm/CCM.

## 2021-06-29 NOTE — PROGRESS NOTE ADULT - ASSESSMENT
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  Eliquis was discontinued, was seen by neurosurgery and advised no interventions necessary.  Pt was d/brittani to [place] [facility] on 06/09/21.  Leg doppler from 06/09/21 neg. Pt was also treated recently for UTI.    Patient subsequently developed increased dyspnea, was hypoxic, O2 sat mid 80's to low 90's, required 100% NRBM. Patient was diuresed.  Hematology consulted for anemia.

## 2021-06-29 NOTE — PROGRESS NOTE ADULT - SUBJECTIVE AND OBJECTIVE BOX
Patient is a 81y old  Female who presents with a chief complaint of dyspnea, hypoxia (29 Jun 2021 11:06)    Patient seen and examined at bedside. No acute overnight events. More bright this morning. Used NIPPV overnight, desaturation noted overnight. Denies fever, chills, cough, chest pain.     ALLERGIES:  adhesives (Pruritus; Blisters)  penicillin (Hives)  pineapple (Anaphylaxis)    MEDICATIONS  (STANDING):  albuterol/ipratropium for Nebulization 3 milliLiter(s) Nebulizer three times a day  ARIPiprazole 10 milliGRAM(s) Oral at bedtime  atorvastatin 40 milliGRAM(s) Oral at bedtime  dextrose 40% Gel 15 Gram(s) Oral once  dextrose 5%. 1000 milliLiter(s) (50 mL/Hr) IV Continuous <Continuous>  dextrose 5%. 1000 milliLiter(s) (100 mL/Hr) IV Continuous <Continuous>  dextrose 50% Injectable 25 Gram(s) IV Push once  dextrose 50% Injectable 12.5 Gram(s) IV Push once  dextrose 50% Injectable 25 Gram(s) IV Push once  epoetin orestes-epbx (RETACRIT) Injectable 77472 Unit(s) SubCutaneous every 7 days  ferrous    sulfate 325 milliGRAM(s) Oral daily  folic acid 1 milliGRAM(s) Oral daily  furosemide   Injectable 40 milliGRAM(s) IV Push daily  glucagon  Injectable 1 milliGRAM(s) IntraMuscular once  heparin   Injectable 5000 Unit(s) SubCutaneous every 8 hours  hydrALAZINE 25 milliGRAM(s) Oral every 8 hours  insulin lispro (ADMELOG) corrective regimen sliding scale   SubCutaneous three times a day before meals  insulin lispro (ADMELOG) corrective regimen sliding scale   SubCutaneous at bedtime  pantoprazole    Tablet 40 milliGRAM(s) Oral before breakfast  thiamine 100 milliGRAM(s) Oral daily    MEDICATIONS  (PRN):  acetaminophen   Tablet .. 650 milliGRAM(s) Oral every 6 hours PRN Temp greater or equal to 38C (100.4F), Mild Pain (1 - 3)  albuterol/ipratropium for Nebulization 3 milliLiter(s) Nebulizer every 4 hours PRN Shortness of Breath and/or Wheezing  ondansetron Injectable 4 milliGRAM(s) IV Push every 6 hours PRN Nausea and/or Vomiting    Vital Signs Last 24 Hrs  T(F): 98.3 (29 Jun 2021 08:22), Max: 99 (28 Jun 2021 12:25)  HR: 69 (29 Jun 2021 08:27) (63 - 84)  BP: 110/48 (29 Jun 2021 08:22) (110/48 - 143/49)  RR: 16 (29 Jun 2021 08:22) (14 - 19)  SpO2: 98% (29 Jun 2021 08:27) (94% - 100%)  I&O's Summary    28 Jun 2021 07:01  -  29 Jun 2021 07:00  --------------------------------------------------------  IN: 200 mL / OUT: 1800 mL / NET: -1600 mL    PHYSICAL EXAM:  General: NAD, A/O x 2. Comfortable on nasal cannula  ENT: MMM, no tonsilar exudate  Neck: Supple, No JVD  Lungs: Fair air entry bilaterally. Crackles bilaterally   Cardio: RRR, S1/S2, +systolic murmurs  Abdomen: Soft, Nontender, Nondistended; Bowel sounds present  Extremities: No calf tenderness, No pitting edema    LABS:                        8.0    6.16  )-----------( 238      ( 28 Jun 2021 07:10 )             25.0       06-28    138  |  98  |  34  ----------------------------<  144  4.0   |  38  |  1.22    Ca    9.0      28 Jun 2021 07:10  Phos  2.8     06-28  Mg     1.7     06-28    TPro  6.5  /  Alb  2.3  /  TBili  0.2  /  DBili  x   /  AST  39  /  ALT  40  /  AlkPhos  116  06-28     eGFR if Non African American: 42 mL/min/1.73M2 (06-28-21 @ 07:10)  eGFR if : 49 mL/min/1.73M2 (06-28-21 @ 07:10)    05-30 Chol 133 mg/dL LDL -- HDL 54 mg/dL Trig 103 mg/dL    ABG - ( 27 Jun 2021 06:20 )  pH, Arterial: 7.44  pH, Blood: x     /  pCO2: 33    /  pO2: 145   / HCO3: x     / Base Excess: x     /  SaO2: 99        POCT Blood Glucose.: 147 mg/dL (29 Jun 2021 07:41)  POCT Blood Glucose.: 139 mg/dL (28 Jun 2021 21:17)  POCT Blood Glucose.: 128 mg/dL (28 Jun 2021 16:24)  POCT Blood Glucose.: 208 mg/dL (28 Jun 2021 11:47)    Culture - Blood (collected 22 Jun 2021 23:29)  Source: .Blood Blood-Peripheral  Final Report (28 Jun 2021 04:00):    No Growth Final    Culture - Blood (collected 22 Jun 2021 23:29)  Source: .Blood Blood-Peripheral  Final Report (28 Jun 2021 04:00):    No Growth Final    COVID-19 PCR: NotDetec (06-20-21 @ 20:20)  COVID-19 PCR: NotDetec (06-12-21 @ 05:45)  COVID-19 PCR: NotDetec (06-05-21 @ 18:40)    RADIOLOGY & ADDITIONAL TESTS:     Care Discussed with Consultants/Other Providers: d/w Dr. Koehler

## 2021-06-29 NOTE — PROGRESS NOTE ADULT - SUBJECTIVE AND OBJECTIVE BOX
no acute events    Vital Signs Last 24 Hrs  T(C): 36.4 (29 Jun 2021 04:41), Max: 37.2 (28 Jun 2021 12:25)  T(F): 97.6 (29 Jun 2021 04:41), Max: 99 (28 Jun 2021 12:25)  HR: 66 (29 Jun 2021 04:41) (66 - 84)  BP: 123/53 (29 Jun 2021 04:41) (119/49 - 143/49)  BP(mean): --  RR: 16 (29 Jun 2021 04:41) (14 - 19)  SpO2: 97% (29 Jun 2021 04:41) (94% - 100%)    AAO to name, place, year and president  PERRL, EOMI, face sym.  no pronator drift  lifts both legs against gravity  coord intact fnf    MEDICATIONS  (STANDING):  albuterol/ipratropium for Nebulization 3 milliLiter(s) Nebulizer three times a day  ARIPiprazole 10 milliGRAM(s) Oral at bedtime  atorvastatin 40 milliGRAM(s) Oral at bedtime  dextrose 40% Gel 15 Gram(s) Oral once  dextrose 5%. 1000 milliLiter(s) (50 mL/Hr) IV Continuous <Continuous>  dextrose 5%. 1000 milliLiter(s) (100 mL/Hr) IV Continuous <Continuous>  dextrose 50% Injectable 25 Gram(s) IV Push once  dextrose 50% Injectable 12.5 Gram(s) IV Push once  dextrose 50% Injectable 25 Gram(s) IV Push once  epoetin orestes-epbx (RETACRIT) Injectable 17378 Unit(s) SubCutaneous every 7 days  ferrous    sulfate 325 milliGRAM(s) Oral daily  folic acid 1 milliGRAM(s) Oral daily  furosemide   Injectable 40 milliGRAM(s) IV Push daily  glucagon  Injectable 1 milliGRAM(s) IntraMuscular once  heparin   Injectable 5000 Unit(s) SubCutaneous every 8 hours  hydrALAZINE 25 milliGRAM(s) Oral every 8 hours  insulin lispro (ADMELOG) corrective regimen sliding scale   SubCutaneous three times a day before meals  insulin lispro (ADMELOG) corrective regimen sliding scale   SubCutaneous at bedtime  pantoprazole    Tablet 40 milliGRAM(s) Oral before breakfast  thiamine 100 milliGRAM(s) Oral daily    MEDICATIONS  (PRN):  acetaminophen   Tablet .. 650 milliGRAM(s) Oral every 6 hours PRN Temp greater or equal to 38C (100.4F), Mild Pain (1 - 3)  albuterol/ipratropium for Nebulization 3 milliLiter(s) Nebulizer every 4 hours PRN Shortness of Breath and/or Wheezing  ondansetron Injectable 4 milliGRAM(s) IV Push every 6 hours PRN Nausea and/or Vomiting      HCT: IMPRESSION: No acute intracranial findings.    Redemonstration of a right-sided posterior parietal approach ventriculoperitoneal shunt catheter with unchanged ventricular size and configuration when compared with 6/21/2021.    Chronic bilateral cerebellar hemisphere infarcts.    cxr pleural effusion    EEG shows no epileptiform discharges. moderate generalized slowing with triphasic waves. This is likely metabolic. LFT's are wnl.     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 Recent basal ganglia bleed which was managed conservatively with d/c of eliquis, transferred to [facility]- in early June, who has developed progressive respiratory difficulties with hypoxia which led to [facility] transfer on 6/22.  She received CTX and cephalexin for klebsiella UTI in early June.She has had episodes of confusion.She has been O2 dependant.Echo with diastolic dysfunction, serial chest CT scans with atelectasis and possible pneumonia.CXR have shown congestion.  She was given aztreonam on admission x 1 dose for pneumonia coverage.    Pt was consulted for delirium. This is likely due to metabolic encephalopathy. Will advise caution with medications that may cause change in ms. Continue thiamine. In case of delirium, please use nonpharmacologic treatments such as reorientation and pablo chair.  Will sign out to Dr. Ulloa

## 2021-06-29 NOTE — PROGRESS NOTE ADULT - SUBJECTIVE AND OBJECTIVE BOX
No distress    Vital Signs Last 24 Hrs  T(C): 36.7 (06-29-21 @ 12:25), Max: 37.1 (06-28-21 @ 20:03)  T(F): 98 (06-29-21 @ 12:25), Max: 98.8 (06-28-21 @ 20:03)  HR: 75 (06-29-21 @ 17:04) (63 - 84)  BP: 121/46 (06-29-21 @ 17:04) (110/48 - 128/65)  RR: 16 (06-29-21 @ 17:04) (14 - 19)  SpO2: 94% (06-29-21 @ 17:04) (94% - 100%)    s1s2  b/l air entry, decr BS at bases  soft, ND  no edema b/l LE                                                                                                                       8.0    6.16  )-----------( 238      ( 28 Jun 2021 07:10 )             25.0     28 Jun 2021 07:10    138    |  98     |  34     ----------------------------<  144    4.0     |  38     |  1.22     Ca    9.0        28 Jun 2021 07:10  Phos  2.8       28 Jun 2021 07:10  Mg     1.7       28 Jun 2021 07:10    TPro  6.5    /  Alb  2.3    /  TBili  0.2    /  DBili  x      /  AST  39     /  ALT  40     /  AlkPhos  116    28 Jun 2021 07:10    LIVER FUNCTIONS - ( 28 Jun 2021 07:10 )  Alb: 2.3 g/dL / Pro: 6.5 g/dL / ALK PHOS: 116 U/L / ALT: 40 U/L / AST: 39 U/L / GGT: x           acetaminophen   Tablet .. 650 milliGRAM(s) Oral every 6 hours PRN  albuterol/ipratropium for Nebulization 3 milliLiter(s) Nebulizer every 4 hours PRN  albuterol/ipratropium for Nebulization 3 milliLiter(s) Nebulizer three times a day  ARIPiprazole 10 milliGRAM(s) Oral at bedtime  atorvastatin 40 milliGRAM(s) Oral at bedtime  dextrose 40% Gel 15 Gram(s) Oral once  dextrose 5%. 1000 milliLiter(s) IV Continuous <Continuous>  dextrose 5%. 1000 milliLiter(s) IV Continuous <Continuous>  dextrose 50% Injectable 25 Gram(s) IV Push once  dextrose 50% Injectable 12.5 Gram(s) IV Push once  dextrose 50% Injectable 25 Gram(s) IV Push once  epoetin orestes-epbx (RETACRIT) Injectable 54498 Unit(s) SubCutaneous every 7 days  ferrous    sulfate 325 milliGRAM(s) Oral daily  folic acid 1 milliGRAM(s) Oral daily  furosemide   Injectable 40 milliGRAM(s) IV Push daily  glucagon  Injectable 1 milliGRAM(s) IntraMuscular once  heparin   Injectable 5000 Unit(s) SubCutaneous every 8 hours  hydrALAZINE 25 milliGRAM(s) Oral every 8 hours  insulin lispro (ADMELOG) corrective regimen sliding scale   SubCutaneous three times a day before meals  insulin lispro (ADMELOG) corrective regimen sliding scale   SubCutaneous at bedtime  ondansetron Injectable 4 milliGRAM(s) IV Push every 6 hours PRN  pantoprazole    Tablet 40 milliGRAM(s) Oral before breakfast  thiamine 100 milliGRAM(s) Oral daily    A/P:    Hx HTN, s/p ICH, mod AS  SOB, V/Q scan w/indeterminate prob, ? asp PNA  Hemodynamic/cardio-renal PASCALE/CKD 3  Improved  Avoid nephrotoxins  Continue Lasix, titrate as needed  F/u BMP, Mg  Epoetin    [phone]

## 2021-06-29 NOTE — PROGRESS NOTE ADULT - SUBJECTIVE AND OBJECTIVE BOX
GREGORY WOODALL   81y   Female    Admitting: [facility]LOE Eva  HPI:  80 y/o F w/ PMH of DVT (on Eliquis), NIDDM, glaucoma, HLD, HTN, hs of NPH s/p  shunt 2015, recently hospitalized for ICH (05/29/21) - small right basal ganglia hemorrhage.  Eliquis was discontinued, was seen by neurosurgery and advised no interventions necessary.  Pt was d/brittani to [place] acute [facility] on 06/09/21.  Leg doppler from 06/09/21 neg. Pt was also treated recently for UTI.    Patient subsequently developed increased dyspnea, was hypoxic, O2 sat mid 80's to low 90's, required 100% NRBM. Patient was diuresed.    PAST MEDICAL & SURGICAL HISTORY:  Hypertension    Hyperlipidemia    Diabetes    Glaucoma    Osteoarthritis    Small bowel obstruction    S/P hysterectomy  1981 and Oopherectomy in 1990s    S/P cholecystectomy  1981    Achilles tendon injury  repair  right scalene muscle release    S/P knee replacement, left      HEALTH ISSUES - PROBLEM Dx:  Suspected pulmonary embolism    Bipolar disorder    Bipolar affective, mixed    Anemia, unspecified type      MEDICATIONS  (STANDING):  albuterol/ipratropium for Nebulization 3 milliLiter(s) Nebulizer three times a day  ARIPiprazole 10 milliGRAM(s) Oral at bedtime  atorvastatin 40 milliGRAM(s) Oral at bedtime  dextrose 40% Gel 15 Gram(s) Oral once  dextrose 5%. 1000 milliLiter(s) (50 mL/Hr) IV Continuous <Continuous>  dextrose 5%. 1000 milliLiter(s) (100 mL/Hr) IV Continuous <Continuous>  dextrose 50% Injectable 25 Gram(s) IV Push once  dextrose 50% Injectable 12.5 Gram(s) IV Push once  dextrose 50% Injectable 25 Gram(s) IV Push once  epoetin orestes-epbx (RETACRIT) Injectable 01974 Unit(s) SubCutaneous every 7 days  ferrous    sulfate 325 milliGRAM(s) Oral daily  folic acid 1 milliGRAM(s) Oral daily  furosemide   Injectable 40 milliGRAM(s) IV Push daily  glucagon  Injectable 1 milliGRAM(s) IntraMuscular once  heparin   Injectable 5000 Unit(s) SubCutaneous every 8 hours  hydrALAZINE 25 milliGRAM(s) Oral every 8 hours  insulin lispro (ADMELOG) corrective regimen sliding scale   SubCutaneous three times a day before meals  insulin lispro (ADMELOG) corrective regimen sliding scale   SubCutaneous at bedtime  pantoprazole    Tablet 40 milliGRAM(s) Oral before breakfast  thiamine 100 milliGRAM(s) Oral daily    MEDICATIONS  (PRN):  acetaminophen   Tablet .. 650 milliGRAM(s) Oral every 6 hours PRN Temp greater or equal to 38C (100.4F), Mild Pain (1 - 3)  albuterol/ipratropium for Nebulization 3 milliLiter(s) Nebulizer every 4 hours PRN Shortness of Breath and/or Wheezing  ondansetron Injectable 4 milliGRAM(s) IV Push every 6 hours PRN Nausea and/or Vomiting    Allergies    adhesives (Pruritus; Blisters)  penicillin (Hives)  pineapple (Anaphylaxis)    INTERVAL HPI/OVERNIGHT EVENTS:  Patient S&E at bedside. No c/o H/A, CP or SOB.     VITAL SIGNS:  T(F): 97.6 (06-29-21 @ 04:41)  HR: 66 (06-29-21 @ 04:41)  BP: 123/53 (06-29-21 @ 04:41)  RR: 16 (06-29-21 @ 04:41)  SpO2: 97% (06-29-21 @ 04:41)    PHYSICAL EXAM:  Constitutional: NAD  Eyes: sclera non-icteric  Neck: no JVD  Respiratory: decreased BS bases ant.; no wheezes  Cardiovascular: RRR, no M/R/G  Gastrointestinal: soft, NTND, no masses palpable  Extremities: no calf tenderness  Neurological: Awake, alert.    Labs:             8.0    6.16  )-----------( 238      ( 06-28 @ 07:10 )             25.0                7.5    5.99  )-----------( 210      ( 06-27 @ 06:40 )             23.2       06-28    138  |  98  |  34<H>  ----------------------------<  144<H>  4.0   |  38<H>  |  1.22    Ca    9.0      28 Jun 2021 07:10  Phos  2.8     06-28  Mg     1.7     06-28    TPro  6.5  /  Alb  2.3<L>  /  TBili  0.2  /  DBili  x   /  AST  39  /  ALT  40  /  AlkPhos  116  06-28      Absolute Reticulocytes: 44.9 K/uL (06-29-21 @ 06:00)      Consultant notes reviewed : YES [x ] ; NO [ ]

## 2021-06-30 ENCOUNTER — TRANSCRIPTION ENCOUNTER (OUTPATIENT)
Age: 81
End: 2021-06-30

## 2021-06-30 LAB
ANION GAP SERPL CALC-SCNC: 3 MMOL/L — LOW (ref 5–17)
BUN SERPL-MCNC: 31 MG/DL — HIGH (ref 7–23)
CALCIUM SERPL-MCNC: 9.4 MG/DL — SIGNIFICANT CHANGE UP (ref 8.4–10.5)
CHLORIDE SERPL-SCNC: 98 MMOL/L — SIGNIFICANT CHANGE UP (ref 96–108)
CO2 SERPL-SCNC: 38 MMOL/L — HIGH (ref 22–31)
CREAT SERPL-MCNC: 1.26 MG/DL — SIGNIFICANT CHANGE UP (ref 0.5–1.3)
GLUCOSE BLDC GLUCOMTR-MCNC: 146 MG/DL — HIGH (ref 70–99)
GLUCOSE BLDC GLUCOMTR-MCNC: 175 MG/DL — HIGH (ref 70–99)
GLUCOSE BLDC GLUCOMTR-MCNC: 224 MG/DL — HIGH (ref 70–99)
GLUCOSE BLDC GLUCOMTR-MCNC: 283 MG/DL — HIGH (ref 70–99)
GLUCOSE SERPL-MCNC: 141 MG/DL — HIGH (ref 70–99)
HCT VFR BLD CALC: 25.4 % — LOW (ref 34.5–45)
HGB BLD-MCNC: 8.2 G/DL — LOW (ref 11.5–15.5)
MCHC RBC-ENTMCNC: 29.6 PG — SIGNIFICANT CHANGE UP (ref 27–34)
MCHC RBC-ENTMCNC: 32.3 GM/DL — SIGNIFICANT CHANGE UP (ref 32–36)
MCV RBC AUTO: 91.7 FL — SIGNIFICANT CHANGE UP (ref 80–100)
NRBC # BLD: 0 /100 WBCS — SIGNIFICANT CHANGE UP (ref 0–0)
PLATELET # BLD AUTO: 255 K/UL — SIGNIFICANT CHANGE UP (ref 150–400)
POTASSIUM SERPL-MCNC: 3.7 MMOL/L — SIGNIFICANT CHANGE UP (ref 3.5–5.3)
POTASSIUM SERPL-SCNC: 3.7 MMOL/L — SIGNIFICANT CHANGE UP (ref 3.5–5.3)
RBC # BLD: 2.77 M/UL — LOW (ref 3.8–5.2)
RBC # FLD: 12.4 % — SIGNIFICANT CHANGE UP (ref 10.3–14.5)
SODIUM SERPL-SCNC: 139 MMOL/L — SIGNIFICANT CHANGE UP (ref 135–145)
WBC # BLD: 6.03 K/UL — SIGNIFICANT CHANGE UP (ref 3.8–10.5)
WBC # FLD AUTO: 6.03 K/UL — SIGNIFICANT CHANGE UP (ref 3.8–10.5)

## 2021-06-30 PROCEDURE — 99232 SBSQ HOSP IP/OBS MODERATE 35: CPT

## 2021-06-30 RX ORDER — FUROSEMIDE 40 MG
40 TABLET ORAL
Refills: 0 | Status: DISCONTINUED | OUTPATIENT
Start: 2021-07-01 | End: 2021-07-01

## 2021-06-30 RX ORDER — POLYETHYLENE GLYCOL 3350 17 G/17G
17 POWDER, FOR SOLUTION ORAL ONCE
Refills: 0 | Status: COMPLETED | OUTPATIENT
Start: 2021-06-30 | End: 2021-06-30

## 2021-06-30 RX ADMIN — ARIPIPRAZOLE 10 MILLIGRAM(S): 15 TABLET ORAL at 21:09

## 2021-06-30 RX ADMIN — HEPARIN SODIUM 5000 UNIT(S): 5000 INJECTION INTRAVENOUS; SUBCUTANEOUS at 06:25

## 2021-06-30 RX ADMIN — Medication 2: at 16:41

## 2021-06-30 RX ADMIN — Medication 25 MILLIGRAM(S): at 21:09

## 2021-06-30 RX ADMIN — Medication 3 MILLILITER(S): at 09:30

## 2021-06-30 RX ADMIN — Medication 25 MILLIGRAM(S): at 15:05

## 2021-06-30 RX ADMIN — Medication 25 MILLIGRAM(S): at 06:25

## 2021-06-30 RX ADMIN — HEPARIN SODIUM 5000 UNIT(S): 5000 INJECTION INTRAVENOUS; SUBCUTANEOUS at 21:08

## 2021-06-30 RX ADMIN — Medication 325 MILLIGRAM(S): at 12:38

## 2021-06-30 RX ADMIN — Medication 100 MILLIGRAM(S): at 12:38

## 2021-06-30 RX ADMIN — Medication 40 MILLIGRAM(S): at 06:25

## 2021-06-30 RX ADMIN — PANTOPRAZOLE SODIUM 40 MILLIGRAM(S): 20 TABLET, DELAYED RELEASE ORAL at 06:57

## 2021-06-30 RX ADMIN — Medication 3 MILLILITER(S): at 21:36

## 2021-06-30 RX ADMIN — POLYETHYLENE GLYCOL 3350 17 GRAM(S): 17 POWDER, FOR SOLUTION ORAL at 12:38

## 2021-06-30 RX ADMIN — ATORVASTATIN CALCIUM 40 MILLIGRAM(S): 80 TABLET, FILM COATED ORAL at 21:09

## 2021-06-30 RX ADMIN — Medication 1: at 21:09

## 2021-06-30 RX ADMIN — HEPARIN SODIUM 5000 UNIT(S): 5000 INJECTION INTRAVENOUS; SUBCUTANEOUS at 15:05

## 2021-06-30 RX ADMIN — Medication 1 MILLIGRAM(S): at 12:38

## 2021-06-30 RX ADMIN — Medication 4: at 12:38

## 2021-06-30 NOTE — DISCHARGE NOTE PROVIDER - NSDCMRMEDTOKEN_GEN_ALL_CORE_FT
acetaminophen 325 mg oral tablet: 2 tab(s) orally every 6 hours, As needed, Temp greater or equal to 38C (100.4F), Mild Pain (1 - 3), Moderate Pain (4 - 6)  albuterol 90 mcg/inh inhalation aerosol: 2 puff(s) inhaled every 6 hours  amLODIPine 10 mg oral tablet: 1 tab(s) orally once a day  ARIPiprazole 10 mg oral tablet: 1 tab(s) orally once a day (at bedtime)  atorvastatin 40 mg oral tablet: 1 tab(s) orally once a day (at bedtime)  chlorhexidine 0.12% mucous membrane liquid: 15 milliliter(s) mucous membrane 2 times a day  fluticasone 50 mcg/inh nasal spray: 1 spray(s) nasal 2 times a day  fluvoxaMINE 100 mg oral tablet: 1 tab(s) orally once a day (at bedtime)  folic acid 1 mg oral tablet: 1 tab(s) orally once a day  hydrALAZINE 25 mg oral tablet: 1 tab(s) orally every 8 hours  metFORMIN 500 mg oral tablet: 1 tab(s) orally 2 times a day  nystatin 100,000 units/g topical powder: 1 application topically 2 times a day  ondansetron 4 mg oral tablet: 1 tab(s) orally every 8 hours, As needed, Nausea and/or Vomiting   acetaminophen 325 mg oral tablet: 2 tab(s) orally every 6 hours, As needed, Temp greater or equal to 38C (100.4F), Mild Pain (1 - 3), Moderate Pain (4 - 6)  albuterol 90 mcg/inh inhalation aerosol: 2 puff(s) inhaled every 6 hours  ARIPiprazole 10 mg oral tablet: 1 tab(s) orally once a day (at bedtime)  atorvastatin 40 mg oral tablet: 1 tab(s) orally once a day (at bedtime)  chlorhexidine 0.12% mucous membrane liquid: 15 milliliter(s) mucous membrane 2 times a day  epoetin orestes: 02938 unit(s) subcutaneous every 7 days  ferrous sulfate 325 mg (65 mg elemental iron) oral tablet: 1 tab(s) orally once a day  folic acid 1 mg oral tablet: 1 tab(s) orally once a day  furosemide 40 mg oral tablet: 1 tab(s) orally 2 times a day  hydrALAZINE 25 mg oral tablet: 1 tab(s) orally every 8 hours  metFORMIN 500 mg oral tablet: 1 tab(s) orally 2 times a day  ondansetron 4 mg oral tablet: 1 tab(s) orally every 8 hours, As needed, Nausea and/or Vomiting  pantoprazole 40 mg oral delayed release tablet: 1 tab(s) orally once a day (before a meal)  thiamine 100 mg oral tablet: 1 tab(s) orally once a day

## 2021-06-30 NOTE — PROGRESS NOTE ADULT - ASSESSMENT
PLAN:  -BiPAP nocturnal and PRN  -maitanin NC goal Sao2 >90%, wean to room air as able overnight  -daily lasix, follow lytes while diuresis, goal K+ 4-4.5  -heparin subq forDVT prophylaxis   -goal euglycemia with  ISS  -BP control  -f/u am labs

## 2021-06-30 NOTE — PROGRESS NOTE ADULT - ASSESSMENT
82 yo F with hx of DVT (on Eliquis), NPH s/p VPS (2015), Type 2 Diabetes Mellitus, Bipolar disorder, Paralyzed hemidiaphragm, HFpEF, recent basal ganglia bleed (managed conservatively ac dc'd) who was transferred from [facility] for hypoxia to ICU 6/22. Found to have Hypercarbia, Klebsiella UTI, Pneumonia, HF exacerbation. Hospital course complicated by delirium, oxygen dependence. Now downgraded to medical floors for continued care.     # Acute hypoxic and hypercarbic respiratory failure due to underlying Pneumonia, acute diastolic heart failure exacerbation  # Hx of paralyzed hemidiaphragm   # Klebsiella UTI  - Completed course of abx per Infectious Diseases  - Pulm following. AVAPS qhs  - currently on 2L nc. titrate down as tolerated  - Euvolemic on exam. Lasix 40mg BID  - PT/OT following  - Cardio evals noted    # Acute Kidney Injury on Chronic Kidney Disease 3 - resolved  - back to baseline renal function  - Monitor BMP  - Avoid nephrotoxic medications   - Nephro following    # Delirium  - Neuro and psych eval  - EEG noted  - Thiamine per Neuro    # Anemia  - likely multifactorial   - Heme recommendations appreciated. c/w PO iron  - Epo per renal    # Hypertension   - Continue with Hydralazine 25mg q8h. ACEi discontinued due to acute kidney injury   - will monitor and adjust to optimize bp    # Type 2 Diabetes Mellitus   - HbA1C: 7.7%  - FS and JOSI    # Bipolar disorder  - Continue with abilify  - Psych eval pending    # Hx of DVT  - most recent duplex negative  - recent intracranial bleed, high risk for ac    DVT Prophylaxis:  - Heparin    Daughter Sarah [phone] agreeable to [facility]  D/w Pulm - pt will need NIPPV qhs

## 2021-06-30 NOTE — DISCHARGE NOTE PROVIDER - CARE PROVIDERS DIRECT ADDRESSES
[email or web address]

## 2021-06-30 NOTE — PROGRESS NOTE ADULT - SUBJECTIVE AND OBJECTIVE BOX
Patient is a 81y old  Female who presents with a chief complaint of dyspnea, hypoxia (30 Jun 2021 08:26)    Patient seen and examined at bedside. No acute overnight events. Reports sleeping poorly last night. Denies fever, chills, nausea, vomiting.     ALLERGIES:  adhesives (Pruritus; Blisters)  penicillin (Hives)  pineapple (Anaphylaxis)    MEDICATIONS  (STANDING):  albuterol/ipratropium for Nebulization 3 milliLiter(s) Nebulizer three times a day  ARIPiprazole 10 milliGRAM(s) Oral at bedtime  atorvastatin 40 milliGRAM(s) Oral at bedtime  dextrose 40% Gel 15 Gram(s) Oral once  dextrose 5%. 1000 milliLiter(s) (50 mL/Hr) IV Continuous <Continuous>  dextrose 5%. 1000 milliLiter(s) (100 mL/Hr) IV Continuous <Continuous>  dextrose 50% Injectable 25 Gram(s) IV Push once  dextrose 50% Injectable 12.5 Gram(s) IV Push once  dextrose 50% Injectable 25 Gram(s) IV Push once  epoetin orestes-epbx (RETACRIT) Injectable 43721 Unit(s) SubCutaneous every 7 days  ferrous    sulfate 325 milliGRAM(s) Oral daily  folic acid 1 milliGRAM(s) Oral daily  glucagon  Injectable 1 milliGRAM(s) IntraMuscular once  heparin   Injectable 5000 Unit(s) SubCutaneous every 8 hours  hydrALAZINE 25 milliGRAM(s) Oral every 8 hours  insulin lispro (ADMELOG) corrective regimen sliding scale   SubCutaneous three times a day before meals  insulin lispro (ADMELOG) corrective regimen sliding scale   SubCutaneous at bedtime  pantoprazole    Tablet 40 milliGRAM(s) Oral before breakfast  polyethylene glycol 3350 17 Gram(s) Oral once  thiamine 100 milliGRAM(s) Oral daily    MEDICATIONS  (PRN):  acetaminophen   Tablet .. 650 milliGRAM(s) Oral every 6 hours PRN Temp greater or equal to 38C (100.4F), Mild Pain (1 - 3)  albuterol/ipratropium for Nebulization 3 milliLiter(s) Nebulizer every 4 hours PRN Shortness of Breath and/or Wheezing  ondansetron Injectable 4 milliGRAM(s) IV Push every 6 hours PRN Nausea and/or Vomiting    Vital Signs Last 24 Hrs  T(F): 98.2 (30 Jun 2021 08:18), Max: 98.4 (29 Jun 2021 19:24)  HR: 64 (30 Jun 2021 09:59) (61 - 81)  BP: 145/37 (30 Jun 2021 09:59) (115/52 - 158/57)  RR: 16 (30 Jun 2021 09:59) (13 - 16)  SpO2: 100% (30 Jun 2021 09:59) (94% - 100%)  I&O's Summary    29 Jun 2021 07:01  -  30 Jun 2021 07:00  --------------------------------------------------------  IN: 500 mL / OUT: 1200 mL / NET: -700 mL    30 Jun 2021 07:01  -  30 Jun 2021 11:54  --------------------------------------------------------  IN: 100 mL / OUT: 900 mL / NET: -800 mL    PHYSICAL EXAM:  General: NAD, A/O x 2. Comfortable on nasal cannula  ENT: MMM, no tonsilar exudate  Neck: Supple, No JVD  Lungs: Clear to auscultation bilaterally, no wheezes. Good air entry bilaterally   Cardio: RRR, S1/S2, +systolic murmur   Abdomen: Soft, Nontender, Nondistended; Bowel sounds present  Extremities: No calf tenderness, No pitting edema    LABS:                        8.2    6.03  )-----------( 255      ( 30 Jun 2021 06:15 )             25.4       06-30    139  |  98  |  31  ----------------------------<  141  3.7   |  38  |  1.26    Ca    9.4      30 Jun 2021 06:15  Phos  2.8     06-28  Mg     1.7     06-28    TPro  6.5  /  Alb  2.3  /  TBili  0.2  /  DBili  x   /  AST  39  /  ALT  40  /  AlkPhos  116  06-28     eGFR if Non African American: 41 mL/min/1.73M2 (06-30-21 @ 06:15)  eGFR if African American: 47 mL/min/1.73M2 (06-30-21 @ 06:15)    05-30 Chol 133 mg/dL LDL -- HDL 54 mg/dL Trig 103 mg/dL    POCT Blood Glucose.: 224 mg/dL (30 Jun 2021 11:35)  POCT Blood Glucose.: 146 mg/dL (30 Jun 2021 08:29)  POCT Blood Glucose.: 135 mg/dL (29 Jun 2021 20:53)  POCT Blood Glucose.: 198 mg/dL (29 Jun 2021 16:32)    COVID-19 PCR: NotDetec (06-20-21 @ 20:20)  COVID-19 PCR: NotDetec (06-12-21 @ 05:45)  COVID-19 PCR: NotDetec (06-05-21 @ 18:40)    RADIOLOGY & ADDITIONAL TESTS:     Care Discussed with Consultants/Other Providers: d/w Dr. Zamudio

## 2021-06-30 NOTE — DISCHARGE NOTE PROVIDER - HOSPITAL COURSE
Hospital Course:  Per admitting physician:   HPI:  80 y/o F w/ PMH of DVT (on Eliquis), NIDDM, glaucoma, HLD, HTN, hs of NPH s/p  shunt 2015, recently hospitalized for ICH (05/29/21) - small right basal ganglia hemorrhage.  Eliquis was discontinued, was seen by neurosurgery and advised no interventions necessary.  Pt was d/brittani to [place] acute rehab on 06/09/21.  Leg doppler from 06/09/21 neg. Pt was also treated recently for UTI.    Tonight, pt noted to have increased dyspnea, was hypoxic, O2 sat mid 80's to low 90's, required 100% NRBM.  Pt apparently had decreasing Na for the past few days and was started on NS today.  Cxray shows fluid overload, (venous congestion, bilat pl effusions). IVF was d/brittani and pt was given Lasix 40 mg IVP x 1. Pt denies chest pain, however remained tachypneic.  O2 sat on 100% NRBM is 92-93%. Pt is afebrile.  She has an occ dry cough, denies abd pain, she has occ nausea.  EKG showed NSR 85/min.  Stat labs ordered and pt transferred to telemetry for closer monitoring.  (21 Jun 2021 05:14)    Medical Floor Course:  GREGORY WOODALL was admitted to ICU from rehab for acute hypoxic and hypercarbic respiratory failure, pnuemonia, and acute diastolic heart failure exacerbation. Patient was maintained on NIPPV with signifcant improvement of hypoxia and hypercarbia, on antibotics per Infectious Diseases and on diuresis with close renal monitoring. Subsequently downgraded to medical floors for continued care. Patient euvolemic, completes course of antibiotics and evaluated by PT and OT who recommended JULIEN. Pt and daughter agree with JULIEN placement.    Discharging Provider:  Ming Chandler MD  Contact Info: Cell [phone] - Please call with any questions or concerns.    Outpatient Provider: Dr. Martínez    Signout given to  [facility] Provider:    I, the attending physician, was physically present for the key portions of the evaluation and management (E/M) service provided. The total amount of time spent reviewing the hospital course, laboratory values, imaging findings, assessing/counseling the patient, discussing with consultant physicians, social work, nursing staff was *** minutes.    Hospital Course:  Per admitting physician:   HPI:  82 y/o F w/ PMH of DVT (on Eliquis), NIDDM, glaucoma, HLD, HTN, hs of NPH s/p  shunt 2015, recently hospitalized for ICH (05/29/21) - small right basal ganglia hemorrhage.  Eliquis was discontinued, was seen by neurosurgery and advised no interventions necessary.  Pt was d/brittani to [place] acute rehab on 06/09/21.  Leg doppler from 06/09/21 neg. Pt was also treated recently for UTI.    Tonight, pt noted to have increased dyspnea, was hypoxic, O2 sat mid 80's to low 90's, required 100% NRBM.  Pt apparently had decreasing Na for the past few days and was started on NS today.  Cxray shows fluid overload, (venous congestion, bilat pl effusions). IVF was d/brittani and pt was given Lasix 40 mg IVP x 1. Pt denies chest pain, however remained tachypneic.  O2 sat on 100% NRBM is 92-93%. Pt is afebrile.  She has an occ dry cough, denies abd pain, she has occ nausea.  EKG showed NSR 85/min.  Stat labs ordered and pt transferred to telemetry for closer monitoring.  (21 Jun 2021 05:14)    Medical Floor Course:  GREGORY WOODALL was admitted to ICU from rehab for acute hypoxic and hypercarbic respiratory failure, pnuemonia, and acute diastolic heart failure exacerbation. Patient was maintained on NIPPV with signifcant improvement of hypoxia and hypercarbia, on antibotics per Infectious Diseases and on diuresis with close renal monitoring. Subsequently downgraded to medical floors for continued care. Patient euvolemic, completes course of antibiotics and evaluated by PT and OT who recommended JULIEN. Pt and daughter agree with JULIEN placement.    Discharging Provider:  Ming Chandler MD  Contact Info: Cell [phone] - Please call with any questions or concerns.    Outpatient Provider: Dr. Martínez    Signout given to  [facility] Provider: Dr. Vladimir Alarcon. Signed out to nurse Parker [phone]    I, the attending physician, was physically present for the key portions of the evaluation and management (E/M) service provided. The total amount of time spent reviewing the hospital course, laboratory values, imaging findings, assessing/counseling the patient, discussing with consultant physicians, social work, nursing staff was 34 minutes.    Hospital Course:  Per admitting physician:   HPI:  82 y/o F w/ PMH of DVT (on Eliquis), NIDDM, glaucoma, HLD, HTN, hs of NPH s/p  shunt 2015, recently hospitalized for ICH (05/29/21) - small right basal ganglia hemorrhage.  Eliquis was discontinued, was seen by neurosurgery and advised no interventions necessary.  Pt was d/brittani to [place] acute rehab on 06/09/21.  Leg doppler from 06/09/21 neg. Pt was also treated recently for UTI.    Tonight, pt noted to have increased dyspnea, was hypoxic, O2 sat mid 80's to low 90's, required 100% NRBM.  Pt apparently had decreasing Na for the past few days and was started on NS today.  Cxray shows fluid overload, (venous congestion, bilat pl effusions). IVF was d/brittani and pt was given Lasix 40 mg IVP x 1. Pt denies chest pain, however remained tachypneic.  O2 sat on 100% NRBM is 92-93%. Pt is afebrile.  She has an occ dry cough, denies abd pain, she has occ nausea.  EKG showed NSR 85/min.  Stat labs ordered and pt transferred to telemetry for closer monitoring.  (21 Jun 2021 05:14)    Medical Floor Course:  GREGORY WOODALL was admitted to ICU from rehab for acute hypoxic and hypercarbic respiratory failure, pnuemonia, and acute diastolic heart failure exacerbation. Patient was maintained on NIPPV with signifcant improvement of hypoxia and hypercarbia, on antibotics per Infectious Diseases and on diuresis with close renal monitoring. Subsequently downgraded to medical floors for continued care. Patient euvolemic, completes course of antibiotics and evaluated by PT and OT who recommended JULIEN. Pt and daughter agree with JULIEN placement.    BIPAP settings   Mode of Delivery: AVAPS  EPAP (cm H2O): 6   Set Rate (breaths/min): 14   Oxygen Concentration (%): 40   RR Total (breaths/min): 27   Tidal Volume (mL): 300   Minute Ventilation (L/min): 5   Peak Inspiratory Pressure (cm H2O): 7     Discharging Provider:  Ming Chandler MD  Contact Info: Cell [phone] - Please call with any questions or concerns.    Outpatient Provider: Dr. Martínez    Signout given to  SNF Provider: Dr. Vladimir Alarcon. Signed out to nurse Parker [phone]    I, the attending physician, was physically present for the key portions of the evaluation and management (E/M) service provided. The total amount of time spent reviewing the hospital course, laboratory values, imaging findings, assessing/counseling the patient, discussing with consultant physicians, social work, nursing staff was 34 minutes.

## 2021-06-30 NOTE — PROGRESS NOTE ADULT - PROBLEM SELECTOR PLAN 1
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 Patient receiving PO folic acid supplement as well as Epogen. Vitamin B 12 level, haptoglobin pending; guaiac stool. PO iron (possible volume issues noted). Hemoglobin stable at present. Continue to monitor CBC.
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 Patient receiving PO folic acid supplement as well as Epogen. Guaiac stool. PO iron (possible volume issues noted). Hemoglobin stable at present. Continue to monitor CBC.

## 2021-06-30 NOTE — DISCHARGE NOTE PROVIDER - DETAILS OF MALNUTRITION DIAGNOSIS/DIAGNOSES
This patient has been assessed with a concern for Malnutrition and was treated during this hospitalization for the following Nutrition diagnosis/diagnoses:     -  07/01/2021: Severe protein-calorie malnutrition

## 2021-06-30 NOTE — DISCHARGE NOTE PROVIDER - NSDCCPCAREPLAN_GEN_ALL_CORE_FT
PRINCIPAL DISCHARGE DIAGNOSIS  Diagnosis: Acute respiratory failure with hypoxia  Assessment and Plan of Treatment: Please use AVAPS at night. Use oxygen as needed during the day       PRINCIPAL DISCHARGE DIAGNOSIS  Diagnosis: Acute respiratory failure with hypoxia  Assessment and Plan of Treatment: Please use AVAPS at night. Use oxygen as needed during the day  BIPAP settings   Mode of Delivery: AVAPS  EPAP (cm H2O): 6   Set Rate (breaths/min): 14   Oxygen Concentration (%): 40   RR Total (breaths/min): 27   Tidal Volume (mL): 300   Minute Ventilation (L/min): 5   Peak Inspiratory Pressure (cm H2O): 7

## 2021-06-30 NOTE — DISCHARGE NOTE PROVIDER - CARE PROVIDER_API CALL
Andres Martínez (MD)  Cardiovascular Disease  [address], [address]  [place] [address]  Phone: [phone]  Fax: [phone]  Follow Up Time:     Refugio Bowman; PhD)  Neurology; Vascular Neurology  [address], [address]  [place] [address]  Phone: [phone]  Fax: [phone]  Follow Up Time:

## 2021-06-30 NOTE — PROGRESS NOTE ADULT - SUBJECTIVE AND OBJECTIVE BOX
GREGORY WOODALL   81y   Female    Admitting: CHLOE ZavalaEva  HPI:  80 y/o F w/ PMH of DVT (on Eliquis), NIDDM, glaucoma, HLD, HTN, hs of NPH s/p  shunt 2015, recently hospitalized for ICH (05/29/21) - small right basal ganglia hemorrhage.  Eliquis was discontinued, was seen by neurosurgery and advised no interventions necessary.  Pt was d/brittani to [place] acute rehab on 06/09/21.  Leg doppler from 06/09/21 neg. Pt was also treated recently for UTI.    Patient subsequently developed increased dyspnea, was hypoxic, O2 sat mid 80's to low 90's, required 100% NRBM. Patient was diuresed.    PAST MEDICAL & SURGICAL HISTORY:  Hypertension    Hyperlipidemia    Diabetes    Glaucoma    Osteoarthritis    Small bowel obstruction    S/P hysterectomy  1981 and Oopherectomy in 1990s    S/P cholecystectomy  1981    Achilles tendon injury  repair  right scalene muscle release    S/P knee replacement, left      HEALTH ISSUES - PROBLEM Dx:  Suspected pulmonary embolism    Bipolar disorder    Bipolar affective, mixed    Anemia, unspecified type    MEDICATIONS  (STANDING):  albuterol/ipratropium for Nebulization 3 milliLiter(s) Nebulizer three times a day  ARIPiprazole 10 milliGRAM(s) Oral at bedtime  atorvastatin 40 milliGRAM(s) Oral at bedtime  dextrose 40% Gel 15 Gram(s) Oral once  dextrose 5%. 1000 milliLiter(s) (50 mL/Hr) IV Continuous <Continuous>  dextrose 5%. 1000 milliLiter(s) (100 mL/Hr) IV Continuous <Continuous>  dextrose 50% Injectable 25 Gram(s) IV Push once  dextrose 50% Injectable 12.5 Gram(s) IV Push once  dextrose 50% Injectable 25 Gram(s) IV Push once  epoetin orestes-epbx (RETACRIT) Injectable 87915 Unit(s) SubCutaneous every 7 days  ferrous    sulfate 325 milliGRAM(s) Oral daily  folic acid 1 milliGRAM(s) Oral daily  glucagon  Injectable 1 milliGRAM(s) IntraMuscular once  heparin   Injectable 5000 Unit(s) SubCutaneous every 8 hours  hydrALAZINE 25 milliGRAM(s) Oral every 8 hours  insulin lispro (ADMELOG) corrective regimen sliding scale   SubCutaneous three times a day before meals  insulin lispro (ADMELOG) corrective regimen sliding scale   SubCutaneous at bedtime  pantoprazole    Tablet 40 milliGRAM(s) Oral before breakfast  thiamine 100 milliGRAM(s) Oral daily    MEDICATIONS  (PRN):  acetaminophen   Tablet .. 650 milliGRAM(s) Oral every 6 hours PRN Temp greater or equal to 38C (100.4F), Mild Pain (1 - 3)  albuterol/ipratropium for Nebulization 3 milliLiter(s) Nebulizer every 4 hours PRN Shortness of Breath and/or Wheezing  ondansetron Injectable 4 milliGRAM(s) IV Push every 6 hours PRN Nausea and/or Vomiting    Allergies    adhesives (Pruritus; Blisters)  penicillin (Hives)  pineapple (Anaphylaxis)    INTERVAL HPI/OVERNIGHT EVENTS:  Patient S&E at bedside. Appears to be resting comfortably.    VITAL SIGNS:  T(F): 98.2 (06-30-21 @ 08:18)  HR: 68 (06-30-21 @ 08:18)  BP: 143/64 (06-30-21 @ 08:18)  RR: 14 (06-30-21 @ 08:18)  SpO2: 97% (06-30-21 @ 08:18)    PHYSICAL EXAM:  Constitutional: NAD  Eyes: sclera non-icteric  Neck: no JVD  Respiratory: +few scattered rhonchi ant.  Cardiovascular: RRR, no M/R/G  Gastrointestinal: soft, NTND, no masses palpable  Extremities: no calf tenderness  Neurological: Awake, alert.    Labs:             8.2    6.03  )-----------( 255      ( 06-30 @ 06:15 )             25.4                8.0    6.16  )-----------( 238      ( 06-28 @ 07:10 )             25.0       06-30    139  |  98  |  31<H>  ----------------------------<  141<H>  3.7   |  38<H>  |  1.26    Ca    9.4      30 Jun 2021 06:15        Vitamin B12, Serum: 906 pg/mL (06-29-21 @ 06:00)  Folate, Serum: >20.0 ng/mL (06-29-21 @ 06:00)  Absolute Reticulocytes: 44.9 K/uL (06-29-21 @ 06:00)  Haptoglobin, Serum: 324 mg/dL (06-29-21 @ 06:00)    Consultant notes reviewed : YES [x ] ; NO [ ]

## 2021-06-30 NOTE — PROGRESS NOTE ADULT - SUBJECTIVE AND OBJECTIVE BOX
F/U Note:    81y Female admitted with   81F admitted w/ PNA/CHF exacerbation        Vital Signs Last 24 Hrs  T(C): 36.8 (30 Jun 2021 20:13), Max: 36.8 (30 Jun 2021 08:18)  T(F): 98.3 (30 Jun 2021 20:13), Max: 98.3 (30 Jun 2021 20:13)  HR: 90 (30 Jun 2021 21:37) (61 - 90)  BP: 129/44 (30 Jun 2021 20:13) (115/52 - 158/57)  RR: 19 (30 Jun 2021 20:13) (14 - 19)  SpO2: 98% (30 Jun 2021 21:37) (95% - 100%)                            8.2    6.03  )-----------( 255      ( 30 Jun 2021 06:15 )             25.4         06-30    139  |  98  |  31<H>  ----------------------------<  141<H>  3.7   |  38<H>  |  1.26    Ca    9.4      30 Jun 2021 06:15            NEURO: awake and alert  CV: (+) S1/S2, rrr, no mrg  RESP: CTA b/l  GI: soft, non tender

## 2021-06-30 NOTE — PROGRESS NOTE ADULT - ASSESSMENT
82 y/o F w/ PMH of DVT (on Eliquis), NIDDM, glaucoma, HLD, HTN, hs of NPH s/p  shunt 2015, recently hospitalized for ICH (05/29/21) - small right basal ganglia hemorrhage.  Eliquis was discontinued, was seen by neurosurgery and advised no interventions necessary.  Pt was d/brittani to [facility] acute re[facility] on 06/09/21.  Leg doppler from 06/09/21 neg. Pt was also treated recently for UTI.    Patient subsequently developed increased dyspnea, was hypoxic, O2 sat mid 80's to low 90's, required 100% NRBM. Patient was diuresed.  Hematology consulted for anemia.

## 2021-07-01 ENCOUNTER — TRANSCRIPTION ENCOUNTER (OUTPATIENT)
Age: 81
End: 2021-07-01

## 2021-07-01 VITALS
DIASTOLIC BLOOD PRESSURE: 55 MMHG | SYSTOLIC BLOOD PRESSURE: 146 MMHG | HEART RATE: 68 BPM | RESPIRATION RATE: 15 BRPM | TEMPERATURE: 98 F | OXYGEN SATURATION: 96 %

## 2021-07-01 LAB
ANION GAP SERPL CALC-SCNC: 2 MMOL/L — LOW (ref 5–17)
BUN SERPL-MCNC: 30 MG/DL — HIGH (ref 7–23)
CALCIUM SERPL-MCNC: 9.7 MG/DL — SIGNIFICANT CHANGE UP (ref 8.4–10.5)
CHLORIDE SERPL-SCNC: 100 MMOL/L — SIGNIFICANT CHANGE UP (ref 96–108)
CO2 SERPL-SCNC: 39 MMOL/L — HIGH (ref 22–31)
CREAT SERPL-MCNC: 1.3 MG/DL — SIGNIFICANT CHANGE UP (ref 0.5–1.3)
GLUCOSE BLDC GLUCOMTR-MCNC: 161 MG/DL — HIGH (ref 70–99)
GLUCOSE BLDC GLUCOMTR-MCNC: 235 MG/DL — HIGH (ref 70–99)
GLUCOSE SERPL-MCNC: 160 MG/DL — HIGH (ref 70–99)
HCT VFR BLD CALC: 26.9 % — LOW (ref 34.5–45)
HGB BLD-MCNC: 8.5 G/DL — LOW (ref 11.5–15.5)
MCHC RBC-ENTMCNC: 29.3 PG — SIGNIFICANT CHANGE UP (ref 27–34)
MCHC RBC-ENTMCNC: 31.6 GM/DL — LOW (ref 32–36)
MCV RBC AUTO: 92.8 FL — SIGNIFICANT CHANGE UP (ref 80–100)
NRBC # BLD: 0 /100 WBCS — SIGNIFICANT CHANGE UP (ref 0–0)
PLATELET # BLD AUTO: 267 K/UL — SIGNIFICANT CHANGE UP (ref 150–400)
POTASSIUM SERPL-MCNC: 3.9 MMOL/L — SIGNIFICANT CHANGE UP (ref 3.5–5.3)
POTASSIUM SERPL-SCNC: 3.9 MMOL/L — SIGNIFICANT CHANGE UP (ref 3.5–5.3)
RBC # BLD: 2.9 M/UL — LOW (ref 3.8–5.2)
RBC # FLD: 12.4 % — SIGNIFICANT CHANGE UP (ref 10.3–14.5)
SARS-COV-2 RNA SPEC QL NAA+PROBE: SIGNIFICANT CHANGE UP
SODIUM SERPL-SCNC: 141 MMOL/L — SIGNIFICANT CHANGE UP (ref 135–145)
WBC # BLD: 6.55 K/UL — SIGNIFICANT CHANGE UP (ref 3.8–10.5)
WBC # FLD AUTO: 6.55 K/UL — SIGNIFICANT CHANGE UP (ref 3.8–10.5)

## 2021-07-01 PROCEDURE — 83550 IRON BINDING TEST: CPT

## 2021-07-01 PROCEDURE — 86850 RBC ANTIBODY SCREEN: CPT

## 2021-07-01 PROCEDURE — 87635 SARS-COV-2 COVID-19 AMP PRB: CPT

## 2021-07-01 PROCEDURE — 71045 X-RAY EXAM CHEST 1 VIEW: CPT

## 2021-07-01 PROCEDURE — 82746 ASSAY OF FOLIC ACID SERUM: CPT

## 2021-07-01 PROCEDURE — 97166 OT EVAL MOD COMPLEX 45 MIN: CPT

## 2021-07-01 PROCEDURE — 95812 EEG 41-60 MINUTES: CPT

## 2021-07-01 PROCEDURE — 97535 SELF CARE MNGMENT TRAINING: CPT

## 2021-07-01 PROCEDURE — 97110 THERAPEUTIC EXERCISES: CPT

## 2021-07-01 PROCEDURE — 82962 GLUCOSE BLOOD TEST: CPT

## 2021-07-01 PROCEDURE — 93971 EXTREMITY STUDY: CPT

## 2021-07-01 PROCEDURE — 94640 AIRWAY INHALATION TREATMENT: CPT

## 2021-07-01 PROCEDURE — 99239 HOSP IP/OBS DSCHRG MGMT >30: CPT

## 2021-07-01 PROCEDURE — A9540: CPT

## 2021-07-01 PROCEDURE — 82803 BLOOD GASES ANY COMBINATION: CPT

## 2021-07-01 PROCEDURE — 85610 PROTHROMBIN TIME: CPT

## 2021-07-01 PROCEDURE — 78580 LUNG PERFUSION IMAGING: CPT

## 2021-07-01 PROCEDURE — 85730 THROMBOPLASTIN TIME PARTIAL: CPT

## 2021-07-01 PROCEDURE — 83010 ASSAY OF HAPTOGLOBIN QUANT: CPT

## 2021-07-01 PROCEDURE — 82728 ASSAY OF FERRITIN: CPT

## 2021-07-01 PROCEDURE — 82607 VITAMIN B-12: CPT

## 2021-07-01 PROCEDURE — 80053 COMPREHEN METABOLIC PANEL: CPT

## 2021-07-01 PROCEDURE — 36415 COLL VENOUS BLD VENIPUNCTURE: CPT

## 2021-07-01 PROCEDURE — 83540 ASSAY OF IRON: CPT

## 2021-07-01 PROCEDURE — 94660 CPAP INITIATION&MGMT: CPT

## 2021-07-01 PROCEDURE — 87640 STAPH A DNA AMP PROBE: CPT

## 2021-07-01 PROCEDURE — 84100 ASSAY OF PHOSPHORUS: CPT

## 2021-07-01 PROCEDURE — 87040 BLOOD CULTURE FOR BACTERIA: CPT

## 2021-07-01 PROCEDURE — 86900 BLOOD TYPING SEROLOGIC ABO: CPT

## 2021-07-01 PROCEDURE — 84145 PROCALCITONIN (PCT): CPT

## 2021-07-01 PROCEDURE — 84484 ASSAY OF TROPONIN QUANT: CPT

## 2021-07-01 PROCEDURE — 86901 BLOOD TYPING SEROLOGIC RH(D): CPT

## 2021-07-01 PROCEDURE — 71250 CT THORAX DX C-: CPT

## 2021-07-01 PROCEDURE — 85025 COMPLETE CBC W/AUTO DIFF WBC: CPT

## 2021-07-01 PROCEDURE — 85379 FIBRIN DEGRADATION QUANT: CPT

## 2021-07-01 PROCEDURE — 97162 PT EVAL MOD COMPLEX 30 MIN: CPT

## 2021-07-01 PROCEDURE — 36600 WITHDRAWAL OF ARTERIAL BLOOD: CPT

## 2021-07-01 PROCEDURE — 97530 THERAPEUTIC ACTIVITIES: CPT

## 2021-07-01 PROCEDURE — 87641 MR-STAPH DNA AMP PROBE: CPT

## 2021-07-01 PROCEDURE — 76604 US EXAM CHEST: CPT

## 2021-07-01 PROCEDURE — 83880 ASSAY OF NATRIURETIC PEPTIDE: CPT

## 2021-07-01 PROCEDURE — 97116 GAIT TRAINING THERAPY: CPT

## 2021-07-01 PROCEDURE — 83735 ASSAY OF MAGNESIUM: CPT

## 2021-07-01 PROCEDURE — 85045 AUTOMATED RETICULOCYTE COUNT: CPT

## 2021-07-01 PROCEDURE — 93308 TTE F-UP OR LMTD: CPT

## 2021-07-01 PROCEDURE — 93970 EXTREMITY STUDY: CPT

## 2021-07-01 PROCEDURE — 85027 COMPLETE CBC AUTOMATED: CPT

## 2021-07-01 PROCEDURE — 86769 SARS-COV-2 COVID-19 ANTIBODY: CPT

## 2021-07-01 PROCEDURE — 70450 CT HEAD/BRAIN W/O DYE: CPT

## 2021-07-01 PROCEDURE — 80048 BASIC METABOLIC PNL TOTAL CA: CPT

## 2021-07-01 RX ORDER — PANTOPRAZOLE SODIUM 20 MG/1
1 TABLET, DELAYED RELEASE ORAL
Qty: 0 | Refills: 0 | DISCHARGE
Start: 2021-07-01

## 2021-07-01 RX ORDER — FUROSEMIDE 40 MG
1 TABLET ORAL
Qty: 0 | Refills: 0 | DISCHARGE
Start: 2021-07-01

## 2021-07-01 RX ORDER — ERYTHROPOIETIN 10000 [IU]/ML
10000 INJECTION, SOLUTION INTRAVENOUS; SUBCUTANEOUS
Qty: 0 | Refills: 0 | DISCHARGE
Start: 2021-07-01

## 2021-07-01 RX ORDER — FERROUS SULFATE 325(65) MG
1 TABLET ORAL
Qty: 0 | Refills: 0 | DISCHARGE
Start: 2021-07-01

## 2021-07-01 RX ORDER — THIAMINE MONONITRATE (VIT B1) 100 MG
1 TABLET ORAL
Qty: 0 | Refills: 0 | DISCHARGE
Start: 2021-07-01

## 2021-07-01 RX ADMIN — HEPARIN SODIUM 5000 UNIT(S): 5000 INJECTION INTRAVENOUS; SUBCUTANEOUS at 05:50

## 2021-07-01 RX ADMIN — HEPARIN SODIUM 5000 UNIT(S): 5000 INJECTION INTRAVENOUS; SUBCUTANEOUS at 11:19

## 2021-07-01 RX ADMIN — Medication 40 MILLIGRAM(S): at 05:50

## 2021-07-01 RX ADMIN — Medication 25 MILLIGRAM(S): at 05:50

## 2021-07-01 RX ADMIN — Medication 2: at 08:31

## 2021-07-01 RX ADMIN — Medication 1 MILLIGRAM(S): at 11:19

## 2021-07-01 RX ADMIN — Medication 100 MILLIGRAM(S): at 11:19

## 2021-07-01 RX ADMIN — Medication 3 MILLILITER(S): at 09:28

## 2021-07-01 RX ADMIN — ERYTHROPOIETIN 10000 UNIT(S): 10000 INJECTION, SOLUTION INTRAVENOUS; SUBCUTANEOUS at 16:06

## 2021-07-01 RX ADMIN — Medication 25 MILLIGRAM(S): at 11:19

## 2021-07-01 RX ADMIN — Medication 325 MILLIGRAM(S): at 11:19

## 2021-07-01 RX ADMIN — PANTOPRAZOLE SODIUM 40 MILLIGRAM(S): 20 TABLET, DELAYED RELEASE ORAL at 05:50

## 2021-07-01 RX ADMIN — Medication 4: at 11:20

## 2021-07-01 NOTE — PROGRESS NOTE ADULT - SUBJECTIVE AND OBJECTIVE BOX
Patient is a 81y old  Female who presents with a chief complaint of dyspnea, hypoxia (30 Jun 2021 20:03)    Patient seen and examined at bedside. No acute overnight events. Reports sleeping poorly due to mask.     ALLERGIES:  adhesives (Pruritus; Blisters)  penicillin (Hives)  pineapple (Anaphylaxis)    MEDICATIONS  (STANDING):  albuterol/ipratropium for Nebulization 3 milliLiter(s) Nebulizer three times a day  ARIPiprazole 10 milliGRAM(s) Oral at bedtime  atorvastatin 40 milliGRAM(s) Oral at bedtime  dextrose 40% Gel 15 Gram(s) Oral once  dextrose 5%. 1000 milliLiter(s) (50 mL/Hr) IV Continuous <Continuous>  dextrose 5%. 1000 milliLiter(s) (100 mL/Hr) IV Continuous <Continuous>  dextrose 50% Injectable 25 Gram(s) IV Push once  dextrose 50% Injectable 12.5 Gram(s) IV Push once  dextrose 50% Injectable 25 Gram(s) IV Push once  epoetin orestes-epbx (RETACRIT) Injectable 58414 Unit(s) SubCutaneous every 7 days  ferrous    sulfate 325 milliGRAM(s) Oral daily  folic acid 1 milliGRAM(s) Oral daily  furosemide    Tablet 40 milliGRAM(s) Oral two times a day  glucagon  Injectable 1 milliGRAM(s) IntraMuscular once  heparin   Injectable 5000 Unit(s) SubCutaneous every 8 hours  hydrALAZINE 25 milliGRAM(s) Oral every 8 hours  insulin lispro (ADMELOG) corrective regimen sliding scale   SubCutaneous three times a day before meals  insulin lispro (ADMELOG) corrective regimen sliding scale   SubCutaneous at bedtime  pantoprazole    Tablet 40 milliGRAM(s) Oral before breakfast  thiamine 100 milliGRAM(s) Oral daily    MEDICATIONS  (PRN):  acetaminophen   Tablet .. 650 milliGRAM(s) Oral every 6 hours PRN Temp greater or equal to 38C (100.4F), Mild Pain (1 - 3)  albuterol/ipratropium for Nebulization 3 milliLiter(s) Nebulizer every 4 hours PRN Shortness of Breath and/or Wheezing  ondansetron Injectable 4 milliGRAM(s) IV Push every 6 hours PRN Nausea and/or Vomiting    Vital Signs Last 24 Hrs  T(F): 98.3 (01 Jul 2021 05:36), Max: 98.8 (01 Jul 2021 00:12)  HR: 85 (01 Jul 2021 05:36) (62 - 90)  BP: 149/52 (01 Jul 2021 05:36) (127/57 - 149/52)  RR: 20 (01 Jul 2021 05:36) (15 - 20)  SpO2: 97% (01 Jul 2021 05:36) (95% - 98%)  I&O's Summary    30 Jun 2021 07:01  -  01 Jul 2021 07:00  --------------------------------------------------------  IN: 200 mL / OUT: 2100 mL / NET: -1900 mL    PHYSICAL EXAM:  General: NAD, A/O x 2  ENT: MMM, no tonsilar exudate  Neck: Supple, No JVD  Lungs: Clear to auscultation bilaterally, no wheezes. Good air entry bilaterally   Cardio: RRR, S1/S2, +systolic murmur  Abdomen: Soft, Nontender, Nondistended; Bowel sounds present  Extremities: No calf tenderness, No pitting edema    LABS:                        8.5    6.55  )-----------( 267      ( 01 Jul 2021 07:36 )             26.9       07-01    141  |  100  |  30  ----------------------------<  160  3.9   |  39  |  1.30    Ca    9.7      01 Jul 2021 07:36    eGFR if Non African American: 39 mL/min/1.73M2 (07-01-21 @ 07:36)  eGFR if : 45 mL/min/1.73M2 (07-01-21 @ 07:36)    05-30 Chol 133 mg/dL LDL -- HDL 54 mg/dL Trig 103 mg/dL    POCT Blood Glucose.: 161 mg/dL (01 Jul 2021 08:23)  POCT Blood Glucose.: 283 mg/dL (30 Jun 2021 21:07)  POCT Blood Glucose.: 175 mg/dL (30 Jun 2021 16:41)  POCT Blood Glucose.: 224 mg/dL (30 Jun 2021 11:35)    COVID-19 PCR: NotDetec (06-20-21 @ 20:20)  COVID-19 PCR: NotDetec (06-12-21 @ 05:45)  COVID-19 PCR: NotDetec (06-05-21 @ 18:40)    RADIOLOGY & ADDITIONAL TESTS:     Care Discussed with Consultants/Other Providers:

## 2021-07-01 NOTE — PROGRESS NOTE ADULT - NSICDXPILOT_GEN_ALL_CORE
[place]

## 2021-07-01 NOTE — DIETITIAN NUTRITION RISK NOTIFICATION - TREATMENT: THE FOLLOWING DIET HAS BEEN RECOMMENDED
Diet, Consistent Carbohydrate/No Snacks:   Dysphagia 2, Mechanical Soft, Thin Liquids ([id number])  1000mL Fluid Restriction ([id number])  No Concentrated Potassium  Supplement Feeding Modality:  Oral  Glucerna Shake Cans or Servings Per Day:  1       Frequency:  Daily (06-23-21 @ 13:28) [Active]

## 2021-07-01 NOTE — PROGRESS NOTE ADULT - NUTRITIONAL ASSESSMENT
This patient has been assessed with a concern for Malnutrition and has been determined to have a diagnosis/diagnoses of Severe protein-calorie malnutrition.    This patient is being managed with:   Diet Consistent Carbohydrate/No Snacks-  Dysphagia 2 Mechanical Soft Thin Liquids ([id number])  1000mL Fluid Restriction ([id number])  No Concentrated Potassium  Supplement Feeding Modality:  Oral  Glucerna Shake Cans or Servings Per Day:  1       Frequency:  Daily  Entered: Jun 23 2021  1:28PM

## 2021-07-01 NOTE — PROGRESS NOTE ADULT - ASSESSMENT
82 yo F with hx of DVT (on Eliquis), NPH s/p VPS (2015), Type 2 Diabetes Mellitus, Bipolar disorder, Paralyzed hemidiaphragm, HFpEF, recent basal ganglia bleed (managed conservatively ac dc'd) who was transferred from [facility] for hypoxia to ICU 6/22. Found to have Hypercarbia, Klebsiella UTI, Pneumonia, HF exacerbation. Hospital course complicated by delirium, oxygen dependence. Now downgraded to medical floors for continued care.     # Acute hypoxic and hypercarbic respiratory failure due to underlying Pneumonia, acute diastolic heart failure exacerbation  # Hx of paralyzed hemidiaphragm   # Klebsiella UTI  - Completed course of abx per Infectious Diseases  - Pulm following. AVAPS qhs  - currently on 2L nc. titrate down as tolerated  - Euvolemic on exam. Lasix 40mg BID  - PT/OT following  - Cardio evals noted    # Acute Kidney Injury on Chronic Kidney Disease 3 - resolved  - back to baseline renal function  - Monitor BMP  - Avoid nephrotoxic medications   - Nephro following    # Delirium  - Neuro and psych eval  - EEG noted  - Thiamine per Neuro    # Anemia  - likely multifactorial   - Heme recommendations appreciated. c/w PO iron  - Epo per renal    # Hypertension   - Continue with Hydralazine 25mg q8h. ACEi discontinued due to acute kidney injury   - will monitor and adjust to optimize bp    # Type 2 Diabetes Mellitus   - HbA1C: 7.7%  - FS and JOSI    # Bipolar disorder  - Continue with abilify  - Psych eval pending    # Hx of DVT  - most recent duplex negative  - recent intracranial bleed, high risk for ac    DVT Prophylaxis:  - Heparin    Daughter Sarah [phone] agreeable to [facility]  D/w Pulm - pt will need NIPPV qhs

## 2021-07-01 NOTE — PROGRESS NOTE ADULT - PROVIDER SPECIALTY LIST ADULT
Critical Care
Nephrology
Nephrology
Neurology
Cardiology
Critical Care
Nephrology
Cardiology
Cardiology
Critical Care
Critical Care
Hospitalist
Infectious Disease
Nephrology
Critical Care
Hospitalist
Cardiology
Critical Care
Hospitalist
Infectious Disease
Infectious Disease
Heme/Onc
Heme/Onc

## 2021-07-01 NOTE — PROGRESS NOTE ADULT - REASON FOR ADMISSION
dyspnea, hypoxia

## 2021-07-01 NOTE — PROGRESS NOTE ADULT - SUBJECTIVE AND OBJECTIVE BOX
No distress    Vital Signs Last 24 Hrs  T(C): 36.8 (07-01-21 @ 15:49), Max: 37.1 (07-01-21 @ 00:12)  T(F): 98.2 (07-01-21 @ 15:49), Max: 98.8 (07-01-21 @ 00:12)  HR: 68 (07-01-21 @ 15:49) (68 - 90)  BP: 146/55 (07-01-21 @ 15:49) (129/44 - 149/52)  RR: 15 (07-01-21 @ 15:49) (15 - 20)  SpO2: 96% (07-01-21 @ 15:49) (95% - 98%)    s1s2  b/l air entry, decr BS at bases  soft, ND  no edema b/l LE                                                                                                                               8.5    6.55  )-----------( 267      ( 01 Jul 2021 07:36 )             26.9     01 Jul 2021 07:36    141    |  100    |  30     ----------------------------<  160    3.9     |  39     |  1.30     Ca    9.7        01 Jul 2021 07:36    acetaminophen   Tablet .. 650 milliGRAM(s) Oral every 6 hours PRN  albuterol/ipratropium for Nebulization 3 milliLiter(s) Nebulizer every 4 hours PRN  albuterol/ipratropium for Nebulization 3 milliLiter(s) Nebulizer three times a day  ARIPiprazole 10 milliGRAM(s) Oral at bedtime  atorvastatin 40 milliGRAM(s) Oral at bedtime  dextrose 40% Gel 15 Gram(s) Oral once  dextrose 5%. 1000 milliLiter(s) IV Continuous <Continuous>  dextrose 5%. 1000 milliLiter(s) IV Continuous <Continuous>  dextrose 50% Injectable 25 Gram(s) IV Push once  dextrose 50% Injectable 12.5 Gram(s) IV Push once  dextrose 50% Injectable 25 Gram(s) IV Push once  epoetin orestes-epbx (RETACRIT) Injectable 63357 Unit(s) SubCutaneous every 7 days  ferrous    sulfate 325 milliGRAM(s) Oral daily  folic acid 1 milliGRAM(s) Oral daily  furosemide    Tablet 40 milliGRAM(s) Oral two times a day  glucagon  Injectable 1 milliGRAM(s) IntraMuscular once  heparin   Injectable 5000 Unit(s) SubCutaneous every 8 hours  hydrALAZINE 25 milliGRAM(s) Oral every 8 hours  insulin lispro (ADMELOG) corrective regimen sliding scale   SubCutaneous three times a day before meals  insulin lispro (ADMELOG) corrective regimen sliding scale   SubCutaneous at bedtime  ondansetron Injectable 4 milliGRAM(s) IV Push every 6 hours PRN  pantoprazole    Tablet 40 milliGRAM(s) Oral before breakfast  thiamine 100 milliGRAM(s) Oral daily    A/P:    Hx HTN, s/p ICH, mod AS  S/p SOB, V/Q scan w/indeterminate prob, ? asp PNA  S/p Hemodynamic/cardio-renal PASCALE/CKD 3  Improved  Avoid nephrotoxins  Continue Lasix  Epoetin  Renal f/u as op    [phone]

## 2021-07-01 NOTE — DISCHARGE NOTE NURSING/CASE MANAGEMENT/SOCIAL WORK - PATIENT PORTAL LINK FT
You can access the FollowMyHealth Patient Portal offered by [facility] by registering at the following website: [email or web address]. By joining [facility]’s FollowMyHealth portal, you will also be able to view your health information using other applications (apps) compatible with our system.

## 2021-07-01 NOTE — CHART NOTE - NSCHARTNOTEFT_GEN_A_CORE
Nutrition Follow Up Note  Hospital Course (Per Electronic Medical Record):   Source: Medical Record [X] Patient [X] Family [X] Nursing Staff [X]     Diet: Consistent Carbohydrate , Mechanical Soft 1000cc FR , Glucerna shake QD     Patient's po intake varies 30-60% , patient requires total assistance due to arm weakness, encouraged consumption of po supplement,  FR noted , CHF/Lasix therapy noted, POCT noted.     Current Weight:     Pertinent Medications: MEDICATIONS  (STANDING):  albuterol/ipratropium for Nebulization 3 milliLiter(s) Nebulizer three times a day  ARIPiprazole 10 milliGRAM(s) Oral at bedtime  atorvastatin 40 milliGRAM(s) Oral at bedtime  dextrose 40% Gel 15 Gram(s) Oral once  dextrose 5%. 1000 milliLiter(s) (50 mL/Hr) IV Continuous <Continuous>  dextrose 5%. 1000 milliLiter(s) (100 mL/Hr) IV Continuous <Continuous>  dextrose 50% Injectable 25 Gram(s) IV Push once  dextrose 50% Injectable 12.5 Gram(s) IV Push once  dextrose 50% Injectable 25 Gram(s) IV Push once  epoetin orestes-epbx (RETACRIT) Injectable 99192 Unit(s) SubCutaneous every 7 days  ferrous    sulfate 325 milliGRAM(s) Oral daily  folic acid 1 milliGRAM(s) Oral daily  furosemide    Tablet 40 milliGRAM(s) Oral two times a day  glucagon  Injectable 1 milliGRAM(s) IntraMuscular once  heparin   Injectable 5000 Unit(s) SubCutaneous every 8 hours  hydrALAZINE 25 milliGRAM(s) Oral every 8 hours  insulin lispro (ADMELOG) corrective regimen sliding scale   SubCutaneous three times a day before meals  insulin lispro (ADMELOG) corrective regimen sliding scale   SubCutaneous at bedtime  pantoprazole    Tablet 40 milliGRAM(s) Oral before breakfast  thiamine 100 milliGRAM(s) Oral daily    MEDICATIONS  (PRN):  acetaminophen   Tablet .. 650 milliGRAM(s) Oral every 6 hours PRN Temp greater or equal to 38C (100.4F), Mild Pain (1 - 3)  albuterol/ipratropium for Nebulization 3 milliLiter(s) Nebulizer every 4 hours PRN Shortness of Breath and/or Wheezing  ondansetron Injectable 4 milliGRAM(s) IV Push every 6 hours PRN Nausea and/or Vomiting      Pertinent Labs:  07-01 Na141 mmol/L Glu 160 mg/dL<H> K+ 3.9 mmol/L Cr  1.30 mg/dL BUN 30 mg/dL<H> 06-28 Phos 2.8 mg/dL 06-28 Alb 2.3 g/dL<L>  POCT 283,175,224,146      Skin: (L) arm skin tear     Edema: (+1) generalized edema     Last BM: (6/26) fecal incontinence noted.     Estimated Needs:   [X] No Change since Previous Assessment  [X] Recalculated:     Previous Nutrition Diagnosis:     Nutrition Diagnosis is [X] Ongoing    [X] Resolved -     New Nutrition Diagnosis: [X] Not Applicable  [X] Chewing Difficulties    [X] Swallowing Difficulties    [X] Predicted Suboptimal Energy Intake  [X] Obesity   [X] Inadequate Oral Intake   [X] Increased Nutrient Needs   [X] Excessive Energy Intake   [X] Altered GI Function   [X] Unintended Weight Loss   [X] Food & Nutrition Related Knowledge Deficit  [X] Limited Adherence to nutrition related recommendations   [X] Mild Moderate Severe Malnutrition      Interventions:   1. Recommend  2.     Monitoring & Evaluation: will monitor:  [X] Weights   [X] PO Intake   [X] Follow Up (Per Protocol)  [X] Tolerance to Diet Prescription       RD to follow as per Nutrition protocol  Dilcia Coleman RDN Nutrition Follow Up Note  Hospital Course (Per Electronic Medical Record):   Source: Medical Record [X] Patient [X] Family [X] Nursing Staff [X]     Diet: Consistent Carbohydrate , Mechanical Soft 1000cc FR , Glucerna shake QD     Patient's po intake varies 30-60% , patient requires total assistance with feeding due to arm weakness, encouraged consumption of po supplement,  FR noted , CHF/Lasix therapy noted, POCT noted. ? liberalize FR  to 1200cc / day to enable additional nutrition supplement to be provided,     Current Weight: (7/1) 152.7/69.3kg , weight varied since admission ,? due to Lasix tx & varied po .     Pertinent Medications: MEDICATIONS  (STANDING):  albuterol/ipratropium for Nebulization 3 milliLiter(s) Nebulizer three times a day  ARIPiprazole 10 milliGRAM(s) Oral at bedtime  atorvastatin 40 milliGRAM(s) Oral at bedtime  dextrose 40% Gel 15 Gram(s) Oral once  dextrose 5%. 1000 milliLiter(s) (50 mL/Hr) IV Continuous <Continuous>  dextrose 5%. 1000 milliLiter(s) (100 mL/Hr) IV Continuous <Continuous>  dextrose 50% Injectable 25 Gram(s) IV Push once  dextrose 50% Injectable 12.5 Gram(s) IV Push once  dextrose 50% Injectable 25 Gram(s) IV Push once  epoetin orestes-epbx (RETACRIT) Injectable 66790 Unit(s) SubCutaneous every 7 days  ferrous    sulfate 325 milliGRAM(s) Oral daily  folic acid 1 milliGRAM(s) Oral daily  furosemide    Tablet 40 milliGRAM(s) Oral two times a day  glucagon  Injectable 1 milliGRAM(s) IntraMuscular once  heparin   Injectable 5000 Unit(s) SubCutaneous every 8 hours  hydrALAZINE 25 milliGRAM(s) Oral every 8 hours  insulin lispro (ADMELOG) corrective regimen sliding scale   SubCutaneous three times a day before meals  insulin lispro (ADMELOG) corrective regimen sliding scale   SubCutaneous at bedtime  pantoprazole    Tablet 40 milliGRAM(s) Oral before breakfast  thiamine 100 milliGRAM(s) Oral daily    MEDICATIONS  (PRN):  acetaminophen   Tablet .. 650 milliGRAM(s) Oral every 6 hours PRN Temp greater or equal to 38C (100.4F), Mild Pain (1 - 3)  albuterol/ipratropium for Nebulization 3 milliLiter(s) Nebulizer every 4 hours PRN Shortness of Breath and/or Wheezing  ondansetron Injectable 4 milliGRAM(s) IV Push every 6 hours PRN Nausea and/or Vomiting      Pertinent Labs:  07-01 Na141 mmol/L Glu 160 mg/dL<H> K+ 3.9 mmol/L Cr  1.30 mg/dL BUN 30 mg/dL<H> 06-28 Phos 2.8 mg/dL 06-28 Alb 2.3 g/dL<L>  POCT 283,175,224,146      Skin: (L) arm skin tear     Edema: (+1) generalized edema     Last BM: (6/26) fecal incontinence noted.     Estimated Needs:   [X] No Change since Previous Assessment    Previous Nutrition Diagnosis: Difficulty chewing swallowing     Nutrition Diagnosis is [X] Ongoing       New Nutrition Diagnosis: [X] Severe Malnutrition  related to patient consuming <50% of assessed needs >5 days , muscle wasting observed (temporal region)     Interventions:   1. consider  liberalize FR       Monitoring & Evaluation: will monitor:  [X] Weights   [X] PO Intake   [X] Follow Up (Per Protocol)  [X] Tolerance to Diet Prescription       RD to follow as per Nutrition protocol  Dilcia Coleman RDN

## 2021-08-02 PROCEDURE — 83930 ASSAY OF BLOOD OSMOLALITY: CPT

## 2021-08-02 PROCEDURE — 82803 BLOOD GASES ANY COMBINATION: CPT

## 2021-08-02 PROCEDURE — 83935 ASSAY OF URINE OSMOLALITY: CPT

## 2021-08-02 PROCEDURE — 97110 THERAPEUTIC EXERCISES: CPT

## 2021-08-02 PROCEDURE — 84300 ASSAY OF URINE SODIUM: CPT

## 2021-08-02 PROCEDURE — U0003: CPT

## 2021-08-02 PROCEDURE — 71250 CT THORAX DX C-: CPT

## 2021-08-02 PROCEDURE — 80048 BASIC METABOLIC PNL TOTAL CA: CPT

## 2021-08-02 PROCEDURE — 71045 X-RAY EXAM CHEST 1 VIEW: CPT

## 2021-08-02 PROCEDURE — 81001 URINALYSIS AUTO W/SCOPE: CPT

## 2021-08-02 PROCEDURE — 85379 FIBRIN DEGRADATION QUANT: CPT

## 2021-08-02 PROCEDURE — 92507 TX SP LANG VOICE COMM INDIV: CPT

## 2021-08-02 PROCEDURE — 97112 NEUROMUSCULAR REEDUCATION: CPT

## 2021-08-02 PROCEDURE — 92523 SPEECH SOUND LANG COMPREHEN: CPT

## 2021-08-02 PROCEDURE — 93005 ELECTROCARDIOGRAM TRACING: CPT

## 2021-08-02 PROCEDURE — 97163 PT EVAL HIGH COMPLEX 45 MIN: CPT

## 2021-08-02 PROCEDURE — 97167 OT EVAL HIGH COMPLEX 60 MIN: CPT

## 2021-08-02 PROCEDURE — 86769 SARS-COV-2 COVID-19 ANTIBODY: CPT

## 2021-08-02 PROCEDURE — 83880 ASSAY OF NATRIURETIC PEPTIDE: CPT

## 2021-08-02 PROCEDURE — 84439 ASSAY OF FREE THYROXINE: CPT

## 2021-08-02 PROCEDURE — 36415 COLL VENOUS BLD VENIPUNCTURE: CPT

## 2021-08-02 PROCEDURE — 82962 GLUCOSE BLOOD TEST: CPT

## 2021-08-02 PROCEDURE — 97116 GAIT TRAINING THERAPY: CPT

## 2021-08-02 PROCEDURE — 97530 THERAPEUTIC ACTIVITIES: CPT

## 2021-08-02 PROCEDURE — 93970 EXTREMITY STUDY: CPT

## 2021-08-02 PROCEDURE — 76775 US EXAM ABDO BACK WALL LIM: CPT

## 2021-08-02 PROCEDURE — 84484 ASSAY OF TROPONIN QUANT: CPT

## 2021-08-02 PROCEDURE — 93306 TTE W/DOPPLER COMPLETE: CPT

## 2021-08-02 PROCEDURE — U0005: CPT

## 2021-08-02 PROCEDURE — 94640 AIRWAY INHALATION TREATMENT: CPT

## 2021-08-02 PROCEDURE — 82533 TOTAL CORTISOL: CPT

## 2021-08-02 PROCEDURE — 85027 COMPLETE CBC AUTOMATED: CPT

## 2021-08-02 PROCEDURE — 82436 ASSAY OF URINE CHLORIDE: CPT

## 2021-08-02 PROCEDURE — 97535 SELF CARE MNGMENT TRAINING: CPT

## 2021-08-02 PROCEDURE — 36600 WITHDRAWAL OF ARTERIAL BLOOD: CPT

## 2021-08-02 PROCEDURE — 84550 ASSAY OF BLOOD/URIC ACID: CPT

## 2021-08-02 PROCEDURE — 92610 EVALUATE SWALLOWING FUNCTION: CPT

## 2021-08-02 PROCEDURE — 80053 COMPREHEN METABOLIC PANEL: CPT

## 2021-08-02 PROCEDURE — 84443 ASSAY THYROID STIM HORMONE: CPT

## 2021-08-02 PROCEDURE — 85025 COMPLETE CBC W/AUTO DIFF WBC: CPT

## 2021-08-02 PROCEDURE — 84145 PROCALCITONIN (PCT): CPT

## 2021-08-17 ENCOUNTER — APPOINTMENT (OUTPATIENT)
Dept: NEUROLOGY | Facility: CLINIC | Age: 81
End: 2021-08-17
Payer: MEDICARE

## 2021-08-17 DIAGNOSIS — G21.19 OTHER DRUG INDUCED SECONDARY PARKINSONISM: ICD-10-CM

## 2021-08-17 DIAGNOSIS — I63.9 CEREBRAL INFARCTION, UNSPECIFIED: ICD-10-CM

## 2021-08-17 PROCEDURE — 99213 OFFICE O/P EST LOW 20 MIN: CPT

## 2021-08-17 RX ORDER — BENZTROPINE MESYLATE 0.5 MG/1
0.5 TABLET ORAL TWICE DAILY
Qty: 60 | Refills: 0 | Status: COMPLETED | COMMUNITY
Start: 2021-05-18 | End: 2021-08-17

## 2021-08-17 NOTE — HISTORY OF PRESENT ILLNESS
[FreeTextEntry1] : 10/24/17:\par  is a 77-year-old woman comes here today for neurologic evaluation. She has a history of hydrocephalus with  shunt placed. She continues to have multiple neurologic complaints including a sensation of falling changes in handwriting a sensation of leaning to the left when walking, memory loss, drooling occasionally, slurring her speech, forgetting words in conversation, difficulty with handling money and figures, occasional confusion, difficulty tying her shoes and other items requiring fine hand motion and movements. This is been going on for at least 2-3 years what appears to be getting worse. She had a recent CAT scan which showed ventriculomegaly and a  shunt in place. She said the neurosurgeon offered to adjust the shunt but did not think it was entirely necessary. This is how she understood the conversation with the nurse surgical nurse practitioner. She also has a history of depression and is on medications for this. She is also a history of stroke and has a mild base line right-sided weakness. She is here today for neurologic reevaluation having seen multiple neurologists in the past.\par \par Followup January 23, 2018:\par This is a 77-year-old woman follows up today with gait difficulty. She has a history of hydrocephalus with  shunt. She was also having difficulty using her hands when I saw her last. She was also having some cognitive issues. I sent her for both physical and occupational therapy. Since going to therapy she's been improved greatly. Her gait is much more stable and her hands feel stronger and she is able to use them better. She even appears to be doing better for a cognitive point of view. She is here today for a neurologic followup.\par \par Followup May 1, 2020:\par This is a 80-year-old woman who presents today for followup of gait difficulty, new complaint of dysarthria and left hand numbness. She seen by video conference during the corona virus outbreak. Verbal consent was obtained at the beginning of the visit. She states that for the past 2-3 weeks she's been slurring her words. As the day progresses they run into each other more so. She does have a mild dysarthria when talking to her. She also states that her left upper tremor he occasionally has weakness and numbness. She did have some dyspnea and swelling in her leg and is currently being evaluated for DVT. She had some mild cough but no significant fever and no other signs of corona virus. She does have a history of hydrocephalus and has a  shunt placed. She also reports about 6 weeks ago she had a lump in the back of the right side of her head. I was present for 2 days and went away on its own. She presents today for neurologic followup.\par \par Followup May 18, 2021:\par This is a 81-year-old woman who presents today for neurologic followup. She has a history of a gait difficulty as well as a dysarthria and speech issues. She has a long-standing psychiatric history and is status post recent ECT. She's been on multiple medications in the past. Today she appears to be parkinsonian, likely from medications. She has a history of hydrocephalus and has a  shunt placed. She states she has not seen neurosurgery in many years for followup. She is here today with her daughter for neurologic followup. Her daughter is very concerned about her neurologic state and is concerned that her psychiatrist is changing her psychiatric medicines at their doses without much thought.\par \par Followup August 17, 2021:\par This is a 81-year-old woman who presents today for neurologic followup. She had been seen with parkinsonism likely due to previous psychiatric medications. She is having trouble walking with a history of a  shunt in the last visit I did a head CT to assess her shunt. Aleve and a small right-sided intraparenchymal bleed which required hospitalization. She currently a nursing home. She is here today for neurologic followup.\par

## 2021-08-17 NOTE — ASSESSMENT
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 I had started Cogentin briefly after last visit. This was discontinued during the hospital sedation. While she still has masked facies as she appears less tremulous at all deferred restarting the Cogentin at this time. She should continue with physical therapy to improve her ambulation. She should continue with psychiatry for her depression. I will see her back in the office in 3 months, sooner should the need arise.

## 2021-08-17 NOTE — PHYSICAL EXAM
[Person] : oriented to person [Place] : oriented to place [Time] : oriented to time [Remote Intact] : remote memory intact [Registration Intact] : recent registration memory intact [Span Intact] : the attention span was normal [Concentration Intact] : normal concentrating ability [Visual Intact] : visual attention was ~T not ~L decreased [Naming Objects] : no difficulty naming common objects [Repeating Phrases] : no difficulty repeating a phrase [Fluency] : fluency intact [Comprehension] : comprehension intact [Current Events] : adequate knowledge of current events [Past History] : adequate knowledge of personal past history [Cranial Nerves Optic (II)] : visual acuity intact bilaterally,  visual fields full to confrontation, pupils equal round and reactive to light [Cranial Nerves Oculomotor (III)] : extraocular motion intact [Cranial Nerves Trigeminal (V)] : facial sensation intact symmetrically [Cranial Nerves Facial (VII)] : face symmetrical [Cranial Nerves Vestibulocochlear (VIII)] : hearing was intact bilaterally [Cranial Nerves Glossopharyngeal (IX)] : tongue and palate midline [Cranial Nerves Accessory (XI - Cranial And Spinal)] : head turning and shoulder shrug symmetric [Cranial Nerves Hypoglossal (XII)] : there was no tongue deviation with protrusion [Motor Strength] : muscle strength was normal in all four extremities [Involuntary Movements] : no involuntary movements were seen [No Muscle Atrophy] : normal bulk in all four extremities [Motor Handedness Right-Handed] : the patient is right hand dominant [Paresis Pronator Drift Right-Sided] : no pronator drift on the right [Paresis Pronator Drift Left-Sided] : no pronator drift on the left [Motor Strength Upper Extremities Bilaterally] : strength was normal in both upper extremities [Motor Strength Lower Extremities Bilaterally] : strength was normal in both lower extremities [Sensation Tactile Decrease] : light touch was intact [Sensation Pain / Temperature Decrease] : pain and temperature was intact [Sensation Vibration Decrease] : vibration was intact [Proprioception] : proprioception was intact [Non-ambulatory] : Non-ambulatory [Tremor] : no tremor present [Coordination - Dysmetria Impaired Finger-to-Nose Bilateral] : not present [1+] : Patella left 1+ [FreeTextEntry6] : She has masked facies, increased tone with mild cogwheeling and overall  bradykinesia [FreeTextEntry8] : wheelchair [Sclera] : the sclera and conjunctiva were normal [PERRL With Normal Accommodation] : pupils were equal in size, round, reactive to light, with normal accommodation [Extraocular Movements] : extraocular movements were intact [No APD] : no afferent pupillary defect [No CARSON] : no internuclear ophthalmoplegia [Full Visual Field] : full visual field

## 2021-08-17 NOTE — DATA REVIEWED
[de-identified] : CT of the head was done May 27, 2021. It showed right jasmin sylvian regionhemorrhage. There was small vessel ischemic changes. There was an intact  shunt. There is no acute stroke.

## 2021-08-17 NOTE — CONSULT LETTER
[Dear  ___] : Dear  [unfilled], [Courtesy Letter:] : I had the pleasure of seeing your patient, [unfilled], in my office today. [Please see my note below.] : Please see my note below. [Consult Closing:] : Thank you very much for allowing me to participate in the care of this patient.  If you have any questions, please do not hesitate to contact me. [Sincerely,] : Sincerely, [FreeTextEntry3] : Refugio Bowman M.D., Ph.D. DPN-N\par [facility]\par Neurology [facility] [place]\par Medical Director of Stroke Services\par [facility]\par

## 2021-09-28 ENCOUNTER — INPATIENT (INPATIENT)
Facility: HOSPITAL | Age: 81
LOS: 6 days | Discharge: HOME | DRG: 100 | End: 2021-10-05
Attending: INTERNAL MEDICINE | Admitting: STUDENT IN AN ORGANIZED HEALTH CARE EDUCATION/TRAINING PROGRAM
Payer: MEDICARE

## 2021-09-28 VITALS
HEART RATE: 105 BPM | OXYGEN SATURATION: 94 % | SYSTOLIC BLOOD PRESSURE: 196 MMHG | DIASTOLIC BLOOD PRESSURE: 130 MMHG | TEMPERATURE: 99 F | RESPIRATION RATE: 16 BRPM

## 2021-09-28 DIAGNOSIS — S86.009A UNSPECIFIED INJURY OF UNSPECIFIED ACHILLES TENDON, INITIAL ENCOUNTER: Chronic | ICD-10-CM

## 2021-09-28 DIAGNOSIS — Z90.710 ACQUIRED ABSENCE OF BOTH CERVIX AND UTERUS: Chronic | ICD-10-CM

## 2021-09-28 DIAGNOSIS — Z96.652 PRESENCE OF LEFT ARTIFICIAL KNEE JOINT: Chronic | ICD-10-CM

## 2021-09-28 DIAGNOSIS — Z90.49 ACQUIRED ABSENCE OF OTHER SPECIFIED PARTS OF DIGESTIVE TRACT: Chronic | ICD-10-CM

## 2021-09-28 LAB
ALBUMIN SERPL ELPH-MCNC: 3.6 G/DL — SIGNIFICANT CHANGE UP (ref 3.3–5.2)
ALP SERPL-CCNC: 86 U/L — SIGNIFICANT CHANGE UP (ref 40–120)
ALT FLD-CCNC: 11 U/L — SIGNIFICANT CHANGE UP
ANION GAP SERPL CALC-SCNC: 22 MMOL/L — HIGH (ref 5–17)
APPEARANCE UR: CLEAR — SIGNIFICANT CHANGE UP
AST SERPL-CCNC: 18 U/L — SIGNIFICANT CHANGE UP
BACTERIA # UR AUTO: ABNORMAL
BASOPHILS # BLD AUTO: 0.06 K/UL — SIGNIFICANT CHANGE UP (ref 0–0.2)
BASOPHILS NFR BLD AUTO: 0.5 % — SIGNIFICANT CHANGE UP (ref 0–2)
BILIRUB SERPL-MCNC: <0.2 MG/DL — LOW (ref 0.4–2)
BILIRUB UR-MCNC: NEGATIVE — SIGNIFICANT CHANGE UP
BUN SERPL-MCNC: 25 MG/DL — HIGH (ref 8–20)
CALCIUM SERPL-MCNC: 7 MG/DL — LOW (ref 8.6–10.2)
CHLORIDE SERPL-SCNC: 95 MMOL/L — LOW (ref 98–107)
CO2 SERPL-SCNC: 21 MMOL/L — LOW (ref 22–29)
COLOR SPEC: YELLOW — SIGNIFICANT CHANGE UP
CREAT SERPL-MCNC: 1.44 MG/DL — HIGH (ref 0.5–1.3)
DIFF PNL FLD: ABNORMAL
EOSINOPHIL # BLD AUTO: 0.35 K/UL — SIGNIFICANT CHANGE UP (ref 0–0.5)
EOSINOPHIL NFR BLD AUTO: 3.1 % — SIGNIFICANT CHANGE UP (ref 0–6)
EPI CELLS # UR: SIGNIFICANT CHANGE UP
GAS PNL BLDA: SIGNIFICANT CHANGE UP
GLUCOSE SERPL-MCNC: 202 MG/DL — HIGH (ref 70–99)
GLUCOSE UR QL: NEGATIVE — SIGNIFICANT CHANGE UP
HCT VFR BLD CALC: 31.8 % — LOW (ref 34.5–45)
HGB BLD-MCNC: 10.6 G/DL — LOW (ref 11.5–15.5)
IMM GRANULOCYTES NFR BLD AUTO: 0.6 % — SIGNIFICANT CHANGE UP (ref 0–1.5)
KETONES UR-MCNC: NEGATIVE — SIGNIFICANT CHANGE UP
LACTATE BLDV-MCNC: 3.9 MMOL/L — HIGH (ref 0.5–2)
LACTATE BLDV-MCNC: 5 MMOL/L — CRITICAL HIGH (ref 0.5–2)
LEUKOCYTE ESTERASE UR-ACNC: ABNORMAL
LYMPHOCYTES # BLD AUTO: 1.92 K/UL — SIGNIFICANT CHANGE UP (ref 1–3.3)
LYMPHOCYTES # BLD AUTO: 16.8 % — SIGNIFICANT CHANGE UP (ref 13–44)
MCHC RBC-ENTMCNC: 29.3 PG — SIGNIFICANT CHANGE UP (ref 27–34)
MCHC RBC-ENTMCNC: 33.3 GM/DL — SIGNIFICANT CHANGE UP (ref 32–36)
MCV RBC AUTO: 87.8 FL — SIGNIFICANT CHANGE UP (ref 80–100)
MONOCYTES # BLD AUTO: 1.12 K/UL — HIGH (ref 0–0.9)
MONOCYTES NFR BLD AUTO: 9.8 % — SIGNIFICANT CHANGE UP (ref 2–14)
NEUTROPHILS # BLD AUTO: 7.94 K/UL — HIGH (ref 1.8–7.4)
NEUTROPHILS NFR BLD AUTO: 69.2 % — SIGNIFICANT CHANGE UP (ref 43–77)
NITRITE UR-MCNC: POSITIVE
NT-PROBNP SERPL-SCNC: 2590 PG/ML — HIGH (ref 0–300)
PH UR: 5 — SIGNIFICANT CHANGE UP (ref 5–8)
PLATELET # BLD AUTO: 217 K/UL — SIGNIFICANT CHANGE UP (ref 150–400)
POTASSIUM SERPL-MCNC: 3.8 MMOL/L — SIGNIFICANT CHANGE UP (ref 3.5–5.3)
POTASSIUM SERPL-SCNC: 3.8 MMOL/L — SIGNIFICANT CHANGE UP (ref 3.5–5.3)
PROT SERPL-MCNC: 6.9 G/DL — SIGNIFICANT CHANGE UP (ref 6.6–8.7)
PROT UR-MCNC: 100
RAPID RVP RESULT: SIGNIFICANT CHANGE UP
RBC # BLD: 3.62 M/UL — LOW (ref 3.8–5.2)
RBC # FLD: 15.2 % — HIGH (ref 10.3–14.5)
RBC CASTS # UR COMP ASSIST: SIGNIFICANT CHANGE UP /HPF (ref 0–4)
SARS-COV-2 RNA SPEC QL NAA+PROBE: SIGNIFICANT CHANGE UP
SODIUM SERPL-SCNC: 138 MMOL/L — SIGNIFICANT CHANGE UP (ref 135–145)
SP GR SPEC: 1.01 — SIGNIFICANT CHANGE UP (ref 1.01–1.02)
TROPONIN T SERPL-MCNC: 0.03 NG/ML — SIGNIFICANT CHANGE UP (ref 0–0.06)
TSH SERPL-MCNC: 8.27 UIU/ML — HIGH (ref 0.27–4.2)
UROBILINOGEN FLD QL: NEGATIVE — SIGNIFICANT CHANGE UP
WBC # BLD: 11.46 K/UL — HIGH (ref 3.8–10.5)
WBC # FLD AUTO: 11.46 K/UL — HIGH (ref 3.8–10.5)
WBC UR QL: ABNORMAL

## 2021-09-28 PROCEDURE — G1004: CPT

## 2021-09-28 PROCEDURE — 99291 CRITICAL CARE FIRST HOUR: CPT

## 2021-09-28 PROCEDURE — 70450 CT HEAD/BRAIN W/O DYE: CPT | Mod: 26,MG

## 2021-09-28 PROCEDURE — 71045 X-RAY EXAM CHEST 1 VIEW: CPT | Mod: 26

## 2021-09-28 RX ORDER — CEFTRIAXONE 500 MG/1
1000 INJECTION, POWDER, FOR SOLUTION INTRAMUSCULAR; INTRAVENOUS ONCE
Refills: 0 | Status: COMPLETED | OUTPATIENT
Start: 2021-09-28 | End: 2021-09-28

## 2021-09-28 RX ORDER — METRONIDAZOLE 500 MG
500 TABLET ORAL ONCE
Refills: 0 | Status: COMPLETED | OUTPATIENT
Start: 2021-09-28 | End: 2021-09-28

## 2021-09-28 RX ORDER — LABETALOL HCL 100 MG
10 TABLET ORAL ONCE
Refills: 0 | Status: COMPLETED | OUTPATIENT
Start: 2021-09-28 | End: 2021-09-28

## 2021-09-28 RX ORDER — ONDANSETRON 8 MG/1
4 TABLET, FILM COATED ORAL ONCE
Refills: 0 | Status: COMPLETED | OUTPATIENT
Start: 2021-09-28 | End: 2021-09-28

## 2021-09-28 RX ORDER — LEVETIRACETAM 250 MG/1
1000 TABLET, FILM COATED ORAL ONCE
Refills: 0 | Status: COMPLETED | OUTPATIENT
Start: 2021-09-28 | End: 2021-09-28

## 2021-09-28 RX ORDER — SODIUM CHLORIDE 9 MG/ML
1000 INJECTION INTRAMUSCULAR; INTRAVENOUS; SUBCUTANEOUS ONCE
Refills: 0 | Status: COMPLETED | OUTPATIENT
Start: 2021-09-28 | End: 2021-09-28

## 2021-09-28 RX ORDER — CALCIUM GLUCONATE 100 MG/ML
2 VIAL (ML) INTRAVENOUS ONCE
Refills: 0 | Status: COMPLETED | OUTPATIENT
Start: 2021-09-28 | End: 2021-09-28

## 2021-09-28 RX ADMIN — ONDANSETRON 4 MILLIGRAM(S): 8 TABLET, FILM COATED ORAL at 21:32

## 2021-09-28 RX ADMIN — SODIUM CHLORIDE 1000 MILLILITER(S): 9 INJECTION INTRAMUSCULAR; INTRAVENOUS; SUBCUTANEOUS at 22:34

## 2021-09-28 RX ADMIN — LEVETIRACETAM 400 MILLIGRAM(S): 250 TABLET, FILM COATED ORAL at 23:49

## 2021-09-28 RX ADMIN — Medication 100 MILLIGRAM(S): at 23:15

## 2021-09-28 RX ADMIN — CEFTRIAXONE 100 MILLIGRAM(S): 500 INJECTION, POWDER, FOR SOLUTION INTRAMUSCULAR; INTRAVENOUS at 22:35

## 2021-09-28 NOTE — ED PROVIDER NOTE - PHYSICAL EXAMINATION
Const: Alert to voice commands. Diaphoretic on exam.   HEENT: NC/AT. Moist mucous membranes.  Eyes: No scleral icterus. EOMI.  Neck:. Soft and supple. Full ROM without pain.  Cardiac: Tachycardic rate and regular rhythm. +S1/S2. Peripheral pulses 2+ and symmetric. No LE edema.  Resp: Speaking in full sentences. No evidence of respiratory distress. No wheezes, rales or rhonchi.  Abd: Soft, non-tender, non-distended. Normal bowel sounds in all 4 quadrants. No guarding or rebound.  Back: Spine midline and non-tender. No CVAT.  Skin: No rashes, abrasions or lacerations.  Lymph: No cervical lymphadenopathy.  Neuro: Awake, alert & oriented to self. Moves all extremities symmetrically.

## 2021-09-28 NOTE — ED PROVIDER NOTE - CLINICAL SUMMARY MEDICAL DECISION MAKING FREE TEXT BOX
82 yo female BIBA for AMS. Priority CT scan obtained, negative for brain bleed. Will obtain basic labs, CXR, EKG. Continue to monitor and reassess.

## 2021-09-28 NOTE — ED ADULT TRIAGE NOTE - CHIEF COMPLAINT QUOTE
BIBEMS for AMS from home. LKW as per  was 8pm tonight.  Pt present to ED lethargic but responsive to voice stimuli. A&Ox2, Diaphoretic and weakness bilaterally.  Hypertensive on arrival. Hx of brain bleed. Dr. Wolf called to evaluate. Priority CT called.

## 2021-09-28 NOTE — ED ADULT NURSE REASSESSMENT NOTE - NS ED NURSE REASSESS COMMENT FT1
pt had witnessed tonic clonic seizure by MD Lopez, pt suctioned, pt given 2mg ativan, Pt placed on NRB, Md Wolf called to bedside, pt given 1g Keppra

## 2021-09-28 NOTE — ED PROVIDER NOTE - PROGRESS NOTE DETAILS
Clif: Patient w tonic clonic seizure lasting approx 45 seconds witnessed by Dr Lopez. Given 2 mg Ativan. Now appears post-ictal.

## 2021-09-28 NOTE — ED PROVIDER NOTE - ATTENDING CONTRIBUTION TO CARE
HPI: 82yo female with h/o NPH s/p  shunt, recent ICH 5/2021 presenting with AMS. Per son- patient was having shaking episodes yesterday, this evening found patient unresponsive with vomitus next to face. No fevers.     PE:  Gen: somnolent, awakens to voice, knows name and place but not time  Head: NCAT  HEENT: Oral mucosa moist, normal conjunctiva  CV: RRR no murmurs, normal perfusion  Resp: CTA b/l, no wheezing, rales, rhonchi, no respiratory distress  GI: Abd Soft NTND  Neuro: No focal neuro deficits  MSK: FROM all 4 extremities, no deformity  Skin: No rash, no bruising  Psych: Normal affect    MDM: Pt with AMS- found to have UTI and aspiration pneumonia, tx with antibiotics. Patient had witnessed seizure-like activity while in ED- given Keppra and Ativan with improvement, now postictal. Unclear source of seizures, possibly due to ICH. No fever/meningeal signs- doubt encephalitis/meningitis. Patient with no acute findings on CT head. Admission to medicine for further management. Full code. Dian Wolf DO         I performed a history and physical exam of the patient and discussed their management with the resident. I reviewed the resident's note and agree with the documented findings and plan of care. My medical decision making and observations are found above. HPI: 80yo female with h/o NPH s/p  shunt, recent ICH 5/2021 presenting with AMS. Per son- patient was having shaking episodes yesterday, this evening found patient unresponsive with vomitus next to face. No fevers.     PE:  Gen: somnolent, awakens to voice, knows name and place but not time  Head: NCAT  HEENT: Oral mucosa moist, normal conjunctiva  CV: RRR no murmurs, normal perfusion  Resp: CTA b/l, no wheezing, rales, rhonchi, no respiratory distress  GI: Abd Soft NTND  Neuro: No focal neuro deficits  MSK: FROM all 4 extremities, no deformity  Skin: No rash, no bruising  Psych: Normal affect    MDM: Pt with AMS- found to have UTI and aspiration pneumonia, tx with antibiotics. Patient had witnessed seizure-like activity while in ED- given Keppra and Ativan with improvement, now postictal. Unclear source of seizures, possibly due to prior ICH. Shunt appears to be functioning as ventricles are not enlarged. No fever/meningeal signs- doubt encephalitis/meningitis. Patient with no acute findings on CT head. Admission to medicine for further management. Full code. Dian Wolf DO         I performed a history and physical exam of the patient and discussed their management with the resident. I reviewed the resident's note and agree with the documented findings and plan of care. My medical decision making and observations are found above.

## 2021-09-28 NOTE — ED PROVIDER NOTE - OBJECTIVE STATEMENT
82 yo female history of recent brain bleed,  shunt presents from home with lethargy beginning at 8pm this evening. Per EMS, patient became minimally responsive to voice, prompting  to call EMS. On arrival, patient was hypertensive and diaphoretic. Priority CT called.

## 2021-09-29 DIAGNOSIS — Z96.652 PRESENCE OF LEFT ARTIFICIAL KNEE JOINT: Chronic | ICD-10-CM

## 2021-09-29 DIAGNOSIS — R41.82 ALTERED MENTAL STATUS, UNSPECIFIED: ICD-10-CM

## 2021-09-29 DIAGNOSIS — Z90.49 ACQUIRED ABSENCE OF OTHER SPECIFIED PARTS OF DIGESTIVE TRACT: Chronic | ICD-10-CM

## 2021-09-29 DIAGNOSIS — J69.0 PNEUMONITIS DUE TO INHALATION OF FOOD AND VOMIT: ICD-10-CM

## 2021-09-29 LAB
ALBUMIN SERPL ELPH-MCNC: 3.4 G/DL — SIGNIFICANT CHANGE UP (ref 3.3–5.2)
ALP SERPL-CCNC: 84 U/L — SIGNIFICANT CHANGE UP (ref 40–120)
ALT FLD-CCNC: 12 U/L — SIGNIFICANT CHANGE UP
ANION GAP SERPL CALC-SCNC: 12 MMOL/L — SIGNIFICANT CHANGE UP (ref 5–17)
ANION GAP SERPL CALC-SCNC: 25 MMOL/L — HIGH (ref 5–17)
AST SERPL-CCNC: 18 U/L — SIGNIFICANT CHANGE UP
BASOPHILS # BLD AUTO: 0.04 K/UL — SIGNIFICANT CHANGE UP (ref 0–0.2)
BASOPHILS NFR BLD AUTO: 0.6 % — SIGNIFICANT CHANGE UP (ref 0–2)
BILIRUB SERPL-MCNC: <0.2 MG/DL — LOW (ref 0.4–2)
BUN SERPL-MCNC: 24.3 MG/DL — HIGH (ref 8–20)
BUN SERPL-MCNC: 24.9 MG/DL — HIGH (ref 8–20)
CALCIUM SERPL-MCNC: 6.6 MG/DL — LOW (ref 8.6–10.2)
CALCIUM SERPL-MCNC: 7.4 MG/DL — LOW (ref 8.6–10.2)
CHLORIDE SERPL-SCNC: 100 MMOL/L — SIGNIFICANT CHANGE UP (ref 98–107)
CHLORIDE SERPL-SCNC: 95 MMOL/L — LOW (ref 98–107)
CK MB CFR SERPL CALC: 5 NG/ML — SIGNIFICANT CHANGE UP (ref 0–6.7)
CK SERPL-CCNC: 181 U/L — HIGH (ref 25–170)
CO2 SERPL-SCNC: 16 MMOL/L — LOW (ref 22–29)
CO2 SERPL-SCNC: 27 MMOL/L — SIGNIFICANT CHANGE UP (ref 22–29)
CREAT SERPL-MCNC: 1.28 MG/DL — SIGNIFICANT CHANGE UP (ref 0.5–1.3)
CREAT SERPL-MCNC: 1.35 MG/DL — HIGH (ref 0.5–1.3)
EOSINOPHIL # BLD AUTO: 0.11 K/UL — SIGNIFICANT CHANGE UP (ref 0–0.5)
EOSINOPHIL NFR BLD AUTO: 1.7 % — SIGNIFICANT CHANGE UP (ref 0–6)
GLUCOSE BLDC GLUCOMTR-MCNC: 104 MG/DL — HIGH (ref 70–99)
GLUCOSE BLDC GLUCOMTR-MCNC: 92 MG/DL — SIGNIFICANT CHANGE UP (ref 70–99)
GLUCOSE BLDC GLUCOMTR-MCNC: 98 MG/DL — SIGNIFICANT CHANGE UP (ref 70–99)
GLUCOSE SERPL-MCNC: 136 MG/DL — HIGH (ref 70–99)
GLUCOSE SERPL-MCNC: 240 MG/DL — HIGH (ref 70–99)
HCT VFR BLD CALC: 29.2 % — LOW (ref 34.5–45)
HGB BLD-MCNC: 9.5 G/DL — LOW (ref 11.5–15.5)
IMM GRANULOCYTES NFR BLD AUTO: 0.5 % — SIGNIFICANT CHANGE UP (ref 0–1.5)
LYMPHOCYTES # BLD AUTO: 1.5 K/UL — SIGNIFICANT CHANGE UP (ref 1–3.3)
LYMPHOCYTES # BLD AUTO: 23.1 % — SIGNIFICANT CHANGE UP (ref 13–44)
MCHC RBC-ENTMCNC: 29.1 PG — SIGNIFICANT CHANGE UP (ref 27–34)
MCHC RBC-ENTMCNC: 32.5 GM/DL — SIGNIFICANT CHANGE UP (ref 32–36)
MCV RBC AUTO: 89.3 FL — SIGNIFICANT CHANGE UP (ref 80–100)
MONOCYTES # BLD AUTO: 0.7 K/UL — SIGNIFICANT CHANGE UP (ref 0–0.9)
MONOCYTES NFR BLD AUTO: 10.8 % — SIGNIFICANT CHANGE UP (ref 2–14)
NEUTROPHILS # BLD AUTO: 4.12 K/UL — SIGNIFICANT CHANGE UP (ref 1.8–7.4)
NEUTROPHILS NFR BLD AUTO: 63.3 % — SIGNIFICANT CHANGE UP (ref 43–77)
PLATELET # BLD AUTO: 199 K/UL — SIGNIFICANT CHANGE UP (ref 150–400)
POTASSIUM SERPL-MCNC: 4 MMOL/L — SIGNIFICANT CHANGE UP (ref 3.5–5.3)
POTASSIUM SERPL-MCNC: 4.2 MMOL/L — SIGNIFICANT CHANGE UP (ref 3.5–5.3)
POTASSIUM SERPL-SCNC: 4 MMOL/L — SIGNIFICANT CHANGE UP (ref 3.5–5.3)
POTASSIUM SERPL-SCNC: 4.2 MMOL/L — SIGNIFICANT CHANGE UP (ref 3.5–5.3)
PROCALCITONIN SERPL-MCNC: 0.17 NG/ML — HIGH (ref 0.02–0.1)
PROT SERPL-MCNC: 6.6 G/DL — SIGNIFICANT CHANGE UP (ref 6.6–8.7)
RBC # BLD: 3.27 M/UL — LOW (ref 3.8–5.2)
RBC # FLD: 15.3 % — HIGH (ref 10.3–14.5)
SODIUM SERPL-SCNC: 136 MMOL/L — SIGNIFICANT CHANGE UP (ref 135–145)
SODIUM SERPL-SCNC: 139 MMOL/L — SIGNIFICANT CHANGE UP (ref 135–145)
WBC # BLD: 6.5 K/UL — SIGNIFICANT CHANGE UP (ref 3.8–10.5)
WBC # FLD AUTO: 6.5 K/UL — SIGNIFICANT CHANGE UP (ref 3.8–10.5)

## 2021-09-29 PROCEDURE — 99223 1ST HOSP IP/OBS HIGH 75: CPT

## 2021-09-29 PROCEDURE — 74018 RADEX ABDOMEN 1 VIEW: CPT | Mod: 26

## 2021-09-29 PROCEDURE — 99222 1ST HOSP IP/OBS MODERATE 55: CPT

## 2021-09-29 PROCEDURE — 70250 X-RAY EXAM OF SKULL: CPT | Mod: 26

## 2021-09-29 PROCEDURE — 71045 X-RAY EXAM CHEST 1 VIEW: CPT | Mod: 26

## 2021-09-29 PROCEDURE — 12345: CPT | Mod: NC

## 2021-09-29 RX ORDER — SODIUM CHLORIDE 9 MG/ML
1000 INJECTION, SOLUTION INTRAVENOUS
Refills: 0 | Status: DISCONTINUED | OUTPATIENT
Start: 2021-09-29 | End: 2021-10-05

## 2021-09-29 RX ORDER — PIPERACILLIN AND TAZOBACTAM 4; .5 G/20ML; G/20ML
3.38 INJECTION, POWDER, LYOPHILIZED, FOR SOLUTION INTRAVENOUS EVERY 8 HOURS
Refills: 0 | Status: DISCONTINUED | OUTPATIENT
Start: 2021-09-29 | End: 2021-09-29

## 2021-09-29 RX ORDER — FAMOTIDINE 10 MG/ML
20 INJECTION INTRAVENOUS DAILY
Refills: 0 | Status: DISCONTINUED | OUTPATIENT
Start: 2021-09-29 | End: 2021-10-05

## 2021-09-29 RX ORDER — LAMOTRIGINE 25 MG/1
25 TABLET, ORALLY DISINTEGRATING ORAL
Refills: 0 | Status: DISCONTINUED | OUTPATIENT
Start: 2021-09-29 | End: 2021-10-05

## 2021-09-29 RX ORDER — DEXTROSE 50 % IN WATER 50 %
25 SYRINGE (ML) INTRAVENOUS ONCE
Refills: 0 | Status: DISCONTINUED | OUTPATIENT
Start: 2021-09-29 | End: 2021-10-05

## 2021-09-29 RX ORDER — LABETALOL HCL 100 MG
10 TABLET ORAL EVERY 6 HOURS
Refills: 0 | Status: DISCONTINUED | OUTPATIENT
Start: 2021-09-29 | End: 2021-10-05

## 2021-09-29 RX ORDER — GLUCAGON INJECTION, SOLUTION 0.5 MG/.1ML
1 INJECTION, SOLUTION SUBCUTANEOUS ONCE
Refills: 0 | Status: DISCONTINUED | OUTPATIENT
Start: 2021-09-29 | End: 2021-10-05

## 2021-09-29 RX ORDER — VANCOMYCIN HCL 1 G
1000 VIAL (EA) INTRAVENOUS EVERY 12 HOURS
Refills: 0 | Status: DISCONTINUED | OUTPATIENT
Start: 2021-09-29 | End: 2021-09-29

## 2021-09-29 RX ORDER — ONDANSETRON 8 MG/1
4 TABLET, FILM COATED ORAL EVERY 8 HOURS
Refills: 0 | Status: DISCONTINUED | OUTPATIENT
Start: 2021-09-29 | End: 2021-10-05

## 2021-09-29 RX ORDER — DEXTROSE 50 % IN WATER 50 %
15 SYRINGE (ML) INTRAVENOUS ONCE
Refills: 0 | Status: DISCONTINUED | OUTPATIENT
Start: 2021-09-29 | End: 2021-10-05

## 2021-09-29 RX ORDER — SODIUM CHLORIDE 9 MG/ML
1000 INJECTION, SOLUTION INTRAVENOUS
Refills: 0 | Status: DISCONTINUED | OUTPATIENT
Start: 2021-09-29 | End: 2021-10-01

## 2021-09-29 RX ORDER — LEVETIRACETAM 250 MG/1
500 TABLET, FILM COATED ORAL
Refills: 0 | Status: DISCONTINUED | OUTPATIENT
Start: 2021-09-29 | End: 2021-10-05

## 2021-09-29 RX ORDER — DEXTROSE 50 % IN WATER 50 %
12.5 SYRINGE (ML) INTRAVENOUS ONCE
Refills: 0 | Status: DISCONTINUED | OUTPATIENT
Start: 2021-09-29 | End: 2021-10-05

## 2021-09-29 RX ORDER — INSULIN LISPRO 100/ML
VIAL (ML) SUBCUTANEOUS
Refills: 0 | Status: DISCONTINUED | OUTPATIENT
Start: 2021-09-29 | End: 2021-10-02

## 2021-09-29 RX ORDER — LEVETIRACETAM 250 MG/1
1000 TABLET, FILM COATED ORAL EVERY 12 HOURS
Refills: 0 | Status: DISCONTINUED | OUTPATIENT
Start: 2021-09-29 | End: 2021-09-29

## 2021-09-29 RX ADMIN — Medication 2 MILLIGRAM(S): at 00:47

## 2021-09-29 RX ADMIN — Medication 200 GRAM(S): at 00:13

## 2021-09-29 RX ADMIN — LEVETIRACETAM 400 MILLIGRAM(S): 250 TABLET, FILM COATED ORAL at 06:48

## 2021-09-29 RX ADMIN — LEVETIRACETAM 500 MILLIGRAM(S): 250 TABLET, FILM COATED ORAL at 18:44

## 2021-09-29 RX ADMIN — PIPERACILLIN AND TAZOBACTAM 25 GRAM(S): 4; .5 INJECTION, POWDER, LYOPHILIZED, FOR SOLUTION INTRAVENOUS at 06:48

## 2021-09-29 RX ADMIN — LAMOTRIGINE 25 MILLIGRAM(S): 25 TABLET, ORALLY DISINTEGRATING ORAL at 18:43

## 2021-09-29 NOTE — H&P ADULT - HISTORY OF PRESENT ILLNESS
80yo F(Margarita Duff  [birth year]) old MRN [id number] with PMH dCHF, NIDDM, HTN, HLD, NPH s/op  shunt(), ICH(small right basal ganglia hemorrhage 2021),DVT (off AC with neg doppler), hx Klebsiella UTI, hx Pneumonia, glaucoma, Bipolar, depression presented to ED from home by EMS for acute onset of confusion and seizure at home witnessed by family. Pt unable to provide hx at this time due s/p ativan and Keppra s/p another seizure in the ED, currently in post-ictal state. As per son pt rey rehab for 6wks and got home on 21, has been doing well since until yesterday pt was c/o disoriented then had episode of shacking what seemed to him is seizure like activity Pt also had chocking episode during that time. No known hx of seizure, denies fever, chills, pt had been doing well with home PT and occupational therapy since her discharge from rehab, as per son she is coherent and had been walking well. He report to one of her bp medications was discontinued due to hypotension but otherwise no new medications and pt has been compliant with her meds. In the ED pt had witness tonic-clonic seizure(more upper ext) lasted less than 1mins with post-ictal state, Pt s/p 1g IV Keppra and ativan. Labs pertinent for elevated lactate 5, mild leukocytosis AG metabolic acidosis, UA with positive nitrite, RVP/COVID neg, CT head unremarkable for acute findings, CXR with possible RLL consolidation concern for aspiration pneumonia. pt was given ceftriaxone and flagyl in the ED.

## 2021-09-29 NOTE — H&P ADULT - ASSESSMENT
80yo F(Margarita Duff  [birth year]) old MRN [id number] with PMH dCHF, NIDDM, HTN, HLD, NPH s/op  shunt(), ICH(small right basal ganglia hemorrhage 2021),DVT (off AC with neg doppler), hx Klebsiella UTI, hx Pneumonia, glaucoma, Bipolar, depression presented to ED from home by EMS for acute onset of confusion and seizure at home witnessed by family.    Seizure   -unknown etiology  -witnessed tonic-clonic seizure in the ED s/p ativan and Keppra   -no know hx of seizure, pt had EEG in 2021 without seizure activity  -CTH without acute intracranial finding, noted  shunt in place   -electrolytes within normal limits, afebrile, RVP/COVID neg   -UA pertinent for nitrite   -admit to telemetry   -NPO until mentation improves   -seizure/aspiration precaution   -cont with ativan 2mg IV for breakthrough seizure, Keppra 1g IV BID   -check EEG   -Dr. KWON consulted     Lactic/AG metabolic acidosis   -likely due to seizure activity   -trend lactate, check repeat BMP     possible concern for Aspiration pneumonia  -CXR with RLL infiltrate(unofficial read)  -s/p ceftriaxone and flagyl in the ED   -started vanco and zosyn deescalate as needed   -check procalcitonin     Hypertensive urgency   -s/p labetalol 10mg IV push with improvement of BP  -cont with labetalol 10mg IVP prn for sbp >180   -hold home po meds for now     PASCALE on CKD-3  -s/p 1L NS in the ED   -Monitor BMP  -Avoid nephrotoxic medications     hx Anemia  -hold Ferrous sulfate for npo status   -monitor h/h, currently stable     Type 2 Diabetes Mellitus   -hold home metformin   -ISS, hypoglycemic protocol   -check A1c     Bipolar disorder  -hold po meds for now     Hx of DVT  -off AC after ICH   -Dopplex neg in      DVT ppx   -heparin subq                80yo F(Margarita Duff  [birth year]) old MRN [id number] with PMH dCHF, NIDDM, HTN, HLD, NPH s/op  shunt(), ICH(small right basal ganglia hemorrhage 2021),DVT (off AC with neg doppler), hx Klebsiella UTI, hx Pneumonia, glaucoma, Bipolar, depression presented to ED from home by EMS for acute onset of confusion and seizure at home witnessed by family.    Seizure   -unknown etiology  -witnessed tonic-clonic seizure in the ED s/p ativan and Keppra   -no know hx of seizure, pt had EEG in 2021 without seizure activity  -CTH without acute intracranial finding, noted  shunt in place   -electrolytes within normal limits, afebrile, RVP/COVID neg   -UA pertinent for nitrite   -admit to telemetry   -NPO until mentation improves   -seizure/aspiration precaution   -cont with ativan 2mg IV for breakthrough seizure, Keppra 1g IV BID   -check EEG   -Dr. KWON consulted     Lactic/AG metabolic acidosis   -likely due to seizure activity   -trend lactate, check repeat BMP     possible concern for Aspiration pneumonia  -CXR with RLL infiltrate(unofficial read)  -s/p ceftriaxone and flagyl in the ED   -started vanco and zosyn deescalate as needed   -check procalcitonin     Hypertensive urgency   -s/p labetalol 10mg IV push with improvement of BP  -cont with labetalol 10mg IVP prn for sbp >180   -hold home po meds for now     PASCALE on CKD-3  -s/p 1L NS in the ED   -Monitor BMP  -Avoid nephrotoxic medications     diastolic CHF   -euvolemic  -hold PO lasix for now      hx Anemia  -hold Ferrous sulfate for npo status   -monitor h/h, currently stable     Type 2 Diabetes Mellitus   -hold home metformin   -ISS, hypoglycemic protocol   -check A1c     Bipolar disorder  -hold po meds for now     Hx of DVT  -off AC after ICH   -Dopplex neg in      DVT ppx   -heparin subq

## 2021-09-29 NOTE — CONSULT NOTE ADULT - ASSESSMENT
82 y/o female with NPH w/ VPS; Seizure today   - Skull xray reviewed and VPS setting @1.5  - MRI brain per neurology   - please call after MRI done and we can recheck the shunt to ensure the setting did not change due to the magnet 
This is a 81y Female with a history of dCHF, NIDDM, HTN, HLD, NPH s/p  shunt 2015 (neurologist Dr. Bowman, NSG Dr. Haskins), ICH (small right basal ganglia hemorrhage 5/2021), DVT (off AC with neg doppler), hx Klebsiella UTI, hx Pneumonia, glaucoma, Bipolar, depression who presented with AMS, followed by witnessed bilateral tonic clonic seizure in ER. Personally reviewed all imagines, labs and other studies.    Impression:  1. Initial AMS: Much improved. Suspect unwitnessed bilateral tonic clonic seizure at home and found by family in postictal state.  2. 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 Start lamotrigine (both for seizure and psych), bridge with levetiracetam.  3. Aspiration pna  4. Acute on CKD  5. NPH s/p VPS  6. dCHF, NIDDM, HTN, HLD, bipolar, depression      Recommendation:  - transfer to Epilepsy Monitoring Unit (EMU) on 6T for cvEEG   - start lamotrigine 25mg QHS and slowly up-titrate outpatient - both for seizure and depression/anxiety (ordered)   - bridge with levetiracetam while up-titrating lamotrigine, but at a renally adjusted dose of 500mg BID (ordered)   - xray shunt series to eval for patency and shunt setting (ordered)   - consult NSG to see if they have appropriate equipment to reprogram the VPS after MRI      => if yes: MRI brain w/o epilepsy protocol  - seizure precaution  - medical management and support care per primary team       Thank you for allowing Epilepsy to participate in the care of this patient.   ______________________  Natan Lopez MD   Director, Epilepsy/EMU - [facility]   Epilepsy Consult #: [phone] ([phone])

## 2021-09-29 NOTE — PROGRESS NOTE ADULT - SUBJECTIVE AND OBJECTIVE BOX
[facility] Division of Hospital Medicine    Chief Complaint:  seizure     SUBJECTIVE: pt is not verbalizing complains, and appears under effect of medication      OVERNIGHT EVENTS: none reported     Patient denies chest pain, SOB, abd pain, N/V, fever, chills, dysuria or any other complaints. All remainder ROS negative.     MEDICATIONS  (STANDING):  dextrose 40% Gel 15 Gram(s) Oral once  dextrose 5%. 1000 milliLiter(s) (50 mL/Hr) IV Continuous <Continuous>  dextrose 5%. 1000 milliLiter(s) (100 mL/Hr) IV Continuous <Continuous>  dextrose 50% Injectable 25 Gram(s) IV Push once  dextrose 50% Injectable 12.5 Gram(s) IV Push once  dextrose 50% Injectable 25 Gram(s) IV Push once  glucagon  Injectable 1 milliGRAM(s) IntraMuscular once  insulin lispro (ADMELOG) corrective regimen sliding scale   SubCutaneous three times a day before meals  lamoTRIgine 25 milliGRAM(s) Oral <User Schedule>  levETIRAcetam 500 milliGRAM(s) Oral two times a day    MEDICATIONS  (PRN):  aluminum hydroxide/magnesium hydroxide/simethicone Suspension 30 milliLiter(s) Oral every 4 hours PRN Dyspepsia  labetalol Injectable 10 milliGRAM(s) IV Push every 6 hours PRN Systolic blood pressure >180  LORazepam   Injectable 2 milliGRAM(s) IV Push every 2 hours PRN seizure  ondansetron Injectable 4 milliGRAM(s) IV Push every 8 hours PRN Nausea and/or Vomiting        I&O's Summary      PHYSICAL EXAM:  Vital Signs Last 24 Hrs  T(C): 36.5 (29 Sep 2021 11:22), Max: 37.1 (28 Sep 2021 21:07)  T(F): 97.7 (29 Sep 2021 11:22), Max: 98.8 (28 Sep 2021 21:07)  HR: 77 (29 Sep 2021 11:22) (77 - 109)  BP: 143/82 (29 Sep 2021 11:22) (143/82 - 196/130)  BP(mean): --  RR: 18 (29 Sep 2021 11:22) (16 - 18)  SpO2: 97% (29 Sep 2021 11:22) (92% - 99%)        CONSTITUTIONAL: NAD, appears in post ictal state, pale  ENMT: dry oral mucosa, no pharyngeal injection or exudates; normal dentition; No JVD  RESPIRATORY: Normal respiratory effort; lungs are clear to auscultation bilaterally  CARDIOVASCULAR: Regular rate and rhythm, normal S1 and S2, no murmur/rub/gallop; No lower extremity edema; Peripheral pulses are 2+ bilaterally  ABDOMEN: Nontender to palpation, normoactive bowel sounds, no rebound/guarding; No hepatosplenomegaly  MUSCLOSKELETAL:  no clubbing or cyanosis of digits; no joint swelling or tenderness to palpation  PSYCH: can't assess at the moment  NEUROLOGY: she is arousal to voice, but falls asleep w/o constant verbal stimuli, no apparent facial asymmetry, she locolises adn withdraws on pain all 4 ext,    SKIN: No rashes; no palpable lesions    LABS:                        9.5    6.50  )-----------( 199      ( 29 Sep 2021 07:36 )             29.2     09-    139  |  100  |  24.9<H>  ----------------------------<  136<H>  4.2   |  27.0  |  1.28    Ca    7.4<L>      29 Sep 2021 07:36    TPro  6.6  /  Alb  3.4  /  TBili  <0.2<L>  /  DBili  x   /  AST  18  /  ALT  12  /  AlkPhos  84  09-28      CARDIAC MARKERS ( 28 Sep 2021 23:56 )  x     / x     / 181 U/L / x     / 5.0 ng/mL  CARDIAC MARKERS ( 28 Sep 2021 21:54 )  x     / 0.03 ng/mL / x     / x     / x          Urinalysis Basic - ( 28 Sep 2021 22:25 )    Color: Yellow / Appearance: Clear / S.015 / pH: x  Gluc: x / Ketone: Negative  / Bili: Negative / Urobili: Negative   Blood: x / Protein: 100 / Nitrite: Positive   Leuk Esterase: Small / RBC: 0-2 /HPF / WBC 6-10   Sq Epi: x / Non Sq Epi: Occasional / Bacteria: Few        CAPILLARY BLOOD GLUCOSE      POCT Blood Glucose.: 98 mg/dL (29 Sep 2021 11:01)  POCT Blood Glucose.: 104 mg/dL (29 Sep 2021 07:56)  POCT Blood Glucose.: 177 mg/dL (28 Sep 2021 21:03)        RADIOLOGY & ADDITIONAL TESTS:  Results Reviewed:   Imaging Personally Reviewed:  Electrocardiogram Personally Reviewed:

## 2021-09-29 NOTE — H&P ADULT - NSHPPHYSICALEXAM_GEN_ALL_CORE
T(C): 36.7 (09-29-21 @ 00:28), Max: 37.1 (09-28-21 @ 21:07)  HR: 100 (09-29-21 @ 00:28) (96 - 109)  BP: 143/86 (09-29-21 @ 00:28) (143/86 - 196/130)  RR: 18 (09-29-21 @ 00:28) (16 - 18)  SpO2: 95% (09-29-21 @ 00:28) (92% - 98%)    GENERAL: pt appear lethargic, confused s/p sedative   EYES: sclera clear, no exudates  ENMT: noted dry blood at the corner of the mouth   NECK: supple, soft, no thyromegaly noted  LUNGS: good air entry bilaterally, clear to auscultation, symmetric breath sounds, no wheezing or rhonchi appreciated  HEART: soft S1/S2, regular rate and rhythm, no murmurs noted, no lower extremity edema  GASTROINTESTINAL: abdomen is soft, nontender, nondistended, normoactive bowel sounds, no palpable masses  INTEGUMENT: good skin turgor, warm skin, appears well perfused  MUSCULOSKELETAL: no clubbing or cyanosis, no obvious deformity  NEUROLOGIC: post-ictal state, unable to assess   PSYCHIATRIC: calm   HEME/LYMPH: no palpable supraclavicular nodules, no obvious ecchymosis or petechiae

## 2021-09-29 NOTE — PROGRESS NOTE ADULT - ASSESSMENT
80yo F(Margarita Duff  [birth year]) old MRN [id number] with PMH dCHF, NIDDM, HTN, HLD, NPH s/op  shunt(), ICH(small right basal ganglia hemorrhage 2021),DVT (off AC with neg doppler), hx Klebsiella UTI, hx Pneumonia, glaucoma, Bipolar, depression presented to ED from home by EMS for acute onset of confusion and seizure at home witnessed by family.    # New onset seizure disorder in pt with hx of NPH  s/op  shunt and hx of ICH right basal ganglia hemorrhage 2021  - Epileptologist consult noted   - will be trasnfered to 6TW for cvEEG  - c/w Lamotrigine 25 mg, keppra 500 bid, ativan 2 mg iv prn for seizure  -  consulted Neurosurgery to assess VPS pressure and if it is compatible with MRI   - c/w seizure/aspiration precaution     # HAGMA most likely due to lactacidemia due to seizure   -lab work has improved   - c/w ivf - 1L LR over  10 hr    #possible concern for Aspiration pneumonia  - noted procal, and cxr >> d/c Abx will continue to observe for fever    #Hypertensive urgency   -s/p labetalol 10mg IV push with improvement of BP  -cont with labetalol 10mg IVP prn for sbp >180   -hold home po meds for now     #PASCALE on CKD-3, prerenal  -ivf as above  -am lab work noted    #diastolic CHF   -euvolemic, at the moment apears dehydrated >> ivf as above and holding home furosemide   - management of htn as above    hx Anemia  -hold Ferrous sulfate for npo status   -monitor h/h, currently stable     Type 2 Diabetes Mellitus   -hold home metformin   -ISS, hypoglycemic protocol   -check A1c     Bipolar disorder  -will resume home medication when confirmed with family and takes po     Hx of DVT  -off AC after ICH   -Dopplex neg in      DVT ppx   -heparin subq   code - full   Dispo - will need 2-3 days                82yo F(Margarita Duff  [birth year]) old MRN [id number] with PMH dCHF, NIDDM, HTN, HLD, NPH s/op  shunt(), ICH(small right basal ganglia hemorrhage 2021),DVT (off AC with neg doppler), hx Klebsiella UTI, hx Pneumonia, glaucoma, Bipolar, depression presented to ED from home by EMS for acute onset of confusion and seizure at home witnessed by family.    # New onset seizure disorder in pt with hx of NPH  s/op  shunt and hx of ICH right basal ganglia hemorrhage 2021  - Epileptologist consult noted   - will be trasnfered to 6TW for cvEEG  - c/w Lamotrigine 25 mg, keppra 500 bid, ativan 2 mg iv prn for seizure  -  consulted Neurosurgery to assess VPS pressure and if it is compatible with MRI   - c/w seizure/aspiration precaution     # HAGMA most likely due to lactacidemia due to seizure   -lab work has improved   - c/w ivf - 1L LR over  10 hr    #possible concern for Aspiration pneumonia  - noted procal, and cxr >> d/c Abx will continue to observe for fever    #Hypertensive urgency   -s/p labetalol 10mg IV push with improvement of BP  -cont with labetalol 10mg IVP prn for sbp >180   -hold home po meds for now     #PASCALE on CKD-3, prerenal  -ivf as above  -am lab work noted    #diastolic CHF   -euvolemic, at the moment apears dehydrated >> ivf as above and holding home furosemide   - management of htn as above    hx Anemia  -hold Ferrous sulfate for npo status   -monitor h/h, currently stable     Type 2 Diabetes Mellitus   -hold home metformin   -ISS, hypoglycemic protocol   -check A1c     Bipolar disorder  -will resume home medication when confirmed with family and takes po     Hx of DVT  -off AC after ICH   -Dopplex neg in      DVT ppx   -heparin subq   code - full, tried reached out daughter using phone # in prior MRN account, no response and can't leave message.    Dispo - will need 2-3 days

## 2021-09-29 NOTE — CONSULT NOTE ADULT - SUBJECTIVE AND OBJECTIVE BOX
HPI:  80yo F(Margarita Duff  [birth year]) old MRN [id number] with PMH dCHF, NIDDM, HTN, HLD, NPH s/op  shunt(), ICH(small right basal ganglia hemorrhage 2021),DVT (off AC with neg doppler), hx Klebsiella UTI, hx Pneumonia, glaucoma, Bipolar, depression presented to ED from home by EMS for acute onset of confusion and seizure at home witnessed by family. Pt unable to provide hx at this time due s/p ativan and Keppra s/p another seizure in the ED, currently in post-ictal state. As per son pt rey rehab for 6wks and got home on 21, has been doing well since until yesterday pt was c/o disoriented then had episode of shaking what seemed to him is seizure like activity Pt also had chocking episode during that time. No known hx of seizure, denies fever, chills, pt had been doing well with home PT and occupational therapy since her discharge from rehab, as per son she is coherent and had been walking well. He report to one of her bp medications was discontinued due to hypotension but otherwise no new medications and pt has been compliant with her meds. In the ED pt had witness tonic-clonic seizure(more upper ext) lasted less than 1mins with post-ictal state, Pt s/p 1g IV Keppra and ativan. Labs pertinent for elevated lactate 5, mild leukocytosis AG metabolic acidosis, UA with positive nitrite, RVP/COVID neg, CT head unremarkable for acute findings, CXR with possible RLL consolidation concern for aspiration pneumonia. pt was given ceftriaxone and flagyl in the ED.   (29 Sep 2021 00:50)      PAST MEDICAL & SURGICAL HISTORY:  DM (diabetes mellitus)    HTN (hypertension)    Bipolar disorder    Depression    Intracranial bleed    NPH (normal pressure hydrocephalus) s/p VPS    Pneumonia, aspiration    UTI (urinary tract infection)    Hyperlipidemia    Glaucoma    CKD (chronic kidney disease)    DVT, lower extremity    History of TIAs    S/P cholecystectomy    S/P total knee replacement, left      FAMILY HISTORY:  Family history of early CAD    FH: type 2 diabetes mellitus      Allergies    No Known Allergies      REVIEW OF SYSTEMS  Unable to assess 2/2 neurological condition    HOME MEDICATIONS:  Home Medications:  Abilify 5 mg oral tablet: 1 tab(s) orally once a day (at bedtime) (29 Sep 2021 15:40)  atorvastatin 40 mg oral tablet: 1 tab(s) orally once a day (29 Sep 2021 15:40)  buPROPion 100 mg/12 hours (SR) oral tablet, extended release: 1 tab(s) orally 2 times a day (29 Sep 2021 15:40)  Calmoseptine 0.44%-20.6% topical ointment: Apply topically to affected area 2 times a day to bilateral buttocks (29 Sep 2021 15:40)  ferrous sulfate 325 mg (65 mg elemental iron) oral tablet: 1 tab(s) orally once a day (29 Sep 2021 15:40)  folic acid 1 mg oral tablet: 1 tab(s) orally once a day (29 Sep 2021 15:40)  furosemide 40 mg oral tablet: 1 tab(s) orally once a day (29 Sep 2021 15:40)  hydrALAZINE 25 mg oral tablet: 1 tab(s) orally every 8 hours (29 Sep 2021 15:40)  losartan 25 mg oral tablet: 1 tab(s) orally once a day (29 Sep 2021 15:40)  omeprazole 40 mg oral delayed release capsule: 1 cap(s) orally once a day (29 Sep 2021 15:40)  thiamine 100 mg oral tablet: 1 tab(s) orally once a day (29 Sep 2021 15:40)      MEDICATIONS:  Neuro:  lamoTRIgine 25 milliGRAM(s) Oral <User Schedule>  levETIRAcetam 500 milliGRAM(s) Oral two times a day  LORazepam   Injectable 2 milliGRAM(s) IV Push every 2 hours PRN  ondansetron Injectable 4 milliGRAM(s) IV Push every 8 hours PRN    OTHER:  aluminum hydroxide/magnesium hydroxide/simethicone Suspension 30 milliLiter(s) Oral every 4 hours PRN  dextrose 40% Gel 15 Gram(s) Oral once  dextrose 50% Injectable 25 Gram(s) IV Push once  dextrose 50% Injectable 12.5 Gram(s) IV Push once  dextrose 50% Injectable 25 Gram(s) IV Push once  famotidine Injectable 20 milliGRAM(s) IV Push daily  glucagon  Injectable 1 milliGRAM(s) IntraMuscular once  insulin lispro (ADMELOG) corrective regimen sliding scale   SubCutaneous three times a day before meals  labetalol Injectable 10 milliGRAM(s) IV Push every 6 hours PRN    IVF:  dextrose 5%. 1000 milliLiter(s) IV Continuous <Continuous>  dextrose 5%. 1000 milliLiter(s) IV Continuous <Continuous>  lactated ringers. 1000 milliLiter(s) IV Continuous <Continuous>      Vital Signs Last 24 Hrs  T(C): 36.5 (29 Sep 2021 11:22), Max: 37.1 (28 Sep 2021 21:07)  T(F): 97.7 (29 Sep 2021 11:22), Max: 98.8 (28 Sep 2021 21:07)  HR: 77 (29 Sep 2021 11:22) (77 - 109)  BP: 143/82 (29 Sep 2021 11:22) (143/82 - 196/130)  BP(mean): --  RR: 18 (29 Sep 2021 11:22) (16 - 18)  SpO2: 97% (29 Sep 2021 11:22) (92% - 99%)      PHYSICAL EXAM:  GENERAL: NAD, well-groomed, well-developed  Shunt pumps and refills w/o difficulty   HEAD:  Atraumatic, normocephalic  Awake, alert, confused w/ orientation to self and hospital  speech slow; face symmetric   RHODES x4; L hand weakness 3/5 WE,    following commands briskly   CHEST/LUNG: Clear to auscultation bilaterally  HEART: +S1/+S2; Regular rate and rhythm  ABDOMEN: Soft, nontender, nondistended  EXTREMITIES:  2+ peripheral pulses  LYMPH: No lymphadenopathy noted  SKIN: Warm, dry; no rashes or lesions    LABS:                        9.5    6.50  )-----------( 199      ( 29 Sep 2021 07:36 )             29.2     -    139  |  100  |  24.9<H>  ----------------------------<  136<H>  4.2   |  27.0  |  1.28    Ca    7.4<L>      29 Sep 2021 07:36    TPro  6.6  /  Alb  3.4  /  TBili  <0.2<L>  /  DBili  x   /  AST  18  /  ALT  12  /  AlkPhos  84  -      Urinalysis Basic - ( 28 Sep 2021 22:25 )    Color: Yellow / Appearance: Clear / S.015 / pH: x  Gluc: x / Ketone: Negative  / Bili: Negative / Urobili: Negative   Blood: x / Protein: 100 / Nitrite: Positive   Leuk Esterase: Small / RBC: 0-2 /HPF / WBC 6-10   Sq Epi: x / Non Sq Epi: Occasional / Bacteria: Few        CULTURES:      RADIOLOGY & ADDITIONAL STUDIES:      CAPRINI SCORE [CLOT]:  Patient has an estimated Caprini score of greater than 5.  However, the patient's unique clinical situation will be addressed in an individual manner to determine appropriate anticoagulation treatment, if any.

## 2021-09-29 NOTE — CONSULT NOTE ADULT - SUBJECTIVE AND OBJECTIVE BOX
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 Bowman  VPS placed by NSG: Dr. Haskins     Recommendation:  - transfer to Epilepsy Monitoring Unit (EMU) on 6T for cvEEG   - start lamotrigine 25mg QHS and slowly up-titrate outpatient - both for seizure and depression/anxiety (ordered)   - bridge with levetiracetam while up-titrating lamotrigine, but at a renally adjusted dose of 500mg BID (ordered)   - recommend xray shunt series to eval for patency of VPS  - consult NSG to eval if VPS compatible with MRI      => if compatible: MRI brain w/o epilepsy protocol        Discussed with Dr. Cary    Formal consult note to follow.  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 Bowman  VPS placed by NSG: Dr. Haskins     Recommendation:  - transfer to Epilepsy Monitoring Unit (EMU) on 6T for cvEEG   - start lamotrigine 25mg QHS and slowly up-titrate outpatient - both for seizure and depression/anxiety (ordered)   - bridge with levetiracetam while up-titrating lamotrigine, but at a renally adjusted dose of 500mg BID (ordered)   - xray shunt series to eval for patency and shunt setting (ordered)   - consult NSG to see if they have appropriate equipment to reprogram the VPS after MRI      => if yes: MRI brain w/o epilepsy protocol        Discussed with Dr. Cary    Formal consult note to follow.  ______________________  Natan Lopez MD   Director, Epilepsy/EMU - [facility]   Epilepsy consult #: 83-EPILEPSY ([phone])                                                                        [facility]                                                                     MD Mohini Luna,                                               Epilepsy Consult #: 83-EPILEPSY ([phone])                                               Office: [address] [place] [address], [address]                                                 Phone: [phone]; Fax: [phone]                            ==============================================    EPILEPSY INITIAL CONSULT NOTE      ADMITTING DIAGNOSIS: Pneumonitis due to inhalation of food or vomitus        HPI:  82yo F(Margarita Duff  [birth year] old MRN [id number] with PMH dCHF, NIDDM, HTN, HLD, NPH s/op  shunt(), ICH(small right basal ganglia hemorrhage 2021),DVT (off AC with neg doppler), hx Klebsiella UTI, hx Pneumonia, glaucoma, Bipolar, depression presented to ED from home by EMS for acute onset of confusion and seizure at home witnessed by family. Pt unable to provide hx at this time due s/p ativan and Keppra s/p another seizure in the ED, currently in post-ictal state. [facility], has been doing well since until yesterday pt was c/o disoriented then had episode of shacking what seemed to him is seizure like activity Pt also had chocking episode during that time. No known hx of seizure, denies fever, chills, pt had been doing well with home PT and occupational therapy since her discharge from rehab, as per son she is coherent and had been walking well. He report to one of her bp medications was discontinued due to hypotension but otherwise no new medications and pt has been compliant with her meds. In the ED pt had witness tonic-clonic seizure(more upper ext) lasted less than 1mins with post-ictal state, Pt s/p 1g IV Keppra and ativan. Labs pertinent for elevated lactate 5, mild leukocytosis AG metabolic acidosis, UA with positive nitrite, RVP/COVID neg, CT head unremarkable for acute findings, CXR with possible RLL consolidation concern for aspiration pneumonia. pt was given ceftriaxone and flagyl in the ED.   (29 Sep 2021 00:50)    Initially presented with AMS, then had a witnessed bilateral tonic clonic seizure in ER. Now on levetiracetam 1000mg BID. CTH reveals gliosis in the right parietal lobe and encephalomalacia within the left occipital lobe, which may be potential epileptogenic zones.      SEIZURE RISK FACTORS:  Gliosis in the right parietal lobe. Encephalomalacia within the left occipital lobe and cerebellum.     CURRENT AED:  levetiracetam 1000mg BID - started 21    PREVIOUS AED:  none    IMAGING:   CTH 21 ([facility]): Gliosis in the right parietal lobe. Encephalomalacia within the left occipital lobe and cerebellum. Right parietal approach  shunt with tip located at the left lateral ventricle.     NEUROPHYSIOLOGY:  none    NEUROPSYCHOLOGY:   none    PMH:  DM (diabetes mellitus)    HTN (hypertension)    Bipolar disorder    Depression    Intracranial bleed    NPH (normal pressure hydrocephalus)    Pneumonia, aspiration    UTI (urinary tract infection)    Hyperlipidemia    Glaucoma    CKD (chronic kidney disease)    DVT, lower extremity    History of TIAs        PSH:  S/P cholecystectomy    S/P total knee replacement, left      MEDICATION:  aluminum hydroxide/magnesium hydroxide/simethicone Suspension 30 milliLiter(s) Oral every 4 hours PRN Dyspepsia  dextrose 40% Gel 15 Gram(s) Oral once  dextrose 5%. 1000 milliLiter(s) (50 mL/Hr) IV Continuous <Continuous>  dextrose 5%. 1000 milliLiter(s) (100 mL/Hr) IV Continuous <Continuous>  dextrose 50% Injectable 25 Gram(s) IV Push once  dextrose 50% Injectable 12.5 Gram(s) IV Push once  dextrose 50% Injectable 25 Gram(s) IV Push once  famotidine Injectable 20 milliGRAM(s) IV Push daily  glucagon  Injectable 1 milliGRAM(s) IntraMuscular once  insulin lispro (ADMELOG) corrective regimen sliding scale   SubCutaneous three times a day before meals  labetalol Injectable 10 milliGRAM(s) IV Push every 6 hours PRN Systolic blood pressure >180  lactated ringers. 1000 milliLiter(s) (100 mL/Hr) IV Continuous <Continuous>  lamoTRIgine 25 milliGRAM(s) Oral <User Schedule>  levETIRAcetam 500 milliGRAM(s) Oral two times a day  LORazepam   Injectable 2 milliGRAM(s) IV Push every 2 hours PRN seizure  ondansetron Injectable 4 milliGRAM(s) IV Push every 8 hours PRN Nausea and/or Vomiting    ALLERGIES:  No Known Allergies    FH:  noncontributory    SH:  Denied EtOH, tobacco, illicit drugs.      ROS:  Neurology as per HPI, otherwise negative for constitutional, skin, eyes, ENT, respiratory, cardiovascular, gastrointestinal, genitourinary, musculoskeletal, psychiatric, hematology/lymphatics, endocrine, allergic/immunologic.    VITALS:  T(C): 36.5 (21 @ 11:22), Max: 36.7 (21 @ 00:28)  HR: 62 (21 @ 16:46) (62 - 109)  BP: 136/76 (21 @ 16:46) (136/76 - 169/98)  ABP: --  RR: 18 (21 @ 16:46) (16 - 18)  SpO2: 98% (21 @ 16:46) (92% - 99%)  CVP(cm H2O): --    GENERAL PHYSICAL EXAM:  GEN: mildly lethargic  HEENT: NCAT, OP clear  EYES: sclera white, conjunctiva clear, no nystagmus  NECK: supple  CV: RRR, no murmur     		  PULM: CTAB, no wheezing  ABD: soft, +BS, NT  SKIN: warm, dry    NEUROLOGICAL EXAM:  Mental Status  Orientation: awake and alert  Language: clear     Cranial Nerves  II: full visual fields intact   III, IV, VI: PERRL, EOMI  V, VII: facial sensation and movement intact and symmetric   VIII: hearing intact   IX, X: uvula midline, soft palate elevates normally   XI: BL shoulder shrug intact   XII: tongue midline    Motor  4-/5 in all extremities     Reflex  1-2 in BL biceps and patella                                    Plantar responses mute bilaterally    Coordination  Deferred    Gait  Deferred      LABS:                          9.5    6.50  )-----------( 199      ( 29 Sep 2021 07:36 )             29.2         139  |  100  |  24.9<H>  ----------------------------<  136<H>  4.2   |  27.0  |  1.28    Ca    7.4<L>      29 Sep 2021 07:36    TPro  6.6  /  Alb  3.4  /  TBili  <0.2<L>  /  DBili  x   /  AST  18  /  ALT  12  /  AlkPhos  84  -    CAPILLARY BLOOD GLUCOSE      POCT Blood Glucose.: 92 mg/dL (29 Sep 2021 17:10)  POCT Blood Glucose.: 98 mg/dL (29 Sep 2021 11:01)  POCT Blood Glucose.: 104 mg/dL (29 Sep 2021 07:56)    LIVER FUNCTIONS - ( 28 Sep 2021 23:56 )  Alb: 3.4 g/dL / Pro: 6.6 g/dL / ALK PHOS: 84 U/L / ALT: 12 U/L / AST: 18 U/L / GGT: x             Urinalysis Basic - ( 28 Sep 2021 22:25 )    Color: Yellow / Appearance: Clear / S.015 / pH: x  Gluc: x / Ketone: Negative  / Bili: Negative / Urobili: Negative   Blood: x / Protein: 100 / Nitrite: Positive   Leuk Esterase: Small / RBC: 0-2 /HPF / WBC 6-10   Sq Epi: x / Non Sq Epi: Occasional / Bacteria: Few

## 2021-09-29 NOTE — ED ADULT NURSE NOTE - OBJECTIVE STATEMENT
Pt BIBA from home for altered mental status, pt last known well 8pm this evening. Per EMS, patient became minimally responsive to voice, prompting  to call EMS. On arrival, patient was hypertensive and diaphoretic, pt respirations even and labored, asper ems pt was 85% on RA, pt placed on 2L NC and 94%, as per pt family pt was shaking a lot at home, pt had a fall around 3am yesterday but was fine, pt ate a cookie and then family member saw her minimally responsive, pt states she is nauseous

## 2021-09-29 NOTE — H&P ADULT - NSICDXPASTMEDICALHX_GEN_ALL_CORE_FT
PAST MEDICAL HISTORY:  Bipolar disorder     CKD (chronic kidney disease)     Depression     DM (diabetes mellitus)     DVT, lower extremity     Glaucoma     History of TIAs     HTN (hypertension)     Hyperlipidemia     Intracranial bleed     NPH (normal pressure hydrocephalus)     Pneumonia, aspiration     UTI (urinary tract infection)

## 2021-09-30 ENCOUNTER — TRANSCRIPTION ENCOUNTER (OUTPATIENT)
Age: 81
End: 2021-09-30

## 2021-09-30 LAB
A1C WITH ESTIMATED AVERAGE GLUCOSE RESULT: 6.9 % — HIGH (ref 4–5.6)
ANION GAP SERPL CALC-SCNC: 13 MMOL/L — SIGNIFICANT CHANGE UP (ref 5–17)
BUN SERPL-MCNC: 21.4 MG/DL — HIGH (ref 8–20)
CALCIUM SERPL-MCNC: 6.9 MG/DL — LOW (ref 8.6–10.2)
CHLORIDE SERPL-SCNC: 101 MMOL/L — SIGNIFICANT CHANGE UP (ref 98–107)
CO2 SERPL-SCNC: 27 MMOL/L — SIGNIFICANT CHANGE UP (ref 22–29)
COVID-19 SPIKE DOMAIN AB INTERP: POSITIVE
COVID-19 SPIKE DOMAIN ANTIBODY RESULT: >250 U/ML — HIGH
CREAT SERPL-MCNC: 1.31 MG/DL — HIGH (ref 0.5–1.3)
ESTIMATED AVERAGE GLUCOSE: 151 MG/DL — HIGH (ref 68–114)
GLUCOSE BLDC GLUCOMTR-MCNC: 100 MG/DL — HIGH (ref 70–99)
GLUCOSE BLDC GLUCOMTR-MCNC: 115 MG/DL — HIGH (ref 70–99)
GLUCOSE BLDC GLUCOMTR-MCNC: 124 MG/DL — HIGH (ref 70–99)
GLUCOSE BLDC GLUCOMTR-MCNC: 130 MG/DL — HIGH (ref 70–99)
GLUCOSE SERPL-MCNC: 101 MG/DL — HIGH (ref 70–99)
HCT VFR BLD CALC: 29.7 % — LOW (ref 34.5–45)
HGB BLD-MCNC: 9.7 G/DL — LOW (ref 11.5–15.5)
MCHC RBC-ENTMCNC: 28.9 PG — SIGNIFICANT CHANGE UP (ref 27–34)
MCHC RBC-ENTMCNC: 32.7 GM/DL — SIGNIFICANT CHANGE UP (ref 32–36)
MCV RBC AUTO: 88.4 FL — SIGNIFICANT CHANGE UP (ref 80–100)
PLATELET # BLD AUTO: 201 K/UL — SIGNIFICANT CHANGE UP (ref 150–400)
POTASSIUM SERPL-MCNC: 3.8 MMOL/L — SIGNIFICANT CHANGE UP (ref 3.5–5.3)
POTASSIUM SERPL-SCNC: 3.8 MMOL/L — SIGNIFICANT CHANGE UP (ref 3.5–5.3)
RBC # BLD: 3.36 M/UL — LOW (ref 3.8–5.2)
RBC # FLD: 15.3 % — HIGH (ref 10.3–14.5)
SARS-COV-2 IGG+IGM SERPL QL IA: >250 U/ML — HIGH
SARS-COV-2 IGG+IGM SERPL QL IA: POSITIVE
SODIUM SERPL-SCNC: 141 MMOL/L — SIGNIFICANT CHANGE UP (ref 135–145)
WBC # BLD: 5.77 K/UL — SIGNIFICANT CHANGE UP (ref 3.8–10.5)
WBC # FLD AUTO: 5.77 K/UL — SIGNIFICANT CHANGE UP (ref 3.8–10.5)

## 2021-09-30 PROCEDURE — 99233 SBSQ HOSP IP/OBS HIGH 50: CPT

## 2021-09-30 RX ADMIN — LEVETIRACETAM 500 MILLIGRAM(S): 250 TABLET, FILM COATED ORAL at 05:29

## 2021-09-30 RX ADMIN — LAMOTRIGINE 25 MILLIGRAM(S): 25 TABLET, ORALLY DISINTEGRATING ORAL at 17:09

## 2021-09-30 RX ADMIN — LEVETIRACETAM 500 MILLIGRAM(S): 250 TABLET, FILM COATED ORAL at 17:09

## 2021-09-30 RX ADMIN — FAMOTIDINE 20 MILLIGRAM(S): 10 INJECTION INTRAVENOUS at 12:30

## 2021-09-30 NOTE — DISCHARGE NOTE PROVIDER - CARE PROVIDERS DIRECT ADDRESSES
[email or web address]

## 2021-09-30 NOTE — DISCHARGE NOTE PROVIDER - HOSPITAL COURSE
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 Pt admitted to [facility] for new onset seizure in a patient with a history of NPH status post  shunt and ICH right basal ganglia in 2021. In the ER, CT head was negative. Seen by Dr Lopez, started on vEEG, no events were recorded. Pt underwent MRI which revealed ______.  Hospital course complicated by HAGMA    Assessment/Plan:    1. New onset seizure in a patient with a history of NPH status post  shunt and ICH right basal ganglia in 2021  No events recorded on vEEG   MRI brain- seizure protocol pending  Neurosurgery to evaluate  shut post MRI brain  PT evaluation  Continue Lamotrigine and keppra BID  2.  HAGMA most likely due to lactacidemia due to seizure: Improved with IV hydration  DC IV fluids    3.  Hypertensive urgency  on admission: BP improved    4. Elevated procalcitonin on admission; Afebrile, no leukocytosis  Given a dose of IV abx for possible aspiration. Chest xray negative  Asymptomatic  Blood cultures from 09/28 pending  Prelim urine culture positive for GNR  Will hold of IV antibiotics for now unless febrile. FOllow up final cultures    5. PASCALE with CKD stage 3: Stable  Improved with IV fluids    6. Chronic Diastolic CHF: Lasix held for hypovolemia  Resume on discharge    7. DIEGO on Ferrous sulfate PO     8. 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 Pt admitted to [facility] for new onset seizure in a patient with a history of NPH status post  shunt and ICH right basal ganglia in 2021. In the ER, CT head was negative. Seen by Dr Lopez, started on vEEG, no events were recorded. Pt underwent MRI which revealed ______.  NSG will reprogram VPS dial if needed after MRI. Pt also with HAGMA likely d/t lactacidemia 2/2 seizure which improved with IV hydration. Procalcitonin elevated on admission. CXR negative. Ucx+ GNR. Blood cultures pending from 09/28. Pt has remained afebrile and without leukocytosis. Abx therapy not indicated at this time. Pt medically stable at this time pending MRI brain, final blood and urine culture results and PT evaluation.     All electrolyte abnormalities were monitored carefully and repleted as necessary during this hospitalization. At the time of discharge patient was hemodynamically stable and amenable to all terms of discharge. The patient has received verbal instructions from myself regarding discharge plans.     Length of Discharge: 45MIN    Vital Signs Last 24 Hrs  T(C): 36.3 (30 Sep 2021 10:39), Max: 36.4 (30 Sep 2021 05:06)  T(F): 97.4 (30 Sep 2021 10:39), Max: 97.5 (30 Sep 2021 05:06)  HR: 67 (30 Sep 2021 10:39) (55 - 67)  BP: 140/68 (30 Sep 2021 10:24) (125/73 - 140/68)  BP(mean): --  RR: 18 (30 Sep 2021 10:39) (16 - 18)  SpO2: 94% (30 Sep 2021 10:39) (92% - 98%)    PHYSICAL EXAM:  GENERAL: Pt lying in bed comfortably in NAD  HEAD:  Atraumatic   EYES: EOMI, PERRL, conjunctiva and sclera clear  ENT: Moist mucous membranes  NECK: Supple, No JVD  CHEST/LUNG: Clear to auscultation bilaterally; No rales, rhonchi, wheezing or rubs. Unlabored respirations  HEART: Regular rate and rhythm; No murmurs, rubs, or gallops  ABDOMEN: Bowel sounds present; Soft, Nontender, Nondistended. No guarding or rigidity    EXTREMITIES:  2+ Peripheral Pulses, brisk capillary refill. No clubbing, cyanosis, or edema  NERVOUS SYSTEM:  Alert & Oriented X3, speech clear. Answers questions appropriately. Full and equal strength B/L upper and lower extremities. 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 Pt admitted to [facility] for new onset seizure in a patient with a history of NPH status post  shunt and ICH right basal ganglia in 2021. In the ER, CT head was negative. Seen by Dr Lopez, started on vEEG, no events were recorded. Pt underwent MRI which revealed ______.  NSG will reprogram VPS dial if needed after MRI. Pt also with HAGMA likely d/t lactacidemia 2/2 seizure which improved with IV hydration. Procalcitonin elevated on admission. CXR negative. Ucx+ GNR. Blood cultures pending from 09/28. Pt has remained afebrile and without leukocytosis. Abx therapy not indicated at this time. PT recommending home with Home PT. Pt medically stable at this time pending MRI brain, final blood and urine culture results.     All electrolyte abnormalities were monitored carefully and repleted as necessary during this hospitalization. At the time of discharge patient was hemodynamically stable and amenable to all terms of discharge. The patient has received verbal instructions from myself regarding discharge plans.     Length of Discharge: 45MIN    Vital Signs Last 24 Hrs  T(C): 36.3 (30 Sep 2021 10:39), Max: 36.4 (30 Sep 2021 05:06)  T(F): 97.4 (30 Sep 2021 10:39), Max: 97.5 (30 Sep 2021 05:06)  HR: 67 (30 Sep 2021 10:39) (55 - 67)  BP: 140/68 (30 Sep 2021 10:24) (125/73 - 140/68)  BP(mean): --  RR: 18 (30 Sep 2021 10:39) (16 - 18)  SpO2: 94% (30 Sep 2021 10:39) (92% - 98%)    PHYSICAL EXAM:  GENERAL: Pt lying in bed comfortably in NAD  HEAD:  Atraumatic   EYES: EOMI, PERRL, conjunctiva and sclera clear  ENT: Moist mucous membranes  NECK: Supple, No JVD  CHEST/LUNG: Clear to auscultation bilaterally; No rales, rhonchi, wheezing or rubs. Unlabored respirations  HEART: Regular rate and rhythm; No murmurs, rubs, or gallops  ABDOMEN: Bowel sounds present; Soft, Nontender, Nondistended. No guarding or rigidity    EXTREMITIES:  2+ Peripheral Pulses, brisk capillary refill. No clubbing, cyanosis, or edema  NERVOUS SYSTEM:  Alert & Oriented X3, speech clear. Answers questions appropriately. Full and equal strength B/L upper and lower extremities. 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 Pt admitted to [facility] for new onset seizure in a patient with a history of NPH status post  shunt and ICH right basal ganglia in 2021. In the ER, CT head was negative. Seen by Dr Lopez, started on vEEG, no events were recorded. Pt underwent MRI which revealed ______.  NSG will reprogram VPS dial if needed after MRI. Pt also with HAGMA likely d/t lactacidemia 2/2 seizure which improved with IV hydration. Procalcitonin elevated on admission. CXR negative. Ucx+ GNR. Blood cultures pending from 09/28. Pt has remained afebrile and without leukocytosis. Abx therapy not indicated at this time. Pt. is not on home O2 requires 3L NC O2, CXR and ABG requested  PT recommending home with Home PT. Pt medically stable at this time pending MRI brain, final blood and urine culture results.     All electrolyte abnormalities were monitored carefully and repleted as necessary during this hospitalization. At the time of discharge patient was hemodynamically stable and amenable to all terms of discharge. The patient has received verbal instructions from myself regarding discharge plans.     Length of Discharge: 45MIN    Vital Signs Last 24 Hrs  T(C): 36.6 (01 Oct 2021 06:12), Max: 36.6 (01 Oct 2021 06:12)  T(F): 97.8 (01 Oct 2021 06:12), Max: 97.8 (01 Oct 2021 06:12)  HR: 81 (01 Oct 2021 06:12) (81 - 81)  BP: 160/98 (01 Oct 2021 06:12) (160/98 - 160/98)  SpO2: 93% (01 Oct 2021 06:12) (93% - 93%)      PHYSICAL EXAM:  GENERAL: Pt lying in bed comfortably in NAD  HEAD:  Atraumatic   EYES: EOMI, PERRL, conjunctiva and sclera clear  ENT: Moist mucous membranes  NECK: Supple, No JVD  CHEST/LUNG: Clear to auscultation bilaterally; No rales, rhonchi, wheezing or rubs. Unlabored respirations  HEART: Regular rate and rhythm; No murmurs, rubs, or gallops  ABDOMEN: Bowel sounds present; Soft, Nontender, Nondistended. No guarding or rigidity    EXTREMITIES:  2+ Peripheral Pulses, brisk capillary refill. No clubbing, cyanosis, or edema  NERVOUS SYSTEM:  Alert & Oriented X3, speech clear. Answers questions appropriately. Full and equal strength B/L upper and lower extremities. 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 Pt admitted to [facility] for new onset seizure in a patient with a history of NPH status post  shunt and ICH right basal ganglia in 2021. In the ER, CT head was negative. Seen by Dr Lopez, started on vEEG, no events were recorded. 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 Pt also with HAGMA likely d/t lactacidemia 2/2 seizure which improved with IV hydration. Procalcitonin elevated on admission. CXR negative. Ucx+ GNR. Blood cultures pending from 09/28. Pt has remained afebrile and without leukocytosis. Abx therapy not indicated at this time. Pt. is not on home O2 requires 3L NC O2, CXR and ABG without acute findings  PT recommending home with Home PT. Pt medically stable at this time and ready for discharge home    All electrolyte abnormalities were monitored carefully and repleted as necessary during this hospitalization. At the time of discharge patient was hemodynamically stable and amenable to all terms of discharge. The patient has received verbal instructions from myself regarding discharge plans.     Length of Discharge: 45MIN    Vital Signs Last 24 Hrs  T(C): 36.4 (04 Oct 2021 05:15), Max: 36.5 (03 Oct 2021 12:54)  T(F): 97.6 (04 Oct 2021 05:15), Max: 97.7 (03 Oct 2021 12:54)  HR: 70 (04 Oct 2021 08:16) (59 - 78)  BP: 148/73 (04 Oct 2021 08:16) (131/84 - 182/81)  BP(mean): --  RR: 18 (04 Oct 2021 05:15) (18 - 19)  SpO2: 94% (04 Oct 2021 05:15) (94% - 97%)    PHYSICAL EXAM:  GENERAL: Pt lying in bed comfortably in NAD  HEAD:  Atraumatic   EYES: EOMI, PERRL, conjunctiva and sclera clear  ENT: Moist mucous membranes  NECK: Supple, No JVD  CHEST/LUNG: Clear to auscultation bilaterally; No rales, rhonchi, wheezing or rubs. Unlabored respirations  HEART: Regular rate and rhythm; No murmurs, rubs, or gallops  ABDOMEN: Bowel sounds present; Soft, Nontender, Nondistended. No guarding or rigidity    EXTREMITIES:  2+ Peripheral Pulses, brisk capillary refill. No clubbing, cyanosis, or edema  NERVOUS SYSTEM:  Alert & Oriented X3, speech clear. Answers questions appropriately. Full and equal strength B/L upper and lower extremities. 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 Pt admitted to [facility] for new onset seizure in a patient with a history of NPH status post  shunt and ICH right basal ganglia in 2021. In the ER, CT head was negative. Seen by Dr Lopez, started on vEEG, no events were recorded. 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 Pt also with HAGMA likely d/t lactacidemia 2/2 seizure which improved with IV hydration. Procalcitonin elevated on admission. CXR negative. Ucx+ GNR. Blood cultures pending from 09/28. Pt has remained afebrile and without leukocytosis. Abx therapy not indicated at this time. PT recommending home with Home PT. Pt medically stable at this time and ready for discharge home. Patient currently requires O2 on ambulation (drops to 86% on ambulation, improves to 94% with 2LPM of NC). Patient to go home on home O2. Likely due to chronic diastolic heart failure    Length of Discharge: 45MIN    Vital Signs Last 24 Hrs  T(C): 36.4 (04 Oct 2021 05:15), Max: 36.5 (03 Oct 2021 12:54)  T(F): 97.6 (04 Oct 2021 05:15), Max: 97.7 (03 Oct 2021 12:54)  HR: 70 (04 Oct 2021 08:16) (59 - 78)  BP: 148/73 (04 Oct 2021 08:16) (131/84 - 182/81)  BP(mean): --  RR: 18 (04 Oct 2021 05:15) (18 - 19)  SpO2: 94% (04 Oct 2021 05:15) (94% - 97%)    PHYSICAL EXAM:  GENERAL: Pt lying in bed comfortably in NAD  HEAD:  Atraumatic   EYES: EOMI, PERRL, conjunctiva and sclera clear  ENT: Moist mucous membranes  NECK: Supple, No JVD  CHEST/LUNG: Clear to auscultation bilaterally; No rales, rhonchi, wheezing or rubs. Unlabored respirations  HEART: Regular rate and rhythm; No murmurs, rubs, or gallops  ABDOMEN: Bowel sounds present; Soft, Nontender, Nondistended. No guarding or rigidity    EXTREMITIES:  2+ Peripheral Pulses, brisk capillary refill. No clubbing, cyanosis, or edema  NERVOUS SYSTEM:  Alert & Oriented X3, speech clear. Answers questions appropriately. Full and equal strength B/L upper and lower extremities. No deficits   MSK: FROM x 4 extremities   SKIN: No rashes or lesions

## 2021-09-30 NOTE — DISCHARGE NOTE PROVIDER - NSDCMRMEDTOKEN_GEN_ALL_CORE_FT
Abilify 5 mg oral tablet: 1 tab(s) orally once a day (at bedtime)  atorvastatin 40 mg oral tablet: 1 tab(s) orally once a day  buPROPion 100 mg/12 hours (SR) oral tablet, extended release: 1 tab(s) orally 2 times a day  Calmoseptine 0.44%-20.6% topical ointment: Apply topically to affected area 2 times a day to bilateral buttocks  ferrous sulfate 325 mg (65 mg elemental iron) oral tablet: 1 tab(s) orally once a day  folic acid 1 mg oral tablet: 1 tab(s) orally once a day  furosemide 20 mg oral tablet: 1 tab(s) orally once a day  hydrALAZINE 25 mg oral tablet: 1 tab(s) orally every 8 hours  losartan 25 mg oral tablet: 1 tab(s) orally once a day  omeprazole 40 mg oral delayed release capsule: 1 cap(s) orally once a day  Tylenol 500 mg oral tablet: 2 tab(s) orally every 12 hours, As Needed  Vitamin B-100 oral tablet: 1 tab(s) orally once a day  Vitamin D2: 1 tab(s) orally once a day   Abilify 5 mg oral tablet: 1 tab(s) orally once a day (at bedtime)  atorvastatin 40 mg oral tablet: 1 tab(s) orally once a day  ferrous sulfate 325 mg (65 mg elemental iron) oral tablet: 1 tab(s) orally once a day  folic acid 1 mg oral tablet: 1 tab(s) orally once a day  furosemide 20 mg oral tablet: 1 tab(s) orally every other day (at bedtime)  isosorbide mononitrate 60 mg oral tablet, extended release: 1 tab(s) orally once a day  lamoTRIgine 25 mg oral tablet: 1 tab(s) orally once a day (at bedtime)   levETIRAcetam 500 mg oral tablet: 1 tab(s) orally 2 times a day  losartan 25 mg oral tablet: 1 tab(s) orally once a day  metoprolol succinate 50 mg oral tablet, extended release: 1 tab(s) orally once a day  omeprazole 40 mg oral delayed release capsule: 1 cap(s) orally once a day  Tylenol 500 mg oral tablet: 2 tab(s) orally every 12 hours, As Needed  Vitamin B-100 oral tablet: 1 tab(s) orally once a day  Vitamin D2: 1 tab(s) orally once a day

## 2021-09-30 NOTE — DISCHARGE NOTE PROVIDER - PROVIDER TOKENS
PROVIDER:[TOKEN:[[id number]],FOLLOWUP:[1 week]],PROVIDER:[TOKEN:[[id number]],FOLLOWUP:[1 week]],FREE:[LAST:[PCP],PHONE:[(   )    -],FAX:[(   )    -],FOLLOWUP:[1 week]]

## 2021-09-30 NOTE — PROGRESS NOTE ADULT - SUBJECTIVE AND OBJECTIVE BOX
[facility]                                                                     MD Mohini Luna,                                               Epilepsy Consult #: [id number] ([phone])                                               Office: [address] [place] [address], [address]                                                 Phone: [phone]; Fax: [phone]                            ==============================================    EPILEPSY FOLLOW-UP NOTE      INTERVAL HISTORY:  Much more awake and alert this morning. No seizure overnight.     MEDICATIONS  (STANDING):  dextrose 40% Gel 15 Gram(s) Oral once  dextrose 5%. 1000 milliLiter(s) (50 mL/Hr) IV Continuous <Continuous>  dextrose 5%. 1000 milliLiter(s) (100 mL/Hr) IV Continuous <Continuous>  dextrose 50% Injectable 25 Gram(s) IV Push once  dextrose 50% Injectable 12.5 Gram(s) IV Push once  dextrose 50% Injectable 25 Gram(s) IV Push once  famotidine Injectable 20 milliGRAM(s) IV Push daily  glucagon  Injectable 1 milliGRAM(s) IntraMuscular once  insulin lispro (ADMELOG) corrective regimen sliding scale   SubCutaneous three times a day before meals  lactated ringers. 1000 milliLiter(s) (100 mL/Hr) IV Continuous <Continuous>  lamoTRIgine 25 milliGRAM(s) Oral <User Schedule>  levETIRAcetam 500 milliGRAM(s) Oral two times a day    Vital Signs Last 24 Hrs  T(C): 36.3 (30 Sep 2021 10:39), Max: 36.4 (30 Sep 2021 05:06)  T(F): 97.4 (30 Sep 2021 10:39), Max: 97.5 (30 Sep 2021 05:06)  HR: 67 (30 Sep 2021 10:39) (55 - 67)  BP: 140/68 (30 Sep 2021 10:24) (125/73 - 140/68)  BP(mean): --  RR: 18 (30 Sep 2021 10:39) (16 - 18)  SpO2: 94% (30 Sep 2021 10:39) (92% - 98%)    GENERAL PHYSICAL EXAM:  GEN: no distress   HEENT: NCAT, OP clear  EYES: sclera white, conjunctiva clear, no nystagmus  NECK: supple  CV: RRR, no murmur     		  PULM: CTAB, no wheezing  ABD: soft, +BS, NT  EXT: peripheral pulse intact, no cyanosis  MSK: muscle tone and strength normal  SKIN: warm, dry    NEUROLOGICAL EXAM:  Mental Status  Orientation: alert and oriented   Language: clear and fluent    Cranial Nerves  II: full visual fields intact   III, IV, VI: PERRL, EOMI  V, VII: facial sensation and movement intact and symmetric   VIII: hearing intact   IX, X: uvula midline, soft palate elevates normally   XI: BL shoulder shrug intact   XII: tongue midline    Motor  4+/5 BUE and BLE                 Tone and bulk are normal in upper and lower limbs  No pronator drift    Sensation  Intact to light touch and pinprick in all 4 EXTs    Reflex  2+ in BL biceps and patella                                    Plantar responses downward bilaterally    Coordination  Normal FTN bilaterally    Gait  Deferred       LABS:                          9.7    5.77  )-----------( 201      ( 30 Sep 2021 06:43 )             29.7     -    141  |  101  |  21.4<H>  ----------------------------<  101<H>  3.8   |  27.0  |  1.31<H>    Ca    6.9<L>      30 Sep 2021 06:43    TPro  6.6  /  Alb  3.4  /  TBili  <0.2<L>  /  DBili  x   /  AST  18  /  ALT  12  /  AlkPhos  84  -    CAPILLARY BLOOD GLUCOSE      POCT Blood Glucose.: 130 mg/dL (30 Sep 2021 11:12)  POCT Blood Glucose.: 100 mg/dL (30 Sep 2021 07:49)  POCT Blood Glucose.: 124 mg/dL (30 Sep 2021 01:32)  POCT Blood Glucose.: 92 mg/dL (29 Sep 2021 17:10)    LIVER FUNCTIONS - ( 28 Sep 2021 23:56 )  Alb: 3.4 g/dL / Pro: 6.6 g/dL / ALK PHOS: 84 U/L / ALT: 12 U/L / AST: 18 U/L / GGT: x             Urinalysis Basic - ( 28 Sep 2021 22:25 )    Color: Yellow / Appearance: Clear / S.015 / pH: x  Gluc: x / Ketone: Negative  / Bili: Negative / Urobili: Negative   Blood: x / Protein: 100 / Nitrite: Positive   Leuk Esterase: Small / RBC: 0-2 /HPF / WBC 6-10   Sq Epi: x / Non Sq Epi: Occasional / Bacteria: Few        OTHER IMAGING AND STUDIES:    non-elective EMU  - 21:  EEG Summary:  EEG in the awake, drowsy and asleep states.  - A single sharply contoured waveform in the left temporal (max F8), equivocal for epileptiform.    Impression/Clinical Correlate:   – : No events or seizures were recorded.    During this EMU admission, no events or seizures were recorded. A single possible left temporal sharply contoured waveform, equivocal for epileptiform.

## 2021-09-30 NOTE — PROGRESS NOTE ADULT - ASSESSMENT
This is a 81y Female with a history of dCHF, NIDDM, HTN, HLD, NPH s/p  shunt 2015 (neurologist Dr. Bowman, NSG Dr. Haskins), ICH (small right basal ganglia hemorrhage 5/2021), DVT (off AC with neg doppler), hx Klebsiella UTI, hx Pneumonia, glaucoma, Bipolar, depression who presented with AMS, followed by witnessed bilateral tonic clonic seizure in ER. Personally reviewed all imagines, labs and other studies.    Impression:  1. Initial AMS: Much improved. Suspect unwitnessed bilateral tonic clonic seizure at home and found by family in postictal state.  2. 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 Start lamotrigine (both for seizure and psych), bridge with levetiracetam.  3. Aspiration pna  4. Acute on CKD  5. NPH s/p VPS  6. dCHF, NIDDM, HTN, HLD, bipolar, depression    Recommendation:  - discontinue cvEEG   - continue lamotrigine 25mg QHS and slowly up-titrate outpatient - both for seizure and depression/anxiety  - bridge with levetiracetam 500mg BID while up-titrating lamotrigine  - MRI brain w/o epilepsy protocol  - NSG will reprogram VPS dial if needed after MRI  - seizure precaution  - medical management and support care per primary team       Will continue to follow with you.   ______________________  Natan oLpez MD   Director, Epilepsy/EMU - [facility]   Epilepsy Consult #: [phone] ([phone])

## 2021-09-30 NOTE — PHYSICAL THERAPY INITIAL EVALUATION ADULT - ADDITIONAL COMMENTS
owns and uses a RW, lives c son who assists prn throughout the day, flight of stairs 1 rail to enter home, no stairs within home

## 2021-09-30 NOTE — DISCHARGE NOTE PROVIDER - NSDCCPCAREPLAN_GEN_ALL_CORE_FT
PRINCIPAL DISCHARGE DIAGNOSIS  Diagnosis: Seizure  Assessment and Plan of Treatment: - Continue with keppra and lamictal   - No events recorded on vEEG  - Follow up with Epilepsy in 1 week      SECONDARY DISCHARGE DIAGNOSES  Diagnosis: S/P  shunt  Assessment and Plan of Treatment: - Follow up with Neurosurgery in 1 week    Diagnosis: High anion gap metabolic acidosis  Assessment and Plan of Treatment: - Resolved    Diagnosis: Hypertensive urgency  Assessment and Plan of Treatment: - Resolved    Diagnosis: Elevated procalcitonin  Assessment and Plan of Treatment: - Follow up with PCP in 1 week     PRINCIPAL DISCHARGE DIAGNOSIS  Diagnosis: Seizure  Assessment and Plan of Treatment: Continue with keppra and lamictal   No events recorded on vEEG  Follow up with Epilepsy in 1 week      SECONDARY DISCHARGE DIAGNOSES  Diagnosis: Hypertensive urgency  Assessment and Plan of Treatment: Continue with losartan, metoprolol, Imdur    Diagnosis: Elevated procalcitonin  Assessment and Plan of Treatment: Follow up with PCP in 1 week    Diagnosis: S/P  shunt  Assessment and Plan of Treatment: Follow up with Neurosurgery in 1 week

## 2021-09-30 NOTE — PHYSICAL THERAPY INITIAL EVALUATION ADULT - GAIT PATTERN USED, PT EVAL
decreased gait velocity and activity tolerance, decreased kasey step length, supervision for safety due to fatigue

## 2021-09-30 NOTE — PROGRESS NOTE ADULT - ASSESSMENT
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  In the ER, CT head was negative. Seen by Dr Lopez, started on vEEG, no events were recorded.     Assessment/Plan:    1. New onset seizure in a patient with a history of NPH status post  shunt and ICH right basal ganglia in 2021  No events recorded on vEEG   MRI brain- seizure protocol pending  Neurosurgery to evaluate  shut post MRI brain  PT evaluation  Continue Lamotrigine and keppra BID  2.  HAGMA most likely due to lactacidemia due to seizure: Improved with IV hydration  DC IV fluids    3.  Hypertensive urgency  on admission: BP improved    4. Elevated procalcitonin on admission; Afebrile, no leukocytosis  Given a dose of IV abx for possible aspiration. Chest xray negative  Asymptomatic  Blood cultures from 09/28 pending  Prelim urine culture positive for GNR  Will hold of IV antibiotics for now unless febrile. FOllow up final cultures    5. PASCALE with CKD stage 3: Stable  Improved with IV fluids    6. Chronic Diastolic CHF: Lasix held for hypovolemia  Resume on discharge    7. DIEGO on Ferrous sulfate PO     8. Diabetes mellitus type 2: Admelog sliding scale  Monitor BSL    VTE- Heparin Subcut    Code Status; Full Code    Next of Kin; Son Thomas [phone], attempted to call with no answer.   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  In the ER, CT head was negative. Seen by Dr Lopez, started on vEEG, no events were recorded.     Assessment/Plan:    1. New onset seizure in a patient with a history of NPH status post  shunt and ICH right basal ganglia in 2021  No events recorded on vEEG   MRI brain- seizure protocol pending  Neurosurgery to evaluate  shut post MRI brain  PT evaluation  Continue Lamotrigine and keppra BID  2.  HAGMA most likely due to lactacidemia due to seizure: Improved with IV hydration  DC IV fluids    3.  Hypertensive urgency  on admission: BP improved    4. Elevated procalcitonin on admission; Afebrile, no leukocytosis  Given a dose of IV abx for possible aspiration. Chest xray negative  Asymptomatic  Blood cultures from 09/28 pending  Prelim urine culture positive for GNR  Will hold of IV antibiotics for now unless febrile. FOllow up final cultures    5. PASCALE with CKD stage 3: Stable  Improved with IV fluids    6. Chronic Diastolic CHF: Lasix held for hypovolemia  Resume on discharge    7. DIEGO on Ferrous sulfate PO     8. Diabetes mellitus type 2: Admelog sliding scale  Monitor BSL    VTE- Heparin Subcut    Code Status; Full Code    Next of Kin; Son Thomas [phone] updated on plan of care and patient's condition    PT Evaluation    Discharge disposition; LIkely home pending MRI brain, final blood and urine culture results and PT evaluation

## 2021-09-30 NOTE — PROGRESS NOTE ADULT - SUBJECTIVE AND OBJECTIVE BOX
CC: Follow up    INTERVAL HPI/OVERNIGHT EVENTS: Patient seen and examined, feels well this morning. No acute complaints overnight. Denies headache, nausea or vomiting. Denies sob, chest pain cough or palpitations.       Vital Signs Last 24 Hrs  T(C): 36.4 (30 Sep 2021 05:06), Max: 36.5 (29 Sep 2021 11:22)  T(F): 97.5 (30 Sep 2021 05:06), Max: 97.7 (29 Sep 2021 11:22)  HR: 63 (30 Sep 2021 05:06) (55 - 77)  BP: 125/73 (30 Sep 2021 05:06) (125/73 - 143/82)  BP(mean): --  RR: 16 (30 Sep 2021 05:06) (16 - 18)  SpO2: 92% (30 Sep 2021 05:06) (92% - 98%)    PHYSICAL EXAM:    GENERAL: NAD, AOX3  HEAD:  EEG leads  EYES:  conjunctiva and sclera clear  ENMT: Moist mucous membranes  NECK: Supple, No JVD  CHEST/LUNG: Clear to auscultation bilaterally; No rales, rhonchi, wheezing, or rubs  HEART: Regular rate and rhythm; No murmurs, rubs, or gallops  ABDOMEN: Soft, Nontender, Nondistended; Bowel sounds present  EXTREMITIES:  2+ Peripheral Pulses, No clubbing, cyanosis, or edema        MEDICATIONS  (STANDING):  dextrose 40% Gel 15 Gram(s) Oral once  dextrose 5%. 1000 milliLiter(s) (50 mL/Hr) IV Continuous <Continuous>  dextrose 5%. 1000 milliLiter(s) (100 mL/Hr) IV Continuous <Continuous>  dextrose 50% Injectable 25 Gram(s) IV Push once  dextrose 50% Injectable 12.5 Gram(s) IV Push once  dextrose 50% Injectable 25 Gram(s) IV Push once  famotidine Injectable 20 milliGRAM(s) IV Push daily  glucagon  Injectable 1 milliGRAM(s) IntraMuscular once  insulin lispro (ADMELOG) corrective regimen sliding scale   SubCutaneous three times a day before meals  lactated ringers. 1000 milliLiter(s) (100 mL/Hr) IV Continuous <Continuous>  lamoTRIgine 25 milliGRAM(s) Oral <User Schedule>  levETIRAcetam 500 milliGRAM(s) Oral two times a day    MEDICATIONS  (PRN):  aluminum hydroxide/magnesium hydroxide/simethicone Suspension 30 milliLiter(s) Oral every 4 hours PRN Dyspepsia  labetalol Injectable 10 milliGRAM(s) IV Push every 6 hours PRN Systolic blood pressure >180  LORazepam   Injectable 2 milliGRAM(s) IV Push every 2 hours PRN seizure  ondansetron Injectable 4 milliGRAM(s) IV Push every 8 hours PRN Nausea and/or Vomiting      Allergies    No Known Allergies    Intolerances          LABS:                          9.7    5.77  )-----------( 201      ( 30 Sep 2021 06:43 )             29.7         141  |  101  |  21.4<H>  ----------------------------<  101<H>  3.8   |  27.0  |  1.31<H>    Ca    6.9<L>      30 Sep 2021 06:43    TPro  6.6  /  Alb  3.4  /  TBili  <0.2<L>  /  DBili  x   /  AST  18  /  ALT  12  /  AlkPhos  84        Urinalysis Basic - ( 28 Sep 2021 22:25 )    Color: Yellow / Appearance: Clear / S.015 / pH: x  Gluc: x / Ketone: Negative  / Bili: Negative / Urobili: Negative   Blood: x / Protein: 100 / Nitrite: Positive   Leuk Esterase: Small / RBC: 0-2 /HPF / WBC 6-10   Sq Epi: x / Non Sq Epi: Occasional / Bacteria: Few        RADIOLOGY & ADDITIONAL TESTS:

## 2021-09-30 NOTE — EEG REPORT - NS EEG TEXT BOX
[facility] Epilepsy Monitoring Unit Report  [facility]: [address] , [place] [address], Phone [phone] [facility]: [phone] [place], [place] [address], [place] [address], Phone [phone] [place] [facility]: [address], [address], [place] [address] Phone [phone]  [facility]: [address] [place] [address], Phone [phone] [place] Office: [address] [place] [address], Phone [phone]  Patient Name: Jesenia Wills  (Margarita Duff,  1940) Age: 81 year, : 1940 Patient ID: -, MRN #: -, Sánchez: -  Physician Ordering Inpatient EEG: Natan Lopez Referral Source to EMU: non-elective admission – from ER  EMU Study Started: 15:21 on 21   EMU Study Ended: 06:18 on 21  Study Information:  EEG Recording Technique: The patient underwent continuous Video-EEG monitoring, using Telemetry System hardware on the XLTek Digital System. EEG and video data were stored on a computer hard drive with important events saved in digital archive files. The material was reviewed by a physician (electroencephalographer / epileptologist) on a daily basis. Hector and seizure detection algorithms were utilized and reviewed. An EEG Technician attended to the patient, and was available throughout daytime work hours.  The epilepsy center neurologist was available in person or on call 24-hours per day.  EEG Placement and Labeling of Electrodes: The EEG was performed utilizing 20 channel referential EEG connections (coronal over temporal over parasagittal montage) using all standard 10-20 electrode placements with EKG, with additional electrodes placed in the inferior temporal region using the modified 10-10 montage electrode placements for elective admissions, or if deemed necessary. Recording was at a sampling rate of 256 samples per second per channel. Time synchronized digital video recording was done simultaneously with EEG recording. A low light infrared camera was used for low light recording.     History: This is a 81y Female with a history of dCHF, NIDDM, HTN, HLD, NPH s/p  shunt 2015 [place]. [place] [facility] [place] ICH (small right basal ganglia hemorrhage 2021), DVT (off AC with neg doppler), hx Klebsiella UTI, hx Pneumonia, glaucoma, Bipolar, depression who presented with AMS, followed by witnessed bilateral tonic clonic seizure in ER.   Initially presented with AMS, then had a witnessed bilateral tonic clonic seizure in ER. Now on levetiracetam 1000mg BID. CTH reveals gliosis in the right parietal lobe and encephalomalacia within the left occipital lobe, which may be potential epileptogenic zones.  SEIZURE RISK FACTORS: Gliosis in the right parietal lobe. Encephalomalacia within the left occipital lobe and cerebellum.   CURRENT AED: levetiracetam 1000mg BID - started 21  PREVIOUS AED: none  IMAGING:  CTH 21 ([facility]): Gliosis in the right parietal lobe. Encephalomalacia within the left occipital lobe and cerebellum. Right parietal approach  shunt with tip located at the left lateral ventricle.   NEUROPHYSIOLOGY: none  NEUROPSYCHOLOGY:  none  Home Antiepileptic Medication and Device none  Interpretation:  Start Date: 2021 – Day 1                                Start Time – 15:21       Duration – approx. 14h 30m  Daily EEG Visual Analysis Findings: The background was continuous and reactive. During wakefulness, the posterior dominant rhythm consisted of symmetric, well-modulated 8-9 Hz activity, with amplitude to 30 uV, that attenuated to eye opening.   Background Slowing: No generalized background slowing was present.  Focal Slowing:  None were present.  Sleep Background: Drowsiness was characterized by fragmentation, attenuation, and slowing of the background activity.   Sleep was characterized by the presence of vertex waves, symmetric sleep spindles and K-complexes.  Other Findings: A single sharply contoured waveform in the left temporal (max F8), equivocal for epileptiform.  BP, 7uV    AVG, 7uV    Events: No events or seizures recorded.  Artifacts: Intermittent myogenic and movement artifacts were noted.  ECG: The heart rate on single channel ECG was predominantly between 60-70 BPM.  AEDs: LTG 25mg QHS, LEV 1000/500mg  EEG Summary: EEG in the awake, drowsy and asleep states. - A single sharply contoured waveform in the left temporal (max F8), equivocal for epileptiform.  Impression/Clinical Correlate:  – : No events or seizures were recorded.  During this EMU admission, no events or seizures were recorded. A single possible left temporal sharply contoured waveform, equivocal for epileptiform.   ________________________________________  Natan Lopez MD Director, Epilepsy/EMU - [facility]

## 2021-09-30 NOTE — DISCHARGE NOTE PROVIDER - CARE PROVIDER_API CALL
Natan Lopez)  EEGEpilepsy; Neurology  [address] [place] [address]  Phone: [phone]  Fax: [phone]  Follow Up Time: 1 week    Julio Cesar Patterson)  Neurosurgery  [address] [place] [address]  Phone: [phone]  Fax: [phone]  Follow Up Time: 1 week    PCP,   Phone: (   )    -  Fax: (   )    -  Follow Up Time: 1 week

## 2021-10-01 LAB
-  AMIKACIN: SIGNIFICANT CHANGE UP
-  AMOXICILLIN/CLAVULANIC ACID: SIGNIFICANT CHANGE UP
-  AMPICILLIN/SULBACTAM: SIGNIFICANT CHANGE UP
-  AMPICILLIN: SIGNIFICANT CHANGE UP
-  AZTREONAM: SIGNIFICANT CHANGE UP
-  CEFAZOLIN: SIGNIFICANT CHANGE UP
-  CEFEPIME: SIGNIFICANT CHANGE UP
-  CEFOXITIN: SIGNIFICANT CHANGE UP
-  CEFTRIAXONE: SIGNIFICANT CHANGE UP
-  CIPROFLOXACIN: SIGNIFICANT CHANGE UP
-  ERTAPENEM: SIGNIFICANT CHANGE UP
-  GENTAMICIN: SIGNIFICANT CHANGE UP
-  IMIPENEM: SIGNIFICANT CHANGE UP
-  LEVOFLOXACIN: SIGNIFICANT CHANGE UP
-  MEROPENEM: SIGNIFICANT CHANGE UP
-  NITROFURANTOIN: SIGNIFICANT CHANGE UP
-  PIPERACILLIN/TAZOBACTAM: SIGNIFICANT CHANGE UP
-  TIGECYCLINE: SIGNIFICANT CHANGE UP
-  TOBRAMYCIN: SIGNIFICANT CHANGE UP
-  TRIMETHOPRIM/SULFAMETHOXAZOLE: SIGNIFICANT CHANGE UP
ANION GAP SERPL CALC-SCNC: 14 MMOL/L — SIGNIFICANT CHANGE UP (ref 5–17)
BASE EXCESS BLDA CALC-SCNC: 4.5 MMOL/L — HIGH (ref -2–3)
BLOOD GAS COMMENTS ARTERIAL: SIGNIFICANT CHANGE UP
BUN SERPL-MCNC: 13.3 MG/DL — SIGNIFICANT CHANGE UP (ref 8–20)
CALCIUM SERPL-MCNC: 7.8 MG/DL — LOW (ref 8.6–10.2)
CHLORIDE SERPL-SCNC: 103 MMOL/L — SIGNIFICANT CHANGE UP (ref 98–107)
CO2 SERPL-SCNC: 25 MMOL/L — SIGNIFICANT CHANGE UP (ref 22–29)
CREAT SERPL-MCNC: 0.99 MG/DL — SIGNIFICANT CHANGE UP (ref 0.5–1.3)
CULTURE RESULTS: SIGNIFICANT CHANGE UP
D DIMER BLD IA.RAPID-MCNC: 571 NG/ML DDU — HIGH
GAS PNL BLDA: SIGNIFICANT CHANGE UP
GLUCOSE BLDC GLUCOMTR-MCNC: 119 MG/DL — HIGH (ref 70–99)
GLUCOSE BLDC GLUCOMTR-MCNC: 137 MG/DL — HIGH (ref 70–99)
GLUCOSE SERPL-MCNC: 126 MG/DL — HIGH (ref 70–99)
HCO3 BLDA-SCNC: 30 MMOL/L — HIGH (ref 21–28)
HCT VFR BLD CALC: 32.8 % — LOW (ref 34.5–45)
HGB BLD-MCNC: 10.3 G/DL — LOW (ref 11.5–15.5)
HOROWITZ INDEX BLDA+IHG-RTO: SIGNIFICANT CHANGE UP
LACTATE SERPL-SCNC: 1 MMOL/L — SIGNIFICANT CHANGE UP (ref 0.5–2)
MCHC RBC-ENTMCNC: 28.5 PG — SIGNIFICANT CHANGE UP (ref 27–34)
MCHC RBC-ENTMCNC: 31.4 GM/DL — LOW (ref 32–36)
MCV RBC AUTO: 90.6 FL — SIGNIFICANT CHANGE UP (ref 80–100)
METHOD TYPE: SIGNIFICANT CHANGE UP
NT-PROBNP SERPL-SCNC: HIGH PG/ML (ref 0–300)
ORGANISM # SPEC MICROSCOPIC CNT: SIGNIFICANT CHANGE UP
ORGANISM # SPEC MICROSCOPIC CNT: SIGNIFICANT CHANGE UP
PCO2 BLDA: 50 MMHG — HIGH (ref 32–35)
PH BLDA: 7.38 — SIGNIFICANT CHANGE UP (ref 7.35–7.45)
PLATELET # BLD AUTO: 212 K/UL — SIGNIFICANT CHANGE UP (ref 150–400)
PO2 BLDA: 87 MMHG — SIGNIFICANT CHANGE UP (ref 83–108)
POTASSIUM SERPL-MCNC: 4.1 MMOL/L — SIGNIFICANT CHANGE UP (ref 3.5–5.3)
POTASSIUM SERPL-SCNC: 4.1 MMOL/L — SIGNIFICANT CHANGE UP (ref 3.5–5.3)
RBC # BLD: 3.62 M/UL — LOW (ref 3.8–5.2)
RBC # FLD: 14.9 % — HIGH (ref 10.3–14.5)
SAO2 % BLDA: 98.7 % — HIGH (ref 94–98)
SODIUM SERPL-SCNC: 142 MMOL/L — SIGNIFICANT CHANGE UP (ref 135–145)
SPECIMEN SOURCE: SIGNIFICANT CHANGE UP
T3 SERPL-MCNC: 81 NG/DL — SIGNIFICANT CHANGE UP (ref 80–200)
T4 AB SER-ACNC: 7.9 UG/DL — SIGNIFICANT CHANGE UP (ref 4.5–12)
TSH SERPL-MCNC: 3.62 UIU/ML — SIGNIFICANT CHANGE UP (ref 0.27–4.2)
WBC # BLD: 5.61 K/UL — SIGNIFICANT CHANGE UP (ref 3.8–10.5)
WBC # FLD AUTO: 5.61 K/UL — SIGNIFICANT CHANGE UP (ref 3.8–10.5)

## 2021-10-01 PROCEDURE — 71045 X-RAY EXAM CHEST 1 VIEW: CPT | Mod: 26

## 2021-10-01 PROCEDURE — 93970 EXTREMITY STUDY: CPT | Mod: 26

## 2021-10-01 PROCEDURE — 99233 SBSQ HOSP IP/OBS HIGH 50: CPT

## 2021-10-01 PROCEDURE — 76377 3D RENDER W/INTRP POSTPROCES: CPT | Mod: 26

## 2021-10-01 PROCEDURE — 70551 MRI BRAIN STEM W/O DYE: CPT | Mod: 26

## 2021-10-01 RX ORDER — ACETAMINOPHEN 500 MG
325 TABLET ORAL EVERY 12 HOURS
Refills: 0 | Status: DISCONTINUED | OUTPATIENT
Start: 2021-10-01 | End: 2021-10-05

## 2021-10-01 RX ORDER — ARIPIPRAZOLE 15 MG/1
5 TABLET ORAL DAILY
Refills: 0 | Status: DISCONTINUED | OUTPATIENT
Start: 2021-10-01 | End: 2021-10-05

## 2021-10-01 RX ORDER — FOLIC ACID 0.8 MG
1 TABLET ORAL DAILY
Refills: 0 | Status: DISCONTINUED | OUTPATIENT
Start: 2021-10-01 | End: 2021-10-05

## 2021-10-01 RX ORDER — LAMOTRIGINE 25 MG/1
1 TABLET, ORALLY DISINTEGRATING ORAL
Qty: 30 | Refills: 0
Start: 2021-10-01 | End: 2021-10-30

## 2021-10-01 RX ORDER — LEVETIRACETAM 250 MG/1
1 TABLET, FILM COATED ORAL
Qty: 60 | Refills: 0
Start: 2021-10-01 | End: 2021-10-30

## 2021-10-01 RX ORDER — HEPARIN SODIUM 5000 [USP'U]/ML
5000 INJECTION INTRAVENOUS; SUBCUTANEOUS EVERY 12 HOURS
Refills: 0 | Status: DISCONTINUED | OUTPATIENT
Start: 2021-10-01 | End: 2021-10-05

## 2021-10-01 RX ORDER — ATORVASTATIN CALCIUM 80 MG/1
40 TABLET, FILM COATED ORAL AT BEDTIME
Refills: 0 | Status: DISCONTINUED | OUTPATIENT
Start: 2021-10-01 | End: 2021-10-05

## 2021-10-01 RX ORDER — FERROUS SULFATE 325(65) MG
325 TABLET ORAL DAILY
Refills: 0 | Status: DISCONTINUED | OUTPATIENT
Start: 2021-10-01 | End: 2021-10-05

## 2021-10-01 RX ORDER — FUROSEMIDE 40 MG
40 TABLET ORAL ONCE
Refills: 0 | Status: COMPLETED | OUTPATIENT
Start: 2021-10-01 | End: 2021-10-01

## 2021-10-01 RX ADMIN — LEVETIRACETAM 500 MILLIGRAM(S): 250 TABLET, FILM COATED ORAL at 17:32

## 2021-10-01 RX ADMIN — ATORVASTATIN CALCIUM 40 MILLIGRAM(S): 80 TABLET, FILM COATED ORAL at 23:28

## 2021-10-01 RX ADMIN — LAMOTRIGINE 25 MILLIGRAM(S): 25 TABLET, ORALLY DISINTEGRATING ORAL at 17:32

## 2021-10-01 RX ADMIN — HEPARIN SODIUM 5000 UNIT(S): 5000 INJECTION INTRAVENOUS; SUBCUTANEOUS at 17:33

## 2021-10-01 RX ADMIN — Medication 40 MILLIGRAM(S): at 14:21

## 2021-10-01 RX ADMIN — LEVETIRACETAM 500 MILLIGRAM(S): 250 TABLET, FILM COATED ORAL at 06:32

## 2021-10-01 RX ADMIN — FAMOTIDINE 20 MILLIGRAM(S): 10 INJECTION INTRAVENOUS at 14:25

## 2021-10-01 NOTE — PROGRESS NOTE ADULT - SUBJECTIVE AND OBJECTIVE BOX
CC: Follow up    INTERVAL HPI/OVERNIGHT EVENTS: Patient seen and examined, spo2 on room air at rest in the 70s overnight, improved with 3 liters NC. Not o2 dependent. Denies sob, chest pain or cough.       Vital Signs Last 24 Hrs  T(C): 36.6 (01 Oct 2021 06:12), Max: 36.6 (01 Oct 2021 06:12)  T(F): 97.8 (01 Oct 2021 06:12), Max: 97.8 (01 Oct 2021 06:12)  HR: 81 (01 Oct 2021 06:12) (81 - 81)  BP: 160/98 (01 Oct 2021 06:12) (160/98 - 160/98)  BP(mean): --  RR: --  SpO2: 93% (01 Oct 2021 06:12) (93% - 93%)    PHYSICAL EXAM:    GENERAL: NAD,AOX3  HEAD:  Atraumatic, Normocephalic  EYES: conjunctiva and sclera clear  ENMT: Moist mucous membranes  NECK: Supple, No JVD  CHEST/LUNG: Clear to auscultation bilaterally; No rales, rhonchi, wheezing, or rubs  HEART: Regular rate and rhythm; No murmurs, rubs, or gallops  ABDOMEN: Soft, Nontender, Nondistended; Bowel sounds present  EXTREMITIES:  2+ Peripheral Pulses, No clubbing, cyanosis, or edema        MEDICATIONS  (STANDING):  dextrose 40% Gel 15 Gram(s) Oral once  dextrose 5%. 1000 milliLiter(s) (50 mL/Hr) IV Continuous <Continuous>  dextrose 5%. 1000 milliLiter(s) (100 mL/Hr) IV Continuous <Continuous>  dextrose 50% Injectable 25 Gram(s) IV Push once  dextrose 50% Injectable 12.5 Gram(s) IV Push once  dextrose 50% Injectable 25 Gram(s) IV Push once  famotidine Injectable 20 milliGRAM(s) IV Push daily  glucagon  Injectable 1 milliGRAM(s) IntraMuscular once  insulin lispro (ADMELOG) corrective regimen sliding scale   SubCutaneous three times a day before meals  lamoTRIgine 25 milliGRAM(s) Oral <User Schedule>  levETIRAcetam 500 milliGRAM(s) Oral two times a day    MEDICATIONS  (PRN):  aluminum hydroxide/magnesium hydroxide/simethicone Suspension 30 milliLiter(s) Oral every 4 hours PRN Dyspepsia  labetalol Injectable 10 milliGRAM(s) IV Push every 6 hours PRN Systolic blood pressure >180  LORazepam   Injectable 2 milliGRAM(s) IV Push every 2 hours PRN seizure  ondansetron Injectable 4 milliGRAM(s) IV Push every 8 hours PRN Nausea and/or Vomiting      Allergies    No Known Allergies    Intolerances          LABS:                          9.7    5.77  )-----------( 201      ( 30 Sep 2021 06:43 )             29.7     09-30    141  |  101  |  21.4<H>  ----------------------------<  101<H>  3.8   |  27.0  |  1.31<H>    Ca    6.9<L>      30 Sep 2021 06:43            RADIOLOGY & ADDITIONAL TESTS:

## 2021-10-01 NOTE — PROGRESS NOTE ADULT - SUBJECTIVE AND OBJECTIVE BOX
[facility]                                                                     MD Mohini Luna DO                                              Epilepsy Consult #: [phone] ([phone])                                               Office: [address] [place] [address], [address]                                                 Phone: [phone]; Fax: [phone]                            ==============================================    EPILEPSY FOLLOW-UP NOTE      INTERVAL HISTORY:  MRI brain done this morning demonstrating gliosis around the right parietal catheter tract and old infarcts in the left occipital and bilateral cerebellum.     MEDICATIONS  (STANDING):  dextrose 40% Gel 15 Gram(s) Oral once  dextrose 5%. 1000 milliLiter(s) (50 mL/Hr) IV Continuous <Continuous>  dextrose 5%. 1000 milliLiter(s) (100 mL/Hr) IV Continuous <Continuous>  dextrose 50% Injectable 25 Gram(s) IV Push once  dextrose 50% Injectable 12.5 Gram(s) IV Push once  dextrose 50% Injectable 25 Gram(s) IV Push once  famotidine Injectable 20 milliGRAM(s) IV Push daily  glucagon  Injectable 1 milliGRAM(s) IntraMuscular once  insulin lispro (ADMELOG) corrective regimen sliding scale   SubCutaneous three times a day before meals  lactated ringers. 1000 milliLiter(s) (100 mL/Hr) IV Continuous <Continuous>  lamoTRIgine 25 milliGRAM(s) Oral <User Schedule>  levETIRAcetam 500 milliGRAM(s) Oral two times a day    Vital Signs Last 24 Hrs  T(C): 36.6 (01 Oct 2021 06:12), Max: 36.6 (01 Oct 2021 06:12)  T(F): 97.8 (01 Oct 2021 06:12), Max: 97.8 (01 Oct 2021 06:12)  HR: 81 (01 Oct 2021 06:12) (81 - 81)  BP: 160/98 (01 Oct 2021 06:12) (160/98 - 160/98)  BP(mean): --  RR: --  SpO2: 93% (01 Oct 2021 06:12) (93% - 93%)    GENERAL PHYSICAL EXAM:  GEN: no distress   HEENT: NCAT, OP clear  EYES: sclera white, conjunctiva clear, no nystagmus  NECK: supple  CV: RRR, no murmur     		  PULM: CTAB, no wheezing  ABD: soft, +BS, NT  EXT: peripheral pulse intact, no cyanosis  MSK: muscle tone and strength normal  SKIN: warm, dry    NEUROLOGICAL EXAM:  Mental Status  Orientation: alert and oriented   Language: clear and fluent    Cranial Nerves  II: full visual fields intact   III, IV, VI: PERRL, EOMI  V, VII: facial sensation and movement intact and symmetric   VIII: hearing intact   IX, X: uvula midline, soft palate elevates normally   XI: BL shoulder shrug intact   XII: tongue midline    Motor  4+/5 BUE and BLE                 Tone and bulk are normal in upper and lower limbs  No pronator drift    Sensation  Intact to light touch and pinprick in all 4 EXTs    Reflex  2+ in BL biceps and patella                                    Plantar responses downward bilaterally    Coordination  Normal FTN bilaterally    Gait  Deferred       LABS:                          9.7    5.77  )-----------( 201      ( 30 Sep 2021 06:43 )             29.7     -    141  |  101  |  21.4<H>  ----------------------------<  101<H>  3.8   |  27.0  |  1.31<H>    Ca    6.9<L>      30 Sep 2021 06:43    TPro  6.6  /  Alb  3.4  /  TBili  <0.2<L>  /  DBili  x   /  AST  18  /  ALT  12  /  AlkPhos  84  -    CAPILLARY BLOOD GLUCOSE      POCT Blood Glucose.: 130 mg/dL (30 Sep 2021 11:12)  POCT Blood Glucose.: 100 mg/dL (30 Sep 2021 07:49)  POCT Blood Glucose.: 124 mg/dL (30 Sep 2021 01:32)  POCT Blood Glucose.: 92 mg/dL (29 Sep 2021 17:10)    LIVER FUNCTIONS - ( 28 Sep 2021 23:56 )  Alb: 3.4 g/dL / Pro: 6.6 g/dL / ALK PHOS: 84 U/L / ALT: 12 U/L / AST: 18 U/L / GGT: x             Urinalysis Basic - ( 28 Sep 2021 22:25 )    Color: Yellow / Appearance: Clear / S.015 / pH: x  Gluc: x / Ketone: Negative  / Bili: Negative / Urobili: Negative   Blood: x / Protein: 100 / Nitrite: Positive   Leuk Esterase: Small / RBC: 0-2 /HPF / WBC 6-10   Sq Epi: x / Non Sq Epi: Occasional / Bacteria: Few        OTHER IMAGING AND STUDIES:    non-elective EMU  - 21:  EEG Summary:  EEG in the awake, drowsy and asleep states.  - A single sharply contoured waveform in the left temporal (max F8), equivocal for epileptiform.    Impression/Clinical Correlate:   – : No events or seizures were recorded.    During this EMU admission, no events or seizures were recorded. A single possible left temporal sharply contoured waveform, equivocal for epileptiform.          < from: MR Brain-Seizure, Epilepsy No Cont (10.01.21 @ 10:04) >  IMPRESSION:   RIGHT parietal ventricular shunt catheter with tip in body of RIGHT lateral ventricle, unchanged. Gliosis is noted about the catheter tract. Mild periventricular and scattered deep and subcortical white matter ischemia is noted. Tiny old cortical infarction in the LEFT occipital lobe and old infarctions in theBILATERAL cerebellum.

## 2021-10-01 NOTE — PROGRESS NOTE ADULT - ASSESSMENT
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  In the ER, CT head was negative. Seen by Dr Lopez, started on vEEG, no events were recorded. Started on keppra BID while lamotrigine is up titrated  by Dr Lopez as an outpatient. MRI consistent with old infarcts. Neurosurgery called to reprogram  shunt post MRi. Seen by PT, recommend home with supervision. Course complicated by acute hypoxia.        Assessment/Plan:    1. New onset seizure in a patient with a history of NPH status post  shunt and ICH right basal ganglia in 2021  No events recorded on vEEG   MRI brain- seizure protocol negatve  Neurosurgery called to  evaluate  shut post MRI brain  PT evaluation- Home with supervision  Continue Lamotrigine and keppra BID per Dr Lopez, to be titrated as outpatient     2.  Acute hypoxic respiratory failure: Not O2 dependent at home  Spo2 on room air at rest of 76% on 3 liters via nasal cannula  Stat Chest xray   Stat ABG  Stat CBC and BNP  Elevated procalcitonin on admission; Afebrile, no leukocytosis  Given a dose of IV abx for possible aspiration. Chest xray negative and ABX were not continued  Asymptomatic  Blood cultures from 09/28 negative x 2   Prelim urine culture positive for Klebsiella, has a history of Kleb UTI, asymptomatic   Afebrile     3. HAGMA most likely due to lactacidemia due to seizure: Improved with IV hydration  DC IV fluids  Follow up BMP    4.  Hypertensive urgency  on admission: BP improved    5. PASCALE with CKD stage 3: Stable  Improved with IV fluids  Follow up BMP    6. Chronic Diastolic CHF: Lasix held for hypovolemia  Dc IVF  Check BNP and chest xray  WIll resume PO lasix, May need IV     7. DIEGO on Ferrous sulfate PO     8. 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  In the ER, CT head was negative. Seen by Dr Lopez, started on vEEG, no events were recorded. Started on keppra BID while lamotrigine is up titrated  by Dr Lopez as an outpatient. MRI consistent with old infarcts. Neurosurgery called to reprogram  shunt post MRi. Seen by PT, recommend home with supervision. Course complicated by acute hypoxia.        Assessment/Plan:    1. New onset seizure in a patient with a history of NPH status post  shunt and ICH right basal ganglia in 2021  No events recorded on vEEG   MRI brain- seizure protocol negatve  Neurosurgery called to  evaluate  shut post MRI brain  PT evaluation- Home with supervision  Continue Lamotrigine and keppra BID per Dr Lopez, to be titrated as outpatient     2.  Acute hypoxic respiratory failure: Not O2 dependent at home  Spo2 on room air at rest of 76% on 3 liters via nasal cannula  Stat Chest xray   Stat ABG  Stat CBC and BNP  Elevated procalcitonin on admission; Afebrile, no leukocytosis  Given a dose of IV abx for possible aspiration. Chest xray negative and ABX were not continued  Asymptomatic  Blood cultures from 09/28 negative x 2   Prelim urine culture positive for Klebsiella, has a history of Kleb UTI, asymptomatic   Afebrile     3. HAGMA most likely due to lactacidemia due to seizure: Improved with IV hydration  DC IV fluids  Follow up BMP    4.  Hypertensive urgency  on admission: BP improved    5. PASCALE with CKD stage 3: Stable  Improved with IV fluids  Follow up BMP    6. Chronic Diastolic CHF: Lasix held for hypovolemia  Dc IVF  Check BNP and chest xray  WIll resume PO lasix, May need IV     7. DIEGO on Ferrous sulfate PO     8. Diabetes mellitus type 2: Admelog sliding scale  Monitor BSL    9. 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  In the ER, CT head was negative. Seen by Dr Lopez, started on vEEG, no events were recorded. Started on keppra BID while lamotrigine is up titrated  by Dr Lopez as an outpatient. MRI consistent with old infarcts. Neurosurgery called to reprogram  shunt post MRi. Seen by PT, recommend home with supervision. Course complicated by acute hypoxia.     Assessment/Plan:    1. New onset seizure in a patient with a history of NPH status post  shunt and ICH right basal ganglia in 2021  No events recorded on vEEG   MRI brain- seizure protocol negatve  Neurosurgery called to  evaluate  shut post MRI brain  PT evaluation- Home with supervision  Continue Lamotrigine and keppra BID per Dr Lopez, to be titrated as outpatient     2.  Acute hypoxic respiratory failure: Not O2 dependent at home  Spo2 on room air at rest of 76% on 3 liters via nasal cannula  Chest xray negative, no leukocytosis or fever, lactate WNL- pneumonia ruled out  BNP elevated- IV lasix 40mg x 1  Stat LE duplex ordered for D dimer of 500     3. Acute on chronic diastolic CHF; Chest xray negative; BNP elevated  IV lasix 40mg x1 now  resume PO lasix 20mg every other day in AM     4.  Hypertensive urgency  on admission: BP improved    5. PASCALE with HAGMA with CKD stage 3: Resolved  Improved with IV fluids    6. DIEGO on Ferrous sulfate PO     7. Diabetes mellitus type 2: Admelog sliding scale  Monitor BSL    8. Elevated TSH: check T3 and T4    9/ Depression; Resume abilify  Spoke with patient's son wellbutrin to be held as it may lower seizure threshold. To follow up with PMD as outpatient     VTE- Heparin Subcut    Code Status; Full Code    Next of Kin; Son Thomas [phone] updated on plan of care and patient's condition    PT Evaluation- Home with supervision; lives with son     Discharge disposition; LIkely home pending improvement in hypoxia

## 2021-10-01 NOTE — PROGRESS NOTE ADULT - ASSESSMENT
This is a 81y Female with a history of dCHF, NIDDM, HTN, HLD, NPH s/p  shunt 2015 (neurologist Dr. Bowman, NSG Dr. Haskins), ICH (small right basal ganglia hemorrhage 5/2021), DVT (off AC with neg doppler), hx Klebsiella UTI, hx Pneumonia, glaucoma, Bipolar, depression who presented with AMS, followed by witnessed bilateral tonic clonic seizure in ER. Personally reviewed all imagines, labs and other studies.    Impression:  1. Initial AMS: Much improved. Suspect unwitnessed bilateral tonic clonic seizure at home and found by family in postictal state.  2. 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 Start lamotrigine (both for seizure and psych), bridge with levetiracetam.  3. Aspiration pna  4. Acute on CKD  5. 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 NPH s/p VPS  7. dCHF, NIDDM, HTN, HLD, bipolar, depression    Recommendation:  - continue lamotrigine 25mg QHS and slowly up-titrate outpatient - both for seizure and depression/anxiety  - bridge with levetiracetam 500mg BID while up-titrating lamotrigine  - cleared for discharge from epilepsy perspective, after NSG checks VPS setting  - seizure precaution  - medical management and support care per primary team   - f/u Dr. Bowman for management of chronic infarcts      No acute intervention from Epilepsy at this time.  Please reconsult if needed.  ______________________  Natan Lopez MD   Director, Epilepsy/EMU - [facility]   Epilepsy Consult #: [phone] ([phone])

## 2021-10-02 LAB
ANION GAP SERPL CALC-SCNC: 13 MMOL/L — SIGNIFICANT CHANGE UP (ref 5–17)
BUN SERPL-MCNC: 14.5 MG/DL — SIGNIFICANT CHANGE UP (ref 8–20)
CALCIUM SERPL-MCNC: 7.8 MG/DL — LOW (ref 8.6–10.2)
CHLORIDE SERPL-SCNC: 101 MMOL/L — SIGNIFICANT CHANGE UP (ref 98–107)
CO2 SERPL-SCNC: 28 MMOL/L — SIGNIFICANT CHANGE UP (ref 22–29)
CREAT SERPL-MCNC: 1.12 MG/DL — SIGNIFICANT CHANGE UP (ref 0.5–1.3)
GLUCOSE BLDC GLUCOMTR-MCNC: 128 MG/DL — HIGH (ref 70–99)
GLUCOSE BLDC GLUCOMTR-MCNC: 147 MG/DL — HIGH (ref 70–99)
GLUCOSE BLDC GLUCOMTR-MCNC: 241 MG/DL — HIGH (ref 70–99)
GLUCOSE SERPL-MCNC: 120 MG/DL — HIGH (ref 70–99)
HCT VFR BLD CALC: 31.1 % — LOW (ref 34.5–45)
HGB BLD-MCNC: 10 G/DL — LOW (ref 11.5–15.5)
MCHC RBC-ENTMCNC: 28.6 PG — SIGNIFICANT CHANGE UP (ref 27–34)
MCHC RBC-ENTMCNC: 32.2 GM/DL — SIGNIFICANT CHANGE UP (ref 32–36)
MCV RBC AUTO: 88.9 FL — SIGNIFICANT CHANGE UP (ref 80–100)
PLATELET # BLD AUTO: 221 K/UL — SIGNIFICANT CHANGE UP (ref 150–400)
POTASSIUM SERPL-MCNC: 3.6 MMOL/L — SIGNIFICANT CHANGE UP (ref 3.5–5.3)
POTASSIUM SERPL-SCNC: 3.6 MMOL/L — SIGNIFICANT CHANGE UP (ref 3.5–5.3)
RBC # BLD: 3.5 M/UL — LOW (ref 3.8–5.2)
RBC # FLD: 15 % — HIGH (ref 10.3–14.5)
SODIUM SERPL-SCNC: 141 MMOL/L — SIGNIFICANT CHANGE UP (ref 135–145)
WBC # BLD: 5.72 K/UL — SIGNIFICANT CHANGE UP (ref 3.8–10.5)
WBC # FLD AUTO: 5.72 K/UL — SIGNIFICANT CHANGE UP (ref 3.8–10.5)

## 2021-10-02 PROCEDURE — 99233 SBSQ HOSP IP/OBS HIGH 50: CPT

## 2021-10-02 RX ORDER — INSULIN LISPRO 100/ML
VIAL (ML) SUBCUTANEOUS
Refills: 0 | Status: DISCONTINUED | OUTPATIENT
Start: 2021-10-02 | End: 2021-10-05

## 2021-10-02 RX ORDER — FUROSEMIDE 40 MG
20 TABLET ORAL
Refills: 0 | Status: DISCONTINUED | OUTPATIENT
Start: 2021-10-02 | End: 2021-10-05

## 2021-10-02 RX ORDER — ISOSORBIDE MONONITRATE 60 MG/1
30 TABLET, EXTENDED RELEASE ORAL DAILY
Refills: 0 | Status: DISCONTINUED | OUTPATIENT
Start: 2021-10-02 | End: 2021-10-03

## 2021-10-02 RX ADMIN — LAMOTRIGINE 25 MILLIGRAM(S): 25 TABLET, ORALLY DISINTEGRATING ORAL at 17:12

## 2021-10-02 RX ADMIN — Medication 20 MILLIGRAM(S): at 17:13

## 2021-10-02 RX ADMIN — FAMOTIDINE 20 MILLIGRAM(S): 10 INJECTION INTRAVENOUS at 11:00

## 2021-10-02 RX ADMIN — LEVETIRACETAM 500 MILLIGRAM(S): 250 TABLET, FILM COATED ORAL at 05:39

## 2021-10-02 RX ADMIN — ARIPIPRAZOLE 5 MILLIGRAM(S): 15 TABLET ORAL at 11:01

## 2021-10-02 RX ADMIN — HEPARIN SODIUM 5000 UNIT(S): 5000 INJECTION INTRAVENOUS; SUBCUTANEOUS at 05:39

## 2021-10-02 RX ADMIN — Medication 325 MILLIGRAM(S): at 11:01

## 2021-10-02 RX ADMIN — ISOSORBIDE MONONITRATE 30 MILLIGRAM(S): 60 TABLET, EXTENDED RELEASE ORAL at 11:00

## 2021-10-02 RX ADMIN — LEVETIRACETAM 500 MILLIGRAM(S): 250 TABLET, FILM COATED ORAL at 17:12

## 2021-10-02 RX ADMIN — ATORVASTATIN CALCIUM 40 MILLIGRAM(S): 80 TABLET, FILM COATED ORAL at 22:57

## 2021-10-02 RX ADMIN — Medication 1 MILLIGRAM(S): at 11:01

## 2021-10-02 RX ADMIN — HEPARIN SODIUM 5000 UNIT(S): 5000 INJECTION INTRAVENOUS; SUBCUTANEOUS at 17:12

## 2021-10-02 RX ADMIN — Medication 2: at 11:45

## 2021-10-02 RX ADMIN — Medication 20 MILLIGRAM(S): at 11:01

## 2021-10-02 NOTE — PROGRESS NOTE ADULT - ASSESSMENT
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  In the ER, CT head was negative. Seen by Dr Lopez, started on vEEG, no events were recorded. Started on keppra BID while lamotrigine is up titrated  by Dr Lopez as an outpatient. MRI consistent with old infarcts. Neurosurgery called to reprogram  shunt post MRi. Seen by PT, recommend home with supervision. Course complicated by acute hypoxia.     1. New onset seizure in a patient with a history of NPH status post  shunt and ICH right basal ganglia in 2021  No events recorded on vEEG  MRI brain- seizure protocol negatve  Neurosurgery called to  evaluate  shut post MRI brain  PT evaluation- Home with supervision  Continue Lamotrigine and keppra BID per Dr Lopez, to be titrated as outpatient     2.  Acute hypoxic respiratory failure: Not O2 dependent at home  Weaned off NC O2 to room air now  Chest xray negative, no leukocytosis or fever, lactate WNL- pneumonia ruled out  BNP elevated- IV lasix 40mg x 1  Stat LE duplex ordered for D dimer of 500  which is negative for DVT    3. Acute on chronic diastolic CHF; Chest xray negative; BNP elevated  IV lasix 40mg x1 started on 10/1; cont 20mg bid today onwards  Assess for changing to PO with clinical improvement  not on BB at home. HR in 60s to 70s.  After exacerbation has resolved will start BB     4.  Hypertensive urgency on admission: still uncontrolled  Started Imdur  reassess and titrate    5. PASCALE with MA with CKD stage 3: Resolved  Improved with IV fluids    6. DIEGO on Ferrous sulfate PO     7. Diabetes mellitus type 2: Uncontrolled intermittently  Admelog sliding scale  uptitrated  Monitor    8. Elevated TSH: check T3 and T4  full TFTs WNL    9. Depression;   Resume Abilify  Spoke with patient's son Wellbutrin to be held as it may lower seizure threshold. To follow up with PMD as outpatient     VTE- Heparin Subcut    Code Status; Full Code    Next of Kin; Son Thomas [phone] updated on plan of care and patient's condition    PT Evaluation- Home with supervision; lives with son     Discharge disposition; Likely home pending improvement clinically

## 2021-10-02 NOTE — PROGRESS NOTE ADULT - SUBJECTIVE AND OBJECTIVE BOX
HEALTH ISSUES - PROBLEM Dx:  past medical history of chronic diastolic CHF, NIDDM, hypertension, hyperlipidemia, NPH status post  shunt 2015,  ICH(small right basal ganglia hemorrhage 5/2021),DVT (off AC with neg doppler), glaucoma, Bipolar, depression     Sz- AMS. Course complicated by hypoxia and AE D CHF    INTERVAL HPI/ OVERNIGHT EVENTS:    comfortable lying in bed  on room air  no SOB  explained the plan of care    REVIEW OF SYSTEMS:    as above    Vital Signs Last 24 Hrs  T(C): 36.4 (02 Oct 2021 11:29), Max: 36.8 (02 Oct 2021 05:33)  T(F): 97.6 (02 Oct 2021 11:29), Max: 98.3 (02 Oct 2021 05:33)  HR: 75 (02 Oct 2021 11:29) (66 - 76)  BP: 125/78 (02 Oct 2021 11:29) (125/78 - 167/75)  BP(mean): --  RR: 17 (02 Oct 2021 11:29) (17 - 18)  SpO2: 87% (02 Oct 2021 11:29) (87% - 98%)    PHYSICAL EXAM-  GENERAL: Obese, comfortable on room air  HEAD:  Atraumatic, Normocephalic  EYES: EOMI, conjunctiva and sclera clear  ENT: Moist mucous membranes, No lesions  NECK: Supple, No JVD, Normal thyroid  NERVOUS SYSTEM:  Alert & Oriented X 3, Motor Strength 5/5 B/L upper and lower extremities  CHEST/LUNG: CTA bilaterally; No rales, rhonchi, wheezing, or rubs  HEART: Regular rate and rhythm; No murmurs, rubs, or gallops  ABDOMEN: Soft, Nontender, Nondistended; Bowel sounds present  EXTREMITIES:  2+ Peripheral Pulses, No clubbing, cyanosis, or edema  SKIN: No rashes or lesions    MEDICATIONS  (STANDING):  ARIPiprazole 5 milliGRAM(s) Oral daily  atorvastatin 40 milliGRAM(s) Oral at bedtime  dextrose 40% Gel 15 Gram(s) Oral once  dextrose 5%. 1000 milliLiter(s) (50 mL/Hr) IV Continuous <Continuous>  dextrose 5%. 1000 milliLiter(s) (100 mL/Hr) IV Continuous <Continuous>  dextrose 50% Injectable 25 Gram(s) IV Push once  dextrose 50% Injectable 12.5 Gram(s) IV Push once  dextrose 50% Injectable 25 Gram(s) IV Push once  famotidine Injectable 20 milliGRAM(s) IV Push daily  ferrous    sulfate 325 milliGRAM(s) Oral daily  folic acid 1 milliGRAM(s) Oral daily  furosemide   Injectable 20 milliGRAM(s) IV Push two times a day  glucagon  Injectable 1 milliGRAM(s) IntraMuscular once  heparin   Injectable 5000 Unit(s) SubCutaneous every 12 hours  insulin lispro (ADMELOG) corrective regimen sliding scale   SubCutaneous three times a day before meals  isosorbide   mononitrate ER Tablet (IMDUR) 30 milliGRAM(s) Oral daily  lamoTRIgine 25 milliGRAM(s) Oral <User Schedule>  levETIRAcetam 500 milliGRAM(s) Oral two times a day    MEDICATIONS  (PRN):  acetaminophen   Tablet .. 325 milliGRAM(s) Oral every 12 hours PRN Temp greater or equal to 38C (100.4F), Mild Pain (1 - 3)  aluminum hydroxide/magnesium hydroxide/simethicone Suspension 30 milliLiter(s) Oral every 4 hours PRN Dyspepsia  labetalol Injectable 10 milliGRAM(s) IV Push every 6 hours PRN Systolic blood pressure >180  LORazepam   Injectable 2 milliGRAM(s) IV Push every 2 hours PRN seizure  ondansetron Injectable 4 milliGRAM(s) IV Push every 8 hours PRN Nausea and/or Vomiting      LABS:                        10.0   5.72  )-----------( 221      ( 02 Oct 2021 06:57 )             31.1     10-02    141  |  101  |  14.5  ----------------------------<  120<H>  3.6   |  28.0  |  1.12    Ca    7.8<L>      02 Oct 2021 06:57

## 2021-10-03 LAB
CULTURE RESULTS: SIGNIFICANT CHANGE UP
CULTURE RESULTS: SIGNIFICANT CHANGE UP
GLUCOSE BLDC GLUCOMTR-MCNC: 129 MG/DL — HIGH (ref 70–99)
GLUCOSE BLDC GLUCOMTR-MCNC: 131 MG/DL — HIGH (ref 70–99)
GLUCOSE BLDC GLUCOMTR-MCNC: 200 MG/DL — HIGH (ref 70–99)
GLUCOSE BLDC GLUCOMTR-MCNC: 205 MG/DL — HIGH (ref 70–99)
SPECIMEN SOURCE: SIGNIFICANT CHANGE UP
SPECIMEN SOURCE: SIGNIFICANT CHANGE UP

## 2021-10-03 PROCEDURE — 99232 SBSQ HOSP IP/OBS MODERATE 35: CPT

## 2021-10-03 RX ORDER — ISOSORBIDE MONONITRATE 60 MG/1
60 TABLET, EXTENDED RELEASE ORAL DAILY
Refills: 0 | Status: DISCONTINUED | OUTPATIENT
Start: 2021-10-04 | End: 2021-10-05

## 2021-10-03 RX ORDER — METOPROLOL TARTRATE 50 MG
50 TABLET ORAL DAILY
Refills: 0 | Status: DISCONTINUED | OUTPATIENT
Start: 2021-10-03 | End: 2021-10-05

## 2021-10-03 RX ADMIN — ARIPIPRAZOLE 5 MILLIGRAM(S): 15 TABLET ORAL at 11:15

## 2021-10-03 RX ADMIN — ATORVASTATIN CALCIUM 40 MILLIGRAM(S): 80 TABLET, FILM COATED ORAL at 21:51

## 2021-10-03 RX ADMIN — Medication 325 MILLIGRAM(S): at 11:15

## 2021-10-03 RX ADMIN — Medication 1 MILLIGRAM(S): at 11:15

## 2021-10-03 RX ADMIN — Medication 10 MILLIGRAM(S): at 08:08

## 2021-10-03 RX ADMIN — LEVETIRACETAM 500 MILLIGRAM(S): 250 TABLET, FILM COATED ORAL at 05:56

## 2021-10-03 RX ADMIN — Medication 2: at 11:16

## 2021-10-03 RX ADMIN — HEPARIN SODIUM 5000 UNIT(S): 5000 INJECTION INTRAVENOUS; SUBCUTANEOUS at 17:38

## 2021-10-03 RX ADMIN — HEPARIN SODIUM 5000 UNIT(S): 5000 INJECTION INTRAVENOUS; SUBCUTANEOUS at 05:57

## 2021-10-03 RX ADMIN — Medication 50 MILLIGRAM(S): at 11:25

## 2021-10-03 RX ADMIN — ISOSORBIDE MONONITRATE 30 MILLIGRAM(S): 60 TABLET, EXTENDED RELEASE ORAL at 11:15

## 2021-10-03 RX ADMIN — Medication 20 MILLIGRAM(S): at 06:18

## 2021-10-03 RX ADMIN — Medication 20 MILLIGRAM(S): at 17:37

## 2021-10-03 RX ADMIN — FAMOTIDINE 20 MILLIGRAM(S): 10 INJECTION INTRAVENOUS at 11:15

## 2021-10-03 NOTE — PROGRESS NOTE ADULT - ASSESSMENT
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  In the ER, CT head was negative. Seen by Dr Lopez, started on vEEG, no events were recorded. Started on Keppra BID while lamotrigine is up titrated  by Dr Lopez as an outpatient. MRI consistent with old infarcts. Neurosurgery called to reprogram  shunt post MRI Seen by PT, recommend home with supervision. Course complicated by acute hypoxia.     1. New onset seizure in a patient with a history of NPH status post  shunt and ICH right basal ganglia in 2021  No events recorded on vEEG  MRI brain- seizure protocol negative  Neurosurgery called to  evaluate  shut post MRI brain  PT evaluation- Home with supervision  Continue Lamotrigine and Keppra BID per Dr Lopez, to be titrated as outpatient     2.  Acute hypoxic respiratory failure: Not O2 dependent at home  Weaned off NC O2 to room air now  Chest X-ray negative, no leukocytosis or fever, lactate WNL- pneumonia ruled out  BNP elevated- IV Lasix 20 bid to cont today  Stat LE duplex ordered for D dimer of 500  which is negative for DVT    Assess for Home O2 needs in AM    3. Acute on chronic diastolic CHF; Chest xray negative; BNP elevated  IV Lasix 40mg x1 started on 10/1; cont 20mg bid today onwards. Change to PO Lasix starting tomorrow  Assess for changing to PO with clinical improvement  not on BB at home. started low dose here.    4.  Hypertensive urgency on admission: still uncontrolled  Cont Imdur, increase dose  Started BB today  reassess and titrate    5. PASCALE with MA with CKD stage 3: Resolved  Improved with IV fluids    6. DIEGO on Ferrous sulfate PO     7. Diabetes mellitus type 2: Uncontrolled intermittently  Admelog sliding scale  uptitrated  Monitor    8. Elevated TSH: check T3 and T4  full TFTs WNL    9. Depression;   Resume Abilify  Spoke with patient's son Wellbutrin to be held as it may lower seizure threshold. To follow up with PMD as outpatient     VTE- Heparin Subcut    Code Status; Full Code    Next of Kin; Son Thomas [phone]     PT Evaluation- Home with supervision; lives with son   Dispo: Home  Plan: Assess Home O2 needs in AM, change Lasix to PO in AM and discharge if hemodynamically stable

## 2021-10-03 NOTE — PROGRESS NOTE ADULT - SUBJECTIVE AND OBJECTIVE BOX
HEALTH ISSUES - PROBLEM Dx:  history of chronic diastolic CHF, NIDDM, hypertension, hyperlipidemia, NPH status post  shunt 2015, ICH(small right basal ganglia hemorrhage 5/2021),DVT (off AC with neg doppler), glaucoma, Bipolar, depression     Sz- AMS. Course complicated by hypoxia and AE D CHF    INTERVAL HPI/ OVERNIGHT EVENTS:    comfortable lying in bed  was on room air yesterday  today on NC O2.   patient stating she feels   no SOB  explained the plan of care    REVIEW OF SYSTEMS:    as above    Vital Signs Last 24 Hrs  T(C): 36.5 (03 Oct 2021 12:54), Max: 37 (02 Oct 2021 20:33)  T(F): 97.7 (03 Oct 2021 12:54), Max: 98.6 (02 Oct 2021 20:33)  HR: 61 (03 Oct 2021 12:54) (61 - 84)  BP: 160/81 (03 Oct 2021 12:54) (137/79 - 179/89)  BP(mean): --  RR: 19 (03 Oct 2021 12:54) (18 - 19)  SpO2: 96% (03 Oct 2021 12:54) (92% - 96%)    PHYSICAL EXAM-  GENERAL: NAD, well-groomed, well-developed  HEAD:  Atraumatic, Normocephalic  EYES: EOMI, PERRLA, conjunctiva and sclera clear  ENMT: No tonsillar erythema, exudates, or enlargement; Moist mucous membranes, Good dentition, No lesions  NECK: Supple, No JVD, Normal thyroid  NERVOUS SYSTEM:  Alert & Oriented X3, Motor Strength 5/5 B/L upper and lower extremities; DTRs 2+ intact and symmetric  CHEST/LUNG: Clear to percussion bilaterally; No rales, rhonchi, wheezing, or rubs  HEART: Regular rate and rhythm; No murmurs, rubs, or gallops  ABDOMEN: Soft, Nontender, Nondistended; Bowel sounds present  EXTREMITIES:  2+ Peripheral Pulses, No clubbing, cyanosis, or edema  LYMPH: No lymphadenopathy noted  SKIN: No rashes or lesions    MEDICATIONS  (STANDING):  ARIPiprazole 5 milliGRAM(s) Oral daily  atorvastatin 40 milliGRAM(s) Oral at bedtime  dextrose 40% Gel 15 Gram(s) Oral once  dextrose 5%. 1000 milliLiter(s) (50 mL/Hr) IV Continuous <Continuous>  dextrose 5%. 1000 milliLiter(s) (100 mL/Hr) IV Continuous <Continuous>  dextrose 50% Injectable 25 Gram(s) IV Push once  dextrose 50% Injectable 12.5 Gram(s) IV Push once  dextrose 50% Injectable 25 Gram(s) IV Push once  famotidine Injectable 20 milliGRAM(s) IV Push daily  ferrous    sulfate 325 milliGRAM(s) Oral daily  folic acid 1 milliGRAM(s) Oral daily  furosemide   Injectable 20 milliGRAM(s) IV Push two times a day  glucagon  Injectable 1 milliGRAM(s) IntraMuscular once  heparin   Injectable 5000 Unit(s) SubCutaneous every 12 hours  insulin lispro (ADMELOG) corrective regimen sliding scale   SubCutaneous three times a day before meals  isosorbide   mononitrate ER Tablet (IMDUR) 30 milliGRAM(s) Oral daily  lamoTRIgine 25 milliGRAM(s) Oral <User Schedule>  levETIRAcetam 500 milliGRAM(s) Oral two times a day  metoprolol succinate ER 50 milliGRAM(s) Oral daily    MEDICATIONS  (PRN):  acetaminophen   Tablet .. 325 milliGRAM(s) Oral every 12 hours PRN Temp greater or equal to 38C (100.4F), Mild Pain (1 - 3)  aluminum hydroxide/magnesium hydroxide/simethicone Suspension 30 milliLiter(s) Oral every 4 hours PRN Dyspepsia  labetalol Injectable 10 milliGRAM(s) IV Push every 6 hours PRN Systolic blood pressure >180  LORazepam   Injectable 2 milliGRAM(s) IV Push every 2 hours PRN seizure  ondansetron Injectable 4 milliGRAM(s) IV Push every 8 hours PRN Nausea and/or Vomiting      LABS:                        10.0   5.72  )-----------( 221      ( 02 Oct 2021 06:57 )             31.1     10-02    141  |  101  |  14.5  ----------------------------<  120<H>  3.6   |  28.0  |  1.12    Ca    7.8<L>      02 Oct 2021 06:57              Assessment and Plan    Encephalopathy  Malnutrition  Morbid Obesity  Functional quadriplegia    DVT Prophylaxis    Discussed with: Patient, family, RN, CM, Consultants  Plan of care/ Discharge planning discussed.    Visit Time:  HEALTH ISSUES - PROBLEM Dx:  history of chronic diastolic CHF, NIDDM, hypertension, hyperlipidemia, NPH status post  shunt 2015, ICH(small right basal ganglia hemorrhage 5/2021),DVT (off AC with neg doppler), glaucoma, Bipolar, depression     Sz- AMS. Course complicated by hypoxia and AE D CHF    INTERVAL HPI/ OVERNIGHT EVENTS:    comfortable lying in bed  was on room air yesterday  today on NC O2.   patient stating she feels OK, so O2 shut off    Later RN informed that her saturation is high 80s while pt is fast asleep    REVIEW OF SYSTEMS:    as above     Vital Signs Last 24 Hrs  T(C): 36.5 (03 Oct 2021 12:54), Max: 37 (02 Oct 2021 20:33)  T(F): 97.7 (03 Oct 2021 12:54), Max: 98.6 (02 Oct 2021 20:33)  HR: 61 (03 Oct 2021 12:54) (61 - 84)  BP: 160/81 (03 Oct 2021 12:54) (137/79 - 179/89)  BP(mean): --  RR: 19 (03 Oct 2021 12:54) (18 - 19)  SpO2: 96% (03 Oct 2021 12:54) (92% - 96%)    PHYSICAL EXAM-  GENERAL: Obese, comfortable on room air ? occ need for NC O2?  HEAD:  Atraumatic, Normocephalic  EYES: EOMI, conjunctiva and sclera clear  ENT: Moist mucous membranes, No lesions  NECK: Supple, No JVD, Normal thyroid  NERVOUS SYSTEM:  Alert & Oriented X 3, Motor Strength 5/5 B/L upper and lower extremities  CHEST/LUNG: CTA bilaterally; No rales, rhonchi, wheezing, or rubs  HEART: Regular rate and rhythm; No murmurs, rubs, or gallops  ABDOMEN: Soft, Nontender, Nondistended; Bowel sounds present  EXTREMITIES:  2+ Peripheral Pulses, No clubbing, cyanosis, or edema  SKIN: No rashes or lesions      MEDICATIONS  (STANDING):  ARIPiprazole 5 milliGRAM(s) Oral daily  atorvastatin 40 milliGRAM(s) Oral at bedtime  dextrose 40% Gel 15 Gram(s) Oral once  dextrose 5%. 1000 milliLiter(s) (50 mL/Hr) IV Continuous <Continuous>  dextrose 5%. 1000 milliLiter(s) (100 mL/Hr) IV Continuous <Continuous>  dextrose 50% Injectable 25 Gram(s) IV Push once  dextrose 50% Injectable 12.5 Gram(s) IV Push once  dextrose 50% Injectable 25 Gram(s) IV Push once  famotidine Injectable 20 milliGRAM(s) IV Push daily  ferrous    sulfate 325 milliGRAM(s) Oral daily  folic acid 1 milliGRAM(s) Oral daily  furosemide   Injectable 20 milliGRAM(s) IV Push two times a day  glucagon  Injectable 1 milliGRAM(s) IntraMuscular once  heparin   Injectable 5000 Unit(s) SubCutaneous every 12 hours  insulin lispro (ADMELOG) corrective regimen sliding scale   SubCutaneous three times a day before meals  isosorbide   mononitrate ER Tablet (IMDUR) 30 milliGRAM(s) Oral daily  lamoTRIgine 25 milliGRAM(s) Oral <User Schedule>  levETIRAcetam 500 milliGRAM(s) Oral two times a day  metoprolol succinate ER 50 milliGRAM(s) Oral daily    MEDICATIONS  (PRN):  acetaminophen   Tablet .. 325 milliGRAM(s) Oral every 12 hours PRN Temp greater or equal to 38C (100.4F), Mild Pain (1 - 3)  aluminum hydroxide/magnesium hydroxide/simethicone Suspension 30 milliLiter(s) Oral every 4 hours PRN Dyspepsia  labetalol Injectable 10 milliGRAM(s) IV Push every 6 hours PRN Systolic blood pressure >180  LORazepam   Injectable 2 milliGRAM(s) IV Push every 2 hours PRN seizure  ondansetron Injectable 4 milliGRAM(s) IV Push every 8 hours PRN Nausea and/or Vomiting      LABS:                        10.0   5.72  )-----------( 221      ( 02 Oct 2021 06:57 )             31.1     10-02    141  |  101  |  14.5  ----------------------------<  120<H>  3.6   |  28.0  |  1.12    Ca    7.8<L>      02 Oct 2021 06:57

## 2021-10-04 LAB
ANION GAP SERPL CALC-SCNC: 12 MMOL/L — SIGNIFICANT CHANGE UP (ref 5–17)
BUN SERPL-MCNC: 15.2 MG/DL — SIGNIFICANT CHANGE UP (ref 8–20)
CALCIUM SERPL-MCNC: 8.2 MG/DL — LOW (ref 8.6–10.2)
CHLORIDE SERPL-SCNC: 100 MMOL/L — SIGNIFICANT CHANGE UP (ref 98–107)
CO2 SERPL-SCNC: 31 MMOL/L — HIGH (ref 22–29)
CREAT SERPL-MCNC: 1.22 MG/DL — SIGNIFICANT CHANGE UP (ref 0.5–1.3)
GLUCOSE BLDC GLUCOMTR-MCNC: 140 MG/DL — HIGH (ref 70–99)
GLUCOSE BLDC GLUCOMTR-MCNC: 150 MG/DL — HIGH (ref 70–99)
GLUCOSE BLDC GLUCOMTR-MCNC: 335 MG/DL — HIGH (ref 70–99)
GLUCOSE SERPL-MCNC: 132 MG/DL — HIGH (ref 70–99)
HCT VFR BLD CALC: 31.4 % — LOW (ref 34.5–45)
HGB BLD-MCNC: 10.1 G/DL — LOW (ref 11.5–15.5)
MCHC RBC-ENTMCNC: 28.2 PG — SIGNIFICANT CHANGE UP (ref 27–34)
MCHC RBC-ENTMCNC: 32.2 GM/DL — SIGNIFICANT CHANGE UP (ref 32–36)
MCV RBC AUTO: 87.7 FL — SIGNIFICANT CHANGE UP (ref 80–100)
NT-PROBNP SERPL-SCNC: 5386 PG/ML — HIGH (ref 0–300)
PLATELET # BLD AUTO: 235 K/UL — SIGNIFICANT CHANGE UP (ref 150–400)
POTASSIUM SERPL-MCNC: 3.9 MMOL/L — SIGNIFICANT CHANGE UP (ref 3.5–5.3)
POTASSIUM SERPL-SCNC: 3.9 MMOL/L — SIGNIFICANT CHANGE UP (ref 3.5–5.3)
RBC # BLD: 3.58 M/UL — LOW (ref 3.8–5.2)
RBC # FLD: 14.6 % — HIGH (ref 10.3–14.5)
SODIUM SERPL-SCNC: 143 MMOL/L — SIGNIFICANT CHANGE UP (ref 135–145)
WBC # BLD: 6.19 K/UL — SIGNIFICANT CHANGE UP (ref 3.8–10.5)
WBC # FLD AUTO: 6.19 K/UL — SIGNIFICANT CHANGE UP (ref 3.8–10.5)

## 2021-10-04 PROCEDURE — 99232 SBSQ HOSP IP/OBS MODERATE 35: CPT

## 2021-10-04 RX ORDER — METOPROLOL TARTRATE 50 MG
2 TABLET ORAL
Qty: 30 | Refills: 0 | DISCHARGE
Start: 2021-10-04 | End: 2021-11-02

## 2021-10-04 RX ORDER — LOSARTAN POTASSIUM 100 MG/1
1 TABLET, FILM COATED ORAL
Qty: 0 | Refills: 0 | DISCHARGE
Start: 2021-10-04

## 2021-10-04 RX ORDER — METOPROLOL TARTRATE 50 MG
1 TABLET ORAL
Qty: 30 | Refills: 0
Start: 2021-10-04 | End: 2021-11-02

## 2021-10-04 RX ORDER — ISOSORBIDE MONONITRATE 60 MG/1
1 TABLET, EXTENDED RELEASE ORAL
Qty: 30 | Refills: 0
Start: 2021-10-04 | End: 2021-11-02

## 2021-10-04 RX ORDER — LEVETIRACETAM 250 MG/1
1 TABLET, FILM COATED ORAL
Qty: 60 | Refills: 0
Start: 2021-10-04 | End: 2021-11-02

## 2021-10-04 RX ORDER — LAMOTRIGINE 25 MG/1
1 TABLET, ORALLY DISINTEGRATING ORAL
Qty: 30 | Refills: 0
Start: 2021-10-04 | End: 2021-11-02

## 2021-10-04 RX ORDER — AMLODIPINE BESYLATE 2.5 MG/1
1 TABLET ORAL
Qty: 30 | Refills: 0
Start: 2021-10-04 | End: 2021-11-02

## 2021-10-04 RX ORDER — AMLODIPINE BESYLATE 2.5 MG/1
10 TABLET ORAL DAILY
Refills: 0 | Status: DISCONTINUED | OUTPATIENT
Start: 2021-10-04 | End: 2021-10-05

## 2021-10-04 RX ADMIN — Medication 20 MILLIGRAM(S): at 05:30

## 2021-10-04 RX ADMIN — AMLODIPINE BESYLATE 10 MILLIGRAM(S): 2.5 TABLET ORAL at 08:15

## 2021-10-04 RX ADMIN — ISOSORBIDE MONONITRATE 60 MILLIGRAM(S): 60 TABLET, EXTENDED RELEASE ORAL at 08:14

## 2021-10-04 RX ADMIN — Medication 20 MILLIGRAM(S): at 16:50

## 2021-10-04 RX ADMIN — LEVETIRACETAM 500 MILLIGRAM(S): 250 TABLET, FILM COATED ORAL at 16:50

## 2021-10-04 RX ADMIN — Medication 1 MILLIGRAM(S): at 08:15

## 2021-10-04 RX ADMIN — LAMOTRIGINE 25 MILLIGRAM(S): 25 TABLET, ORALLY DISINTEGRATING ORAL at 16:50

## 2021-10-04 RX ADMIN — ATORVASTATIN CALCIUM 40 MILLIGRAM(S): 80 TABLET, FILM COATED ORAL at 21:07

## 2021-10-04 RX ADMIN — Medication 325 MILLIGRAM(S): at 08:14

## 2021-10-04 RX ADMIN — Medication 8: at 11:26

## 2021-10-04 RX ADMIN — FAMOTIDINE 20 MILLIGRAM(S): 10 INJECTION INTRAVENOUS at 16:51

## 2021-10-04 RX ADMIN — LEVETIRACETAM 500 MILLIGRAM(S): 250 TABLET, FILM COATED ORAL at 05:29

## 2021-10-04 RX ADMIN — Medication 50 MILLIGRAM(S): at 05:29

## 2021-10-04 RX ADMIN — HEPARIN SODIUM 5000 UNIT(S): 5000 INJECTION INTRAVENOUS; SUBCUTANEOUS at 16:51

## 2021-10-04 RX ADMIN — HEPARIN SODIUM 5000 UNIT(S): 5000 INJECTION INTRAVENOUS; SUBCUTANEOUS at 05:30

## 2021-10-04 RX ADMIN — ARIPIPRAZOLE 5 MILLIGRAM(S): 15 TABLET ORAL at 08:14

## 2021-10-04 NOTE — PROGRESS NOTE ADULT - PROVIDER SPECIALTY LIST ADULT
Hospitalist
Internal Medicine
Epilepsy
Internal Medicine
Epilepsy

## 2021-10-04 NOTE — PROGRESS NOTE ADULT - SUBJECTIVE AND OBJECTIVE BOX
CC: seizure (03 Oct 2021 15:55)    INTERVAL HPI/OVERNIGHT EVENTS:  no acute event  eager to go home    Vital Signs Last 24 Hrs  T(C): 36.4 (05 Oct 2021 04:49), Max: 36.7 (04 Oct 2021 13:52)  T(F): 97.6 (05 Oct 2021 04:49), Max: 98.1 (04 Oct 2021 13:52)  HR: 78 (05 Oct 2021 04:49) (78 - 83)  BP: 176/94 (05 Oct 2021 04:49) (99/64 - 176/94)  BP(mean): --  RR: 18 (05 Oct 2021 04:49) (18 - 18)  SpO2: 94% (05 Oct 2021 04:49) (86% - 94%)    PHYSICAL EXAM:  General: Well developed; frail appearing  Eyes: PERRLA, EOMI; conjunctiva and sclera clear  Head: Normocephalic; atraumatic  ENMT: No nasal discharge; airway clear; NC in place  Neck: Supple; non tender; no masses  Respiratory: No wheezes, rales or rhonchi  Cardiovascular: Regular rate and rhythm. S1 and S2 Normal; No murmurs, gallops or rubs  Gastrointestinal: Soft non-tender non-distended; Normal bowel sounds  Extremities: Normal range of motion, No clubbing, cyanosis or edema  Vascular: Peripheral pulses palpable 2+ bilaterally  Psychiatric: Cooperative and appropriate    I&O's Detail                                10.1   6.19  )-----------( 235      ( 04 Oct 2021 05:51 )             31.4     04 Oct 2021 05:51    143    |  100    |  15.2   ----------------------------<  132    3.9     |  31.0   |  1.22     Ca    8.2        04 Oct 2021 05:51        CAPILLARY BLOOD GLUCOSE      POCT Blood Glucose.: 152 mg/dL (05 Oct 2021 08:06)  POCT Blood Glucose.: 150 mg/dL (04 Oct 2021 16:49)  POCT Blood Glucose.: 335 mg/dL (04 Oct 2021 11:21)          MEDICATIONS  (STANDING):  amLODIPine   Tablet 10 milliGRAM(s) Oral daily  ARIPiprazole 5 milliGRAM(s) Oral daily  atorvastatin 40 milliGRAM(s) Oral at bedtime  dextrose 40% Gel 15 Gram(s) Oral once  dextrose 5%. 1000 milliLiter(s) (50 mL/Hr) IV Continuous <Continuous>  dextrose 5%. 1000 milliLiter(s) (100 mL/Hr) IV Continuous <Continuous>  dextrose 50% Injectable 25 Gram(s) IV Push once  dextrose 50% Injectable 12.5 Gram(s) IV Push once  dextrose 50% Injectable 25 Gram(s) IV Push once  famotidine Injectable 20 milliGRAM(s) IV Push daily  ferrous    sulfate 325 milliGRAM(s) Oral daily  folic acid 1 milliGRAM(s) Oral daily  furosemide   Injectable 20 milliGRAM(s) IV Push two times a day  glucagon  Injectable 1 milliGRAM(s) IntraMuscular once  heparin   Injectable 5000 Unit(s) SubCutaneous every 12 hours  insulin lispro (ADMELOG) corrective regimen sliding scale   SubCutaneous three times a day before meals  isosorbide   mononitrate ER Tablet (IMDUR) 60 milliGRAM(s) Oral daily  lamoTRIgine 25 milliGRAM(s) Oral <User Schedule>  levETIRAcetam 500 milliGRAM(s) Oral two times a day  metoprolol succinate ER 50 milliGRAM(s) Oral daily    MEDICATIONS  (PRN):  acetaminophen   Tablet .. 325 milliGRAM(s) Oral every 12 hours PRN Temp greater or equal to 38C (100.4F), Mild Pain (1 - 3)  aluminum hydroxide/magnesium hydroxide/simethicone Suspension 30 milliLiter(s) Oral every 4 hours PRN Dyspepsia  labetalol Injectable 10 milliGRAM(s) IV Push every 6 hours PRN Systolic blood pressure >180  LORazepam   Injectable 2 milliGRAM(s) IV Push every 2 hours PRN seizure  ondansetron Injectable 4 milliGRAM(s) IV Push every 8 hours PRN Nausea and/or Vomiting      RADIOLOGY & ADDITIONAL TESTS:

## 2021-10-04 NOTE — PROGRESS NOTE ADULT - ASSESSMENT
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  In the ER, CT head was negative. Seen by Dr Lopez, started on vEEG, no events were recorded. Started on Keppra BID while lamotrigine is up titrated  by Dr Lopez as an outpatient. MRI consistent with old infarcts. Neurosurgery called to reprogram  shunt post MRI Seen by PT, recommend home with supervision. Course complicated by acute hypoxia.     1. New onset seizure in a patient with a history of NPH status post  shunt and ICH right basal ganglia in 2021  No events recorded on vEEG  MRI brain- seizure protocol negative  Neurosurgery called to  evaluate  shut post MRI brain  PT evaluation- Home with supervision  Continue Lamotrigine and Keppra BID per Dr Lopez, to be titrated as outpatient     2.  Acute hypoxic respiratory failure: Not O2 dependent at home  requires 2 LPM of NC to correct from 84% with ambulation to 94%  Chest X-ray negative, no leukocytosis or fever, lactate WNL- pneumonia ruled out  change lasix to daily    3. Acute on chronic diastolic CHF; Chest xray negative; BNP elevated  now on oral lasix  Assess for changing to PO with clinical improvement  not on BB at home. started low dose here.    4.  Hypertensive urgency on admission: still uncontrolled  Cont Imdur, increase dose  Started BB  reassess and titrate    5. PASCALE with MA with CKD stage 3: Resolved  Improved with IV fluids    6. DIEGO on Ferrous sulfate PO     7. Diabetes mellitus type 2: Uncontrolled intermittently  Admelog sliding scale  uptitrated  Monitor    8. Elevated TSH: check T3 and T4  full TFTs WNL    9. Depression;   Resume Abilify  Spoke with patient's son Wellbutrin to be held as it may lower seizure threshold. To follow up with PMD as outpatient     VTE- Heparin Subcut    Code Status; Full Code    Next of Kin; Son Thomas [phone]     PT Evaluation- Home with supervision; lives with son   Dispo: Home  Plan: needs home O2; will arrange today

## 2021-10-05 ENCOUNTER — TRANSCRIPTION ENCOUNTER (OUTPATIENT)
Age: 81
End: 2021-10-05

## 2021-10-05 VITALS
HEART RATE: 75 BPM | DIASTOLIC BLOOD PRESSURE: 76 MMHG | TEMPERATURE: 98 F | SYSTOLIC BLOOD PRESSURE: 115 MMHG | RESPIRATION RATE: 20 BRPM | OXYGEN SATURATION: 94 %

## 2021-10-05 LAB
GLUCOSE BLDC GLUCOMTR-MCNC: 152 MG/DL — HIGH (ref 70–99)
GLUCOSE BLDC GLUCOMTR-MCNC: 278 MG/DL — HIGH (ref 70–99)
SARS-COV-2 RNA SPEC QL NAA+PROBE: SIGNIFICANT CHANGE UP

## 2021-10-05 PROCEDURE — 99239 HOSP IP/OBS DSCHRG MGMT >30: CPT

## 2021-10-05 RX ADMIN — FAMOTIDINE 20 MILLIGRAM(S): 10 INJECTION INTRAVENOUS at 12:50

## 2021-10-05 RX ADMIN — HEPARIN SODIUM 5000 UNIT(S): 5000 INJECTION INTRAVENOUS; SUBCUTANEOUS at 06:28

## 2021-10-05 RX ADMIN — Medication 50 MILLIGRAM(S): at 06:28

## 2021-10-05 RX ADMIN — Medication 2: at 08:14

## 2021-10-05 RX ADMIN — LEVETIRACETAM 500 MILLIGRAM(S): 250 TABLET, FILM COATED ORAL at 06:28

## 2021-10-05 RX ADMIN — AMLODIPINE BESYLATE 10 MILLIGRAM(S): 2.5 TABLET ORAL at 06:28

## 2021-10-05 RX ADMIN — Medication 20 MILLIGRAM(S): at 06:39

## 2021-10-05 RX ADMIN — ISOSORBIDE MONONITRATE 60 MILLIGRAM(S): 60 TABLET, EXTENDED RELEASE ORAL at 13:23

## 2021-10-05 RX ADMIN — Medication 6: at 12:49

## 2021-10-05 RX ADMIN — Medication 325 MILLIGRAM(S): at 12:50

## 2021-10-05 RX ADMIN — Medication 1 MILLIGRAM(S): at 12:50

## 2021-10-05 RX ADMIN — ARIPIPRAZOLE 5 MILLIGRAM(S): 15 TABLET ORAL at 12:50

## 2021-10-05 NOTE — DISCHARGE NOTE NURSING/CASE MANAGEMENT/SOCIAL WORK - NSDCFUADDAPPT_GEN_ALL_CORE_FT
STAR pt-    [facility] lung appt with Dr Charles on 10.20.2021 at 12:15pm  [address], [place], [place]   [phone]    Son is content with pharmacy and does not want meds to bed from [facility], pt does not require a pharmacy review  Yellow folder given to pt for STAR protocol     STAR pt-    [facility] referral to [facility] and accepted, pt had services PTA    [facility] appt with Dr Charles on 10.20.2021 at 12:15pm  [address], [place], [place]   [phone]    Son is content with pharmacy and does not want meds to bed from [facility], pt does not require a pharmacy review  Yellow folder given to pt for STAR protocol

## 2021-10-05 NOTE — DISCHARGE NOTE NURSING/CASE MANAGEMENT/SOCIAL WORK - PATIENT PORTAL LINK FT
You can access the FollowMyHealth Patient Portal offered by [facility] by registering at the following website: [email or web address]. By joining [employer]’s FollowMyHealth portal, you will also be able to view your health information using other applications (apps) compatible with our system.

## 2021-10-06 RX ORDER — BARRIER CREAM 200; 4.5 MG/G; MG/G
0 CREAM TOPICAL
Qty: 0 | Refills: 0 | DISCHARGE

## 2021-10-06 RX ORDER — THIAMINE MONONITRATE (VIT B1) 100 MG
1 TABLET ORAL
Qty: 0 | Refills: 0 | DISCHARGE

## 2021-10-06 RX ORDER — ARIPIPRAZOLE 15 MG/1
1 TABLET ORAL
Qty: 0 | Refills: 0 | DISCHARGE

## 2021-10-06 RX ORDER — BUPROPION HYDROCHLORIDE 150 MG/1
1 TABLET, EXTENDED RELEASE ORAL
Qty: 0 | Refills: 0 | DISCHARGE

## 2021-10-06 RX ORDER — FUROSEMIDE 40 MG
1 TABLET ORAL
Qty: 0 | Refills: 0 | DISCHARGE

## 2021-10-06 RX ORDER — HYDRALAZINE HCL 50 MG
1 TABLET ORAL
Qty: 0 | Refills: 0 | DISCHARGE

## 2021-10-06 RX ORDER — LOSARTAN POTASSIUM 100 MG/1
1 TABLET, FILM COATED ORAL
Qty: 0 | Refills: 0 | DISCHARGE

## 2021-10-07 PROBLEM — E11.9 TYPE 2 DIABETES MELLITUS WITHOUT COMPLICATIONS: Chronic | Status: ACTIVE | Noted: 2021-09-29

## 2021-10-07 PROBLEM — Z86.73 PERSONAL HISTORY OF TRANSIENT ISCHEMIC ATTACK (TIA), AND CEREBRAL INFARCTION WITHOUT RESIDUAL DEFICITS: Chronic | Status: ACTIVE | Noted: 2021-09-29

## 2021-10-07 PROBLEM — I82.409 ACUTE EMBOLISM AND THROMBOSIS OF UNSPECIFIED DEEP VEINS OF UNSPECIFIED LOWER EXTREMITY: Chronic | Status: ACTIVE | Noted: 2021-09-29

## 2021-10-07 PROBLEM — F32.9 MAJOR DEPRESSIVE DISORDER, SINGLE EPISODE, UNSPECIFIED: Chronic | Status: ACTIVE | Noted: 2021-09-29

## 2021-10-07 PROBLEM — E78.5 HYPERLIPIDEMIA, UNSPECIFIED: Chronic | Status: ACTIVE | Noted: 2021-09-29

## 2021-10-07 PROBLEM — N39.0 URINARY TRACT INFECTION, SITE NOT SPECIFIED: Chronic | Status: ACTIVE | Noted: 2021-09-29

## 2021-10-07 PROBLEM — H40.9 UNSPECIFIED GLAUCOMA: Chronic | Status: ACTIVE | Noted: 2021-09-29

## 2021-10-07 PROBLEM — N18.9 CHRONIC KIDNEY DISEASE, UNSPECIFIED: Chronic | Status: ACTIVE | Noted: 2021-09-29

## 2021-10-07 PROBLEM — I10 ESSENTIAL (PRIMARY) HYPERTENSION: Chronic | Status: ACTIVE | Noted: 2021-09-29

## 2021-10-07 PROBLEM — G91.2 (IDIOPATHIC) NORMAL PRESSURE HYDROCEPHALUS: Chronic | Status: ACTIVE | Noted: 2021-09-29

## 2021-10-07 PROBLEM — I62.9 NONTRAUMATIC INTRACRANIAL HEMORRHAGE, UNSPECIFIED: Chronic | Status: ACTIVE | Noted: 2021-09-29

## 2021-10-07 PROBLEM — J69.0 PNEUMONITIS DUE TO INHALATION OF FOOD AND VOMIT: Chronic | Status: ACTIVE | Noted: 2021-09-29

## 2021-10-07 PROBLEM — F31.9 BIPOLAR DISORDER, UNSPECIFIED: Chronic | Status: ACTIVE | Noted: 2021-09-29

## 2021-10-08 ENCOUNTER — APPOINTMENT (OUTPATIENT)
Dept: CARE COORDINATION | Facility: HOME HEALTH | Age: 81
End: 2021-10-08

## 2021-10-09 DIAGNOSIS — I10 ESSENTIAL (PRIMARY) HYPERTENSION: ICD-10-CM

## 2021-10-09 DIAGNOSIS — G40.909 EPILEPSY, UNSPECIFIED, NOT INTRACTABLE, W/OUT STATUS EPILEPTICUS: ICD-10-CM

## 2021-10-09 DIAGNOSIS — E11.9 TYPE 2 DIABETES MELLITUS W/OUT COMPLICATIONS: ICD-10-CM

## 2021-10-09 DIAGNOSIS — R26.81 UNSTEADINESS ON FEET: ICD-10-CM

## 2021-10-09 RX ORDER — ANORECTAL OINTMENT 15.7; .44; 24; 20.6 G/100G; G/100G; G/100G; G/100G
0.44-20.6 OINTMENT TOPICAL
Qty: 113 | Refills: 0 | Status: ACTIVE | COMMUNITY
Start: 2021-08-31

## 2021-10-09 RX ORDER — OMEPRAZOLE 40 MG/1
40 CAPSULE, DELAYED RELEASE ORAL
Qty: 30 | Refills: 0 | Status: ACTIVE | COMMUNITY
Start: 2021-08-31

## 2021-10-09 RX ORDER — ACETAMINOPHEN EXTRA STRENGTH 500 MG/1
500 TABLET ORAL
Qty: 40 | Refills: 0 | Status: ACTIVE | COMMUNITY
Start: 2021-08-31

## 2021-10-09 RX ORDER — FUROSEMIDE 20 MG/1
20 TABLET ORAL
Qty: 30 | Refills: 0 | Status: ACTIVE | COMMUNITY
Start: 2021-09-22

## 2021-10-09 RX ORDER — CHLORHEXIDINE GLUCONATE 4 %
325 (65 FE) LIQUID (ML) TOPICAL
Qty: 30 | Refills: 0 | Status: ACTIVE | COMMUNITY
Start: 2021-08-31

## 2021-10-09 RX ORDER — FUROSEMIDE 40 MG/1
40 TABLET ORAL
Qty: 30 | Refills: 0 | Status: ACTIVE | COMMUNITY
Start: 2021-08-31

## 2021-10-09 RX ORDER — METOPROLOL SUCCINATE 50 MG/1
50 TABLET, EXTENDED RELEASE ORAL
Qty: 30 | Refills: 0 | Status: ACTIVE | COMMUNITY
Start: 2021-10-04

## 2021-10-09 RX ORDER — BUPROPION HYDROCHLORIDE 100 MG/1
100 TABLET, FILM COATED, EXTENDED RELEASE ORAL
Qty: 60 | Refills: 0 | Status: ACTIVE | COMMUNITY
Start: 2021-08-31

## 2021-10-11 ENCOUNTER — INPATIENT (INPATIENT)
Facility: HOSPITAL | Age: 81
LOS: 3 days | Discharge: HOME | DRG: 305 | End: 2021-10-15
Attending: HOSPITALIST | Admitting: HOSPITALIST
Payer: MEDICARE

## 2021-10-11 VITALS
HEIGHT: 58 IN | RESPIRATION RATE: 20 BRPM | SYSTOLIC BLOOD PRESSURE: 185 MMHG | TEMPERATURE: 98 F | OXYGEN SATURATION: 94 % | DIASTOLIC BLOOD PRESSURE: 75 MMHG | HEART RATE: 62 BPM

## 2021-10-11 DIAGNOSIS — I10 ESSENTIAL (PRIMARY) HYPERTENSION: ICD-10-CM

## 2021-10-11 DIAGNOSIS — I63.9 CEREBRAL INFARCTION, UNSPECIFIED: ICD-10-CM

## 2021-10-11 DIAGNOSIS — E11.9 TYPE 2 DIABETES MELLITUS WITHOUT COMPLICATIONS: ICD-10-CM

## 2021-10-11 DIAGNOSIS — S86.009A UNSPECIFIED INJURY OF UNSPECIFIED ACHILLES TENDON, INITIAL ENCOUNTER: Chronic | ICD-10-CM

## 2021-10-11 DIAGNOSIS — I50.22 CHRONIC SYSTOLIC (CONGESTIVE) HEART FAILURE: ICD-10-CM

## 2021-10-11 DIAGNOSIS — Z90.710 ACQUIRED ABSENCE OF BOTH CERVIX AND UTERUS: Chronic | ICD-10-CM

## 2021-10-11 DIAGNOSIS — Z96.652 PRESENCE OF LEFT ARTIFICIAL KNEE JOINT: Chronic | ICD-10-CM

## 2021-10-11 DIAGNOSIS — R56.9 UNSPECIFIED CONVULSIONS: ICD-10-CM

## 2021-10-11 DIAGNOSIS — Z90.49 ACQUIRED ABSENCE OF OTHER SPECIFIED PARTS OF DIGESTIVE TRACT: Chronic | ICD-10-CM

## 2021-10-11 DIAGNOSIS — G91.2 (IDIOPATHIC) NORMAL PRESSURE HYDROCEPHALUS: ICD-10-CM

## 2021-10-11 LAB
ALBUMIN SERPL ELPH-MCNC: 3.6 G/DL — SIGNIFICANT CHANGE UP (ref 3.3–5.2)
ALP SERPL-CCNC: 122 U/L — HIGH (ref 40–120)
ALT FLD-CCNC: 9 U/L — SIGNIFICANT CHANGE UP
ANION GAP SERPL CALC-SCNC: 14 MMOL/L — SIGNIFICANT CHANGE UP (ref 5–17)
APTT BLD: 27.9 SEC — SIGNIFICANT CHANGE UP (ref 27.5–35.5)
AST SERPL-CCNC: 15 U/L — SIGNIFICANT CHANGE UP
BASOPHILS # BLD AUTO: 0.08 K/UL — SIGNIFICANT CHANGE UP (ref 0–0.2)
BASOPHILS NFR BLD AUTO: 1 % — SIGNIFICANT CHANGE UP (ref 0–2)
BILIRUB SERPL-MCNC: 0.3 MG/DL — LOW (ref 0.4–2)
BUN SERPL-MCNC: 16.9 MG/DL — SIGNIFICANT CHANGE UP (ref 8–20)
CALCIUM SERPL-MCNC: 9.2 MG/DL — SIGNIFICANT CHANGE UP (ref 8.6–10.2)
CHLORIDE SERPL-SCNC: 99 MMOL/L — SIGNIFICANT CHANGE UP (ref 98–107)
CO2 SERPL-SCNC: 25 MMOL/L — SIGNIFICANT CHANGE UP (ref 22–29)
CREAT SERPL-MCNC: 0.91 MG/DL — SIGNIFICANT CHANGE UP (ref 0.5–1.3)
EOSINOPHIL # BLD AUTO: 0.5 K/UL — SIGNIFICANT CHANGE UP (ref 0–0.5)
EOSINOPHIL NFR BLD AUTO: 6.3 % — HIGH (ref 0–6)
GLUCOSE SERPL-MCNC: 145 MG/DL — HIGH (ref 70–99)
HCT VFR BLD CALC: 33 % — LOW (ref 34.5–45)
HGB BLD-MCNC: 10.6 G/DL — LOW (ref 11.5–15.5)
IMM GRANULOCYTES NFR BLD AUTO: 0.4 % — SIGNIFICANT CHANGE UP (ref 0–1.5)
INR BLD: 1.13 RATIO — SIGNIFICANT CHANGE UP (ref 0.88–1.16)
LYMPHOCYTES # BLD AUTO: 2.43 K/UL — SIGNIFICANT CHANGE UP (ref 1–3.3)
LYMPHOCYTES # BLD AUTO: 30.7 % — SIGNIFICANT CHANGE UP (ref 13–44)
MCHC RBC-ENTMCNC: 28.7 PG — SIGNIFICANT CHANGE UP (ref 27–34)
MCHC RBC-ENTMCNC: 32.1 GM/DL — SIGNIFICANT CHANGE UP (ref 32–36)
MCV RBC AUTO: 89.4 FL — SIGNIFICANT CHANGE UP (ref 80–100)
MONOCYTES # BLD AUTO: 0.79 K/UL — SIGNIFICANT CHANGE UP (ref 0–0.9)
MONOCYTES NFR BLD AUTO: 10 % — SIGNIFICANT CHANGE UP (ref 2–14)
NEUTROPHILS # BLD AUTO: 4.09 K/UL — SIGNIFICANT CHANGE UP (ref 1.8–7.4)
NEUTROPHILS NFR BLD AUTO: 51.6 % — SIGNIFICANT CHANGE UP (ref 43–77)
PLATELET # BLD AUTO: 302 K/UL — SIGNIFICANT CHANGE UP (ref 150–400)
POTASSIUM SERPL-MCNC: 4 MMOL/L — SIGNIFICANT CHANGE UP (ref 3.5–5.3)
POTASSIUM SERPL-SCNC: 4 MMOL/L — SIGNIFICANT CHANGE UP (ref 3.5–5.3)
PROT SERPL-MCNC: 7 G/DL — SIGNIFICANT CHANGE UP (ref 6.6–8.7)
PROTHROM AB SERPL-ACNC: 13 SEC — SIGNIFICANT CHANGE UP (ref 10.6–13.6)
RBC # BLD: 3.69 M/UL — LOW (ref 3.8–5.2)
RBC # FLD: 14.7 % — HIGH (ref 10.3–14.5)
SARS-COV-2 RNA SPEC QL NAA+PROBE: SIGNIFICANT CHANGE UP
SODIUM SERPL-SCNC: 138 MMOL/L — SIGNIFICANT CHANGE UP (ref 135–145)
TROPONIN T SERPL-MCNC: 0.02 NG/ML — SIGNIFICANT CHANGE UP (ref 0–0.06)
WBC # BLD: 7.92 K/UL — SIGNIFICANT CHANGE UP (ref 3.8–10.5)
WBC # FLD AUTO: 7.92 K/UL — SIGNIFICANT CHANGE UP (ref 3.8–10.5)

## 2021-10-11 PROCEDURE — 70496 CT ANGIOGRAPHY HEAD: CPT | Mod: 26,MA

## 2021-10-11 PROCEDURE — 70450 CT HEAD/BRAIN W/O DYE: CPT | Mod: 26,MA,59

## 2021-10-11 PROCEDURE — 99291 CRITICAL CARE FIRST HOUR: CPT

## 2021-10-11 PROCEDURE — 71045 X-RAY EXAM CHEST 1 VIEW: CPT | Mod: 26

## 2021-10-11 PROCEDURE — 99223 1ST HOSP IP/OBS HIGH 75: CPT | Mod: AI

## 2021-10-11 PROCEDURE — 70498 CT ANGIOGRAPHY NECK: CPT | Mod: 26,MA

## 2021-10-11 PROCEDURE — 0042T: CPT

## 2021-10-11 PROCEDURE — 93010 ELECTROCARDIOGRAM REPORT: CPT

## 2021-10-11 RX ORDER — DEXTROSE 50 % IN WATER 50 %
12.5 SYRINGE (ML) INTRAVENOUS ONCE
Refills: 0 | Status: DISCONTINUED | OUTPATIENT
Start: 2021-10-11 | End: 2021-10-15

## 2021-10-11 RX ORDER — FERROUS SULFATE 325(65) MG
325 TABLET ORAL DAILY
Refills: 0 | Status: DISCONTINUED | OUTPATIENT
Start: 2021-10-11 | End: 2021-10-15

## 2021-10-11 RX ORDER — METOPROLOL TARTRATE 50 MG
50 TABLET ORAL DAILY
Refills: 0 | Status: DISCONTINUED | OUTPATIENT
Start: 2021-10-11 | End: 2021-10-15

## 2021-10-11 RX ORDER — DEXTROSE 50 % IN WATER 50 %
15 SYRINGE (ML) INTRAVENOUS ONCE
Refills: 0 | Status: DISCONTINUED | OUTPATIENT
Start: 2021-10-11 | End: 2021-10-15

## 2021-10-11 RX ORDER — LAMOTRIGINE 25 MG/1
25 TABLET, ORALLY DISINTEGRATING ORAL AT BEDTIME
Refills: 0 | Status: DISCONTINUED | OUTPATIENT
Start: 2021-10-11 | End: 2021-10-15

## 2021-10-11 RX ORDER — PANTOPRAZOLE SODIUM 20 MG/1
40 TABLET, DELAYED RELEASE ORAL
Refills: 0 | Status: DISCONTINUED | OUTPATIENT
Start: 2021-10-11 | End: 2021-10-15

## 2021-10-11 RX ORDER — ARIPIPRAZOLE 15 MG/1
5 TABLET ORAL DAILY
Refills: 0 | Status: DISCONTINUED | OUTPATIENT
Start: 2021-10-11 | End: 2021-10-15

## 2021-10-11 RX ORDER — DEXTROSE 50 % IN WATER 50 %
25 SYRINGE (ML) INTRAVENOUS ONCE
Refills: 0 | Status: DISCONTINUED | OUTPATIENT
Start: 2021-10-11 | End: 2021-10-15

## 2021-10-11 RX ORDER — SODIUM CHLORIDE 9 MG/ML
1000 INJECTION, SOLUTION INTRAVENOUS
Refills: 0 | Status: DISCONTINUED | OUTPATIENT
Start: 2021-10-11 | End: 2021-10-15

## 2021-10-11 RX ORDER — ASPIRIN/CALCIUM CARB/MAGNESIUM 324 MG
81 TABLET ORAL DAILY
Refills: 0 | Status: DISCONTINUED | OUTPATIENT
Start: 2021-10-11 | End: 2021-10-15

## 2021-10-11 RX ORDER — INSULIN LISPRO 100/ML
VIAL (ML) SUBCUTANEOUS
Refills: 0 | Status: DISCONTINUED | OUTPATIENT
Start: 2021-10-11 | End: 2021-10-15

## 2021-10-11 RX ORDER — FERROUS SULFATE 325(65) MG
1 TABLET ORAL
Qty: 0 | Refills: 0 | DISCHARGE

## 2021-10-11 RX ORDER — FUROSEMIDE 40 MG
20 TABLET ORAL DAILY
Refills: 0 | Status: DISCONTINUED | OUTPATIENT
Start: 2021-10-11 | End: 2021-10-13

## 2021-10-11 RX ORDER — ARIPIPRAZOLE 15 MG/1
10 TABLET ORAL DAILY
Refills: 0 | Status: DISCONTINUED | OUTPATIENT
Start: 2021-10-11 | End: 2021-10-11

## 2021-10-11 RX ORDER — LEVETIRACETAM 250 MG/1
500 TABLET, FILM COATED ORAL
Refills: 0 | Status: DISCONTINUED | OUTPATIENT
Start: 2021-10-11 | End: 2021-10-15

## 2021-10-11 RX ORDER — ALBUTEROL 90 UG/1
2 AEROSOL, METERED ORAL EVERY 6 HOURS
Refills: 0 | Status: DISCONTINUED | OUTPATIENT
Start: 2021-10-11 | End: 2021-10-15

## 2021-10-11 RX ORDER — GLUCAGON INJECTION, SOLUTION 0.5 MG/.1ML
1 INJECTION, SOLUTION SUBCUTANEOUS ONCE
Refills: 0 | Status: DISCONTINUED | OUTPATIENT
Start: 2021-10-11 | End: 2021-10-15

## 2021-10-11 RX ORDER — ATORVASTATIN CALCIUM 80 MG/1
40 TABLET, FILM COATED ORAL AT BEDTIME
Refills: 0 | Status: DISCONTINUED | OUTPATIENT
Start: 2021-10-11 | End: 2021-10-15

## 2021-10-11 RX ORDER — OMEPRAZOLE 10 MG/1
1 CAPSULE, DELAYED RELEASE ORAL
Qty: 0 | Refills: 0 | DISCHARGE

## 2021-10-11 RX ORDER — ATORVASTATIN CALCIUM 80 MG/1
1 TABLET, FILM COATED ORAL
Qty: 0 | Refills: 0 | DISCHARGE

## 2021-10-11 RX ORDER — ISOSORBIDE MONONITRATE 60 MG/1
60 TABLET, EXTENDED RELEASE ORAL DAILY
Refills: 0 | Status: DISCONTINUED | OUTPATIENT
Start: 2021-10-11 | End: 2021-10-15

## 2021-10-11 RX ORDER — FOLIC ACID 0.8 MG
1 TABLET ORAL
Qty: 0 | Refills: 0 | DISCHARGE

## 2021-10-11 RX ADMIN — ATORVASTATIN CALCIUM 40 MILLIGRAM(S): 80 TABLET, FILM COATED ORAL at 21:24

## 2021-10-11 RX ADMIN — LAMOTRIGINE 25 MILLIGRAM(S): 25 TABLET, ORALLY DISINTEGRATING ORAL at 21:22

## 2021-10-11 RX ADMIN — LEVETIRACETAM 500 MILLIGRAM(S): 250 TABLET, FILM COATED ORAL at 17:51

## 2021-10-11 NOTE — CHART NOTE - NSCHARTNOTEFT_GEN_A_CORE
[facility]                                        Neu[facility] [place]                                 Colby Marques, & Paul                                  [address]                                        [place], [place], [address]                                             [phone]        Responded to code stroke call.   Discussed with ER team.     Patient presented with left face droop and dysarthria.   She has prior history of intracranial hemorrhage.  She is therefore not a t-PA candidate.   CT-A negative for large vessel occlusion and CT-P negative for core/penumbra.     Recent MRI brain 10/1/21 showed multiple chronic findings but no acute pathology.  Will need repeat non contrast brain MRI.     Full neuro consult to follow.

## 2021-10-11 NOTE — ED PROVIDER NOTE - CLINICAL SUMMARY MEDICAL DECISION MAKING FREE TEXT BOX
Pt with hx of cva and ich presenting today with c/o L facial droop and dysarthria with lkw 1300 today. Code stroke called, will initiate code stroke orderset. Given prior hx of ich, pt is not a tpa candidate. Will reeval.

## 2021-10-11 NOTE — ED PROVIDER NOTE - ATTENDING CONTRIBUTION TO CARE
patient critically ill requiring medications with intensive monitoring, discussion with consultants, discussion with patient and family, interpretation of diagnostic studies.     I, Nusrat Rosales, have personally seen and examined this patient. I have fully participated in the care of this patient. I have reviewed all pertinent clinical information, including history, physical exam, plan and the Resident's note and agree except as noted below.     Pt with h/o CVA with Lt sided weakness, recent admission for new onset sz, h/o NPH with  shunt and ICH 5/2021, today acute onset 1 hr PTA slurred speech, increased 'heaviness' and tinglin gin left arm/leg and facial droop.     NIH 4, not TPA candidate due to ICH. CT labs, TBA

## 2021-10-11 NOTE — H&P ADULT - ASSESSMENT
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 She was seen in ER, CT head neg for acute IC bleed, seen by Neurology, was found to not be a candidate for tPA 2/2 recent h/o IC bleed. Admitted for acute CVA.

## 2021-10-11 NOTE — ED PROVIDER NOTE - PHYSICAL EXAMINATION
Gen: no acute distress  Head: normocephalic, atraumatic  Lung: CTAB, no respiratory distress, no wheezing, rales, rhonchi  CV: normal s1/s2, rrr,   Abd: soft, no tenderness, non-distended  MSK: No edema, no visible deformities, full range of motion in all 4 extremities  Neuro: No focal neurologic deficits, L facial droop with associated dysarthria CN 2-12 otherwise intact , LUE strength 3/5 (baseline), 5/5 strength to all other extremities, sensation intact,   Skin: No rash   Psych: normal affect Gen: no acute distress  Head: normocephalic, atraumatic  Lung: CTAB, no respiratory distress, no wheezing, rales, rhonchi  CV: normal s1/s2, rrr,   Abd: soft, no tenderness, non-distended  MSK: No edema, no visible deformities, full range of motion in all 4 extremities  Neuro:  L facial droop with associated mild dysarthria CN 2-12 otherwise intact , LUE strength 3/5 (baseline), 5/5 strength to all other extremities, sensation intact,   Skin: No rash   Psych: normal affect

## 2021-10-11 NOTE — ED PROVIDER NOTE - PROGRESS NOTE DETAILS
D/w code stroke nruologist dr. velásquez. Agrees with no tpa given hx of recent ich. Requesting admission for mri. - Dong Lewis, PGY-3 Reviewed all results with pt as well as plans for admission. Pt is comfortable with plan for admission. Questions answered. - Dong Lewis, PGY-3

## 2021-10-11 NOTE — H&P ADULT - HISTORY OF PRESENT ILLNESS
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 She was seen in ER, CT head neg for acute IC bleed, seen by Neurology, was found to not be a candidate for tPA 2/2 recent h/o IC bleed.   Of note she was discharged from [facility] on 10/05 after a stay for new onset seizure, she was started on keppra and discharged home on home O2 PRN for Acute on chronic CHF.

## 2021-10-11 NOTE — H&P ADULT - NSHPPHYSICALEXAM_GEN_ALL_CORE
PHYSICAL EXAM:    GENERAL: NAD, well-groomed, well-developed  HEAD:  Atraumatic, Normocephalic  EYES: EOMI, PERRLA, conjunctiva and sclera clear  ENMT:  Moist mucous membranes, Good dentition, No lesions  NECK: Supple, No JVD, Normal thyroid  NERVOUS SYSTEM:  Alert & Oriented X3, Good concentration; Left facial droop  CHEST/LUNG: Clear to percussion bilaterally; No rales, rhonchi,  HEART: Regular rate and rhythm; No murmurs,   ABDOMEN: Soft, Nontender, Nondistended; Bowel sounds present  EXTREMITIES:   No clubbing, cyanosis, or edema  SKIN: No rashes or lesions

## 2021-10-11 NOTE — ED ADULT TRIAGE NOTE - CHIEF COMPLAINT QUOTE
pt a+ox3, BIBA from home c/o worsening slurred speech and dizziness x1 hr. hx of brain bleed in May 2021. left sided facial droop noted. left sided residual weakness from previous stroke. MD Rosales @ bedside.

## 2021-10-11 NOTE — ED PROVIDER NOTE - NSICDXPASTSURGICALHX_GEN_ALL_CORE_FT
PAST SURGICAL HISTORY:  Achilles tendon injury repair  right scalene muscle release    S/P cholecystectomy 1981    S/P cholecystectomy     S/P hysterectomy 1981 and Oopherectomy in 1990s    S/P knee replacement, left     S/P total knee replacement, left

## 2021-10-11 NOTE — ED PROVIDER NOTE - NSICDXFAMILYHX_GEN_ALL_CORE_FT
FAMILY HISTORY:  Family history of diabetes mellitus  Family history of early CAD  Family history of early CAD  FH: type 2 diabetes mellitus    Sibling  Still living? No  Family history of pancreatic cancer, Age at diagnosis: 61-70

## 2021-10-11 NOTE — ED PROVIDER NOTE - NSICDXPASTMEDICALHX_GEN_ALL_CORE_FT
PAST MEDICAL HISTORY:  Bipolar disorder     CKD (chronic kidney disease)     Depression     Diabetes     DM (diabetes mellitus)     DVT, lower extremity     Glaucoma     Glaucoma     History of TIAs     HTN (hypertension)     Hyperlipidemia     Hyperlipidemia     Hypertension     Intracranial bleed     NPH (normal pressure hydrocephalus)     Osteoarthritis     Pneumonia, aspiration     Small bowel obstruction     UTI (urinary tract infection)

## 2021-10-11 NOTE — ED PROVIDER NOTE - OBJECTIVE STATEMENT
82 y/o F pt with hx of chronic diastolic CHF, NIDDM, hypertension, hyperlipidemia, NPH status post  shunt 2015,  ICH(small right basal ganglia hemorrhage 5/2021),DVT (off AC with neg doppler), glaucoma, Bipolar, depression presenting today with c/o L facial droop and dysarthria with lkw 1300 today. Pt states that she has residual L hand weakness from prior stroke, but is also c/o L hand paresthesias that began at 1300 today which is also new. Pt was just admitted to [facility] 1 week prior for seizures, was d/c on keppra. Pt denies any chest pain, dyspnea, fevers, n/v/d, abdominal pain, dysuria, headache, cough, congestion, sore throat, neck pain, back pain, weakness, syncope, or other complaint.

## 2021-10-11 NOTE — ED ADULT NURSE NOTE - OBJECTIVE STATEMENT
story obtained from ems and pt.  pt arrived worsening slurred speech and left sided weakness since 1 am pt denies fevers sweats chills denies chest pain code stroke called MD king at bedside, pt placed on monitor.  pt had recent brain bleed and recent discharge from hospital .

## 2021-10-11 NOTE — ED CLERICAL - NS ED CLERK NOTE PRE-ARRIVAL INFORMATION; ADDITIONAL PRE-ARRIVAL INFORMATION
This patient is enrolled in a readmission reduction program and has active care navigation. This patient can be followed up by the care navigation team within 24 hours. To arrange close follow-up or to obtain additional clinical information about this patient, please call the contact number above. Please speak with the [place] ED Case Manager for assistance with discharge planning

## 2021-10-12 ENCOUNTER — APPOINTMENT (OUTPATIENT)
Dept: NEUROLOGY | Facility: CLINIC | Age: 81
End: 2021-10-12

## 2021-10-12 LAB
CHOLEST SERPL-MCNC: 153 MG/DL — SIGNIFICANT CHANGE UP
COVID-19 SPIKE DOMAIN AB INTERP: POSITIVE
COVID-19 SPIKE DOMAIN ANTIBODY RESULT: >250 U/ML — HIGH
GLUCOSE BLDC GLUCOMTR-MCNC: 132 MG/DL — HIGH (ref 70–99)
GLUCOSE BLDC GLUCOMTR-MCNC: 132 MG/DL — HIGH (ref 70–99)
GLUCOSE BLDC GLUCOMTR-MCNC: 141 MG/DL — HIGH (ref 70–99)
GLUCOSE BLDC GLUCOMTR-MCNC: 153 MG/DL — HIGH (ref 70–99)
GLUCOSE BLDC GLUCOMTR-MCNC: 184 MG/DL — HIGH (ref 70–99)
HCT VFR BLD CALC: 31.2 % — LOW (ref 34.5–45)
HDLC SERPL-MCNC: 43 MG/DL — LOW
HGB BLD-MCNC: 10 G/DL — LOW (ref 11.5–15.5)
LIPID PNL WITH DIRECT LDL SERPL: 73 MG/DL — SIGNIFICANT CHANGE UP
MCHC RBC-ENTMCNC: 28.9 PG — SIGNIFICANT CHANGE UP (ref 27–34)
MCHC RBC-ENTMCNC: 32.1 GM/DL — SIGNIFICANT CHANGE UP (ref 32–36)
MCV RBC AUTO: 90.2 FL — SIGNIFICANT CHANGE UP (ref 80–100)
NON HDL CHOLESTEROL: 110 MG/DL — SIGNIFICANT CHANGE UP
PLATELET # BLD AUTO: 280 K/UL — SIGNIFICANT CHANGE UP (ref 150–400)
RBC # BLD: 3.46 M/UL — LOW (ref 3.8–5.2)
RBC # FLD: 14.9 % — HIGH (ref 10.3–14.5)
SARS-COV-2 IGG+IGM SERPL QL IA: >250 U/ML — HIGH
SARS-COV-2 IGG+IGM SERPL QL IA: POSITIVE
TRIGL SERPL-MCNC: 185 MG/DL — HIGH
WBC # BLD: 7.14 K/UL — SIGNIFICANT CHANGE UP (ref 3.8–10.5)
WBC # FLD AUTO: 7.14 K/UL — SIGNIFICANT CHANGE UP (ref 3.8–10.5)

## 2021-10-12 PROCEDURE — 99233 SBSQ HOSP IP/OBS HIGH 50: CPT

## 2021-10-12 RX ORDER — INFLUENZA VIRUS VACCINE 15; 15; 15; 15 UG/.5ML; UG/.5ML; UG/.5ML; UG/.5ML
0.5 SUSPENSION INTRAMUSCULAR ONCE
Refills: 0 | Status: DISCONTINUED | OUTPATIENT
Start: 2021-10-12 | End: 2021-10-15

## 2021-10-12 RX ORDER — ENOXAPARIN SODIUM 100 MG/ML
40 INJECTION SUBCUTANEOUS DAILY
Refills: 0 | Status: DISCONTINUED | OUTPATIENT
Start: 2021-10-12 | End: 2021-10-15

## 2021-10-12 RX ORDER — HYDRALAZINE HCL 50 MG
10 TABLET ORAL EVERY 8 HOURS
Refills: 0 | Status: DISCONTINUED | OUTPATIENT
Start: 2021-10-12 | End: 2021-10-15

## 2021-10-12 RX ADMIN — ISOSORBIDE MONONITRATE 60 MILLIGRAM(S): 60 TABLET, EXTENDED RELEASE ORAL at 10:38

## 2021-10-12 RX ADMIN — LEVETIRACETAM 500 MILLIGRAM(S): 250 TABLET, FILM COATED ORAL at 05:49

## 2021-10-12 RX ADMIN — Medication 10 MILLIGRAM(S): at 12:49

## 2021-10-12 RX ADMIN — Medication 325 MILLIGRAM(S): at 10:38

## 2021-10-12 RX ADMIN — ARIPIPRAZOLE 5 MILLIGRAM(S): 15 TABLET ORAL at 10:38

## 2021-10-12 RX ADMIN — Medication 20 MILLIGRAM(S): at 05:48

## 2021-10-12 RX ADMIN — LEVETIRACETAM 500 MILLIGRAM(S): 250 TABLET, FILM COATED ORAL at 19:11

## 2021-10-12 RX ADMIN — Medication 81 MILLIGRAM(S): at 10:37

## 2021-10-12 RX ADMIN — ATORVASTATIN CALCIUM 40 MILLIGRAM(S): 80 TABLET, FILM COATED ORAL at 21:32

## 2021-10-12 RX ADMIN — Medication 2: at 13:17

## 2021-10-12 RX ADMIN — PANTOPRAZOLE SODIUM 40 MILLIGRAM(S): 20 TABLET, DELAYED RELEASE ORAL at 05:48

## 2021-10-12 RX ADMIN — LAMOTRIGINE 25 MILLIGRAM(S): 25 TABLET, ORALLY DISINTEGRATING ORAL at 21:32

## 2021-10-12 RX ADMIN — ENOXAPARIN SODIUM 40 MILLIGRAM(S): 100 INJECTION SUBCUTANEOUS at 12:49

## 2021-10-12 RX ADMIN — Medication 2: at 10:34

## 2021-10-12 NOTE — OCCUPATIONAL THERAPY INITIAL EVALUATION ADULT - PERTINENT HX OF CURRENT PROBLEM, REHAB EVAL
Pt with right posterior shunt with hx of NPH; pt with admission for seizure activity and discharge on 10/5/21

## 2021-10-12 NOTE — ED ADULT NURSE REASSESSMENT NOTE - NS ED NURSE REASSESS COMMENT FT1
spoke to MD ALVA who will adjust neuro check frequency for SDU
assumed care of pt Pt in no apparent distress at this time. Airway patent, breathing spontaneous and nonlabored. Pt A&Ox3 resting in stretcher. Pt no complaints at this time,

## 2021-10-12 NOTE — PHYSICAL THERAPY INITIAL EVALUATION ADULT - ADDITIONAL COMMENTS
Pt reports living in a 2nd floor apartment with son who assists her. Pt recently discharged from hospital due to a seizure, reports being independent with all prior to hospitalization without devices. Since being home, reports needing assist with all ADLs and self care and amb with a rollator.

## 2021-10-12 NOTE — PHYSICAL THERAPY INITIAL EVALUATION ADULT - IMPAIRED TRANSFERS: SIT/STAND, REHAB EVAL
impaired balance/impaired coordination/impaired motor control/abnormal muscle tone/decreased strength

## 2021-10-12 NOTE — OCCUPATIONAL THERAPY INITIAL EVALUATION ADULT - LIVES WITH, PROFILE
son, in a 2nd floor garden apartment with 2 steps to enter with railing and 12 steps inside with railing; pt reports that her son assists her with ADLs and IADLs prior to hospitalization/children

## 2021-10-12 NOTE — CONSULT NOTE ADULT - ASSESSMENT
The patient is a 81y Female with new stroke.     Stroke.   Was not t-PA candidate secondary to prior intracranial hemorrhage and mild nondisabling deficit.   LDL: 73  Goal: < 70  Continue antiplatelet and statin.   Check MRI brain.   PT/OT.     Seizure history/ICH.   Continue Keppra.   Continue Lamotrigine (likely taking for behavioral issues).     Case discussed with ER team Dr Lewis (resident) and Dr Rosales attending.

## 2021-10-13 ENCOUNTER — TRANSCRIPTION ENCOUNTER (OUTPATIENT)
Age: 81
End: 2021-10-13

## 2021-10-13 LAB
A1C WITH ESTIMATED AVERAGE GLUCOSE RESULT: 7.4 % — HIGH (ref 4–5.6)
ANION GAP SERPL CALC-SCNC: 15 MMOL/L — SIGNIFICANT CHANGE UP (ref 5–17)
BUN SERPL-MCNC: 19.6 MG/DL — SIGNIFICANT CHANGE UP (ref 8–20)
CALCIUM SERPL-MCNC: 8.5 MG/DL — LOW (ref 8.6–10.2)
CHLORIDE SERPL-SCNC: 100 MMOL/L — SIGNIFICANT CHANGE UP (ref 98–107)
CHOLEST SERPL-MCNC: 153 MG/DL — SIGNIFICANT CHANGE UP
CO2 SERPL-SCNC: 28 MMOL/L — SIGNIFICANT CHANGE UP (ref 22–29)
CREAT SERPL-MCNC: 1.29 MG/DL — SIGNIFICANT CHANGE UP (ref 0.5–1.3)
ESTIMATED AVERAGE GLUCOSE: 166 MG/DL — HIGH (ref 68–114)
GLUCOSE BLDC GLUCOMTR-MCNC: 103 MG/DL — HIGH (ref 70–99)
GLUCOSE BLDC GLUCOMTR-MCNC: 151 MG/DL — HIGH (ref 70–99)
GLUCOSE BLDC GLUCOMTR-MCNC: 172 MG/DL — HIGH (ref 70–99)
GLUCOSE BLDC GLUCOMTR-MCNC: 177 MG/DL — HIGH (ref 70–99)
GLUCOSE SERPL-MCNC: 143 MG/DL — HIGH (ref 70–99)
HCT VFR BLD CALC: 30.4 % — LOW (ref 34.5–45)
HDLC SERPL-MCNC: 40 MG/DL — LOW
HGB BLD-MCNC: 9.9 G/DL — LOW (ref 11.5–15.5)
LIPID PNL WITH DIRECT LDL SERPL: 76 MG/DL — SIGNIFICANT CHANGE UP
MCHC RBC-ENTMCNC: 29.4 PG — SIGNIFICANT CHANGE UP (ref 27–34)
MCHC RBC-ENTMCNC: 32.6 GM/DL — SIGNIFICANT CHANGE UP (ref 32–36)
MCV RBC AUTO: 90.2 FL — SIGNIFICANT CHANGE UP (ref 80–100)
NON HDL CHOLESTEROL: 113 MG/DL — SIGNIFICANT CHANGE UP
PLATELET # BLD AUTO: 261 K/UL — SIGNIFICANT CHANGE UP (ref 150–400)
POTASSIUM SERPL-MCNC: 3.9 MMOL/L — SIGNIFICANT CHANGE UP (ref 3.5–5.3)
POTASSIUM SERPL-SCNC: 3.9 MMOL/L — SIGNIFICANT CHANGE UP (ref 3.5–5.3)
RBC # BLD: 3.37 M/UL — LOW (ref 3.8–5.2)
RBC # FLD: 15 % — HIGH (ref 10.3–14.5)
SODIUM SERPL-SCNC: 143 MMOL/L — SIGNIFICANT CHANGE UP (ref 135–145)
TRIGL SERPL-MCNC: 186 MG/DL — HIGH
WBC # BLD: 6.55 K/UL — SIGNIFICANT CHANGE UP (ref 3.8–10.5)
WBC # FLD AUTO: 6.55 K/UL — SIGNIFICANT CHANGE UP (ref 3.8–10.5)

## 2021-10-13 PROCEDURE — 99233 SBSQ HOSP IP/OBS HIGH 50: CPT

## 2021-10-13 PROCEDURE — 99223 1ST HOSP IP/OBS HIGH 75: CPT

## 2021-10-13 PROCEDURE — 99232 SBSQ HOSP IP/OBS MODERATE 35: CPT

## 2021-10-13 RX ORDER — FUROSEMIDE 40 MG
20 TABLET ORAL
Refills: 0 | Status: DISCONTINUED | OUTPATIENT
Start: 2021-10-14 | End: 2021-10-15

## 2021-10-13 RX ADMIN — Medication 2: at 12:19

## 2021-10-13 RX ADMIN — ARIPIPRAZOLE 5 MILLIGRAM(S): 15 TABLET ORAL at 12:15

## 2021-10-13 RX ADMIN — ISOSORBIDE MONONITRATE 60 MILLIGRAM(S): 60 TABLET, EXTENDED RELEASE ORAL at 12:15

## 2021-10-13 RX ADMIN — LEVETIRACETAM 500 MILLIGRAM(S): 250 TABLET, FILM COATED ORAL at 17:16

## 2021-10-13 RX ADMIN — LEVETIRACETAM 500 MILLIGRAM(S): 250 TABLET, FILM COATED ORAL at 05:50

## 2021-10-13 RX ADMIN — Medication 50 MILLIGRAM(S): at 05:50

## 2021-10-13 RX ADMIN — Medication 325 MILLIGRAM(S): at 12:14

## 2021-10-13 RX ADMIN — PANTOPRAZOLE SODIUM 40 MILLIGRAM(S): 20 TABLET, DELAYED RELEASE ORAL at 05:49

## 2021-10-13 RX ADMIN — LAMOTRIGINE 25 MILLIGRAM(S): 25 TABLET, ORALLY DISINTEGRATING ORAL at 21:47

## 2021-10-13 RX ADMIN — ATORVASTATIN CALCIUM 40 MILLIGRAM(S): 80 TABLET, FILM COATED ORAL at 21:47

## 2021-10-13 RX ADMIN — Medication 2: at 08:29

## 2021-10-13 RX ADMIN — ENOXAPARIN SODIUM 40 MILLIGRAM(S): 100 INJECTION SUBCUTANEOUS at 12:15

## 2021-10-13 RX ADMIN — Medication 20 MILLIGRAM(S): at 05:50

## 2021-10-13 RX ADMIN — Medication 81 MILLIGRAM(S): at 12:15

## 2021-10-13 NOTE — SPEECH LANGUAGE PATHOLOGY EVALUATION - SLP PERTINENT HISTORY OF CURRENT PROBLEM
Pt reports slurred speech PTA, due to loose upper dentition, however, reportedly worsened this hospitalization

## 2021-10-13 NOTE — DISCHARGE NOTE NURSING/CASE MANAGEMENT/SOCIAL WORK - PATIENT PORTAL LINK FT
You can access the FollowMyHealth Patient Portal offered by [facility] by registering at the following website: [email or web address]. By joining [employer]’s FollowMyHealth portal, you will also be able to view your health information using other applications (apps) compatible with our system.

## 2021-10-13 NOTE — CONSULT NOTE ADULT - SUBJECTIVE AND OBJECTIVE BOX
[facility]                                        Ne[facility] at [place]                                 Colby Marques, & Paul                                  [address]                                        [place], [place], [address]                                             [phone]        CC: Stroke    HPI:   The patient is a 81 year old woman with a history of hydrocephalus and ventriculoperitoneal shunt.   She has a history of Parkinsonism likely secondary to psychiatric medication.   She follows with Dr Refugio Bowman in the office.   She was hospitalized several months ago secondary to intracranial hemorrhage and seizure.    She now presented with slurred speech, left face droop, and left arm numbness.   Onset was around 1:30 the day of arrival.     She now reports that her speech is nearly back to normal.     She uses a rolling walker at baseline for ambulation.    Family history:   Family history of stroke (mother).     Social history   Non smoker.     ROS:   Denies headache or dizziness.  Denies chest pain.  Denies shortness of breath.    MEDICATIONS  (STANDING):  ARIPiprazole 5 milliGRAM(s) Oral daily  aspirin  chewable 81 milliGRAM(s) Oral daily  atorvastatin 40 milliGRAM(s) Oral at bedtime  dextrose 40% Gel 15 Gram(s) Oral once  dextrose 5%. 1000 milliLiter(s) (50 mL/Hr) IV Continuous <Continuous>  enoxaparin Injectable 40 milliGRAM(s) SubCutaneous daily  ferrous    sulfate 325 milliGRAM(s) Oral daily  furosemide    Tablet 20 milliGRAM(s) Oral daily  glucagon  Injectable 1 milliGRAM(s) IntraMuscular once  insulin lispro (ADMELOG) corrective regimen sliding scale   SubCutaneous three times a day before meals  isosorbide   mononitrate ER Tablet (IMDUR) 60 milliGRAM(s) Oral daily  lamoTRIgine 25 milliGRAM(s) Oral at bedtime  levETIRAcetam 500 milliGRAM(s) Oral two times a day  metoprolol succinate ER 50 milliGRAM(s) Oral daily  pantoprazole    Tablet 40 milliGRAM(s) Oral before breakfast      Vital Signs Last 24 Hrs  T(C): 36.7 (12 Oct 2021 08:09), Max: 36.8 (12 Oct 2021 01:36)  T(F): 98 (12 Oct 2021 08:09), Max: 98.2 (12 Oct 2021 01:36)  HR: 58 (12 Oct 2021 08:09) (50 - 62)  BP: 219/88 (12 Oct 2021 08:09) (175/80 - 219/88)  RR: 18 (12 Oct 2021 08:09) (16 - 20)  SpO2: 96% (12 Oct 2021 08:09) (94% - 98%)    Detailed Neurologic Exam:    Mental status: The patient is awake and alert. There is no aphasia. There is no dysarthria.     Cranial nerves: Pupils equal and react symmetrically to light. There is no visual field deficit to threat. Extraocular motion is full with no nystagmus. Facial sensation is intact. Facial musculature is asymmetric with a subtle depression of the left nasolabial fold. Palate elevates symmetrically. Tongue is midline.    Motor: There is normal bulk with mildly increased tone with some cogwheeling at the wrists with reinforcement.  There is no tremor.  Strength grossly 5/5 bilaterally.    Sensation: Grossly intact to light touch and pin.    Reflexes: 2+ throughout and plantar responses are flexor.    Cerebellar: No dysmetria on finger nose testing.    [facility] SS:   Date: 10/12/21  Time:   1a) Level of consciousness (0-3): 0  1b) Questions (0-2): 0  1c) Commands (0-2): 0  2  ) Gaze (0-2): 0  3  ) Visual field (0-3): 0  4  ) Facial palsy (0-3): 1  Motor  5a) Left arm (0-4): 0  5b) Right arm (0-4): 0  6a) Left leg (0-4): 0  6b) Right leg (0-4): 0  7  ) Ataxia (0-2): 0  Sensory  8  ) Sensory (0-2): 0  Speech  9  ) Language (0-3): 0  10) Dysarthria (0-2): 0  Extinction  11) Extinction/inattention (0-2): 0    Total score: 1    Prestroke Modified [place]: 1    (0: No symptoms and no disability.  1: No significant disability despite symptoms; able to carry out all usual duties and activities.  2: Slight disability; unable to carry out all previous activity but able to look after own affairs without assistance.  3: Moderate disability; requiring some help but able to walk without assistance.   4: Moderately severe disability; unable to walk without assistance and unable to attend to own bodily needs without assistance.  5: Severe disability; bedridden, incontinent and requiring constant nursing care and attention.   6: Dead. )     Labs:     10-11    138  |  99  |  16.9  ----------------------------<  145<H>  4.0   |  25.0  |  0.91    Ca    9.2      11 Oct 2021 14:08    TPro  7.0  /  Alb  3.6  /  TBili  0.3<L>  /  DBili  x   /  AST  15  /  ALT  9   /  AlkPhos  122<H>  10-11                            10.0   7.14  )-----------( 280      ( 12 Oct 2021 08:03 )             31.2       Rad:     CT head images reviewed (and concur with report): There is no acute pathology. Shunt catheter in place right posterior. Encephalomalacia in left occipital area.    CT-A negative for large vessel occlusion.  CT-P negative for core/penumbra.    
81yF was admitted on 10-11 with reports of dysarthria and left arm weakens.       Imaging performed:  CT PERFUSION demonstrated 10/11 - No core infarct. No active ischemia. If symptoms persist consider follow up head CT or MRI, MRA  if no contraindication.    CTA COW 10/11 - Patent intracranial circulation without flow limiting stenosis.    CTA NECK 10/11 - Patent, ECAs, ICAs, no  hemodynamically significant stenosis at  ICA origins by NASCET criteria.  Bilateral vertebral arteries are patent without flow limiting stenosis.    Patient reports left arm weakness.  Has no pain.  has been feeling fatigued from sleeping and not eating.   Looking forward to having breakfast.     REVIEW OF SYSTEMS  Constitutional - No fever, No weight loss, +fatigue  HEENT - No eye pain, No visual disturbances, No difficulty hearing, No tinnitus, No vertigo, No neck pain  Respiratory - No cough, No wheezing, No shortness of breath  Cardiovascular - No chest pain, No palpitations  Gastrointestinal - No abdominal pain, No nausea, No vomiting, No diarrhea, No constipation  Genitourinary - No dysuria, No frequency, No hematuria, No incontinence  Neurological - No headaches, No memory loss, +loss of strength, No numbness, +tremors  Skin - No itching, No rashes, No lesions   Endocrine - No temperature intolerance  Musculoskeletal - No joint pain, No joint swelling, No muscle pain  Psychiatric - No depression, No anxiety    VITALS  T(C): 36.7 (10-13-21 @ 10:11), Max: 37.1 (10-12-21 @ 12:39)  HR: 56 (10-13-21 @ 10:11) (56 - 69)  BP: 158/71 (10-13-21 @ 10:26) (112/49 - 216/83)  RR: 18 (10-13-21 @ 10:11) (11 - 19)  SpO2: 97% (10-13-21 @ 08:16) (93% - 98%)  Wt(kg): --    PAST MEDICAL & SURGICAL HISTORY  Hypertension    Hyperlipidemia    Diabetes    Glaucoma    Osteoarthritis    Small bowel obstruction    DM (diabetes mellitus)    HTN (hypertension)    Bipolar disorder    Depression    Intracranial bleed    NPH (normal pressure hydrocephalus)    Pneumonia, aspiration    UTI (urinary tract infection)    Hyperlipidemia    Glaucoma    CKD (chronic kidney disease)    DVT, lower extremity    History of TIAs    S/P hysterectomy    S/P cholecystectomy    Achilles tendon injury    S/P knee replacement, left    S/P cholecystectomy    S/P total knee replacement, left        FUNCTIONAL HISTORY  Lives with son, 2nd floor apartment ([address])  Independent with RW, needed assist with ADLs    CURRENT FUNCTIONAL STATUS  10/13 SLP  · Diagnosis	Mild dysarthria, receptive & expressive language skills WFL  · Patient/Family Goals Statement	"MY DAUGHTER JUST TOLD ME MY SPEECH DOESN'T SOUND TOO GOOD"  · Criteria for Skilled Therapeutic Interventions Met	skilled criteria for intervention met  · Therapy Frequency	as schedule permits    Behavioral Exam:   · Behavioral Observations	alert  · Orientation	grossly WFL    Auditory Comprehension:   · Answers Yes/No Questions	within functional limits  · Able to Follow Commands	within functional limits  · Discourse	intact  · Right/Left Discrimination	intact  · Body Part ID	intact  · Open Ended Questions	intact    Verbal Expression:   · Verbal Expression	intact  · Automatic Speech	intact  · Confrontational Naming	intact  · Responsive Naming	intact  · Repetition	intact; sentence; phrase; impaired; word  · Fluency	grossly WFL  · Conversational Speech	fluent with appropriate syntax & semantics    Cognitive Linguistic Status Examination:   · Short Term Memory	pt able to recall 2/3 words with imposed delay  · Comments	Not formally assessed, however, suspected mild deficits    Motor Speech:   · Alternating Rapid Sounds	impaired  · Observations	slurred speech  · Articulation	imprecise  · Rate	impaired  · Volume	reduced  · Comments	Decreased speech intelligibility noted with lengthy sentences production & in conversation    10/12 PT  Bed Mobility: Sit to Supine:     · Level of [place]	minimum assist (75% patients effort)  · Physical Assist/Nonphysical Assist	1 person assist    Bed Mobility: Supine to Sit:     · Level of [place]	minimum assist (75% patients effort)  · Physical Assist/Nonphysical Assist	1 person assist    Bed Mobility Analysis:     · Bed Mobility Limitations	decreased ability to use legs for bridging/pushing; decreased ability to use arms for pushing/pulling  · Impairments Contributing to Impaired Bed Mobility	impaired balance; impaired coordination; impaired motor control; decreased strength    Transfer: Sit to Stand:     · Level of [place]	minimum assist (75% patients effort)  · Physical Assist/Nonphysical Assist	1 person assist  · Weight-Bearing Restrictions	weight-bearing as tolerated  · Assistive Device	rolling walker    Transfer: Stand to Sit:     · Level of Taney	minimum assist (75% patients effort)  · Physical Assist/Nonphysical Assist	1 person assist  · Weight-Bearing Restrictions	weight-bearing as tolerated  · Assistive Device	rolling walker    Sit/Stand Transfer Safety Analysis:     · Transfer Safety Concerns Noted	losing balance; decreased step length  · Impairments Contributing to Impaired Transfers	impaired balance; impaired coordination; impaired motor control; decreased strength; abnormal muscle tone    Gait Skills:     · Level of [place]	minimum assist (75% patients effort)  · Physical Assist/Nonphysical Assist	1 person assist  · Weight-Bearing Restrictions	weight-bearing as tolerated  · Assistive Device	rolling walker  · Gait Distance	25 feet    Gait Analysis:     · Gait Pattern Used	3-point gait  · Gait Deviations Noted	increased time in double stance  · Impairments Contributing to Gait Deviations	impaired balance; impaired coordination; impaired motor control; abnormal muscle tone; decreased strength    10/12 OT  Bathing Training:     · Level of Taney	moderate assist (50% patients effort)  · Physical Assist/Nonphysical Assist	1 person assist    Upper Body Dressing Training:     · Level of Taney	minimum assist (75% patients effort); to don gown  · Physical Assist/Nonphysical Assist	1 person assist    Lower Body Dressing Training:     · Level of Taney	maximum assist (25% patients effort); to don socks seated at the edge of the bed  · Physical Assist/Nonphysical Assist	1 person assist    Toilet Hygiene Training:     · Level of Taney	maximum assist (25% patients effort); secondary to incontinence  · Physical Assist/Nonphysical Assist	1 person assist    Grooming Training:     · Level of Taney	minimum assist (75% patients effort); moderate assist (50% patients effort)  · Physical Assist/Nonphysical Assist	1 person assist    Eating/Self-Feeding Training:     · Level of Taney	supervision  · Physical Assist/Nonphysical Assist	supervision      SOCIAL HISTORY - as per documentation/history  Smoking - None  EtOH - None  Drugs - None    FAMILY HISTORY   Family history of pancreatic cancer (Sibling)    Family history of early CAD    Family history of diabetes mellitus    Family history of early CAD    FH: type 2 diabetes mellitus        RECENT LABS - Reviewed  CBC Full  -  ( 13 Oct 2021 05:48 )  WBC Count : 6.55 K/uL  RBC Count : 3.37 M/uL  Hemoglobin : 9.9 g/dL  Hematocrit : 30.4 %  Platelet Count - Automated : 261 K/uL  Mean Cell Volume : 90.2 fl  Mean Cell Hemoglobin : 29.4 pg  Mean Cell Hemoglobin Concentration : 32.6 gm/dL  Auto Neutrophil # : x  Auto Lymphocyte # : x  Auto Monocyte # : x  Auto Eosinophil # : x  Auto Basophil # : x  Auto Neutrophil % : x  Auto Lymphocyte % : x  Auto Monocyte % : x  Auto Eosinophil % : x  Auto Basophil % : x    10-13    143  |  100  |  19.6  ----------------------------<  143<H>  3.9   |  28.0  |  1.29    Ca    8.5<L>      13 Oct 2021 05:48    TPro  7.0  /  Alb  3.6  /  TBili  0.3<L>  /  DBili  x   /  AST  15  /  ALT  9   /  AlkPhos  122<H>  10-11        ALLERGIES  adhesives (Pruritus; Blisters)  penicillin (Hives)  pineapple (Anaphylaxis)      MEDICATIONS   ALBUTerol    90 MICROgram(s) HFA Inhaler 2 Puff(s) Inhalation every 6 hours PRN  ARIPiprazole 5 milliGRAM(s) Oral daily  aspirin  chewable 81 milliGRAM(s) Oral daily  atorvastatin 40 milliGRAM(s) Oral at bedtime  dextrose 40% Gel 15 Gram(s) Oral once  dextrose 5%. 1000 milliLiter(s) IV Continuous <Continuous>  dextrose 5%. 1000 milliLiter(s) IV Continuous <Continuous>  dextrose 50% Injectable 25 Gram(s) IV Push once  dextrose 50% Injectable 12.5 Gram(s) IV Push once  dextrose 50% Injectable 25 Gram(s) IV Push once  enoxaparin Injectable 40 milliGRAM(s) SubCutaneous daily  ferrous    sulfate 325 milliGRAM(s) Oral daily  glucagon  Injectable 1 milliGRAM(s) IntraMuscular once  hydrALAZINE Injectable 10 milliGRAM(s) IV Push every 8 hours PRN  influenza   Vaccine 0.5 milliLiter(s) IntraMuscular once  insulin lispro (ADMELOG) corrective regimen sliding scale   SubCutaneous three times a day before meals  isosorbide   mononitrate ER Tablet (IMDUR) 60 milliGRAM(s) Oral daily  lamoTRIgine 25 milliGRAM(s) Oral at bedtime  levETIRAcetam 500 milliGRAM(s) Oral two times a day  metoprolol succinate ER 50 milliGRAM(s) Oral daily  pantoprazole    Tablet 40 milliGRAM(s) Oral before breakfast      ----------------------------------------------------------------------------------------  PHYSICAL EXAM  Constitutional - NAD, Comfortable  HEENT - NCAT, EOMI  Neck - Supple, No limited ROM  Chest - Breathing comfortably, No wheezing  Cardiovascular - S1S2   Abdomen - Soft   Extremities - No C/C/E, No calf tenderness   Neurologic Exam -                    Cognitive - AAO to self, place, date, year, situation     Communication - Fluent, +dysarthria     Cranial Nerves - Left lip droop     Motor - No focal deficits                    LEFT    UE - ShAB 1/5, EF 2/5, EE 2/5,  4/5                    RIGHT UE - ShAB 3/5, EF 4/5, EE 4/5,  4/5                    LEFT    LE - HF 3/5, KE 4/5, DF 5/5                     RIGHT LE - HF 3/5, KE 4/5, DF 5/5      Sensory - Intact to LT  Psychiatric - Mood stable, Affect WNL  ----------------------------------------------------------------------------------------  ASSESSMENT/PLAN  81yFemale with functional deficits after possible CVA  R/O CVA - ASA, Lipitor, MRI planned  HTN - Hydralazine, Lasix, Imdur, Toprol  Mood - Abilify   Seizure D/O - Keppra, Lamictal  DVT PPX - SCDs, Lovenox  Rehab - Additional workup pending including MRI given CT findings. Depending on organic findings will determine rehab needs.     Recommend ongoing mobilization by staff to maintain cardiopulmonary function and prevention of secondary complications related to debility. Discussed with rehab team.

## 2021-10-13 NOTE — SPEECH LANGUAGE PATHOLOGY EVALUATION - COMMENTS
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 She was seen in ER, CT head neg for acute IC bleed, seen by Neurology, was found to not be a candidate for tPA 2/2 recent h/o IC bleed. 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 She was seen in ER, CT head neg for acute IC bleed, seen by Neurology, was found to not be a candidate for tPA 2/2 recent h/o IC bleed. Admitted for acute CVA."    Of note, pt reported difficulty swallowing upon ?: bedside swallow eval is RX. Pt reports baseline diet to be: soft cut-up solids, thin fluids with periods of choking. Pt seen for swallow eval at this facility in June 2021 with Rx for puree, thin fluids

## 2021-10-14 ENCOUNTER — TRANSCRIPTION ENCOUNTER (OUTPATIENT)
Age: 81
End: 2021-10-14

## 2021-10-14 LAB
GLUCOSE BLDC GLUCOMTR-MCNC: 156 MG/DL — HIGH (ref 70–99)
GLUCOSE BLDC GLUCOMTR-MCNC: 161 MG/DL — HIGH (ref 70–99)
GLUCOSE BLDC GLUCOMTR-MCNC: 188 MG/DL — HIGH (ref 70–99)
GLUCOSE BLDC GLUCOMTR-MCNC: 192 MG/DL — HIGH (ref 70–99)
GLUCOSE BLDC GLUCOMTR-MCNC: 199 MG/DL — HIGH (ref 70–99)

## 2021-10-14 PROCEDURE — 70551 MRI BRAIN STEM W/O DYE: CPT | Mod: 26

## 2021-10-14 PROCEDURE — 99233 SBSQ HOSP IP/OBS HIGH 50: CPT

## 2021-10-14 RX ORDER — AMLODIPINE BESYLATE 2.5 MG/1
1 TABLET ORAL
Qty: 30 | Refills: 0
Start: 2021-10-14 | End: 2021-11-12

## 2021-10-14 RX ORDER — AMLODIPINE BESYLATE 2.5 MG/1
2.5 TABLET ORAL DAILY
Refills: 0 | Status: DISCONTINUED | OUTPATIENT
Start: 2021-10-14 | End: 2021-10-15

## 2021-10-14 RX ORDER — ASPIRIN/CALCIUM CARB/MAGNESIUM 324 MG
1 TABLET ORAL
Qty: 0 | Refills: 0 | DISCHARGE
Start: 2021-10-14

## 2021-10-14 RX ORDER — AMLODIPINE BESYLATE 2.5 MG/1
1 TABLET ORAL
Qty: 30 | Refills: 0 | DISCHARGE
Start: 2021-10-14 | End: 2021-11-12

## 2021-10-14 RX ORDER — LANOLIN ALCOHOL/MO/W.PET/CERES
3 CREAM (GRAM) TOPICAL ONCE
Refills: 0 | Status: COMPLETED | OUTPATIENT
Start: 2021-10-14 | End: 2021-10-14

## 2021-10-14 RX ADMIN — ATORVASTATIN CALCIUM 40 MILLIGRAM(S): 80 TABLET, FILM COATED ORAL at 21:05

## 2021-10-14 RX ADMIN — ISOSORBIDE MONONITRATE 60 MILLIGRAM(S): 60 TABLET, EXTENDED RELEASE ORAL at 14:02

## 2021-10-14 RX ADMIN — AMLODIPINE BESYLATE 2.5 MILLIGRAM(S): 2.5 TABLET ORAL at 14:01

## 2021-10-14 RX ADMIN — Medication 2: at 09:03

## 2021-10-14 RX ADMIN — ENOXAPARIN SODIUM 40 MILLIGRAM(S): 100 INJECTION SUBCUTANEOUS at 14:02

## 2021-10-14 RX ADMIN — PANTOPRAZOLE SODIUM 40 MILLIGRAM(S): 20 TABLET, DELAYED RELEASE ORAL at 05:49

## 2021-10-14 RX ADMIN — Medication 20 MILLIGRAM(S): at 05:49

## 2021-10-14 RX ADMIN — Medication 50 MILLIGRAM(S): at 05:49

## 2021-10-14 RX ADMIN — Medication 2: at 14:02

## 2021-10-14 RX ADMIN — Medication 81 MILLIGRAM(S): at 14:01

## 2021-10-14 RX ADMIN — Medication 3 MILLIGRAM(S): at 01:07

## 2021-10-14 RX ADMIN — LEVETIRACETAM 500 MILLIGRAM(S): 250 TABLET, FILM COATED ORAL at 05:49

## 2021-10-14 RX ADMIN — Medication 2: at 17:13

## 2021-10-14 RX ADMIN — LAMOTRIGINE 25 MILLIGRAM(S): 25 TABLET, ORALLY DISINTEGRATING ORAL at 21:05

## 2021-10-14 RX ADMIN — LEVETIRACETAM 500 MILLIGRAM(S): 250 TABLET, FILM COATED ORAL at 17:13

## 2021-10-14 RX ADMIN — Medication 325 MILLIGRAM(S): at 14:01

## 2021-10-14 RX ADMIN — ARIPIPRAZOLE 5 MILLIGRAM(S): 15 TABLET ORAL at 14:01

## 2021-10-14 NOTE — DISCHARGE NOTE PROVIDER - CARE PROVIDER_API CALL
Refugio Bowman; PhD)  Neurology; Vascular Neurology  [address]  [place] [address]  Phone: [phone]  Fax: [phone]  Follow Up Time: 1 week

## 2021-10-14 NOTE — DISCHARGE NOTE PROVIDER - NSDCFUSCHEDAPPT_GEN_ALL_CORE_FT
GREGORY WOODALL ; 10/20/2021 ; NPP PulmMed [address] [facility]  GREGORY WOODALL ; 11/08/2021 ; NPP Neurology [address] [facility] [place]

## 2021-10-14 NOTE — DISCHARGE NOTE PROVIDER - NSDCMRMEDTOKEN_GEN_ALL_CORE_FT
Abilify 5 mg oral tablet: 1 tab(s) orally once a day (at bedtime)  acetaminophen 325 mg oral tablet: 2 tab(s) orally every 6 hours, As needed, Temp greater or equal to 38C (100.4F), Mild Pain (1 - 3), Moderate Pain (4 - 6)  albuterol 90 mcg/inh inhalation aerosol: 2 puff(s) inhaled every 6 hours  amLODIPine 2.5 mg oral tablet: 1 tab(s) orally once a day  aspirin 81 mg oral tablet, chewable: 1 tab(s) orally once a day  atorvastatin 40 mg oral tablet: 1 tab(s) orally once a day (at bedtime)  chlorhexidine 0.12% mucous membrane liquid: 15 milliliter(s) mucous membrane 2 times a day  epoetin orestes: 67093 unit(s) subcutaneous every 7 days  ferrous sulfate 325 mg (65 mg elemental iron) oral tablet: 1 tab(s) orally once a day  folic acid 1 mg oral tablet: 1 tab(s) orally once a day  furosemide 20 mg oral tablet: 1 tab(s) orally every other day (at bedtime)  hydrALAZINE 25 mg oral tablet: 1 tab(s) orally every 8 hours  isosorbide mononitrate 60 mg oral tablet, extended release: 1 tab(s) orally once a day  lamoTRIgine 25 mg oral tablet: 1 tab(s) orally once a day (at bedtime)   levETIRAcetam 500 mg oral tablet: 1 tab(s) orally 2 times a day  losartan 25 mg oral tablet: 1 tab(s) orally once a day  metFORMIN 500 mg oral tablet: 1 tab(s) orally 2 times a day  metoprolol succinate 50 mg oral tablet, extended release: 1 tab(s) orally once a day  pantoprazole 40 mg oral delayed release tablet: 1 tab(s) orally once a day (before a meal)  thiamine 100 mg oral tablet: 1 tab(s) orally once a day  Tylenol 500 mg oral tablet: 2 tab(s) orally every 12 hours, As Needed  Vitamin B-100 oral tablet: 1 tab(s) orally once a day  Vitamin D2: 1 tab(s) orally once a day   Abilify 5 mg oral tablet: 1 tab(s) orally once a day (at bedtime)  albuterol 90 mcg/inh inhalation aerosol: 2 puff(s) inhaled every 6 hours  amLODIPine 2.5 mg oral tablet: 1 tab(s) orally once a day  aspirin 81 mg oral tablet, chewable: 1 tab(s) orally once a day  atorvastatin 40 mg oral tablet: 1 tab(s) orally once a day (at bedtime)  chlorhexidine 0.12% mucous membrane liquid: 15 milliliter(s) mucous membrane 2 times a day  epoetin orestes: 03841 unit(s) subcutaneous every 7 days  ferrous sulfate 325 mg (65 mg elemental iron) oral tablet: 1 tab(s) orally once a day  folic acid 1 mg oral tablet: 1 tab(s) orally once a day  furosemide 20 mg oral tablet: 1 tab(s) orally every other day (at bedtime)  hydrALAZINE 25 mg oral tablet: 1 tab(s) orally every 8 hours  isosorbide mononitrate 60 mg oral tablet, extended release: 1 tab(s) orally once a day  lamoTRIgine 25 mg oral tablet: 1 tab(s) orally once a day (at bedtime)   levETIRAcetam 500 mg oral tablet: 1 tab(s) orally 2 times a day  losartan 25 mg oral tablet: 1 tab(s) orally once a day  metFORMIN 500 mg oral tablet: 1 tab(s) orally 2 times a day  metoprolol succinate 50 mg oral tablet, extended release: 1 tab(s) orally once a day  pantoprazole 40 mg oral delayed release tablet: 1 tab(s) orally once a day (before a meal)  thiamine 100 mg oral tablet: 1 tab(s) orally once a day  Vitamin B-100 oral tablet: 1 tab(s) orally once a day  Vitamin D2: 1 tab(s) orally once a day

## 2021-10-14 NOTE — CHART NOTE - NSCHARTNOTEFT_GEN_A_CORE
Pt had an MRI with a VPS. Neurosurgery asked to check the settings & reprogram as needed. She was seen on 10/1/21 by neurosurgery, she needed her shunt programmed. Today the shunt was at 1 & it was programmed back to 1.5, checked 2x for accuracy.

## 2021-10-14 NOTE — CDI QUERY NOTE - NSCDIOTHERTXTBX_GEN_ALL_CORE_HH
Can you please clarify if there is a relationship between seizure and patient’s history of ICH (intracranial hemorrhage)?  A.	Seizure likely due to ICH (intracranial hemorrhage)  B.	Seizure not related to ICH (intracranial hemorrhage)  C.	Other, please specify  D.	Not clinically significant    Chart Documentation:    Progress Note Adult-Hospitalist Attending [Charted Location: [address] [address]] [Authored: 29-Sep-2021 13:59]  # New onset seizure disorder in pt with hx of NPH  s/op  shunt and hx of ICH right basal ganglia hemorrhage 5/2021  - Epileptologist consult noted   - will be trasnfered to [facility] for cvEEG  - c/w Lamotrigine 25 mg, keppra 500 bid, ativan 2 mg iv prn for seizure  -  consulted Neurosurgery to assess VPS pressure and if it is compatible with MRI   - c/w seizure/aspiration precaution    Progress Note Adult-Epilepsy Attending [Charted Location: [place] [address]] [Authored: 01-Oct-2021 11:36]  Impression:  1. Initial AMS: Much improved. Suspect unwitnessed bilateral tonic clonic seizure at home and found by family in postictal state.  2. 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 Start lamotrigine (both for seizure and psych), bridge with levetiracetam.      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 Pt admitted to [facility] for new onset seizure in a patient with a history of NPH status post  shunt and ICH right basal ganglia in 2021.

## 2021-10-14 NOTE — DISCHARGE NOTE PROVIDER - NSDCACTIVITY_GEN_ALL_CORE
Weight bearing as tolerated with rolling walker Weight bearing as tolerated with rolling walker/No restrictions

## 2021-10-14 NOTE — DISCHARGE NOTE PROVIDER - NSDCCPCAREPLAN_GEN_ALL_CORE_FT
PRINCIPAL DISCHARGE DIAGNOSIS  Diagnosis: Cerebrovascular accident (CVA)  Assessment and Plan of Treatment: Not a candidate for tPA 2/2 recent h/o IC bleed.  MRI brain showed  Right-sided ventriculostomy in situ with gliosis and encephalomalacia in the right parietal lobe. No diffusion restriction or acute ischemia. Ventricles appear to be normal in size.      SECONDARY DISCHARGE DIAGNOSES  Diagnosis: Hypertensive urgency  Assessment and Plan of Treatment: BP controlled continue Imdur and toptol continued, Norvasc added    Diagnosis: Hydrocephalus, adult  Assessment and Plan of Treatment: hx of s/p  shunt, Neurosurgery evaluated and adjusted shunt s/p MRI     PRINCIPAL DISCHARGE DIAGNOSIS  Diagnosis: Hypertensive urgency  Assessment and Plan of Treatment: BP controlled continue Imdur and toptol continued, Norvasc added      SECONDARY DISCHARGE DIAGNOSES  Diagnosis: Hydrocephalus, adult  Assessment and Plan of Treatment: hx of s/p  shunt, Neurosurgery evaluated and adjusted shunt s/p MRI    Diagnosis: Seizure  Assessment and Plan of Treatment:     Diagnosis: Hypertensive urgency  Assessment and Plan of Treatment: BP controlled continue Imdur and toptol continued, Norvasc added

## 2021-10-14 NOTE — DISCHARGE NOTE PROVIDER - HOSPITAL COURSE
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 She was seen in ER, CT head neg for acute IC bleed, seen by Neurology, was found to not be a candidate for tPA 2/2 recent h/o IC bleed. Admitted for acute CVA with hypertensive urgency.  Continue Toprol and Imdur. Norvasc 2.5 mg added to control BP.  MRI head performed, results below.  Neurosurgery evaluated and reprogrammed  shunt as indicated s/p MRI. Pt was evaluate by PM&R and PT.  Pt would like to be discharged to home.       < from: MR Head No Cont (10.14.21 @ 13:16) >    IMPRESSION:    1)  Right-sided ventriculostomy in situ with gliosis and encephalomalacia in the right parietal lobe. No diffusion restriction or acute ischemia. Ventricles appear to be normal in size.  2)  scattered mucosal thickening in sinuses.      < from: CT Angio Head w/ IV Cont (10.11.21 @ 14:24) >      CT PERFUSION demonstrated: No core infarct. No active ischemia.  If symptoms persist consider follow up head CT or MRI, MRA  if no contraindication.    CTA COW:  Patent intracranial circulation without flow limiting stenosis.    CTA NECK: Patent, ECAs, ICAs, no  hemodynamically significant stenosis at  ICA origins by NASCET criteria.  Bilateral vertebral arteries are patent without flow limiting stenosis.      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 She was seen in ER, CT head neg for acute IC bleed, seen by Neurology, was found to not be a candidate for tPA 2/2 recent h/o IC bleed. Admitted for acute CVA with hypertensive urgency.  Continue Toprol and Imdur. Norvasc 2.5 mg added to control BP.  MRI head performed, results below.  Neurosurgery evaluated and reprogrammed  shunt as indicated s/p MRI. Pt was evaluate by PM&R and PT.  Pt would like to be discharged to home.       < from: MR Head No Cont (10.14.21 @ 13:16) >    IMPRESSION:    1)  Right-sided ventriculostomy in situ with gliosis and encephalomalacia in the right parietal lobe. No diffusion restriction or acute ischemia. Ventricles appear to be normal in size.  2)  scattered mucosal thickening in sinuses.      < from: CT Angio Head w/ IV Cont (10.11.21 @ 14:24) >      CT PERFUSION demonstrated: No core infarct. No active ischemia.  If symptoms persist consider follow up head CT or MRI, MRA  if no contraindication.    CTA COW:  Patent intracranial circulation without flow limiting stenosis.    CTA NECK: Patent, ECAs, ICAs, no  hemodynamically significant stenosis at  ICA origins by NASCET criteria.  Bilateral vertebral arteries are patent without flow limiting stenosis.      < end of copied text >    Time spent in discharge planning and co-ordination : 50min

## 2021-10-15 VITALS
SYSTOLIC BLOOD PRESSURE: 162 MMHG | HEART RATE: 61 BPM | OXYGEN SATURATION: 98 % | DIASTOLIC BLOOD PRESSURE: 75 MMHG | TEMPERATURE: 98 F | RESPIRATION RATE: 18 BRPM

## 2021-10-15 DIAGNOSIS — I16.0 HYPERTENSIVE URGENCY: ICD-10-CM

## 2021-10-15 LAB
GLUCOSE BLDC GLUCOMTR-MCNC: 138 MG/DL — HIGH (ref 70–99)
GLUCOSE BLDC GLUCOMTR-MCNC: 189 MG/DL — HIGH (ref 70–99)

## 2021-10-15 PROCEDURE — 99233 SBSQ HOSP IP/OBS HIGH 50: CPT

## 2021-10-15 PROCEDURE — 99239 HOSP IP/OBS DSCHRG MGMT >30: CPT

## 2021-10-15 RX ORDER — ACETAMINOPHEN 500 MG
2 TABLET ORAL
Qty: 0 | Refills: 0 | DISCHARGE

## 2021-10-15 RX ADMIN — ISOSORBIDE MONONITRATE 60 MILLIGRAM(S): 60 TABLET, EXTENDED RELEASE ORAL at 11:13

## 2021-10-15 RX ADMIN — AMLODIPINE BESYLATE 2.5 MILLIGRAM(S): 2.5 TABLET ORAL at 08:34

## 2021-10-15 RX ADMIN — Medication 325 MILLIGRAM(S): at 11:12

## 2021-10-15 RX ADMIN — Medication 2: at 12:58

## 2021-10-15 RX ADMIN — PANTOPRAZOLE SODIUM 40 MILLIGRAM(S): 20 TABLET, DELAYED RELEASE ORAL at 06:21

## 2021-10-15 RX ADMIN — ENOXAPARIN SODIUM 40 MILLIGRAM(S): 100 INJECTION SUBCUTANEOUS at 11:14

## 2021-10-15 RX ADMIN — ARIPIPRAZOLE 5 MILLIGRAM(S): 15 TABLET ORAL at 11:12

## 2021-10-15 RX ADMIN — Medication 50 MILLIGRAM(S): at 08:33

## 2021-10-15 RX ADMIN — Medication 81 MILLIGRAM(S): at 11:13

## 2021-10-15 RX ADMIN — LEVETIRACETAM 500 MILLIGRAM(S): 250 TABLET, FILM COATED ORAL at 06:20

## 2021-10-15 NOTE — PROGRESS NOTE ADULT - SUBJECTIVE AND OBJECTIVE BOX
[facility]                                        Neurology at Pool                                 Colby Marques, & Paul                                  [address]                                        [place], [place], [address]                                             [phone]        CC: Stroke    HPI:   The patient is a 81 year old woman with a history of hydrocephalus and ventriculoperitoneal shunt.   She has a history of Parkinsonism likely secondary to psychiatric medication.   She follows with Dr Refugio Bowman in the office.   She was hospitalized several months ago secondary to intracranial hemorrhage and seizure.    She now presented with slurred speech, left face droop, and left arm numbness.   Onset was around 1:30 the day of arrival.     She now reports that her speech is nearly back to normal.     She uses a rolling walker at baseline for ambulation. (JW)    Interval history: c/o being tired, no dysarthria    Review of systems (neurology): Denies headache or dizziness. Denies weakness/numbness.  no speech/language deficits. Denies diplopia/blurred vision.  Denies confusion (+) tired    MEDICATIONS  (STANDING):  amLODIPine   Tablet 2.5 milliGRAM(s) Oral daily  ARIPiprazole 5 milliGRAM(s) Oral daily  aspirin  chewable 81 milliGRAM(s) Oral daily  atorvastatin 40 milliGRAM(s) Oral at bedtime  dextrose 40% Gel 15 Gram(s) Oral once  dextrose 5%. 1000 milliLiter(s) (50 mL/Hr) IV Continuous <Continuous>  dextrose 5%. 1000 milliLiter(s) (100 mL/Hr) IV Continuous <Continuous>  dextrose 50% Injectable 25 Gram(s) IV Push once  dextrose 50% Injectable 12.5 Gram(s) IV Push once  dextrose 50% Injectable 25 Gram(s) IV Push once  enoxaparin Injectable 40 milliGRAM(s) SubCutaneous daily  ferrous    sulfate 325 milliGRAM(s) Oral daily  furosemide    Tablet 20 milliGRAM(s) Oral <User Schedule>  glucagon  Injectable 1 milliGRAM(s) IntraMuscular once  influenza   Vaccine 0.5 milliLiter(s) IntraMuscular once  insulin lispro (ADMELOG) corrective regimen sliding scale   SubCutaneous three times a day before meals  isosorbide   mononitrate ER Tablet (IMDUR) 60 milliGRAM(s) Oral daily  lamoTRIgine 25 milliGRAM(s) Oral at bedtime  levETIRAcetam 500 milliGRAM(s) Oral two times a day  metoprolol succinate ER 50 milliGRAM(s) Oral daily  pantoprazole    Tablet 40 milliGRAM(s) Oral before breakfast    MEDICATIONS  (PRN):  ALBUTerol    90 MICROgram(s) HFA Inhaler 2 Puff(s) Inhalation every 6 hours PRN Shortness of Breath and/or Wheezing  hydrALAZINE Injectable 10 milliGRAM(s) IV Push every 8 hours PRN SBP>200      Vital Signs Last 24 Hrs  T(C): 36.7 (14 Oct 2021 14:02), Max: 36.7 (13 Oct 2021 20:22)  T(F): 98 (14 Oct 2021 14:02), Max: 98 (13 Oct 2021 20:22)  HR: 61 (14 Oct 2021 14:02) (61 - 68)  BP: 186/80 (14 Oct 2021 14:02) (146/77 - 186/80)  BP(mean): 115 (14 Oct 2021 14:02) (100 - 117)  RR: 16 (14 Oct 2021 14:02) (16 - 20)  SpO2: 98% (14 Oct 2021 14:02) (93% - 98%)    Detailed Neurologic Exam:    Mental status: The patient is sleepy. There is no aphasia. There is no dysarthria.     Cranial nerves: Pupils equal and react symmetrically to light. There is no visual field deficit to threat. Extraocular motion is full with no nystagmus. Facial sensation is intact. Facial musculature is asymmetric with a subtle depression of the left nasolabial fold. Palate elevates symmetrically. Tongue is midline.    Motor: There is normal bulk with mildly increased tone with some cogwheeling at the wrists with reinforcement.  There is no tremor.  Strength grossly 5/5 bilaterally.    Sensation: Grossly intact to light touch and pin.    Reflexes: 2+ throughout and plantar responses are flexor.    Cerebellar: No dysmetria on finger nose testing.    Labs:                           9.9    6.55  )-----------( 261      ( 13 Oct 2021 05:48 )             30.4     10-13    143  |  100  |  19.6  ----------------------------<  143<H>  3.9   |  28.0  |  1.29    Ca    8.5<L>      13 Oct 2021 05:48          Rad:   MRI brain did not show acute CVA, mass or blood  (+) SVID and atophy    CT head images: There is no acute pathology. Shunt catheter in place right posterior. Encephalomalacia in left occipital area.    CT-A negative for large vessel occlusion.  CT-P negative for core/penumbra.    
GREGORY WOODALL    [id number]    81y      Female    CC: admitted with left arm weakness, slurred speech and facial droop   still has same symptoms, awaiting MRI  offers no other complaints    INTERVAL HPI/OVERNIGHT EVENTS: no acute events     REVIEW OF SYSTEMS:    CONSTITUTIONAL: No fever,  fatigue  RESPIRATORY: No cough, wheezing, No shortness of breath  CARDIOVASCULAR: No chest pain, palpitations  GASTROINTESTINAL: No abdominal or epigastric pain. No nausea, vomiting  NEUROLOGICAL: No headaches      Vital Signs Last 24 Hrs  T(C): 36.6 (14 Oct 2021 08:00), Max: 36.7 (13 Oct 2021 20:22)  T(F): 97.8 (14 Oct 2021 08:00), Max: 98 (13 Oct 2021 20:22)  HR: 63 (14 Oct 2021 08:00) (56 - 68)  BP: 160/77 (14 Oct 2021 08:00) (123/68 - 167/84)  BP(mean): 105 (14 Oct 2021 08:00) (86 - 117)  RR: 16 (14 Oct 2021 08:00) (16 - 20)  SpO2: 96% (14 Oct 2021 08:00) (93% - 96%)      PHYSICAL EXAM:    GENERAL: NAD, well-groomed  HEENT: PERRL, +EOMI  NECK: soft, Supple.   CHEST/LUNG: Clear to percussion bilaterally; No wheezing  HEART: S1S2+, Regular rate and rhythm; No murmurs  ABDOMEN: Soft, Nontender, Nondistended; Bowel sounds present  EXTREMITIES:   No clubbing, cyanosis, or edema  SKIN: No rashes or lesions  NEURO: AAOX3, left facial droop, slurred speech and left arm weakness         LABS:                                            9.9    6.55  )-----------( 261      ( 13 Oct 2021 05:48 )             30.4   10-13    143  |  100  |  19.6  ----------------------------<  143<H>  3.9   |  28.0  |  1.29    Ca    8.5<L>      13 Oct 2021 05:48          MEDICATIONS  (STANDING):  amLODIPine   Tablet 2.5 milliGRAM(s) Oral daily  ARIPiprazole 5 milliGRAM(s) Oral daily  aspirin  chewable 81 milliGRAM(s) Oral daily  atorvastatin 40 milliGRAM(s) Oral at bedtime  dextrose 40% Gel 15 Gram(s) Oral once  dextrose 5%. 1000 milliLiter(s) (50 mL/Hr) IV Continuous <Continuous>  dextrose 5%. 1000 milliLiter(s) (100 mL/Hr) IV Continuous <Continuous>  dextrose 50% Injectable 25 Gram(s) IV Push once  dextrose 50% Injectable 12.5 Gram(s) IV Push once  dextrose 50% Injectable 25 Gram(s) IV Push once  enoxaparin Injectable 40 milliGRAM(s) SubCutaneous daily  ferrous    sulfate 325 milliGRAM(s) Oral daily  furosemide    Tablet 20 milliGRAM(s) Oral <User Schedule>  glucagon  Injectable 1 milliGRAM(s) IntraMuscular once  influenza   Vaccine 0.5 milliLiter(s) IntraMuscular once  insulin lispro (ADMELOG) corrective regimen sliding scale   SubCutaneous three times a day before meals  isosorbide   mononitrate ER Tablet (IMDUR) 60 milliGRAM(s) Oral daily  lamoTRIgine 25 milliGRAM(s) Oral at bedtime  levETIRAcetam 500 milliGRAM(s) Oral two times a day  metoprolol succinate ER 50 milliGRAM(s) Oral daily  pantoprazole    Tablet 40 milliGRAM(s) Oral before breakfast    MEDICATIONS  (PRN):  ALBUTerol    90 MICROgram(s) HFA Inhaler 2 Puff(s) Inhalation every 6 hours PRN Shortness of Breath and/or Wheezing  hydrALAZINE Injectable 10 milliGRAM(s) IV Push every 8 hours PRN SBP>200        RADIOLOGY & ADDITIONAL TESTS:  
GREGORY WOODALL    [id number]    81y      Female    CC: admitted with left arm weakness, slurred speech and facial droop   symptoms somewhat better. MRI negative for acute CVA      INTERVAL HPI/OVERNIGHT EVENTS: Did not void overnight, bladder scan showed 500cc, straight cath was done. unclear whether pt was unable to void. pt denies any dysuria     REVIEW OF SYSTEMS:    CONSTITUTIONAL: No fever,  fatigue  RESPIRATORY: No cough, wheezing, No shortness of breath  CARDIOVASCULAR: No chest pain, palpitations  GASTROINTESTINAL: No abdominal or epigastric pain. No nausea, vomiting  NEUROLOGICAL: No headaches        Vital Signs Last 24 Hrs  T(C): 36.6 (15 Oct 2021 11:19), Max: 36.8 (14 Oct 2021 16:00)  T(F): 97.9 (15 Oct 2021 11:19), Max: 98.2 (14 Oct 2021 16:00)  HR: 59 (15 Oct 2021 11:19) (55 - 67)  BP: 131/59 (15 Oct 2021 11:19) (131/59 - 186/80)  BP(mean): 101 (15 Oct 2021 05:06) (99 - 115)  RR: 16 (15 Oct 2021 11:19) (16 - 18)  SpO2: 97% (15 Oct 2021 11:19) (96% - 99%)      PHYSICAL EXAM:    GENERAL: NAD, well-groomed  HEENT: PERRL, +EOMI  NECK: soft, Supple.   CHEST/LUNG: Clear to percussion bilaterally; No wheezing  HEART: S1S2+, Regular rate and rhythm; No murmurs  ABDOMEN: Soft, Nontender, Nondistended; Bowel sounds present  EXTREMITIES:   No clubbing, cyanosis, or edema  SKIN: No rashes or lesions  NEURO: AAOX3, mild left facial droop, slurred speech - improved today        LABS:                        MEDICATIONS  (STANDING):  amLODIPine   Tablet 2.5 milliGRAM(s) Oral daily  ARIPiprazole 5 milliGRAM(s) Oral daily  aspirin  chewable 81 milliGRAM(s) Oral daily  atorvastatin 40 milliGRAM(s) Oral at bedtime  dextrose 40% Gel 15 Gram(s) Oral once  dextrose 5%. 1000 milliLiter(s) (50 mL/Hr) IV Continuous <Continuous>  dextrose 5%. 1000 milliLiter(s) (100 mL/Hr) IV Continuous <Continuous>  dextrose 50% Injectable 25 Gram(s) IV Push once  dextrose 50% Injectable 12.5 Gram(s) IV Push once  dextrose 50% Injectable 25 Gram(s) IV Push once  enoxaparin Injectable 40 milliGRAM(s) SubCutaneous daily  ferrous    sulfate 325 milliGRAM(s) Oral daily  furosemide    Tablet 20 milliGRAM(s) Oral <User Schedule>  glucagon  Injectable 1 milliGRAM(s) IntraMuscular once  influenza   Vaccine 0.5 milliLiter(s) IntraMuscular once  insulin lispro (ADMELOG) corrective regimen sliding scale   SubCutaneous three times a day before meals  isosorbide   mononitrate ER Tablet (IMDUR) 60 milliGRAM(s) Oral daily  lamoTRIgine 25 milliGRAM(s) Oral at bedtime  levETIRAcetam 500 milliGRAM(s) Oral two times a day  metoprolol succinate ER 50 milliGRAM(s) Oral daily  pantoprazole    Tablet 40 milliGRAM(s) Oral before breakfast    MEDICATIONS  (PRN):  ALBUTerol    90 MICROgram(s) HFA Inhaler 2 Puff(s) Inhalation every 6 hours PRN Shortness of Breath and/or Wheezing  hydrALAZINE Injectable 10 milliGRAM(s) IV Push every 8 hours PRN SBP>200                           RADIOLOGY & ADDITIONAL TESTS:  
Patient still waiting on MRI.  Discussed planning based on results.  Patient only wants home.   After she went to [facility] she went to [facility] for 6 weeks and has a huge medical bill still left to pay.  She will only accept a discharge home.     REVIEW OF SYSTEMS  Constitutional - No fever,  +fatigue  Neurological - No headaches, No memory loss, +loss of strength   Musculoskeletal - No joint pain, No joint swelling, No muscle pain  Psychiatric - No depression, No anxiety    FUNCTIONAL PROGRESS  10/13 SLP  · Diagnosis	Mild dysarthria, receptive & expressive language skills WFL  · Patient/Family Goals Statement	"MY DAUGHTER JUST TOLD ME MY SPEECH DOESN'T SOUND TOO GOOD"  · Criteria for Skilled Therapeutic Interventions Met	skilled criteria for intervention met  · Therapy Frequency	as schedule permits    Behavioral Exam:   · Behavioral Observations	alert  · Orientation	grossly WFL    Auditory Comprehension:   · Answers Yes/No Questions	within functional limits  · Able to Follow Commands	within functional limits  · Discourse	intact  · Right/Left Discrimination	intact  · Body Part ID	intact  · Open Ended Questions	intact    Verbal Expression:   · Verbal Expression	intact  · Automatic Speech	intact  · Confrontational Naming	intact  · Responsive Naming	intact  · Repetition	intact; sentence; phrase; impaired; word  · Fluency	grossly WFL  · Conversational Speech	fluent with appropriate syntax & semantics    Cognitive Linguistic Status Examination:   · Short Term Memory	pt able to recall 2/3 words with imposed delay  · Comments	Not formally assessed, however, suspected mild deficits    Motor Speech:   · Alternating Rapid Sounds	impaired  · Observations	slurred speech  · Articulation	imprecise  · Rate	impaired  · Volume	reduced  · Comments	Decreased speech intelligibility noted with lengthy sentences production & in conversation    10/12 PT  Bed Mobility: Sit to Supine:     · Level of [place]	minimum assist (75% patients effort)  · Physical Assist/Nonphysical Assist	1 person assist    Bed Mobility: Supine to Sit:     · Level of [place]	minimum assist (75% patients effort)  · Physical Assist/Nonphysical Assist	1 person assist    Bed Mobility Analysis:     · Bed Mobility Limitations	decreased ability to use legs for bridging/pushing; decreased ability to use arms for pushing/pulling  · Impairments Contributing to Impaired Bed Mobility	impaired balance; impaired coordination; impaired motor control; decreased strength    Transfer: Sit to Stand:     · Level of [place]	minimum assist (75% patients effort)  · Physical Assist/Nonphysical Assist	1 person assist  · Weight-Bearing Restrictions	weight-bearing as tolerated  · Assistive Device	rolling walker    Transfer: Stand to Sit:     · Level of [place]	minimum assist (75% patients effort)  · Physical Assist/Nonphysical Assist	1 person assist  · Weight-Bearing Restrictions	weight-bearing as tolerated  · Assistive Device	rolling walker    Sit/Stand Transfer Safety Analysis:     · Transfer Safety Concerns Noted	losing balance; decreased step length  · Impairments Contributing to Impaired Transfers	impaired balance; impaired coordination; impaired motor control; decreased strength; abnormal muscle tone    Gait Skills:     · Level of [place]	minimum assist (75% patients effort)  · Physical Assist/Nonphysical Assist	1 person assist  · Weight-Bearing Restrictions	weight-bearing as tolerated  · Assistive Device	rolling walker  · Gait Distance	25 feet    Gait Analysis:     · Gait Pattern Used	3-point gait  · Gait Deviations Noted	increased time in double stance  · Impairments Contributing to Gait Deviations	impaired balance; impaired coordination; impaired motor control; abnormal muscle tone; decreased strength    10/12 OT  Bathing Training:     · Level of [place]	moderate assist (50% patients effort)  · Physical Assist/Nonphysical Assist	1 person assist    Upper Body Dressing Training:     · Level of [place]	minimum assist (75% patients effort); to don gown  · Physical Assist/Nonphysical Assist	1 person assist    Lower Body Dressing Training:     · Level of [place]	maximum assist (25% patients effort); to don socks seated at the edge of the bed  · Physical Assist/Nonphysical Assist	1 person assist    Toilet Hygiene Training:     · Level of [place]	maximum assist (25% patients effort); secondary to incontinence  · Physical Assist/Nonphysical Assist	1 person assist    Grooming Training:     · Level of [place]	minimum assist (75% patients effort); moderate assist (50% patients effort)  · Physical Assist/Nonphysical Assist	1 person assist    Eating/Self-Feeding Training:     · Level of [place]	supervision  · Physical Assist/Nonphysical Assist	supervision      VITALS  T(C): 36.6 (10-14-21 @ 08:00), Max: 36.7 (10-13-21 @ 20:22)  HR: 63 (10-14-21 @ 08:00) (56 - 68)  BP: 160/77 (10-14-21 @ 08:00) (123/68 - 167/84)  RR: 16 (10-14-21 @ 08:00) (16 - 20)  SpO2: 96% (10-14-21 @ 08:00) (93% - 96%)  Wt(kg): --    MEDICATIONS   ALBUTerol    90 MICROgram(s) HFA Inhaler 2 Puff(s) every 6 hours PRN  ARIPiprazole 5 milliGRAM(s) daily  aspirin  chewable 81 milliGRAM(s) daily  atorvastatin 40 milliGRAM(s) at bedtime  dextrose 40% Gel 15 Gram(s) once  dextrose 5%. 1000 milliLiter(s) <Continuous>  dextrose 5%. 1000 milliLiter(s) <Continuous>  dextrose 50% Injectable 25 Gram(s) once  dextrose 50% Injectable 12.5 Gram(s) once  dextrose 50% Injectable 25 Gram(s) once  enoxaparin Injectable 40 milliGRAM(s) daily  ferrous    sulfate 325 milliGRAM(s) daily  furosemide    Tablet 20 milliGRAM(s) <User Schedule>  glucagon  Injectable 1 milliGRAM(s) once  hydrALAZINE Injectable 10 milliGRAM(s) every 8 hours PRN  influenza   Vaccine 0.5 milliLiter(s) once  insulin lispro (ADMELOG) corrective regimen sliding scale   three times a day before meals  isosorbide   mononitrate ER Tablet (IMDUR) 60 milliGRAM(s) daily  lamoTRIgine 25 milliGRAM(s) at bedtime  levETIRAcetam 500 milliGRAM(s) two times a day  metoprolol succinate ER 50 milliGRAM(s) daily  pantoprazole    Tablet 40 milliGRAM(s) before breakfast      RECENT LABS/IMAGING                          9.9    6.55  )-----------( 261      ( 13 Oct 2021 05:48 )             30.4     10-13    143  |  100  |  19.6  ----------------------------<  143<H>  3.9   |  28.0  |  1.29    Ca    8.5<L>      13 Oct 2021 05:48                  CT PERFUSION demonstrated 10/11 - No core infarct. No active ischemia. If symptoms persist consider follow up head CT or MRI, MRA  if no contraindication.    CTA COW 10/11 - Patent intracranial circulation without flow limiting stenosis.    CTA NECK 10/11 - Patent, ECAs, ICAs, no  hemodynamically significant stenosis at  ICA origins by NASCET criteria.  Bilateral vertebral arteries are patent without flow limiting stenosis.        ----------------------------------------------------------------------------------------  PHYSICAL EXAM  Constitutional - NAD, Comfortable  Extremities - No C/C/E, No calf tenderness   Neurologic Exam -                    Cognitive - AAO to self, place, date, year, situation     Communication - Fluent, +dysarthria     Cranial Nerves - Left lip droop     Motor -                      LEFT    UE - ShAB 2/5, EF 2/5, EE 2/5,  4/5                    RIGHT UE - ShAB 3/5, EF 4/5, EE 4/5,  4/5                    LEFT    LE - HF 3/5, KE 4/5, DF 5/5                     RIGHT LE - HF 3/5, KE 4/5, DF 5/5      Sensory - Intact to LT  Psychiatric - Mood stable, Affect WNL  ----------------------------------------------------------------------------------------  ASSESSMENT/PLAN  81yFemale with functional deficits after possible CVA vs seizures  R/O CVA - ASA, Lipitor, MRI planned  HTN - Hydralazine, Lasix, Imdur, Toprol - Please transition IV meds to PO for home DC PLANNING  Mood - Abilify   Seizure D/O - Keppra, Lamictal  DVT PPX - SCDs, Lovenox  Rehab - Additional workup pending including MRI are pending. Have requested PT to see patient for planned DC HOME. Patient has her son that assists her, despite the 12 PAVEL and is the only plan patient wants, given above.     Recommend ongoing mobilization by staff to maintain cardiopulmonary function and prevention of secondary complications related to debility. Discussed with rehab team.   
Patient feels well.  Discussed plan and hope she will be able to go home soon.  RN to reiterate the visitor's requirements.    REVIEW OF SYSTEMS  Constitutional - No fever,  No fatigue  HEENT - No vertigo, No neck pain  Neurological - No headaches, No memory loss, +loss of strength, No numbness, No tremors  Musculoskeletal - No joint pain, No joint swelling, No muscle pain  Psychiatric - No depression, No anxiety    FUNCTIONAL PROGRESS  10/15 PT  Bed Mobility  Bed Mobility Training Supine-to-Sit: independent    Sit-Stand Transfer Training  Transfer Training Sit-to-Stand Transfer: independent;  weight-bearing as tolerated   rolling walker  Transfer Training Stand-to-Sit Transfer: independent;  weight-bearing as tolerated   rolling walker  Sit-to-Stand Transfer Training Transfer Safety Analysis: rolling walker    Gait Training  Gait Training: independent;  weight-bearing as tolerated   rolling walker;  75 feet;  x2  Gait Analysis: 2-point gait   decreased step length;  decreased dmitry;  75 feet;  x2;  rolling walker    Stair Training  Physical Assist/Nonphysical Assist: supervision  Weight-Bearing Restrictions: weight-bearing as tolerated  Assistive Device: right rail up;  2 hands on handrail to ascend and descend. Pt required standing rest breaks 2*2 ZEPEDA, reports dl will be with her on stairs at home.Per DIANA Dumas, pt also has aides at home.   Number of Stairs: 12       VITALS  T(C): 36.6 (10-15-21 @ 11:19), Max: 36.8 (10-14-21 @ 16:00)  HR: 59 (10-15-21 @ 11:19) (55 - 67)  BP: 131/59 (10-15-21 @ 11:19) (131/59 - 186/80)  RR: 16 (10-15-21 @ 11:19) (16 - 18)  SpO2: 97% (10-15-21 @ 11:19) (96% - 99%)  Wt(kg): --    MEDICATIONS   ALBUTerol    90 MICROgram(s) HFA Inhaler 2 Puff(s) every 6 hours PRN  amLODIPine   Tablet 2.5 milliGRAM(s) daily  ARIPiprazole 5 milliGRAM(s) daily  aspirin  chewable 81 milliGRAM(s) daily  atorvastatin 40 milliGRAM(s) at bedtime  dextrose 40% Gel 15 Gram(s) once  dextrose 5%. 1000 milliLiter(s) <Continuous>  dextrose 5%. 1000 milliLiter(s) <Continuous>  dextrose 50% Injectable 25 Gram(s) once  dextrose 50% Injectable 12.5 Gram(s) once  dextrose 50% Injectable 25 Gram(s) once  enoxaparin Injectable 40 milliGRAM(s) daily  ferrous    sulfate 325 milliGRAM(s) daily  furosemide    Tablet 20 milliGRAM(s) <User Schedule>  glucagon  Injectable 1 milliGRAM(s) once  hydrALAZINE Injectable 10 milliGRAM(s) every 8 hours PRN  influenza   Vaccine 0.5 milliLiter(s) once  insulin lispro (ADMELOG) corrective regimen sliding scale   three times a day before meals  isosorbide   mononitrate ER Tablet (IMDUR) 60 milliGRAM(s) daily  lamoTRIgine 25 milliGRAM(s) at bedtime  levETIRAcetam 500 milliGRAM(s) two times a day  metoprolol succinate ER 50 milliGRAM(s) daily  pantoprazole    Tablet 40 milliGRAM(s) before breakfast      RECENT LABS/IMAGING                            9.9    6.55  )-----------( 261      ( 13 Oct 2021 05:48 )             30.4     10-13    143  |  100  |  19.6  ----------------------------<  143<H>  3.9   |  28.0  |  1.29    Ca    8.5<L>      13 Oct 2021 05:48                  CT PERFUSION demonstrated 10/11 - No core infarct. No active ischemia. If symptoms persist consider follow up head CT or MRI, MRA  if no contraindication.    CTA COW 10/11 - Patent intracranial circulation without flow limiting stenosis.    CTA NECK 10/11 - Patent, ECAs, ICAs, no  hemodynamically significant stenosis at  ICA origins by NASCET criteria.  Bilateral vertebral arteries are patent without flow limiting stenosis.    MRI HEAD 10/14 - 1)  Right-sided ventriculostomy in situ with gliosis and encephalomalacia in the right parietal lobe. No diffusion restriction or acute ischemia. Ventricles appear to be normal in size. 2)  scattered mucosal thickening in sinuses.    ----------------------------------------------------------------------------------------  PHYSICAL EXAM  Constitutional - NAD, Comfortable  Extremities - No C/C/E, No calf tenderness   Neurologic Exam -                    Cognitive - AAO to self, place, date, year, situation     Communication - Fluent, +dysarthria     Cranial Nerves - Left lip droop     Motor -                      LEFT    UE - ShAB 2/5, EF 2/5, EE 2/5,  4/5                    RIGHT UE - ShAB 3/5, EF 4/5, EE 4/5,  4/5                    LEFT    LE - HF 3/5, KE 4/5, DF 5/5                     RIGHT LE - HF 3/5, KE 4/5, DF 5/5      Sensory - Intact to LT  Psychiatric - Mood stable, Affect WNL  ----------------------------------------------------------------------------------------  ASSESSMENT/PLAN  81yFemale with functional deficits after seizures  CAD - ASA, Lipitor   HTN - Hydralazine, Lasix, Imdur, Toprol   Mood - Abilify   Seizure D/O - Keppra, Lamictal  DVT PPX - SCDs, Lovenox  Rehab - Patient with no CVA.  Patient is miraculously independent now with ambulation and supervision with stairs. Plan is HOME with home care.     Will sign off at this time. Thank you for allowing me to be part of your patient's care. Please reconsult PMR for additional rehab recommendations or dispo needs if functional status changes.     Discussed with rehab clinical care team. 
                            [facility]                                        Neurology at [place]                                 Colby Marques, & Paul                                  [address]                                        [place], [address]                                             [phone]        CC: Stroke    HPI:   The patient is a 81 year old woman with a history of hydrocephalus and ventriculoperitoneal shunt.   She has a history of Parkinsonism likely secondary to psychiatric medication.   She follows with Dr Refugio Bowman in the office.   She was hospitalized several months ago secondary to intracranial hemorrhage and seizure.    She now presented with slurred speech, left face droop, and left arm numbness.   Onset was around 1:30 the day of arrival.     She now reports that her speech is nearly back to normal.     She uses a rolling walker at baseline for ambulation. (JW)    Interval history: she reports dysarthria, but speech sounds clear to me    Review of systems (neurology): Denies headache or dizziness. Denies weakness/numbness.  (+) speech, but no language deficits. Denies diplopia/blurred vision.  Denies confusion    MEDICATIONS  (STANDING):  ARIPiprazole 5 milliGRAM(s) Oral daily  aspirin  chewable 81 milliGRAM(s) Oral daily  atorvastatin 40 milliGRAM(s) Oral at bedtime  dextrose 40% Gel 15 Gram(s) Oral once  dextrose 5%. 1000 milliLiter(s) (50 mL/Hr) IV Continuous <Continuous>  dextrose 5%. 1000 milliLiter(s) (100 mL/Hr) IV Continuous <Continuous>  dextrose 50% Injectable 25 Gram(s) IV Push once  dextrose 50% Injectable 12.5 Gram(s) IV Push once  dextrose 50% Injectable 25 Gram(s) IV Push once  enoxaparin Injectable 40 milliGRAM(s) SubCutaneous daily  ferrous    sulfate 325 milliGRAM(s) Oral daily  glucagon  Injectable 1 milliGRAM(s) IntraMuscular once  influenza   Vaccine 0.5 milliLiter(s) IntraMuscular once  insulin lispro (ADMELOG) corrective regimen sliding scale   SubCutaneous three times a day before meals  isosorbide   mononitrate ER Tablet (IMDUR) 60 milliGRAM(s) Oral daily  lamoTRIgine 25 milliGRAM(s) Oral at bedtime  levETIRAcetam 500 milliGRAM(s) Oral two times a day  metoprolol succinate ER 50 milliGRAM(s) Oral daily  pantoprazole    Tablet 40 milliGRAM(s) Oral before breakfast    MEDICATIONS  (PRN):  ALBUTerol    90 MICROgram(s) HFA Inhaler 2 Puff(s) Inhalation every 6 hours PRN Shortness of Breath and/or Wheezing  hydrALAZINE Injectable 10 milliGRAM(s) IV Push every 8 hours PRN SBP>200      Vital Signs Last 24 Hrs  T(C): 36.7 (13 Oct 2021 10:11), Max: 36.8 (12 Oct 2021 16:51)  T(F): 98.1 (13 Oct 2021 10:11), Max: 98.2 (12 Oct 2021 16:51)  HR: 56 (13 Oct 2021 12:24) (56 - 69)  BP: 159/74 (13 Oct 2021 12:24) (112/49 - 179/85)  BP(mean): 100 (13 Oct 2021 10:26) (100 - 116)  RR: 20 (13 Oct 2021 12:24) (11 - 20)  SpO2: 95% (13 Oct 2021 12:24) (93% - 98%)    Detailed Neurologic Exam:    Mental status: The patient is awake and alert. There is no aphasia. There is no dysarthria.     Cranial nerves: Pupils equal and react symmetrically to light. There is no visual field deficit to threat. Extraocular motion is full with no nystagmus. Facial sensation is intact. Facial musculature is asymmetric with a subtle depression of the left nasolabial fold. Palate elevates symmetrically. Tongue is midline.    Motor: There is normal bulk with mildly increased tone with some cogwheeling at the wrists with reinforcement.  There is no tremor.  Strength grossly 5/5 bilaterally.    Sensation: Grossly intact to light touch and pin.    Reflexes: 2+ throughout and plantar responses are flexor.    Cerebellar: No dysmetria on finger nose testing.    Labs:                           9.9    6.55  )-----------( 261      ( 13 Oct 2021 05:48 )             30.4     10-13    143  |  100  |  19.6  ----------------------------<  143<H>  3.9   |  28.0  |  1.29    Ca    8.5<L>      13 Oct 2021 05:48          Rad:     CT head images: There is no acute pathology. Shunt catheter in place right posterior. Encephalomalacia in left occipital area.    CT-A negative for large vessel occlusion.  CT-P negative for core/penumbra.    
GREGROY WOODALL    [id number]    81y      Female    CC: admitted with left arm weakness, slurred speech and facial droop   still has same symptoms  offers no other complaints    INTERVAL HPI/OVERNIGHT EVENTS: no acute events     REVIEW OF SYSTEMS:    CONSTITUTIONAL: No fever,  fatigue  RESPIRATORY: No cough, wheezing, No shortness of breath  CARDIOVASCULAR: No chest pain, palpitations  GASTROINTESTINAL: No abdominal or epigastric pain. No nausea, vomiting  NEUROLOGICAL: No headaches      Vital Signs Last 24 Hrs  T(C): 36.7 (12 Oct 2021 08:09), Max: 36.8 (12 Oct 2021 01:36)  T(F): 98 (12 Oct 2021 08:09), Max: 98.2 (12 Oct 2021 01:36)  HR: 58 (12 Oct 2021 08:09) (50 - 62)  BP: 219/88 (12 Oct 2021 08:09) (175/80 - 219/88)  BP(mean): --  RR: 18 (12 Oct 2021 08:09) (16 - 20)  SpO2: 96% (12 Oct 2021 08:09) (94% - 98%)      PHYSICAL EXAM:    GENERAL: NAD, well-groomed  HEENT: PERRL, +EOMI  NECK: soft, Supple.   CHEST/LUNG: Clear to percussion bilaterally; No wheezing  HEART: S1S2+, Regular rate and rhythm; No murmurs  ABDOMEN: Soft, Nontender, Nondistended; Bowel sounds present  EXTREMITIES:   No clubbing, cyanosis, or edema  SKIN: No rashes or lesions  NEURO: AAOX3        LABS:                        10.0   7.14  )-----------( 280      ( 12 Oct 2021 08:03 )             31.2     10-11    138  |  99  |  16.9  ----------------------------<  145<H>  4.0   |  25.0  |  0.91    Ca    9.2      11 Oct 2021 14:08    TPro  7.0  /  Alb  3.6  /  TBili  0.3<L>  /  DBili  x   /  AST  15  /  ALT  9   /  AlkPhos  122<H>  10-11    PT/INR - ( 11 Oct 2021 14:08 )   PT: 13.0 sec;   INR: 1.13 ratio         PTT - ( 11 Oct 2021 14:08 )  PTT:27.9 sec        MEDICATIONS  (STANDING):  ARIPiprazole 5 milliGRAM(s) Oral daily  aspirin  chewable 81 milliGRAM(s) Oral daily  atorvastatin 40 milliGRAM(s) Oral at bedtime  dextrose 40% Gel 15 Gram(s) Oral once  dextrose 5%. 1000 milliLiter(s) (50 mL/Hr) IV Continuous <Continuous>  dextrose 5%. 1000 milliLiter(s) (100 mL/Hr) IV Continuous <Continuous>  dextrose 50% Injectable 25 Gram(s) IV Push once  dextrose 50% Injectable 12.5 Gram(s) IV Push once  dextrose 50% Injectable 25 Gram(s) IV Push once  enoxaparin Injectable 40 milliGRAM(s) SubCutaneous daily  ferrous    sulfate 325 milliGRAM(s) Oral daily  furosemide    Tablet 20 milliGRAM(s) Oral daily  glucagon  Injectable 1 milliGRAM(s) IntraMuscular once  insulin lispro (ADMELOG) corrective regimen sliding scale   SubCutaneous three times a day before meals  isosorbide   mononitrate ER Tablet (IMDUR) 60 milliGRAM(s) Oral daily  lamoTRIgine 25 milliGRAM(s) Oral at bedtime  levETIRAcetam 500 milliGRAM(s) Oral two times a day  metoprolol succinate ER 50 milliGRAM(s) Oral daily  pantoprazole    Tablet 40 milliGRAM(s) Oral before breakfast    MEDICATIONS  (PRN):  ALBUTerol    90 MICROgram(s) HFA Inhaler 2 Puff(s) Inhalation every 6 hours PRN Shortness of Breath and/or Wheezing  hydrALAZINE Injectable 10 milliGRAM(s) IV Push every 8 hours PRN SBP>200      RADIOLOGY & ADDITIONAL TESTS:  
GREGORY WOODALL    [id number]    81y      Female    CC: admitted with left arm weakness, slurred speech and facial droop   still has same symptoms  offers no other complaints    INTERVAL HPI/OVERNIGHT EVENTS: no acute events     REVIEW OF SYSTEMS:    CONSTITUTIONAL: No fever,  fatigue  RESPIRATORY: No cough, wheezing, No shortness of breath  CARDIOVASCULAR: No chest pain, palpitations  GASTROINTESTINAL: No abdominal or epigastric pain. No nausea, vomiting  NEUROLOGICAL: No headaches      Vital Signs Last 24 Hrs  T(C): 36.7 (13 Oct 2021 10:11), Max: 37.1 (12 Oct 2021 12:39)  T(F): 98.1 (13 Oct 2021 10:11), Max: 98.8 (12 Oct 2021 12:39)  HR: 56 (13 Oct 2021 10:11) (56 - 69)  BP: 158/71 (13 Oct 2021 10:26) (112/49 - 216/83)  BP(mean): 100 (13 Oct 2021 10:26) (100 - 116)  RR: 18 (13 Oct 2021 10:11) (11 - 19)  SpO2: 97% (13 Oct 2021 08:16) (93% - 98%)      PHYSICAL EXAM:    GENERAL: NAD, well-groomed  HEENT: PERRL, +EOMI  NECK: soft, Supple.   CHEST/LUNG: Clear to percussion bilaterally; No wheezing  HEART: S1S2+, Regular rate and rhythm; No murmurs  ABDOMEN: Soft, Nontender, Nondistended; Bowel sounds present  EXTREMITIES:   No clubbing, cyanosis, or edema  SKIN: No rashes or lesions  NEURO: AAOX3, left facial droop, slurred speech and left arm weakness         LABS:                                 9.9    6.55  )-----------( 261      ( 13 Oct 2021 05:48 )             30.4   10-13    143  |  100  |  19.6  ----------------------------<  143<H>  3.9   |  28.0  |  1.29    Ca    8.5<L>      13 Oct 2021 05:48    TPro  7.0  /  Alb  3.6  /  TBili  0.3<L>  /  DBili  x   /  AST  15  /  ALT  9   /  AlkPhos  122<H>  10-11        MEDICATIONS  (STANDING):  ARIPiprazole 5 milliGRAM(s) Oral daily  aspirin  chewable 81 milliGRAM(s) Oral daily  atorvastatin 40 milliGRAM(s) Oral at bedtime  dextrose 40% Gel 15 Gram(s) Oral once  dextrose 5%. 1000 milliLiter(s) (50 mL/Hr) IV Continuous <Continuous>  dextrose 5%. 1000 milliLiter(s) (100 mL/Hr) IV Continuous <Continuous>  dextrose 50% Injectable 25 Gram(s) IV Push once  dextrose 50% Injectable 12.5 Gram(s) IV Push once  dextrose 50% Injectable 25 Gram(s) IV Push once  enoxaparin Injectable 40 milliGRAM(s) SubCutaneous daily  ferrous    sulfate 325 milliGRAM(s) Oral daily  glucagon  Injectable 1 milliGRAM(s) IntraMuscular once  influenza   Vaccine 0.5 milliLiter(s) IntraMuscular once  insulin lispro (ADMELOG) corrective regimen sliding scale   SubCutaneous three times a day before meals  isosorbide   mononitrate ER Tablet (IMDUR) 60 milliGRAM(s) Oral daily  lamoTRIgine 25 milliGRAM(s) Oral at bedtime  levETIRAcetam 500 milliGRAM(s) Oral two times a day  metoprolol succinate ER 50 milliGRAM(s) Oral daily  pantoprazole    Tablet 40 milliGRAM(s) Oral before breakfast    MEDICATIONS  (PRN):  ALBUTerol    90 MICROgram(s) HFA Inhaler 2 Puff(s) Inhalation every 6 hours PRN Shortness of Breath and/or Wheezing  hydrALAZINE Injectable 10 milliGRAM(s) IV Push every 8 hours PRN SBP>200        RADIOLOGY & ADDITIONAL TESTS:

## 2021-10-15 NOTE — PROGRESS NOTE ADULT - NSPROGADDITIONALINFOA_GEN_ALL_CORE
?urinary retention overnight - pt otherwise asymptomatic, denies constipation.  Will monitor, check bladder scan for Post void  if patient voiding without any issues - possible DC today home with home care.
PT , Rehab eval . CM for dc planning pending MRI
DC planning - pt refused rehab - would like home with home PT

## 2021-10-15 NOTE — PROGRESS NOTE ADULT - PROBLEM SELECTOR PLAN 6
on lasix 20mg q48hrs - monitor

## 2021-10-15 NOTE — PROGRESS NOTE ADULT - ASSESSMENT
The patient is a 81y Female with new stroke.     Stroke.   Was not t-PA candidate secondary to prior intracranial hemorrhage and mild nondisabling deficit.   LDL: 73  Goal: < 70  Continue antiplatelet and statin.   Await MRI brain to evaluate for recurrent CVA  PT/OT.     Seizure history/ICH.   Continue Keppra.   Continue Lamotrigine (likely taking for behavioral issues).     will follow with you    Refugio Bowman MD PhD   [id number]      
The patient is a 81y Female with new stroke.     Stroke.   Was not t-PA candidate secondary to prior intracranial hemorrhage and mild nondisabling deficit.   LDL: 73  Goal: < 70  Continue antiplatelet and statin.   MRI brain negative for recurrent CVA  PT/OT.     Seizure history/ICH.   Continue Keppra.   Continue Lamotrigine (likely taking for behavioral issues).     stable from neurology for discharge when medically stable    Thank you for allowing me to participate in the care of your patient    Refugio Bowman MD, PhD   [id number]      
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 She was seen in ER, CT head neg for acute IC bleed, seen by Neurology, was found to not be a candidate for tPA 2/2 recent h/o IC bleed. Admitted for acute CVA with hypertensive urgency      
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 She was seen in ER, CT head neg for acute IC bleed, seen by Neurology, was found to not be a candidate for tPA 2/2 recent h/o IC bleed. Admitted for acute CVA withj hypertensive urgency      
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 She was seen in ER, CT head neg for acute IC bleed, seen by Neurology, was found to not be a candidate for tPA 2/2 recent h/o IC bleed. Admitted for acute CVA withj hypertensive urgency      
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 She was seen in ER, CT head neg for acute IC bleed, seen by Neurology, was found to not be a candidate for tPA 2/2 recent h/o IC bleed. Admitted for acute CVA.

## 2021-10-15 NOTE — PROGRESS NOTE ADULT - PROBLEM SELECTOR PLAN 2
uncontrolled  hypertension   monitor , goal -150mm hg   iv hydralazine for sbp>200  resume Toprol and Imdur - EKG - sinus bradycardia , monitor   add norvasc 2.5 mg daily
uncontrolled  hypertension   monitor , goal -150mm hg   iv hydralazine for sbp>200  resume Toprol and Imdur - EKG - sinus bradycardia , monitor   add norvasc 2.5 mg daily
uncontrolled  hypertension   monitor , goal -150mm hg   iv hydralazine for sbp>200  resume Toprol and Imdur - EKG - sinus bradycardia , monitor
uncontrolled  hypertension   monitor , goal -180mm hg   iv hydralazine for sbp>200  resume Toprol and Imdur - EKG - sinus bradycardia , monitor

## 2021-10-15 NOTE — PROGRESS NOTE ADULT - REASON FOR ADMISSION
facial droop, dyarthria, left arm numbness
facial droop, dysarthria, left arm numbness
facial droop, dyarthria, left arm numbness

## 2021-10-15 NOTE — PROGRESS NOTE ADULT - PROBLEM SELECTOR PLAN 1
MRI head - neg for acute cva.   Neuro eval appreciated   control BP - ct  norvasc 2.5 mg daily
MRI head pending   stroke unit  Neuro eval appreciated   control BP
MRI head pending   stroke unit - will downgrade today after MRI   Neuro eval appreciated   control BP - add norvasc 2.5 mg daily
MRI head  stroke unit  Neuro eval appreciated   control BP   lipid, A1C

## 2021-10-15 NOTE — PROGRESS NOTE ADULT - PROBLEM SELECTOR PROBLEM 3
Normal pressure hydrocephalus

## 2021-10-18 NOTE — DIETITIAN INITIAL EVALUATION ADULT. - ORAL INTAKE PTA/DIET HISTORY
Pt reports mild decrease in appetite in the past year. Reports she normally eats 2 meals/day, sometimes snacks. Pt provided food preferences. Pt becomes easily distracted during conversation, needed to be redirected several times during interview. Pt has an allergy to pineapple. Per chart review, pt was on puree consistency @ [facility], tolerated diet while there. show

## 2021-10-20 ENCOUNTER — APPOINTMENT (OUTPATIENT)
Dept: PULMONOLOGY | Facility: CLINIC | Age: 81
End: 2021-10-20
Payer: MEDICARE

## 2021-10-20 VITALS
SYSTOLIC BLOOD PRESSURE: 120 MMHG | OXYGEN SATURATION: 95 % | DIASTOLIC BLOOD PRESSURE: 68 MMHG | BODY MASS INDEX: 30.86 KG/M2 | HEART RATE: 66 BPM | HEIGHT: 58 IN | WEIGHT: 147 LBS | RESPIRATION RATE: 16 BRPM

## 2021-10-20 DIAGNOSIS — R06.02 SHORTNESS OF BREATH: ICD-10-CM

## 2021-10-20 DIAGNOSIS — J98.6 DISORDERS OF DIAPHRAGM: ICD-10-CM

## 2021-10-20 PROCEDURE — 99204 OFFICE O/P NEW MOD 45 MIN: CPT

## 2021-10-20 RX ORDER — ARIPIPRAZOLE 5 MG/1
5 TABLET ORAL
Refills: 0 | Status: ACTIVE | COMMUNITY

## 2021-10-20 RX ORDER — ATORVASTATIN CALCIUM 40 MG/1
40 TABLET, FILM COATED ORAL
Refills: 0 | Status: ACTIVE | COMMUNITY

## 2021-10-20 RX ORDER — ISOSORBIDE MONONITRATE 60 MG/1
60 TABLET, EXTENDED RELEASE ORAL
Qty: 30 | Refills: 0 | Status: DISCONTINUED | COMMUNITY
Start: 2021-10-04 | End: 2021-10-20

## 2021-10-20 RX ORDER — AMLODIPINE BESYLATE 2.5 MG/1
2.5 TABLET ORAL
Refills: 0 | Status: ACTIVE | COMMUNITY

## 2021-10-20 RX ORDER — LOSARTAN POTASSIUM 25 MG/1
25 TABLET, FILM COATED ORAL
Qty: 30 | Refills: 0 | Status: DISCONTINUED | COMMUNITY
Start: 2021-08-31 | End: 2021-10-20

## 2021-10-20 RX ORDER — FOLIC ACID 1 MG/1
1 TABLET ORAL
Refills: 0 | Status: ACTIVE | COMMUNITY

## 2021-10-20 RX ORDER — HYDRALAZINE HYDROCHLORIDE 25 MG/1
25 TABLET ORAL
Qty: 90 | Refills: 0 | Status: DISCONTINUED | COMMUNITY
Start: 2021-08-31 | End: 2021-10-20

## 2021-10-20 RX ORDER — ASPIRIN 81 MG
81 TABLET, DELAYED RELEASE (ENTERIC COATED) ORAL
Refills: 0 | Status: DISCONTINUED | COMMUNITY
End: 2021-10-20

## 2021-10-20 NOTE — ASSESSMENT
[FreeTextEntry1] : Patient with right diaphragmatic paralysis. This could be result of her surgery in her 20s, it could also be resulting from normal pressure hydrocephalus. Seems appear to be clinically stable over the past 4 years. Dyspnea on exertion is chronic and there is no indication for intrinsic lung disease. I reassured the patient and her son regarding this. In the past some consideration was given to evaluation at [place] for diaphragmatic pacing but I do not believe with her age and comorbidities that the benefits would outweigh the risks.

## 2021-10-20 NOTE — PHYSICAL EXAM
[General Appearance - Well Developed] : well developed [General Appearance - Well Nourished] : well nourished [Normal Conjunctiva] : the conjunctiva exhibited no abnormalities [Normal Oropharynx] : normal oropharynx [Neck Appearance] : the appearance of the neck was normal [Neck Cervical Mass (___cm)] : no neck mass was observed [Jugular Venous Distention Increased] : there was no jugular-venous distention [Thyroid Diffuse Enlargement] : the thyroid was not enlarged [Heart Rate And Rhythm] : heart rate and rhythm were normal [Heart Sounds] : normal S1 and S2 [Murmurs] : no murmurs present [Arterial Pulses Normal] : the arterial pulses were normal [Edema] : no peripheral edema present [] : no respiratory distress [Respiration, Rhythm And Depth] : normal respiratory rhythm and effort [Exaggerated Use Of Accessory Muscles For Inspiration] : no accessory muscle use [Auscultation Breath Sounds / Voice Sounds] : lungs were clear to auscultation bilaterally [Abdomen Soft] : soft [Abdomen Tenderness] : non-tender [Gait - Steppage Right] : a steppage gait was noted on the right [Gait - Steppage Left] : a steppage gait was noted on the left [Nail Clubbing] : no clubbing of the fingernails [Cyanosis, Localized] : no localized cyanosis [FreeTextEntry2] : LLE swelling [Oriented To Time, Place, And Person] : oriented to person, place, and time

## 2021-10-20 NOTE — HISTORY OF PRESENT ILLNESS
[TextBox_4] : Was last seen here 3-1/2 years ago. She has a history of an elevated right hemidiaphragm which has been present for many years. She has dyspnea on exertion. Pulmonary function studies show severe restriction. She has a history of psychiatric disorder, normal pressure hydrocephalus with shunt and gait disturbance. Recently hospitalized with a small intracranial bleed. No intervention was made. Review of hospital records showed an elevated right hemidiaphragm. Review of records from her previous pulmonologist showed a sniff test last year consistent with diaphragmatic paralysis. The patient reports that she had surgery in her 20s for vascular compromise in her right upper extremity.

## 2021-10-20 NOTE — REASON FOR VISIT
[Consultation] : a consultation [Abnormal CXR/ Chest CT] : an abnormal CXR/ chest CT [Shortness of Breath] : shortness of breath [Family Member] : family member

## 2021-11-08 ENCOUNTER — APPOINTMENT (OUTPATIENT)
Dept: NEUROLOGY | Facility: CLINIC | Age: 81
End: 2021-11-08
Payer: MEDICARE

## 2021-11-08 VITALS
OXYGEN SATURATION: 93 % | DIASTOLIC BLOOD PRESSURE: 78 MMHG | SYSTOLIC BLOOD PRESSURE: 200 MMHG | HEIGHT: 57 IN | BODY MASS INDEX: 31.71 KG/M2 | HEART RATE: 58 BPM | TEMPERATURE: 97.6 F | WEIGHT: 147 LBS

## 2021-11-08 DIAGNOSIS — G40.802 OTHER EPILEPSY, NOT INTRACTABLE, W/OUT STATUS EPILEPTICUS: ICD-10-CM

## 2021-11-08 PROCEDURE — 99215 OFFICE O/P EST HI 40 MIN: CPT

## 2021-11-08 RX ORDER — METOPROLOL SUCCINATE 50 MG/1
50 TABLET, EXTENDED RELEASE ORAL DAILY
Qty: 5 | Refills: 0 | Status: ACTIVE | COMMUNITY
Start: 2021-11-08 | End: 1900-01-01

## 2021-11-09 NOTE — CONSULT LETTER
[Dear  ___] : Dear  [unfilled], [Sincerely,] : Sincerely, [FreeTextEntry1] : I had the pleasure of seeing your patient, GREGORY WOODALL, in my office today. Please see my note below. \par \par If you have any questions, please do not hesitate to contact me.  [FreeTextEntry3] : Natan Lopez MD\par Director, Epilepsy/EMU\par [facility] \par [facility] [place]\par [address] [place] [address] \par Tel: [phone]; Fax: [phone]

## 2021-11-09 NOTE — PHYSICAL EXAM
[FreeTextEntry1] : GENERAL PHYSICAL EXAM:\par GEN: no distress, normal affect\par HEENT: NCAT, OP clear\par EYES: sclera white, conjunctiva clear, no nystagmus\par NECK: supple\par CV: RRR    		\par PULM: CTAB, no wheezing\par GI: soft ABD, +BS, NT, ND\par EXT: peripheral pulse intact, no cyanosis\par MSK: muscle tone and strength normal\par SKIN: warm, dry, no rash or lesion on exposed skin \par \par NEUROLOGICAL EXAM:\par Mental Status\par Orientation: awake and alert\par Language: clear \par \par Cranial Nerves\par II: full visual fields intact \par III, IV, VI: PERRL, EOMI\par V, VII: facial sensation and movement intact and symmetric \par VIII: hearing intact \par IX, X: uvula midline, soft palate elevates normally \par XI: BL shoulder shrug intact \par XII: tongue midline\par \par Motor\par 5/5 in all EXTs          \par Tone and bulk are normal in upper and lower limbs\par No pronator drift\par \par Sensation\par Intact to light touch and pinprick in all 4 EXTs\par \par Reflex\par 2+ in BL biceps, triceps, brachioradialis, patella, ankle                                    \par Plantar responses downward bilaterally\par \par Coordination\par Normal FTN bilaterally\par \par Gait\par Ambulates independently\par \par

## 2021-11-09 NOTE — ASSESSMENT
[FreeTextEntry1] : GREGORY WOODALL is a 81y Female with a history of dCHF, NIDDM, HTN, HLD, NPH s/p  shunt 2015 (neurologist Dr. Bowman, NSG Dr. 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 CTH/MRI brain with encephalomalacia/gliosis in the right parietal and left occipital lobes. EEG with a single equivocal LT epileptiform discharge. Personally reviewed all images, labs, EEG and other studies. \par \par Continue to up-titrate LTG. Thoroughly went over side effects of medication, detailed direction and titrating schedule regarding medication administration. All questions and concerns answered and addressed in detail to patient's and son's complete satisfaction. Patient and son verbalized understanding and agreed to plan.\par \par \par - Continue to up-titrate LTG. Following titration schedule printed and provided to pt. Educated pt on side effects, including monitoring for rash daily. Stop taking LTG and call office immediately if rash appears. \par Week 1, 2: 0/25mg		\par Week 3, 4: 25/25mg \par Week 5, 6: 50/50mg\par Week 7, 8: 50/100mg\par Week 9 and onward: 100/100mg\par \par - Continue LEV 250mg BID for now \par - f/u in 3 months: EMU, LTG level\par \par \par All relevant epilepsy AAN quality care measures were addressed and discussed with the patient and son.\par \par More than 50% of time spent counseling and educating patient and son about epilepsy specific safety issues including AED side effects and interactions, alcohol consumption, sleep deprivation, risks and driving privileges associated with the [place] Guidelines, death related to seizures/SUDEP, seizure 1st aid and risks. Patient and son are educated on seizure precautions, including no driving, no operating machinery, no swimming or bathing, no climbing heights, or engage in any risky activities during which a seizure could cause further injury to pt or others.

## 2021-11-09 NOTE — HISTORY OF PRESENT ILLNESS
[FreeTextEntry1] : PCP: Dr. Franklin\par Son: Thomas Hager\par \par This is a 81y Female with a history of dCHF, NIDDM, HTN, HLD, NPH s/p  shunt 2015 (neurologist Dr. Bowman, NSG Dr. Haskins), ICH (small right basal ganglia hemorrhage 5/2021), DVT (off AC with neg doppler), hx Klebsiella UTI, hx Pneumonia, glaucoma, Bipolar, depression who presents for posthospital followup for newly diagnosed focal epilepsy. \par \par Pt presented to [facility] on 9/28 with AMS and seizure-like activity at home. Had a witnessed bilateral tonic clonic seizure in ER that lasted about 45s. Head imaging revealed encephalomalacia/gliosis along shunt track in the right parietal lobe, as well as encephalomalacia in the left occipital lobe. Given h/o anxiety/depression, pt was discharged with LTG 25mg QHS with plan to up-titrate outpatient, as well as LEV 500mg BID as bridging agent. However, son decreased LEV to 250mg BID due to pt's excessive fatigue on LEV 500mg BID.\par \par SEIZURE RISK FACTORS:\par Encephalomalacia/gliosis in the right parietal and left occipital lobes. \par \par CURRENT AEDs:\par LEV 250mg BID - started 9/28/21, bridging agent\par LTG 25mg QHS (plan to up-titrate) – started 9/30/21\par \par PREVIOUS AED:\par none\par \par IMAGING: \par MRI w/o, epilepsy protocol 10/14/21 ([facility]): Right-sided ventriculostomy in situ with gliosis and encephalomalacia in the right parietal lobe.\par \par CTH 9/28/21 ([facility]): Gliosis in the right parietal lobe. Encephalomalacia within the left occipital lobe and cerebellum. Right parietal approach  shunt with tip located at the left lateral ventricle. \par \par NEUROPHYSIOLOGY:\par Non-elective EMU 9/29 – 9/30/21 ([facility]): a single equivocal LT epileptiform discharge\par \par NEUROPSYCHOLOGY: \par none

## 2021-12-29 ENCOUNTER — NON-APPOINTMENT (OUTPATIENT)
Age: 81
End: 2021-12-29

## 2021-12-29 DIAGNOSIS — G93.40 ENCEPHALOPATHY, UNSPECIFIED: ICD-10-CM

## 2021-12-30 PROCEDURE — 97116 GAIT TRAINING THERAPY: CPT

## 2021-12-30 PROCEDURE — 85610 PROTHROMBIN TIME: CPT

## 2021-12-30 PROCEDURE — 87077 CULTURE AEROBIC IDENTIFY: CPT

## 2021-12-30 PROCEDURE — 82435 ASSAY OF BLOOD CHLORIDE: CPT

## 2021-12-30 PROCEDURE — 84436 ASSAY OF TOTAL THYROXINE: CPT

## 2021-12-30 PROCEDURE — 84132 ASSAY OF SERUM POTASSIUM: CPT

## 2021-12-30 PROCEDURE — 82962 GLUCOSE BLOOD TEST: CPT

## 2021-12-30 PROCEDURE — 81001 URINALYSIS AUTO W/SCOPE: CPT

## 2021-12-30 PROCEDURE — 86769 SARS-COV-2 COVID-19 ANTIBODY: CPT

## 2021-12-30 PROCEDURE — 82330 ASSAY OF CALCIUM: CPT

## 2021-12-30 PROCEDURE — 74018 RADEX ABDOMEN 1 VIEW: CPT

## 2021-12-30 PROCEDURE — 85027 COMPLETE CBC AUTOMATED: CPT

## 2021-12-30 PROCEDURE — G1004: CPT

## 2021-12-30 PROCEDURE — 96375 TX/PRO/DX INJ NEW DRUG ADDON: CPT

## 2021-12-30 PROCEDURE — 84480 ASSAY TRIIODOTHYRONINE (T3): CPT

## 2021-12-30 PROCEDURE — 97167 OT EVAL HIGH COMPLEX 60 MIN: CPT

## 2021-12-30 PROCEDURE — 80053 COMPREHEN METABOLIC PANEL: CPT

## 2021-12-30 PROCEDURE — 82947 ASSAY GLUCOSE BLOOD QUANT: CPT

## 2021-12-30 PROCEDURE — 84484 ASSAY OF TROPONIN QUANT: CPT

## 2021-12-30 PROCEDURE — U0003: CPT

## 2021-12-30 PROCEDURE — 0225U NFCT DS DNA&RNA 21 SARSCOV2: CPT

## 2021-12-30 PROCEDURE — 85014 HEMATOCRIT: CPT

## 2021-12-30 PROCEDURE — U0005: CPT

## 2021-12-30 PROCEDURE — 36600 WITHDRAWAL OF ARTERIAL BLOOD: CPT

## 2021-12-30 PROCEDURE — 70450 CT HEAD/BRAIN W/O DYE: CPT | Mod: MA

## 2021-12-30 PROCEDURE — 70250 X-RAY EXAM OF SKULL: CPT

## 2021-12-30 PROCEDURE — 83036 HEMOGLOBIN GLYCOSYLATED A1C: CPT

## 2021-12-30 PROCEDURE — 82550 ASSAY OF CK (CPK): CPT

## 2021-12-30 PROCEDURE — 95700 EEG CONT REC W/VID EEG TECH: CPT

## 2021-12-30 PROCEDURE — 85018 HEMOGLOBIN: CPT

## 2021-12-30 PROCEDURE — 85730 THROMBOPLASTIN TIME PARTIAL: CPT

## 2021-12-30 PROCEDURE — 80048 BASIC METABOLIC PNL TOTAL CA: CPT

## 2021-12-30 PROCEDURE — 82553 CREATINE MB FRACTION: CPT

## 2021-12-30 PROCEDURE — 70498 CT ANGIOGRAPHY NECK: CPT | Mod: MA

## 2021-12-30 PROCEDURE — 97530 THERAPEUTIC ACTIVITIES: CPT

## 2021-12-30 PROCEDURE — 99285 EMERGENCY DEPT VISIT HI MDM: CPT | Mod: 25

## 2021-12-30 PROCEDURE — 95714 VEEG EA 12-26 HR UNMNTR: CPT

## 2021-12-30 PROCEDURE — 85025 COMPLETE CBC W/AUTO DIFF WBC: CPT

## 2021-12-30 PROCEDURE — 92523 SPEECH SOUND LANG COMPREHEN: CPT

## 2021-12-30 PROCEDURE — 70496 CT ANGIOGRAPHY HEAD: CPT | Mod: MA

## 2021-12-30 PROCEDURE — 99285 EMERGENCY DEPT VISIT HI MDM: CPT

## 2021-12-30 PROCEDURE — 83880 ASSAY OF NATRIURETIC PEPTIDE: CPT

## 2021-12-30 PROCEDURE — 93005 ELECTROCARDIOGRAM TRACING: CPT

## 2021-12-30 PROCEDURE — 84145 PROCALCITONIN (PCT): CPT

## 2021-12-30 PROCEDURE — 80061 LIPID PANEL: CPT

## 2021-12-30 PROCEDURE — 83605 ASSAY OF LACTIC ACID: CPT

## 2021-12-30 PROCEDURE — 0042T: CPT

## 2021-12-30 PROCEDURE — 71045 X-RAY EXAM CHEST 1 VIEW: CPT

## 2021-12-30 PROCEDURE — 70551 MRI BRAIN STEM W/O DYE: CPT | Mod: MG

## 2021-12-30 PROCEDURE — 87186 SC STD MICRODIL/AGAR DIL: CPT

## 2021-12-30 PROCEDURE — 84443 ASSAY THYROID STIM HORMONE: CPT

## 2021-12-30 PROCEDURE — 96374 THER/PROPH/DIAG INJ IV PUSH: CPT

## 2021-12-30 PROCEDURE — 70450 CT HEAD/BRAIN W/O DYE: CPT | Mod: MG

## 2021-12-30 PROCEDURE — 93970 EXTREMITY STUDY: CPT

## 2021-12-30 PROCEDURE — 97163 PT EVAL HIGH COMPLEX 45 MIN: CPT

## 2021-12-30 PROCEDURE — 87040 BLOOD CULTURE FOR BACTERIA: CPT

## 2021-12-30 PROCEDURE — 85379 FIBRIN DEGRADATION QUANT: CPT

## 2021-12-30 PROCEDURE — 84295 ASSAY OF SERUM SODIUM: CPT

## 2021-12-30 PROCEDURE — 87086 URINE CULTURE/COLONY COUNT: CPT

## 2021-12-30 PROCEDURE — 36415 COLL VENOUS BLD VENIPUNCTURE: CPT

## 2021-12-30 PROCEDURE — 70551 MRI BRAIN STEM W/O DYE: CPT

## 2021-12-30 PROCEDURE — 76377 3D RENDER W/INTRP POSTPROCES: CPT

## 2021-12-30 PROCEDURE — 82803 BLOOD GASES ANY COMBINATION: CPT

## 2022-01-05 ENCOUNTER — NON-APPOINTMENT (OUTPATIENT)
Age: 82
End: 2022-01-05

## 2022-01-10 ENCOUNTER — APPOINTMENT (OUTPATIENT)
Dept: NEUROLOGY | Facility: CLINIC | Age: 82
End: 2022-01-10
Payer: MEDICARE

## 2022-01-10 ENCOUNTER — NON-APPOINTMENT (OUTPATIENT)
Age: 82
End: 2022-01-10

## 2022-01-10 PROCEDURE — 99214 OFFICE O/P EST MOD 30 MIN: CPT | Mod: 95

## 2022-01-10 RX ORDER — LEVETIRACETAM 250 MG/1
250 TABLET, FILM COATED ORAL TWICE DAILY
Qty: 60 | Refills: 3 | Status: COMPLETED | COMMUNITY
Start: 2021-10-04 | End: 2022-01-10

## 2022-01-10 NOTE — ASSESSMENT
[FreeTextEntry1] : GREGORY WOODALL is a 81y Female with a history of dCHF, NIDDM, HTN, HLD, NPH s/p  shunt 2015 (neurologist Dr. Bowman, NSG Dr. 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 CTH/MRI brain with encephalomalacia/gliosis in the right parietal and left occipital lobes. EEG with a single equivocal LT epileptiform discharge. Personally reviewed all images, labs, EEG and other studies. \par \par Asked son to obtain LTG level asap. If therapeutic, will monitor at low dose. All questions and concerns answered and addressed in detail to patient's and son's complete satisfaction. Patient and son verbalized understanding and agreed to plan.\par \par \par - continue LTG 50mg BID\par - will be off of LEV 250mg this Wenesday\par - son to call office when lab work done, and then we will decide if there is a need to increase LTG\par \par \par All relevant epilepsy AAN quality care measures were addressed and discussed with the patient and son.\par \par More than 50% of time spent counseling and educating patient and son about epilepsy specific safety issues including AED side effects and interactions, alcohol consumption, sleep deprivation, risks and driving privileges associated with the [place] Guidelines, death related to seizures/SUDEP, seizure 1st aid and risks. Patient and son are educated on seizure precautions, including no driving, no operating machinery, no swimming or bathing, no climbing heights, or engage in any risky activities during which a seizure could cause further injury to pt or others. \par \par \par Patient's identify verified. Patient consented to the teleservice as stated below. Verbal consent given on 01/10/2022 by patient, GREGORY WOODALL.\par \par Informed Consent for Telehealth Services During a Public Health Emergency\par \par By virtue of my participation in this telehealth visit, I am consenting to receive care through telehealth. Telehealth is the use of electronic information and communication technologies by providers to deliver health care to patients at a distance.\par \par I understand that any care provided to me through [employer]s telehealth application (“the [employer] heena”) will incorporate security protocols to protect the privacy and security of my health information. If any other application is used to provide care to me, I understand that the technology may not contain appropriate security protocols to protect the privacy and security of my health information. My provider has explained to me the risks associated with the technology platforms that he or she is using to provide care to me. I acknowledge that there are potential risks associated with any technology used while obtaining care through telehealth, including, but not limited to, connectively interruptions, other technical difficulties, and unauthorized access by a third party to one’s health information. Despite these risks, I agree to participate in the telehealth encounter.\par \par I understand and agree that I or my healthcare provider may terminate a telehealth encounter at any time in the event of a technical malfunction.\par \par I also understand that my location determines where medicine is being practiced. As a result, I will inform my provider where I am located at the time of my telehealth visit.\par \par I understand that there may be costs associated with a telehealth visit. I agree that I am responsible for any fees associated with the telehealth services that I receive.\par \par This Informed Consent for Telehealth Services During a Public Health Emergency will remain in effect solely during the term of the public health emergency.\par \par By signing below I certify that:\par \par I have read or had this form read and/or had this form explained to me;\par \par I fully understand the contents of this document, including the risks and benefits of receiving telehealth services; and\par \par I have been given ample opportunity to discuss any questions I may have regarding the telehealth services and that all of my questions have been answered to my satisfaction

## 2022-01-10 NOTE — CONSULT LETTER
[FreeTextEntry1] : I had the pleasure of seeing your patient, GREGORY WOODALL, in my office today. Please see my note below. \par \par If you have any questions, please do not hesitate to contact me.  [FreeTextEntry3] : Natan Lopez MD\par Director, Epilepsy/EMU\par [facility] \par [facility] [place]\par [address] [place] [address] \par Tel: [phone]; Fax: [phone]

## 2022-01-10 NOTE — HISTORY OF PRESENT ILLNESS
[FreeTextEntry1] : LAST OFFICE VISIT: 11/8/21\par \par PCP: Dr. Franklin\par Son: Thomas Hager\par \par INTERIM HISTORY:\par Pt today is accompanied by her son. At the last visit, pt was instructed to up-titrate LTG to 100mg BID. However, pt wasn’t tolerating it, c/o slurred speech, losing train of thought, and drowsiness. Family self-reduced it to LTG 50mg BID with improvement in symptoms. Pt was also called to taper off LEV 250mg BID. Currently on LEV 250mg daily, will be taking her last dose this Wednesday. Has not gotten LTG level.\par \par CURRENT ASMs:\par LEV 250mg daily (tapering off) - started 9/28/21, bridging agent\par LTG 50mg BID – started 9/30/21\par \par \par PRIOR EPILEPSY HISTORY:\par 11/8/21: This is a 81y Female with a history of dCHF, NIDDM, HTN, HLD, NPH s/p  shunt 2015 (neurologist Dr. Bowman, NSG Dr. Haskins), ICH (small right basal ganglia hemorrhage 5/2021), DVT (off AC with neg doppler), hx Klebsiella UTI, hx Pneumonia, glaucoma, Bipolar, depression who presents for posthospital followup for newly diagnosed focal epilepsy. \par \par Pt presented to [facility] on 9/28 with AMS and seizure-like activity at home. Had a witnessed bilateral tonic clonic seizure in ER that lasted about 45s. Head imaging revealed encephalomalacia/gliosis along shunt track in the right parietal lobe, as well as encephalomalacia in the left occipital lobe. Given h/o anxiety/depression, pt was discharged with LTG 25mg QHS with plan to up-titrate outpatient, as well as LEV 500mg BID as bridging agent. However, son decreased LEV to 250mg BID due to pt's excessive fatigue on LEV 500mg BID. CURRENT ASMs: LEV 250mg BID - started 9/28/21, bridging agent; LTG 25mg QHS (plan to up-titrate) – started 9/30/21.\par \par \par SEIZURE RISK FACTORS:\par Encephalomalacia/gliosis in the right parietal and left occipital lobes. \par \par PREVIOUS ASM:\par LEV - 9/28/21 to 1/12/22, excessive fatigue on 500mg BID\par \par IMAGING: \par MRI w/o, epilepsy protocol 10/14/21 ([facility]): Right-sided ventriculostomy in situ with gliosis and encephalomalacia in the right parietal lobe.\par \par CTH 9/28/21 ([facility]): Gliosis in the right parietal lobe. Encephalomalacia within the left occipital lobe and cerebellum. Right parietal approach  shunt with tip located at the left lateral ventricle. \par \par NEUROPHYSIOLOGY:\par Non-elective EMU 9/29 – 9/30/21 ([facility]): a single equivocal LT epileptiform discharge\par \par NEUROPSYCHOLOGY: \par none

## 2022-01-14 ENCOUNTER — NON-APPOINTMENT (OUTPATIENT)
Age: 82
End: 2022-01-14

## 2022-01-14 ENCOUNTER — EMERGENCY (EMERGENCY)
Facility: HOSPITAL | Age: 82
LOS: 1 days | Discharge: HOME | End: 2022-01-14
Attending: STUDENT IN AN ORGANIZED HEALTH CARE EDUCATION/TRAINING PROGRAM
Payer: MEDICARE

## 2022-01-14 VITALS
SYSTOLIC BLOOD PRESSURE: 184 MMHG | OXYGEN SATURATION: 93 % | DIASTOLIC BLOOD PRESSURE: 90 MMHG | HEART RATE: 67 BPM | TEMPERATURE: 98 F | RESPIRATION RATE: 16 BRPM

## 2022-01-14 VITALS
HEART RATE: 57 BPM | SYSTOLIC BLOOD PRESSURE: 170 MMHG | RESPIRATION RATE: 18 BRPM | TEMPERATURE: 98 F | OXYGEN SATURATION: 93 % | DIASTOLIC BLOOD PRESSURE: 74 MMHG

## 2022-01-14 DIAGNOSIS — Z96.652 PRESENCE OF LEFT ARTIFICIAL KNEE JOINT: Chronic | ICD-10-CM

## 2022-01-14 DIAGNOSIS — Z90.710 ACQUIRED ABSENCE OF BOTH CERVIX AND UTERUS: Chronic | ICD-10-CM

## 2022-01-14 DIAGNOSIS — S86.009A UNSPECIFIED INJURY OF UNSPECIFIED ACHILLES TENDON, INITIAL ENCOUNTER: Chronic | ICD-10-CM

## 2022-01-14 DIAGNOSIS — Z90.49 ACQUIRED ABSENCE OF OTHER SPECIFIED PARTS OF DIGESTIVE TRACT: Chronic | ICD-10-CM

## 2022-01-14 LAB
ALBUMIN SERPL ELPH-MCNC: 3.7 G/DL — SIGNIFICANT CHANGE UP (ref 3.3–5.2)
ALP SERPL-CCNC: 59 U/L — SIGNIFICANT CHANGE UP (ref 40–120)
ALT FLD-CCNC: 5 U/L — SIGNIFICANT CHANGE UP
ANION GAP SERPL CALC-SCNC: 16 MMOL/L — SIGNIFICANT CHANGE UP (ref 5–17)
AST SERPL-CCNC: 11 U/L — SIGNIFICANT CHANGE UP
BASOPHILS # BLD AUTO: 0.05 K/UL — SIGNIFICANT CHANGE UP (ref 0–0.2)
BASOPHILS NFR BLD AUTO: 0.7 % — SIGNIFICANT CHANGE UP (ref 0–2)
BILIRUB SERPL-MCNC: 0.3 MG/DL — LOW (ref 0.4–2)
BUN SERPL-MCNC: 25.4 MG/DL — HIGH (ref 8–20)
CALCIUM SERPL-MCNC: 8 MG/DL — LOW (ref 8.6–10.2)
CHLORIDE SERPL-SCNC: 99 MMOL/L — SIGNIFICANT CHANGE UP (ref 98–107)
CO2 SERPL-SCNC: 24 MMOL/L — SIGNIFICANT CHANGE UP (ref 22–29)
CREAT SERPL-MCNC: 1.2 MG/DL — SIGNIFICANT CHANGE UP (ref 0.5–1.3)
EOSINOPHIL # BLD AUTO: 0.08 K/UL — SIGNIFICANT CHANGE UP (ref 0–0.5)
EOSINOPHIL NFR BLD AUTO: 1.1 % — SIGNIFICANT CHANGE UP (ref 0–6)
GLUCOSE SERPL-MCNC: 152 MG/DL — HIGH (ref 70–99)
HCT VFR BLD CALC: 34.6 % — SIGNIFICANT CHANGE UP (ref 34.5–45)
HGB BLD-MCNC: 11.8 G/DL — SIGNIFICANT CHANGE UP (ref 11.5–15.5)
IMM GRANULOCYTES NFR BLD AUTO: 0.4 % — SIGNIFICANT CHANGE UP (ref 0–1.5)
LYMPHOCYTES # BLD AUTO: 1.38 K/UL — SIGNIFICANT CHANGE UP (ref 1–3.3)
LYMPHOCYTES # BLD AUTO: 19.4 % — SIGNIFICANT CHANGE UP (ref 13–44)
MCHC RBC-ENTMCNC: 29.8 PG — SIGNIFICANT CHANGE UP (ref 27–34)
MCHC RBC-ENTMCNC: 34.1 GM/DL — SIGNIFICANT CHANGE UP (ref 32–36)
MCV RBC AUTO: 87.4 FL — SIGNIFICANT CHANGE UP (ref 80–100)
MONOCYTES # BLD AUTO: 0.55 K/UL — SIGNIFICANT CHANGE UP (ref 0–0.9)
MONOCYTES NFR BLD AUTO: 7.7 % — SIGNIFICANT CHANGE UP (ref 2–14)
NEUTROPHILS # BLD AUTO: 5.02 K/UL — SIGNIFICANT CHANGE UP (ref 1.8–7.4)
NEUTROPHILS NFR BLD AUTO: 70.7 % — SIGNIFICANT CHANGE UP (ref 43–77)
PLATELET # BLD AUTO: 263 K/UL — SIGNIFICANT CHANGE UP (ref 150–400)
POTASSIUM SERPL-MCNC: 3.8 MMOL/L — SIGNIFICANT CHANGE UP (ref 3.5–5.3)
POTASSIUM SERPL-SCNC: 3.8 MMOL/L — SIGNIFICANT CHANGE UP (ref 3.5–5.3)
PROT SERPL-MCNC: 6.4 G/DL — LOW (ref 6.6–8.7)
RBC # BLD: 3.96 M/UL — SIGNIFICANT CHANGE UP (ref 3.8–5.2)
RBC # FLD: 12.6 % — SIGNIFICANT CHANGE UP (ref 10.3–14.5)
SODIUM SERPL-SCNC: 139 MMOL/L — SIGNIFICANT CHANGE UP (ref 135–145)
WBC # BLD: 7.11 K/UL — SIGNIFICANT CHANGE UP (ref 3.8–10.5)
WBC # FLD AUTO: 7.11 K/UL — SIGNIFICANT CHANGE UP (ref 3.8–10.5)

## 2022-01-14 PROCEDURE — 71045 X-RAY EXAM CHEST 1 VIEW: CPT | Mod: 26

## 2022-01-14 PROCEDURE — G1004: CPT

## 2022-01-14 PROCEDURE — 99284 EMERGENCY DEPT VISIT MOD MDM: CPT | Mod: 25

## 2022-01-14 PROCEDURE — 80175 DRUG SCREEN QUAN LAMOTRIGINE: CPT

## 2022-01-14 PROCEDURE — 36415 COLL VENOUS BLD VENIPUNCTURE: CPT

## 2022-01-14 PROCEDURE — 80053 COMPREHEN METABOLIC PANEL: CPT

## 2022-01-14 PROCEDURE — 85025 COMPLETE CBC W/AUTO DIFF WBC: CPT

## 2022-01-14 PROCEDURE — 96374 THER/PROPH/DIAG INJ IV PUSH: CPT

## 2022-01-14 PROCEDURE — 71045 X-RAY EXAM CHEST 1 VIEW: CPT

## 2022-01-14 PROCEDURE — 99284 EMERGENCY DEPT VISIT MOD MDM: CPT

## 2022-01-14 PROCEDURE — 70450 CT HEAD/BRAIN W/O DYE: CPT | Mod: ME

## 2022-01-14 PROCEDURE — 70450 CT HEAD/BRAIN W/O DYE: CPT | Mod: 26,ME

## 2022-01-14 RX ORDER — LAMOTRIGINE 25 MG/1
25 TABLET ORAL TWICE DAILY
Qty: 120 | Refills: 0 | Status: COMPLETED | COMMUNITY
Start: 2021-10-04 | End: 2022-01-14

## 2022-01-14 RX ORDER — AMLODIPINE BESYLATE 2.5 MG/1
2.5 TABLET ORAL ONCE
Refills: 0 | Status: COMPLETED | OUTPATIENT
Start: 2022-01-14 | End: 2022-01-14

## 2022-01-14 RX ORDER — LAMOTRIGINE 25 MG/1
50 TABLET, ORALLY DISINTEGRATING ORAL ONCE
Refills: 0 | Status: COMPLETED | OUTPATIENT
Start: 2022-01-14 | End: 2022-01-14

## 2022-01-14 RX ORDER — ONDANSETRON 8 MG/1
4 TABLET, FILM COATED ORAL ONCE
Refills: 0 | Status: COMPLETED | OUTPATIENT
Start: 2022-01-14 | End: 2022-01-14

## 2022-01-14 RX ORDER — HYDRALAZINE HCL 50 MG
25 TABLET ORAL ONCE
Refills: 0 | Status: COMPLETED | OUTPATIENT
Start: 2022-01-14 | End: 2022-01-14

## 2022-01-14 RX ORDER — LAMOTRIGINE 25 MG/1
1 TABLET, ORALLY DISINTEGRATING ORAL
Qty: 90 | Refills: 0
Start: 2022-01-14

## 2022-01-14 RX ADMIN — AMLODIPINE BESYLATE 2.5 MILLIGRAM(S): 2.5 TABLET ORAL at 13:48

## 2022-01-14 RX ADMIN — ONDANSETRON 4 MILLIGRAM(S): 8 TABLET, FILM COATED ORAL at 13:48

## 2022-01-14 RX ADMIN — Medication 25 MILLIGRAM(S): at 13:48

## 2022-01-14 RX ADMIN — LAMOTRIGINE 50 MILLIGRAM(S): 25 TABLET, ORALLY DISINTEGRATING ORAL at 13:48

## 2022-01-14 NOTE — ED PROVIDER NOTE - OBJECTIVE STATEMENT
Pt is a 82 yo F BIBEMS for weakness.  PMhx significant for NPH with  shunt, ICH, bipolar disorder, htn, hld, sz disorder. Pt's son provides primary hx.  Pt's son states that they recently stopped the keppra she had been taking for her seizures as they found it was making her two drowsy and kept her on the lamotragine. Pt has been doing well until this morning when she got up she did not seem herself and seemed really shaky and weak.  Pt's son got her to the bathroom and after using the bathroom he was standing her up and she started hyperventilating and crying out so he sat her back down and called and ambulance.  Pt's son states that she had a similar episode prior to having her first seizure in september.  no n/v. no fever/chills. no cough. no sob. no other complaints.

## 2022-01-14 NOTE — CHART NOTE - NSCHARTNOTEFT_GEN_A_CORE
SW Note: SW alerted pt will need john for d/c home (seizures,  shunt, CVA hx). SW placed call to pt's son Thomas [phone] to confirm address on f/s is correct, Thomas reports there are 14 steps to enter apartment and he will be home to accept pt. John requested via SW Clerical, NEAF uploaded and to be left on unit with  EMS Billing form, no further SW services identified

## 2022-01-14 NOTE — ED ADULT NURSE NOTE - NSIMPLEMENTINTERV_GEN_ALL_ED
Implemented All Fall with Harm Risk Interventions:  [place] to call system. Call bell, personal items and telephone within reach. Instruct patient to call for assistance. Room bathroom lighting operational. Non-slip footwear when patient is off stretcher. Physically safe environment: no spills, clutter or unnecessary equipment. Stretcher in lowest position, wheels locked, appropriate side rails in place. Provide visual cue, wrist band, yellow gown, etc. Monitor gait and stability. Monitor for mental status changes and reorient to person, place, and time. Review medications for side effects contributing to fall risk. Reinforce activity limits and safety measures with patient and family. Provide visual clues: red socks.

## 2022-01-14 NOTE — ED PROVIDER NOTE - NS ED ROS FT
No fever/chills, No photophobia/eye pain/changes in vision, No ear pain/sore throat/dysphagia, No chest pain/palpitations, no SOB/cough/wheeze/stridor, No abdominal pain, No N/V/D, no dysuria/frequency/discharge, No neck/back pain, no rash, neuro as per hpi.

## 2022-01-14 NOTE — ED PROVIDER NOTE - CLINICAL SUMMARY MEDICAL DECISION MAKING FREE TEXT BOX
labs and imaging reviewed. Pt at baseline mental status/function.  Case d/w Dr. Lacey who feels that this is likely withdrawal from her keppra and plan to change her lamictal to lamictal ER 50 in the morning and 100 at night and will f/up with her as an outpatient. Dr. lacey also recommended sending a lamictal level which she will follow up on.  plan discussed with son who understands patient to be discharged and to return for any new/concerning symptoms.  Pt given a copy of all results and instructed to f/up with pcp regarding any abnormal results.

## 2022-01-14 NOTE — ED PROVIDER NOTE - PATIENT PORTAL LINK FT
You can access the FollowMyHealth Patient Portal offered by [facility] by registering at the following website: [email or web address]. By joining [employer]’s FollowMyHealth portal, you will also be able to view your health information using other applications (apps) compatible with our system.

## 2022-01-14 NOTE — ED ADULT NURSE REASSESSMENT NOTE - NS ED NURSE REASSESS COMMENT FT1
pt resting quietly in bed, awaiting ems for transport.  refer to flow sheet for vital signs, pt is stable, safety maintained, will continue to monitor.

## 2022-01-14 NOTE — ED ADULT TRIAGE NOTE - CCCP TRG CHIEF CMPLNT
Patient Responses    1. Are you pregnant or suspect pregnancy? No    2. Have you been diagnosed with a new medical condition? No    3. Have you had increased asthma symptoms (Chest tightness, increased cough, wheezing, or felt short of breath) in the past week? No    4. Have you had increased allergy symptoms (Itching eyes or nose, sneezing, runny nose, post-nasal drip, or throat-clearing) in the past week? No    5. Have you had a cold, respiratory infection, or flu-like symptoms in the past 2 weeks? No    6. Did you have any problems such as increased allergy or asthma symptoms, hives, or generalized itching after receiving your last injection or swelling that persisted into the next day? No    7. Are you on any new medications since your last allergy injection? New blood pressure or heart medications, eye drops, etc.? Please Specify: no    8. Do you have an Epi-Pen? Yes        Peña Finley was given 2 allergy injections per written orders. #1 0.25 ml SC L arm  #2 0.25 ml SC R arm  No complications or reactions noted. Patient tolerated procedure well. Patient instructed to wait in the clinic for 20 minutes following injection.
tremor

## 2022-01-14 NOTE — ED ADULT TRIAGE NOTE - CHIEF COMPLAINT QUOTE
at baseline mental status as per EMS, sent from home for "shaking" activity, being seen by neurologist for episodes of expressive aphasia, worsening over past two weeks. Hx of Seizures on Keppra and Lamictal

## 2022-01-14 NOTE — ED PROVIDER NOTE - PHYSICAL EXAMINATION
Constitutional - well-developed. Head - NCAT. Airway patent. Eyes - PERRL. CV - RRR. no murmur. no edema. Pulm - CTAB. Abd - soft, nt. no rebound. no guarding. Neuro - A&Ox3. RHODES. Skin - No rash. MSK - normal ROM.

## 2022-01-18 LAB — LAMOTRIGINE SERPL-MCNC: 3.5 UG/ML — SIGNIFICANT CHANGE UP (ref 2–20)

## 2022-02-15 ENCOUNTER — APPOINTMENT (OUTPATIENT)
Dept: NEUROLOGY | Facility: CLINIC | Age: 82
End: 2022-02-15
Payer: MEDICARE

## 2022-03-09 ENCOUNTER — APPOINTMENT (OUTPATIENT)
Dept: NEUROLOGY | Facility: CLINIC | Age: 82
End: 2022-03-09
Payer: MEDICARE

## 2022-03-09 DIAGNOSIS — Z51.81 ENCOUNTER FOR THERAPEUTIC DRUG LVL MONITORING: ICD-10-CM

## 2022-03-09 PROCEDURE — 99214 OFFICE O/P EST MOD 30 MIN: CPT | Mod: 95

## 2022-03-09 RX ORDER — LAMOTRIGINE 100 MG/1
100 TABLET, EXTENDED RELEASE ORAL AT BEDTIME
Qty: 90 | Refills: 3 | Status: COMPLETED | COMMUNITY
Start: 2022-01-14 | End: 2022-03-09

## 2022-03-09 NOTE — ASSESSMENT
[FreeTextEntry1] : GREGORY WOODALL is an 81yo female with a history of dCHF, NIDDM, HTN, HLD, NPH s/p  shunt 2015 (neurologist Dr. Bowman, NSG Dr. 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 CTH/MRI brain with encephalomalacia/gliosis in the right parietal and left occipital lobes. EEG with a single equivocal LT epileptiform discharge. Personally reviewed all images, labs, EEG and other studies. \par \par Doing well on LTG monotherapy. All questions and concerns answered and addressed in detail to patient's and son's complete satisfaction. Patient and son verbalized understanding and agreed to plan.\par \par \par - continue LTG ER 50/100mg\par - LTG level via home blood draw\par - f/u in 4 months\par \par \par All relevant epilepsy AAN quality care measures were addressed and discussed with the patient and son.\par \par More than 50% of time spent counseling and educating patient and son about epilepsy specific safety issues including AED side effects and interactions, alcohol consumption, sleep deprivation, risks and driving privileges associated with the [place] Guidelines, death related to seizures/SUDEP, seizure 1st aid and risks. Patient and son are educated on seizure precautions, including no driving, no operating machinery, no swimming or bathing, no climbing heights, or engage in any risky activities during which a seizure could cause further injury to pt or others. \par \par \par Patient's identify verified. Patient consented to the teleservice as stated below. Verbal consent given on 03/09/2022 by patient, GREGORY WOODALL.\par \par Informed Consent for Telehealth Services During a Public Health Emergency\par \par By virtue of my participation in this telehealth visit, I am consenting to receive care through telehealth. Telehealth is the use of electronic information and communication technologies by providers to deliver health care to patients at a distance.\par \par I understand that any care provided to me through [employer]’s telehealth application (“the [employer] heena”) will incorporate security protocols to protect the privacy and security of my health information. If any other application is used to provide care to me, I understand that the technology may not contain appropriate security protocols to protect the privacy and security of my health information. My provider has explained to me the risks associated with the technology platforms that he or she is using to provide care to me. I acknowledge that there are potential risks associated with any technology used while obtaining care through telehealth, including, but not limited to, connectively interruptions, other technical difficulties, and unauthorized access by a third party to one’s health information. Despite these risks, I agree to participate in the telehealth encounter.\par \par I understand and agree that I or my healthcare provider may terminate a telehealth encounter at any time in the event of a technical malfunction.\par \par I also understand that my location determines where medicine is being practiced. As a result, I will inform my provider where I am located at the time of my telehealth visit.\par \par I understand that there may be costs associated with a telehealth visit. I agree that I am responsible for any fees associated with the telehealth services that I receive.\par \par This Informed Consent for Telehealth Services During a Public Health Emergency will remain in effect solely during the term of the public health emergency.\par \par By signing below I certify that:\par \par I have read or had this form read and/or had this form explained to me;\par \par I fully understand the contents of this document, including the risks and benefits of receiving telehealth services; and\par \par I have been given ample opportunity to discuss any questions I may have regarding the telehealth services and that all of my questions have been answered to my satisfaction

## 2022-03-09 NOTE — HISTORY OF PRESENT ILLNESS
[Home] : at home, [unfilled] , at the time of the visit. [Medical Office: ([facility])___] : at the medical office located in  [Verbal consent obtained from patient] : the patient, [unfilled] [FreeTextEntry1] : LAST OFFICE VISIT: 1/10/22\par \par PCP: Dr. Franklin\par Son: Thomas Hager\par \par INTERIM HISTORY:\par Pt today is accompanied by her son. Pt presented to [facility] ER on 1/14 feeling shaky, weak and panic-like symptoms. ER note, "Pt has been doing well until this morning when she got up she did not seem herself and seemed really shaky and weak. Pt's son got her to the bathroom and after using the bathroom he was standing her up and she started hyperventilating and crying out so he sat her back down and called and ambulance. Pt's son states that she had a similar episode prior to having her first seizure in september." \par Tapered off of LEV 250mg BID; last dose 1/12. Was on LTG IR 50mg BID. Advised ER to increase LTG IR 50mg BID to ER 50/100mg. Pt has been doing very well since the switch. No seizure or shaky feeling.\par \par CURRENT ASM:\par LTG ER 50/100mg – started IR 9/30/21 and switched to ER 1/2022, level 3.5 on 1/15/22 on 50mg BID\par \par \par PRIOR EPILEPSY HISTORY:\par 11/8/21: This is a 81y Female with a history of dCHF, NIDDM, HTN, HLD, NPH s/p  shunt 2015 (neurologist Dr. Bowman, NSG Dr. Haskins), ICH (small right basal ganglia hemorrhage 5/2021), DVT (off AC with neg doppler), hx Klebsiella UTI, hx Pneumonia, glaucoma, Bipolar, depression who presents for posthospital followup for newly diagnosed focal epilepsy. \par \par Pt presented to [facility] on 9/28 with AMS and seizure-like activity at home. Had a witnessed bilateral tonic clonic seizure in ER that lasted about 45s. Head imaging revealed encephalomalacia/gliosis along shunt track in the right parietal lobe, as well as encephalomalacia in the left occipital lobe. Given h/o anxiety/depression, pt was discharged with LTG 25mg QHS with plan to up-titrate outpatient, as well as LEV 500mg BID as bridging agent. However, son decreased LEV to 250mg BID due to pt's excessive fatigue on LEV 500mg BID. CURRENT ASMs: LEV 250mg BID - started 9/28/21, bridging agent; LTG 25mg QHS (plan to up-titrate) – started 9/30/21.\par \par === 1/10/22 ===\par Pt today is accompanied by her son. At the last visit, pt was instructed to up-titrate LTG to 100mg BID. However, pt wasn’t tolerating it, c/o slurred speech, losing train of thought, and drowsiness. Family self-reduced it to LTG 50mg BID with improvement in symptoms. Pt was also called to taper off LEV 250mg BID. Currently on LEV 250mg daily, will be taking her last dose this Wednesday. Has not gotten LTG level.\par CURRENT ASM: LEV 250mg daily (tapering off) - started 9/28/21, bridging agent; LTG 50mg BID – started 9/30/21.\par \par \par SEIZURE RISK FACTORS:\par Encephalomalacia/gliosis in the right parietal and left occipital lobes. \par \par PREVIOUS ASM:\par LEV - 9/28/21 to 1/12/22, excessive fatigue on 500mg BID\par \par IMAGING: \par MRI w/o, epilepsy protocol 10/14/21 ([facility]): Right-sided ventriculostomy in situ with gliosis and encephalomalacia in the right parietal lobe.\par \par CTH 9/28/21 ([facility]): Gliosis in the right parietal lobe. Encephalomalacia within the left occipital lobe and cerebellum. Right parietal approach  shunt with tip located at the left lateral ventricle. \par \par NEUROPHYSIOLOGY:\par Non-elective EMU 9/29 – 9/30/21 ([facility]): a single equivocal LT epileptiform discharge\par \par NEUROPSYCHOLOGY: \par none\par

## 2022-03-09 NOTE — CONSULT LETTER
[Dear  ___] : Dear  [unfilled], [Sincerely,] : Sincerely, [FreeTextEntry1] : I had the pleasure of seeing your patient, GREGORY WOODALL, in my office today. Please see my note below. \par \par If you have any questions, please do not hesitate to contact me.  [FreeTextEntry3] : Natan Lopez MD\par Director, Epilepsy/EMU\par [facility] \par [facility] [place]\par [address] [place] [address] \par Tel: [phone]; Fax: [phone]

## 2022-03-09 NOTE — PHYSICAL EXAM
[FreeTextEntry1] : GENERAL PHYSICAL EXAM:\par GEN: no distress, normal affect\par HEENT: NCAT\par CV/PULM/ABD: deferred due to telehealth encounter \par \par NEUROLOGICAL EXAM:\par Alert and oriented \par Clear speech\par Grossly looks in all directs\par No obvious facial asymmetry \par Hearing intact \par BL shoulder shrug intact \par Moves all EXTs spontaneously\par Ambulates independently\par

## 2022-05-26 NOTE — INPATIENT CERTIFICATION FOR MEDICARE PATIENTS - CURRENT MEDICAL NEEDS AND CARE PLANS
Patient resting on gurney in supine position. No acute distress. No aggression/agitation noted. No behavioral pain indicators noted. 1:1 observation maintained by sitter at bedside.   Possible Home

## 2022-05-31 NOTE — PHYSICAL THERAPY INITIAL EVALUATION ADULT - STANDING BALANCE: DYNAMIC, REHAB EVAL
Pg 37: baking soda/salt solution if you experience oral discomfort or mouth sores. Resume omeprazole once daily    Diarrhea: We recommend imodium AD (available over the counter) 2 tablets at the first onset of diarrhea. Then take 1 tablet every 2 hours until no diarrhea for 12 hours. Please call if you still have symptoms after this. Â   Constipation:  We recommend senokot-S (also over the counter) 1-2 tablets twice daily as needed for constipation. If you are still constipated despite 2 tablets twice daily, I recommend adding once daily miralax, which is also over the counter. Make sure you are drinking 6-8 8oz glasses of water/daily. Â   Nausea: You have r prochlorperazine (compazine). This can be taken once every 6 hours as needed for nausea. Please take this at the first onset of symptoms. Tamera Rosado  We also recommend making sure you have a thermometer at home to monitor temperature if you start feeling ill. Please call with any temperature over 100.4F       Oral chemotherapy (Capecitabine): Take 3 tablets by mouth twice daily with food. Only take on days on days you have radiation. If you miss or vomit a dose, skip it. Do not double up. Please keep oral chemotherapy in a cool, dry place. Keep oral chemotherapy separate from your other meds. fair balance

## 2022-06-07 RX ORDER — LAMOTRIGINE 50 MG/1
50 TABLET, EXTENDED RELEASE ORAL TWICE DAILY
Qty: 60 | Refills: 5 | Status: ACTIVE | COMMUNITY
Start: 2022-01-14 | End: 1900-01-01

## 2022-06-24 NOTE — PHYSICAL THERAPY INITIAL EVALUATION ADULT - LEVEL OF INDEPENDENCE: STAIR NEGOTIATION, REHAB EVAL
modified task: 6'' step ups 2x 2 rails supervision Oral Minoxidil Pregnancy And Lactation Text: This medication should only be used when clearly needed if you are pregnant, attempting to become pregnant or breast feeding.

## 2022-09-06 NOTE — PROGRESS NOTE BEHAVIORAL HEALTH - NS ED [facility] MED ROS SKIN
Detail Level: Zone
No complaints

## 2022-11-18 NOTE — PROGRESS NOTE BEHAVIORAL HEALTH - NSBHADMITMEDEDUDETAILS_A_CORE FT
Problem: Patient Centered Care  Goal: Patient preferences are identified and integrated in the patient's plan of care  Description: Interventions:  - What would you like us to know as we care for you? Pt is from home w/ wife and son. - Provide timely, complete, and accurate information to patient/family  - Incorporate patient and family knowledge, values, beliefs, and cultural backgrounds into the planning and delivery of care  - Encourage patient/family to participate in care and decision-making at the level they choose  - Honor patient and family perspectives and choices  Outcome: Progressing     Problem: CARDIOVASCULAR - ADULT  Goal: Maintains optimal cardiac output and hemodynamic stability  Description: INTERVENTIONS:  - Monitor vital signs, rhythm, and trends  - Monitor for bleeding, hypotension and signs of decreased cardiac output  - Evaluate effectiveness of vasoactive medications to optimize hemodynamic stability  - Monitor arterial and/or venous puncture sites for bleeding and/or hematoma  - Assess quality of pulses, skin color and temperature  - Assess for signs of decreased coronary artery perfusion - ex.  Angina  - Evaluate fluid balance, assess for edema, trend weights  Outcome: Progressing  Goal: Absence of cardiac arrhythmias or at baseline  Description: INTERVENTIONS:  - Continuous cardiac monitoring, monitor vital signs, obtain 12 lead EKG if indicated  - Evaluate effectiveness of antiarrhythmic and heart rate control medications as ordered  - Initiate emergency measures for life threatening arrhythmias  - Monitor electrolytes and administer replacement therapy as ordered  Outcome: Progressing     Problem: RESPIRATORY - ADULT  Goal: Achieves optimal ventilation and oxygenation  Description: INTERVENTIONS:  - Assess for changes in respiratory status  - Assess for changes in mentation and behavior  - Position to facilitate oxygenation and minimize respiratory effort  - Oxygen supplementation based on oxygen saturation or ABGs  - Provide Smoking Cessation handout, if applicable  - Encourage broncho-pulmonary hygiene including cough, deep breathe, Incentive Spirometry  - Assess the need for suctioning and perform as needed  - Assess and instruct to report SOB or any respiratory difficulty  - Respiratory Therapy support as indicated  - Manage/alleviate anxiety  - Monitor for signs/symptoms of CO2 retention  Outcome: Progressing     Problem: METABOLIC/FLUID AND ELECTROLYTES - ADULT  Goal: Glucose maintained within prescribed range  Description: INTERVENTIONS:  - Monitor Blood Glucose as ordered  - Assess for signs and symptoms of hyperglycemia and hypoglycemia  - Administer ordered medications to maintain glucose within target range  - Assess barriers to adequate nutritional intake and initiate nutrition consult as needed  - Instruct patient on self management of diabetes  Outcome: Progressing  Goal: Hemodynamic stability and optimal renal function maintained  Description: INTERVENTIONS:  - Monitor labs and assess for signs and symptoms of volume excess or deficit  - Monitor intake, output and patient weight  - Monitor urine specific gravity, serum osmolarity and serum sodium as indicated or ordered  - Monitor response to interventions for patient's volume status, including labs, urine output, blood pressure (other measures as available)  - Encourage oral intake as appropriate  - Instruct patient on fluid and nutrition restrictions as appropriate  Outcome: Progressing     Problem: SKIN/TISSUE INTEGRITY - ADULT  Goal: Skin integrity remains intact  Description: INTERVENTIONS  - Assess and document risk factors for pressure ulcer development  - Assess and document skin integrity  - Monitor for areas of redness and/or skin breakdown  - Initiate interventions, skin care algorithm/standards of care as needed  Outcome: Progressing     Problem: MUSCULOSKELETAL - ADULT  Goal: Return mobility to safest level of function  Description: INTERVENTIONS:  - Assess patient stability and activity tolerance for standing, transferring and ambulating w/ or w/o assistive devices  - Assist with transfers and ambulation using safe patient handling equipment as needed  - Ensure adequate protection for wounds/incisions during mobilization  - Obtain PT/OT consults as needed  - Advance activity as appropriate  - Communicate ordered activity level and limitations with patient/family  Outcome: Progressing     Problem: Impaired Functional Mobility  Goal: Achieve highest/safest level of mobility/gait  Description: Interventions:  - Assess patient's functional ability and stability  - Promote increasing activity/tolerance for mobility and gait  - Educate and engage patient/family in tolerated activity level and precautions  - Recommend use of  RW for transfers and ambulation  Outcome: Progressing     Problem: SAFETY ADULT - FALL  Goal: Free from fall injury  Description: INTERVENTIONS:  - Assess pt frequently for physical needs  - Identify cognitive and physical deficits and behaviors that affect risk of falls. - Byron fall precautions as indicated by assessment.  - Educate pt/family on patient safety including physical limitations  - Instruct pt to call for assistance with activity based on assessment  - Modify environment to reduce risk of injury  - Provide assistive devices as appropriate  - Consider OT/PT consult to assist with strengthening/mobility  - Encourage toileting schedule  Outcome: Progressing  No acute changes overnight. VSS. PRN tessalon and robitussin given for persistent cough. Call light within reach. Bed alarm on. Safety measures in place. Psychoeducation provided re: need for Rx and after care  adherence for sx management with teach back verbalized.

## 2023-01-12 NOTE — ED BEHAVIORAL HEALTH ASSESSMENT NOTE - ABUSE / TRAUMA HISTORY
Impression: Regular astigmatism, bilateral: H52.223. Plan: Pt educated on minimal change to SRx today. Finalized SRx released. RTC 1-year for CEE; or sooner prn.
No

## 2023-04-14 NOTE — PATIENT PROFILE ADULT - HISTORY OF COVID-19 VACCINATION
Medication name: lisdexamfetamine (Vyvanse) 20 MG capsule    Last Refill Details: Date 3/13/23, Quantity: 30, refills approved: 0     Ordering provider name: Jasno Roth MD    Last office visit with ordering provider: 12/29/22    Unable to refill medication per established guidelines, request forwarded to provider for review.  Caller advised to contact office for follow-up.    Yes

## 2023-06-06 ENCOUNTER — OFFICE (OUTPATIENT)
Dept: URBAN - METROPOLITAN AREA CLINIC 104 | Facility: CLINIC | Age: 83
Setting detail: OPHTHALMOLOGY
End: 2023-06-06
Payer: MEDICARE

## 2023-06-06 ENCOUNTER — RX ONLY (RX ONLY)
Age: 83
End: 2023-06-06

## 2023-06-06 DIAGNOSIS — H04.121: ICD-10-CM

## 2023-06-06 DIAGNOSIS — H26.492: ICD-10-CM

## 2023-06-06 DIAGNOSIS — H35.371: ICD-10-CM

## 2023-06-06 DIAGNOSIS — E11.9: ICD-10-CM

## 2023-06-06 DIAGNOSIS — H40.1132: ICD-10-CM

## 2023-06-06 PROCEDURE — 92133 CPTRZD OPH DX IMG PST SGM ON: CPT | Performed by: SPECIALIST

## 2023-06-06 PROCEDURE — 66821 AFTER CATARACT LASER SURGERY: CPT | Performed by: SPECIALIST

## 2023-06-06 PROCEDURE — 99204 OFFICE O/P NEW MOD 45 MIN: CPT | Performed by: SPECIALIST

## 2023-06-06 ASSESSMENT — REFRACTION_MANIFEST
OD_VA1: 20/150
OS_SPHERE: +0.25
OS_AXIS: 100
OD_AXIS: 100
OD_SPHERE: +1.25
OD_CYLINDER: -2.75
OS_CYLINDER: -2.50
OS_VA1: 20/50

## 2023-06-06 ASSESSMENT — SPHEQUIV_DERIVED
OS_SPHEQUIV: -1
OD_SPHEQUIV: -0.125
OS_SPHEQUIV: -1.25
OD_SPHEQUIV: -0.125

## 2023-06-06 ASSESSMENT — VISUAL ACUITY
OD_BCVA: 20/40-2
OS_BCVA: 20/200

## 2023-06-06 ASSESSMENT — CONFRONTATIONAL VISUAL FIELD TEST (CVF)
OS_FINDINGS: FULL
OD_FINDINGS: FULL

## 2023-06-06 ASSESSMENT — SUPERFICIAL PUNCTATE KERATITIS (SPK)
OS_SPK: 2+
OD_SPK: 2+

## 2023-06-06 ASSESSMENT — TEAR BREAK UP TIME (TBUT): OD_TBUT: 2+

## 2023-06-06 ASSESSMENT — REFRACTION_AUTOREFRACTION
OS_CYLINDER: -2.50
OS_SPHERE: 0.00
OD_SPHERE: +1.25
OD_AXIS: 100
OS_AXIS: 100
OD_CYLINDER: -2.75

## 2023-06-06 ASSESSMENT — PACHYMETRY
OD_CT_UM: 532
OS_CT_CORRECTION: 1
OD_CT_CORRECTION: 1
OS_CT_UM: 529

## 2023-06-21 ENCOUNTER — OFFICE (OUTPATIENT)
Dept: URBAN - METROPOLITAN AREA CLINIC 104 | Facility: CLINIC | Age: 83
Setting detail: OPHTHALMOLOGY
End: 2023-06-21
Payer: MEDICARE

## 2023-06-21 DIAGNOSIS — H52.4: ICD-10-CM

## 2023-06-21 DIAGNOSIS — H04.121: ICD-10-CM

## 2023-06-21 DIAGNOSIS — E11.9: ICD-10-CM

## 2023-06-21 DIAGNOSIS — H40.1132: ICD-10-CM

## 2023-06-21 DIAGNOSIS — Z96.1: ICD-10-CM

## 2023-06-21 DIAGNOSIS — H43.813: ICD-10-CM

## 2023-06-21 DIAGNOSIS — Z79.4: ICD-10-CM

## 2023-06-21 DIAGNOSIS — H35.371: ICD-10-CM

## 2023-06-21 PROCEDURE — 99214 OFFICE O/P EST MOD 30 MIN: CPT | Performed by: OPTOMETRIST

## 2023-06-21 PROCEDURE — 92015 DETERMINE REFRACTIVE STATE: CPT | Performed by: OPTOMETRIST

## 2023-06-21 ASSESSMENT — REFRACTION_MANIFEST
OD_SPHERE: +1.25
OD_ADD: +2.75
OD_CYLINDER: -2.75
OS_SPHERE: -0.25
OD_VA1: 20/100
OS_CYLINDER: -0.25
OS_VA1: 20/20-
OS_AXIS: 100
OD_CYLINDER: -1.00
OS_SPHERE: +0.25
OD_AXIS: 100
OS_VA1: 20/50
OS_ADD: +2.75
OS_AXIS: 030
OD_VA1: 20/150
OD_AXIS: 095
OS_CYLINDER: -2.50
OD_SPHERE: +0.25

## 2023-06-21 ASSESSMENT — REFRACTION_AUTOREFRACTION
OS_AXIS: 029
OS_SPHERE: -0.25
OD_CYLINDER: -2.00
OS_CYLINDER: -0.25
OD_AXIS: 098
OD_SPHERE: +0.75

## 2023-06-21 ASSESSMENT — PACHYMETRY
OS_CT_CORRECTION: 1
OS_CT_UM: 529
OD_CT_UM: 532
OD_CT_CORRECTION: 1

## 2023-06-21 ASSESSMENT — SPHEQUIV_DERIVED
OD_SPHEQUIV: -0.25
OD_SPHEQUIV: -0.125
OS_SPHEQUIV: -0.375
OS_SPHEQUIV: -1
OD_SPHEQUIV: -0.25
OS_SPHEQUIV: -0.375

## 2023-06-21 ASSESSMENT — SUPERFICIAL PUNCTATE KERATITIS (SPK)
OD_SPK: 2+
OS_SPK: 2+

## 2023-06-21 ASSESSMENT — AXIALLENGTH_DERIVED
OS_AL: 23.6242
OD_AL: 23.79
OD_AL: 23.84
OD_AL: 23.84
OS_AL: 23.6242
OS_AL: 23.8709

## 2023-06-21 ASSESSMENT — KERATOMETRY
OS_K1POWER_DIOPTERS: 43.44
OS_K2POWER_DIOPTERS: 44.18
OD_K2POWER_DIOPTERS: 43.89
OD_AXISANGLE_DEGREES: 011
OD_K1POWER_DIOPTERS: 42.29
OS_AXISANGLE_DEGREES: 117

## 2023-06-21 ASSESSMENT — CONFRONTATIONAL VISUAL FIELD TEST (CVF)
OS_FINDINGS: FULL
OD_FINDINGS: FULL

## 2023-06-21 ASSESSMENT — VISUAL ACUITY
OD_BCVA: 20/30
OS_BCVA: 20/100

## 2023-06-21 ASSESSMENT — TEAR BREAK UP TIME (TBUT): OD_TBUT: 2+

## 2023-07-19 ENCOUNTER — OFFICE (OUTPATIENT)
Dept: URBAN - METROPOLITAN AREA CLINIC 104 | Facility: CLINIC | Age: 83
Setting detail: OPHTHALMOLOGY
End: 2023-07-19
Payer: MEDICARE

## 2023-07-19 DIAGNOSIS — H40.1132: ICD-10-CM

## 2023-07-19 DIAGNOSIS — E11.9: ICD-10-CM

## 2023-07-19 DIAGNOSIS — H43.813: ICD-10-CM

## 2023-07-19 DIAGNOSIS — Z79.4: ICD-10-CM

## 2023-07-19 DIAGNOSIS — H04.121: ICD-10-CM

## 2023-07-19 DIAGNOSIS — Z96.1: ICD-10-CM

## 2023-07-19 DIAGNOSIS — H26.493: ICD-10-CM

## 2023-07-19 DIAGNOSIS — H35.371: ICD-10-CM

## 2023-07-19 PROCEDURE — 92083 EXTENDED VISUAL FIELD XM: CPT | Performed by: OPTOMETRIST

## 2023-07-19 PROCEDURE — 99213 OFFICE O/P EST LOW 20 MIN: CPT | Performed by: OPTOMETRIST

## 2023-07-19 ASSESSMENT — KERATOMETRY
OD_K1POWER_DIOPTERS: 41.75
OS_AXISANGLE_DEGREES: 138
OD_K2POWER_DIOPTERS: 43.75
OD_AXISANGLE_DEGREES: 003
OS_K1POWER_DIOPTERS: 43.00
OS_K2POWER_DIOPTERS: 43.50

## 2023-07-19 ASSESSMENT — REFRACTION_AUTOREFRACTION
OD_CYLINDER: -2.50
OS_AXIS: 040
OS_CYLINDER: -0.25
OS_SPHERE: PLANO
OD_SPHERE: +1.50
OD_AXIS: 092

## 2023-07-19 ASSESSMENT — SUPERFICIAL PUNCTATE KERATITIS (SPK)
OS_SPK: 2+
OD_SPK: 2+

## 2023-07-19 ASSESSMENT — TONOMETRY
OD_IOP_MMHG: 17
OS_IOP_MMHG: 17

## 2023-07-19 ASSESSMENT — TEAR BREAK UP TIME (TBUT): OD_TBUT: 2+

## 2023-07-19 ASSESSMENT — PACHYMETRY
OS_CT_UM: 529
OD_CT_CORRECTION: 1
OS_CT_CORRECTION: 1
OD_CT_UM: 532

## 2023-07-19 ASSESSMENT — SPHEQUIV_DERIVED: OD_SPHEQUIV: 0.25

## 2023-07-19 ASSESSMENT — AXIALLENGTH_DERIVED: OD_AL: 23.7713

## 2023-07-19 ASSESSMENT — VISUAL ACUITY
OD_BCVA: 20/20
OS_BCVA: 20/150-1

## 2023-10-16 ENCOUNTER — RX ONLY (RX ONLY)
Age: 83
End: 2023-10-16

## 2023-10-16 ENCOUNTER — OFFICE (OUTPATIENT)
Dept: URBAN - METROPOLITAN AREA CLINIC 104 | Facility: CLINIC | Age: 83
Setting detail: OPHTHALMOLOGY
End: 2023-10-16
Payer: MEDICARE

## 2023-10-16 DIAGNOSIS — H43.813: ICD-10-CM

## 2023-10-16 DIAGNOSIS — H04.121: ICD-10-CM

## 2023-10-16 DIAGNOSIS — H40.1132: ICD-10-CM

## 2023-10-16 DIAGNOSIS — Z96.1: ICD-10-CM

## 2023-10-16 PROCEDURE — 92250 FUNDUS PHOTOGRAPHY W/I&R: CPT | Performed by: OPTOMETRIST

## 2023-10-16 PROCEDURE — 99213 OFFICE O/P EST LOW 20 MIN: CPT | Performed by: OPTOMETRIST

## 2023-10-16 ASSESSMENT — REFRACTION_AUTOREFRACTION
OD_SPHERE: +1.25
OD_AXIS: 098
OS_SPHERE: UNABLE
OD_CYLINDER: -2.50

## 2023-10-16 ASSESSMENT — PACHYMETRY
OS_CT_UM: 529
OD_CT_UM: 532
OD_CT_CORRECTION: 1
OS_CT_CORRECTION: 1

## 2023-10-16 ASSESSMENT — TEAR BREAK UP TIME (TBUT): OD_TBUT: 2+

## 2023-10-16 ASSESSMENT — KERATOMETRY
OD_K2POWER_DIOPTERS: 44.00
OD_K1POWER_DIOPTERS: 41.82
OD_AXISANGLE_DEGREES: 008
OS_K1POWER_DIOPTERS: UNABLE

## 2023-10-16 ASSESSMENT — VISUAL ACUITY
OD_BCVA: 20/20
OS_BCVA: 20/150

## 2023-10-16 ASSESSMENT — AXIALLENGTH_DERIVED: OD_AL: 23.8106

## 2023-10-16 ASSESSMENT — SPHEQUIV_DERIVED: OD_SPHEQUIV: 0

## 2023-10-16 ASSESSMENT — CONFRONTATIONAL VISUAL FIELD TEST (CVF)
OD_FINDINGS: FULL
OS_FINDINGS: FULL

## 2023-10-16 ASSESSMENT — SUPERFICIAL PUNCTATE KERATITIS (SPK)
OD_SPK: 2+
OS_SPK: 2+

## 2024-01-04 ENCOUNTER — OFFICE (OUTPATIENT)
Dept: URBAN - METROPOLITAN AREA CLINIC 63 | Facility: CLINIC | Age: 84
Setting detail: OPHTHALMOLOGY
End: 2024-01-04
Payer: MEDICARE

## 2024-01-04 DIAGNOSIS — H35.371: ICD-10-CM

## 2024-01-04 DIAGNOSIS — H40.1132: ICD-10-CM

## 2024-01-04 DIAGNOSIS — H43.813: ICD-10-CM

## 2024-01-04 DIAGNOSIS — E11.9: ICD-10-CM

## 2024-01-04 PROCEDURE — 92134 CPTRZ OPH DX IMG PST SGM RTA: CPT | Performed by: SPECIALIST

## 2024-01-04 PROCEDURE — 92012 INTRM OPH EXAM EST PATIENT: CPT | Performed by: SPECIALIST

## 2024-01-04 ASSESSMENT — SUPERFICIAL PUNCTATE KERATITIS (SPK)
OD_SPK: 2+
OS_SPK: 2+

## 2024-01-04 ASSESSMENT — REFRACTION_AUTOREFRACTION
OD_SPHERE: +1.25
OD_AXIS: 098
OS_SPHERE: UNABLE
OD_CYLINDER: -2.50

## 2024-01-04 ASSESSMENT — SPHEQUIV_DERIVED: OD_SPHEQUIV: 0

## 2024-01-04 ASSESSMENT — TEAR BREAK UP TIME (TBUT): OD_TBUT: 2+

## 2024-01-14 NOTE — ED BEHAVIORAL HEALTH ASSESSMENT NOTE - PERSONAL COLLATERAL NAME
ED TO INPATIENT SBAR HANDOFF    Patient Name: Mitchel Mckinney   :  1936  87 y.o.   MRN:  [id number]  Preferred Name  Demetri  ED Room #:  ED-0010/10  Family/Caregiver Present yes   Restraints no   Sitter no   Sepsis Risk Score Sepsis Risk Score: 1.83    Situation  Code Status: Prior No additional code details.    Allergies: Latex and Iodine  Weight: Patient Vitals for the past 96 hrs (Last 3 readings):   Weight   24 1550 107 kg (236 lb)     Arrived from: home  Chief Complaint:   Chief Complaint   Patient presents with    Shortness of Breath     Patient states he was at the grocery store. Patient started having SOB, neck pain, dizziness, and confusion. Patient has chest tightness     Hospital Problem/Diagnosis:  Active Problems:    * No active hospital problems. *  Resolved Problems:    * No resolved hospital problems. *    Imaging:   CT CHEST PULMONARY EMBOLISM W CONTRAST   Final Result   No evidence of pulmonary embolism or acute pulmonary abnormality.         CT HEAD WO CONTRAST   Final Result   No acute intracranial abnormality.         XR CHEST (2 VW)   Final Result   No acute cardiopulmonary process.      Cardiomegaly.      Osteopenia.  No acute pulmonary disease identified.      Stable cardiomegaly.           Abnormal labs:   Abnormal Labs Reviewed   COMPREHENSIVE METABOLIC PANEL W/ REFLEX TO MG FOR LOW K - Abnormal; Notable for the following components:       Result Value    Glucose 148 (*)     BUN 23 (*)     Est, Glom Filt Rate 59 (*)     Alkaline Phosphatase 137 (*)     All other components within normal limits   TROPONIN - Abnormal; Notable for the following components:    Troponin, High Sensitivity 23 (*)     All other components within normal limits   BRAIN NATRIURETIC PEPTIDE - Abnormal; Notable for the following components:    Pro- (*)     All other components within normal limits   URINALYSIS WITH REFLEX TO CULTURE - Abnormal; Notable for the following components:    Color, UA DARK  Daughter

## 2024-01-15 ENCOUNTER — OFFICE (OUTPATIENT)
Dept: URBAN - METROPOLITAN AREA CLINIC 104 | Facility: CLINIC | Age: 84
Setting detail: OPHTHALMOLOGY
End: 2024-01-15
Payer: MEDICARE

## 2024-01-15 DIAGNOSIS — H35.371: ICD-10-CM

## 2024-01-15 DIAGNOSIS — H43.813: ICD-10-CM

## 2024-01-15 DIAGNOSIS — H40.1132: ICD-10-CM

## 2024-01-15 DIAGNOSIS — Z79.4: ICD-10-CM

## 2024-01-15 DIAGNOSIS — E11.9: ICD-10-CM

## 2024-01-15 DIAGNOSIS — H04.121: ICD-10-CM

## 2024-01-15 DIAGNOSIS — Z96.1: ICD-10-CM

## 2024-01-15 PROCEDURE — 99213 OFFICE O/P EST LOW 20 MIN: CPT | Performed by: OPTOMETRIST

## 2024-01-15 ASSESSMENT — CONFRONTATIONAL VISUAL FIELD TEST (CVF)
OS_FINDINGS: FULL
OD_FINDINGS: FULL

## 2024-01-15 ASSESSMENT — SUPERFICIAL PUNCTATE KERATITIS (SPK)
OD_SPK: 2+
OS_SPK: 2+

## 2024-01-15 ASSESSMENT — TEAR BREAK UP TIME (TBUT): OD_TBUT: 2+

## 2024-01-23 RX ORDER — ARIPIPRAZOLE 15 MG/1
1 TABLET ORAL
Qty: 0 | Refills: 0 | DISCHARGE

## 2024-01-23 RX ORDER — ERGOCALCIFEROL 1.25 MG/1
1 CAPSULE ORAL
Qty: 0 | Refills: 0 | DISCHARGE

## 2024-01-23 RX ORDER — CHLORHEXIDINE GLUCONATE 213 G/1000ML
1 SOLUTION TOPICAL ONCE
Refills: 0 | Status: DISCONTINUED | OUTPATIENT
Start: 2024-01-24 | End: 2024-01-26

## 2024-01-23 NOTE — H&P PST ADULT - ASSESSMENT
HPI:  82 yo female with aortic stenosis, AF, shortness of breath to undergo right and left heart catheterization.      ASA  Mallampati  GFR  Creat  Bleeding Risk      Plan/Recommendations:   -plan for R and  LHC on 1/24/24  -patient seen and examined  -ECG and Labs reviewed  -NPO after midnight prior with exception of sip of water with morning medications  -Hold metformin pm and am prior to procedure     HPI:  :84 yo female with pmhx significant for HTN, HLD, TIA, Basal ganglia hemorrhage,  in 2021, repeat imaging with resolved ICH, encephalomalacia s/p rt s/p right ventriculostomy s/p rt  shunt placement, achilles tendon repair, S/P B/L knee replacement,  history of severe AS, AF, HTN, HLD, renal insufficiency and DM.    Patient ambulates with rolling walker and 1 person assistance. dependant on 1 person assistances for ADL's.  She presented to [facility] with CHF in December and aortic stenosis was found on echo. Echo on 12/6/23, revealed LV mod -severely increased thickening, no regional wall motion abnormalities, EF 65-70%LA mildly dilated, AV leaflets severely calcified with moderate to severe AS, mild AI, and MV annulus calcified mild-mod MR with multiple jets.  Patient denies HA, dizziness, CP, Palpitations PND, fever, chills.    Risk assessment    ASA: 3  Mallampati: 3  GFR: 44  Creat: 1.24  Bleeding Risk: 3.4%      Plan/Recommendations:   -plan for R and  LHC on 1/24/24  -patient seen and examined  -ECG and Labs reviewed  -NPO after midnight prior with exception of sip of water with morning medications  -No  ASpirin pre cath per DR Moses as pt for diagnostic cath  -Normal saline 250ml iv bolus x1 pre cath  -Held  metformin pm and am prior to procedure  -Procedural risks and benefits explained to patient and her son, and they verbalized understanding  -Consent obtained by DR Moses

## 2024-01-23 NOTE — H&P PST ADULT - HISTORY OF PRESENT ILLNESS
Department of Cardiology                                                                  [facility]/[place] [address] [place] [address]                                                            Telephone: [phone]. Fax:[phone]                                                                                    PST H & P     Chief complaint:    Patient is a 83y old  Female who presents with a chief complaint of shortness of breath.    HPI:82 yo female with c/o shortness of breath, history of AS, AF, HTN, HLD, renal insufficiency and DM.  She presented to [facility] with CHF in December and aortic stenosis was found on echo.       Symptoms:        Angina (Class): II       Ischemic Symptoms: SOB    Heart Failure:        Systolic/Diastolic/Combined:        NYHA Class (within 2 weeks):     Assessment of LVEF:       EF: 65-70%       Assessed by: echo       Date: 12/6/23    Prior Cardiac Interventions:  Prior EP Procedures:    Prior IC Procedures:    Prior CTS:             Noninvasive Testing:   Stress Test: Date:        Protocol:        Duration of Exercise:        Symptoms:        EKG Changes:        DTS:        Myocardial Imaging:        Risk Assessment:     Echo: 12/6/23  LV mod -severely increased thickening, no regional wall motion abnormalities, EF 65-70%  LA mildly dilated  RV normal size and function with mildly elevated pulmonary pressure  AV leaflets severely calcified with moderate to severe AS, mild AI  MV annulus clacified mild-mod MR with multiple jets  TV mild -mod TR    Antianginal Therapies:        Beta Blockers:         Calcium Channel Blockers:        Long Acting Nitrates:        Ranexa:     Associated Risk Factors:        Cerebrovascular Disease: N/A       Chronic Lung Disease: N/A       Peripheral Arterial Disease: N/A       Chronic Kidney Disease (if yes, what is GFR): N/A       Uncontrolled Diabetes (if yes, what is HgbA1C or FBS): N/A       Poorly Controlled Hypertension (if yes, what is SBP): N/A       Morbid Obesity (if yes, what is BMI): N/A       History of Recent Ventricular Arrhythmia: N/A       Inability to Ambulate Safely: N/A       Need for Therapeutic Anticoagulation: N/A       Antiplatelet or Contrast Allergy: N/A             Antiarrhythmic Therapies:      Anticoagulation Therapies:      	  ROS: as stated above, otherwise negative      	    LABS:	 	                                                                                                                               Department of Cardiology                                                                  [facility]/[place] [address] [id number]                                                            Telephone: [phone]. Fax:[phone]                                                                                    PST H & P     Chief complaint:    Patient is a 83y old  Female who presents with a chief complaint of shortness of breath.    HPI:82 yo female with pmhx significant for HTN, HLD, TIA, Basal ganglia hemorrhage,  in 2021, repeat imaging with resolved ICH, encephalomalacia s/p rt s/p right ventriculostomy s/p rt  shunt placement, achilles tendon repair, S/P B/L knee replacement,  history of severe AS, AF, HTN, HLD, renal insufficiency and DM.    Patient ambulates with rolling walker and 1 person assistance. dependant on 1 person assistances for ADL's. Per son, he is taking care of her and do not wish to send her to any facility.   She presented to [facility] with CHF in December and aortic stenosis was found on echo. Echo on 12/6/23, revealed LV mod -severely increased thickening, no regional wall motion abnormalities, EF 65-70%LA mildly dilated, AV leaflets severely calcified with moderate to severe AS, mild AI, and MV annulus calcified mild-mod MR with multiple jets.  Patient denies HA, dizziness, CP, Palpitations PND, fever, chills.  Patient now presents to [facility] for left heart catheterization to evaluate for CAD as patient ican be a potenrial candidate for Aortic valve replacement in the near future.    Symptoms:        Angina (Class): II       Ischemic Symptoms: SOB on exertion    Heart Failure: No       Systolic/Diastolic/Combined:        NYHA Class (within 2 weeks):     Assessment of LVEF:       EF: 65-70%       Assessed by: echo       Date: 12/6/23    Prior Cardiac Interventions: None  Prior EP Procedures: ILR implantation and explantation     Prior IC Procedures: none    Prior CTS:none             Noninvasive Testing:   Stress Test: Date: NA       Protocol:        Duration of Exercise:        Symptoms:        EKG Changes:        DTS:        Myocardial Imaging:        Risk Assessment:     Echo: 12/6/23  LV mod -severely increased thickening, no regional wall motion abnormalities, EF 65-70%  LA mildly dilated  RV normal size and function with mildly elevated pulmonary pressure  AV leaflets severely calcified with moderate to severe AS, mild AI  MV annulus clacified mild-mod MR with multiple jets  TV mild -mod TR    Antianginal Therapies:        Beta Blockers:  metoprolol       Calcium Channel Blockers: none       Long Acting Nitrates: none       Ranexa: no    Associated Risk Factors:        Cerebrovascular Disease: Hx of TIA, ICH, RESOLVED, encephalomalacia, s/p rt  shunt       Chronic Lung Disease: N/A       Peripheral Arterial Disease: N/A       Chronic Kidney Disease (if yes, what is GFR): N/A       Uncontrolled Diabetes (if yes, what is HgbA1C or FBS): N/A       Poorly Controlled Hypertension (if yes, what is SBP): N/A       Morbid Obesity (if yes, what is BMI): N/A       History of Recent Ventricular Arrhythmia: N/A       Inability to Ambulate Safely: N/A       Need for Therapeutic Anticoagulation: N/A       Antiplatelet or Contrast Allergy: N/A             Antiarrhythmic Therapies: metoprolol     Anticoagulation Therapies:    no  	        	    LABS:	                         12.5   6.35  )-----------( 259      ( 24 Jan 2024 15:35 )             38.5   	          Vital Signs Last 24 Hrs  T(C): 36.7 (24 Jan 2024 15:57), Max: 36.7 (24 Jan 2024 15:57)  T(F): 98 (24 Jan 2024 15:57), Max: 98 (24 Jan 2024 15:57)  HR: 56 (24 Jan 2024 15:57) (56 - 56)  BP: 174/77 (24 Jan 2024 15:57) (174/77 - 174/77)  BP(mean): --  RR: 16 (24 Jan 2024 15:57) (16 - 16)  SpO2: 100% (24 Jan 2024 15:57) (100% - 100%)    Parameters below as of 24 Jan 2024 15:57  Patient On (Oxygen Delivery Method): room air                                                                                                                         Department of Cardiology                                                                  [facility]/[place] [address] [address]                                                            Telephone: [phone]. Fax:[phone]                                                                                    PST H & P     Chief complaint:    Patient is a 83y old  Female who presents with a chief complaint of shortness of breath.    HPI:82 yo female with pmhx significant for HTN, HLD, TIA, Basal ganglia hemorrhage,  in 2021, repeat imaging with resolved ICH, encephalomalacia s/p rt s/p right ventriculostomy s/p rt  shunt placement, achilles tendon repair, S/P B/L knee replacement,  history of severe AS, AF, HTN, HLD, renal insufficiency and DM.    Patient ambulates with rolling walker and 1 person assistance. dependant on 1 person assistances for ADL's. Per son, he is taking care of her and do not wish to send her to any facility.   She presented to [facility] with CHF in December and aortic stenosis was found on echo. Echo on 12/6/23, revealed LV mod -severely increased thickening, no regional wall motion abnormalities, EF 65-70%LA mildly dilated, AV leaflets severely calcified with moderate to severe AS, mild AI, and MV annulus calcified mild-mod MR with multiple jets.  Patient denies HA, dizziness, CP, Palpitations PND, fever, chills.  Patient now presents to [facility] for left heart catheterization to evaluate for CAD as patient ican be a potenrial candidate for Aortic valve replacement in the near future.    Symptoms:        Angina (Class): II       Ischemic Symptoms: SOB on exertion    Heart Failure: No       Systolic/Diastolic/Combined:        NYHA Class (within 2 weeks):     Assessment of LVEF:       EF: 65-70%       Assessed by: echo       Date: 12/6/23    Prior Cardiac Interventions: None  Prior EP Procedures: ILR implantation and explantation     Prior IC Procedures: none    Prior CTS:none             Noninvasive Testing:   Stress Test: Date: 2018       Protocol: Reganedoson           Myocardial Imaging: NUCLEAR FINDINGS:  Review of raw data shows: Breast attenuation artifact.  The left ventricle was normal in size. Normal myocardial  perfusion scan, with no evidence of infarction or  inducible ischemia.There is a small, mild defect in apical  wall that is fixed consistent with a prominent apical  slit..        Echo: 12/6/23  LV mod -severely increased thickening, no regional wall motion abnormalities, EF 65-70%  LA mildly dilated  RV normal size and function with mildly elevated pulmonary pressure  AV leaflets severely calcified with moderate to severe AS, mild AI  MV annulus clacified mild-mod MR with multiple jets  TV mild -mod TR    Antianginal Therapies:        Beta Blockers:  metoprolol       Calcium Channel Blockers: none       Long Acting Nitrates: none       Ranexa: no    Associated Risk Factors:        Cerebrovascular Disease: Hx of TIA, ICH, RESOLVED, encephalomalacia, s/p rt  shunt       Chronic Lung Disease: N/A       Peripheral Arterial Disease: N/A       Chronic Kidney Disease (if yes, what is GFR): N/A       Uncontrolled Diabetes (if yes, what is HgbA1C or FBS): N/A       Poorly Controlled Hypertension (if yes, what is SBP): N/A       Morbid Obesity (if yes, what is BMI): N/A       History of Recent Ventricular Arrhythmia: N/A       Inability to Ambulate Safely: N/A       Need for Therapeutic Anticoagulation: N/A       Antiplatelet or Contrast Allergy: N/A             Antiarrhythmic Therapies: metoprolol     Anticoagulation Therapies:    no  	        	    LABS:	                         12.5   6.35  )-----------( 259      ( 24 Jan 2024 15:35 )             38.5   	          Vital Signs Last 24 Hrs  T(C): 36.7 (24 Jan 2024 15:57), Max: 36.7 (24 Jan 2024 15:57)  T(F): 98 (24 Jan 2024 15:57), Max: 98 (24 Jan 2024 15:57)  HR: 56 (24 Jan 2024 15:57) (56 - 56)  BP: 174/77 (24 Jan 2024 15:57) (174/77 - 174/77)  BP(mean): --  RR: 16 (24 Jan 2024 15:57) (16 - 16)  SpO2: 100% (24 Jan 2024 15:57) (100% - 100%)    Parameters below as of 24 Jan 2024 15:57  Patient On (Oxygen Delivery Method): room air                                                                                                                         Department of Cardiology                                                                  [facility]/[place] [address] [facility]                                                            Telephone: [phone]. Fax:[phone]                                                                                    PST H & P     Chief complaint:    Patient is a 83y old  Female who presents with a chief complaint of shortness of breath.    HPI:82 yo female with pmhx significant for HTN, HLD, TIA, Epilepsy, Basal ganglia hemorrhage,  in 2021, repeat imaging with resolved ICH, encephalomalacia s/p rt s/p right ventriculostomy s/p rt  shunt placement, achilles tendon repair, S/P B/L knee replacement,  history of severe AS, AF, HTN, HLD, renal insufficiency and DM.    Patient ambulates with rolling walker and 1 person assistance. dependant on 1 person assistances for ADL's. Per son, he is taking care of her and do not wish to send her to any facility.   She presented to [facility] with CHF in December and aortic stenosis was found on echo. Echo on 12/6/23, revealed LV mod -severely increased thickening, no regional wall motion abnormalities, EF 65-70%LA mildly dilated, AV leaflets severely calcified with moderate to severe AS, mild AI, and MV annulus calcified mild-mod MR with multiple jets.  Patient denies HA, dizziness, CP, Palpitations PND, fever, chills.  Patient now presents to [facility] for left heart catheterization to evaluate for CAD as patient ican be a potenrial candidate for Aortic valve replacement in the near future.    Symptoms:        Angina (Class): II       Ischemic Symptoms: SOB on exertion    Heart Failure: No       Systolic/Diastolic/Combined:        NYHA Class (within 2 weeks):     Assessment of LVEF:       EF: 65-70%       Assessed by: echo       Date: 12/6/23    Prior Cardiac Interventions: None  Prior EP Procedures: ILR implantation and explantation     Prior IC Procedures: none    Prior CTS:none             Noninvasive Testing:   Stress Test: Date: 2018       Protocol: Reganedoson           Myocardial Imaging: NUCLEAR FINDINGS:  Review of raw data shows: Breast attenuation artifact.  The left ventricle was normal in size. Normal myocardial  perfusion scan, with no evidence of infarction or  inducible ischemia.There is a small, mild defect in apical  wall that is fixed consistent with a prominent apical  slit..        Echo: 12/6/23  LV mod -severely increased thickening, no regional wall motion abnormalities, EF 65-70%  LA mildly dilated  RV normal size and function with mildly elevated pulmonary pressure  AV leaflets severely calcified with moderate to severe AS, mild AI  MV annulus clacified mild-mod MR with multiple jets  TV mild -mod TR    Antianginal Therapies:        Beta Blockers:  metoprolol       Calcium Channel Blockers: none       Long Acting Nitrates: none       Ranexa: no    Associated Risk Factors:        Cerebrovascular Disease: Hx of TIA, ICH, RESOLVED, encephalomalacia, s/p rt  shunt       Chronic Lung Disease: N/A       Peripheral Arterial Disease: N/A       Chronic Kidney Disease (if yes, what is GFR): N/A       Uncontrolled Diabetes (if yes, what is HgbA1C or FBS): N/A       Poorly Controlled Hypertension (if yes, what is SBP): N/A       Morbid Obesity (if yes, what is BMI): N/A       History of Recent Ventricular Arrhythmia: N/A       Inability to Ambulate Safely: N/A       Need for Therapeutic Anticoagulation: N/A       Antiplatelet or Contrast Allergy: N/A             Antiarrhythmic Therapies: metoprolol     Anticoagulation Therapies:    no  	        	    LABS:	                         12.5   6.35  )-----------( 259      ( 24 Jan 2024 15:35 )             38.5   	          Vital Signs Last 24 Hrs  T(C): 36.7 (24 Jan 2024 15:57), Max: 36.7 (24 Jan 2024 15:57)  T(F): 98 (24 Jan 2024 15:57), Max: 98 (24 Jan 2024 15:57)  HR: 56 (24 Jan 2024 15:57) (56 - 56)  BP: 174/77 (24 Jan 2024 15:57) (174/77 - 174/77)  BP(mean): --  RR: 16 (24 Jan 2024 15:57) (16 - 16)  SpO2: 100% (24 Jan 2024 15:57) (100% - 100%)    Parameters below as of 24 Jan 2024 15:57  Patient On (Oxygen Delivery Method): room air

## 2024-01-24 ENCOUNTER — INPATIENT (INPATIENT)
Facility: HOSPITAL | Age: 84
LOS: 1 days | Discharge: HOME | DRG: 324 | End: 2024-01-26
Attending: INTERNAL MEDICINE | Admitting: INTERNAL MEDICINE
Payer: MEDICARE

## 2024-01-24 ENCOUNTER — TRANSCRIPTION ENCOUNTER (OUTPATIENT)
Age: 84
End: 2024-01-24

## 2024-01-24 VITALS
DIASTOLIC BLOOD PRESSURE: 77 MMHG | OXYGEN SATURATION: 100 % | TEMPERATURE: 98 F | HEART RATE: 56 BPM | RESPIRATION RATE: 16 BRPM | SYSTOLIC BLOOD PRESSURE: 174 MMHG

## 2024-01-24 DIAGNOSIS — Z90.710 ACQUIRED ABSENCE OF BOTH CERVIX AND UTERUS: Chronic | ICD-10-CM

## 2024-01-24 DIAGNOSIS — S86.009A UNSPECIFIED INJURY OF UNSPECIFIED ACHILLES TENDON, INITIAL ENCOUNTER: Chronic | ICD-10-CM

## 2024-01-24 DIAGNOSIS — Z96.652 PRESENCE OF LEFT ARTIFICIAL KNEE JOINT: Chronic | ICD-10-CM

## 2024-01-24 DIAGNOSIS — Z90.49 ACQUIRED ABSENCE OF OTHER SPECIFIED PARTS OF DIGESTIVE TRACT: Chronic | ICD-10-CM

## 2024-01-24 DIAGNOSIS — R07.9 CHEST PAIN, UNSPECIFIED: ICD-10-CM

## 2024-01-24 LAB
ALBUMIN SERPL ELPH-MCNC: 4.1 G/DL — SIGNIFICANT CHANGE UP (ref 3.3–5.2)
ALP SERPL-CCNC: 98 U/L — SIGNIFICANT CHANGE UP (ref 40–120)
ALT FLD-CCNC: 13 U/L — SIGNIFICANT CHANGE UP
ANION GAP SERPL CALC-SCNC: 14 MMOL/L — SIGNIFICANT CHANGE UP (ref 5–17)
APTT BLD: 31.6 SEC — SIGNIFICANT CHANGE UP (ref 24.5–35.6)
AST SERPL-CCNC: 17 U/L — SIGNIFICANT CHANGE UP
BASOPHILS # BLD AUTO: 0.06 K/UL — SIGNIFICANT CHANGE UP (ref 0–0.2)
BASOPHILS NFR BLD AUTO: 0.9 % — SIGNIFICANT CHANGE UP (ref 0–2)
BILIRUB SERPL-MCNC: 0.3 MG/DL — LOW (ref 0.4–2)
BLD GP AB SCN SERPL QL: SIGNIFICANT CHANGE UP
BUN SERPL-MCNC: 36.6 MG/DL — HIGH (ref 8–20)
CALCIUM SERPL-MCNC: 9.4 MG/DL — SIGNIFICANT CHANGE UP (ref 8.4–10.5)
CHLORIDE SERPL-SCNC: 104 MMOL/L — SIGNIFICANT CHANGE UP (ref 96–108)
CO2 SERPL-SCNC: 25 MMOL/L — SIGNIFICANT CHANGE UP (ref 22–29)
CREAT SERPL-MCNC: 1.24 MG/DL — SIGNIFICANT CHANGE UP (ref 0.5–1.3)
EGFR: 44 ML/MIN/1.73M2 — LOW
EOSINOPHIL # BLD AUTO: 0.19 K/UL — SIGNIFICANT CHANGE UP (ref 0–0.5)
EOSINOPHIL NFR BLD AUTO: 3 % — SIGNIFICANT CHANGE UP (ref 0–6)
GLUCOSE SERPL-MCNC: 128 MG/DL — HIGH (ref 70–99)
HCT VFR BLD CALC: 38.5 % — SIGNIFICANT CHANGE UP (ref 34.5–45)
HGB BLD-MCNC: 12.5 G/DL — SIGNIFICANT CHANGE UP (ref 11.5–15.5)
IMM GRANULOCYTES NFR BLD AUTO: 0.3 % — SIGNIFICANT CHANGE UP (ref 0–0.9)
INR BLD: 1.08 RATIO — SIGNIFICANT CHANGE UP (ref 0.85–1.18)
LYMPHOCYTES # BLD AUTO: 2.08 K/UL — SIGNIFICANT CHANGE UP (ref 1–3.3)
LYMPHOCYTES # BLD AUTO: 32.8 % — SIGNIFICANT CHANGE UP (ref 13–44)
MCHC RBC-ENTMCNC: 28.7 PG — SIGNIFICANT CHANGE UP (ref 27–34)
MCHC RBC-ENTMCNC: 32.5 GM/DL — SIGNIFICANT CHANGE UP (ref 32–36)
MCV RBC AUTO: 88.5 FL — SIGNIFICANT CHANGE UP (ref 80–100)
MONOCYTES # BLD AUTO: 0.58 K/UL — SIGNIFICANT CHANGE UP (ref 0–0.9)
MONOCYTES NFR BLD AUTO: 9.1 % — SIGNIFICANT CHANGE UP (ref 2–14)
NEUTROPHILS # BLD AUTO: 3.42 K/UL — SIGNIFICANT CHANGE UP (ref 1.8–7.4)
NEUTROPHILS NFR BLD AUTO: 53.9 % — SIGNIFICANT CHANGE UP (ref 43–77)
PLATELET # BLD AUTO: 259 K/UL — SIGNIFICANT CHANGE UP (ref 150–400)
POTASSIUM SERPL-MCNC: 4.4 MMOL/L — SIGNIFICANT CHANGE UP (ref 3.5–5.3)
POTASSIUM SERPL-SCNC: 4.4 MMOL/L — SIGNIFICANT CHANGE UP (ref 3.5–5.3)
PROT SERPL-MCNC: 7.1 G/DL — SIGNIFICANT CHANGE UP (ref 6.6–8.7)
PROTHROM AB SERPL-ACNC: 12 SEC — SIGNIFICANT CHANGE UP (ref 9.5–13)
RBC # BLD: 4.35 M/UL — SIGNIFICANT CHANGE UP (ref 3.8–5.2)
RBC # FLD: 12.3 % — SIGNIFICANT CHANGE UP (ref 10.3–14.5)
SODIUM SERPL-SCNC: 142 MMOL/L — SIGNIFICANT CHANGE UP (ref 135–145)
WBC # BLD: 6.35 K/UL — SIGNIFICANT CHANGE UP (ref 3.8–10.5)
WBC # FLD AUTO: 6.35 K/UL — SIGNIFICANT CHANGE UP (ref 3.8–10.5)

## 2024-01-24 PROCEDURE — 93010 ELECTROCARDIOGRAM REPORT: CPT

## 2024-01-24 RX ORDER — LAMOTRIGINE 25 MG/1
50 TABLET, ORALLY DISINTEGRATING ORAL DAILY
Refills: 0 | Status: DISCONTINUED | OUTPATIENT
Start: 2024-01-25 | End: 2024-01-26

## 2024-01-24 RX ORDER — GLUCAGON INJECTION, SOLUTION 0.5 MG/.1ML
1 INJECTION, SOLUTION SUBCUTANEOUS ONCE
Refills: 0 | Status: DISCONTINUED | OUTPATIENT
Start: 2024-01-24 | End: 2024-01-26

## 2024-01-24 RX ORDER — SACUBITRIL AND VALSARTAN 24; 26 MG/1; MG/1
1 TABLET, FILM COATED ORAL
Refills: 0 | Status: DISCONTINUED | OUTPATIENT
Start: 2024-01-24 | End: 2024-01-26

## 2024-01-24 RX ORDER — ASPIRIN/CALCIUM CARB/MAGNESIUM 324 MG
81 TABLET ORAL DAILY
Refills: 0 | Status: DISCONTINUED | OUTPATIENT
Start: 2024-01-25 | End: 2024-01-26

## 2024-01-24 RX ORDER — SODIUM CHLORIDE 9 MG/ML
250 INJECTION INTRAMUSCULAR; INTRAVENOUS; SUBCUTANEOUS ONCE
Refills: 0 | Status: DISCONTINUED | OUTPATIENT
Start: 2024-01-24 | End: 2024-01-26

## 2024-01-24 RX ORDER — DEXTROSE 50 % IN WATER 50 %
15 SYRINGE (ML) INTRAVENOUS ONCE
Refills: 0 | Status: DISCONTINUED | OUTPATIENT
Start: 2024-01-24 | End: 2024-01-26

## 2024-01-24 RX ORDER — FUROSEMIDE 40 MG
40 TABLET ORAL DAILY
Refills: 0 | Status: DISCONTINUED | OUTPATIENT
Start: 2024-01-25 | End: 2024-01-26

## 2024-01-24 RX ORDER — FUROSEMIDE 40 MG
1 TABLET ORAL
Qty: 0 | Refills: 0 | DISCHARGE

## 2024-01-24 RX ORDER — LEVOTHYROXINE SODIUM 125 MCG
25 TABLET ORAL DAILY
Refills: 0 | Status: DISCONTINUED | OUTPATIENT
Start: 2024-01-24 | End: 2024-01-26

## 2024-01-24 RX ORDER — SODIUM CHLORIDE 9 MG/ML
1000 INJECTION, SOLUTION INTRAVENOUS
Refills: 0 | Status: DISCONTINUED | OUTPATIENT
Start: 2024-01-24 | End: 2024-01-26

## 2024-01-24 RX ORDER — METOPROLOL TARTRATE 50 MG
50 TABLET ORAL DAILY
Refills: 0 | Status: DISCONTINUED | OUTPATIENT
Start: 2024-01-24 | End: 2024-01-26

## 2024-01-24 RX ORDER — FUROSEMIDE 40 MG
1 TABLET ORAL
Refills: 0 | DISCHARGE

## 2024-01-24 RX ORDER — INSULIN LISPRO 100/ML
VIAL (ML) SUBCUTANEOUS AT BEDTIME
Refills: 0 | Status: DISCONTINUED | OUTPATIENT
Start: 2024-01-24 | End: 2024-01-26

## 2024-01-24 RX ORDER — INSULIN LISPRO 100/ML
VIAL (ML) SUBCUTANEOUS
Refills: 0 | Status: DISCONTINUED | OUTPATIENT
Start: 2024-01-24 | End: 2024-01-26

## 2024-01-24 RX ORDER — DEXTROSE 50 % IN WATER 50 %
25 SYRINGE (ML) INTRAVENOUS ONCE
Refills: 0 | Status: DISCONTINUED | OUTPATIENT
Start: 2024-01-24 | End: 2024-01-26

## 2024-01-24 RX ORDER — CLOPIDOGREL BISULFATE 75 MG/1
75 TABLET, FILM COATED ORAL DAILY
Refills: 0 | Status: DISCONTINUED | OUTPATIENT
Start: 2024-01-25 | End: 2024-01-26

## 2024-01-24 RX ORDER — DEXTROSE 50 % IN WATER 50 %
12.5 SYRINGE (ML) INTRAVENOUS ONCE
Refills: 0 | Status: DISCONTINUED | OUTPATIENT
Start: 2024-01-24 | End: 2024-01-26

## 2024-01-24 RX ORDER — SACUBITRIL AND VALSARTAN 24; 26 MG/1; MG/1
1 TABLET, FILM COATED ORAL
Refills: 0 | Status: DISCONTINUED | OUTPATIENT
Start: 2024-01-24 | End: 2024-01-24

## 2024-01-24 RX ORDER — ARIPIPRAZOLE 15 MG/1
5 TABLET ORAL DAILY
Refills: 0 | Status: DISCONTINUED | OUTPATIENT
Start: 2024-01-24 | End: 2024-01-26

## 2024-01-24 RX ORDER — AMLODIPINE BESYLATE 2.5 MG/1
2.5 TABLET ORAL DAILY
Refills: 0 | Status: DISCONTINUED | OUTPATIENT
Start: 2024-01-24 | End: 2024-01-26

## 2024-01-24 RX ORDER — METOPROLOL TARTRATE 50 MG
100 TABLET ORAL DAILY
Refills: 0 | Status: DISCONTINUED | OUTPATIENT
Start: 2024-01-24 | End: 2024-01-24

## 2024-01-24 RX ORDER — ATORVASTATIN CALCIUM 80 MG/1
40 TABLET, FILM COATED ORAL AT BEDTIME
Refills: 0 | Status: DISCONTINUED | OUTPATIENT
Start: 2024-01-24 | End: 2024-01-26

## 2024-01-24 RX ADMIN — ATORVASTATIN CALCIUM 40 MILLIGRAM(S): 80 TABLET, FILM COATED ORAL at 23:36

## 2024-01-24 NOTE — DISCHARGE NOTE PROVIDER - HOSPITAL COURSE
:82 yo female with pmhx significant for HTN, HLD, TIA, Basal ganglia hemorrhage,  in 2021, repeat imaging with resolved ICH, encephalomalacia s/p rt s/p right ventriculostomy s/p rt  shunt placement, achilles tendon repair, S/P B/L knee replacement,  history of severe AS, AF, HTN, HLD, renal insufficiency and DM.    Patient ambulates with rolling walker and 1 person assistance. dependant on 1 person assistances for ADL's.  She presented to [facility] with CHF in December and aortic stenosis was found on echo. Echo on 12/6/23, revealed LV mod -severely increased thickening, no regional wall motion abnormalities, EF 65-70%LA mildly dilated, AV leaflets severely calcified with moderate to severe AS, mild AI, and MV annulus calcified mild-mod MR with multiple jets.  Patient denies HA, dizziness, CP, Palpitations PND, fever, chills.  patient presented to [facility] CCL for left heart catheterization     Patient  now s/p left heart catheterization with PCI via RFA approach, (S/P # 6F RFA sheath  # 7F RFV sheath, S/P angioseal and mynxed, with Dr. Moses,     Intraprocedurally: Patient received IV sedation and  8,000 units IV heparin    Omnipaque: 124 ml     Ptaient received Plavix 600mg po loading dose post procedure    Findings    S/P LHC   LCX  S/P DESX2 TO LEFT CIRCUMFLEX (XIENCE SKYPOINT 3.0X12MM) (XIENCE SKYPOINT 2.5X38MM)     Official cath report pending     S/P RHC  Right heart hemodynamics as below  PA: 31/9/17  RA: 8/5/4  RV: 29/3/6  PCWP: 8/6/5  PA SAT: 58.7  CO: 4.11  CI: 2.62  HIREN: 1.34  LVEDP: 13        Official cath report pending      Post Cath EKG: SB, HR; 59BPM, TWI IN LEAD1, II, V4, V5, AVF, V6, No acute ST/T changes     # S/P LHC/  LCX/ S/P PCI TO LCX/ STABLE RIGHT HEART PRESSURES/HIREN: 1.34    -ADMIT due to: / Pt s/p PCI  -post cardiac cath management per protocol  -Right groin precautions  -bedrest x 3 hours post procedure OOB at 22: 30 pm  -labs and EKG in am  -NS 0.9% 250ml/hr x 1 bolus: post procedure DUGLAS ppx   -continue current medical therapy INCLUDING   -Dual anti platelet therapy with aspirin/ plavix  reinforced importance of strict adherence to DAPT   -PATIENT WITH HX OF ICH, RESOLVED PER RECORDS/IMAGING, PATIENT TO CLOSELY FOLLOW UP WITH NEURO, F/U NEURO CASSY DOWNS  IN 3-5 DAYS  -statin therapy: C/W Atorvastatin 40mg po daily HS  -beta blocker: C/W home regimen metoprolol 100mg po daily  -C/W rest of the home medication regimen  -Medication reconciliation completed after verifying patient's home medications with pharmacy [email or web address]  -follow up outpt in 1 week with Cardiologist DR Moses  -[facility] following during hospitalization  -Lifestyle modifications discussed to reduce cardiovascular risk factors including weight reduction, smoking cessation, medication compliance, and routine follow up with Cardiologist to track your BMI, cholesterol, and glucose levels.   - cardiac rehab info provided/referral and communication to cardiac rehab completed  -Discharge in am if overnight tele, EKG, labs in am all remain WNL/RLE benign  -Patient education provided to patient and son, and verbalized undersatnding  -Patient at baseline need 1 person assistance for ADL'S HAS rolling walker at home, pt's son is the care giver    -Discussed with DR Moses           :82 yo female with pmhx significant for HTN, HLD, TIA, Basal ganglia hemorrhage,  in 2021, repeat imaging with resolved ICH, encephalomalacia s/p rt s/p right ventriculostomy s/p rt  shunt placement, achilles tendon repair, S/P B/L knee replacement,  history of severe AS, AF, HTN, HLD, renal insufficiency and DM.    Patient ambulates with rolling walker and 1 person assistance. dependant on 1 person assistances for ADL's.  She presented to [facility] with CHF in December and aortic stenosis was found on echo. Echo on 12/6/23, revealed LV mod -severely increased thickening, no regional wall motion abnormalities, EF 65-70%LA mildly dilated, AV leaflets severely calcified with moderate to severe AS, mild AI, and MV annulus calcified mild-mod MR with multiple jets.  Patient denies HA, dizziness, CP, Palpitations PND, fever, chills.  patient presented to [facility] CCL for left heart catheterization     Patient  now s/p left heart catheterization with PCI via RFA approach, (S/P # 6F RFA sheath  # 7F RFV sheath, S/P angioseal and mynxed, with Dr. Moses,     Intraprocedurally: Patient received IV sedation and  8,000 units IV heparin    Omnipaque: 124 ml     Ptaient received Plavix 600mg po loading dose post procedure    Findings    S/P LHC   LCX  S/P DESX2 TO LEFT CIRCUMFLEX (XIENCE SKYPOINT 3.0X12MM) (XIENCE SKYPOINT 2.5X38MM)     Official cath report pending     S/P RHC  Right heart hemodynamics as below  PA: 31/9/17  RA: 8/5/4  RV: 29/3/6  PCWP: 8/6/5  PA SAT: 58.7  CO: 4.11  CI: 2.62  HIREN: 1.34  LVEDP: 13        Official cath report pending      Post Cath EKG: SB, HR; 59BPM, TWI IN LEAD1, II, V4, V5, AVF, V6, No acute ST/T changes     # S/P LHC/  LCX/ S/P PCI TO LCX/ STABLE RIGHT HEART PRESSURES/HIREN: 1.34    -ADMIT due to: / Pt s/p PCI  -post cardiac cath management per protocol  -Right groin precautions  -bedrest x 3 hours post procedure OOB at 22: 30 pm  -labs and EKG in am  -NS 0.9% 250ml/hr x 1 bolus: post procedure DUGLAS ppx   -continue current medical therapy INCLUDING   -Dual anti platelet therapy with aspirin/ plavix  reinforced importance of strict adherence to DAPT   -PATIENT WITH HX OF ICH, RESOLVED PER RECORDS/IMAGING, PATIENT TO CLOSELY FOLLOW UP WITH NEURO, F/U NEURO CASSY DOWNS  IN 3-5 DAYS  -statin therapy: C/W Atorvastatin 40mg po daily HS  -beta blocker: C/W home regimen metoprolol 100mg po daily  --Afib: NO AC per DR Moses, C/W BB and DAPT  -C/W rest of the home medication regimen  -Medication reconciliation completed after verifying patient's home medications with pharmacy [facility]  -follow up outpt in 1 week with Cardiologist DR Moses  -[facility] following during hospitalization  -Lifestyle modifications discussed to reduce cardiovascular risk factors including weight reduction, smoking cessation, medication compliance, and routine follow up with Cardiologist to track your BMI, cholesterol, and glucose levels.   - cardiac rehab info provided/referral and communication to cardiac rehab completed  -Discharge in am if overnight tele, EKG, labs in am all remain WNL/RLE benign  -Patient education provided to patient and son, and verbalized undersatnding  -Patient at baseline need 1 person assistance for ADL'S HAS rolling walker at home, pt's son is the care giver    -Discussed with DR Moses           :84 yo female with pmhx significant for HTN, HLD, TIA, Basal ganglia hemorrhage,  in 2021, repeat imaging with resolved ICH, encephalomalacia s/p rt s/p right ventriculostomy s/p rt  shunt placement, achilles tendon repair, S/P B/L knee replacement,  history of severe AS, AF, HTN, HLD, renal insufficiency and DM.    Patient ambulates with rolling walker and 1 person assistance. dependant on 1 person assistances for ADL's.  She presented to [facility] with CHF in December and aortic stenosis was found on echo. Echo on 12/6/23, revealed LV mod -severely increased thickening, no regional wall motion abnormalities, EF 65-70%LA mildly dilated, AV leaflets severely calcified with moderate to severe AS, mild AI, and MV annulus calcified mild-mod MR with multiple jets.  Patient denies HA, dizziness, CP, Palpitations PND, fever, chills.  patient presented to [facility] CCL for left heart catheterization     Patient  now s/p left heart catheterization with PCI via RFA approach, (S/P # 6F RFA sheath  # 7F RFV sheath, S/P angioseal and mynxed, with Dr. Moses,     Intraprocedurally: Patient received IV sedation and  8,000 units IV heparin    Omnipaque: 124 ml     Ptaient received Plavix 600mg po loading dose post procedure    Findings    S/P LHC   LCX  S/P DESX2 TO LEFT CIRCUMFLEX (XIENCE SKYPOINT 3.0X12MM) (XIENCE SKYPOINT 2.5X38MM)     Official cath report pending     S/P RHC  Right heart hemodynamics as below  PA: 31/9/17  RA: 8/5/4  RV: 29/3/6  PCWP: 8/6/5  PA SAT: 58.7  CO: 4.11  CI: 2.62  HIREN: 1.34  LVEDP: 13        Official cath report pending      Post Cath EKG: SB, HR; 59BPM, TWI IN LEAD1, II, V4, V5, AVF, V6, No acute ST/T changes     # S/P LHC/  LCX/ S/P PCI TO LCX/ STABLE RIGHT HEART PRESSURES/HIREN: 1.34    -ADMIT due to: / Pt s/p PCI  -post cardiac cath management per protocol  -Right groin precautions  -bedrest x 3 hours post procedure OOB at 22: 30 pm  -labs and EKG in am  -NS 0.9% 250ml/hr x 1 bolus: post procedure DUGLAS ppx   -continue current medical therapy INCLUDING   -Dual anti platelet therapy with aspirin/ plavix  reinforced importance of strict adherence to DAPT   -PATIENT WITH HX OF ICH, RESOLVED PER RECORDS/IMAGING, PATIENT TO CLOSELY FOLLOW UP WITH NEURO, F/U NEURO CASSY DOWNS  IN 3-5 DAYS  -statin therapy: C/W Atorvastatin 40mg po daily HS  -beta blocker: ON  home regimen metoprolol 100mg po daily, WILL DECREASE DOSE TO 50MG PO DAILY with parameters IN SETTING OF ASYMPTOMATIC MICHAEL   --Afib: NO AC per DR Moses, C/W BB and DAPT  -C/W rest of the home medication regimen  -Medication reconciliation completed after verifying patient's home medications with pharmacy [facility]  -follow up outpt in 1 week with Cardiologist DR Moses  -[facility] following during hospitalization  -Lifestyle modifications discussed to reduce cardiovascular risk factors including weight reduction, smoking cessation, medication compliance, and routine follow up with Cardiologist to track your BMI, cholesterol, and glucose levels.   - cardiac rehab info provided/referral and communication to cardiac rehab completed  -Discharge in am if overnight tele, EKG, labs in am all remain WNL/RLE benign  -Patient education provided to patient and son, and verbalized undersatnding  -Patient at baseline need 1 person assistance for ADL'S HAS rolling walker at home, pt's son is the care giver    -Discussed with DR Moses           84 yo female with pmhx significant for HTN, HLD, TIA, Basal ganglia hemorrhage,  in 2021, repeat imaging with resolved ICH, encephalomalacia s/p rt s/p right ventriculostomy s/p rt  shunt placement, achilles tendon repair, S/P B/L knee replacement,  history of severe AS, AF, HTN, HLD, renal insufficiency and DM.    Patient ambulates with rolling walker and 1 person assistance. dependant on 1 person assistances for ADL's.  She presented to [facility] with CHF in December and aortic stenosis was found on echo. Echo on 12/6/23, revealed LV mod -severely increased thickening, no regional wall motion abnormalities, EF 65-70%LA mildly dilated, AV leaflets severely calcified with moderate to severe AS, mild AI, and MV annulus calcified mild-mod MR with multiple jets.  Patient denies HA, dizziness, CP, Palpitations PND, fever, chills.  patient presented to [facility] CCL for left heart catheterization     Patient  now s/p left heart catheterization with PCI via RFA approach, (S/P # 6F RFA sheath  # 7F RFV sheath, S/P angioseal and mynxed, with Dr. Moses,     Intraprocedurally: Patient received IV sedation and  8,000 units IV heparin    Omnipaque: 124 ml     Ptaient received Plavix 600mg po loading dose post procedure    Findings    S/P LHC   LCX  S/P DESX2 TO LEFT CIRCUMFLEX (XIENCE SKYPOINT 3.0X12MM) (XIENCE SKYPOINT 2.5X38MM)     Official cath report pending     S/P C  Right heart hemodynamics as below  PA: 31/9/17  RA: 8/5/4  RV: 29/3/6  PCWP: 8/6/5  PA SAT: 58.7  CO: 4.11  CI: 2.62  HIREN: 1.34  LVEDP: 13        Official cath report pending      Post Cath EKG: SB, HR; 59BPM, TWI IN LEAD1, II, V4, V5, AVF, V6, No acute ST/T changes     # S/P LHC/  LCX/ S/P PCI TO LCX/ STABLE RIGHT HEART PRESSURES/HIREN: 1.34    -ADMIT due to: / Pt s/p PCI  -post cardiac cath management per protocol  -Right groin precautions  -bedrest x 3 hours post procedure OOB at 22: 30 pm  -labs and EKG in am  -NS 0.9% 250ml/hr x 1 bolus: post procedure DUGLAS ppx   -continue current medical therapy INCLUDING   -Dual anti platelet therapy with aspirin/ plavix  reinforced importance of strict adherence to DAPT   -PATIENT WITH HX OF ICH, RESOLVED PER RECORDS/IMAGING, PATIENT TO CLOSELY FOLLOW UP WITH NEURO, F/U NEURO CASSY DOWNS  IN 3-5 DAYS  -statin therapy: C/W Atorvastatin 40mg po daily HS  -beta blocker: ON  home regimen metoprolol 100mg po daily, WILL DECREASE DOSE TO 50MG PO DAILY with parameters IN SETTING OF ASYMPTOMATIC MICHAEL   --Afib: NO AC per DR Moses, C/W BB and DAPT  -C/W rest of the home medication regimen  -Medication reconciliation completed after verifying patient's home medications with pharmacy [facility]  -follow up outpt in 1 week with Cardiologist DR Moses  -[facility] following during hospitalization  -Lifestyle modifications discussed to reduce cardiovascular risk factors including weight reduction, smoking cessation, medication compliance, and routine follow up with Cardiologist to track your BMI, cholesterol, and glucose levels.   - cardiac rehab info provided/referral and communication to cardiac rehab completed  -Discharge in am if overnight tele, EKG, labs in am all remain WNL/RLE benign  -Patient education provided to patient and son, and verbalized undersatnding  -Patient at baseline need 1 person assistance for ADL'S HAS rolling walker at home, pt's son is the care giver    -Discussed with DR Moses

## 2024-01-24 NOTE — DISCHARGE NOTE PROVIDER - NSDCCPCAREPLAN_GEN_ALL_CORE_FT
PRINCIPAL DISCHARGE DIAGNOSIS  Diagnosis: CAD (coronary artery disease)  Assessment and Plan of Treatment: Coronary artery disease is a condition where the arteries the supply the heart muscle get clogges with fatty deposits & puts you at risk for a heart attack  Call your doctor if you have any new pain, pressure, or discomfort in the center of your chest, pain, tingling or discomfort in arms, back, neck, jaw, or stomach, shortness of breath, nausea, vomiting, burping or heartburn, sweating, cold and clammy skin, racing or abnormal heartbeat for more than 10 minutes or if they keep coming & going.  Call 911 and do not tr to get to hospital by care  You can help yourself with lefestyle changes (quitting smoking if you smoke), eat lots of fruits & vegetables & low fat dairy products, not a lot of meat & fatty foods, walk or some form of physical activity most days of the week, lose weight if you are overweight  Take your cardiac medication as prescribed to lower cholesterol, to lower blood pressure, aspirin to prevent blood clots, and diabetes control  Make sure to keep appointments with doctor for cardiac follow up care

## 2024-01-24 NOTE — DISCHARGE NOTE PROVIDER - CARE PROVIDER_API CALL
Beka Moses  Interventional Cardiology  [address] [place] [address], [address]  [place] [address]  Phone: [phone]  Fax: [phone]  Follow Up Time: 1 week

## 2024-01-24 NOTE — PROGRESS NOTE ADULT - SUBJECTIVE AND OBJECTIVE BOX
Department of Cardiology                                                                  [facility]/[place] [address] [address] [facility]                                                            Telephone: [phone]. Fax:[phone]                                                    Post- Procedure Note: Right and Left Heart Cardiac Catheterization       Narrative:   Patient  now s/p left heart catheterization with PCI via RFA (S/P # 6F RFA sheath  # 7F RFV sheath, S/P angioseal and mynxed, approach with Dr. Moses,  tolerated procedure well without complications. Arrived to recovery room NAD and hemodynamically stable, distal pulse +, neurovascular intact          PAST MEDICAL & SURGICAL HISTORY:  Hypertension      Hyperlipidemia      Diabetes      Glaucoma      Osteoarthritis      Small bowel obstruction      DM (diabetes mellitus)      HTN (hypertension)      Bipolar disorder      Depression      Intracranial bleed      NPH (normal pressure hydrocephalus)      Pneumonia, aspiration      UTI (urinary tract infection)      Hyperlipidemia      Glaucoma      CKD (chronic kidney disease)      DVT, lower extremity      History of TIAs      S/P hysterectomy   and Oopherectomy in       S/P cholecystectomy        Achilles tendon injury  repair  right scalene muscle release      S/P knee replacement, left      S/P cholecystectomy      S/P total knee replacement, left        Home Medications:  ARIPiprazole 5 mg oral tablet: 1 tab(s) orally once a day (2024 16:17)  atorvastatin 40 mg oral tablet: 1 tab(s) orally once a day (at bedtime) (2024 16:17)  Entresto 24 mg-26 mg oral tablet: 1 tab(s) orally 2 times a day (2024 18:56)  furosemide 20 mg oral tablet: 1 tab(s) orally every other day (at bedtime) (2024 16:17)  lamoTRIgine 50 mg oral tablet, disintegratin tab(s) orally once a day (2024 16:17)  levothyroxine 25 mcg (0.025 mg) oral tablet: 1 tab(s) orally once a day (2024 16:17)  metFORMIN 500 mg oral tablet: 1 tab(s) orally 3 times a day (2024 16:17)        penicillin (Hives)  adhesives (Pruritus; Blisters)  pineapple (Anaphylaxis)      Objective:  Vital Signs Last 24 Hrs  T(C): 36.7 (2024 15:57), Max: 36.7 (2024 15:57)  T(F): 98 (2024 15:57), Max: 98 (2024 15:57)  HR: 56 (2024 15:57) (56 - 56)  BP: 174/77 (2024 15:57) (174/77 - 174/77)  BP(mean): --  RR: 16 (2024 15:57) (16 - 16)  SpO2: 100% (2024 15:57) (100% - 100%)    Parameters below as of 2024 15:57  Patient On (Oxygen Delivery Method): room air        GENERAL: Pt lying comfortably, NAD.  ENMT: PERRL, +EOMI.  NECK: soft, Supple, No JVD,   CHEST/LUNG: Clear to auscultatation bilaterally; No wheezing.  HEART: S1S2+, Regular rate and rhythm; No murmurs.  ABDOMEN: Soft, Nontender, Nondistended; Bowel sounds present.  MUSCULOSKELETAL: Normal range of motion.  SKIN: No rashes or lesions.  NEURO: AAOX3, no focal deficits, no motor r sensory loss.  PSYCH: normal mood.  Procedure site: RFV, RFA  no signs of bleeding or hematoma, neurovascular intact   VASCULAR:   Radial +2 R/+2 L  Femoral +2 R/+2 L  PT +2 R/+2 L  DP +2 R/+2 L                          12.5   6.35  )-----------( 259      ( 2024 15:35 )             38.5         142  |  104  |  36.6<H>  ----------------------------<  128<H>  4.4   |  25.0  |  1.24    Ca    9.4      2024 15:35    TPro  7.1  /  Alb  4.1  /  TBili  0.3<L>  /  DBili  x   /  AST  17  /  ALT  13  /  AlkPhos  98      PT/INR - ( 2024 15:35 )   PT: 12.0 sec;   INR: 1.08 ratio         PTT - ( 2024 15:35 )  PTT:31.6 sec                                                                                Department of Cardiology                                                                  [facility]/[place] [address] [address] [facility]                                                            Telephone: [phone]. Fax:[phone]                                                    Post- Procedure Note: Right and Left Heart Cardiac Catheterization       Narrative:   Patient  now s/p Right and  left heart catheterization with PCI via RFA (S/P # 6F RFA sheath  # 7F RFV sheath, S/P angioseal and mynxed, approach with Dr. Moses,  tolerated procedure well without complications. Arrived to recovery room NAD and hemodynamically stable, distal pulse +, neurovascular intact          PAST MEDICAL & SURGICAL HISTORY:  Hypertension      Hyperlipidemia      Diabetes      Glaucoma      Osteoarthritis      Small bowel obstruction      DM (diabetes mellitus)      HTN (hypertension)      Bipolar disorder      Depression      Intracranial bleed      NPH (normal pressure hydrocephalus)      Pneumonia, aspiration      UTI (urinary tract infection)      Hyperlipidemia      Glaucoma      CKD (chronic kidney disease)      DVT, lower extremity      History of TIAs      S/P hysterectomy   and Oopherectomy in       S/P cholecystectomy        Achilles tendon injury  repair  right scalene muscle release      S/P knee replacement, left      S/P cholecystectomy      S/P total knee replacement, left        Home Medications:  ARIPiprazole 5 mg oral tablet: 1 tab(s) orally once a day (2024 16:17)  atorvastatin 40 mg oral tablet: 1 tab(s) orally once a day (at bedtime) (2024 16:17)  Entresto 24 mg-26 mg oral tablet: 1 tab(s) orally 2 times a day (2024 18:56)  furosemide 20 mg oral tablet: 1 tab(s) orally every other day (at bedtime) (2024 16:17)  lamoTRIgine 50 mg oral tablet, disintegratin tab(s) orally once a day (2024 16:17)  levothyroxine 25 mcg (0.025 mg) oral tablet: 1 tab(s) orally once a day (2024 16:17)  metFORMIN 500 mg oral tablet: 1 tab(s) orally 3 times a day (2024 16:17)        penicillin (Hives)  adhesives (Pruritus; Blisters)  pineapple (Anaphylaxis)      Objective:  Vital Signs Last 24 Hrs  T(C): 36.7 (2024 15:57), Max: 36.7 (2024 15:57)  T(F): 98 (2024 15:57), Max: 98 (2024 15:57)  HR: 56 (2024 15:57) (56 - 56)  BP: 174/77 (2024 15:57) (174/77 - 174/77)  BP(mean): --  RR: 16 (2024 15:57) (16 - 16)  SpO2: 100% (2024 15:57) (100% - 100%)    Parameters below as of 2024 15:57  Patient On (Oxygen Delivery Method): room air        GENERAL: Pt lying comfortably, NAD.  ENMT: PERRL, +EOMI.  NECK: soft, Supple, No JVD,   CHEST/LUNG: Clear to auscultatation bilaterally; No wheezing.  HEART: S1S2+, Regular rate and rhythm; No murmurs.  ABDOMEN: Soft, Nontender, Nondistended; Bowel sounds present.  MUSCULOSKELETAL: Normal range of motion.  SKIN: No rashes or lesions.  NEURO: AAOX3, no focal deficits, no motor r sensory loss.  PSYCH: normal mood.  Procedure site: RFV, RFA  no signs of bleeding or hematoma, neurovascular intact   VASCULAR:   Radial +2 R/+2 L  Femoral +2 R/+2 L  PT +2 R/+2 L  DP +2 R/+2 L                          12.5   6.35  )-----------( 259      ( 2024 15:35 )             38.5         142  |  104  |  36.6<H>  ----------------------------<  128<H>  4.4   |  25.0  |  1.24    Ca    9.4      2024 15:35    TPro  7.1  /  Alb  4.1  /  TBili  0.3<L>  /  DBili  x   /  AST  17  /  ALT  13  /  AlkPhos  98      PT/INR - ( 2024 15:35 )   PT: 12.0 sec;   INR: 1.08 ratio         PTT - ( 2024 15:35 )  PTT:31.6 sec

## 2024-01-24 NOTE — DISCHARGE NOTE PROVIDER - NSDCCPTREATMENT_GEN_ALL_CORE_FT
PRINCIPAL PROCEDURE  Procedure: Left heart catheterization  Findings and Treatment: No heavy lifting, driving, sex, tub baths, swimming, or any activity that submerges the lower half of the body in water for 48 hours.  Limited walking and stairs for 48 hours.    Change the bandaid after 24 hours and every 24 hours after that.  Keep the puncture site dry and covered with a bandaid until a scab forms.    Observe the site frequently.  If bleeding or a large lump (the size of a golf ball or bigger) occurs lie flat, apply continuous direct pressure just above the puncture site for at least 10 minutes, and notify your physician immediately.  If the bleeding cannot be controlled, call 911 immediately for assistance.  Notify your physician of pain, swelling or any drainage.    Notify your physician immediately if coldness, numbness, discoloration or pain in your foot occurs.        SECONDARY PROCEDURE  Procedure: Insertion of coronary artery stent(s)  Findings and Treatment: Patient teaching done about DAPT  patient  is made aware to take  antiplatelet therapy as prescribed ( Statin and Aspirin and Plavix  as they are needed to keep your stent/s OPEN  patient is aware to take them every day, do not miss or double up on any doses  these medications can only be discontinued by your cardiologist   TEACH BACK used to verify patient's understanding; pateint verbalizes understanding and gives positive feedback .

## 2024-01-24 NOTE — PROGRESS NOTE ADULT - ASSESSMENT
:84 yo female with pmhx significant for HTN, HLD, TIA, Basal ganglia hemorrhage,  in 2021, repeat imaging with resolved ICH, encephalomalacia s/p rt s/p right ventriculostomy s/p rt  shunt placement, achilles tendon repair, S/P B/L knee replacement,  history of severe AS, AF, HTN, HLD, renal insufficiency and DM.    Patient ambulates with rolling walker and 1 person assistance. dependant on 1 person assistances for ADL's.  She presented to [facility] with CHF in December and aortic stenosis was found on echo. Echo on 12/6/23, revealed LV mod -severely increased thickening, no regional wall motion abnormalities, EF 65-70%LA mildly dilated, AV leaflets severely calcified with moderate to severe AS, mild AI, and MV annulus calcified mild-mod MR with multiple jets.  Patient denies HA, dizziness, CP, Palpitations PND, fever, chills.  patient presented to [facility] CCL for left heart catheterization     Patient  now s/p left heart catheterization with PCI via RFA approach, (S/P # 6F RFA sheath  # 7F RFV sheath, S/P angioseal and mynxed, with Dr. Moses,     Intraprocedurally: Patient received IV sedation and  8,000 units IV heparin    Omnipaque: 124 ml     Ptaient received Plavix 600mg po loading dose post procedure    Findings    S/P LHC   LCX  S/P DESX2 TO LEFT CIRCUMFLEX (XIENCE SKYPOINT 3.0X12MM) (XIENCE SKYPOINT 2.5X38MM)     Official cath report pending     S/P RHC  Right heart hemodynamics as below  PA: 31/9/17  RA: 8/5/4  RV: 29/3/6  PCWP: 8/6/5  PA SAT: 58.7  CO: 4.11  CI: 2.62  HIREN: 1.34  LVEDP: 13        Official cath report pending      Post Cath EKG:    # S/P LHC/  LCX/ S/P PCI TO LCX/ STABLE RIGHT HEART PRESSURES/HIREN: 1.34    -ADMIT due to: / Pt s/p PCI  -post cardiac cath management per protocol  -Right groin precautions  -bedrest x 3 hours post procedure OOB at 22: 30 pm  -labs and EKG in am  -NS 0.9% 250ml/hr x 1 bolus: post procedure DUGLAS ppx   -continue current medical therapy INCLUDING   -Dual anti platelet therapy with aspirin/ plavix  reinforced importance of strict adherence to DAPT   -PATIENT WITH HX OF ICH, RESOLVED PER RECORDS/IMAGING, PATIENT TO CLOSELY FOLLOW UP WITH NEURO, F/U NEURO CASSY DOWNS  IN 3-5 DAYS  -statin therapy: C/W Atorvastatin 40mg po daily hs  -beta blocker: C/W home regimen metoprolol 100mg po daily  -C/W rest of the home medication regimen  -follow up outpt in 1 week with Cardiologist DR Moses  -[facility] following during hospitalization  -Lifestyle modifications discussed to reduce cardiovascular risk factors including weight reduction, smoking cessation, medication compliance, and routine follow up with Cardiologist to track your BMI, cholesterol, and glucose levels.   - cardiac rehab info provided/referral and communication to cardiac rehab completed  -Discharge in am if overnight tele, EKG, labs in am all remain WNL/RLE benign  -Patient education provided to patient and son, and verbalized undersatnding  -Patient at baseline need 1 person assistance for ADL'S HAS rolling walker at home, pt's son is the care giver    -Discussed with DR Moses   :84 yo female with pmhx significant for HTN, HLD, TIA, Basal ganglia hemorrhage,  in 2021, repeat imaging with resolved ICH, encephalomalacia s/p rt s/p right ventriculostomy s/p rt  shunt placement, achilles tendon repair, S/P B/L knee replacement,  history of severe AS, AF, HTN, HLD, renal insufficiency and DM.    Patient ambulates with rolling walker and 1 person assistance. dependant on 1 person assistances for ADL's.  She presented to [facility] with CHF in December and aortic stenosis was found on echo. Echo on 12/6/23, revealed LV mod -severely increased thickening, no regional wall motion abnormalities, EF 65-70%LA mildly dilated, AV leaflets severely calcified with moderate to severe AS, mild AI, and MV annulus calcified mild-mod MR with multiple jets.  Patient denies HA, dizziness, CP, Palpitations PND, fever, chills.  patient presented to [facility] CCL for left heart catheterization     Patient  now s/p left heart catheterization with PCI via RFA approach, (S/P # 6F RFA sheath  # 7F RFV sheath, S/P angioseal and mynxed, with Dr. Moses,     Intraprocedurally: Patient received IV sedation and  8,000 units IV heparin    Omnipaque: 124 ml     Ptaient received Plavix 600mg po loading dose post procedure    Findings    S/P LHC   LCX  S/P DESX2 TO LEFT CIRCUMFLEX (XIENCE SKYPOINT 3.0X12MM) (XIENCE SKYPOINT 2.5X38MM)     Official cath report pending     S/P RHC  Right heart hemodynamics as below  PA: 31/9/17  RA: 8/5/4  RV: 29/3/6  PCWP: 8/6/5  PA SAT: 58.7  CO: 4.11  CI: 2.62  HIREN: 1.34  LVEDP: 13        Official cath report pending      Post Cath EKG: SB, HR; 59BPM, TWI IN LEAD1, II, V4, V5, AVF, V6, No acute ST/T changes     # S/P LHC/  LCX/ S/P PCI TO LCX/ STABLE RIGHT HEART PRESSURES/HIREN: 1.34    -ADMIT due to: / Pt s/p PCI  -post cardiac cath management per protocol  -Right groin precautions  -bedrest x 3 hours post procedure OOB at 22: 30 pm  -labs and EKG in am  -NS 0.9% 250ml/hr x 1 bolus: post procedure DUGLAS ppx   -continue current medical therapy INCLUDING   -Dual anti platelet therapy with aspirin/ plavix  reinforced importance of strict adherence to DAPT   -PATIENT WITH HX OF ICH, RESOLVED PER RECORDS/IMAGING, PATIENT TO CLOSELY FOLLOW UP WITH NEURO, F/U NEURO CASSY DOWNS  IN 3-5 DAYS  -statin therapy: C/W Atorvastatin 40mg po daily HS  -beta blocker: C/W home regimen metoprolol 100mg po daily  -C/W rest of the home medication regimen  -Medication reconciliation completed after verifying patient's home medications with pharmacy [facility]  -follow up outpt in 1 week with Cardiologist DR Moses  -[facility] following during hospitalization  -Lifestyle modifications discussed to reduce cardiovascular risk factors including weight reduction, smoking cessation, medication compliance, and routine follow up with Cardiologist to track your BMI, cholesterol, and glucose levels.   - cardiac rehab info provided/referral and communication to cardiac rehab completed  -Discharge in am if overnight tele, EKG, labs in am all remain WNL/RLE benign  -Patient education provided to patient and son, and verbalized undersatnding  -Patient at baseline need 1 person assistance for ADL'S HAS rolling walker at home, pt's son is the care giver    -Discussed with DR Moses   :82 yo female with pmhx significant for HTN, HLD, TIA, Basal ganglia hemorrhage,  in 2021, repeat imaging with resolved ICH, encephalomalacia s/p rt s/p right ventriculostomy s/p rt  shunt placement, achilles tendon repair, S/P B/L knee replacement,  history of severe AS, AF, HTN, HLD, renal insufficiency and DM.    Patient noted to be in afib during Op cardiology visit  Patient ambulates with rolling walker and 1 person assistance. dependant on 1 person assistances for ADL's.  She presented to [facility] with CHF in December and aortic stenosis was found on echo. Echo on 12/6/23, revealed LV mod -severely increased thickening, no regional wall motion abnormalities, EF 65-70%LA mildly dilated, AV leaflets severely calcified with moderate to severe AS, mild AI, and MV annulus calcified mild-mod MR with multiple jets.  Patient denies HA, dizziness, CP, Palpitations PND, fever, chills.  patient presented to [facility] CCL for left heart catheterization     Patient  now s/p left heart catheterization with PCI via RFA approach, (S/P # 6F RFA sheath  # 7F RFV sheath, S/P angioseal and mynxed, with Dr. Moses,   Post procedure SR at baseline with pAFIB, rate controlled, DR moses aware, Hx of afib     Intraprocedurally: Patient received IV sedation and  8,000 units IV heparin    Omnipaque: 124 ml     Ptaient received Plavix 600mg po loading dose post procedure    Findings    S/P LHC   LCX  S/P DESX2 TO LEFT CIRCUMFLEX (XIENCE SKYPOINT 3.0X12MM) (XIENCE SKYPOINT 2.5X38MM)     Official cath report pending     S/P C  Right heart hemodynamics as below  PA: 31/9/17  RA: 8/5/4  RV: 29/3/6  PCWP: 8/6/5  PA SAT: 58.7  CO: 4.11  CI: 2.62  HIREN: 1.34  LVEDP: 13        Official cath report pending      Post Cath EKG: SB, HR; 59BPM, TWI IN LEAD1, II, V4, V5, AVF, V6, No acute ST/T changes     # S/P LHC/  LCX/ S/P PCI TO LCX/ STABLE RIGHT HEART PRESSURES/HIREN: 1.34    -ADMIT due to: / Pt s/p PCI  -post cardiac cath management per protocol  -Right groin precautions  -bedrest x 3 hours post procedure OOB at 22: 30 pm  -labs and EKG in am  -NS 0.9% 250ml/hr x 1 bolus: post procedure DUGLAS ppx   -continue current medical therapy INCLUDING   -Dual anti platelet therapy with aspirin/ plavix  reinforced importance of strict adherence to DAPT   -PATIENT WITH HX OF ICH, RESOLVED PER RECORDS/IMAGING, PATIENT TO CLOSELY FOLLOW UP WITH NEURO, F/U NEURO CASSY DOWNS  IN 3-5 DAYS  -statin therapy: C/W Atorvastatin 40mg po daily HS  -beta blocker: C/W home regimen metoprolol 100mg po daily  -Afib: NO AC per DR Moses, C/W BB and DAPT  -C/W rest of the home medication regimen  -Medication reconciliation completed after verifying patient's home medications with pharmacy [facility]  -follow up outpt in 1 week with Cardiologist DR Moses  -[facility] following during hospitalization  -Lifestyle modifications discussed to reduce cardiovascular risk factors including weight reduction, smoking cessation, medication compliance, and routine follow up with Cardiologist to track your BMI, cholesterol, and glucose levels.   - cardiac rehab info provided/referral and communication to cardiac rehab completed  -Discharge in am if overnight tele, EKG, labs in am all remain WNL/RLE benign  -Patient education provided to patient and son, and verbalized undersatnding  -Patient at baseline need 1 person assistance for ADL'S HAS rolling walker at home, pt's son is the care giver    -Discussed with DR Moses   :84 yo female with pmhx significant for HTN, HLD, TIA, Basal ganglia hemorrhage,  in 2021, repeat imaging with resolved ICH, encephalomalacia s/p rt s/p right ventriculostomy s/p rt  shunt placement, achilles tendon repair, S/P B/L knee replacement,  history of severe AS, AF, HTN, HLD, renal insufficiency and DM.    Patient noted to be in afib during Op cardiology visit  Patient ambulates with rolling walker and 1 person assistance. dependant on 1 person assistances for ADL's.  She presented to [facility] with CHF in December and aortic stenosis was found on echo. Echo on 12/6/23, revealed LV mod -severely increased thickening, no regional wall motion abnormalities, EF 65-70%LA mildly dilated, AV leaflets severely calcified with moderate to severe AS, mild AI, and MV annulus calcified mild-mod MR with multiple jets.  Patient denies HA, dizziness, CP, Palpitations PND, fever, chills.  patient presented to [facility] CCL for left heart catheterization     Patient  now s/p left heart catheterization with PCI via RFA approach, (S/P # 6F RFA sheath  # 7F RFV sheath, S/P angioseal and mynxed, with Dr. Moses,   Post procedure SR at baseline with pAFIB, rate controlled, DR moses aware, Hx of afib     Intraprocedurally: Patient received IV sedation and  8,000 units IV heparin    Omnipaque: 124 ml     Ptaient received Plavix 600mg po loading dose post procedure    Findings    S/P LHC   LCX  S/P DESX2 TO LEFT CIRCUMFLEX (XIENCE SKYPOINT 3.0X12MM) (XIENCE SKYPOINT 2.5X38MM)     Official cath report pending     S/P C  Right heart hemodynamics as below  PA: 31/9/17  RA: 8/5/4  RV: 29/3/6  PCWP: 8/6/5  PA SAT: 58.7  CO: 4.11  CI: 2.62  HIREN: 1.34  LVEDP: 13        Official cath report pending      Post Cath EKG: SB, HR; 59BPM, TWI IN LEAD1, II, V4, V5, AVF, V6, No acute ST/T changes     # S/P LHC/  LCX/ S/P PCI TO LCX/ STABLE RIGHT HEART PRESSURES/HIREN: 1.34    -ADMIT due to: / Pt s/p PCI  -post cardiac cath management per protocol  -Right groin precautions  -bedrest x 3 hours post procedure OOB at 22: 30 pm  -labs and EKG in am  -NS 0.9% 250ml/hr x 1 bolus: post procedure DUGLAS ppx   -continue current medical therapy INCLUDING   -Dual anti platelet therapy with aspirin/ plavix  reinforced importance of strict adherence to DAPT   -PATIENT WITH HX OF ICH, RESOLVED PER RECORDS/IMAGING, PATIENT TO CLOSELY FOLLOW UP WITH NEURO, F/U NEURO CASSY DOWNS  IN 3-5 DAYS  -statin therapy: C/W Atorvastatin 40mg po daily HS  -beta blocker: ON  home regimen metoprolol 100mg po daily, will decrease dose to 50mg po daily with parameters in setting of asymptomatic clarisse, HR in high 40;s to low 50;s, upon exertion trend upto 60-'s  -Afib: NO AC per DR Moses, C/W BB and DAPT  -C/W rest of the home medication regimen  -Medication reconciliation completed after verifying patient's home medications with pharmacy [facility]  -follow up outpt in 1 week with Cardiologist DR Moses  -[facility] following during hospitalization  -Lifestyle modifications discussed to reduce cardiovascular risk factors including weight reduction, smoking cessation, medication compliance, and routine follow up with Cardiologist to track your BMI, cholesterol, and glucose levels.   - cardiac rehab info provided/referral and communication to cardiac rehab completed  -Discharge in am if overnight tele, EKG, labs in am all remain WNL/RLE benign  -Patient education provided to patient and son, and verbalized undersatnding  -Patient at baseline need 1 person assistance for ADL'S HAS rolling walker at home, pt's son is the care giver    -Discussed with DR Moses

## 2024-01-24 NOTE — DISCHARGE NOTE PROVIDER - NSDCMRMEDTOKEN_GEN_ALL_CORE_FT
amLODIPine 2.5 mg oral tablet: 1 tab(s) orally once a day  ARIPiprazole 5 mg oral tablet: 1 tab(s) orally once a day  atorvastatin 40 mg oral tablet: 1 tab(s) orally once a day (at bedtime)  Entresto 24 mg-26 mg oral tablet: 1 tab(s) orally 2 times a day  furosemide 40 mg oral tablet: 1 tab(s) orally once a day  lamoTRIgine 50 mg oral tablet, disintegratin tab(s) orally once a day  levothyroxine 25 mcg (0.025 mg) oral tablet: 1 tab(s) orally once a day  metFORMIN 500 mg oral tablet: 1 tab(s) orally 3 times a day  metFORMIN 500 mg oral tablet: 1 tab(s) orally 3 times a day  metoprolol succinate 50 mg oral tablet, extended release: 2 tab(s) orally once a day   amLODIPine 2.5 mg oral tablet: 1 tab(s) orally once a day  ARIPiprazole 5 mg oral tablet: 1 tab(s) orally once a day  aspirin 81 mg oral tablet, chewable: 1 tab(s) orally once a day  atorvastatin 40 mg oral tablet: 1 tab(s) orally once a day (at bedtime)  clopidogrel 75 mg oral tablet: 1 tab(s) orally once a day  Entresto 24 mg-26 mg oral tablet: 1 tab(s) orally 2 times a day  furosemide 40 mg oral tablet: 1 tab(s) orally once a day  lamoTRIgine 50 mg oral tablet, disintegratin tab(s) orally once a day  levothyroxine 25 mcg (0.025 mg) oral tablet: 1 tab(s) orally once a day  metFORMIN 500 mg oral tablet: 1 tab(s) orally 3 times a day Hold For 2 days And restart 24 morning  metoprolol succinate 50 mg oral tablet, extended release: 2 tab(s) orally once a day

## 2024-01-25 ENCOUNTER — TRANSCRIPTION ENCOUNTER (OUTPATIENT)
Age: 84
End: 2024-01-25

## 2024-01-25 LAB
A1C WITH ESTIMATED AVERAGE GLUCOSE RESULT: 7.8 % — HIGH (ref 4–5.6)
ANION GAP SERPL CALC-SCNC: 12 MMOL/L — SIGNIFICANT CHANGE UP (ref 5–17)
BASOPHILS # BLD AUTO: 0.05 K/UL — SIGNIFICANT CHANGE UP (ref 0–0.2)
BASOPHILS NFR BLD AUTO: 0.6 % — SIGNIFICANT CHANGE UP (ref 0–2)
BUN SERPL-MCNC: 25.6 MG/DL — HIGH (ref 8–20)
CALCIUM SERPL-MCNC: 8.6 MG/DL — SIGNIFICANT CHANGE UP (ref 8.4–10.5)
CHLORIDE SERPL-SCNC: 103 MMOL/L — SIGNIFICANT CHANGE UP (ref 96–108)
CHOLEST SERPL-MCNC: 134 MG/DL — SIGNIFICANT CHANGE UP
CO2 SERPL-SCNC: 24 MMOL/L — SIGNIFICANT CHANGE UP (ref 22–29)
CREAT SERPL-MCNC: 1.14 MG/DL — SIGNIFICANT CHANGE UP (ref 0.5–1.3)
EGFR: 48 ML/MIN/1.73M2 — LOW
EOSINOPHIL # BLD AUTO: 0.07 K/UL — SIGNIFICANT CHANGE UP (ref 0–0.5)
EOSINOPHIL NFR BLD AUTO: 0.8 % — SIGNIFICANT CHANGE UP (ref 0–6)
ESTIMATED AVERAGE GLUCOSE: 177 MG/DL — HIGH (ref 68–114)
GLUCOSE BLDC GLUCOMTR-MCNC: 144 MG/DL — HIGH (ref 70–99)
GLUCOSE BLDC GLUCOMTR-MCNC: 147 MG/DL — HIGH (ref 70–99)
GLUCOSE BLDC GLUCOMTR-MCNC: 178 MG/DL — HIGH (ref 70–99)
GLUCOSE BLDC GLUCOMTR-MCNC: 190 MG/DL — HIGH (ref 70–99)
GLUCOSE BLDC GLUCOMTR-MCNC: 271 MG/DL — HIGH (ref 70–99)
GLUCOSE SERPL-MCNC: 213 MG/DL — HIGH (ref 70–99)
HCT VFR BLD CALC: 36 % — SIGNIFICANT CHANGE UP (ref 34.5–45)
HDLC SERPL-MCNC: 41 MG/DL — LOW
HGB BLD-MCNC: 12.4 G/DL — SIGNIFICANT CHANGE UP (ref 11.5–15.5)
IMM GRANULOCYTES NFR BLD AUTO: 0.1 % — SIGNIFICANT CHANGE UP (ref 0–0.9)
LIPID PNL WITH DIRECT LDL SERPL: 70 MG/DL — SIGNIFICANT CHANGE UP
LYMPHOCYTES # BLD AUTO: 1.86 K/UL — SIGNIFICANT CHANGE UP (ref 1–3.3)
LYMPHOCYTES # BLD AUTO: 21.1 % — SIGNIFICANT CHANGE UP (ref 13–44)
MAGNESIUM SERPL-MCNC: 1.4 MG/DL — LOW (ref 1.6–2.6)
MCHC RBC-ENTMCNC: 29.9 PG — SIGNIFICANT CHANGE UP (ref 27–34)
MCHC RBC-ENTMCNC: 34.4 GM/DL — SIGNIFICANT CHANGE UP (ref 32–36)
MCV RBC AUTO: 86.7 FL — SIGNIFICANT CHANGE UP (ref 80–100)
MONOCYTES # BLD AUTO: 0.83 K/UL — SIGNIFICANT CHANGE UP (ref 0–0.9)
MONOCYTES NFR BLD AUTO: 9.4 % — SIGNIFICANT CHANGE UP (ref 2–14)
NEUTROPHILS # BLD AUTO: 5.99 K/UL — SIGNIFICANT CHANGE UP (ref 1.8–7.4)
NEUTROPHILS NFR BLD AUTO: 68 % — SIGNIFICANT CHANGE UP (ref 43–77)
NON HDL CHOLESTEROL: 93 MG/DL — SIGNIFICANT CHANGE UP
PLATELET # BLD AUTO: 232 K/UL — SIGNIFICANT CHANGE UP (ref 150–400)
POTASSIUM SERPL-MCNC: 3.7 MMOL/L — SIGNIFICANT CHANGE UP (ref 3.5–5.3)
POTASSIUM SERPL-SCNC: 3.7 MMOL/L — SIGNIFICANT CHANGE UP (ref 3.5–5.3)
RBC # BLD: 4.15 M/UL — SIGNIFICANT CHANGE UP (ref 3.8–5.2)
RBC # FLD: 12.4 % — SIGNIFICANT CHANGE UP (ref 10.3–14.5)
SODIUM SERPL-SCNC: 139 MMOL/L — SIGNIFICANT CHANGE UP (ref 135–145)
TRIGL SERPL-MCNC: 114 MG/DL — SIGNIFICANT CHANGE UP
WBC # BLD: 8.81 K/UL — SIGNIFICANT CHANGE UP (ref 3.8–10.5)
WBC # FLD AUTO: 8.81 K/UL — SIGNIFICANT CHANGE UP (ref 3.8–10.5)

## 2024-01-25 PROCEDURE — 93010 ELECTROCARDIOGRAM REPORT: CPT

## 2024-01-25 RX ORDER — HYDRALAZINE HCL 50 MG
10 TABLET ORAL ONCE
Refills: 0 | Status: COMPLETED | OUTPATIENT
Start: 2024-01-25 | End: 2024-01-25

## 2024-01-25 RX ORDER — METFORMIN HYDROCHLORIDE 850 MG/1
1 TABLET ORAL
Refills: 0 | DISCHARGE

## 2024-01-25 RX ORDER — ASPIRIN/CALCIUM CARB/MAGNESIUM 324 MG
1 TABLET ORAL
Qty: 0 | Refills: 0 | DISCHARGE
Start: 2024-01-25

## 2024-01-25 RX ORDER — CLOPIDOGREL BISULFATE 75 MG/1
1 TABLET, FILM COATED ORAL
Qty: 90 | Refills: 3
Start: 2024-01-25

## 2024-01-25 RX ADMIN — Medication 10 MILLIGRAM(S): at 01:10

## 2024-01-25 RX ADMIN — Medication 25 MICROGRAM(S): at 05:37

## 2024-01-25 RX ADMIN — Medication 40 MILLIGRAM(S): at 05:37

## 2024-01-25 RX ADMIN — ARIPIPRAZOLE 5 MILLIGRAM(S): 15 TABLET ORAL at 10:54

## 2024-01-25 RX ADMIN — Medication 50 MILLIGRAM(S): at 12:37

## 2024-01-25 RX ADMIN — SACUBITRIL AND VALSARTAN 1 TABLET(S): 24; 26 TABLET, FILM COATED ORAL at 05:37

## 2024-01-25 RX ADMIN — LAMOTRIGINE 50 MILLIGRAM(S): 25 TABLET, ORALLY DISINTEGRATING ORAL at 10:54

## 2024-01-25 RX ADMIN — AMLODIPINE BESYLATE 2.5 MILLIGRAM(S): 2.5 TABLET ORAL at 05:37

## 2024-01-25 RX ADMIN — Medication 81 MILLIGRAM(S): at 10:53

## 2024-01-25 RX ADMIN — Medication 1: at 09:18

## 2024-01-25 RX ADMIN — ATORVASTATIN CALCIUM 40 MILLIGRAM(S): 80 TABLET, FILM COATED ORAL at 21:19

## 2024-01-25 RX ADMIN — CLOPIDOGREL BISULFATE 75 MILLIGRAM(S): 75 TABLET, FILM COATED ORAL at 10:53

## 2024-01-25 RX ADMIN — SACUBITRIL AND VALSARTAN 1 TABLET(S): 24; 26 TABLET, FILM COATED ORAL at 17:33

## 2024-01-25 RX ADMIN — Medication 3: at 12:37

## 2024-01-25 NOTE — DISCHARGE NOTE NURSING/CASE MANAGEMENT/SOCIAL WORK - PATIENT PORTAL LINK FT
You can access the FollowMyHealth Patient Portal offered by  by registering at the following website: [email or web address]. By joining [employer]’s FollowMyHealth portal, you will also be able to view your health information using other applications (apps) compatible with our system.

## 2024-01-25 NOTE — PROGRESS NOTE ADULT - SUBJECTIVE AND OBJECTIVE BOX
Interventional Cardiology NP note:       -s/p LHC/PCI/RHC via RFA/RFV with Dr. Moses 1/24/24:  LCX treated with rotational atherectomy, intravascular lithotripsy and FLORIDA stent x 1  Post procedure SR at baseline with pAFIB, rate controlled, DR moses aware, Hx of afib     Right heart hemodynamics as below  PA: 31/9/17  RA: 8/5/4  RV: 29/3/6  PCWP: 8/6/5  PA SAT: 58.7  CO: 4.11  CI: 2.62  HIREN: 1.34  LVEDP: 13    EKG: NSB 58 bpm unchanged from previous  TELE: NSB 50s    MEDICATIONS  (STANDING):  amLODIPine   Tablet 2.5 milliGRAM(s) Oral daily  ARIPiprazole 5 milliGRAM(s) Oral daily  aspirin  chewable 81 milliGRAM(s) Oral daily  atorvastatin 40 milliGRAM(s) Oral at bedtime  chlorhexidine 4% Liquid 1 Application(s) Topical Once  clopidogrel Tablet 75 milliGRAM(s) Oral daily  dextrose 5%. 1000 milliLiter(s) (100 mL/Hr) IV Continuous <Continuous>  dextrose 5%. 1000 milliLiter(s) (50 mL/Hr) IV Continuous <Continuous>  dextrose 50% Injectable 25 Gram(s) IV Push once  dextrose 50% Injectable 12.5 Gram(s) IV Push once  dextrose 50% Injectable 25 Gram(s) IV Push once  furosemide    Tablet 40 milliGRAM(s) Oral daily  glucagon  Injectable 1 milliGRAM(s) IntraMuscular once  insulin lispro (ADMELOG) corrective regimen sliding scale   SubCutaneous three times a day before meals  insulin lispro (ADMELOG) corrective regimen sliding scale   SubCutaneous at bedtime  lamoTRIgine 50 milliGRAM(s) Oral daily  levothyroxine 25 MICROGram(s) Oral daily  metoprolol succinate ER 50 milliGRAM(s) Oral daily  sacubitril 24 mG/valsartan 26 mG 1 Tablet(s) Oral two times a day  sodium chloride 0.9% Bolus 250 milliLiter(s) IV Bolus once  sodium chloride 0.9% Bolus 250 milliLiter(s) IV Bolus once    MEDICATIONS  (PRN):  dextrose Oral Gel 15 Gram(s) Oral once PRN Blood Glucose LESS THAN 70 milliGRAM(s)/deciliter      Allergies:  penicillin (Hives)  adhesives (Pruritus; Blisters)  pineapple (Anaphylaxis)      PAST MEDICAL & SURGICAL HISTORY:  Hypertension      Hyperlipidemia      Diabetes      Glaucoma      Osteoarthritis      Small bowel obstruction      DM (diabetes mellitus)      HTN (hypertension)      Bipolar disorder      Depression      Intracranial bleed      NPH (normal pressure hydrocephalus)      Pneumonia, aspiration      UTI (urinary tract infection)      Hyperlipidemia      Glaucoma      CKD (chronic kidney disease)      DVT, lower extremity      History of TIAs      S/P hysterectomy  1981 and Oopherectomy in 1990s      S/P cholecystectomy  1981      Achilles tendon injury  repair  right scalene muscle release      S/P knee replacement, left      S/P cholecystectomy      S/P total knee replacement, left          Vital Signs Last 24 Hrs  T(C): 37.4 (25 Jan 2024 16:58), Max: 37.4 (25 Jan 2024 16:58)  T(F): 99.4 (25 Jan 2024 16:58), Max: 99.4 (25 Jan 2024 16:58)  HR: 64 (25 Jan 2024 16:58) (48 - 140)  BP: 151/74 (25 Jan 2024 16:58) (135/74 - 182/76)  BP(mean): --  RR: 18 (25 Jan 2024 16:58) (13 - 18)  SpO2: 92% (25 Jan 2024 16:58) (92% - 100%)    Parameters below as of 25 Jan 2024 16:58  Patient On (Oxygen Delivery Method): room air        Physical Exam:  Constitutional: NAD, AAOx3  Cardiovascular: +S1S2 RRR  Pulmonary: CTA b/l, unlabored  GI: soft NTND +BS  Extremities: no pedal edema, +distal pulses b/l  Neuro: non focal, RHODES x4  Procedure site: Right groin site benign without hematoma/bleeding; no bruit; + right PP    LABS:                        12.4   8.81  )-----------( 232      ( 25 Jan 2024 07:17 )             36.0     01-25    139  |  103  |  25.6<H>  ----------------------------<  213<H>  3.7   |  24.0  |  1.14    Ca    8.6      25 Jan 2024 07:17  Mg     1.4     01-25    TPro  7.1  /  Alb  4.1  /  TBili  0.3<L>  /  DBili  x   /  AST  17  /  ALT  13  /  AlkPhos  98  01-24    PT/INR - ( 24 Jan 2024 15:35 )   PT: 12.0 sec;   INR: 1.08 ratio         PTT - ( 24 Jan 2024 15:35 )  PTT:31.6 sec  Urinalysis Basic - ( 25 Jan 2024 07:17 )    Color: x / Appearance: x / SG: x / pH: x  Gluc: 213 mg/dL / Ketone: x  / Bili: x / Urobili: x   Blood: x / Protein: x / Nitrite: x   Leuk Esterase: x / RBC: x / WBC x   Sq Epi: x / Non Sq Epi: x / Bacteria: x        RADIOLOGY & ADDITIONAL TESTS:  < from: Cardiac Catheterization (01.24.24 @ 17:38) >  Cath Lab Report    Diagnostic Cardiologist:       Beka Moses MD   Interventional Cardiologist:   Beka Moses MD   Fellow:                        La Wagner     Procedures Performed   Procedures:               1.    Arterial Access - Right Femoral     2.    Venous Access - Right Femoral   3.    RHC   4.    Left Heart Cath   5.    Diagnostic Coronary Angiography   6.    PCI: Rotational Atherectomy   7.    PCI: Intravascular Lithotripsy   8.    PCI: FLORIDA   9.    IVUS   10.   AngioSeal   11.   Mynx     Conclusions:   Normal LVEF 70%     Moderate Aortic stenosis   Mild Left Main   Mild to moderate diffuse and calcified stenosis of the proximla and  mid LAD with a 60% stenosis in the mid-distal LAD  Significant and severe stenosis of the proximal and mid LCX treated  with rotational atherectomy, intravascular Lithotripsy and  FLOR[place] stent   Mild disease of the RCA   Recommendations:   Aspirin and Plavix     Acute complication:    No complications     < end of copied text >

## 2024-01-25 NOTE — PROGRESS NOTE ADULT - ASSESSMENT
A/P: 82 yo female with pmhx significant for HTN, HLD, TIA, Basal ganglia hemorrhage,  in 2021, repeat imaging with resolved ICH, encephalomalacia s/p rt s/p right ventriculostomy s/p rt  shunt placement, achilles tendon repair, S/P B/L knee replacement,  history of severe AS, AF, HTN, HLD, renal insufficiency and DM.  Patient noted to be in afib during Op cardiology visit  Patient ambulates with rolling walker and 1 person assistance. dependant on 1 person assistances for ADL's.  She presented to [facility] with CHF in December and aortic stenosis was found on echo. Echo on 12/6/23, revealed LV mod -severely increased thickening, no regional wall motion abnormalities, EF 65-70%LA mildly dilated, AV leaflets severely calcified with moderate to severe AS, mild AI, and MV annulus calcified mild-mod MR with multiple jets.  Now s/p LHC/PCI/RHC via RFA/RFV with Dr. Moses 1/24/24:  LCX treated with rotational atherectomy, intravascular lithotripsy and FLOR[place] stent x 1  Post procedure SR at baseline with pAFIB, rate controlled, DR moses aware, Hx of afib       # S/P LHC/  LCX/ S/P PCI TO LCX/ STABLE RIGHT HEART PRESSURES/HIREN: 1.34    -Patient stable for discharge to home today; being discharged home with nursing follow up  -continue current medical therapy INCLUDING   -Dual anti platelet therapy with aspirin/ plavix  reinforced importance of strict adherence to DAPT   -PATIENT WITH HX OF ICH, RESOLVED PER RECORDS/IMAGING, PATIENT TO CLOSELY FOLLOW UP WITH NEURO, F/U NEURO CASSY DOWNS  IN 3-5 DAYS  -statin therapy: C/W Atorvastatin 40mg po daily HS  -beta blocker: ON  home regimen metoprolol 100mg po daily, will decrease dose to 50mg po daily with parameters in setting of asymptomatic clarisse, HR in high 40;s to low 50;s, upon exertion trend upto 60-'s  -Afib: NO AC per DR Moses, C/W BB and DAPT  -C/W rest of the home medication regimen  -follow up outpt in 1 week with Cardiologist DR Moses  -Lifestyle modifications discussed to reduce cardiovascular risk factors including weight reduction, smoking cessation, medication compliance, and routine follow up with Cardiologist to track your BMI, cholesterol, and glucose levels.   - cardiac rehab info provided/referral and communication to cardiac rehab completed  -Patient education provided to patient and son, and verbalized undersatnding  -Patient at baseline need 1 person assistance for ADL'S HAS rolling walker at home, pt's son is the care giver    -Discussed with DR Moses    Addendum:   -Patient unable to get ride home today; will stay overnight and discharge to home first thing in the morning 80

## 2024-01-26 VITALS
HEART RATE: 71 BPM | OXYGEN SATURATION: 98 % | SYSTOLIC BLOOD PRESSURE: 143 MMHG | RESPIRATION RATE: 18 BRPM | DIASTOLIC BLOOD PRESSURE: 76 MMHG

## 2024-01-26 LAB — GLUCOSE BLDC GLUCOMTR-MCNC: 165 MG/DL — HIGH (ref 70–99)

## 2024-01-26 PROCEDURE — 82962 GLUCOSE BLOOD TEST: CPT

## 2024-01-26 PROCEDURE — C9602: CPT | Mod: LC

## 2024-01-26 PROCEDURE — C1887: CPT

## 2024-01-26 PROCEDURE — C1874: CPT

## 2024-01-26 PROCEDURE — 86901 BLOOD TYPING SEROLOGIC RH(D): CPT

## 2024-01-26 PROCEDURE — C1894: CPT

## 2024-01-26 PROCEDURE — C1760: CPT

## 2024-01-26 PROCEDURE — 85025 COMPLETE CBC W/AUTO DIFF WBC: CPT

## 2024-01-26 PROCEDURE — 36415 COLL VENOUS BLD VENIPUNCTURE: CPT

## 2024-01-26 PROCEDURE — 80053 COMPREHEN METABOLIC PANEL: CPT

## 2024-01-26 PROCEDURE — 85610 PROTHROMBIN TIME: CPT

## 2024-01-26 PROCEDURE — C1724: CPT

## 2024-01-26 PROCEDURE — C1725: CPT

## 2024-01-26 PROCEDURE — 86850 RBC ANTIBODY SCREEN: CPT

## 2024-01-26 PROCEDURE — 86900 BLOOD TYPING SEROLOGIC ABO: CPT

## 2024-01-26 PROCEDURE — 93005 ELECTROCARDIOGRAM TRACING: CPT

## 2024-01-26 PROCEDURE — 80048 BASIC METABOLIC PNL TOTAL CA: CPT

## 2024-01-26 PROCEDURE — C1761: CPT

## 2024-01-26 PROCEDURE — 83036 HEMOGLOBIN GLYCOSYLATED A1C: CPT

## 2024-01-26 PROCEDURE — C1889: CPT

## 2024-01-26 PROCEDURE — 80061 LIPID PANEL: CPT

## 2024-01-26 PROCEDURE — 93460 R&L HRT ART/VENTRICLE ANGIO: CPT | Mod: 59

## 2024-01-26 PROCEDURE — 83735 ASSAY OF MAGNESIUM: CPT

## 2024-01-26 PROCEDURE — 92972 PERQ TRLUML CORONRY LITHOTRP: CPT

## 2024-01-26 PROCEDURE — C1753: CPT

## 2024-01-26 PROCEDURE — 85730 THROMBOPLASTIN TIME PARTIAL: CPT

## 2024-01-26 PROCEDURE — 92978 ENDOLUMINL IVUS OCT C 1ST: CPT | Mod: LC

## 2024-01-26 PROCEDURE — C1769: CPT

## 2024-01-26 RX ORDER — MAGNESIUM OXIDE 400 MG ORAL TABLET 241.3 MG
1 TABLET ORAL
Qty: 14 | Refills: 0
Start: 2024-01-26 | End: 2024-02-01

## 2024-01-26 RX ORDER — MAGNESIUM OXIDE 400 MG ORAL TABLET 241.3 MG
400 TABLET ORAL ONCE
Refills: 0 | Status: COMPLETED | OUTPATIENT
Start: 2024-01-26 | End: 2024-01-26

## 2024-01-26 RX ADMIN — Medication 1: at 08:45

## 2024-01-26 RX ADMIN — Medication 40 MILLIGRAM(S): at 06:17

## 2024-01-26 RX ADMIN — Medication 25 MICROGRAM(S): at 06:17

## 2024-01-26 RX ADMIN — SACUBITRIL AND VALSARTAN 1 TABLET(S): 24; 26 TABLET, FILM COATED ORAL at 06:17

## 2024-01-26 RX ADMIN — MAGNESIUM OXIDE 400 MG ORAL TABLET 400 MILLIGRAM(S): 241.3 TABLET ORAL at 08:46

## 2024-01-26 RX ADMIN — AMLODIPINE BESYLATE 2.5 MILLIGRAM(S): 2.5 TABLET ORAL at 06:22

## 2024-01-26 NOTE — PROGRESS NOTE ADULT - ASSESSMENT
Procedure: LHC and PCI of the LCX    1. S/P LHC and PCI of the LCX  ·	Post procedure instructions explained.  ·	Medications:   ·	     DAPT: Will continue Plavix 75 mg daily and aspirin 81 mg daily  ·	     Statin: Will continue Lipitor 40 mg daily  ·	     Beta Blocker: Will continue Toprol 100 mg daily  ·	     RAAS Inhibition: Will continue Entresto 50 mg BID  ·	     Other Medications: Will continue amlodipine 2.5 mg daily  ·	Cardiac rehab info provided/referral and communication to cardiac rehab completed  ·	Aggressive lifestyle modification.    2. Hypomagnesemia  ·	Mg 1.4 on yesterdays labs.  ·	Mag Oxide 400 mg once now and BID after.  ·	BMP and magnesium in 1-3 days, results to go to Dr. Moses.    3. DM2  ·	GDMT:   ·	     Diabetic diet.  ·	     FS Q AC and HS with coverage  ·	     HgbA1C: 7.8%  ·	     Total Insulin Requirements Past 24 Hours: 4 units  ·	     Basal Insulin: N/A  ·	     Nutritional Insulin: N/A  ·	     Oral Antihyperglycemics: Will resume metformin 500 mg TID on January 27, 2024.  ·	     SGLT2i/GLP1-RA: N/A    4. HFpEF  ·	GDMT  ·	     Antihypertensives: Will continue Toprol 100 mg daily and amlodipine 2.5 mg daily  ·	     ARNI: Will continue Entresto 50 mg BID  ·	     MRA: N/A  ·	     Diuretic: Will continue Lasix 40 mgm daily  ·	     SGLT2i: N/A  ·	     Other: N/A  ·	Strict I&O's  ·	Daily standing weights (if able)    5. HLD  ·	Goal Non-HDL < 100, LDL < 70  ·	Lipid Panel: At goal  ·	Statin: Will continue Lipitor 40 mg daily  ·	Other: N/A    6. HTN  ·	BP Range Last 24 Hours: 135-152/  ·	Beta Blocker: Will continue Toprol 100 mg daily  ·	RAASi: Will continue Entresto 50 mg BID  ·	CCB: Will continue amlodipine 2.5 mg daily  ·	Other: N/A    Discharge Planning:   ·	Discharge home today  ·	Follow up with: Dr. Moses in 1-2 weeks

## 2024-01-26 NOTE — PROGRESS NOTE ADULT - SUBJECTIVE AND OBJECTIVE BOX
Department of Cardiology                                                                  [facility]/[place] [address] [place]                                                            Telephone: [phone]. Fax:[phone]                                                                      INTERVENTIONAL CARDIOLOGY CATH NOTE       Subjective:  83y Female who had a left heart catheterization which showed:  Coronary Angiography   ·	The coronary circulation is right dominant.    LM   ·	Left main artery: The segment is mildly calcified. Angiography shows mild atherosclerosis.  LAD   ·	Proximal left anterior descending: The segment is moderately calcified. There is a 25 % stenosis.   ·	Mid left anterior descending: The segment is moderately calcified. There is a 30 % stenosis.   ·	Distal left anterior descending: The segment is moderately calcified. There is a 60 % stenosis.    CX   ·	Proximal circumflex: There is a 70 % stenosis. It was treated with a XIENCE SKYPOINT 3 X 12MM [place].  ·	Mid circumflex: The segment is severely calcified. There is an 85 % stenosis. It was treated with rotational atherectomy, Shockwave IVL, and a XIENCE SKYPOINT 2.5 X 38MM [place] post dilated with a EUPHORA NC 3.0 X 20MM balloon.  RCA   ·	Proximal right coronary artery: The segment is moderately calcified. There is a 25 % stenosis.  Ramus   ·	Proximal ramus intermedius: There is a 60 % stenosis.    Left Heart Cath   ·	Global left ventricular function is normal.   ·	Ejection fraction is 70%.   Valves   ·	Aortic Valve: There is moderate aortic stenosis.    ·	Mitral Valve: The mitral valve exhibits trivial (less than 1+) regurgitation.  Right Heart Pressures:       RA: 4       RV: 29/6       PA: 31/9/17       PCWP: 5       CO: 4.11 LPM       CI: 2.62 LPM/m2       SVR: 21.15 Wood Units       PVR: 2.92 Wood Units    Interval history: No chest pain, SOB, or palpitations overnight.    HPI: 84 yo female with pmhx significant for HTN, HLD, TIA, Epilepsy, Basal ganglia hemorrhage,  in , repeat imaging with resolved ICH, encephalomalacia s/p rt s/p right ventriculostomy s/p rt  shunt placement, achilles tendon repair, S/P B/L knee replacement,  history of severe AS, AF, HTN, HLD, renal insufficiency and DM.    Patient ambulates with rolling walker and 1 person assistance. dependant on 1 person assistances for ADL's. Per son, he is taking care of her and do not wish to send her to any facility.   She presented to [facility] with CHF in December and aortic stenosis was found on echo. Echo on 23, revealed LV mod -severely increased thickening, no regional wall motion abnormalities, EF 65-70%LA mildly dilated, AV leaflets severely calcified with moderate to severe AS, mild AI, and MV annulus calcified mild-mod MR with multiple jets.  Patient denies HA, dizziness, CP, Palpitations PND, fever, chills.  Patient now presents to [facility] for left heart catheterization to evaluate for CAD as patient ican be a potenrial candidate for Aortic valve replacement in the near future.    PAST MEDICAL & SURGICAL HISTORY:  Hypertension  Hyperlipidemia  Diabetes  Glaucoma  Osteoarthritis  Small bowel obstruction  DM (diabetes mellitus)  HTN (hypertension)  Bipolar disorder  Depression  Intracranial bleed  NPH (normal pressure hydrocephalus)  Pneumonia, aspiration  UTI (urinary tract infection)  Hyperlipidemia  Glaucoma  CKD (chronic kidney disease)  DVT, lower extremity  History of TIAs  S/P hysterectomy  and Oopherectomy in   S/P cholecystectomy   Achilles tendon injury repair right scalene muscle release  S/P knee replacement, left  S/P cholecystectomy  S/P total knee replacement, left    Allergies:   ·	penicillin (Hives)  ·	adhesives (Pruritus; Blisters)  ·	pineapple (Anaphylaxis)    Home Medications:  amLODIPine 2.5 mg oral tablet: 1 tab(s) orally once a day (2024 20:34)  ARIPiprazole 5 mg oral tablet: 1 tab(s) orally once a day (2024 20:34)  aspirin 81 mg oral tablet, chewable: 1 tab(s) orally once a day (2024 14:17)  atorvastatin 40 mg oral tablet: 1 tab(s) orally once a day (at bedtime) (2024 16:17)  Entresto 24 mg-26 mg oral tablet: 1 tab(s) orally 2 times a day (2024 20:34)  furosemide 40 mg oral tablet: 1 tab(s) orally once a day (2024 20:34)  lamoTRIgine 50 mg oral tablet, disintegratin tab(s) orally once a day (2024 20:34)  levothyroxine 25 mcg (0.025 mg) oral tablet: 1 tab(s) orally once a day (2024 20:34)  metFORMIN 500 mg oral tablet: 1 tab(s) orally 3 times a day Hold For 2 days And restart 24 morning (2024 14:17)  metoprolol succinate 50 mg oral tablet, extended release: 2 tab(s) orally once a day (2024 20:34)    MEDICATIONS  (STANDING):  amLODIPine   Tablet 2.5 milliGRAM(s) Oral daily  ARIPiprazole 5 milliGRAM(s) Oral daily  aspirin  chewable 81 milliGRAM(s) Oral daily  atorvastatin 40 milliGRAM(s) Oral at bedtime  chlorhexidine 4% Liquid 1 Application(s) Topical Once  clopidogrel Tablet 75 milliGRAM(s) Oral daily  dextrose 5%. 1000 milliLiter(s) (100 mL/Hr) IV Continuous <Continuous>  dextrose 5%. 1000 milliLiter(s) (50 mL/Hr) IV Continuous <Continuous>  dextrose 50% Injectable 25 Gram(s) IV Push once  dextrose 50% Injectable 12.5 Gram(s) IV Push once  dextrose 50% Injectable 25 Gram(s) IV Push once  furosemide    Tablet 40 milliGRAM(s) Oral daily  glucagon  Injectable 1 milliGRAM(s) IntraMuscular once  insulin lispro (ADMELOG) corrective regimen sliding scale   SubCutaneous three times a day before meals  insulin lispro (ADMELOG) corrective regimen sliding scale   SubCutaneous at bedtime  lamoTRIgine 50 milliGRAM(s) Oral daily  levothyroxine 25 MICROGram(s) Oral daily  magnesium oxide 400 milliGRAM(s) Oral once  metoprolol succinate ER 50 milliGRAM(s) Oral daily  sacubitril 24 mG/valsartan 26 mG 1 Tablet(s) Oral two times a day  sodium chloride 0.9% Bolus 250 milliLiter(s) IV Bolus once  sodium chloride 0.9% Bolus 250 milliLiter(s) IV Bolus once    MEDICATIONS  (PRN):  dextrose Oral Gel 15 Gram(s) Oral once PRN Blood Glucose LESS THAN 70 milliGRAM(s)/deciliter    ROS:   General: No fatigue  HEENT: No headache, no epistaxis.  CV: No chest pain, no palpitations.  Respiratory: No SOB, no cough, no wheeze  GI: No nausea    Objective:  Vital Signs Last 24 Hrs  T(C): 37.4 (2024 05:45), Max: 37.4 (2024 16:58)  T(F): 99.4 (2024 05:45), Max: 99.4 (2024 16:58)  HR: 57 (2024 05:45) (56 - 64)  BP: 143/71 (2024 05:45) (135/74 - 152/100)  RR: 18 (2024 05:45) (16 - 18)  SpO2: 96% (2024 05:45) (92% - 96%)    Parameters below as of 2024 05:45  Patient On (Oxygen Delivery Method): room air    General: Awake, alert, speech clear, in no acute distress.  Chest: CTA, S1, S2, + grade 3/6 systolic murmur.  Abdomen, Soft, nondistended  Right Groin: Soft, no bleeding, no hematoma.  Extremities: Radial: Right: 2+, Left: 2+, DP: Right: + by doppler, Left: 1+                          12.4   8.81  )-----------( 232      ( 2024 07:17 )             36.0         139  |  103  |  25.6  ----------------------------<  213  3.7   |  24.0  |  1.14    Ca    8.6      2024 07:17  Mg     1.4         TPro  7.1  /  Alb  4.1  /  TBili  0.3  /  DBili  x   /  AST  17  /  ALT  13  /  AlkPhos  98  01-24    Lipids       Total Cholesterol: 134       Triglycerides: 114       HDL: 41       Non-HDL: 93       LDL: 70    HgbA1C: 7.8%

## 2024-03-13 NOTE — PHYSICAL EXAM
Greater than 75%
[FreeTextEntry1] : GENERAL PHYSICAL EXAM:\par GEN: no distress, normal affect\par HEENT: NCAT\par CV/PULM/ABD: deferred due to telehealth encounter \par \par NEUROLOGICAL EXAM:\par Alert and oriented \par Clear speech\par Grossly looks in all directs\par No obvious facial asymmetry \par Hearing intact \par BL shoulder shrug intact \par Moves all EXTs spontaneously\par Ambulates independently\par

## 2024-04-04 ENCOUNTER — OFFICE (OUTPATIENT)
Dept: URBAN - METROPOLITAN AREA CLINIC 63 | Facility: CLINIC | Age: 84
Setting detail: OPHTHALMOLOGY
End: 2024-04-04
Payer: MEDICARE

## 2024-04-04 DIAGNOSIS — H43.813: ICD-10-CM

## 2024-04-04 DIAGNOSIS — E11.9: ICD-10-CM

## 2024-04-04 DIAGNOSIS — Z79.4: ICD-10-CM

## 2024-04-04 DIAGNOSIS — H35.371: ICD-10-CM

## 2024-04-04 PROCEDURE — 92012 INTRM OPH EXAM EST PATIENT: CPT | Performed by: SPECIALIST

## 2024-04-04 PROCEDURE — 92134 CPTRZ OPH DX IMG PST SGM RTA: CPT | Performed by: SPECIALIST

## 2024-04-15 ENCOUNTER — INPATIENT (INPATIENT)
Facility: HOSPITAL | Age: 84
LOS: 3 days | Discharge: HOME | DRG: 812 | End: 2024-04-19
Attending: STUDENT IN AN ORGANIZED HEALTH CARE EDUCATION/TRAINING PROGRAM | Admitting: INTERNAL MEDICINE
Payer: MEDICARE

## 2024-04-15 VITALS
SYSTOLIC BLOOD PRESSURE: 143 MMHG | DIASTOLIC BLOOD PRESSURE: 85 MMHG | HEART RATE: 58 BPM | WEIGHT: 164.91 LBS | TEMPERATURE: 98 F | RESPIRATION RATE: 16 BRPM | OXYGEN SATURATION: 98 %

## 2024-04-15 DIAGNOSIS — Z90.49 ACQUIRED ABSENCE OF OTHER SPECIFIED PARTS OF DIGESTIVE TRACT: Chronic | ICD-10-CM

## 2024-04-15 DIAGNOSIS — Z96.652 PRESENCE OF LEFT ARTIFICIAL KNEE JOINT: Chronic | ICD-10-CM

## 2024-04-15 DIAGNOSIS — S86.009A UNSPECIFIED INJURY OF UNSPECIFIED ACHILLES TENDON, INITIAL ENCOUNTER: Chronic | ICD-10-CM

## 2024-04-15 DIAGNOSIS — Z90.710 ACQUIRED ABSENCE OF BOTH CERVIX AND UTERUS: Chronic | ICD-10-CM

## 2024-04-15 LAB
ALBUMIN SERPL ELPH-MCNC: 3.7 G/DL — SIGNIFICANT CHANGE UP (ref 3.3–5.2)
ALP SERPL-CCNC: 92 U/L — SIGNIFICANT CHANGE UP (ref 40–120)
ALT FLD-CCNC: 9 U/L — SIGNIFICANT CHANGE UP
ANION GAP SERPL CALC-SCNC: 13 MMOL/L — SIGNIFICANT CHANGE UP (ref 5–17)
APTT BLD: 26 SEC — SIGNIFICANT CHANGE UP (ref 24.5–35.6)
AST SERPL-CCNC: 15 U/L — SIGNIFICANT CHANGE UP
BASOPHILS # BLD AUTO: 0.07 K/UL — SIGNIFICANT CHANGE UP (ref 0–0.2)
BASOPHILS NFR BLD AUTO: 1.2 % — SIGNIFICANT CHANGE UP (ref 0–2)
BILIRUB SERPL-MCNC: 0.4 MG/DL — SIGNIFICANT CHANGE UP (ref 0.4–2)
BLD GP AB SCN SERPL QL: SIGNIFICANT CHANGE UP
BUN SERPL-MCNC: 66.9 MG/DL — HIGH (ref 8–20)
CALCIUM SERPL-MCNC: 9 MG/DL — SIGNIFICANT CHANGE UP (ref 8.4–10.5)
CHLORIDE SERPL-SCNC: 106 MMOL/L — SIGNIFICANT CHANGE UP (ref 96–108)
CO2 SERPL-SCNC: 23 MMOL/L — SIGNIFICANT CHANGE UP (ref 22–29)
CREAT SERPL-MCNC: 1.98 MG/DL — HIGH (ref 0.5–1.3)
EGFR: 25 ML/MIN/1.73M2 — LOW
EOSINOPHIL # BLD AUTO: 0.36 K/UL — SIGNIFICANT CHANGE UP (ref 0–0.5)
EOSINOPHIL NFR BLD AUTO: 6.2 % — HIGH (ref 0–6)
GLUCOSE SERPL-MCNC: 154 MG/DL — HIGH (ref 70–99)
HCT VFR BLD CALC: 26 % — LOW (ref 34.5–45)
HGB BLD-MCNC: 8.2 G/DL — LOW (ref 11.5–15.5)
IMM GRANULOCYTES NFR BLD AUTO: 0.3 % — SIGNIFICANT CHANGE UP (ref 0–0.9)
INR BLD: 1.01 RATIO — SIGNIFICANT CHANGE UP (ref 0.85–1.18)
LIDOCAIN IGE QN: 34 U/L — SIGNIFICANT CHANGE UP (ref 22–51)
LYMPHOCYTES # BLD AUTO: 1.56 K/UL — SIGNIFICANT CHANGE UP (ref 1–3.3)
LYMPHOCYTES # BLD AUTO: 26.8 % — SIGNIFICANT CHANGE UP (ref 13–44)
MAGNESIUM SERPL-MCNC: 1.9 MG/DL — SIGNIFICANT CHANGE UP (ref 1.6–2.6)
MCHC RBC-ENTMCNC: 30.3 PG — SIGNIFICANT CHANGE UP (ref 27–34)
MCHC RBC-ENTMCNC: 31.5 GM/DL — LOW (ref 32–36)
MCV RBC AUTO: 95.9 FL — SIGNIFICANT CHANGE UP (ref 80–100)
MONOCYTES # BLD AUTO: 0.67 K/UL — SIGNIFICANT CHANGE UP (ref 0–0.9)
MONOCYTES NFR BLD AUTO: 11.5 % — SIGNIFICANT CHANGE UP (ref 2–14)
NEUTROPHILS # BLD AUTO: 3.14 K/UL — SIGNIFICANT CHANGE UP (ref 1.8–7.4)
NEUTROPHILS NFR BLD AUTO: 54 % — SIGNIFICANT CHANGE UP (ref 43–77)
NT-PROBNP SERPL-SCNC: 1964 PG/ML — HIGH (ref 0–300)
OB PNL STL: NEGATIVE — SIGNIFICANT CHANGE UP
PLATELET # BLD AUTO: 282 K/UL — SIGNIFICANT CHANGE UP (ref 150–400)
POTASSIUM SERPL-MCNC: 5.2 MMOL/L — SIGNIFICANT CHANGE UP (ref 3.5–5.3)
POTASSIUM SERPL-SCNC: 5.2 MMOL/L — SIGNIFICANT CHANGE UP (ref 3.5–5.3)
PROT SERPL-MCNC: 6.7 G/DL — SIGNIFICANT CHANGE UP (ref 6.6–8.7)
PROTHROM AB SERPL-ACNC: 11.2 SEC — SIGNIFICANT CHANGE UP (ref 9.5–13)
RBC # BLD: 2.71 M/UL — LOW (ref 3.8–5.2)
RBC # FLD: 15.1 % — HIGH (ref 10.3–14.5)
SODIUM SERPL-SCNC: 142 MMOL/L — SIGNIFICANT CHANGE UP (ref 135–145)
TROPONIN T, HIGH SENSITIVITY RESULT: 45 NG/L — SIGNIFICANT CHANGE UP (ref 0–51)
TROPONIN T, HIGH SENSITIVITY RESULT: 47 NG/L — SIGNIFICANT CHANGE UP (ref 0–51)
WBC # BLD: 5.82 K/UL — SIGNIFICANT CHANGE UP (ref 3.8–10.5)
WBC # FLD AUTO: 5.82 K/UL — SIGNIFICANT CHANGE UP (ref 3.8–10.5)

## 2024-04-15 PROCEDURE — 73502 X-RAY EXAM HIP UNI 2-3 VIEWS: CPT | Mod: 26,RT

## 2024-04-15 PROCEDURE — 99285 EMERGENCY DEPT VISIT HI MDM: CPT | Mod: GC

## 2024-04-15 PROCEDURE — 71045 X-RAY EXAM CHEST 1 VIEW: CPT | Mod: 26

## 2024-04-15 NOTE — ED PROVIDER NOTE - OBJECTIVE STATEMENT
84 y f with pmh of HTN, HLD, TIA, Basal ganglia hemorrhage in 2021, encephalomalacia s/p rt s/p right ventriculostomy s/p rt  shunt placement, achilles tendon repair, history of severe AS, AF, HTN, HLD, renal insufficiency and DM, Presenting for low hemoglobin on routine labs 4 days ago.  4 days ago.  Since then has been having generalized fatigue and bilateral leg weakness. It taking aspirin and plavix. Patient reports that she fell roughly 1 week ago onto her right hip  and had a large bruise over that side.  Denies any black or bloody stools.   had a stent placed 5 months ago and since then has been having daily chest pain the last 2 to 3 minutes.  Denying any radiation, diaphoresis, nausea, vomiting, exertional symptoms.

## 2024-04-15 NOTE — ED PROVIDER NOTE - ATTENDING CONTRIBUTION TO CARE
Razia: I performed a face to face bedside interview with patient regarding history of present illness, review of symptoms and past medical history. I completed an independent physical exam.  I have discussed patient's plan of care with resident.   I agree with note as stated above including HISTORY OF PRESENT ILLNESS, HIV, PAST MEDICAL/SURGICAL/FAMILY/SOCIAL HISTORY, ALLERGIES AND HOME MEDICATIONS, REVIEW OF SYSTEMS, PHYSICAL EXAM, MEDICAL DECISION MAKING and any PROGRESS NOTES during the time I functioned as the attending physician for this patient unless otherwise noted. My brief assessment is as follows: 84 y f with pmh of HTN, HLD, TIA, Basal ganglia hemorrhage in 2021, encephalomalacia s/p rt s/p right ventriculostomy s/p rt  shunt placement, achilles tendon repair, history of severe AS, AF, HTN, HLD, renal insufficiency and DM, Presenting for low hemoglobin on routine labs 4 days ago.

## 2024-04-15 NOTE — ED PROVIDER NOTE - CLINICAL SUMMARY MEDICAL DECISION MAKING FREE TEXT BOX
84 y f with pmh of HTN, HLD, TIA, Basal ganglia hemorrhage in 2021, encephalomalacia s/p rt s/p right ventriculostomy s/p rt  shunt placement, achilles tendon repair, history of severe AS, AF, HTN, HLD, renal insufficiency and DM, Presenting for low hemoglobin on routine labs 4 days ago.

## 2024-04-15 NOTE — ED ADULT NURSE NOTE - OBJECTIVE STATEMENT
pt is an 84y female AOX4, sent her for low hgb level per home visitng nurse.  pt states she is weak, denies pain, lightheadedness, dizziness, cp, sob.  pt resting comfortably in bed, no s/s acute distress noted.

## 2024-04-15 NOTE — ED PROVIDER NOTE - PHYSICAL EXAMINATION
General: well appearing, NAD  Head: NC, AT  EENT: EOMI, no scleral icterus  Cardiac: RRR, no apparent murmurs, no lower extremity edema  Respiratory: CTABL, no respiratory distress   Abdomen: soft, ND, NT, nonperitonitic  MSK/Vascular: full ROM, soft compartments, warm extremities  Skin: Contusion over right hip extending down to right lateral knee. Minimal tenderness to right hip.   Neuro: AAOx3, sensation to light touch intact  Psych: calm, cooperative

## 2024-04-16 DIAGNOSIS — D64.9 ANEMIA, UNSPECIFIED: ICD-10-CM

## 2024-04-16 LAB
ANION GAP SERPL CALC-SCNC: 10 MMOL/L — SIGNIFICANT CHANGE UP (ref 5–17)
BUN SERPL-MCNC: 60.4 MG/DL — HIGH (ref 8–20)
CALCIUM SERPL-MCNC: 9.5 MG/DL — SIGNIFICANT CHANGE UP (ref 8.4–10.5)
CHLORIDE SERPL-SCNC: 105 MMOL/L — SIGNIFICANT CHANGE UP (ref 96–108)
CO2 SERPL-SCNC: 27 MMOL/L — SIGNIFICANT CHANGE UP (ref 22–29)
CREAT SERPL-MCNC: 1.7 MG/DL — HIGH (ref 0.5–1.3)
EGFR: 30 ML/MIN/1.73M2 — LOW
GLUCOSE BLDC GLUCOMTR-MCNC: 145 MG/DL — HIGH (ref 70–99)
GLUCOSE BLDC GLUCOMTR-MCNC: 181 MG/DL — HIGH (ref 70–99)
GLUCOSE BLDC GLUCOMTR-MCNC: 195 MG/DL — HIGH (ref 70–99)
GLUCOSE BLDC GLUCOMTR-MCNC: 202 MG/DL — HIGH (ref 70–99)
GLUCOSE SERPL-MCNC: 152 MG/DL — HIGH (ref 70–99)
HCT VFR BLD CALC: 26.6 % — LOW (ref 34.5–45)
HCT VFR BLD CALC: 28.4 % — LOW (ref 34.5–45)
HGB BLD-MCNC: 8.5 G/DL — LOW (ref 11.5–15.5)
HGB BLD-MCNC: 8.8 G/DL — LOW (ref 11.5–15.5)
MCHC RBC-ENTMCNC: 29.7 PG — SIGNIFICANT CHANGE UP (ref 27–34)
MCHC RBC-ENTMCNC: 30.5 PG — SIGNIFICANT CHANGE UP (ref 27–34)
MCHC RBC-ENTMCNC: 31 GM/DL — LOW (ref 32–36)
MCHC RBC-ENTMCNC: 32 GM/DL — SIGNIFICANT CHANGE UP (ref 32–36)
MCV RBC AUTO: 95.3 FL — SIGNIFICANT CHANGE UP (ref 80–100)
MCV RBC AUTO: 95.9 FL — SIGNIFICANT CHANGE UP (ref 80–100)
PLATELET # BLD AUTO: 263 K/UL — SIGNIFICANT CHANGE UP (ref 150–400)
PLATELET # BLD AUTO: 287 K/UL — SIGNIFICANT CHANGE UP (ref 150–400)
POTASSIUM SERPL-MCNC: 4.4 MMOL/L — SIGNIFICANT CHANGE UP (ref 3.5–5.3)
POTASSIUM SERPL-SCNC: 4.4 MMOL/L — SIGNIFICANT CHANGE UP (ref 3.5–5.3)
RBC # BLD: 2.79 M/UL — LOW (ref 3.8–5.2)
RBC # BLD: 2.96 M/UL — LOW (ref 3.8–5.2)
RBC # FLD: 14.7 % — HIGH (ref 10.3–14.5)
RBC # FLD: 15 % — HIGH (ref 10.3–14.5)
SODIUM SERPL-SCNC: 142 MMOL/L — SIGNIFICANT CHANGE UP (ref 135–145)
WBC # BLD: 6.13 K/UL — SIGNIFICANT CHANGE UP (ref 3.8–10.5)
WBC # BLD: 6.71 K/UL — SIGNIFICANT CHANGE UP (ref 3.8–10.5)
WBC # FLD AUTO: 6.13 K/UL — SIGNIFICANT CHANGE UP (ref 3.8–10.5)
WBC # FLD AUTO: 6.71 K/UL — SIGNIFICANT CHANGE UP (ref 3.8–10.5)

## 2024-04-16 PROCEDURE — 99223 1ST HOSP IP/OBS HIGH 75: CPT

## 2024-04-16 PROCEDURE — 70450 CT HEAD/BRAIN W/O DYE: CPT | Mod: 26,MC

## 2024-04-16 PROCEDURE — 72125 CT NECK SPINE W/O DYE: CPT | Mod: 26,MC

## 2024-04-16 PROCEDURE — 74176 CT ABD & PELVIS W/O CONTRAST: CPT | Mod: 26,MC

## 2024-04-16 PROCEDURE — 71250 CT THORAX DX C-: CPT | Mod: 26,MC

## 2024-04-16 RX ORDER — GLUCAGON INJECTION, SOLUTION 0.5 MG/.1ML
1 INJECTION, SOLUTION SUBCUTANEOUS ONCE
Refills: 0 | Status: DISCONTINUED | OUTPATIENT
Start: 2024-04-16 | End: 2024-04-19

## 2024-04-16 RX ORDER — LAMOTRIGINE 25 MG/1
50 TABLET, ORALLY DISINTEGRATING ORAL DAILY
Refills: 0 | Status: DISCONTINUED | OUTPATIENT
Start: 2024-04-16 | End: 2024-04-19

## 2024-04-16 RX ORDER — ATORVASTATIN CALCIUM 80 MG/1
40 TABLET, FILM COATED ORAL AT BEDTIME
Refills: 0 | Status: DISCONTINUED | OUTPATIENT
Start: 2024-04-16 | End: 2024-04-19

## 2024-04-16 RX ORDER — ONDANSETRON 8 MG/1
4 TABLET, FILM COATED ORAL EVERY 8 HOURS
Refills: 0 | Status: DISCONTINUED | OUTPATIENT
Start: 2024-04-16 | End: 2024-04-19

## 2024-04-16 RX ORDER — INSULIN LISPRO 100/ML
VIAL (ML) SUBCUTANEOUS
Refills: 0 | Status: DISCONTINUED | OUTPATIENT
Start: 2024-04-16 | End: 2024-04-19

## 2024-04-16 RX ORDER — SODIUM CHLORIDE 9 MG/ML
1000 INJECTION, SOLUTION INTRAVENOUS
Refills: 0 | Status: DISCONTINUED | OUTPATIENT
Start: 2024-04-16 | End: 2024-04-19

## 2024-04-16 RX ORDER — AMLODIPINE BESYLATE 2.5 MG/1
1 TABLET ORAL
Refills: 0 | DISCHARGE

## 2024-04-16 RX ORDER — INSULIN LISPRO 100/ML
VIAL (ML) SUBCUTANEOUS AT BEDTIME
Refills: 0 | Status: DISCONTINUED | OUTPATIENT
Start: 2024-04-16 | End: 2024-04-19

## 2024-04-16 RX ORDER — ARIPIPRAZOLE 15 MG/1
5 TABLET ORAL AT BEDTIME
Refills: 0 | Status: DISCONTINUED | OUTPATIENT
Start: 2024-04-16 | End: 2024-04-19

## 2024-04-16 RX ORDER — ACETAMINOPHEN 500 MG
650 TABLET ORAL EVERY 6 HOURS
Refills: 0 | Status: DISCONTINUED | OUTPATIENT
Start: 2024-04-16 | End: 2024-04-19

## 2024-04-16 RX ORDER — SACUBITRIL AND VALSARTAN 24; 26 MG/1; MG/1
1 TABLET, FILM COATED ORAL
Refills: 0 | Status: DISCONTINUED | OUTPATIENT
Start: 2024-04-17 | End: 2024-04-19

## 2024-04-16 RX ORDER — DEXTROSE 10 % IN WATER 10 %
125 INTRAVENOUS SOLUTION INTRAVENOUS ONCE
Refills: 0 | Status: DISCONTINUED | OUTPATIENT
Start: 2024-04-16 | End: 2024-04-19

## 2024-04-16 RX ORDER — LANOLIN ALCOHOL/MO/W.PET/CERES
3 CREAM (GRAM) TOPICAL AT BEDTIME
Refills: 0 | Status: DISCONTINUED | OUTPATIENT
Start: 2024-04-16 | End: 2024-04-19

## 2024-04-16 RX ORDER — DEXTROSE 50 % IN WATER 50 %
15 SYRINGE (ML) INTRAVENOUS ONCE
Refills: 0 | Status: DISCONTINUED | OUTPATIENT
Start: 2024-04-16 | End: 2024-04-19

## 2024-04-16 RX ORDER — DEXTROSE 50 % IN WATER 50 %
12.5 SYRINGE (ML) INTRAVENOUS ONCE
Refills: 0 | Status: DISCONTINUED | OUTPATIENT
Start: 2024-04-16 | End: 2024-04-19

## 2024-04-16 RX ORDER — LEVOTHYROXINE SODIUM 125 MCG
125 TABLET ORAL DAILY
Refills: 0 | Status: DISCONTINUED | OUTPATIENT
Start: 2024-04-16 | End: 2024-04-19

## 2024-04-16 RX ORDER — METOPROLOL TARTRATE 50 MG
50 TABLET ORAL EVERY 12 HOURS
Refills: 0 | Status: DISCONTINUED | OUTPATIENT
Start: 2024-04-16 | End: 2024-04-18

## 2024-04-16 RX ORDER — AMLODIPINE BESYLATE 2.5 MG/1
5 TABLET ORAL DAILY
Refills: 0 | Status: DISCONTINUED | OUTPATIENT
Start: 2024-04-16 | End: 2024-04-19

## 2024-04-16 RX ORDER — CLOPIDOGREL BISULFATE 75 MG/1
75 TABLET, FILM COATED ORAL DAILY
Refills: 0 | Status: DISCONTINUED | OUTPATIENT
Start: 2024-04-16 | End: 2024-04-19

## 2024-04-16 RX ORDER — TAMSULOSIN HYDROCHLORIDE 0.4 MG/1
0.4 CAPSULE ORAL AT BEDTIME
Refills: 0 | Status: DISCONTINUED | OUTPATIENT
Start: 2024-04-16 | End: 2024-04-19

## 2024-04-16 RX ORDER — ASPIRIN/CALCIUM CARB/MAGNESIUM 324 MG
81 TABLET ORAL DAILY
Refills: 0 | Status: DISCONTINUED | OUTPATIENT
Start: 2024-04-16 | End: 2024-04-19

## 2024-04-16 RX ORDER — LEVOTHYROXINE SODIUM 125 MCG
1 TABLET ORAL
Refills: 0 | DISCHARGE

## 2024-04-16 RX ORDER — ARIPIPRAZOLE 15 MG/1
1 TABLET ORAL
Refills: 0 | DISCHARGE

## 2024-04-16 RX ORDER — DEXTROSE 50 % IN WATER 50 %
25 SYRINGE (ML) INTRAVENOUS ONCE
Refills: 0 | Status: DISCONTINUED | OUTPATIENT
Start: 2024-04-16 | End: 2024-04-19

## 2024-04-16 RX ADMIN — Medication 650 MILLIGRAM(S): at 11:25

## 2024-04-16 RX ADMIN — TAMSULOSIN HYDROCHLORIDE 0.4 MILLIGRAM(S): 0.4 CAPSULE ORAL at 22:36

## 2024-04-16 RX ADMIN — Medication 125 MICROGRAM(S): at 06:46

## 2024-04-16 RX ADMIN — LAMOTRIGINE 50 MILLIGRAM(S): 25 TABLET, ORALLY DISINTEGRATING ORAL at 11:15

## 2024-04-16 RX ADMIN — Medication 1: at 11:15

## 2024-04-16 RX ADMIN — Medication 81 MILLIGRAM(S): at 11:14

## 2024-04-16 RX ADMIN — CLOPIDOGREL BISULFATE 75 MILLIGRAM(S): 75 TABLET, FILM COATED ORAL at 11:15

## 2024-04-16 RX ADMIN — Medication 50 MILLIGRAM(S): at 06:47

## 2024-04-16 RX ADMIN — AMLODIPINE BESYLATE 5 MILLIGRAM(S): 2.5 TABLET ORAL at 06:46

## 2024-04-16 RX ADMIN — ATORVASTATIN CALCIUM 40 MILLIGRAM(S): 80 TABLET, FILM COATED ORAL at 22:36

## 2024-04-16 RX ADMIN — Medication 650 MILLIGRAM(S): at 12:58

## 2024-04-16 RX ADMIN — Medication 1: at 15:18

## 2024-04-16 RX ADMIN — ARIPIPRAZOLE 5 MILLIGRAM(S): 15 TABLET ORAL at 22:36

## 2024-04-16 NOTE — H&P ADULT - RESPIRATORY
clear to auscultation bilaterally/no wheezes/no rales/no respiratory distress/respirations non-labored

## 2024-04-16 NOTE — PHYSICAL THERAPY INITIAL EVALUATION ADULT - ADDITIONAL COMMENTS
pt reports living in private home with her son who is retired able to assist. Pt has 2 + 1 PAVEL with handrail and 12 stairs inside with handrail to bedroom. Independent prior to admission with use of rollator, also owns shower chair and cane. Reports she was suppose to start home OT soon.

## 2024-04-16 NOTE — H&P ADULT - HISTORY OF PRESENT ILLNESS
83 y/o female with PMH of HTN, HLD, TIA, Basal ganglia hemorrhage in 2021, encephalomalacia s/p rt s/p right ventriculostomy s/p rt  shunt placement, achilles tendon repair, history of severe AS, AF, DM-2, CKD-3 was sent to the ED for abnormal outpatient lab work. Patient had blood work done 04/11/24 got a call yesterday that Hb was 8.0 from 12. She reported falling on her right hip while at the diner on 04/04/24, did not seek medical intervention, but reported pain in her right hip. She reported feeling tired, dizzy, as well as bilateral weakness in her thigh. She reported shortness of breath and wheezing that has been going on for months, that persisted after cardiac cath. She has no chest pain, palpitation, orthopnea, nausea, vomiting, abdominal pain, hematuria, melena, hematochezia, HA.

## 2024-04-16 NOTE — H&P ADULT - NSHPPHYSICALEXAM_GEN_ALL_CORE
Vital Signs Last 24 Hrs  T(C): 36.3 (15 Apr 2024 23:41), Max: 36.6 (15 Apr 2024 18:16)  T(F): 97.3 (15 Apr 2024 23:41), Max: 97.9 (15 Apr 2024 18:16)  HR: 71 (16 Apr 2024 05:33) (58 - 71)  BP: 156/73 (16 Apr 2024 05:33) (143/85 - 182/53)  BP(mean): --  RR: 18 (16 Apr 2024 05:33) (15 - 20)  SpO2: 96% (16 Apr 2024 05:33) (96% - 98%)    Parameters below as of 15 Apr 2024 18:16  Patient On (Oxygen Delivery Method): room air

## 2024-04-16 NOTE — PHYSICAL THERAPY INITIAL EVALUATION ADULT - PATIENT/FAMILY/SIGNIFICANT OTHER GOALS STATEMENT, PT EVAL
Quality 130: Documentation Of Current Medications In The Medical Record: Current Medications Documented Detail Level: Detailed Quality 431: Preventive Care And Screening: Unhealthy Alcohol Use - Screening: Patient not identified as an unhealthy alcohol user when screened for unhealthy alcohol use using a systematic screening method Quality 402: Tobacco Use And Help With Quitting Among Adolescents: Patient screened for tobacco and never smoked To go home

## 2024-04-16 NOTE — PHYSICAL THERAPY INITIAL EVALUATION ADULT - PERTINENT HX OF CURRENT PROBLEM, REHAB EVAL
85 y/o female with PMH of HTN, HLD, TIA, Basal ganglia hemorrhage in 2021, encephalomalacia s/p rt s/p right ventriculostomy s/p rt  shunt placement, achilles tendon repair, history of severe AS, AF, DM-2, CKD-3 was sent to the ED for abnormal outpatient lab work. Patient had blood work done 04/11/24 got a call yesterday that Hb was 8.0 from 12. She reported falling on her right hip while at the diner on 04/04/24, did not seek medical intervention, but reported pain in her right hip.

## 2024-04-16 NOTE — H&P ADULT - ASSESSMENT
83 y/o female with PMH of HTN, HLD, TIA, Basal ganglia hemorrhage in 2021, encephalomalacia s/p rt s/p right ventriculostomy s/p rt  shunt placement, achilles tendon repair, history of severe AS, AF, DM-2, CKD-3 was sent to the ED for abnormal outpatient lab work. Patient had blood work done 04/11/24 got a call yesterday that Hb was 8.0 from 12. She reported falling on her right hip while at the diner on 04/04/24, did not seek medical intervention, but reported pain in her right hip.    Medication reconciliation done as per the list patient brought, Dr. Diego and prior discharge note.       Symptomatic anemia likely due to right hip hematoma s/p fall initial encounter   Admit to telemetry   CT abdomen: Right lateral hip subcutaneous hematoma measuring 7.3 x 5 cm (185:3) with adjacent subcutaneous edema.  Hb: 8.2----> 8.5  Monitor CBC, transfuse to keep Hb > 8   Fall precaution   PT evaluation     PASCALE on CKD-3   Cr. 1.98 (baseline 1.24-1.4)   As per Dr. first and discharge note, she is on Lasix, but patient said she only took it for 6 days   Likely due to anemia?   Patient on Entresto 24-26mg bid will schedule for tomorrow, hold today's dose   Monitor renal function     CAD/HTN/HLD   Patient recently had stent placed in December   Will continue Plavix 75mg   Aspirin 81mg   Monitor CBC   Metroprolol ER 50mg bid with holding parameters   Amlodipine 5mg   Atorvastatin 40mg     Hypothyroidism   Synthroid 125mcg     DM-2   Hold Metformin 500mg tid   Insulin sliding scale     Hx of basal ganglia hemorrhage   Lamotirigine 50mg (patient has her medication list that did not contain this medication but she said she is still taking it under her tongue)     Supportive   DVT prophylaxis: CSD   Diet: soft bite side     Plan of care discussed with patient and ER nurse.

## 2024-04-16 NOTE — PHYSICAL THERAPY INITIAL EVALUATION ADULT - GENERAL OBSERVATIONS, REHAB EVAL
Pt received in stretcher, + IV Loc, +Tele, + purewick, breathing on RA in NAD, in 3/10 right lateral hip pain, agreeable to PT evaluation

## 2024-04-16 NOTE — CHART NOTE - NSCHARTNOTEFT_GEN_A_CORE
Patient was seen and examined at bedside. Nocturnist H and P reviewed. Will trend H/H. Will have PT assess patient. Will continue to monitor patient.

## 2024-04-16 NOTE — PATIENT PROFILE ADULT - FALL HARM RISK - RISK INTERVENTIONS

## 2024-04-16 NOTE — H&P ADULT - TIME BILLING
chart, labs and imaging reviewed. Physical examination, medication reconciliation and documentation. Discussion with patient and ER nurse.

## 2024-04-17 ENCOUNTER — RESULT REVIEW (OUTPATIENT)
Age: 84
End: 2024-04-17

## 2024-04-17 LAB
ANION GAP SERPL CALC-SCNC: 12 MMOL/L — SIGNIFICANT CHANGE UP (ref 5–17)
BASOPHILS # BLD AUTO: 0.05 K/UL — SIGNIFICANT CHANGE UP (ref 0–0.2)
BASOPHILS NFR BLD AUTO: 0.9 % — SIGNIFICANT CHANGE UP (ref 0–2)
BUN SERPL-MCNC: 52.9 MG/DL — HIGH (ref 8–20)
CALCIUM SERPL-MCNC: 9.4 MG/DL — SIGNIFICANT CHANGE UP (ref 8.4–10.5)
CHLORIDE SERPL-SCNC: 107 MMOL/L — SIGNIFICANT CHANGE UP (ref 96–108)
CO2 SERPL-SCNC: 25 MMOL/L — SIGNIFICANT CHANGE UP (ref 22–29)
CREAT SERPL-MCNC: 1.78 MG/DL — HIGH (ref 0.5–1.3)
EGFR: 28 ML/MIN/1.73M2 — LOW
EOSINOPHIL # BLD AUTO: 0.37 K/UL — SIGNIFICANT CHANGE UP (ref 0–0.5)
EOSINOPHIL NFR BLD AUTO: 6.6 % — HIGH (ref 0–6)
GLUCOSE BLDC GLUCOMTR-MCNC: 145 MG/DL — HIGH (ref 70–99)
GLUCOSE BLDC GLUCOMTR-MCNC: 160 MG/DL — HIGH (ref 70–99)
GLUCOSE BLDC GLUCOMTR-MCNC: 180 MG/DL — HIGH (ref 70–99)
GLUCOSE BLDC GLUCOMTR-MCNC: 234 MG/DL — HIGH (ref 70–99)
GLUCOSE SERPL-MCNC: 135 MG/DL — HIGH (ref 70–99)
HCT VFR BLD CALC: 26.7 % — LOW (ref 34.5–45)
HGB BLD-MCNC: 8.5 G/DL — LOW (ref 11.5–15.5)
IMM GRANULOCYTES NFR BLD AUTO: 0.2 % — SIGNIFICANT CHANGE UP (ref 0–0.9)
LYMPHOCYTES # BLD AUTO: 1.37 K/UL — SIGNIFICANT CHANGE UP (ref 1–3.3)
LYMPHOCYTES # BLD AUTO: 24.4 % — SIGNIFICANT CHANGE UP (ref 13–44)
MCHC RBC-ENTMCNC: 30 PG — SIGNIFICANT CHANGE UP (ref 27–34)
MCHC RBC-ENTMCNC: 31.8 GM/DL — LOW (ref 32–36)
MCV RBC AUTO: 94.3 FL — SIGNIFICANT CHANGE UP (ref 80–100)
MONOCYTES # BLD AUTO: 0.73 K/UL — SIGNIFICANT CHANGE UP (ref 0–0.9)
MONOCYTES NFR BLD AUTO: 13 % — SIGNIFICANT CHANGE UP (ref 2–14)
NEUTROPHILS # BLD AUTO: 3.08 K/UL — SIGNIFICANT CHANGE UP (ref 1.8–7.4)
NEUTROPHILS NFR BLD AUTO: 54.9 % — SIGNIFICANT CHANGE UP (ref 43–77)
PLATELET # BLD AUTO: 280 K/UL — SIGNIFICANT CHANGE UP (ref 150–400)
POTASSIUM SERPL-MCNC: 4.9 MMOL/L — SIGNIFICANT CHANGE UP (ref 3.5–5.3)
POTASSIUM SERPL-SCNC: 4.9 MMOL/L — SIGNIFICANT CHANGE UP (ref 3.5–5.3)
RBC # BLD: 2.83 M/UL — LOW (ref 3.8–5.2)
RBC # FLD: 15.1 % — HIGH (ref 10.3–14.5)
SODIUM SERPL-SCNC: 144 MMOL/L — SIGNIFICANT CHANGE UP (ref 135–145)
WBC # BLD: 5.61 K/UL — SIGNIFICANT CHANGE UP (ref 3.8–10.5)
WBC # FLD AUTO: 5.61 K/UL — SIGNIFICANT CHANGE UP (ref 3.8–10.5)

## 2024-04-17 PROCEDURE — 99233 SBSQ HOSP IP/OBS HIGH 50: CPT

## 2024-04-17 PROCEDURE — 93306 TTE W/DOPPLER COMPLETE: CPT | Mod: 26

## 2024-04-17 RX ORDER — SODIUM CHLORIDE 9 MG/ML
500 INJECTION, SOLUTION INTRAVENOUS
Refills: 0 | Status: DISCONTINUED | OUTPATIENT
Start: 2024-04-17 | End: 2024-04-19

## 2024-04-17 RX ADMIN — Medication 81 MILLIGRAM(S): at 11:03

## 2024-04-17 RX ADMIN — ARIPIPRAZOLE 5 MILLIGRAM(S): 15 TABLET ORAL at 21:33

## 2024-04-17 RX ADMIN — SACUBITRIL AND VALSARTAN 1 TABLET(S): 24; 26 TABLET, FILM COATED ORAL at 17:32

## 2024-04-17 RX ADMIN — ATORVASTATIN CALCIUM 40 MILLIGRAM(S): 80 TABLET, FILM COATED ORAL at 21:33

## 2024-04-17 RX ADMIN — LAMOTRIGINE 50 MILLIGRAM(S): 25 TABLET, ORALLY DISINTEGRATING ORAL at 11:03

## 2024-04-17 RX ADMIN — Medication 1: at 13:29

## 2024-04-17 RX ADMIN — CLOPIDOGREL BISULFATE 75 MILLIGRAM(S): 75 TABLET, FILM COATED ORAL at 11:03

## 2024-04-17 RX ADMIN — Medication 125 MICROGRAM(S): at 05:25

## 2024-04-17 RX ADMIN — SACUBITRIL AND VALSARTAN 1 TABLET(S): 24; 26 TABLET, FILM COATED ORAL at 05:25

## 2024-04-17 RX ADMIN — Medication 50 MILLIGRAM(S): at 05:25

## 2024-04-17 RX ADMIN — TAMSULOSIN HYDROCHLORIDE 0.4 MILLIGRAM(S): 0.4 CAPSULE ORAL at 21:33

## 2024-04-17 RX ADMIN — Medication 50 MILLIGRAM(S): at 17:32

## 2024-04-17 RX ADMIN — AMLODIPINE BESYLATE 5 MILLIGRAM(S): 2.5 TABLET ORAL at 05:24

## 2024-04-17 RX ADMIN — Medication 1: at 09:14

## 2024-04-17 RX ADMIN — SODIUM CHLORIDE 75 MILLILITER(S): 9 INJECTION, SOLUTION INTRAVENOUS at 14:20

## 2024-04-18 ENCOUNTER — TRANSCRIPTION ENCOUNTER (OUTPATIENT)
Age: 84
End: 2024-04-18

## 2024-04-18 LAB
ANION GAP SERPL CALC-SCNC: 10 MMOL/L — SIGNIFICANT CHANGE UP (ref 5–17)
BUN SERPL-MCNC: 37.3 MG/DL — HIGH (ref 8–20)
CALCIUM SERPL-MCNC: 9.1 MG/DL — SIGNIFICANT CHANGE UP (ref 8.4–10.5)
CHLORIDE SERPL-SCNC: 107 MMOL/L — SIGNIFICANT CHANGE UP (ref 96–108)
CO2 SERPL-SCNC: 24 MMOL/L — SIGNIFICANT CHANGE UP (ref 22–29)
CREAT SERPL-MCNC: 1.44 MG/DL — HIGH (ref 0.5–1.3)
EGFR: 36 ML/MIN/1.73M2 — LOW
FERRITIN SERPL-MCNC: 60 NG/ML — SIGNIFICANT CHANGE UP (ref 13–330)
GLUCOSE BLDC GLUCOMTR-MCNC: 120 MG/DL — HIGH (ref 70–99)
GLUCOSE BLDC GLUCOMTR-MCNC: 145 MG/DL — HIGH (ref 70–99)
GLUCOSE BLDC GLUCOMTR-MCNC: 165 MG/DL — HIGH (ref 70–99)
GLUCOSE BLDC GLUCOMTR-MCNC: 206 MG/DL — HIGH (ref 70–99)
GLUCOSE SERPL-MCNC: 127 MG/DL — HIGH (ref 70–99)
HCT VFR BLD CALC: 27.3 % — LOW (ref 34.5–45)
HGB BLD-MCNC: 8.6 G/DL — LOW (ref 11.5–15.5)
IRON SATN MFR SERPL: 18 % — SIGNIFICANT CHANGE UP (ref 14–50)
IRON SATN MFR SERPL: 69 UG/DL — SIGNIFICANT CHANGE UP (ref 37–145)
MAGNESIUM SERPL-MCNC: 1.7 MG/DL — LOW (ref 1.8–2.6)
MCHC RBC-ENTMCNC: 30 PG — SIGNIFICANT CHANGE UP (ref 27–34)
MCHC RBC-ENTMCNC: 31.5 GM/DL — LOW (ref 32–36)
MCV RBC AUTO: 95.1 FL — SIGNIFICANT CHANGE UP (ref 80–100)
PHOSPHATE SERPL-MCNC: 4 MG/DL — SIGNIFICANT CHANGE UP (ref 2.4–4.7)
PLATELET # BLD AUTO: 262 K/UL — SIGNIFICANT CHANGE UP (ref 150–400)
POTASSIUM SERPL-MCNC: 4.4 MMOL/L — SIGNIFICANT CHANGE UP (ref 3.5–5.3)
POTASSIUM SERPL-SCNC: 4.4 MMOL/L — SIGNIFICANT CHANGE UP (ref 3.5–5.3)
RBC # BLD: 2.87 M/UL — LOW (ref 3.8–5.2)
RBC # FLD: 14.8 % — HIGH (ref 10.3–14.5)
SODIUM SERPL-SCNC: 141 MMOL/L — SIGNIFICANT CHANGE UP (ref 135–145)
TIBC SERPL-MCNC: 390 UG/DL — SIGNIFICANT CHANGE UP (ref 220–430)
TRANSFERRIN SERPL-MCNC: 273 MG/DL — SIGNIFICANT CHANGE UP (ref 192–382)
WBC # BLD: 6.24 K/UL — SIGNIFICANT CHANGE UP (ref 3.8–10.5)
WBC # FLD AUTO: 6.24 K/UL — SIGNIFICANT CHANGE UP (ref 3.8–10.5)

## 2024-04-18 PROCEDURE — 93010 ELECTROCARDIOGRAM REPORT: CPT

## 2024-04-18 PROCEDURE — 99233 SBSQ HOSP IP/OBS HIGH 50: CPT

## 2024-04-18 RX ORDER — MAGNESIUM SULFATE 500 MG/ML
1 VIAL (ML) INJECTION ONCE
Refills: 0 | Status: COMPLETED | OUTPATIENT
Start: 2024-04-18 | End: 2024-04-18

## 2024-04-18 RX ORDER — METOPROLOL TARTRATE 50 MG
25 TABLET ORAL
Refills: 0 | Status: DISCONTINUED | OUTPATIENT
Start: 2024-04-19 | End: 2024-04-19

## 2024-04-18 RX ADMIN — Medication 100 GRAM(S): at 09:15

## 2024-04-18 RX ADMIN — TAMSULOSIN HYDROCHLORIDE 0.4 MILLIGRAM(S): 0.4 CAPSULE ORAL at 21:23

## 2024-04-18 RX ADMIN — SACUBITRIL AND VALSARTAN 1 TABLET(S): 24; 26 TABLET, FILM COATED ORAL at 05:23

## 2024-04-18 RX ADMIN — ATORVASTATIN CALCIUM 40 MILLIGRAM(S): 80 TABLET, FILM COATED ORAL at 21:23

## 2024-04-18 RX ADMIN — SACUBITRIL AND VALSARTAN 1 TABLET(S): 24; 26 TABLET, FILM COATED ORAL at 17:47

## 2024-04-18 RX ADMIN — CLOPIDOGREL BISULFATE 75 MILLIGRAM(S): 75 TABLET, FILM COATED ORAL at 11:08

## 2024-04-18 RX ADMIN — Medication 81 MILLIGRAM(S): at 11:08

## 2024-04-18 RX ADMIN — LAMOTRIGINE 50 MILLIGRAM(S): 25 TABLET, ORALLY DISINTEGRATING ORAL at 11:08

## 2024-04-18 RX ADMIN — AMLODIPINE BESYLATE 5 MILLIGRAM(S): 2.5 TABLET ORAL at 05:23

## 2024-04-18 RX ADMIN — Medication 50 MILLIGRAM(S): at 05:23

## 2024-04-18 RX ADMIN — ARIPIPRAZOLE 5 MILLIGRAM(S): 15 TABLET ORAL at 21:22

## 2024-04-18 RX ADMIN — Medication 125 MICROGRAM(S): at 05:23

## 2024-04-18 NOTE — DISCHARGE NOTE PROVIDER - CARE PROVIDER_API CALL
Beka Moses  Interventional Cardiology  [address] [place] [address], [address]  [place] [address]  Phone: [phone]  Fax: [phone]  Follow Up Time: 1 week

## 2024-04-18 NOTE — DISCHARGE NOTE PROVIDER - ATTENDING DISCHARGE PHYSICAL EXAMINATION:
CONSTITUTIONAL: NAD, appears stated age  ENMT: Moist oral mucosa, no pharyngeal injection or exudates; normal dentition  RESPIRATORY: Normal respiratory effort; clear to auscultation bilaterally  CARDIOVASCULAR: Regular rate and rhythm, normal S1 and S2, no murmur/rub/gallop; Peripheral pulses are 2+ bilaterally  ABDOMEN: Nontender to palpation, normoactive bowel sounds, no rebound/guarding;   MUSCLOSKELETAL:  No clubbing or cyanosis of digits; no joint swelling or tenderness to palpation  PSYCH: A+O to person, place, and time; affect appropriate  NEUROLOGY: CN 2-12 are intact and symmetric; no gross sensory deficits;   SKIN: No rashes; no palpable lesions

## 2024-04-18 NOTE — CONSULT NOTE ADULT - SUBJECTIVE AND OBJECTIVE BOX
Patient is a 84y old Female who presents with a chief complaint of Anemia (2024 13:25)      HPI:  83 y/o female with PMH of HTN, HLD, TIA, Basal ganglia hemorrhage in , encephalomalacia, s/p right ventriculostomy s/p rt  shunt placement, achilles tendon repair, history of Moderate AS, Paroxysmal A-Fib (Not on anticoagulation therapy due to previous brain hemorrhage), DM-2, CKD-3, and CAD with PCI, Recent Right and Left heart Cath 2024 showed Normal LVEF 70%, moderate AS (Aortic valve area 1.34) Mild Left Main disease, Mild to moderate diffuse and calcified stenosis of the proximal and mid LAD with a 60% stenosis in the mid-distal LAD, Significant and severe stenosis of the proximal and mid LCX treated with rotational atherectomy, intravascular Lithotripsy and [place] stent, and Mild disease of the RCA. Pt  was sent to the ED for abnormal outpatient lab work. Patient had blood work done 24 & got notified that her Hgb was 8.0 down from 12.0.  She reports falling on her right hip while at the diner on 24, and states she did not seek medical intervention. She states she has been having pain in her right hip since the fall        PAST MEDICAL & SURGICAL HISTORY:  Hypertension  Hyperlipidemia  Diabetes  Glaucoma  Osteoarthritis  Small bowel obstruction  DM (diabetes mellitus)  HTN (hypertension)  CAD with PCI  Bipolar disorder  Depression  Intracranial bleed  NPH (normal pressure hydrocephalus)  Pneumonia, aspiration  UTI (urinary tract infection)  Hyperlipidemia  Glaucoma  CKD (chronic kidney disease)  DVT, lower extremity  History of TIAs  S/P hysterectomy  and Oopherectomy in   S/P cholecystectomy   Achilles tendon injury repair  right scalene muscle release  S/P knee replacement, left  S/P cholecystectomy  S/P total knee replacement, left          PREVIOUS DIAGNOSTIC TESTING:      ECHO  FINDINGS: < from: TTE W or WO Ultrasound Enhancing Agent (24 @ 11:26) >     CONCLUSIONS:      1. Left ventricular systolic function is normal with an ejection fraction of 58 % by Desouza's method of disks with an ejection fraction visually estimated at 55 to 60 %.   2. Mild left ventricular hypertrophy.   3. There is mild (grade 1) left ventricular diastolic dysfunction, with elevated filling pressure.   4. Normal right ventricular cavity size and normal systolic function.   5. The left atrium is mildly dilated.   6. Aortic valve anatomy cannot be determined with reduced systolic excursion. Mild to moderate aortic stenosis.   7. Mild aortic regurgitation.   8. Mild to moderate mitral regurgitation.   9. Mild tricuspid regurgitation.  10. Interatrial septum is stretched and displaced to the right, consistent with left atrial volume overload.  11. Estimated pulmonary artery systolic pressure is 39 mmHg, consistent with mild pulmonary hypertension.  12. No pericardial effusion seen.  13. No prior echocardiogram is available for comparison.      < end of copied text >      CARDIAC CATHETERIZATION  FINDINGS:   Cath Lab Report    Diagnostic Cardiologist:  Beka Moses MD   Interventional Cardiologist: Beka Moses MD   Fellow:  Larisa Wagner     Procedures Performed:               1.    Arterial Access - Right Femoral   2.    Venous Access - Right Femoral   3.    RHC   4.    Left Heart Cath   5.    Diagnostic Coronary Angiography   6.    PCI: Rotational Atherectomy   7.    PCI: Intravascular Lithotripsy   8.    PCI: [place]   9.    IVUS   10.   AngioSeal   11.   Mynx     Conclusions:   Normal LVEF 70%     Moderate Aortic stenosis   Mild Left Main   Mild to moderate diffuse and calcified stenosis of the proximla and  mid LAD with a 60% stenosis in the mid-distal LAD  Significant and severe stenosis of the proximal and mid LCX treated  with rotational atherectomy, intravascular Lithotripsy and  [place] stent   Mild disease of the RCA   Recommendations:   Aspirin and Plavix     Acute complication:    No complications         Allergies  pineapple (Anaphylaxis)  adhesives (Pruritus; Blisters)  penicillin (Hives)            MEDICATIONS  (HOME):  · 	magnesium oxide 400 mg oral tablet: 1 tab(s) orally 2 times a day  · 	clopidogrel 75 mg oral tablet: 1 tab(s) orally once a day  · 	aspirin 81 mg oral tablet, chewable: 1 tab(s) orally once a day  · 	metoprolol succinate 50 mg oral tablet, extended release: 2 tab(s) orally once a day  · 	atorvastatin 40 mg oral tablet: 1 tab(s) orally once a day (at bedtime)  · 	metFORMIN 500 mg oral tablet: 1 tab(s) orally 3 times a day Hold For 2 days And restart 24 morning  · 	tamsulosin 0.4 mg oral capsule: Last Dose Taken:  , 1 cap(s) orally once a day  · 	Entresto 24 mg-26 mg oral tablet: 1 tab(s) orally 2 times a day  · 	levothyroxine 125 mcg (0.125 mg) oral tablet: Last Dose Taken:  , 1 tab(s) orally once a day  · 	furosemide 40 mg oral tablet: 1 tab(s) orally once a day  · 	ARIPiprazole 5 mg oral tablet: Last Dose Taken:  , 1 tab(s) orally once a day (at bedtime)  · 	lamoTRIgine 50 mg oral tablet, disintegratin tab(s) orally once a day  · 	amLODIPine 5 mg oral tablet: Last Dose Taken:  , 1 tab(s) orally once a day      FAMILY HISTORY:  Family history of pancreatic cancer (Sibling)  Family history of early CAD  Family history of diabetes mellitus  Family history of early CAD  FH: type 2 diabetes mellitus          CIGARETTES: NEVER SMOKER    ALCOHOL: DENIES    REVIEW OF SYSTEMS:  CONSTITUTIONAL: No fever, or weight loss, + fatigue  EYES: No eye pain, visual disturbances, or discharge  ENMT:  No difficulty hearing, tinnitus, vertigo; No sinus or throat pain  NECK: No pain or stiffness  RESPIRATORY: No cough, wheezing, chills or hemoptysis; + Shortness of Breath  CARDIOVASCULAR: No chest pain, palpitations, passing out, dizziness, or leg swelling  GASTROINTESTINAL: No abdominal or epigastric pain. No nausea, vomiting, or hematemesis; No diarrhea or constipation. No melena or hematochezia.  GENITOURINARY: No dysuria, frequency, hematuria, or incontinence  NEUROLOGICAL: No headaches, memory loss, loss of strength, numbness, or tremors  SKIN: No itching, burning, rashes, or lesions   ENDOCRINE: No heat or cold intolerance; No hair loss  MUSCULOSKELETAL: No joint pain or swelling; No muscle, back, or extremity pain  PSYCHIATRIC: No depression, anxiety, mood swings, or difficulty sleeping  HEME/LYMPH: No easy bruising, or bleeding gums  ALLERY AND IMMUNOLOGIC: No hives or eczema	    Vital Signs Last 24 Hrs  T(C): 36.8 (2024 16:48), Max: 36.9 (2024 10:37)  T(F): 98.3 (2024 16:48), Max: 98.5 (2024 10:37)  HR: 59 (2024 16:48) (56 - 76)  BP: 160/69 (2024 16:48) (144/79 - 160/69)  BP(mean): --  RR: 18 (2024 16:48) (18 - 18)  SpO2: 100% (2024 16:48) (94% - 100%)    Parameters below as of 2024 16:48  Patient On (Oxygen Delivery Method): room air        Daily     Daily Weight in k.4 (2024 01:08)    I&O's Detail      PHYSICAL EXAM:  Appearance: Normal appearance	  HEENT:   Normal oral mucosa, PERRL, EOMI, sclera non-icteric	  Cardiovascular: Normal S1 S2, No JVD, 2/6 Systolic murmurs, No carotid bruits, No peripheral edema  Respiratory: Lungs clear to auscultation	  Psychiatry: A & O x 3, Mood & affect appropriate  Gastrointestinal:  Soft, Non-tender, + BS, no bruits	  Skin: No rashes, No ecchymoses, No cyanosis  Neurologic: Grossly non-focal with full strength in all four extremities  Extremities: Normal range of motion, No clubbing, cyanosis or edema  Vascular: Peripheral pulses bilaterally      INTERPRETATION OF TELEMETRY:  Sinus Jayce 40-60's    ECG: Sinus Bradycardia, HR 51, Non specific ST-T wave abnormalities          LABS:                        8.6    6.24  )-----------( 262      ( 2024 04:50 )             27.3     04-18    141  |  107  |  37.3<H>  ----------------------------<  127<H>  4.4   |  24.0  |  1.44<H>    Ca    9.1      2024 04:50  Phos  4.0     04-18  Mg     1.7     04-18      Urinalysis Basic - ( 2024 04:50 )    Color: x / Appearance: x / SG: x / pH: x  Gluc: 127 mg/dL / Ketone: x  / Bili: x / Urobili: x   Blood: x / Protein: x / Nitrite: x   Leuk Esterase: x / RBC: x / WBC x   Sq Epi: x / Non Sq Epi: x / Bacteria: x          Assessment:  83 y/o female with PMH of HTN, HLD, TIA, Basal ganglia hemorrhage in , encephalomalacia, s/p right ventriculostomy s/p rt  shunt placement, achilles tendon repair, history of Moderate AS, Paroxysmal A-Fib (Not on anticoagulation therapy due to previous brain hemorrhage), DM-2, CKD-3, and CAD with PCI, Recent Right and Left heart Cath 2024 showed Normal LVEF 70%, moderate AS (Aortic valve area 1.34) Mild Left Main disease, Mild to moderate diffuse and calcified stenosis of the proximal and mid LAD with a 60% stenosis in the mid-distal LAD, Significant and severe stenosis of the proximal and mid LCX treated with rotational atherectomy, intravascular Lithotripsy and [place] stent, and Mild disease of the RCA. Pt  was sent to the ED for abnormal outpatient lab work. Patient had blood work done 24 & got notified that her Hgb was 8.0 down from 12.0.  She reports falling on her right hip while at the diner on 24, and states she did not seek medical intervention. She states she has been having pain in her right hip since the fall.    Asked to evaluate Pt for questionable intermittent episodes of Tachycardia.    - Telemetry review shows Sinus Bradycardia, APC's intermittent episodes of Paroxysmal A-Fib, large amounts of artifact, No Tachycardia noted.  - Pt denies chest pain and palpitations, complains of SOB on exertion, O2 Sat's stable   - BP stable, HR 40-60's will decrease Metoprolol dose to 25 mg BID  - Pt not a candidate for A-Fib anticoagulation therapy due to history of intracranial bleed and mechanical falls  - H/H remains stable 8.6/27.3, Creatinine improving 1.44   - Continue ASA and Plavix for recent [place]  - Echo shows Normal LVEF 55-60%, Mild LVH, Normal RV function, Mild-moderate AS, Mild AI, Mild-moderate MR, Mild TR, Mild pulmonary hypertension.    Plan:  Anemia/ Sinus Bradycardia/ Paroxysmal A-Fib  Echo shows Normal LVEF 55-60%, Mild LVH, Normal RV function, Mild-moderate AS, Mild AI, Mild-moderate MR, Mild TR, Mild pulmonary hypertension.  Pt not a candidate for A-Fib anticoagulation therapy due to history of intracranial bleed and mechanical falls  H/H remains stable  Continue ASA and Plavix daily  Decrease Toprol XL to 25 mg PO BID     Patient is a 84y old Female who presents with a chief complaint of Anemia (2024 13:25)      HPI:  83 y/o female with PMH of HTN, HLD, TIA, Basal ganglia hemorrhage in , encephalomalacia, s/p right ventriculostomy s/p rt  shunt placement, achilles tendon repair, history of Moderate AS, Paroxysmal A-Fib (Not on anticoagulation therapy due to previous brain hemorrhage), DM-2, CKD-3, and CAD with PCI, Recent Right and Left heart Cath 2024 showed Normal LVEF 70%, moderate AS (Aortic valve area 1.34) Mild Left Main disease, Mild to moderate diffuse and calcified stenosis of the proximal and mid LAD with a 60% stenosis in the mid-distal LAD, Significant and severe stenosis of the proximal and mid LCX treated with rotational atherectomy, intravascular Lithotripsy and [place] stent, and Mild disease of the RCA. Pt  was sent to the ED for abnormal outpatient lab work. Patient had blood work done 24 & got notified that her Hgb was 8.0 down from 12.0.  She reports falling on her right hip while at the diner on 24, and states she did not seek medical intervention. She states she has been having pain in her right hip since the fall        PAST MEDICAL & SURGICAL HISTORY:  Hypertension  Hyperlipidemia  Diabetes  Glaucoma  Osteoarthritis  Small bowel obstruction  DM (diabetes mellitus)  HTN (hypertension)  CAD with PCI  Bipolar disorder  Depression  Intracranial bleed  NPH (normal pressure hydrocephalus)  Pneumonia, aspiration  UTI (urinary tract infection)  Hyperlipidemia  Glaucoma  CKD (chronic kidney disease)  DVT, lower extremity  History of TIAs  S/P hysterectomy  and Oopherectomy in   S/P cholecystectomy   Achilles tendon injury repair  right scalene muscle release  S/P knee replacement, left  S/P cholecystectomy  S/P total knee replacement, left          PREVIOUS DIAGNOSTIC TESTING:      ECHO  FINDINGS: < from: TTE W or WO Ultrasound Enhancing Agent (24 @ 11:26) >     CONCLUSIONS:      1. Left ventricular systolic function is normal with an ejection fraction of 58 % by Desouza's method of disks with an ejection fraction visually estimated at 55 to 60 %.   2. Mild left ventricular hypertrophy.   3. There is mild (grade 1) left ventricular diastolic dysfunction, with elevated filling pressure.   4. Normal right ventricular cavity size and normal systolic function.   5. The left atrium is mildly dilated.   6. Aortic valve anatomy cannot be determined with reduced systolic excursion. Mild to moderate aortic stenosis.   7. Mild aortic regurgitation.   8. Mild to moderate mitral regurgitation.   9. Mild tricuspid regurgitation.  10. Interatrial septum is stretched and displaced to the right, consistent with left atrial volume overload.  11. Estimated pulmonary artery systolic pressure is 39 mmHg, consistent with mild pulmonary hypertension.  12. No pericardial effusion seen.  13. No prior echocardiogram is available for comparison.      < end of copied text >      CARDIAC CATHETERIZATION  FINDINGS:   Cath Lab Report    Diagnostic Cardiologist:  Beka Moses MD   Interventional Cardiologist: Beka Moses MD   Fellow:  Larisa Wagner     Procedures Performed:               1.    Arterial Access - Right Femoral   2.    Venous Access - Right Femoral   3.    RHC   4.    Left Heart Cath   5.    Diagnostic Coronary Angiography   6.    PCI: Rotational Atherectomy   7.    PCI: Intravascular Lithotripsy   8.    PCI: [place]   9.    IVUS   10.   AngioSeal   11.   Mynx     Conclusions:   Normal LVEF 70%     Moderate Aortic stenosis   Mild Left Main   Mild to moderate diffuse and calcified stenosis of the proximla and  mid LAD with a 60% stenosis in the mid-distal LAD  Significant and severe stenosis of the proximal and mid LCX treated  with rotational atherectomy, intravascular Lithotripsy and  [place] stent   Mild disease of the RCA   Recommendations:   Aspirin and Plavix     Acute complication:    No complications         Allergies  pineapple (Anaphylaxis)  adhesives (Pruritus; Blisters)  penicillin (Hives)            MEDICATIONS  (HOME):  · 	magnesium oxide 400 mg oral tablet: 1 tab(s) orally 2 times a day  · 	clopidogrel 75 mg oral tablet: 1 tab(s) orally once a day  · 	aspirin 81 mg oral tablet, chewable: 1 tab(s) orally once a day  · 	metoprolol succinate 50 mg oral tablet, extended release: 2 tab(s) orally once a day  · 	atorvastatin 40 mg oral tablet: 1 tab(s) orally once a day (at bedtime)  · 	metFORMIN 500 mg oral tablet: 1 tab(s) orally 3 times a day Hold For 2 days And restart 24 morning  · 	tamsulosin 0.4 mg oral capsule: Last Dose Taken:  , 1 cap(s) orally once a day  · 	Entresto 24 mg-26 mg oral tablet: 1 tab(s) orally 2 times a day  · 	levothyroxine 125 mcg (0.125 mg) oral tablet: Last Dose Taken:  , 1 tab(s) orally once a day  · 	furosemide 40 mg oral tablet: 1 tab(s) orally once a day  · 	ARIPiprazole 5 mg oral tablet: Last Dose Taken:  , 1 tab(s) orally once a day (at bedtime)  · 	lamoTRIgine 50 mg oral tablet, disintegratin tab(s) orally once a day  · 	amLODIPine 5 mg oral tablet: Last Dose Taken:  , 1 tab(s) orally once a day      FAMILY HISTORY:  Family history of pancreatic cancer (Sibling)  Family history of early CAD  Family history of diabetes mellitus  Family history of early CAD  FH: type 2 diabetes mellitus          CIGARETTES: NEVER SMOKER    ALCOHOL: DENIES    REVIEW OF SYSTEMS:  CONSTITUTIONAL: No fever, or weight loss, + fatigue  EYES: No eye pain, visual disturbances, or discharge  ENMT:  No difficulty hearing, tinnitus, vertigo; No sinus or throat pain  NECK: No pain or stiffness  RESPIRATORY: No cough, wheezing, chills or hemoptysis; + Shortness of Breath  CARDIOVASCULAR: No chest pain, palpitations, passing out, dizziness, or leg swelling  GASTROINTESTINAL: No abdominal or epigastric pain. No nausea, vomiting, or hematemesis; No diarrhea or constipation. No melena or hematochezia.  GENITOURINARY: No dysuria, frequency, hematuria, or incontinence  NEUROLOGICAL: No headaches, memory loss, loss of strength, numbness, or tremors  SKIN: No itching, burning, rashes, or lesions   ENDOCRINE: No heat or cold intolerance; No hair loss  MUSCULOSKELETAL: No joint pain or swelling; No muscle, back, or extremity pain  PSYCHIATRIC: No depression, anxiety, mood swings, or difficulty sleeping  HEME/LYMPH: No easy bruising, or bleeding gums  ALLERY AND IMMUNOLOGIC: No hives or eczema	    Vital Signs Last 24 Hrs  T(C): 36.8 (2024 16:48), Max: 36.9 (2024 10:37)  T(F): 98.3 (2024 16:48), Max: 98.5 (2024 10:37)  HR: 59 (2024 16:48) (56 - 76)  BP: 160/69 (2024 16:48) (144/79 - 160/69)  BP(mean): --  RR: 18 (2024 16:48) (18 - 18)  SpO2: 100% (2024 16:48) (94% - 100%)    Parameters below as of 2024 16:48  Patient On (Oxygen Delivery Method): room air        Daily     Daily Weight in k.4 (2024 01:08)    I&O's Detail      PHYSICAL EXAM:  Appearance: Normal appearance	  HEENT:   Normal oral mucosa, PERRL, EOMI, sclera non-icteric	  Cardiovascular: Normal S1 S2, No JVD, 2/6 Systolic murmurs, No carotid bruits, No peripheral edema  Respiratory: Lungs clear to auscultation	  Psychiatry: A & O x 3, Mood & affect appropriate  Gastrointestinal:  Soft, Non-tender, + BS, no bruits	  Skin: No rashes, No ecchymoses, No cyanosis  Neurologic: Grossly non-focal with full strength in all four extremities  Extremities: Normal range of motion, No clubbing, cyanosis or edema  Vascular: Peripheral pulses bilaterally      INTERPRETATION OF TELEMETRY:  Sinus Jayce 40-60's    ECG: Sinus Bradycardia, HR 51, Non specific ST-T wave abnormalities          LABS:                        8.6    6.24  )-----------( 262      ( 2024 04:50 )             27.3     04-18    141  |  107  |  37.3<H>  ----------------------------<  127<H>  4.4   |  24.0  |  1.44<H>    Ca    9.1      2024 04:50  Phos  4.0     04-18  Mg     1.7     04-18      Urinalysis Basic - ( 2024 04:50 )    Color: x / Appearance: x / SG: x / pH: x  Gluc: 127 mg/dL / Ketone: x  / Bili: x / Urobili: x   Blood: x / Protein: x / Nitrite: x   Leuk Esterase: x / RBC: x / WBC x   Sq Epi: x / Non Sq Epi: x / Bacteria: x          Assessment:  83 y/o female with PMH of HTN, HLD, TIA, Basal ganglia hemorrhage in , encephalomalacia, s/p right ventriculostomy s/p rt  shunt placement, achilles tendon repair, history of Moderate AS, Paroxysmal A-Fib (Not on anticoagulation therapy due to previous brain hemorrhage), DM-2, CKD-3, and CAD with PCI, Recent Right and Left heart Cath 2024 showed Normal LVEF 70%, moderate AS (Aortic valve area 1.34) Mild Left Main disease, Mild to moderate diffuse and calcified stenosis of the proximal and mid LAD with a 60% stenosis in the mid-distal LAD, Significant and severe stenosis of the proximal and mid LCX treated with rotational atherectomy, intravascular Lithotripsy and [place] stent, and Mild disease of the RCA. Pt  was sent to the ED for abnormal outpatient lab work. Patient had blood work done 24 & got notified that her Hgb was 8.0 down from 12.0.  She reports falling on her right hip while at the diner on 24, and states she did not seek medical intervention. She states she has been having pain in her right hip since the fall.    Asked to evaluate Pt for questionable intermittent episodes of Tachycardia.    - Telemetry review shows Sinus Bradycardia, APC's with large amounts of artifact, No Tachycardia noted.  - ECG today shows Sinus Bradycardia, HR 51, Non specific ST-T wave abnormalities  - Pt denies chest pain and palpitations, complains of SOB on exertion, O2 Sat's stable   - BP stable, HR 40-60's will decrease Metoprolol dose to 25 mg BID  - Pt not a candidate for A-Fib anticoagulation therapy due to history of intracranial bleed and mechanical falls  - H/H remains stable 8..3, Creatinine improving 1.44   - Continue ASA and Plavix for recent [place]  - Echo shows Normal LVEF 55-60%, Mild LVH, Normal RV function, Mild-moderate AS, Mild AI, Mild-moderate MR, Mild TR, Mild pulmonary hypertension.    Plan:  Anemia/ Sinus Bradycardia  Echo shows Normal LVEF 55-60%, Mild LVH, Normal RV function, Mild-moderate AS, Mild AI, Mild-moderate MR, Mild TR, Mild pulmonary hypertension.  Pt not a candidate for A-Fib anticoagulation therapy due to history of intracranial bleed and mechanical falls  H/H remains stable 8.6/.3, Creatinine improving 1.44   Continue ASA and Plavix daily  Decrease Toprol XL to 25 mg PO BID

## 2024-04-18 NOTE — DISCHARGE NOTE PROVIDER - HOSPITAL COURSE
85 y/o female with PMH of HTN, HLD, TIA, Basal ganglia hemorrhage in 2021, encephalomalacia s/p rt s/p right ventriculostomy s/p rt  shunt placement, achilles tendon repair, history of severe AS, AF, DM-2, CKD-3 was sent to the ED for abnormal outpatient lab work. Patient had blood work done 04/11/24 got a call yesterday that Hb was 8.0 from 12. She reported falling on her right hip while at the diner on 04/04/24, did not seek medical intervention, but reported pain in her right hip.     She was admitted to the floor where her H/H was trended which remained stable.    83 y/o female with PMH of HTN, HLD, TIA, Basal ganglia hemorrhage in 2021, encephalomalacia s/p rt s/p right ventriculostomy s/p rt  shunt placement, achilles tendon repair, history of severe AS, AF, DM-2, CKD-3 was sent to the ED for abnormal outpatient lab work. Patient had blood work done 04/11/24 got a call yesterday that Hb was 8.0 from 12. She reported falling on her right hip while at the diner on 04/04/24, did not seek medical intervention, but reported pain in her right hip.     She was admitted to the floor where her H/H was trended which remained stable. Episodes of tachycardia and bradycardia with suspected transient AFIB/Aflutter. EP was consulted .   85 y/o female with PMH of HTN, HLD, TIA, Basal ganglia hemorrhage in 2021, encephalomalacia s/p rt s/p right ventriculostomy s/p rt  shunt placement, achilles tendon repair, history of severe AS, AF, DM-2, CKD-3 was sent to the ED for abnormal outpatient lab work. Patient had blood work done 04/11/24 got a call yesterday that Hb was 8.0 from 12. She reported falling on her right hip while at the diner on 04/04/24, did not seek medical intervention, but reported pain in her right hip.     She was admitted to the floor where her H/H was trended which remained stable. While on the floor she has episodes of tachycardia and bradycardia with possible transient AFIB/Aflutter. EP was consulted and determined that the episodes were a result of artifact. Her dose of metoprolol was decreased to 25mg XR BID for sinus bradycardia. She is medically stable for discharge at this time.    85 y/o female with PMH of HTN, HLD, TIA, Basal ganglia hemorrhage in 2021, encephalomalacia s/p rt s/p right ventriculostomy s/p rt  shunt placement, achilles tendon repair, history of severe AS, AF, DM-2, CKD-3 was sent to the ED for abnormal outpatient lab work. Patient had blood work done 04/11/24 got a call yesterday that Hb was 8.0 from 12. She reported falling on her right hip while at the diner on 04/04/24, did not seek medical intervention, but reported pain in her right hip.     She was admitted to the floor where her H/H was trended which remained stable. While on the floor she has episodes of tachycardia and bradycardia with possible transient AFIB/Aflutter. primary cardio was consulted and determined that the episodes were a result of artifact? Her dose of metoprolol was decreased to 25mg XR BID for sinus bradycardia. She is medically stable for discharge at this time.

## 2024-04-18 NOTE — PROGRESS NOTE ADULT - SUBJECTIVE AND OBJECTIVE BOX
[facility] Division of Hospital Medicine    SUBJECTIVE / OVERNIGHT EVENTS:  Reports of bradycardic episode overnight while sleeping.   This am with frequent transient/ intermittent episodes of tachycardia   + ZEPEDA  Patient denies chest pain, abd pain, N/V, fever, chills, dysuria or any other complaints. All remainder ROS negative.     MEDICATIONS  (STANDING):  amLODIPine   Tablet 5 milliGRAM(s) Oral daily  ARIPiprazole 5 milliGRAM(s) Oral at bedtime  aspirin  chewable 81 milliGRAM(s) Oral daily  atorvastatin 40 milliGRAM(s) Oral at bedtime  clopidogrel Tablet 75 milliGRAM(s) Oral daily  dextrose 10% Bolus 125 milliLiter(s) IV Bolus once  dextrose 5%. 1000 milliLiter(s) (50 mL/Hr) IV Continuous <Continuous>  dextrose 5%. 1000 milliLiter(s) (100 mL/Hr) IV Continuous <Continuous>  dextrose 50% Injectable 25 Gram(s) IV Push once  dextrose 50% Injectable 12.5 Gram(s) IV Push once  glucagon  Injectable 1 milliGRAM(s) IntraMuscular once  insulin lispro (ADMELOG) corrective regimen sliding scale   SubCutaneous at bedtime  insulin lispro (ADMELOG) corrective regimen sliding scale   SubCutaneous three times a day before meals  lactated ringers. 500 milliLiter(s) (75 mL/Hr) IV Continuous <Continuous>  lamoTRIgine 50 milliGRAM(s) Oral daily  levothyroxine 125 MICROGram(s) Oral daily  metoprolol succinate ER 50 milliGRAM(s) Oral every 12 hours  sacubitril 24 mG/valsartan 26 mG 1 Tablet(s) Oral two times a day  tamsulosin 0.4 milliGRAM(s) Oral at bedtime    MEDICATIONS  (PRN):  acetaminophen     Tablet .. 650 milliGRAM(s) Oral every 6 hours PRN Temp greater or equal to 38C (100.4F), Mild Pain (1 - 3)  aluminum hydroxide/magnesium hydroxide/simethicone Suspension 30 milliLiter(s) Oral every 4 hours PRN Dyspepsia  dextrose Oral Gel 15 Gram(s) Oral once PRN Blood Glucose LESS THAN 70 milliGRAM(s)/deciliter  melatonin 3 milliGRAM(s) Oral at bedtime PRN Insomnia  ondansetron Injectable 4 milliGRAM(s) IV Push every 8 hours PRN Nausea and/or Vomiting        I&O's Summary      PHYSICAL EXAM:  Vital Signs Last 24 Hrs  T(C): 36.9 (18 Apr 2024 10:37), Max: 36.9 (18 Apr 2024 10:37)  T(F): 98.5 (18 Apr 2024 10:37), Max: 98.5 (18 Apr 2024 10:37)  HR: 76 (18 Apr 2024 10:37) (55 - 76)  BP: 144/79 (18 Apr 2024 10:37) (144/79 - 166/84)  BP(mean): --  RR: 18 (18 Apr 2024 10:37) (18 - 18)  SpO2: 94% (18 Apr 2024 10:37) (94% - 98%)    Parameters below as of 18 Apr 2024 10:37  Patient On (Oxygen Delivery Method): room air            CONSTITUTIONAL: NAD, appears stated age  ENMT: Moist oral mucosa, no pharyngeal injection or exudates; normal dentition  RESPIRATORY: Normal respiratory effort; clear to auscultation bilaterally  CARDIOVASCULAR: Regular rate and rhythm, normal S1 and S2, no murmur/rub/gallop; Peripheral pulses are 2+ bilaterally  ABDOMEN: Nontender to palpation, normoactive bowel sounds, no rebound/guarding;   MUSCLOSKELETAL:  No clubbing or cyanosis of digits; no joint swelling or tenderness to palpation  PSYCH: A+O to person, place, and time; affect appropriate  NEUROLOGY: CN 2-12 are intact and symmetric; no gross sensory deficits;   SKIN: No rashes; no palpable lesions    LABS:                        8.6    6.24  )-----------( 262      ( 18 Apr 2024 04:50 )             27.3     04-18    141  |  107  |  37.3<H>  ----------------------------<  127<H>  4.4   |  24.0  |  1.44<H>    Ca    9.1      18 Apr 2024 04:50  Phos  4.0     04-18  Mg     1.7     04-18            Urinalysis Basic - ( 18 Apr 2024 04:50 )    Color: x / Appearance: x / SG: x / pH: x  Gluc: 127 mg/dL / Ketone: x  / Bili: x / Urobili: x   Blood: x / Protein: x / Nitrite: x   Leuk Esterase: x / RBC: x / WBC x   Sq Epi: x / Non Sq Epi: x / Bacteria: x        CAPILLARY BLOOD GLUCOSE      POCT Blood Glucose.: 145 mg/dL (18 Apr 2024 08:43)  POCT Blood Glucose.: 234 mg/dL (17 Apr 2024 21:20)  POCT Blood Glucose.: 145 mg/dL (17 Apr 2024 17:15)  POCT Blood Glucose.: 180 mg/dL (17 Apr 2024 12:48)        RADIOLOGY & ADDITIONAL TESTS:  Results Reviewed:   Imaging Personally Reviewed:  Electrocardiogram Personally Reviewed:                                          
Hospitalist Progress Note    Chief Complaint:  Symptomatic anemia    SUBJECTIVE / OVERNIGHT EVENTS:  No events overnight, patient seen at bedside, in NAD, no complaints today. Patient denies chest pain, SOB, abd pain, N/V, fever, chills, dysuria or any other complaints. All remainder ROS negative.     MEDICATIONS  (STANDING):  amLODIPine   Tablet 5 milliGRAM(s) Oral daily  ARIPiprazole 5 milliGRAM(s) Oral at bedtime  aspirin  chewable 81 milliGRAM(s) Oral daily  atorvastatin 40 milliGRAM(s) Oral at bedtime  clopidogrel Tablet 75 milliGRAM(s) Oral daily  dextrose 10% Bolus 125 milliLiter(s) IV Bolus once  dextrose 5%. 1000 milliLiter(s) (50 mL/Hr) IV Continuous <Continuous>  dextrose 5%. 1000 milliLiter(s) (100 mL/Hr) IV Continuous <Continuous>  dextrose 50% Injectable 25 Gram(s) IV Push once  dextrose 50% Injectable 12.5 Gram(s) IV Push once  glucagon  Injectable 1 milliGRAM(s) IntraMuscular once  insulin lispro (ADMELOG) corrective regimen sliding scale   SubCutaneous at bedtime  insulin lispro (ADMELOG) corrective regimen sliding scale   SubCutaneous three times a day before meals  lamoTRIgine 50 milliGRAM(s) Oral daily  levothyroxine 125 MICROGram(s) Oral daily  metoprolol succinate ER 50 milliGRAM(s) Oral every 12 hours  sacubitril 24 mG/valsartan 26 mG 1 Tablet(s) Oral two times a day  tamsulosin 0.4 milliGRAM(s) Oral at bedtime    MEDICATIONS  (PRN):  acetaminophen     Tablet .. 650 milliGRAM(s) Oral every 6 hours PRN Temp greater or equal to 38C (100.4F), Mild Pain (1 - 3)  aluminum hydroxide/magnesium hydroxide/simethicone Suspension 30 milliLiter(s) Oral every 4 hours PRN Dyspepsia  dextrose Oral Gel 15 Gram(s) Oral once PRN Blood Glucose LESS THAN 70 milliGRAM(s)/deciliter  melatonin 3 milliGRAM(s) Oral at bedtime PRN Insomnia  ondansetron Injectable 4 milliGRAM(s) IV Push every 8 hours PRN Nausea and/or Vomiting        I&O's Summary      PHYSICAL EXAM:  Vital Signs Last 24 Hrs  T(C): 36.4 (17 Apr 2024 07:59), Max: 36.7 (17 Apr 2024 04:42)  T(F): 97.6 (17 Apr 2024 07:59), Max: 98 (17 Apr 2024 04:42)  HR: 57 (17 Apr 2024 07:59) (48 - 58)  BP: 163/79 (17 Apr 2024 07:59) (144/67 - 163/79)  BP(mean): --  RR: 18 (17 Apr 2024 07:59) (18 - 18)  SpO2: 94% (17 Apr 2024 07:59) (94% - 98%)    Parameters below as of 17 Apr 2024 07:59  Patient On (Oxygen Delivery Method): room air            CONSTITUTIONAL: NAD,  well-groomed  HEENMT: NC/AT, PERRLA, EOMI, Moist oral mucosa, no pharyngeal injection or exudates; normal dentition  RESPIRATORY: BLAE, No adventitious Lungs Sounds  CARDIOVASCULAR: S1/S2+, RRR, no MRG, No LE Edema  ABDOMEN: Soft, NT/ND, BS+, No guarding  MSK:  Moves limbs with purpose and direction; no clubbing or cyanosis of digits; no joint swelling or tenderness to palpation  PSYCH: normal mood and affect  NEUROLOGY: AAOx4, CN 2-12 are intact and symmetric; no gross sensory deficits;   SKIN: Warm, Dry, No rashes; no palpable lesions    LABS:                        8.5    5.61  )-----------( 280      ( 17 Apr 2024 03:22 )             26.7     04-17    144  |  107  |  52.9<H>  ----------------------------<  135<H>  4.9   |  25.0  |  1.78<H>    Ca    9.4      17 Apr 2024 03:22  Mg     1.9     04-15    TPro  6.7  /  Alb  3.7  /  TBili  0.4  /  DBili  x   /  AST  15  /  ALT  9   /  AlkPhos  92  04-15    PT/INR - ( 15 Apr 2024 20:10 )   PT: 11.2 sec;   INR: 1.01 ratio         PTT - ( 15 Apr 2024 20:10 )  PTT:26.0 sec      Urinalysis Basic - ( 17 Apr 2024 03:22 )    Color: x / Appearance: x / SG: x / pH: x  Gluc: 135 mg/dL / Ketone: x  / Bili: x / Urobili: x   Blood: x / Protein: x / Nitrite: x   Leuk Esterase: x / RBC: x / WBC x   Sq Epi: x / Non Sq Epi: x / Bacteria: x        CAPILLARY BLOOD GLUCOSE      POCT Blood Glucose.: 160 mg/dL (17 Apr 2024 08:59)  POCT Blood Glucose.: 202 mg/dL (16 Apr 2024 22:40)  POCT Blood Glucose.: 181 mg/dL (16 Apr 2024 15:16)        RADIOLOGY & ADDITIONAL TESTS:  Results Reviewed: Y  Imaging Personally Reviewed: N  Electrocardiogram Personally Reviewed: NEREIDA

## 2024-04-18 NOTE — DISCHARGE NOTE PROVIDER - NSDCMRMEDTOKEN_GEN_ALL_CORE_FT
amLODIPine 5 mg oral tablet: 1 tab(s) orally once a day  ARIPiprazole 5 mg oral tablet: 1 tab(s) orally once a day (at bedtime)  aspirin 81 mg oral tablet, chewable: 1 tab(s) orally once a day  atorvastatin 40 mg oral tablet: 1 tab(s) orally once a day (at bedtime)  BMP, magnmesium: Please send results to Dr. Beka Moses, [address] [place] [address], [address], [place] [address], [phone], Fax: [phone]. Dx: Hypomagnesemia, E83.42  clopidogrel 75 mg oral tablet: 1 tab(s) orally once a day  Entresto 24 mg-26 mg oral tablet: 1 tab(s) orally 2 times a day  furosemide 40 mg oral tablet: 1 tab(s) orally once a day  lamoTRIgine 50 mg oral tablet, disintegratin tab(s) orally once a day  levothyroxine 125 mcg (0.125 mg) oral tablet: 1 tab(s) orally once a day  magnesium oxide 400 mg oral tablet: 1 tab(s) orally 2 times a day  metFORMIN 500 mg oral tablet: 1 tab(s) orally 3 times a day Hold For 2 days And restart 24 morning  metoprolol succinate 50 mg oral tablet, extended release: 2 tab(s) orally once a day  tamsulosin 0.4 mg oral capsule: 1 cap(s) orally once a day   amLODIPine 5 mg oral tablet: 1 tab(s) orally once a day  ARIPiprazole 5 mg oral tablet: 1 tab(s) orally once a day (at bedtime)  aspirin 81 mg oral tablet, chewable: 1 tab(s) orally once a day  atorvastatin 40 mg oral tablet: 1 tab(s) orally once a day (at bedtime)  clopidogrel 75 mg oral tablet: 1 tab(s) orally once a day  Entresto 24 mg-26 mg oral tablet: 1 tab(s) orally 2 times a day  furosemide 40 mg oral tablet: 1 tab(s) orally once a day  lamoTRIgine 50 mg oral tablet, disintegratin tab(s) orally once a day  levothyroxine 125 mcg (0.125 mg) oral tablet: 1 tab(s) orally once a day  magnesium oxide 400 mg oral tablet: 1 tab(s) orally 2 times a day  metFORMIN 500 mg oral tablet: 1 tab(s) orally 3 times a day Hold For 2 days And restart 24 morning  metoprolol succinate 25 mg oral tablet, extended release: 1 tab(s) orally 2 times a day  tamsulosin 0.4 mg oral capsule: 1 cap(s) orally once a day   amLODIPine 5 mg oral tablet: 1 tab(s) orally once a day  ARIPiprazole 5 mg oral tablet: 1 tab(s) orally once a day (at bedtime)  aspirin 81 mg oral tablet, chewable: 1 tab(s) orally once a day  atorvastatin 40 mg oral tablet: 1 tab(s) orally once a day (at bedtime)  clopidogrel 75 mg oral tablet: 1 tab(s) orally once a day  Entresto 24 mg-26 mg oral tablet: 1 tab(s) orally 2 times a day  ferrous fumarate 324 mg (106 mg elemental iron) oral tablet: 1 tab(s) orally once a day  furosemide 40 mg oral tablet: 1 tab(s) orally once a day  lamoTRIgine 50 mg oral tablet, disintegratin tab(s) orally once a day  levothyroxine 125 mcg (0.125 mg) oral tablet: 1 tab(s) orally once a day  magnesium oxide 400 mg oral tablet: 1 tab(s) orally 2 times a day  metFORMIN 500 mg oral tablet: 1 tab(s) orally 3 times a day Hold For 2 days And restart 24 morning  metoprolol succinate 25 mg oral tablet, extended release: 1 tab(s) orally 2 times a day  senna (sennosides) 17.2 mg oral tablet: 1 tab(s) orally once a day (at bedtime)  tamsulosin 0.4 mg oral capsule: 1 cap(s) orally once a day

## 2024-04-18 NOTE — PROGRESS NOTE ADULT - ASSESSMENT
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 She reported falling on her right hip while at the diner on 04/04/24, did not seek medical intervention, but reported pain in her right hip.    #Symptomatic anemia likely due to right hip hematoma s/p fall initial encounter   hgb stable  Monitor CBC, transfuse to keep Hb > 8   Fall precaution   PT evaluation     AF not on AC   Arrythmia - Episodes of transient AF/Aflutter vs Slow aflutter misinterpreted as tachycardia.  Aurelia consulted and to evaluate.   Will cont current dose of metoprolol     #PASCALE on CKD-3   Cr. 1.98 on admission.   Likely due to anemia?   Patient on Entresto 24-26mg bid   Improved  Cont to trend     #CAD/HTN/HLD   Patient recently had stent placed in December   Plavix 75mg   Aspirin 81mg   Monitor CBC   Metroprolol ER 50mg bid with holding parameters   Amlodipine 5mg   Atorvastatin 40mg     #Hypothyroidism   Synthroid 125mcg     #DM-2   Hold Metformin 500mg tid   Insulin sliding scale     #Hx of basal ganglia hemorrhage   Lamotirigine 50mg (patient has her medication list that did not contain this medication but she said she is still taking it under her tongue)     #Supportive   DVT prophylaxis: CSD   Diet: soft bite side       Acute    
85 y/o female with PMH of HTN, HLD, TIA, Basal ganglia hemorrhage in 2021, encephalomalacia s/p rt s/p right ventriculostomy s/p rt  shunt placement, achilles tendon repair, history of severe AS, AF, DM-2, CKD-3 was sent to the ED for abnormal outpatient lab work. Patient had blood work done 04/11/24 got a call yesterday that Hb was 8.0 from 12. She reported falling on her right hip while at the diner on 04/04/24, did not seek medical intervention, but reported pain in her right hip.    Medication reconciliation done as per the list patient brought, Dr. Diego and prior discharge note.       #Symptomatic anemia likely due to right hip hematoma s/p fall initial encounter   no acute worsening of hip hematoma  hgb stable  Monitor CBC, transfuse to keep Hb > 8   Fall precaution   PT evaluation     #PASCALE on CKD-3   Cr. 1.98 -> 1.78  Likely due to anemia?   Patient on Entresto 24-26mg bid   obtain TTE, may benefit from fluids if EF wnl  Monitor renal function     #CAD/HTN/HLD   Patient recently had stent placed in December   Plavix 75mg   Aspirin 81mg   Monitor CBC   Metroprolol ER 50mg bid with holding parameters   Amlodipine 5mg   Atorvastatin 40mg     #Hypothyroidism   Synthroid 125mcg     #DM-2   Hold Metformin 500mg tid   Insulin sliding scale     #Hx of basal ganglia hemorrhage   Lamotirigine 50mg (patient has her medication list that did not contain this medication but she said she is still taking it under her tongue)     #Supportive   DVT prophylaxis: CSD   Diet: soft bite side     Plan of care discussed with patient

## 2024-04-18 NOTE — DISCHARGE NOTE PROVIDER - NSDCCPCAREPLAN_GEN_ALL_CORE_FT
PRINCIPAL DISCHARGE DIAGNOSIS  Diagnosis: Anemia  Assessment and Plan of Treatment:      PRINCIPAL DISCHARGE DIAGNOSIS  Diagnosis: Acute blood loss anemia  Assessment and Plan of Treatment: Please follow up with your primary for a repeat cell count in 1 week. take iron tabs as prescribed      SECONDARY DISCHARGE DIAGNOSES  Diagnosis: Bradycardia  Assessment and Plan of Treatment: follow up with your cardiologist in 1 week. take metoprolol as prescribed    Diagnosis: Acute anemia  Assessment and Plan of Treatment:     Diagnosis: Hip hematoma, right  Assessment and Plan of Treatment:     Diagnosis: Acute kidney injury superimposed on CKD  Assessment and Plan of Treatment:

## 2024-04-19 ENCOUNTER — TRANSCRIPTION ENCOUNTER (OUTPATIENT)
Age: 84
End: 2024-04-19

## 2024-04-19 VITALS
HEART RATE: 59 BPM | TEMPERATURE: 98 F | DIASTOLIC BLOOD PRESSURE: 61 MMHG | OXYGEN SATURATION: 94 % | RESPIRATION RATE: 18 BRPM | SYSTOLIC BLOOD PRESSURE: 126 MMHG

## 2024-04-19 LAB
ANION GAP SERPL CALC-SCNC: 10 MMOL/L — SIGNIFICANT CHANGE UP (ref 5–17)
BASOPHILS # BLD AUTO: 0.04 K/UL — SIGNIFICANT CHANGE UP (ref 0–0.2)
BASOPHILS NFR BLD AUTO: 0.6 % — SIGNIFICANT CHANGE UP (ref 0–2)
BUN SERPL-MCNC: 41.5 MG/DL — HIGH (ref 8–20)
CALCIUM SERPL-MCNC: 8.8 MG/DL — SIGNIFICANT CHANGE UP (ref 8.4–10.5)
CHLORIDE SERPL-SCNC: 105 MMOL/L — SIGNIFICANT CHANGE UP (ref 96–108)
CO2 SERPL-SCNC: 23 MMOL/L — SIGNIFICANT CHANGE UP (ref 22–29)
CREAT SERPL-MCNC: 1.48 MG/DL — HIGH (ref 0.5–1.3)
EGFR: 35 ML/MIN/1.73M2 — LOW
EOSINOPHIL # BLD AUTO: 0.34 K/UL — SIGNIFICANT CHANGE UP (ref 0–0.5)
EOSINOPHIL NFR BLD AUTO: 5.4 % — SIGNIFICANT CHANGE UP (ref 0–6)
GLUCOSE BLDC GLUCOMTR-MCNC: 140 MG/DL — HIGH (ref 70–99)
GLUCOSE SERPL-MCNC: 129 MG/DL — HIGH (ref 70–99)
HCT VFR BLD CALC: 25.4 % — LOW (ref 34.5–45)
HGB BLD-MCNC: 8.2 G/DL — LOW (ref 11.5–15.5)
IMM GRANULOCYTES NFR BLD AUTO: 0.3 % — SIGNIFICANT CHANGE UP (ref 0–0.9)
LYMPHOCYTES # BLD AUTO: 1.23 K/UL — SIGNIFICANT CHANGE UP (ref 1–3.3)
LYMPHOCYTES # BLD AUTO: 19.6 % — SIGNIFICANT CHANGE UP (ref 13–44)
MAGNESIUM SERPL-MCNC: 2 MG/DL — SIGNIFICANT CHANGE UP (ref 1.6–2.6)
MCHC RBC-ENTMCNC: 29.9 PG — SIGNIFICANT CHANGE UP (ref 27–34)
MCHC RBC-ENTMCNC: 32.3 GM/DL — SIGNIFICANT CHANGE UP (ref 32–36)
MCV RBC AUTO: 92.7 FL — SIGNIFICANT CHANGE UP (ref 80–100)
MONOCYTES # BLD AUTO: 0.78 K/UL — SIGNIFICANT CHANGE UP (ref 0–0.9)
MONOCYTES NFR BLD AUTO: 12.5 % — SIGNIFICANT CHANGE UP (ref 2–14)
NEUTROPHILS # BLD AUTO: 3.85 K/UL — SIGNIFICANT CHANGE UP (ref 1.8–7.4)
NEUTROPHILS NFR BLD AUTO: 61.6 % — SIGNIFICANT CHANGE UP (ref 43–77)
PHOSPHATE SERPL-MCNC: 4.8 MG/DL — HIGH (ref 2.4–4.7)
PLATELET # BLD AUTO: 237 K/UL — SIGNIFICANT CHANGE UP (ref 150–400)
POTASSIUM SERPL-MCNC: 4.5 MMOL/L — SIGNIFICANT CHANGE UP (ref 3.5–5.3)
POTASSIUM SERPL-SCNC: 4.5 MMOL/L — SIGNIFICANT CHANGE UP (ref 3.5–5.3)
RBC # BLD: 2.74 M/UL — LOW (ref 3.8–5.2)
RBC # FLD: 14.7 % — HIGH (ref 10.3–14.5)
SODIUM SERPL-SCNC: 138 MMOL/L — SIGNIFICANT CHANGE UP (ref 135–145)
WBC # BLD: 6.26 K/UL — SIGNIFICANT CHANGE UP (ref 3.8–10.5)
WBC # FLD AUTO: 6.26 K/UL — SIGNIFICANT CHANGE UP (ref 3.8–10.5)

## 2024-04-19 PROCEDURE — 99285 EMERGENCY DEPT VISIT HI MDM: CPT

## 2024-04-19 PROCEDURE — 72125 CT NECK SPINE W/O DYE: CPT | Mod: MC

## 2024-04-19 PROCEDURE — 80048 BASIC METABOLIC PNL TOTAL CA: CPT

## 2024-04-19 PROCEDURE — 83880 ASSAY OF NATRIURETIC PEPTIDE: CPT

## 2024-04-19 PROCEDURE — 70450 CT HEAD/BRAIN W/O DYE: CPT | Mod: MC

## 2024-04-19 PROCEDURE — 86900 BLOOD TYPING SEROLOGIC ABO: CPT

## 2024-04-19 PROCEDURE — 84466 ASSAY OF TRANSFERRIN: CPT

## 2024-04-19 PROCEDURE — 74176 CT ABD & PELVIS W/O CONTRAST: CPT | Mod: MC

## 2024-04-19 PROCEDURE — 97116 GAIT TRAINING THERAPY: CPT

## 2024-04-19 PROCEDURE — 84100 ASSAY OF PHOSPHORUS: CPT

## 2024-04-19 PROCEDURE — 93005 ELECTROCARDIOGRAM TRACING: CPT

## 2024-04-19 PROCEDURE — 93306 TTE W/DOPPLER COMPLETE: CPT

## 2024-04-19 PROCEDURE — 82272 OCCULT BLD FECES 1-3 TESTS: CPT

## 2024-04-19 PROCEDURE — 83735 ASSAY OF MAGNESIUM: CPT

## 2024-04-19 PROCEDURE — 85610 PROTHROMBIN TIME: CPT

## 2024-04-19 PROCEDURE — 73502 X-RAY EXAM HIP UNI 2-3 VIEWS: CPT

## 2024-04-19 PROCEDURE — 83540 ASSAY OF IRON: CPT

## 2024-04-19 PROCEDURE — 86850 RBC ANTIBODY SCREEN: CPT

## 2024-04-19 PROCEDURE — 85730 THROMBOPLASTIN TIME PARTIAL: CPT

## 2024-04-19 PROCEDURE — 36415 COLL VENOUS BLD VENIPUNCTURE: CPT

## 2024-04-19 PROCEDURE — 85025 COMPLETE CBC W/AUTO DIFF WBC: CPT

## 2024-04-19 PROCEDURE — 99239 HOSP IP/OBS DSCHRG MGMT >30: CPT

## 2024-04-19 PROCEDURE — 71250 CT THORAX DX C-: CPT | Mod: MC

## 2024-04-19 PROCEDURE — 85027 COMPLETE CBC AUTOMATED: CPT

## 2024-04-19 PROCEDURE — 83550 IRON BINDING TEST: CPT

## 2024-04-19 PROCEDURE — 97530 THERAPEUTIC ACTIVITIES: CPT

## 2024-04-19 PROCEDURE — 82962 GLUCOSE BLOOD TEST: CPT

## 2024-04-19 PROCEDURE — 80053 COMPREHEN METABOLIC PANEL: CPT

## 2024-04-19 PROCEDURE — 84484 ASSAY OF TROPONIN QUANT: CPT

## 2024-04-19 PROCEDURE — 86901 BLOOD TYPING SEROLOGIC RH(D): CPT

## 2024-04-19 PROCEDURE — 83690 ASSAY OF LIPASE: CPT

## 2024-04-19 PROCEDURE — 71045 X-RAY EXAM CHEST 1 VIEW: CPT

## 2024-04-19 PROCEDURE — 82728 ASSAY OF FERRITIN: CPT

## 2024-04-19 RX ORDER — SACUBITRIL AND VALSARTAN 24; 26 MG/1; MG/1
1 TABLET, FILM COATED ORAL
Qty: 60 | Refills: 0
Start: 2024-04-19 | End: 2024-05-18

## 2024-04-19 RX ORDER — TAMSULOSIN HYDROCHLORIDE 0.4 MG/1
1 CAPSULE ORAL
Qty: 30 | Refills: 0
Start: 2024-04-19 | End: 2024-05-18

## 2024-04-19 RX ORDER — AMLODIPINE BESYLATE 2.5 MG/1
1 TABLET ORAL
Qty: 30 | Refills: 0
Start: 2024-04-19 | End: 2024-05-18

## 2024-04-19 RX ORDER — ARIPIPRAZOLE 15 MG/1
1 TABLET ORAL
Qty: 30 | Refills: 0
Start: 2024-04-19 | End: 2024-05-18

## 2024-04-19 RX ORDER — TAMSULOSIN HYDROCHLORIDE 0.4 MG/1
1 CAPSULE ORAL
Refills: 0 | DISCHARGE

## 2024-04-19 RX ORDER — LAMOTRIGINE 25 MG/1
1 TABLET, ORALLY DISINTEGRATING ORAL
Refills: 0 | DISCHARGE

## 2024-04-19 RX ORDER — SENNA PLUS 8.6 MG/1
1 TABLET ORAL
Qty: 14 | Refills: 0
Start: 2024-04-19 | End: 2024-05-02

## 2024-04-19 RX ORDER — LEVOTHYROXINE SODIUM 125 MCG
1 TABLET ORAL
Qty: 30 | Refills: 0
Start: 2024-04-19 | End: 2024-05-18

## 2024-04-19 RX ORDER — ASPIRIN/CALCIUM CARB/MAGNESIUM 324 MG
1 TABLET ORAL
Qty: 30 | Refills: 0
Start: 2024-04-19 | End: 2024-05-18

## 2024-04-19 RX ORDER — METOPROLOL TARTRATE 50 MG
1 TABLET ORAL
Qty: 0 | Refills: 0 | DISCHARGE
Start: 2024-04-19

## 2024-04-19 RX ORDER — METOPROLOL TARTRATE 50 MG
1 TABLET ORAL
Qty: 60 | Refills: 0
Start: 2024-04-19 | End: 2024-05-18

## 2024-04-19 RX ORDER — ARIPIPRAZOLE 15 MG/1
1 TABLET ORAL
Refills: 0 | DISCHARGE

## 2024-04-19 RX ORDER — LAMOTRIGINE 25 MG/1
1 TABLET, ORALLY DISINTEGRATING ORAL
Qty: 30 | Refills: 0
Start: 2024-04-19 | End: 2024-05-18

## 2024-04-19 RX ORDER — FUROSEMIDE 40 MG
1 TABLET ORAL
Refills: 0 | DISCHARGE

## 2024-04-19 RX ORDER — AMLODIPINE BESYLATE 2.5 MG/1
1 TABLET ORAL
Refills: 0 | DISCHARGE

## 2024-04-19 RX ORDER — SACUBITRIL AND VALSARTAN 24; 26 MG/1; MG/1
1 TABLET, FILM COATED ORAL
Refills: 0 | DISCHARGE

## 2024-04-19 RX ORDER — LEVOTHYROXINE SODIUM 125 MCG
1 TABLET ORAL
Refills: 0 | DISCHARGE

## 2024-04-19 RX ORDER — ATORVASTATIN CALCIUM 80 MG/1
1 TABLET, FILM COATED ORAL
Qty: 30 | Refills: 0
Start: 2024-04-19 | End: 2024-05-18

## 2024-04-19 RX ORDER — FERROUS FUMARATE 350(115)MG
1 TABLET ORAL
Qty: 14 | Refills: 0
Start: 2024-04-19 | End: 2024-05-02

## 2024-04-19 RX ORDER — METFORMIN HYDROCHLORIDE 850 MG/1
1 TABLET ORAL
Qty: 90 | Refills: 0
Start: 2024-04-19 | End: 2024-05-18

## 2024-04-19 RX ORDER — FUROSEMIDE 40 MG
1 TABLET ORAL
Qty: 30 | Refills: 0
Start: 2024-04-19 | End: 2024-05-18

## 2024-04-19 RX ADMIN — AMLODIPINE BESYLATE 5 MILLIGRAM(S): 2.5 TABLET ORAL at 05:35

## 2024-04-19 RX ADMIN — Medication 125 MICROGRAM(S): at 05:35

## 2024-04-19 RX ADMIN — SACUBITRIL AND VALSARTAN 1 TABLET(S): 24; 26 TABLET, FILM COATED ORAL at 05:35

## 2024-04-19 NOTE — DISCHARGE NOTE NURSING/CASE MANAGEMENT/SOCIAL WORK - NSDCPEFALRISK_GEN_ALL_CORE
For information on Fall & Injury Prevention, visit: [email or web address] OR  [email or web address] OR  https://www.cdc.gov/steadi/patient.html

## 2024-04-19 NOTE — DISCHARGE NOTE NURSING/CASE MANAGEMENT/SOCIAL WORK - PATIENT PORTAL LINK FT
You can access the FollowMyHealth Patient Portal offered by [facility] by registering at the following website: [email or web address]. By joining [employer]’s FollowMyHealth portal, you will also be able to view your health information using other applications (apps) compatible with our system.

## 2024-04-20 NOTE — ED ADULT NURSE NOTE - DISCHARGE DATE/TIME
Plastic Surgery Progress Note    S:  No acute events overnight except hypertensive this AM, responded to hydralazine.  Denies pain.  Feels well.    O:  Visit Vitals  /62   Pulse 66   Temp 98.6 °F (37 °C) (Oral)   Resp 16   Ht 5' 3\" (1.6 m)   Wt 67.6 kg (149 lb 0.5 oz)   LMP 10/25/2002   SpO2 95%   BMI 26.40 kg/m²         NAD  A&O  Breast flaps soft, warm, good color and cap refill. Doppler signals strong.   Incisions c/d/I without sign of hematoma or infection  Drains thin serosang    WBC (K/mcL)   Date Value   04/19/2024 10.9     RBC (mil/mcL)   Date Value   04/19/2024 3.29 (L)     HCT (%)   Date Value   04/19/2024 29.4 (L)     HGB (g/dL)   Date Value   04/19/2024 9.8 (L)     PLT (K/mcL)   Date Value   04/19/2024 216     Sodium (mmol/L)   Date Value   04/19/2024 139     Potassium (mmol/L)   Date Value   04/19/2024 3.9     Chloride (mmol/L)   Date Value   04/19/2024 112 (H)     Glucose (mg/dL)   Date Value   04/19/2024 152 (H)     Calcium (mg/dL)   Date Value   04/19/2024 8.1 (L)     Carbon Dioxide (mmol/L)   Date Value   04/19/2024 23     BUN (mg/dL)   Date Value   04/19/2024 11     Creatinine (mg/dL)   Date Value   04/19/2024 0.60       A/P:  POD 2 s/p bilateral ALEIDA.  Progressing.  Intermittent hypertension.  Doesn't seem related to pain.  Will add a prn hydralazine for a few days at home, patient has a blood pressure cuff and will check BP at home 2-3 times daily.  -DVT ppx: lovenox  -diet: regular  -activity/positioning: ambulate  -flap checks: q2h  -breast drains removed  -anticipated discharge: today    
Plastic Surgery Progress Note    S:  No acute events overnight.  Pain well controlled.    O:  Visit Vitals  /56   Pulse 70   Temp 97.8 °F (36.6 °C) (Oral)   Resp 16   Ht 5' 3\" (1.6 m)   Wt 67.6 kg (149 lb 0.5 oz)   LMP 10/25/2002   SpO2 98%   BMI 26.40 kg/m²         NAD  A&O  Breast flaps soft, warm, good color and cap refill. Doppler signals strong.   Incisions c/d/I without sign of hematoma or infection  Drains thin serosang    WBC (K/mcL)   Date Value   04/19/2024 10.9     RBC (mil/mcL)   Date Value   04/19/2024 3.29 (L)     HCT (%)   Date Value   04/19/2024 29.4 (L)     HGB (g/dL)   Date Value   04/19/2024 9.8 (L)     PLT (K/mcL)   Date Value   04/19/2024 216     Sodium (mmol/L)   Date Value   04/19/2024 139     Potassium (mmol/L)   Date Value   04/19/2024 3.9     Chloride (mmol/L)   Date Value   04/19/2024 112 (H)     Glucose (mg/dL)   Date Value   04/19/2024 152 (H)     Calcium (mg/dL)   Date Value   04/19/2024 8.1 (L)     Carbon Dioxide (mmol/L)   Date Value   04/19/2024 23     BUN (mg/dL)   Date Value   04/19/2024 11     Creatinine (mg/dL)   Date Value   04/19/2024 0.60       A/P:  POD 1 s/p bilateral ALEIDA.  Progressing.  Acute blood loss anemia however within expected limits after surgery and no sign of ongoing bleeding, no further labs or intervention required unless symptomatic.  -abx: 23 hr  -DVT ppx: lovenox  -diet: regular  -IVF: cap  -activity/positioning: in chair, ambulate today  -dc mckinley  -flap checks: q1h, q4h from 10 PM to 6 AM if no flap concerns  -anticipated discharge: tomorrow    
Pt discharged to home with all belongings.   
14-Jan-2022 20:51

## 2024-05-28 NOTE — DISCHARGE NOTE NURSING/CASE MANAGEMENT/SOCIAL WORK - NSDCPETBCESMAN_GEN_ALL_CORE
[FreeTextEntry1] : Labs reviewed with patient during this encounter.  Chol = 225 from 240  Patient to continue with present medications - all medications reconciled/reviewed during this visit and listed above Patient to start Vitamin therapy as discussed during visit Increase fluid intake..  RTO for repeat labs in 6 months.  RTO in 6 months for re-evaluation.  Diet teaching for at least 8 minutes.performed in depth during this visit related to cholesterol and cardiovascular risks. At least 25 minutes was spent with patient via video conference reviewing findings/results during this visit. Ample time was provided to answer any questions or address concerns to the patients satisfaction..  Patient was advised that this audiovisual encounter constitutes a billable visit, and that the visit will be billed on the same terms and conditions as an in-office, face-to-face visit. Patient was further advised they may be responsible for any co-payment, co-insurance, or other self-payment they would normally pay for an office visit, unless such self-payment was waived by their insurance carrier.  
If you are a smoker, it is important for your health to stop smoking. Please be aware that second hand smoke is also harmful.

## 2024-07-02 NOTE — ED ADULT NURSE NOTE - NSFALLRSKINDICATORS_ED_ALL_ED
Pt lying in bed, RR even and unlabored. NAD. Sitter at monitor. Call light in reach. Safety needs met. VSS.   yes

## 2024-07-29 NOTE — PROGRESS NOTE ADULT - PROBLEM/PLAN-2
Follow-up with PCP and OB/GYN.    No intercourse until testing has resulted.    Good hygiene and safe sex practices as we discussed.    We will call with results of testing if further treatment is indicated.    Make sure that you treat your symptoms!!  General Cares:  --Take Tylenol/Ibuprofen as needed for fever and pain relief.   Tylenol 1,000 mg every 8 hours as needed.  Ibuprofen 600 mg every 6-8 hours as needed.   May take the above two medications TOGETHER or stagger them as needed.  --Push fluids (water, Pedialyte, Gatorade). Fluids help loosen mucous and make it easier to cough up.  --Get plenty of rest.  --Frequently wash hands to prevent the spread of infection    Nasal Sinus Congestion/ Runny Nose:   --Recommend Mucinex (guaifenesin) as needed for mucous/congestion. Push fluids to also thin secretions.  --Use a saline sinus rinse, spray, or Neti pot to break up the mucous.   --Take a hot shower to relieve sinus pressure, open up the blocked passages in the nose, and ease pain.  --Use an intranasal steroid spray to decrease the inflammation (Flonase).   --Decongestants (Sudafed) dry up mucous. Avoid Sudafed if you have high blood pressure.   --Antihistamines (Zyrtec, Claritin, Allegra) can help relieve symptoms of sneezing and runny nose.   --Use a humidifier or cool mist vaporizer at bedtime to soothe sinus symptoms.     Cough:   --Recommend Mucinex (Guaifenesin) to keep the mucous loose moving. Push fluids to also thin secretions.  --Robitussin (cough suppressant) and cough drops as needed (per package instructions).   --May consider Coricidin HBP if you have a history of high blood pressure (contains Tylenol, antihistamine, and a cough suppressant).  --May apply a thin layer of Kar's vaporub to the chest to help relieve cough due to minor throat and bronchial irritation.  --Drink hot tea with honey and lemon can help soothe a cough.  --Use a humidifier or cool mist vaporizer at bedtime to soothe 
respiratory symptoms.  --Raise your head up while sleeping using extra pillows to make breathing easier and clear your chest of mucus.     Sore Throat:  --Gargle warm salt water every 1-2 hours for pain relief.  --Use Chloraseptic throat sprays.  --Suck on ice chips, popsicles or lozenges.  --Drink hot tea with honey and lemon.  --Use a humidifier (breathing in moist air can help soothe inflamed tissue in the nose and throat).  --Antihistamines (Zyrtec, Claritin, Allegra) can help relieve symptoms of an itchy throat, sneezing and runny nose.   --Use a humidifier or cool mist vaporizer to help prevent dryness in the throat.      
DISPLAY PLAN FREE TEXT

## 2024-08-16 ENCOUNTER — OFFICE (OUTPATIENT)
Dept: URBAN - METROPOLITAN AREA CLINIC 110 | Facility: CLINIC | Age: 84
Setting detail: OPHTHALMOLOGY
End: 2024-08-16
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
## 2024-08-16 PROCEDURE — NO SHOW FE NO SHOW FEE: Performed by: SPECIALIST

## 2024-10-03 NOTE — ED ADULT NURSE REASSESSMENT NOTE - TEMPLATE LIST FOR HEAD TO TOE ASSESSMENT
[facility] Discharge Instructions post BRONCHOSCOPY        10/3/2024           INSTRUCTIONS AFTER YOUR BRONCHOSCOPY    During your exam, the physician:       No restrictions on any aspirin or anti-inflammatory products.    Follow up with Dr. Bereket Wood [phone]      A light diet is recommended for today unless otherwise directed by your physician.    Your physician advises you to refrain from the following for 24 hours.  Do not drive   Do not return to work  Do not consume alcohol   Do not operate any machinery    AVOID strenuous or heavy activity for the next 24 hours.    You may experience a slight sore throat, coughing, hoarseness, a very small amount of bleeding, occasional chest discomfort or a feeling of sleepiness.    Please notify your physician or come to the emergency room if you experience any of the following:  Sudden or severe chest pain  Coughing up a large amount of bright red blood  Shortness of breath  Fever and/or chills      > If you have any questions or concerns, contact Dr. Bereket Wood [phone]    You may experience some localized pain, tenderness or light bruising at the IV medication site.  However, if the area becomes reddened and swollen, contact your physician within 24 hours.    [facility] [place] will be calling you within 3-5 business days to follow up after your procedure.     ADDITIONAL INFORMATION:           Want to Say “Thank You” to a Nurse?  The BRIANNA Award® was created in memory of SHEREE Veloz by his family to say thank you to bedside nurses who provide an outstanding level of care.  Submit a nomination using any method below.     OR    [email or web address]       VIEW ALL

## 2025-02-19 ENCOUNTER — APPOINTMENT (OUTPATIENT)
Dept: RHEUMATOLOGY | Facility: CLINIC | Age: 85
End: 2025-02-19

## 2025-03-05 ENCOUNTER — APPOINTMENT (OUTPATIENT)
Dept: RHEUMATOLOGY | Facility: CLINIC | Age: 85
End: 2025-03-05

## 2025-05-05 NOTE — CONSULT NOTE ADULT - CONSULT REQUESTED BY NAME
Physical Therapy Daily Treatment Note  [address] [facility], [address]  [place] [address]      Patient: Hodan Rangel   : 1979  Referring practitioner: Dyan Schafer [id number];     Therapeutic Exercise:    23     mins  53864;     Neuromuscular Gabrielle:    8    mins  98493;    Therapeutic Activity:     0     mins  09130;     Gait Training      0    mins  61323;  Work Conditioning     0   mins  23274   Self Care  __8___ mins [phone]      Untimed:  Electrical Stimulation:    0     mins  60849 ( );      Timed Treatment:   39   mins   Total Treatment:     39   mins    Isai Crystal, PT  [place] License: [id number]                   Dr Selby

## 2025-08-06 ENCOUNTER — APPOINTMENT (OUTPATIENT)
Dept: RHEUMATOLOGY | Facility: CLINIC | Age: 85
End: 2025-08-06